# Patient Record
Sex: FEMALE | Race: WHITE | HISPANIC OR LATINO | Employment: OTHER | URBAN - METROPOLITAN AREA
[De-identification: names, ages, dates, MRNs, and addresses within clinical notes are randomized per-mention and may not be internally consistent; named-entity substitution may affect disease eponyms.]

---

## 2017-01-01 ENCOUNTER — OFFICE VISIT (OUTPATIENT)
Dept: PRIMARY CARE CLINIC | Facility: CLINIC | Age: 62
End: 2017-01-01

## 2017-01-01 ENCOUNTER — HOSPITAL ENCOUNTER (INPATIENT)
Facility: HOSPITAL | Age: 62
LOS: 11 days | Discharge: HOME | DRG: 281 | End: 2017-12-25
Attending: EMERGENCY MEDICINE | Admitting: HOSPITALIST
Payer: COMMERCIAL

## 2017-01-01 ENCOUNTER — TELEPHONE (OUTPATIENT)
Dept: CARDIAC REHAB | Facility: CLINIC | Age: 62
End: 2017-01-01

## 2017-01-01 VITALS
BODY MASS INDEX: 28.34 KG/M2 | HEART RATE: 74 BPM | DIASTOLIC BLOOD PRESSURE: 56 MMHG | WEIGHT: 162.5 LBS | SYSTOLIC BLOOD PRESSURE: 98 MMHG | OXYGEN SATURATION: 94 % | TEMPERATURE: 98 F | OXYGEN SATURATION: 94 % | HEIGHT: 63 IN | SYSTOLIC BLOOD PRESSURE: 110 MMHG | RESPIRATION RATE: 18 BRPM | BODY MASS INDEX: 28.79 KG/M2 | HEART RATE: 76 BPM | WEIGHT: 159.94 LBS | DIASTOLIC BLOOD PRESSURE: 63 MMHG | HEIGHT: 63 IN

## 2017-01-01 DIAGNOSIS — R06.02 SOB (SHORTNESS OF BREATH): ICD-10-CM

## 2017-01-01 DIAGNOSIS — I35.0 NONRHEUMATIC AORTIC VALVE STENOSIS: ICD-10-CM

## 2017-01-01 DIAGNOSIS — E78.5 HYPERLIPIDEMIA ASSOCIATED WITH TYPE 2 DIABETES MELLITUS: Chronic | ICD-10-CM

## 2017-01-01 DIAGNOSIS — I48.0 PAROXYSMAL ATRIAL FIBRILLATION: Primary | ICD-10-CM

## 2017-01-01 DIAGNOSIS — I21.4 NSTEMI (NON-ST ELEVATION MYOCARDIAL INFARCTION): Primary | ICD-10-CM

## 2017-01-01 DIAGNOSIS — I50.23 ACUTE ON CHRONIC SYSTOLIC HEART FAILURE: Primary | ICD-10-CM

## 2017-01-01 DIAGNOSIS — I35.0 SEVERE AORTIC STENOSIS: ICD-10-CM

## 2017-01-01 DIAGNOSIS — I10 ESSENTIAL HYPERTENSION: ICD-10-CM

## 2017-01-01 DIAGNOSIS — I50.22 CHRONIC SYSTOLIC HEART FAILURE: ICD-10-CM

## 2017-01-01 DIAGNOSIS — I50.9 CHF (CONGESTIVE HEART FAILURE): ICD-10-CM

## 2017-01-01 DIAGNOSIS — R06.02 SHORTNESS OF BREATH: ICD-10-CM

## 2017-01-01 DIAGNOSIS — Z79.01 CHRONIC ANTICOAGULATION: Chronic | ICD-10-CM

## 2017-01-01 DIAGNOSIS — E11.69 HYPERLIPIDEMIA ASSOCIATED WITH TYPE 2 DIABETES MELLITUS: Chronic | ICD-10-CM

## 2017-01-01 DIAGNOSIS — R07.9 CHEST PAIN: ICD-10-CM

## 2017-01-01 DIAGNOSIS — I21.4 NON-ST ELEVATION MYOCARDIAL INFARCTION (NSTEMI): ICD-10-CM

## 2017-01-01 DIAGNOSIS — I25.10 CORONARY ARTERY DISEASE INVOLVING NATIVE CORONARY ARTERY OF NATIVE HEART WITHOUT ANGINA PECTORIS: Chronic | ICD-10-CM

## 2017-01-01 DIAGNOSIS — N18.30 CKD (CHRONIC KIDNEY DISEASE) STAGE 3, GFR 30-59 ML/MIN: Chronic | ICD-10-CM

## 2017-01-01 LAB
ABO + RH BLD: NORMAL
ALBUMIN SERPL BCP-MCNC: 3.5 G/DL
ALLENS TEST: ABNORMAL
ALP SERPL-CCNC: 85 U/L
ALT SERPL W/O P-5'-P-CCNC: 17 U/L
ANION GAP SERPL CALC-SCNC: 10 MMOL/L
ANION GAP SERPL CALC-SCNC: 11 MMOL/L
ANION GAP SERPL CALC-SCNC: 11 MMOL/L
ANION GAP SERPL CALC-SCNC: 12 MMOL/L
ANION GAP SERPL CALC-SCNC: 13 MMOL/L
ANION GAP SERPL CALC-SCNC: 14 MMOL/L
ANION GAP SERPL CALC-SCNC: 14 MMOL/L
ANION GAP SERPL CALC-SCNC: 15 MMOL/L
AORTIC VALVE STENOSIS: ABNORMAL
APTT BLDCRRT: 25.6 SEC
AST SERPL-CCNC: 18 U/L
BASOPHILS # BLD AUTO: 0.05 K/UL
BASOPHILS # BLD AUTO: 0.06 K/UL
BASOPHILS # BLD AUTO: 0.06 K/UL
BASOPHILS # BLD AUTO: 0.07 K/UL
BASOPHILS # BLD AUTO: 0.07 K/UL
BASOPHILS # BLD AUTO: 0.08 K/UL
BASOPHILS # BLD AUTO: 0.09 K/UL
BASOPHILS NFR BLD: 0.8 %
BASOPHILS NFR BLD: 0.9 %
BASOPHILS NFR BLD: 1 %
BASOPHILS NFR BLD: 1 %
BASOPHILS NFR BLD: 1.2 %
BASOPHILS NFR BLD: 1.2 %
BASOPHILS NFR BLD: 1.3 %
BASOPHILS NFR BLD: 1.3 %
BASOPHILS NFR BLD: 1.4 %
BASOPHILS NFR BLD: 1.4 %
BASOPHILS NFR BLD: 1.5 %
BASOPHILS NFR BLD: 1.6 %
BILIRUB SERPL-MCNC: 0.3 MG/DL
BILIRUB UR QL STRIP: NEGATIVE
BILIRUB UR QL STRIP: NEGATIVE
BLD GP AB SCN CELLS X3 SERPL QL: NORMAL
BNP SERPL-MCNC: 1429 PG/ML
BNP SERPL-MCNC: 2414 PG/ML
BUN SERPL-MCNC: 22 MG/DL
BUN SERPL-MCNC: 25 MG/DL
BUN SERPL-MCNC: 25 MG/DL
BUN SERPL-MCNC: 26 MG/DL
BUN SERPL-MCNC: 28 MG/DL
BUN SERPL-MCNC: 28 MG/DL
BUN SERPL-MCNC: 33 MG/DL
BUN SERPL-MCNC: 36 MG/DL
BUN SERPL-MCNC: 37 MG/DL
BUN SERPL-MCNC: 38 MG/DL
CALCIUM SERPL-MCNC: 10 MG/DL
CALCIUM SERPL-MCNC: 9 MG/DL
CALCIUM SERPL-MCNC: 9.5 MG/DL
CALCIUM SERPL-MCNC: 9.6 MG/DL
CALCIUM SERPL-MCNC: 9.7 MG/DL
CALCIUM SERPL-MCNC: 9.7 MG/DL
CALCIUM SERPL-MCNC: 9.8 MG/DL
CALCIUM SERPL-MCNC: 9.9 MG/DL
CALCIUM SERPL-MCNC: 9.9 MG/DL
CHLORIDE SERPL-SCNC: 100 MMOL/L
CHLORIDE SERPL-SCNC: 103 MMOL/L
CHLORIDE SERPL-SCNC: 104 MMOL/L
CHLORIDE SERPL-SCNC: 105 MMOL/L
CHLORIDE SERPL-SCNC: 106 MMOL/L
CHLORIDE SERPL-SCNC: 107 MMOL/L
CHLORIDE SERPL-SCNC: 107 MMOL/L
CHLORIDE SERPL-SCNC: 109 MMOL/L
CHOLEST SERPL-MCNC: 173 MG/DL
CHOLEST/HDLC SERPL: 5.2 {RATIO}
CLARITY UR REFRACT.AUTO: CLEAR
CLARITY UR REFRACT.AUTO: CLEAR
CO2 SERPL-SCNC: 19 MMOL/L
CO2 SERPL-SCNC: 21 MMOL/L
CO2 SERPL-SCNC: 22 MMOL/L
CO2 SERPL-SCNC: 23 MMOL/L
CO2 SERPL-SCNC: 24 MMOL/L
CO2 SERPL-SCNC: 25 MMOL/L
CO2 SERPL-SCNC: 26 MMOL/L
CO2 SERPL-SCNC: 27 MMOL/L
CO2 SERPL-SCNC: 27 MMOL/L
COLOR UR AUTO: NORMAL
COLOR UR AUTO: YELLOW
CREAT SERPL-MCNC: 1.2 MG/DL
CREAT SERPL-MCNC: 1.3 MG/DL
CREAT SERPL-MCNC: 1.3 MG/DL
CREAT SERPL-MCNC: 1.4 MG/DL
CREAT SERPL-MCNC: 1.5 MG/DL
CREAT SERPL-MCNC: 1.6 MG/DL
CREAT SERPL-MCNC: 1.6 MG/DL
CREAT SERPL-MCNC: 1.8 MG/DL
CREAT UR-MCNC: 75 MG/DL
DELSYS: ABNORMAL
DIFFERENTIAL METHOD: ABNORMAL
EOSINOPHIL # BLD AUTO: 0.1 K/UL
EOSINOPHIL # BLD AUTO: 0.2 K/UL
EOSINOPHIL # BLD AUTO: 0.3 K/UL
EOSINOPHIL # BLD AUTO: 0.3 K/UL
EOSINOPHIL # BLD AUTO: 0.4 K/UL
EOSINOPHIL NFR BLD: 1.7 %
EOSINOPHIL NFR BLD: 2.4 %
EOSINOPHIL NFR BLD: 2.5 %
EOSINOPHIL NFR BLD: 2.8 %
EOSINOPHIL NFR BLD: 3 %
EOSINOPHIL NFR BLD: 3.2 %
EOSINOPHIL NFR BLD: 3.3 %
EOSINOPHIL NFR BLD: 3.7 %
EOSINOPHIL NFR BLD: 3.8 %
EOSINOPHIL NFR BLD: 5.1 %
EOSINOPHIL NFR BLD: 5.2 %
EOSINOPHIL NFR BLD: 6.7 %
ERYTHROCYTE [DISTWIDTH] IN BLOOD BY AUTOMATED COUNT: 15.1 %
ERYTHROCYTE [DISTWIDTH] IN BLOOD BY AUTOMATED COUNT: 15.2 %
ERYTHROCYTE [DISTWIDTH] IN BLOOD BY AUTOMATED COUNT: 15.3 %
ERYTHROCYTE [DISTWIDTH] IN BLOOD BY AUTOMATED COUNT: 15.3 %
ERYTHROCYTE [DISTWIDTH] IN BLOOD BY AUTOMATED COUNT: 15.4 %
ERYTHROCYTE [DISTWIDTH] IN BLOOD BY AUTOMATED COUNT: 15.5 %
ERYTHROCYTE [DISTWIDTH] IN BLOOD BY AUTOMATED COUNT: 15.6 %
ERYTHROCYTE [DISTWIDTH] IN BLOOD BY AUTOMATED COUNT: 15.6 %
ERYTHROCYTE [DISTWIDTH] IN BLOOD BY AUTOMATED COUNT: 15.7 %
EST. GFR  (AFRICAN AMERICAN): 34 ML/MIN/1.73 M^2
EST. GFR  (AFRICAN AMERICAN): 39.3 ML/MIN/1.73 M^2
EST. GFR  (AFRICAN AMERICAN): 39.3 ML/MIN/1.73 M^2
EST. GFR  (AFRICAN AMERICAN): 42.4 ML/MIN/1.73 M^2
EST. GFR  (AFRICAN AMERICAN): 46.1 ML/MIN/1.73 M^2
EST. GFR  (AFRICAN AMERICAN): 50.5 ML/MIN/1.73 M^2
EST. GFR  (AFRICAN AMERICAN): 50.5 ML/MIN/1.73 M^2
EST. GFR  (AFRICAN AMERICAN): 55.6 ML/MIN/1.73 M^2
EST. GFR  (NON AFRICAN AMERICAN): 29.5 ML/MIN/1.73 M^2
EST. GFR  (NON AFRICAN AMERICAN): 34 ML/MIN/1.73 M^2
EST. GFR  (NON AFRICAN AMERICAN): 34 ML/MIN/1.73 M^2
EST. GFR  (NON AFRICAN AMERICAN): 36.8 ML/MIN/1.73 M^2
EST. GFR  (NON AFRICAN AMERICAN): 40 ML/MIN/1.73 M^2
EST. GFR  (NON AFRICAN AMERICAN): 43.8 ML/MIN/1.73 M^2
EST. GFR  (NON AFRICAN AMERICAN): 43.8 ML/MIN/1.73 M^2
EST. GFR  (NON AFRICAN AMERICAN): 48.2 ML/MIN/1.73 M^2
ESTIMATED AVG GLUCOSE: 108 MG/DL
ESTIMATED PA SYSTOLIC PRESSURE: 62.91
FACT X PPP CHRO-ACNC: 0.72 IU/ML
GLUCOSE SERPL-MCNC: 101 MG/DL
GLUCOSE SERPL-MCNC: 103 MG/DL
GLUCOSE SERPL-MCNC: 108 MG/DL
GLUCOSE SERPL-MCNC: 108 MG/DL
GLUCOSE SERPL-MCNC: 113 MG/DL
GLUCOSE SERPL-MCNC: 113 MG/DL
GLUCOSE SERPL-MCNC: 114 MG/DL
GLUCOSE SERPL-MCNC: 117 MG/DL
GLUCOSE SERPL-MCNC: 119 MG/DL
GLUCOSE SERPL-MCNC: 121 MG/DL
GLUCOSE SERPL-MCNC: 126 MG/DL
GLUCOSE SERPL-MCNC: 90 MG/DL
GLUCOSE UR QL STRIP: NEGATIVE
GLUCOSE UR QL STRIP: NEGATIVE
HBA1C MFR BLD HPLC: 5.4 %
HCO3 UR-SCNC: 22.1 MMOL/L (ref 24–28)
HCT VFR BLD AUTO: 33.8 %
HCT VFR BLD AUTO: 34.1 %
HCT VFR BLD AUTO: 34.3 %
HCT VFR BLD AUTO: 34.3 %
HCT VFR BLD AUTO: 34.5 %
HCT VFR BLD AUTO: 34.6 %
HCT VFR BLD AUTO: 34.6 %
HCT VFR BLD AUTO: 34.7 %
HCT VFR BLD AUTO: 34.8 %
HCT VFR BLD AUTO: 34.9 %
HCT VFR BLD AUTO: 36.1 %
HCT VFR BLD AUTO: 36.2 %
HDLC SERPL-MCNC: 33 MG/DL
HDLC SERPL: 19.1 %
HGB BLD-MCNC: 10.7 G/DL
HGB BLD-MCNC: 10.8 G/DL
HGB BLD-MCNC: 10.8 G/DL
HGB BLD-MCNC: 10.9 G/DL
HGB BLD-MCNC: 11 G/DL
HGB BLD-MCNC: 11.1 G/DL
HGB BLD-MCNC: 11.2 G/DL
HGB BLD-MCNC: 11.2 G/DL
HGB BLD-MCNC: 11.3 G/DL
HGB BLD-MCNC: 11.4 G/DL
HGB UR QL STRIP: NEGATIVE
HGB UR QL STRIP: NEGATIVE
IMM GRANULOCYTES # BLD AUTO: 0.01 K/UL
IMM GRANULOCYTES # BLD AUTO: 0.02 K/UL
IMM GRANULOCYTES # BLD AUTO: 0.03 K/UL
IMM GRANULOCYTES # BLD AUTO: 0.03 K/UL
IMM GRANULOCYTES # BLD AUTO: 0.04 K/UL
IMM GRANULOCYTES NFR BLD AUTO: 0.2 %
IMM GRANULOCYTES NFR BLD AUTO: 0.3 %
IMM GRANULOCYTES NFR BLD AUTO: 0.5 %
IMM GRANULOCYTES NFR BLD AUTO: 0.6 %
IMM GRANULOCYTES NFR BLD AUTO: 0.8 %
INR PPP: 1.6
KETONES UR QL STRIP: NEGATIVE
KETONES UR QL STRIP: NEGATIVE
LDLC SERPL CALC-MCNC: 100.2 MG/DL
LEUKOCYTE ESTERASE UR QL STRIP: NEGATIVE
LEUKOCYTE ESTERASE UR QL STRIP: NEGATIVE
LYMPHOCYTES # BLD AUTO: 1.9 K/UL
LYMPHOCYTES # BLD AUTO: 2.1 K/UL
LYMPHOCYTES # BLD AUTO: 2.1 K/UL
LYMPHOCYTES # BLD AUTO: 2.2 K/UL
LYMPHOCYTES # BLD AUTO: 2.3 K/UL
LYMPHOCYTES # BLD AUTO: 2.3 K/UL
LYMPHOCYTES # BLD AUTO: 2.4 K/UL
LYMPHOCYTES # BLD AUTO: 2.7 K/UL
LYMPHOCYTES # BLD AUTO: 2.7 K/UL
LYMPHOCYTES # BLD AUTO: 2.8 K/UL
LYMPHOCYTES # BLD AUTO: 2.9 K/UL
LYMPHOCYTES # BLD AUTO: 3.8 K/UL
LYMPHOCYTES NFR BLD: 31.7 %
LYMPHOCYTES NFR BLD: 33.3 %
LYMPHOCYTES NFR BLD: 33.8 %
LYMPHOCYTES NFR BLD: 34.2 %
LYMPHOCYTES NFR BLD: 34.8 %
LYMPHOCYTES NFR BLD: 35.7 %
LYMPHOCYTES NFR BLD: 38.6 %
LYMPHOCYTES NFR BLD: 47.8 %
LYMPHOCYTES NFR BLD: 48.3 %
LYMPHOCYTES NFR BLD: 50.9 %
LYMPHOCYTES NFR BLD: 51.4 %
LYMPHOCYTES NFR BLD: 51.6 %
MAGNESIUM SERPL-MCNC: 2.1 MG/DL
MCH RBC QN AUTO: 29.9 PG
MCH RBC QN AUTO: 30.2 PG
MCH RBC QN AUTO: 30.3 PG
MCH RBC QN AUTO: 30.4 PG
MCH RBC QN AUTO: 30.6 PG
MCH RBC QN AUTO: 30.6 PG
MCH RBC QN AUTO: 30.7 PG
MCH RBC QN AUTO: 30.9 PG
MCH RBC QN AUTO: 30.9 PG
MCH RBC QN AUTO: 31 PG
MCH RBC QN AUTO: 31.1 PG
MCH RBC QN AUTO: 31.2 PG
MCHC RBC AUTO-ENTMCNC: 31 G/DL
MCHC RBC AUTO-ENTMCNC: 31.2 G/DL
MCHC RBC AUTO-ENTMCNC: 31.2 G/DL
MCHC RBC AUTO-ENTMCNC: 31.5 G/DL
MCHC RBC AUTO-ENTMCNC: 31.5 G/DL
MCHC RBC AUTO-ENTMCNC: 31.7 G/DL
MCHC RBC AUTO-ENTMCNC: 31.8 G/DL
MCHC RBC AUTO-ENTMCNC: 32 G/DL
MCHC RBC AUTO-ENTMCNC: 32.1 G/DL
MCHC RBC AUTO-ENTMCNC: 32.2 G/DL
MCHC RBC AUTO-ENTMCNC: 32.3 G/DL
MCHC RBC AUTO-ENTMCNC: 32.8 G/DL
MCV RBC AUTO: 100 FL
MCV RBC AUTO: 95 FL
MCV RBC AUTO: 96 FL
MCV RBC AUTO: 97 FL
MITRAL VALVE REGURGITATION: ABNORMAL
MODE: ABNORMAL
MONOCYTES # BLD AUTO: 0.3 K/UL
MONOCYTES # BLD AUTO: 0.4 K/UL
MONOCYTES # BLD AUTO: 0.5 K/UL
MONOCYTES NFR BLD: 5.1 %
MONOCYTES NFR BLD: 5.2 %
MONOCYTES NFR BLD: 5.5 %
MONOCYTES NFR BLD: 5.6 %
MONOCYTES NFR BLD: 6.3 %
MONOCYTES NFR BLD: 7.6 %
MONOCYTES NFR BLD: 7.6 %
MONOCYTES NFR BLD: 7.9 %
MONOCYTES NFR BLD: 7.9 %
MONOCYTES NFR BLD: 8.2 %
MONOCYTES NFR BLD: 8.9 %
MONOCYTES NFR BLD: 9.6 %
NEUTROPHILS # BLD AUTO: 1.6 K/UL
NEUTROPHILS # BLD AUTO: 1.8 K/UL
NEUTROPHILS # BLD AUTO: 1.8 K/UL
NEUTROPHILS # BLD AUTO: 2.2 K/UL
NEUTROPHILS # BLD AUTO: 2.3 K/UL
NEUTROPHILS # BLD AUTO: 3.2 K/UL
NEUTROPHILS # BLD AUTO: 3.3 K/UL
NEUTROPHILS # BLD AUTO: 3.4 K/UL
NEUTROPHILS # BLD AUTO: 3.6 K/UL
NEUTROPHILS # BLD AUTO: 3.6 K/UL
NEUTROPHILS # BLD AUTO: 3.8 K/UL
NEUTROPHILS # BLD AUTO: 4.7 K/UL
NEUTROPHILS NFR BLD: 32.3 %
NEUTROPHILS NFR BLD: 32.3 %
NEUTROPHILS NFR BLD: 33.7 %
NEUTROPHILS NFR BLD: 39.2 %
NEUTROPHILS NFR BLD: 42.7 %
NEUTROPHILS NFR BLD: 47.4 %
NEUTROPHILS NFR BLD: 51.6 %
NEUTROPHILS NFR BLD: 52.4 %
NEUTROPHILS NFR BLD: 55.8 %
NEUTROPHILS NFR BLD: 57 %
NEUTROPHILS NFR BLD: 57 %
NEUTROPHILS NFR BLD: 58.5 %
NITRITE UR QL STRIP: NEGATIVE
NITRITE UR QL STRIP: NEGATIVE
NONHDLC SERPL-MCNC: 140 MG/DL
NRBC BLD-RTO: 0 /100 WBC
PCO2 BLDA: 31.2 MMHG (ref 35–45)
PH SMN: 7.46 [PH] (ref 7.35–7.45)
PH UR STRIP: 5 [PH] (ref 5–8)
PH UR STRIP: 6 [PH] (ref 5–8)
PHOSPHATE SERPL-MCNC: 4.3 MG/DL
PLATELET # BLD AUTO: 389 K/UL
PLATELET # BLD AUTO: 473 K/UL
PLATELET # BLD AUTO: 492 K/UL
PLATELET # BLD AUTO: 494 K/UL
PLATELET # BLD AUTO: 496 K/UL
PLATELET # BLD AUTO: 505 K/UL
PLATELET # BLD AUTO: 508 K/UL
PLATELET # BLD AUTO: 514 K/UL
PLATELET # BLD AUTO: 521 K/UL
PLATELET # BLD AUTO: 525 K/UL
PLATELET # BLD AUTO: 526 K/UL
PLATELET # BLD AUTO: 528 K/UL
PMV BLD AUTO: 10 FL
PMV BLD AUTO: 10 FL
PMV BLD AUTO: 10.1 FL
PMV BLD AUTO: 10.4 FL
PMV BLD AUTO: 9.7 FL
PMV BLD AUTO: 9.8 FL
PMV BLD AUTO: 9.9 FL
PMV BLD AUTO: 9.9 FL
PO2 BLDA: 56 MMHG (ref 80–100)
POC BE: -2 MMOL/L
POC SATURATED O2: 91 % (ref 95–100)
POC TCO2: 23 MMOL/L (ref 23–27)
POCT GLUCOSE: 100 MG/DL (ref 70–110)
POCT GLUCOSE: 102 MG/DL (ref 70–110)
POCT GLUCOSE: 104 MG/DL (ref 70–110)
POCT GLUCOSE: 105 MG/DL (ref 70–110)
POCT GLUCOSE: 107 MG/DL (ref 70–110)
POCT GLUCOSE: 109 MG/DL (ref 70–110)
POCT GLUCOSE: 112 MG/DL (ref 70–110)
POCT GLUCOSE: 112 MG/DL (ref 70–110)
POCT GLUCOSE: 115 MG/DL (ref 70–110)
POCT GLUCOSE: 118 MG/DL (ref 70–110)
POCT GLUCOSE: 118 MG/DL (ref 70–110)
POCT GLUCOSE: 120 MG/DL (ref 70–110)
POCT GLUCOSE: 121 MG/DL (ref 70–110)
POCT GLUCOSE: 122 MG/DL (ref 70–110)
POCT GLUCOSE: 123 MG/DL (ref 70–110)
POCT GLUCOSE: 125 MG/DL (ref 70–110)
POCT GLUCOSE: 126 MG/DL (ref 70–110)
POCT GLUCOSE: 127 MG/DL (ref 70–110)
POCT GLUCOSE: 131 MG/DL (ref 70–110)
POCT GLUCOSE: 132 MG/DL (ref 70–110)
POCT GLUCOSE: 141 MG/DL (ref 70–110)
POCT GLUCOSE: 141 MG/DL (ref 70–110)
POCT GLUCOSE: 144 MG/DL (ref 70–110)
POCT GLUCOSE: 159 MG/DL (ref 70–110)
POCT GLUCOSE: 64 MG/DL (ref 70–110)
POCT GLUCOSE: 83 MG/DL (ref 70–110)
POCT GLUCOSE: 85 MG/DL (ref 70–110)
POCT GLUCOSE: 89 MG/DL (ref 70–110)
POCT GLUCOSE: 90 MG/DL (ref 70–110)
POCT GLUCOSE: 92 MG/DL (ref 70–110)
POCT GLUCOSE: 97 MG/DL (ref 70–110)
POCT GLUCOSE: 99 MG/DL (ref 70–110)
POTASSIUM SERPL-SCNC: 3.2 MMOL/L
POTASSIUM SERPL-SCNC: 3.3 MMOL/L
POTASSIUM SERPL-SCNC: 3.6 MMOL/L
POTASSIUM SERPL-SCNC: 3.9 MMOL/L
POTASSIUM SERPL-SCNC: 4 MMOL/L
POTASSIUM SERPL-SCNC: 4.1 MMOL/L
POTASSIUM SERPL-SCNC: 4.2 MMOL/L
POTASSIUM SERPL-SCNC: 4.3 MMOL/L
POTASSIUM SERPL-SCNC: 4.3 MMOL/L
PROT SERPL-MCNC: 8.1 G/DL
PROT UR QL STRIP: NEGATIVE
PROT UR QL STRIP: NEGATIVE
PROTHROMBIN TIME: 16.6 SEC
RBC # BLD AUTO: 3.5 M/UL
RBC # BLD AUTO: 3.5 M/UL
RBC # BLD AUTO: 3.55 M/UL
RBC # BLD AUTO: 3.56 M/UL
RBC # BLD AUTO: 3.57 M/UL
RBC # BLD AUTO: 3.58 M/UL
RBC # BLD AUTO: 3.59 M/UL
RBC # BLD AUTO: 3.65 M/UL
RBC # BLD AUTO: 3.74 M/UL
RBC # BLD AUTO: 3.78 M/UL
RETIRED EF AND QEF - SEE NOTES: 30 (ref 55–65)
SAMPLE: ABNORMAL
SITE: ABNORMAL
SODIUM SERPL-SCNC: 138 MMOL/L
SODIUM SERPL-SCNC: 139 MMOL/L
SODIUM SERPL-SCNC: 140 MMOL/L
SODIUM SERPL-SCNC: 140 MMOL/L
SODIUM SERPL-SCNC: 141 MMOL/L
SODIUM SERPL-SCNC: 142 MMOL/L
SODIUM SERPL-SCNC: 143 MMOL/L
SODIUM SERPL-SCNC: 145 MMOL/L
SP GR UR STRIP: 1.01 (ref 1–1.03)
SP GR UR STRIP: 1.01 (ref 1–1.03)
TRICUSPID VALVE REGURGITATION: ABNORMAL
TRIGL SERPL-MCNC: 199 MG/DL
TROPONIN I SERPL DL<=0.01 NG/ML-MCNC: 0.51 NG/ML
TROPONIN I SERPL DL<=0.01 NG/ML-MCNC: 2.74 NG/ML
TROPONIN I SERPL DL<=0.01 NG/ML-MCNC: 2.94 NG/ML
TROPONIN I SERPL DL<=0.01 NG/ML-MCNC: 2.96 NG/ML
TSH SERPL DL<=0.005 MIU/L-ACNC: 3.68 UIU/ML
URN SPEC COLLECT METH UR: NORMAL
URN SPEC COLLECT METH UR: NORMAL
UROBILINOGEN UR STRIP-ACNC: NEGATIVE EU/DL
UROBILINOGEN UR STRIP-ACNC: NEGATIVE EU/DL
UUN UR-MCNC: 597 MG/DL
WBC # BLD AUTO: 4.79 K/UL
WBC # BLD AUTO: 4.82 K/UL
WBC # BLD AUTO: 5.52 K/UL
WBC # BLD AUTO: 5.53 K/UL
WBC # BLD AUTO: 5.64 K/UL
WBC # BLD AUTO: 6.09 K/UL
WBC # BLD AUTO: 6.29 K/UL
WBC # BLD AUTO: 6.33 K/UL
WBC # BLD AUTO: 6.47 K/UL
WBC # BLD AUTO: 6.52 K/UL
WBC # BLD AUTO: 7.88 K/UL
WBC # BLD AUTO: 8.15 K/UL

## 2017-01-01 PROCEDURE — 63600175 PHARM REV CODE 636 W HCPCS: Performed by: INTERNAL MEDICINE

## 2017-01-01 PROCEDURE — 12000002 HC ACUTE/MED SURGE SEMI-PRIVATE ROOM

## 2017-01-01 PROCEDURE — 36415 COLL VENOUS BLD VENIPUNCTURE: CPT

## 2017-01-01 PROCEDURE — 85025 COMPLETE CBC W/AUTO DIFF WBC: CPT

## 2017-01-01 PROCEDURE — 83735 ASSAY OF MAGNESIUM: CPT

## 2017-01-01 PROCEDURE — 83880 ASSAY OF NATRIURETIC PEPTIDE: CPT

## 2017-01-01 PROCEDURE — 99232 SBSQ HOSP IP/OBS MODERATE 35: CPT | Mod: ,,, | Performed by: HOSPITALIST

## 2017-01-01 PROCEDURE — 20600001 HC STEP DOWN PRIVATE ROOM

## 2017-01-01 PROCEDURE — 80048 BASIC METABOLIC PNL TOTAL CA: CPT

## 2017-01-01 PROCEDURE — 99285 EMERGENCY DEPT VISIT HI MDM: CPT | Mod: 25

## 2017-01-01 PROCEDURE — 93306 TTE W/DOPPLER COMPLETE: CPT | Mod: 26,,, | Performed by: INTERNAL MEDICINE

## 2017-01-01 PROCEDURE — 82570 ASSAY OF URINE CREATININE: CPT

## 2017-01-01 PROCEDURE — 82962 GLUCOSE BLOOD TEST: CPT

## 2017-01-01 PROCEDURE — 97803 MED NUTRITION INDIV SUBSEQ: CPT | Performed by: DIETITIAN, REGISTERED

## 2017-01-01 PROCEDURE — 25000003 PHARM REV CODE 250: Performed by: INTERNAL MEDICINE

## 2017-01-01 PROCEDURE — 93010 ELECTROCARDIOGRAM REPORT: CPT | Mod: 76,,, | Performed by: INTERNAL MEDICINE

## 2017-01-01 PROCEDURE — 99233 SBSQ HOSP IP/OBS HIGH 50: CPT | Mod: ,,, | Performed by: HOSPITALIST

## 2017-01-01 PROCEDURE — 63600175 PHARM REV CODE 636 W HCPCS: Performed by: HOSPITALIST

## 2017-01-01 PROCEDURE — 99255 IP/OBS CONSLTJ NEW/EST HI 80: CPT | Mod: ,,, | Performed by: NURSE PRACTITIONER

## 2017-01-01 PROCEDURE — 80053 COMPREHEN METABOLIC PANEL: CPT

## 2017-01-01 PROCEDURE — C8929 TTE W OR WO FOL WCON,DOPPLER: HCPCS

## 2017-01-01 PROCEDURE — 25000003 PHARM REV CODE 250: Performed by: HOSPITALIST

## 2017-01-01 PROCEDURE — 81003 URINALYSIS AUTO W/O SCOPE: CPT

## 2017-01-01 PROCEDURE — 84484 ASSAY OF TROPONIN QUANT: CPT

## 2017-01-01 PROCEDURE — 11000001 HC ACUTE MED/SURG PRIVATE ROOM

## 2017-01-01 PROCEDURE — 99496 TRANSJ CARE MGMT HIGH F2F 7D: CPT | Mod: PBBFAC | Performed by: INTERNAL MEDICINE

## 2017-01-01 PROCEDURE — 25500020 PHARM REV CODE 255: Performed by: HOSPITALIST

## 2017-01-01 PROCEDURE — 99285 EMERGENCY DEPT VISIT HI MDM: CPT | Mod: ,,, | Performed by: INTERNAL MEDICINE

## 2017-01-01 PROCEDURE — 86901 BLOOD TYPING SEROLOGIC RH(D): CPT

## 2017-01-01 PROCEDURE — 99239 HOSP IP/OBS DSCHRG MGMT >30: CPT | Mod: ,,, | Performed by: HOSPITALIST

## 2017-01-01 PROCEDURE — 93005 ELECTROCARDIOGRAM TRACING: CPT

## 2017-01-01 PROCEDURE — 82803 BLOOD GASES ANY COMBINATION: CPT

## 2017-01-01 PROCEDURE — 85730 THROMBOPLASTIN TIME PARTIAL: CPT

## 2017-01-01 PROCEDURE — 93010 ELECTROCARDIOGRAM REPORT: CPT | Mod: ,,, | Performed by: INTERNAL MEDICINE

## 2017-01-01 PROCEDURE — 96365 THER/PROPH/DIAG IV INF INIT: CPT

## 2017-01-01 PROCEDURE — 97802 MEDICAL NUTRITION INDIV IN: CPT | Performed by: DIETITIAN, REGISTERED

## 2017-01-01 PROCEDURE — 96366 THER/PROPH/DIAG IV INF ADDON: CPT

## 2017-01-01 PROCEDURE — 99223 1ST HOSP IP/OBS HIGH 75: CPT | Mod: ,,, | Performed by: INTERNAL MEDICINE

## 2017-01-01 PROCEDURE — 63600175 PHARM REV CODE 636 W HCPCS: Performed by: EMERGENCY MEDICINE

## 2017-01-01 PROCEDURE — 36600 WITHDRAWAL OF ARTERIAL BLOOD: CPT

## 2017-01-01 PROCEDURE — 99231 SBSQ HOSP IP/OBS SF/LOW 25: CPT | Mod: ,,, | Performed by: HOSPITALIST

## 2017-01-01 PROCEDURE — 84100 ASSAY OF PHOSPHORUS: CPT

## 2017-01-01 PROCEDURE — 99496 TRANSJ CARE MGMT HIGH F2F 7D: CPT | Mod: S$PBB,,, | Performed by: INTERNAL MEDICINE

## 2017-01-01 PROCEDURE — 25000003 PHARM REV CODE 250

## 2017-01-01 PROCEDURE — 63600175 PHARM REV CODE 636 W HCPCS: Performed by: STUDENT IN AN ORGANIZED HEALTH CARE EDUCATION/TRAINING PROGRAM

## 2017-01-01 PROCEDURE — 96375 TX/PRO/DX INJ NEW DRUG ADDON: CPT

## 2017-01-01 PROCEDURE — 99999 PR PBB SHADOW E&M-EST. PATIENT-LVL III: CPT | Mod: PBBFAC,,, | Performed by: INTERNAL MEDICINE

## 2017-01-01 PROCEDURE — 84443 ASSAY THYROID STIM HORMONE: CPT

## 2017-01-01 PROCEDURE — 85520 HEPARIN ASSAY: CPT

## 2017-01-01 PROCEDURE — 83036 HEMOGLOBIN GLYCOSYLATED A1C: CPT

## 2017-01-01 PROCEDURE — 84540 ASSAY OF URINE/UREA-N: CPT

## 2017-01-01 PROCEDURE — 85610 PROTHROMBIN TIME: CPT

## 2017-01-01 PROCEDURE — 99233 SBSQ HOSP IP/OBS HIGH 50: CPT | Mod: ,,, | Performed by: INTERNAL MEDICINE

## 2017-01-01 PROCEDURE — 80061 LIPID PANEL: CPT

## 2017-01-01 PROCEDURE — 99291 CRITICAL CARE FIRST HOUR: CPT | Mod: ,,, | Performed by: EMERGENCY MEDICINE

## 2017-01-01 RX ORDER — BUTALBITAL, ACETAMINOPHEN AND CAFFEINE 50; 325; 40 MG/1; MG/1; MG/1
1 TABLET ORAL EVERY 4 HOURS PRN
Status: DISCONTINUED | OUTPATIENT
Start: 2017-01-01 | End: 2017-01-01 | Stop reason: HOSPADM

## 2017-01-01 RX ORDER — FUROSEMIDE 10 MG/ML
40 INJECTION INTRAMUSCULAR; INTRAVENOUS 3 TIMES DAILY
Status: DISCONTINUED | OUTPATIENT
Start: 2017-01-01 | End: 2017-01-01

## 2017-01-01 RX ORDER — FUROSEMIDE 40 MG/1
40 TABLET ORAL 2 TIMES DAILY
Qty: 60 TABLET | Refills: 0 | Status: SHIPPED | OUTPATIENT
Start: 2017-01-01 | End: 2017-01-01

## 2017-01-01 RX ORDER — SENNOSIDES 8.6 MG/1
1 TABLET ORAL 2 TIMES DAILY PRN
COMMUNITY
Start: 2017-01-01

## 2017-01-01 RX ORDER — ATORVASTATIN CALCIUM 80 MG/1
80 TABLET, FILM COATED ORAL DAILY
Qty: 30 TABLET | Refills: 11 | Status: SHIPPED | OUTPATIENT
Start: 2017-01-01

## 2017-01-01 RX ORDER — METFORMIN HYDROCHLORIDE 500 MG/1
500 TABLET, FILM COATED, EXTENDED RELEASE ORAL
COMMUNITY

## 2017-01-01 RX ORDER — FUROSEMIDE 10 MG/ML
40 INJECTION INTRAMUSCULAR; INTRAVENOUS
Status: COMPLETED | OUTPATIENT
Start: 2017-01-01 | End: 2017-01-01

## 2017-01-01 RX ORDER — CLOPIDOGREL BISULFATE 75 MG/1
75 TABLET ORAL DAILY
Status: DISCONTINUED | OUTPATIENT
Start: 2017-01-01 | End: 2017-01-01 | Stop reason: HOSPADM

## 2017-01-01 RX ORDER — ASPIRIN 325 MG
325 TABLET, DELAYED RELEASE (ENTERIC COATED) ORAL
Status: DISCONTINUED | OUTPATIENT
Start: 2017-01-01 | End: 2017-01-01

## 2017-01-01 RX ORDER — ALBUTEROL SULFATE 0.83 MG/ML
2.5 SOLUTION RESPIRATORY (INHALATION) EVERY 4 HOURS PRN
Status: DISCONTINUED | OUTPATIENT
Start: 2017-01-01 | End: 2017-01-01 | Stop reason: HOSPADM

## 2017-01-01 RX ORDER — FUROSEMIDE 40 MG/1
40 TABLET ORAL 2 TIMES DAILY
Status: DISCONTINUED | OUTPATIENT
Start: 2017-01-01 | End: 2017-01-01 | Stop reason: HOSPADM

## 2017-01-01 RX ORDER — FUROSEMIDE 40 MG/1
40 TABLET ORAL 2 TIMES DAILY
Qty: 60 TABLET | Refills: 0 | Status: SHIPPED | OUTPATIENT
Start: 2017-01-01 | End: 2017-01-01 | Stop reason: SDUPTHER

## 2017-01-01 RX ORDER — FUROSEMIDE 40 MG/1
40 TABLET ORAL 2 TIMES DAILY
Status: DISCONTINUED | OUTPATIENT
Start: 2017-01-01 | End: 2017-01-01

## 2017-01-01 RX ORDER — DIPHENHYDRAMINE HCL 50 MG
50 CAPSULE ORAL EVERY 6 HOURS
Status: CANCELLED | OUTPATIENT
Start: 2017-01-01 | End: 2017-01-01

## 2017-01-01 RX ORDER — POTASSIUM CHLORIDE 20 MEQ/1
60 TABLET, EXTENDED RELEASE ORAL ONCE
Status: COMPLETED | OUTPATIENT
Start: 2017-01-01 | End: 2017-01-01

## 2017-01-01 RX ORDER — CLOPIDOGREL 300 MG/1
300 TABLET, FILM COATED ORAL ONCE
Status: COMPLETED | OUTPATIENT
Start: 2017-01-01 | End: 2017-01-01

## 2017-01-01 RX ORDER — HEPARIN SODIUM 10000 [USP'U]/100ML
12 INJECTION, SOLUTION INTRAVENOUS CONTINUOUS
Status: DISCONTINUED | OUTPATIENT
Start: 2017-01-01 | End: 2017-01-01

## 2017-01-01 RX ORDER — FUROSEMIDE 10 MG/ML
60 INJECTION INTRAMUSCULAR; INTRAVENOUS ONCE
Status: COMPLETED | OUTPATIENT
Start: 2017-01-01 | End: 2017-01-01

## 2017-01-01 RX ORDER — GLUCAGON 1 MG
1 KIT INJECTION
Status: DISCONTINUED | OUTPATIENT
Start: 2017-01-01 | End: 2017-01-01 | Stop reason: HOSPADM

## 2017-01-01 RX ORDER — CLOPIDOGREL BISULFATE 75 MG/1
75 TABLET ORAL DAILY
Qty: 30 TABLET | Refills: 11 | Status: SHIPPED | OUTPATIENT
Start: 2017-01-01 | End: 2017-01-01

## 2017-01-01 RX ORDER — LORAZEPAM 2 MG/ML
0.25 INJECTION INTRAMUSCULAR ONCE
Status: COMPLETED | OUTPATIENT
Start: 2017-01-01 | End: 2017-01-01

## 2017-01-01 RX ORDER — NITROGLYCERIN 0.4 MG/1
0.4 TABLET SUBLINGUAL EVERY 5 MIN PRN
Status: DISCONTINUED | OUTPATIENT
Start: 2017-01-01 | End: 2017-01-01 | Stop reason: HOSPADM

## 2017-01-01 RX ORDER — NAPROXEN SODIUM 220 MG/1
81 TABLET, FILM COATED ORAL DAILY
Refills: 0 | Status: ON HOLD | COMMUNITY
Start: 2017-01-01 | End: 2018-01-01 | Stop reason: HOSPADM

## 2017-01-01 RX ORDER — AMIODARONE HYDROCHLORIDE 200 MG/1
200 TABLET ORAL DAILY
Status: DISCONTINUED | OUTPATIENT
Start: 2017-01-01 | End: 2017-01-01 | Stop reason: HOSPADM

## 2017-01-01 RX ORDER — SENNOSIDES 8.6 MG/1
8.6 TABLET ORAL 2 TIMES DAILY PRN
Status: DISCONTINUED | OUTPATIENT
Start: 2017-01-01 | End: 2017-01-01 | Stop reason: HOSPADM

## 2017-01-01 RX ORDER — METOPROLOL TARTRATE 50 MG/1
50 TABLET ORAL 2 TIMES DAILY
Status: DISCONTINUED | OUTPATIENT
Start: 2017-01-01 | End: 2017-01-01 | Stop reason: HOSPADM

## 2017-01-01 RX ORDER — GUAIFENESIN 600 MG/1
600 TABLET, EXTENDED RELEASE ORAL 2 TIMES DAILY PRN
Status: DISCONTINUED | OUTPATIENT
Start: 2017-01-01 | End: 2017-01-01 | Stop reason: HOSPADM

## 2017-01-01 RX ORDER — AMIODARONE HYDROCHLORIDE 200 MG/1
TABLET ORAL DAILY
Status: ON HOLD | COMMUNITY
End: 2018-01-01 | Stop reason: HOSPADM

## 2017-01-01 RX ORDER — LISINOPRIL 20 MG/1
20 TABLET ORAL DAILY
Status: DISCONTINUED | OUTPATIENT
Start: 2017-01-01 | End: 2017-01-01

## 2017-01-01 RX ORDER — METOPROLOL TARTRATE 50 MG/1
50 TABLET ORAL 2 TIMES DAILY
COMMUNITY
End: 2017-01-01 | Stop reason: SDUPTHER

## 2017-01-01 RX ORDER — DIPHENHYDRAMINE HCL 50 MG
CAPSULE ORAL
Status: DISCONTINUED
Start: 2017-01-01 | End: 2017-01-01 | Stop reason: WASHOUT

## 2017-01-01 RX ORDER — POTASSIUM CHLORIDE 20 MEQ/1
40 TABLET, EXTENDED RELEASE ORAL ONCE
Status: COMPLETED | OUTPATIENT
Start: 2017-01-01 | End: 2017-01-01

## 2017-01-01 RX ORDER — ASPIRIN 325 MG
325 TABLET ORAL DAILY
Status: DISCONTINUED | OUTPATIENT
Start: 2017-01-01 | End: 2017-01-01

## 2017-01-01 RX ORDER — FENOFIBRATE 145 MG/1
145 TABLET, FILM COATED ORAL DAILY
COMMUNITY
End: 2017-01-01

## 2017-01-01 RX ORDER — VENLAFAXINE HYDROCHLORIDE 150 MG/1
CAPSULE, EXTENDED RELEASE ORAL
Refills: 1 | COMMUNITY
Start: 2017-01-01

## 2017-01-01 RX ORDER — ONDANSETRON 8 MG/1
8 TABLET, ORALLY DISINTEGRATING ORAL EVERY 8 HOURS PRN
Status: DISCONTINUED | OUTPATIENT
Start: 2017-01-01 | End: 2017-01-01 | Stop reason: HOSPADM

## 2017-01-01 RX ORDER — NAPROXEN SODIUM 220 MG/1
81 TABLET, FILM COATED ORAL DAILY
Status: DISCONTINUED | OUTPATIENT
Start: 2017-01-01 | End: 2017-01-01 | Stop reason: HOSPADM

## 2017-01-01 RX ORDER — ATORVASTATIN CALCIUM 20 MG/1
80 TABLET, FILM COATED ORAL DAILY
Status: DISCONTINUED | OUTPATIENT
Start: 2017-01-01 | End: 2017-01-01 | Stop reason: HOSPADM

## 2017-01-01 RX ORDER — FUROSEMIDE 10 MG/ML
40 INJECTION INTRAMUSCULAR; INTRAVENOUS 2 TIMES DAILY
Status: DISCONTINUED | OUTPATIENT
Start: 2017-01-01 | End: 2017-01-01

## 2017-01-01 RX ORDER — CLOPIDOGREL BISULFATE 75 MG/1
75 TABLET ORAL DAILY
Qty: 30 TABLET | Refills: 11 | Status: ON HOLD | OUTPATIENT
Start: 2017-01-01 | End: 2018-01-01 | Stop reason: HOSPADM

## 2017-01-01 RX ORDER — BUTALBITAL, ACETAMINOPHEN AND CAFFEINE 50; 325; 40 MG/1; MG/1; MG/1
1 TABLET ORAL EVERY 4 HOURS PRN
COMMUNITY

## 2017-01-01 RX ORDER — NITROGLYCERIN 0.4 MG/1
0.4 TABLET SUBLINGUAL EVERY 5 MIN PRN
Qty: 20 TABLET | Refills: 0 | Status: SHIPPED | OUTPATIENT
Start: 2017-01-01 | End: 2018-12-25

## 2017-01-01 RX ORDER — METOPROLOL TARTRATE 50 MG/1
50 TABLET ORAL 2 TIMES DAILY
Qty: 180 TABLET | Refills: 4 | Status: SHIPPED | OUTPATIENT
Start: 2017-01-01 | End: 2018-01-01 | Stop reason: SDUPTHER

## 2017-01-01 RX ORDER — CLOPIDOGREL 300 MG/1
TABLET, FILM COATED ORAL
Status: DISCONTINUED
Start: 2017-01-01 | End: 2017-01-01 | Stop reason: WASHOUT

## 2017-01-01 RX ORDER — METOPROLOL TARTRATE 1 MG/ML
INJECTION, SOLUTION INTRAVENOUS
Status: DISCONTINUED
Start: 2017-01-01 | End: 2017-01-01 | Stop reason: WASHOUT

## 2017-01-01 RX ORDER — FUROSEMIDE 40 MG/1
40 TABLET ORAL 2 TIMES DAILY
Qty: 60 TABLET | Refills: 0 | Status: SHIPPED | OUTPATIENT
Start: 2017-01-01 | End: 2018-01-01 | Stop reason: SDUPTHER

## 2017-01-01 RX ORDER — POTASSIUM CHLORIDE 20 MEQ/1
40 TABLET, EXTENDED RELEASE ORAL
Status: COMPLETED | OUTPATIENT
Start: 2017-01-01 | End: 2017-01-01

## 2017-01-01 RX ORDER — ASPIRIN 325 MG
TABLET ORAL
Status: COMPLETED
Start: 2017-01-01 | End: 2017-01-01

## 2017-01-01 RX ORDER — GABAPENTIN 100 MG/1
100 CAPSULE ORAL 3 TIMES DAILY
Status: DISCONTINUED | OUTPATIENT
Start: 2017-01-01 | End: 2017-01-01 | Stop reason: HOSPADM

## 2017-01-01 RX ORDER — FUROSEMIDE 20 MG/1
20 TABLET ORAL 2 TIMES DAILY
Status: ON HOLD | COMMUNITY
End: 2017-01-01

## 2017-01-01 RX ORDER — LISINOPRIL 20 MG/1
20 TABLET ORAL DAILY
COMMUNITY
End: 2018-01-01

## 2017-01-01 RX ORDER — ATORVASTATIN CALCIUM 20 MG/1
20 TABLET, FILM COATED ORAL DAILY
Status: ON HOLD | COMMUNITY
End: 2017-01-01

## 2017-01-01 RX ORDER — NITROGLYCERIN 0.4 MG/1
TABLET SUBLINGUAL
Status: DISPENSED
Start: 2017-01-01 | End: 2017-01-01

## 2017-01-01 RX ORDER — FENOFIBRATE 145 MG/1
145 TABLET, FILM COATED ORAL DAILY
Status: DISCONTINUED | OUTPATIENT
Start: 2017-01-01 | End: 2017-01-01 | Stop reason: HOSPADM

## 2017-01-01 RX ORDER — GABAPENTIN 100 MG/1
100 CAPSULE ORAL 3 TIMES DAILY
Status: ON HOLD | COMMUNITY
End: 2018-01-01 | Stop reason: HOSPADM

## 2017-01-01 RX ORDER — TEMAZEPAM 15 MG/1
CAPSULE ORAL
Refills: 2 | Status: ON HOLD | COMMUNITY
Start: 2017-01-01 | End: 2018-01-01 | Stop reason: HOSPADM

## 2017-01-01 RX ORDER — NITROGLYCERIN 0.4 MG/1
0.4 TABLET SUBLINGUAL EVERY 5 MIN PRN
Status: DISCONTINUED | OUTPATIENT
Start: 2017-01-01 | End: 2017-01-01 | Stop reason: SDUPTHER

## 2017-01-01 RX ORDER — INSULIN ASPART 100 [IU]/ML
0-5 INJECTION, SOLUTION INTRAVENOUS; SUBCUTANEOUS EVERY 6 HOURS PRN
Status: DISCONTINUED | OUTPATIENT
Start: 2017-01-01 | End: 2017-01-01 | Stop reason: HOSPADM

## 2017-01-01 RX ADMIN — GABAPENTIN 100 MG: 100 CAPSULE ORAL at 03:12

## 2017-01-01 RX ADMIN — RIVAROXABAN 15 MG: 15 TABLET, FILM COATED ORAL at 04:12

## 2017-01-01 RX ADMIN — FUROSEMIDE 40 MG: 10 INJECTION, SOLUTION INTRAMUSCULAR; INTRAVENOUS at 05:12

## 2017-01-01 RX ADMIN — RIVAROXABAN 15 MG: 15 TABLET, FILM COATED ORAL at 05:12

## 2017-01-01 RX ADMIN — TICAGRELOR 90 MG: 90 TABLET ORAL at 08:12

## 2017-01-01 RX ADMIN — FENOFIBRATE 145 MG: 145 TABLET ORAL at 10:12

## 2017-01-01 RX ADMIN — METOPROLOL TARTRATE 50 MG: 50 TABLET ORAL at 08:12

## 2017-01-01 RX ADMIN — FUROSEMIDE 40 MG: 10 INJECTION, SOLUTION INTRAMUSCULAR; INTRAVENOUS at 08:12

## 2017-01-01 RX ADMIN — FENOFIBRATE 145 MG: 145 TABLET ORAL at 08:12

## 2017-01-01 RX ADMIN — GABAPENTIN 100 MG: 100 CAPSULE ORAL at 09:12

## 2017-01-01 RX ADMIN — METOPROLOL TARTRATE 50 MG: 50 TABLET ORAL at 07:12

## 2017-01-01 RX ADMIN — ASPIRIN 325 MG ORAL TABLET 325 MG: 325 PILL ORAL at 05:12

## 2017-01-01 RX ADMIN — FUROSEMIDE 40 MG: 40 TABLET ORAL at 09:12

## 2017-01-01 RX ADMIN — SENNOSIDES 8.6 MG: 8.6 TABLET, FILM COATED ORAL at 10:12

## 2017-01-01 RX ADMIN — GABAPENTIN 100 MG: 100 CAPSULE ORAL at 01:12

## 2017-01-01 RX ADMIN — FUROSEMIDE 40 MG: 10 INJECTION, SOLUTION INTRAMUSCULAR; INTRAVENOUS at 10:12

## 2017-01-01 RX ADMIN — FENOFIBRATE 145 MG: 145 TABLET ORAL at 09:12

## 2017-01-01 RX ADMIN — METOPROLOL TARTRATE 50 MG: 50 TABLET ORAL at 10:12

## 2017-01-01 RX ADMIN — Medication 325 MG: at 05:12

## 2017-01-01 RX ADMIN — GABAPENTIN 100 MG: 100 CAPSULE ORAL at 02:12

## 2017-01-01 RX ADMIN — FUROSEMIDE 40 MG: 40 TABLET ORAL at 05:12

## 2017-01-01 RX ADMIN — CLOPIDOGREL BISULFATE 300 MG: 300 TABLET, FILM COATED ORAL at 10:12

## 2017-01-01 RX ADMIN — GABAPENTIN 100 MG: 100 CAPSULE ORAL at 08:12

## 2017-01-01 RX ADMIN — GUAIFENESIN 600 MG: 600 TABLET, EXTENDED RELEASE ORAL at 12:12

## 2017-01-01 RX ADMIN — GABAPENTIN 100 MG: 100 CAPSULE ORAL at 05:12

## 2017-01-01 RX ADMIN — AMIODARONE HYDROCHLORIDE 200 MG: 200 TABLET ORAL at 09:12

## 2017-01-01 RX ADMIN — METOPROLOL TARTRATE 50 MG: 50 TABLET ORAL at 09:12

## 2017-01-01 RX ADMIN — TICAGRELOR 180 MG: 90 TABLET ORAL at 09:12

## 2017-01-01 RX ADMIN — HEPARIN SODIUM AND DEXTROSE 12 UNITS/KG/HR: 10000; 5 INJECTION INTRAVENOUS at 10:12

## 2017-01-01 RX ADMIN — HEPARIN SODIUM AND DEXTROSE 11 UNITS/KG/HR: 10000; 5 INJECTION INTRAVENOUS at 01:12

## 2017-01-01 RX ADMIN — TICAGRELOR 90 MG: 90 TABLET ORAL at 09:12

## 2017-01-01 RX ADMIN — BUTALBITAL, ACETAMINOPHEN AND CAFFEINE 1 TABLET: 50; 325; 40 TABLET ORAL at 08:12

## 2017-01-01 RX ADMIN — GABAPENTIN 100 MG: 100 CAPSULE ORAL at 10:12

## 2017-01-01 RX ADMIN — FUROSEMIDE 40 MG: 10 INJECTION, SOLUTION INTRAMUSCULAR; INTRAVENOUS at 09:12

## 2017-01-01 RX ADMIN — TICAGRELOR 90 MG: 90 TABLET ORAL at 10:12

## 2017-01-01 RX ADMIN — BUTALBITAL, ACETAMINOPHEN AND CAFFEINE 1 TABLET: 50; 325; 40 TABLET ORAL at 01:12

## 2017-01-01 RX ADMIN — ASPIRIN 81 MG CHEWABLE TABLET 81 MG: 81 TABLET CHEWABLE at 08:12

## 2017-01-01 RX ADMIN — RIVAROXABAN 20 MG: 20 TABLET, FILM COATED ORAL at 05:12

## 2017-01-01 RX ADMIN — AMIODARONE HYDROCHLORIDE 200 MG: 200 TABLET ORAL at 08:12

## 2017-01-01 RX ADMIN — LISINOPRIL 20 MG: 20 TABLET ORAL at 10:12

## 2017-01-01 RX ADMIN — LORAZEPAM 0.25 MG: 2 INJECTION, SOLUTION INTRAMUSCULAR; INTRAVENOUS at 02:12

## 2017-01-01 RX ADMIN — BUTALBITAL, ACETAMINOPHEN AND CAFFEINE 1 TABLET: 50; 325; 40 TABLET ORAL at 09:12

## 2017-01-01 RX ADMIN — IOHEXOL 100 ML: 350 INJECTION, SOLUTION INTRAVENOUS at 10:12

## 2017-01-01 RX ADMIN — BUTALBITAL, ACETAMINOPHEN AND CAFFEINE 1 TABLET: 50; 325; 40 TABLET ORAL at 03:12

## 2017-01-01 RX ADMIN — ATORVASTATIN CALCIUM 80 MG: 20 TABLET, FILM COATED ORAL at 08:12

## 2017-01-01 RX ADMIN — ONDANSETRON 8 MG: 8 TABLET, ORALLY DISINTEGRATING ORAL at 04:12

## 2017-01-01 RX ADMIN — ASPIRIN 81 MG CHEWABLE TABLET 81 MG: 81 TABLET CHEWABLE at 09:12

## 2017-01-01 RX ADMIN — BUTALBITAL, ACETAMINOPHEN AND CAFFEINE 1 TABLET: 50; 325; 40 TABLET ORAL at 11:12

## 2017-01-01 RX ADMIN — GABAPENTIN 100 MG: 100 CAPSULE ORAL at 07:12

## 2017-01-01 RX ADMIN — BUTALBITAL, ACETAMINOPHEN AND CAFFEINE 1 TABLET: 50; 325; 40 TABLET ORAL at 07:12

## 2017-01-01 RX ADMIN — RIVAROXABAN 15 MG: 15 TABLET, FILM COATED ORAL at 06:12

## 2017-01-01 RX ADMIN — POTASSIUM CHLORIDE 40 MEQ: 1500 TABLET, EXTENDED RELEASE ORAL at 12:12

## 2017-01-01 RX ADMIN — LISINOPRIL 20 MG: 20 TABLET ORAL at 09:12

## 2017-01-01 RX ADMIN — FUROSEMIDE 40 MG: 10 INJECTION, SOLUTION INTRAMUSCULAR; INTRAVENOUS at 02:12

## 2017-01-01 RX ADMIN — ASPIRIN 81 MG CHEWABLE TABLET 81 MG: 81 TABLET CHEWABLE at 10:12

## 2017-01-01 RX ADMIN — AMIODARONE HYDROCHLORIDE 200 MG: 200 TABLET ORAL at 10:12

## 2017-01-01 RX ADMIN — BUTALBITAL, ACETAMINOPHEN AND CAFFEINE 1 TABLET: 50; 325; 40 TABLET ORAL at 10:12

## 2017-01-01 RX ADMIN — FUROSEMIDE 60 MG: 10 INJECTION, SOLUTION INTRAVENOUS at 02:12

## 2017-01-01 RX ADMIN — GABAPENTIN 100 MG: 100 CAPSULE ORAL at 06:12

## 2017-01-01 RX ADMIN — FUROSEMIDE 40 MG: 10 INJECTION, SOLUTION INTRAMUSCULAR; INTRAVENOUS at 01:12

## 2017-01-01 RX ADMIN — BUTALBITAL, ACETAMINOPHEN AND CAFFEINE 1 TABLET: 50; 325; 40 TABLET ORAL at 04:12

## 2017-01-01 RX ADMIN — RIVAROXABAN 15 MG: 15 TABLET, FILM COATED ORAL at 07:12

## 2017-01-01 RX ADMIN — ATORVASTATIN CALCIUM 80 MG: 20 TABLET, FILM COATED ORAL at 10:12

## 2017-01-01 RX ADMIN — POTASSIUM CHLORIDE 60 MEQ: 1500 TABLET, EXTENDED RELEASE ORAL at 09:12

## 2017-01-01 RX ADMIN — POTASSIUM CHLORIDE 40 MEQ: 1500 TABLET, EXTENDED RELEASE ORAL at 01:12

## 2017-01-01 RX ADMIN — POTASSIUM CHLORIDE 40 MEQ: 1500 TABLET, EXTENDED RELEASE ORAL at 10:12

## 2017-01-01 RX ADMIN — ATORVASTATIN CALCIUM 80 MG: 20 TABLET, FILM COATED ORAL at 09:12

## 2017-01-01 RX ADMIN — TICAGRELOR 90 MG: 90 TABLET ORAL at 07:12

## 2017-01-01 RX ADMIN — SALINE NASAL SPRAY 1 SPRAY: 1.5 SOLUTION NASAL at 10:12

## 2017-01-01 RX ADMIN — BUTALBITAL, ACETAMINOPHEN AND CAFFEINE 1 TABLET: 50; 325; 40 TABLET ORAL at 05:12

## 2017-01-01 RX ADMIN — ATORVASTATIN CALCIUM 80 MG: 20 TABLET, FILM COATED ORAL at 07:12

## 2017-01-01 RX ADMIN — NITROGLYCERIN 0.4 MG: 0.4 TABLET SUBLINGUAL at 02:12

## 2017-12-14 PROBLEM — E78.5 HYPERLIPIDEMIA: Status: ACTIVE | Noted: 2017-01-01

## 2017-12-14 PROBLEM — E11.49 TYPE 2 DIABETES MELLITUS WITH NEUROLOGIC COMPLICATION: Status: ACTIVE | Noted: 2017-01-01

## 2017-12-14 PROBLEM — I10 ESSENTIAL HYPERTENSION: Status: ACTIVE | Noted: 2017-01-01

## 2017-12-14 PROBLEM — E11.49 TYPE 2 DIABETES MELLITUS WITH NEUROLOGIC COMPLICATION: Chronic | Status: ACTIVE | Noted: 2017-01-01

## 2017-12-14 PROBLEM — R06.02 SOB (SHORTNESS OF BREATH): Status: ACTIVE | Noted: 2017-01-01

## 2017-12-14 PROBLEM — I21.4 NSTEMI (NON-ST ELEVATED MYOCARDIAL INFARCTION): Status: ACTIVE | Noted: 2017-01-01

## 2017-12-14 PROBLEM — R06.02 SHORTNESS OF BREATH: Status: ACTIVE | Noted: 2017-01-01

## 2017-12-14 PROBLEM — I35.0 AORTIC VALVE STENOSIS: Status: ACTIVE | Noted: 2017-01-01

## 2017-12-14 PROBLEM — I50.23 ACUTE ON CHRONIC SYSTOLIC HEART FAILURE: Status: ACTIVE | Noted: 2017-01-01

## 2017-12-14 PROBLEM — R06.02 SOB (SHORTNESS OF BREATH): Status: RESOLVED | Noted: 2017-01-01 | Resolved: 2017-01-01

## 2017-12-14 NOTE — SUBJECTIVE & OBJECTIVE
Past Medical History:   Diagnosis Date    Aortic stenosis     CHF (congestive heart failure)     Diabetes mellitus     Hyperlipidemia     Hypertension     Hypokalemia     Pulmonary edema        Past Surgical History:   Procedure Laterality Date    CARDIAC SURGERY      HYSTERECTOMY      KNEE SURGERY         Review of patient's allergies indicates:  No Known Allergies    No current facility-administered medications on file prior to encounter.      No current outpatient prescriptions on file prior to encounter.     Family History     None        Social History Main Topics    Smoking status: Never Smoker    Smokeless tobacco: Not on file    Alcohol use No    Drug use: No    Sexual activity: Not on file     Review of Systems   Constitution: Negative for chills and fever.   HENT: Negative for congestion.    Eyes: Negative for blurred vision.   Cardiovascular: Positive for dyspnea on exertion and paroxysmal nocturnal dyspnea. Negative for chest pain, leg swelling, near-syncope, orthopnea, palpitations and syncope.   Respiratory: Positive for shortness of breath.    Hematologic/Lymphatic: Negative for bleeding problem.   Skin: Negative for rash.   Musculoskeletal: Negative for falls.   Gastrointestinal: Negative for abdominal pain.   Genitourinary: Negative for dysuria.   Neurological: Negative for focal weakness.   Psychiatric/Behavioral: Negative for altered mental status.     Objective:     Vital Signs (Most Recent):  Temp: 97.7 °F (36.5 °C) (12/14/17 1636)  Pulse: 84 (12/14/17 1710)  Resp: 18 (12/14/17 1636)  BP: (!) 159/86 (12/14/17 1710)  SpO2: 97 % (12/14/17 1716) Vital Signs (24h Range):  Temp:  [97.7 °F (36.5 °C)] 97.7 °F (36.5 °C)  Pulse:  [84-86] 84  Resp:  [18] 18  SpO2:  [97 %] 97 %  BP: (148-159)/(86-87) 159/86     Weight: 72.6 kg (160 lb)  Body mass index is 28.34 kg/m².    SpO2: 97 %       No intake or output data in the 24 hours ending 12/14/17 1721    Lines/Drains/Airways     Peripheral  Intravenous Line                 Peripheral IV - Single Lumen 12/14/17 1702 Right Forearm less than 1 day         Peripheral IV - Single Lumen 12/14/17 1707 Left Forearm less than 1 day                Physical Exam   Constitutional: She is oriented to person, place, and time. She appears well-developed and well-nourished. She appears distressed (Mild, respiratory).   HENT:   Head: Normocephalic and atraumatic.   Eyes: EOM are normal.   Neck: Neck supple. No JVD present.   Cardiovascular: Normal rate, regular rhythm and intact distal pulses.    Murmur (Systolic ejection murmur at RUSB) heard.  Pulmonary/Chest: She has no wheezes.   Bibasilar rales   Abdominal: Soft. Bowel sounds are normal. She exhibits no distension. There is no tenderness.   Musculoskeletal: Normal range of motion. She exhibits no edema.   Neurological: She is alert and oriented to person, place, and time.   Skin: Skin is warm and dry.   Psychiatric: She has a normal mood and affect. Her behavior is normal.       Significant Labs: BMP: No results for input(s): GLU, NA, K, CL, CO2, BUN, CREATININE, CALCIUM, MG in the last 48 hours., CMP No results for input(s): NA, K, CL, CO2, GLU, BUN, CREATININE, CALCIUM, PROT, ALBUMIN, BILITOT, ALKPHOS, AST, ALT, ANIONGAP, ESTGFRAFRICA, EGFRNONAA in the last 48 hours., CBC No results for input(s): WBC, HGB, HCT, PLT in the last 48 hours., INR No results for input(s): INR, PROTIME in the last 48 hours., Lipid Panel No results for input(s): CHOL, HDL, LDLCALC, TRIG, CHOLHDL in the last 48 hours. and Troponin No results for input(s): TROPONINI in the last 48 hours.    Significant Imaging: EKG: normal sinus rhythm, anterior q waves, 1.5mm ST elevation in anterior leads

## 2017-12-14 NOTE — HPI
Ms Sparks is a 63yo F with HTN, HLD, DM, aortic stenosis, who presents with increasing shortness of breath. She reports that she was recently admitted to [facility] due to heart failure. She underwent an angiogram during that hospitalization. She was told that she needs valve surgery but was told that it could not be performed there. She was instructed to follow up in [place]. Since discharge, she reports increasing dyspnea, orthopnea, and PND. She denies chest pain. Cardiology is consulted due to ST elevation on EKG.

## 2017-12-14 NOTE — ASSESSMENT & PLAN NOTE
Ms Sparks is a 63yo F with HTN, HLD, DM, aortic stenosis, who presents with symptoms of acute on chronic systolic heart failure. EKG shows anterior q waves as well as 1.5mm ST elevation in anterior leads. Bedside echo reveals LVEF ~20%, dyskinetic LV apex, hypokinetic apical septum. 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 No obvious LV thrombus on bedside echo. Symptoms are most likely due to acute on chronic heart failure.    - admit to medicine (team C/J preferably) for treatment of acute on chronic systolic heart failure  - diuresis with lasix 40mg IV BID  - obtain formal echocardiogram  - obtain records of recent hospitalization at [facility], including report of ARELI, TTE, angiogram, and discharge summary

## 2017-12-14 NOTE — ED TRIAGE NOTES
Patient states that she has had SOB for 3 weeks. Patient states that [facility] told her she needed a valve replacement and was told she had to follow up in [place]. Patient reports NO chest pain.

## 2017-12-14 NOTE — ED NOTES
Patient identifiers verified and correct for Anum Quick.    LOC: The patient is awake, alert and aware of environment with an appropriate affect, the patient is oriented x 3 and speaking appropriately.  APPEARANCE: Patient resting comfortably and in no acute distress, patient is clean and well groomed, patient's clothing is properly fastened.  SKIN: The skin is warm and dry, color consistent with ethnicity, patient has normal skin turgor and moist mucus membranes, abrasions noted to RLE.  MUSCULOSKELETAL: Patient moving all extremities spontaneously, no obvious swelling or deformities noted.  RESPIRATORY: Airway is open and patent, respirations are spontaneous, patient has a normal effort and rate, no accessory muscle use noted. Pt c/o SOB. Pt was placed on 2L O2 NC for comfort.  CARDIAC: Patient has a normal rate and regular rhythm, no peripheral edema noted, capillary refill < 3 seconds.  ABDOMEN: Soft and non tender to palpation, no distention noted.  NEUROLOGIC: Eyes open spontaneously, behavior appropriate to situation, follows commands, facial expression symmetrical, bilateral hand grasp equal and even, purposeful motor response noted, normal sensation in all extremities when touched with a finger.

## 2017-12-14 NOTE — ED PROVIDER NOTES
Encounter Date: 12/14/2017    SCRIBE #1 NOTE: I, Zenaida Whitlock, am scribing for, and in the presence of,  Dr. Crespo. I have scribed the following portions of the note - the Resident attestation. Other sections scribed: Attending ED notes, Critical Care.       History     Chief Complaint   Patient presents with    Shortness of Breath     dc'd from [facility] on dec 8, aortic stenosis told needing to go to tx for surgery,chest pressure     Ms Sparks is a 63 yo woman with PMH significant for aortic stenosis, CHF, HTN, HLD and DMT2 who presents to [facility] ED for emergent evaluation of worsening shortness of breath. Pt states this episode of shortness of breath has been going on for the past four weeks with progressive worsening requiring medical evaluation today. Pt states that at baseline she is able to walk about a block and perhaps a flight of stairs before requiring rest, but today she has been unable to walk a few steps without having to break for a rest.     Pt states she was recently evaluated at [facility] by cardiologist there, Dr Rodriguez, who recommended aortic valve replacement in [place], [place].           Review of patient's allergies indicates:  No Known Allergies  Past Medical History:   Diagnosis Date    Aortic stenosis     CHF (congestive heart failure)     Diabetes mellitus     Hyperlipidemia     Hypertension     Hypokalemia     Pulmonary edema      Past Surgical History:   Procedure Laterality Date    CARDIAC SURGERY      HYSTERECTOMY      KNEE SURGERY       History reviewed. No pertinent family history.  Social History   Substance Use Topics    Smoking status: Never Smoker    Smokeless tobacco: Not on file    Alcohol use No     Review of Systems   Constitutional: Negative for fever and unexpected weight change.   HENT: Negative for ear pain, sinus pressure, sneezing and sore throat.    Eyes: Negative for pain and visual disturbance.   Respiratory: Positive for cough and shortness of  breath. Negative for chest tightness and wheezing.    Cardiovascular: Negative for chest pain, palpitations and leg swelling.   Gastrointestinal: Negative for abdominal pain, constipation, diarrhea, nausea and vomiting.   Endocrine: Negative for polydipsia and polyuria.   Genitourinary: Negative for decreased urine volume, difficulty urinating, dysuria and urgency.   Musculoskeletal: Negative for arthralgias and myalgias.   Skin: Negative for color change and rash.   Allergic/Immunologic: Negative for environmental allergies and food allergies.   Neurological: Negative for dizziness, syncope, weakness, light-headedness and headaches.   Psychiatric/Behavioral: Negative for confusion and dysphoric mood. The patient is not nervous/anxious.        Physical Exam     Initial Vitals [12/14/17 1636]   BP Pulse Resp Temp SpO2   (!) 148/87 86 18 97.7 °F (36.5 °C) 97 %      MAP       107.33         Physical Exam    Nursing note and vitals reviewed.  Constitutional: She appears well-developed and well-nourished.   HENT:   Head: Normocephalic and atraumatic.   Nose: Nose normal.   Eyes: EOM are normal. Pupils are equal, round, and reactive to light.   Neck: No tracheal deviation present.   Cardiovascular: Normal rate, regular rhythm and intact distal pulses.   Murmur heard.   Crescendo decrescendo systolic murmur is present with a grade of 3/6   Pulmonary/Chest: Breath sounds normal. No stridor. No respiratory distress. She has no decreased breath sounds. She has no rhonchi. She has no rales. She exhibits no tenderness.   Abdominal: Soft. Bowel sounds are normal. There is no tenderness.   Musculoskeletal: Normal range of motion. She exhibits no tenderness.   Lymphadenopathy:     She has no cervical adenopathy.   Neurological: She is alert and oriented to person, place, and time.   Skin: Skin is warm and dry.   Psychiatric: She has a normal mood and affect.         ED Course   Critical Care  Date/Time: 12/14/2017 5:32  PM  Performed by: SHANNA CAMACHO  Authorized by: SHANNA CAMACHO   Direct patient critical care time: 10 minutes  Additional history critical care time: 1 minutes  Ordering / reviewing critical care time: 5 minutes  Documentation critical care time: 5 minutes  Consulting other physicians critical care time: 10 minutes  Total critical care time (exclusive of procedural time) : 31 minutes        Labs Reviewed   CBC W/ AUTO DIFFERENTIAL - Abnormal; Notable for the following:        Result Value    RBC 3.59 (*)     Hemoglobin 11.2 (*)     Hematocrit 34.8 (*)     MCH 31.2 (*)     RDW 15.5 (*)     Platelets 525 (*)     All other components within normal limits   COMPREHENSIVE METABOLIC PANEL - Abnormal; Notable for the following:     CO2 22 (*)     Glucose 117 (*)     BUN, Bld 26 (*)     Creatinine 1.5 (*)     eGFR if  42.4 (*)     eGFR if non  36.8 (*)     All other components within normal limits    Narrative:     ADD ON PT PER DR. SHANNA CAMACHO AT  12/14/2017  18:03    TROPONIN I - Abnormal; Notable for the following:     Troponin I 2.965 (*)     All other components within normal limits    Narrative:     ADD ON PT PER DR. SHANNA CAMACHO AT  12/14/2017  18:03    B-TYPE NATRIURETIC PEPTIDE - Abnormal; Notable for the following:     BNP 2,414 (*)     All other components within normal limits    Narrative:     ADD ON PT PER DR. SHANNA CAMACHO AT  12/14/2017  18:03    PROTIME-INR - Abnormal; Notable for the following:     Prothrombin Time 16.6 (*)     INR 1.6 (*)     All other components within normal limits    Narrative:     ADD ON PT PER DR. SHANNA CAMACHO AT  12/14/2017  18:03    MAGNESIUM    Narrative:     ADD ON PT PER DR. SHANNA CAMACHO AT  12/14/2017  18:03    PHOSPHORUS    Narrative:     ADD ON PT PER DR. SHANNA CAMACHO AT  12/14/2017  18:03    PROTIME-INR   URINALYSIS    Narrative:     YELLOW & GRAY TUBES   APTT   APTT   TYPE & SCREEN   POCT GLUCOSE     EKG Readings: (Independently  Interpreted)   Initial Reading: STEMI. Heart Rate: 83. ST Segment Elevation: V3, V4 and V5. T Waves: Normal. Axis: Normal. Clinical Impression: Anterior STEMI   1.5 to 2mm ST elevation noted in leads V3-V5          Medical Decision Making:   History:   Old Medical Records: I decided to obtain old medical records.  Differential Diagnosis:   STEMI  Aortic stenosis   Previous STEMI  CHF exacerbation  Independently Interpreted Test(s):   I have ordered and independently interpreted X-rays - see prior notes.  I have ordered and independently interpreted EKG Reading(s) - see prior notes  Clinical Tests:   Lab Tests: Ordered and Reviewed  Radiological Study: Ordered and Reviewed  Medical Tests: Ordered and Reviewed  ED Management:  62 F who presents with acutely worsening shortness of breath that has never been this intense before. Upon arrival, ECG was performed on pt and ST elevation was noted to be present in leads V3-V5 concerning for new onset anterior wall MI. Immediately, code STEMI was called and cardiology arrived to evaluate the patient for possible trip to the Cath lab. Cardiology performed bedside echo and noted no hypokinesis or akinesis concerning for possible acute myocardial infarction but did note ventricular wall aneurysm possibly d/t MI sustained in the past. Assessment, instead was made that this was most likely CHF exacerbation especially in the symptomatic context of no chest pain and shortness of breath as the primary complaint. CHF workup revealed elevated BNP and elevated troponin. Spoke with cardiology about management of elevated troponin and they maintained continuing management as acute CHF exacerbation. Case management was called and pt was deemed appropriate for inpatient management of this possibly new onset congestive heart failure.   Other:   I have discussed this case with another health care provider.            Scribe Attestation:   Scribe #1: I performed the above scribed service and  the documentation accurately describes the services I performed. I attest to the accuracy of the note.    Attending Attestation:   Physician Attestation Statement for Resident:  As the supervising MD   Physician Attestation Statement: I have personally seen and examined this patient.   I agree with the above history. -: I was called emergently to triage to evaluate the pt and the EKG. There are Q waves in the anterior leads along with ST elevations. No reciprocal changes are identified. The pt's hx is not consistent with STEMI. However the EKG is concerning enough that a code STEMI will be called. I am significantly concerned for pt's clinical status.   As the supervising MD I agree with the above PE.    As the supervising MD I agree with the above treatment, course, plan, and disposition.            Attending ED Notes:   17:01   Code STEMI called.    17:06   Cardiology at bedside and evaluating pt and performing stat bedside echo.        17:16   Cardiology completed initial evaluation and bedside echo. They do not believe pt is having an acute STEMI.           ED Course      Clinical Impression:   The primary encounter diagnosis was Acute on chronic systolic heart failure. Diagnoses of SOB (shortness of breath), CHF (congestive heart failure), Non-ST elevation myocardial infarction (NSTEMI), Nonrheumatic aortic valve stenosis, and Chest pain were also pertinent to this visit.    Disposition:   Disposition: Admitted                        Scot Crespo MD  12/26/17 0960

## 2017-12-14 NOTE — CONSULTS
[facility]  Cardiology  Consult Note    Patient Name: Anum Quick  MRN: [id number]  Admission Date: 12/14/2017  Hospital Length of Stay: 0 days  Code Status: No Order   Attending Provider: Daniel Melton MD  Consulting Provider: Daniel Melton MD  Primary Care Physician: No primary care provider on file.  Principal Problem:<principal problem not specified>    Patient information was obtained from patient and ER records.     Consults  Subjective:     Chief Complaint:  dyspnea     HPI:     Ms Sparks is a 61yo F with HTN, HLD, DM, aortic stenosis, who presents with increasing shortness of breath. She reports that she was recently admitted to [facility] due to heart failure. She underwent an angiogram during that hospitalization. She was told that she needs valve surgery but was told that it could not be performed there. She was instructed to follow up in [place]. Since discharge, she reports increasing dyspnea, orthopnea, and PND. She denies chest pain. Cardiology is consulted due to ST elevation on EKG.    Past Medical History:   Diagnosis Date    Aortic stenosis     CHF (congestive heart failure)     Diabetes mellitus     Hyperlipidemia     Hypertension     Hypokalemia     Pulmonary edema        Past Surgical History:   Procedure Laterality Date    CARDIAC SURGERY      HYSTERECTOMY      KNEE SURGERY         Review of patient's allergies indicates:  No Known Allergies    No current facility-administered medications on file prior to encounter.      No current outpatient prescriptions on file prior to encounter.     Family History     None        Social History Main Topics    Smoking status: Never Smoker    Smokeless tobacco: Not on file    Alcohol use No    Drug use: No    Sexual activity: Not on file     Review of Systems   Constitution: Negative for chills and fever.   HENT: Negative for congestion.    Eyes: Negative for blurred vision.   Cardiovascular:  Positive for dyspnea on exertion and paroxysmal nocturnal dyspnea. Negative for chest pain, leg swelling, near-syncope, orthopnea, palpitations and syncope.   Respiratory: Positive for shortness of breath.    Hematologic/Lymphatic: Negative for bleeding problem.   Skin: Negative for rash.   Musculoskeletal: Negative for falls.   Gastrointestinal: Negative for abdominal pain.   Genitourinary: Negative for dysuria.   Neurological: Negative for focal weakness.   Psychiatric/Behavioral: Negative for altered mental status.     Objective:     Vital Signs (Most Recent):  Temp: 97.7 °F (36.5 °C) (12/14/17 1636)  Pulse: 84 (12/14/17 1710)  Resp: 18 (12/14/17 1636)  BP: (!) 159/86 (12/14/17 1710)  SpO2: 97 % (12/14/17 1716) Vital Signs (24h Range):  Temp:  [97.7 °F (36.5 °C)] 97.7 °F (36.5 °C)  Pulse:  [84-86] 84  Resp:  [18] 18  SpO2:  [97 %] 97 %  BP: (148-159)/(86-87) 159/86     Weight: 72.6 kg (160 lb)  Body mass index is 28.34 kg/m².    SpO2: 97 %       No intake or output data in the 24 hours ending 12/14/17 1721    Lines/Drains/Airways     Peripheral Intravenous Line                 Peripheral IV - Single Lumen 12/14/17 1702 Right Forearm less than 1 day         Peripheral IV - Single Lumen 12/14/17 1707 Left Forearm less than 1 day                Physical Exam   Constitutional: She is oriented to person, place, and time. She appears well-developed and well-nourished. She appears distressed (Mild, respiratory).   HENT:   Head: Normocephalic and atraumatic.   Eyes: EOM are normal.   Neck: Neck supple. No JVD present.   Cardiovascular: Normal rate, regular rhythm and intact distal pulses.    Murmur (Systolic ejection murmur at RUSB) heard.  Pulmonary/Chest: She has no wheezes.   Bibasilar rales   Abdominal: Soft. Bowel sounds are normal. She exhibits no distension. There is no tenderness.   Musculoskeletal: Normal range of motion. She exhibits no edema.   Neurological: She is alert and oriented to person, place, and  time.   Skin: Skin is warm and dry.   Psychiatric: She has a normal mood and affect. Her behavior is normal.       Significant Labs: BMP: No results for input(s): GLU, NA, K, CL, CO2, BUN, CREATININE, CALCIUM, MG in the last 48 hours., CMP No results for input(s): NA, K, CL, CO2, GLU, BUN, CREATININE, CALCIUM, PROT, ALBUMIN, BILITOT, ALKPHOS, AST, ALT, ANIONGAP, ESTGFRAFRICA, EGFRNONAA in the last 48 hours., CBC No results for input(s): WBC, HGB, HCT, PLT in the last 48 hours., INR No results for input(s): INR, PROTIME in the last 48 hours., Lipid Panel No results for input(s): CHOL, HDL, LDLCALC, TRIG, CHOLHDL in the last 48 hours. and Troponin No results for input(s): TROPONINI in the last 48 hours.    Significant Imaging: EKG: normal sinus rhythm, anterior q waves, 1.5mm ST elevation in anterior leads    Assessment and Plan:     Shortness of breath      Ms Sparks is a 63yo F with HTN, HLD, DM, aortic stenosis, who presents with symptoms of acute on chronic systolic heart failure. EKG shows anterior q waves as well as 1.5mm ST elevation in anterior leads. Bedside echo reveals LVEF ~20%, dyskinetic LV apex, hypokinetic apical septum. 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 No obvious LV thrombus on bedside echo. Symptoms are most likely due to acute on chronic heart failure.    - admit to medicine (team C/J preferably) for treatment of acute on chronic systolic heart failure  - diuresis with lasix 40mg IV BID  - obtain formal echocardiogram  - obtain records of recent hospitalization at [facility], including report of ARELI, TTE, angiogram, and discharge summary            VTE Risk Mitigation     None          Thank you for your consult. I will sign off. Please contact us if you have any additional questions.    Daniel Melton MD  Cardiology   [facility]

## 2017-12-15 PROBLEM — I48.0 PAROXYSMAL ATRIAL FIBRILLATION: Status: ACTIVE | Noted: 2017-01-01

## 2017-12-15 NOTE — ASSESSMENT & PLAN NOTE
Patient with reported A stenosis per prior eval at [facility], with systolic murmur c/w a. Stenosis  -TTE tomorrow  -Request records per above

## 2017-12-15 NOTE — HPI
Ms. Quick is a 62 F h/o recently diagnosed CHF, AS, DM, HTN, A Fib (on amio), presenting with shortness of breath,  patient reports has been going on x3 weeks, and is progressive, worse with exertion, now exercise limitation to < 1 block without exercise limitations previously. Pt denies chest pain, palpitations, syncope, light headed. She reports 3 pillow orthopnea, PND, and ankle swelling. She reports has been increasing fluid intake recently. Had recent admission to [facility], pt reports undergoing cardiac cath (no reported significant CAD per reports, we have requested disc) was told she had aortic valve stenosis that would require surgery but also was told about possible TAVR. Patient presented here for evaluation because she had previously been told that she would need to go to [place], [place] for AVR.      She states for the last 3-4 weeks she has experienced HA (Class III sx) and is unable to walk up her 17 stairs in her home without stopping to catch her breath. Her SOB resolves with rest and is not associated with Cp, light headedness, dizziness. At  she believes she had ARELI, Coronary Angiogram, CTA but we are still awaiting records. She was also told she would need surgical AVR but was told she must be referred to [place], [place] for surgery. She was unsure as to the reasoning behind this. Since discharge she has adhered to a low sodium diet and been complaint with newly prescribed Lasix, however, she began experiencing SOB and noticed bilateral ankle edema. She presented to [facility] this hospitalization as she wanted another opinion regarding management of her CHF and AS.

## 2017-12-15 NOTE — ASSESSMENT & PLAN NOTE
Stable, checking A1C, on metformin as outpatient holding  -Accuchecks Q6H while NPO  -Low dose SSI

## 2017-12-15 NOTE — ASSESSMENT & PLAN NOTE
Nutrition Problem  Food- and nutrition-related knowledge deficit    Related to (etiology):   No previous diet education    Signs and Symptoms (as evidenced by):   Pt reported diet     Interventions/Recommendations (treatment strategy):  See recs.    Nutrition Diagnosis Status:   New

## 2017-12-15 NOTE — ASSESSMENT & PLAN NOTE
Patient presenting with worsening dyspnea on exertion with LE edema on exam, orthopnea, PND, and elevated BNP, likely etiology of pt SOB, Patient with depressed EF on ECHO, patient without reported h/o CAD / MI however bedside echo and EKG concerning for prior infarct.  -Lasix 40 BID  -request records from [facility] (ECHO, cath report, d/c summary, cards notes)  -ACE/ASA/Statin/B Blocker per above, patient on amiodarone (will continue however will need to clarify from [facility] why on this)

## 2017-12-15 NOTE — SUBJECTIVE & OBJECTIVE
Past Medical History:   Diagnosis Date    Aortic stenosis     CHF (congestive heart failure)     Diabetes mellitus     Hyperlipidemia     Hypertension     Hypokalemia     Pulmonary edema        Past Surgical History:   Procedure Laterality Date    CARDIAC SURGERY      HYSTERECTOMY      KNEE SURGERY         Review of patient's allergies indicates:  No Known Allergies    PTA Medications   Medication Sig    amiodarone (PACERONE) 200 MG Tab Take by mouth once daily.    atorvastatin (LIPITOR) 20 MG tablet Take 20 mg by mouth once daily.    butalbital-acetaminophen-caffeine -40 mg (FIORICET, ESGIC) -40 mg per tablet Take 1 tablet by mouth every 4 (four) hours as needed for Pain.    estropipate (OGEN) 1.5 MG tablet Take 1.5 mg by mouth once daily.    fenofibrate (TRICOR) 145 MG tablet Take 145 mg by mouth once daily.    furosemide (LASIX) 20 MG tablet Take 20 mg by mouth 2 (two) times daily.    gabapentin (NEURONTIN) 100 MG capsule Take 100 mg by mouth 3 (three) times daily.    lisinopril (PRINIVIL,ZESTRIL) 20 MG tablet Take 20 mg by mouth once daily.    metFORMIN (GLUMETZA) 500 MG (MOD) 24 hr tablet Take 500 mg by mouth daily with breakfast.    metoprolol tartrate (LOPRESSOR) 50 MG tablet Take 50 mg by mouth 2 (two) times daily.    rivaroxaban (XARELTO) 20 mg Tab Take 20 mg by mouth daily with dinner or evening meal.     Family History     None        Social History Main Topics    Smoking status: Never Smoker    Smokeless tobacco: Not on file    Alcohol use No    Drug use: No    Sexual activity: Not on file     Review of Systems   Constitution: Positive for weight gain. Negative for chills, diaphoresis, fever, weakness and weight loss.   HENT: Negative for sore throat.    Eyes: Negative for blurred vision, vision loss in left eye, vision loss in right eye and visual disturbance.   Cardiovascular: Positive for dyspnea on exertion and orthopnea. Negative for chest pain, claudication,  leg swelling, near-syncope, palpitations, paroxysmal nocturnal dyspnea and syncope.   Respiratory: Positive for shortness of breath. Negative for cough, hemoptysis, sputum production and wheezing.    Endocrine: Negative for cold intolerance and heat intolerance.   Hematologic/Lymphatic: Negative for adenopathy. Does not bruise/bleed easily.   Skin: Negative for rash.   Musculoskeletal: Negative for falls, muscle weakness and myalgias.   Gastrointestinal: Negative for abdominal pain, change in bowel habit, constipation, diarrhea, melena and nausea.   Genitourinary: Negative for bladder incontinence.   Neurological: Negative for dizziness, focal weakness, headaches, light-headedness and numbness.   Psychiatric/Behavioral: Negative for altered mental status.     Objective:     Vital Signs (Most Recent):  Temp: 97.4 °F (36.3 °C) (12/15/17 1123)  Pulse: 87 (12/15/17 1452)  Resp: 18 (12/15/17 1123)  BP: 118/81 (12/15/17 1123)  SpO2: 98 % (12/15/17 1123) Vital Signs (24h Range):  Temp:  [97.4 °F (36.3 °C)-97.8 °F (36.6 °C)] 97.4 °F (36.3 °C)  Pulse:  [] 87  Resp:  [18-20] 18  SpO2:  [93 %-98 %] 98 %  BP: (118-159)/() 118/81     Weight: 77.3 kg (170 lb 6.7 oz)  Body mass index is 30.19 kg/m².    SpO2: 98 %  O2 Device (Oxygen Therapy): nasal cannula    No intake or output data in the 24 hours ending 12/15/17 1536    Lines/Drains/Airways     Peripheral Intravenous Line                 Peripheral IV - Single Lumen 12/14/17 1702 Right Forearm less than 1 day                Physical Exam   Constitutional: She is oriented to person, place, and time. She appears well-developed and well-nourished. No distress.   HENT:   Head: Normocephalic and atraumatic.   Mouth/Throat: Oropharynx is clear and moist.   Eyes: Conjunctivae and EOM are normal. Pupils are equal, round, and reactive to light. No scleral icterus.   Neck: Neck supple. JVD present. No tracheal deviation present.   Cardiovascular: Exam reveals no gallop and no  friction rub.    Murmur heard.  Pulmonary/Chest: Effort normal and breath sounds normal. No respiratory distress. She has no wheezes. She has no rales. She exhibits no tenderness.   Abdominal: Soft. Bowel sounds are normal. She exhibits no distension. There is no hepatosplenomegaly. There is no tenderness.   Musculoskeletal: She exhibits edema. She exhibits no tenderness.   Neurological: She is alert and oriented to person, place, and time.   Skin: Skin is warm and dry. No rash noted. No erythema.   Psychiatric: She has a normal mood and affect. Her behavior is normal.       Significant Labs:   ABG: No results for input(s): PH, PCO2, HCO3, POCSATURATED, BE in the last 48 hours., Blood Culture: No results for input(s): LABBLOO in the last 48 hours., BMP:   Recent Labs  Lab 12/14/17 1720 12/15/17  0344   * 90    143   K 4.0 4.0    104   CO2 22* 24   BUN 26* 25*   CREATININE 1.5* 1.4   CALCIUM 9.9 9.8   MG 2.1  --    , CMP   Recent Labs  Lab 12/14/17 1720 12/15/17  0344    143   K 4.0 4.0    104   CO2 22* 24   * 90   BUN 26* 25*   CREATININE 1.5* 1.4   CALCIUM 9.9 9.8   PROT 8.1  --    ALBUMIN 3.5  --    BILITOT 0.3  --    ALKPHOS 85  --    AST 18  --    ALT 17  --    ANIONGAP 13 15   ESTGFRAFRICA 42.4* 46.1*   EGFRNONAA 36.8* 40.0*   , CBC   Recent Labs  Lab 12/14/17  1720 12/15/17  0344   WBC 7.88 6.52   HGB 11.2* 11.0*   HCT 34.8* 34.1*   * 494*    and INR   Recent Labs  Lab 12/14/17 1720   INR 1.6*       Significant Imaging: X-Ray: CXR: X-Ray Chest 1 View (CXR): No results found for this visit on 12/14/17.

## 2017-12-15 NOTE — PROGRESS NOTES
Pt follows with Cards at [facility] - Dr. Chun Rodriguez.  Per Cards recs, may need TAVR w/u in future.  Not a candidate for DMHF program.    Removed from HF list.

## 2017-12-15 NOTE — HPI
Ms. Quick is a 62 F h/o recently diagnosed CHF, AS, DM, HTN, A Fib (on amio), presenting with shortness of breath,  patient reports has been going on x3 weeks, and is progressive, worse with exertion, now exercise limitation to < 1 block without exercise limitations previously. Pt denies chest pain, palpitations, syncope, light headed. She reports 3 pillow orthopnea, PND, and ankle swelling. She reports has been increasing fluid intake recently. Had recent admission to [facility], pt reports undergoing cardiac cath (no reported significant CAD per reports, we have requested disc) was told she had aortic valve stenosis that would require surgery but also was told about possible TAVR. Patient presented here for evaluation because she had previously been told that she would need to go to [place], [place] for AVR. Pt denies recent illness, no F/C/N/V.     She states for the last 3-4 weeks she has experienced HA (Class III sx) and is unable to walk up her 17 stairs in her home without stopping to catch her breath. Her SOB resolves with rest and is not associated with Cp, light headedness, dizziness. At  she believes she had ARELI, Coronary Angiogram, CTA but we are still awaiting records. She was also told she would need surgical AVR but was told she must be referred to [place], [place] for surgery. She was unsure as to the reasoning behind this. Since discharge she has adhered to a low sodium diet and been complaint with newly prescribed Lasix, however, she began experiencing SOB and noticed bilateral ankle edema. She [place] [facility] [place] as she wanted another opinion regarding management of her CHF and AS.

## 2017-12-15 NOTE — ED NOTES
Called war [facility] for telemetry box. War room states they do not have telemetry boxes at this time but will bring one as soon as they have one

## 2017-12-15 NOTE — ASSESSMENT & PLAN NOTE
Interventional Cardiology       Valve Center      Anum Quick is a 62 y.o. female referred by Dr Vasquez for evaluation of severe AS (NYHA Class III sx).    she has undergone the following TAVR work-up:   ECHO (Date 12/15/2017): JAMES= 0.49 cm2, MG= 48mmHg, Peak Carlos A= 4.0 m/s, EF= 35%. - Per EJ concern for LV apical thrombus, however, no thrombus visualized on [facility] TTE   LHC : Awaiting disc from [facility]. Per report (12/4/2017): proximal LAD has 20% stenosis, RCA with LI otherwise normal coronaries    STS: 2.19%   Frailty: 1/4 (hand )   Iliacs are Pending   LVOT area by CTA is Pending   CTS risk assessment: Pending- CTS c/s placed   PFTs: Needs     CTS consulted, appreciate their assistance   CTA ordered as part of TAVR work-up and to evaluate for possible Porcelain Aorta  Still awaiting records from  regarding why patient was told she would need to be referred to [place] for AVR.

## 2017-12-15 NOTE — ASSESSMENT & PLAN NOTE
See CHF exacerbation, ddx also includes ACS (less likely per cards eval), COPD (less likely no smoking history or reported hx), PNA (less likely no fever or cough). CXR with lower lung interstitial marking increased c/w volume overload 2/2 CHF  -Continue to monitor, oxygen nasal cannula PRN goal sats >92%

## 2017-12-15 NOTE — PROGRESS NOTES
Progress Note  Hospital Medicine    Primary Team: [facility]  Admit Date: 12/14/2017   Length of Stay:  LOS: 1 day   SUBJECTIVE:   Reason for Admission:  Acute on chronic systolic heart failure      HPI:  Ms. Quick is a 62F h/o recently diagnosed CHF, Aortic stenosis, Dm, HTN presenting with shortness of breath, patient reports has been going on x3 weeks, and progressive, worse with exertion, now exercise limitation to < 1 block without exercise limitations previously. Pt endorsing no chest pain, reports orthopnea, PND, denies LE swelling. Reports has been increasing fluid intake recently. Had recent admission to [facility], pt reports undergoing cardiac cath (no reported blockages) was told had aortic valve stenosis that would require surgery. Patient presented here for evaluation because she had previously been told that she would need to go to [place] for further care. Pt denies recent illness, no F/C/N/V. Reports dyspnea is mild currently.     Hospital Course:  Pt was admitted to [facility] for treatment of acute on chronic systolic heart failure.  Records were obtained from , and Interventional Cardiology consulted for further recs.    Interval history:    Pt reports feeling better today.  Reports she was compliant with all medications from time of  discharge, but despite this, felt SOB within 2-3 days and thus [place] [facility]  Extremely compliant with low salt diet and fluid restriction.    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  Dilated aortic root/ascending aorta at 4.3cm  -Discharge meds: Lasix 20mg QD, Lisinopril 20mg QD, Amio 200mg BID, Metoprolol 50mg BID, Xarelto 20mg qHS; home meds include Metformin, statin    Review of Systems:  Constitutional: no fever or chills  Respiratory: positive for dyspnea on exertion  Cardiovascular: no chest pain or palpitations  Gastrointestinal: no nausea or vomiting,  no abdominal pain or change in bowel habits  Musculoskeletal: no arthralgias or myalgias     OBJECTIVE:     Temp:  [97.4 °F (36.3 °C)-97.8 °F (36.6 °C)]   Pulse:  []   Resp:  [18-20]   BP: (118-159)/()   SpO2:  [93 %-98 %]  Body mass index is 30.19 kg/m².  Intake/Outake:  This Shift:  No intake/output data recorded.    Net I/O past 24h:   No intake or output data in the 24 hours ending 12/15/17 1139          Physical Exam:  Gen- well-developed, well-nourished, NAD  CVS- S1 and S2 present, 3/6 systolic murmur, irregular rhythm  Resp- CTA b/l, few bibasilar crackles  Abd- BS+, soft, NT, ND  Ext- no clubbing, cyanosis.  Trace LE edema    Laboratory:  CBC/Anemia Labs: Coags:      Recent Labs  Lab 12/14/17  1720 12/15/17  0344   WBC 7.88 6.52   HGB 11.2* 11.0*   HCT 34.8* 34.1*   * 494*   MCV 97 96   RDW 15.5* 15.3*      Recent Labs  Lab 12/14/17  1720 12/14/17  2102   INR 1.6*  --    APTT  --  25.6        Chemistries:     Recent Labs  Lab 12/14/17  1720 12/15/17  0344    143   K 4.0 4.0    104   CO2 22* 24   BUN 26* 25*   CREATININE 1.5* 1.4   CALCIUM 9.9 9.8   PROT 8.1  --    BILITOT 0.3  --    ALKPHOS 85  --    ALT 17  --    AST 18  --    MG 2.1  --    PHOS 4.3  --           Cardiac Enzymes: Ejection Fractions:    Recent Labs      12/14/17   1720  12/15/17   0125  12/15/17   0344   TROPONINI  2.965*  2.736*  2.935*    EF   Date Value Ref Range Status   12/15/2017 30 (A) 55 - 65         POCT Glucose: HbA1c:      Recent Labs  Lab 12/14/17 2106   POCTGLUCOSE 83    Hemoglobin A1C   Date Value Ref Range Status   12/15/2017 5.4 4.0 - 5.6 % Final     Comment:     According to ADA guidelines, hemoglobin A1c <7.0% represents  optimal control in non-pregnant diabetic patients. Different  metrics may apply to specific patient populations.   Standards of Medical Care in Diabetes-2016.  For the purpose of screening for the presence of diabetes:  <5.7%     Consistent with the absence of  diabetes  5.7-6.4%  Consistent with increasing risk for diabetes   (prediabetes)  >or=6.5%  Consistent with diabetes  Currently, no consensus exists for use of hemoglobin A1c  for diagnosis of diabetes for children.  This Hemoglobin A1c assay has significant interference with fetal   hemoglobin   (HbF). The results are invalid for patients with abnormal amounts of   HbF,   including those with known Hereditary Persistence   of Fetal Hemoglobin. Heterozygous hemoglobin variants (HbAS, HbAC,   HbAD, HbAE, HbA2) do not significantly interfere with this assay;   however, presence of multiple variants in a sample may impact the %   interference.           Medications:  Scheduled Meds:   amiodarone  200 mg Oral Daily    aspirin  81 mg Oral Daily    atorvastatin  80 mg Oral Daily    estropipate  1.5 mg Oral Daily    fenofibrate  145 mg Oral Daily    furosemide  40 mg Intravenous BID    gabapentin  100 mg Oral TID    lisinopril  20 mg Oral Daily    metoprolol tartrate  50 mg Oral BID    ticagrelor  90 mg Oral BID                             Continuous Infusions:   heparin (porcine) in 5 % dex 12 Units/kg/hr (12/14/17 4340)     PRN Meds:.butalbital-acetaminophen-caffeine -40 mg, dextrose 50%, glucagon (human recombinant), insulin aspart, nitroGLYCERIN     ASSESSMENT/PLAN:     Acute on Chronic Systolic Heart Failure  -ECHO 12/15:   1 - Moderately depressed left ventricular systolic function (EF 30-35%). Akinetic LV apex. There is no thrombus in LV apex.    2 - Indeterminate LV diastolic function.     3 - Mildly to moderately depressed right ventricular systolic function     4 - Pulmonary hypertension. The estimated PA systolic pressure is 63 mmHg.     5 - Severe low flow aortic valve stenosis (JAMES 0.49 cm2, AVAi 0.27 cm2/m2, peak aortic jet velocity 4.0 m/s,MG 48 mmHg).     6 - Mild to moderate mitral regurgitation.     7 - Mild tricuspid regurgitation.     8 - Severe left atrial enlargement.   -volume status  "complicated by low-flow severe AS  -diuresing well, continue Lasix 40mg IV BID; may be able to change to PO tomorrow  -continue medical management with Metoprolol 50mg BID, Lisinopril 20mg daily    Severe Aortic Valve Stenosis  -JAMES 0.49 cm2, AVAi 0.27 cm2/m2, peak aortic jet velocity 4.0 m/s,MG 48 mmHg  -Pt will need TAVR evaluation, will consult team    NSTEMI  -Trop elevated, unsure if acute Type I or II 2/2 CHF and AS  -on ACS protocol with Heparin gtt, ASA, Ticagrelor, Metoprolol, statin  -For cath lab this afternoon  -ECHO results as above  -per LHC at , "noncritical CAD"    Atrial Fibrillation  -CHADS 3  -At , was started on Amiodarone and Xarelto; Xarelto held here  -continue Amio, Metoprolol, and transition back to Xarelto post-procedure  -note severe atrial enlargement, unclear if DCCV would be beneficial    Essential Hypertension  -controlled  -continue Lisinopril and Metoprolol    DM-II, controlled  -A1C 5.4  -continue low-dose SSI    DVT ppx- Heparin gtt  CODE Status- FULL    Dispo- home pending above norma Rosales MD  Hospital Medicine Staff    "

## 2017-12-15 NOTE — H&P
[facility] Hospital Medicine  History & Physical    Patient Name: Anum Quick  MRN: [id number]  Admission Date: 12/14/2017  Attending Physician: Fanny Rosales MD   Primary Care Provider: No primary care provider on file.    Hospital Medicine Team: [facility] MED C Brodie Vasquez MD     Patient information was obtained from patient, spouse/SO and ER records.     Subjective:     Principal Problem:<principal problem not specified>    Chief Complaint:   Chief Complaint   Patient presents with    Shortness of Breath     dc'd from [facility] on dec 8, aortic stenosis told needing to go to tx for surgery,chest pressure        HPI: Ms. Quick is a 62F h/o recently diagnosed CHF, Aortic stenosis, Dm, HTN presenting with shortness of breath, patient reports has been going on x3 weeks, and progressive, worse with exertion, now exercise limitation to < 1 block without exercise limitations previously. Pt endorsing no chest pain, reports orthopnea, PND, denies LE swelling. Reports has been increasing fluid intake recently. Had recent admission to [facility], pt reports undergoing cardiac cath (no reported blockages) was told had aortic valve stenosis that would require surgery. Patient presented here for evaluation because she had previously been told that she would need to go to [place] for further care. Pt denies recent illness, no F/C/N/V. Reports dyspnea is mild currently.     Past Medical History:   Diagnosis Date    Aortic stenosis     CHF (congestive heart failure)     Diabetes mellitus     Hyperlipidemia     Hypertension     Hypokalemia     Pulmonary edema        Past Surgical History:   Procedure Laterality Date    CARDIAC SURGERY      HYSTERECTOMY      KNEE SURGERY         Review of patient's allergies indicates:  No Known Allergies    No current facility-administered medications on file prior to encounter.      No current outpatient prescriptions on file prior to encounter.     Family  History     None        Social History Main Topics    Smoking status: Never Smoker    Smokeless tobacco: Not on file    Alcohol use No    Drug use: No    Sexual activity: Not on file     Review of Systems   Constitutional: Positive for appetite change. Negative for chills and fever.   HENT: Negative for congestion, hearing loss and trouble swallowing.    Respiratory: Positive for cough and shortness of breath. Negative for chest tightness and wheezing.    Cardiovascular: Negative for chest pain, palpitations and leg swelling.   Gastrointestinal: Negative for abdominal distention, abdominal pain, constipation and diarrhea.   Genitourinary: Negative for difficulty urinating and dysuria.   Musculoskeletal: Negative for arthralgias and myalgias.   Skin: Negative for rash.   Neurological: Negative for dizziness and headaches.   Psychiatric/Behavioral: Negative for agitation and behavioral problems.     Objective:     Vital Signs (Most Recent):  Temp: 97.7 °F (36.5 °C) (12/14/17 1636)  Pulse: 94 (12/14/17 1931)  Resp: 18 (12/14/17 1636)  BP: (!) 126/95 (12/14/17 1921)  SpO2: 98 % (12/14/17 1928) Vital Signs (24h Range):  Temp:  [97.7 °F (36.5 °C)] 97.7 °F (36.5 °C)  Pulse:  [] 94  Resp:  [18] 18  SpO2:  [95 %-98 %] 98 %  BP: (124-159)/() 126/95     Weight: 72.6 kg (160 lb)  Body mass index is 28.34 kg/m².    Physical Exam   Constitutional: She is oriented to person, place, and time. She appears well-developed and well-nourished. No distress.   HENT:   Head: Normocephalic and atraumatic.   Nose: Nose normal.   Mouth/Throat: No oropharyngeal exudate.   Eyes: Conjunctivae and EOM are normal. Pupils are equal, round, and reactive to light. Right eye exhibits no discharge. Left eye exhibits no discharge. No scleral icterus.   Neck: Normal range of motion. Neck supple. No JVD present.   Cardiovascular: Normal rate, regular rhythm and intact distal pulses.  Exam reveals no friction rub.    Murmur heard.    Crescendo decrescendo systolic murmur is present with a grade of 3/6   Systolic murmur noted over R upper sternal border radiation to carotid    Pulmonary/Chest: Effort normal. No respiratory distress. She has no wheezes. She has rales in the right lower field and the left lower field. She exhibits no tenderness.   Abdominal: Soft. Bowel sounds are normal. She exhibits no distension and no mass. There is no tenderness. There is no rebound and no guarding.   Musculoskeletal: Normal range of motion. She exhibits edema (bilateral to shins). She exhibits no tenderness or deformity.   Lymphadenopathy:     She has no cervical adenopathy.   Neurological: She is alert and oriented to person, place, and time. She has normal reflexes. No cranial nerve deficit.   Skin: Skin is warm and dry. No rash noted. She is not diaphoretic. No erythema.   Psychiatric: She has a normal mood and affect. Her behavior is normal.         CRANIAL NERVES     CN III, IV, VI   Pupils are equal, round, and reactive to light.  Extraocular motions are normal.        Significant Labs:   CBC:   Recent Labs  Lab 12/14/17  1720   WBC 7.88   HGB 11.2*   HCT 34.8*   *     CMP:   Recent Labs  Lab 12/14/17  1720      K 4.0      CO2 22*   *   BUN 26*   CREATININE 1.5*   CALCIUM 9.9   PROT 8.1   ALBUMIN 3.5   BILITOT 0.3   ALKPHOS 85   AST 18   ALT 17   ANIONGAP 13   EGFRNONAA 36.8*     Cardiac Markers:   Recent Labs  Lab 12/14/17  1720   BNP 2,414*     Troponin:   Recent Labs  Lab 12/14/17  1720   TROPONINI 2.965*       Significant Imaging: I have reviewed all pertinent imaging results/findings within the past 24 hours.    Assessment/Plan:     NSTEMI (non-ST elevated myocardial infarction)    Patient without any h/o chest pain or pressure, with elevated troponin, EKG with ant q waves with 1.5mm ST elevation in ant leads, code STEMI was called prior to my evaluation and cards has evaluated patient, based on bedside echo likely old  changes (no prior EKG to compare in our system), and related to heart failure. Given troponin elevation will proceed with NSTEMI pathway, trend EKG/Trop  -ASA, statin, B blocker, ACE, ticagelor, oxygen PRN, heparin ggt  -ECHO in AM            Acute on chronic systolic heart failure    Patient presenting with worsening dyspnea on exertion with LE edema on exam, orthopnea, PND, and elevated BNP, likely etiology of pt SOB, Patient with depressed EF on ECHO, patient without reported h/o CAD / MI however bedside echo and EKG concerning for prior infarct.  -Lasix 40 BID  -request records from [facility] (ECHO, cath report, d/c summary, cards notes)  -ACE/ASA/Statin/B Blocker per above, patient on amiodarone (will continue however will need to clarify from [facility] why on this)          Aortic valve stenosis    Patient with reported A stenosis per prior eval at [facility], with systolic murmur c/w a. Stenosis  -TTE tomorrow  -Request records per above          Hyperlipidemia    Continue atorvastatin           Essential hypertension    Continue lisinopril, metoprolol            Type 2 diabetes mellitus with neurologic complication    Stable, checking A1C, on metformin as outpatient holding  -Accuchecks Q6H while NPO  -Low dose SSI          Shortness of breath    See CHF exacerbation, ddx also includes ACS (less likely per cards eval), COPD (less likely no smoking history or reported hx), PNA (less likely no fever or cough). CXR with lower lung interstitial marking increased c/w volume overload 2/2 CHF  -Continue to monitor, oxygen nasal cannula PRN goal sats >92%          HARISH   Patient with unknown baseline with creatinine og 1.5, likely prerenal, checking Ua, will continue to monitor.    VTE Risk Mitigation         Ordered     heparin (PORCINE) bolus from bag 4,000 Units  Once     Route:  Intravenous        12/14/17 1944     heparin 25,000 units in dextrose 5% 250 mL (100 units/mL) infusion (heparin infusion)  Continuous     Route:   Intravenous        12/14/17 1944             Brodie Vasquez MD  Department of Hospital Medicine   [facility]

## 2017-12-15 NOTE — HPI
Ms. Quick is a 62F h/o recently diagnosed CHF, Aortic stenosis, Dm, HTN presenting with shortness of breath, patient reports has been going on x3 weeks, and progressive, worse with exertion, now exercise limitation to < 1 block without exercise limitations previously. Pt endorsing no chest pain, reports orthopnea, PND, denies LE swelling. Reports has been increasing fluid intake recently. Had recent admission to [facility], pt reports undergoing cardiac cath (no reported blockages) was told had aortic valve stenosis that would require surgery. Patient presented here for evaluation because she had previously been told that she would need to go to [place] for further care. Pt denies recent illness, no F/C/N/V. Reports dyspnea is mild currently.

## 2017-12-15 NOTE — CONSULTS
[facility]  Cardiothoracic Surgery  Consult Note    Patient Name: Anum Quick  MRN: [id number]  Admission Date: 12/14/2017  Attending Physician: Fanny Rosales MD  Referring Provider: Self, Aaareferral    Patient information was obtained from patient.     Inpatient consult to Cardiothoracic Surgery  Consult performed by: SUSANA ACOSTA  Consult ordered by: SHERRI SAAVEDRA  Reason for consult: TAVR evaluation         Subjective:     Principal Problem: Acute on chronic systolic heart failure    History of Present Illness: Ms. Quick is a 62 F h/o recently diagnosed CHF, AS, DM, HTN, A Fib (on amio), presenting with shortness of breath,  patient reports has been going on x3 weeks, and is progressive, worse with exertion, now exercise limitation to < 1 block without exercise limitations previously. Pt denies chest pain, palpitations, syncope, light headed. She reports 3 pillow orthopnea, PND, and ankle swelling. She reports has been increasing fluid intake recently. Had recent admission to [facility], pt reports undergoing cardiac cath (no reported significant CAD per reports, we have requested disc) was told she had aortic valve stenosis that would require surgery but also was told about possible TAVR. Patient presented here for evaluation because she had previously been told that she would need to go to [place], [place] for AVR.      She states for the last 3-4 weeks she has experienced HA (Class III sx) and is unable to walk up her 17 stairs in her home without stopping to catch her breath. Her SOB resolves with rest and is not associated with Cp, light headedness, dizziness. At  she believes she had ARELI, Coronary Angiogram, CTA but we are still awaiting records. She was also told she would need surgical AVR but was told she must be referred to [place], [place] for surgery. She was unsure as to the reasoning behind this. Since discharge she has adhered to a low sodium diet and been complaint with  newly prescribed Lasix, however, she began experiencing SOB and noticed bilateral ankle edema. She presented to [facility] [place] as she wanted another opinion regarding management of her CHF and AS.     No current facility-administered medications on file prior to encounter.      No current outpatient prescriptions on file prior to encounter.       Review of patient's allergies indicates:  No Known Allergies    Past Medical History:   Diagnosis Date    Aortic stenosis     CHF (congestive heart failure)     Diabetes mellitus     Hyperlipidemia     Hypertension     Hypokalemia     Pulmonary edema      Past Surgical History:   Procedure Laterality Date    CARDIAC SURGERY      HYSTERECTOMY      KNEE SURGERY       Family History     None        Social History Main Topics    Smoking status: Never Smoker    Smokeless tobacco: Not on file    Alcohol use No    Drug use: No    Sexual activity: Not on file     Review of Systems   Constitutional: Positive for activity change.   Respiratory: Positive for shortness of breath. Negative for cough.    Cardiovascular: Negative for chest pain, palpitations and leg swelling.   Gastrointestinal: Negative for abdominal pain, nausea and vomiting.   Endocrine: Negative for polydipsia, polyphagia and polyuria.   Musculoskeletal: Negative for gait problem.   Skin: Negative for rash.   Allergic/Immunologic: Negative for immunocompromised state.   Neurological: Negative for dizziness, syncope and weakness.   Hematological: Does not bruise/bleed easily.   Psychiatric/Behavioral: Negative for behavioral problems.     Objective:     Vital Signs (Most Recent):  Temp: 96.6 °F (35.9 °C) (12/15/17 1544)  Pulse: 85 (12/15/17 1544)  Resp: 18 (12/15/17 1544)  BP: 113/75 (12/15/17 1544)  SpO2: (!) 94 % (12/15/17 1544) Vital Signs (24h Range):  Temp:  [96.6 °F (35.9 °C)-97.8 °F (36.6 °C)] 96.6 °F (35.9 °C)  Pulse:  [] 85  Resp:  [18-20] 18  SpO2:  [93 %-98 %] 94 %  BP:  (113-159)/() 113/75     Weight: 77.3 kg (170 lb 6.7 oz)  Body mass index is 30.19 kg/m².    SpO2: (!) 94 %  O2 Device (Oxygen Therapy): room air     Intake/Output - Last 3 Shifts     None           Lines/Drains/Airways     Peripheral Intravenous Line                 Peripheral IV - Single Lumen 12/14/17 1702 Right Forearm less than 1 day                 STS Risk Score: 2.8%    Physical Exam   Constitutional: She is oriented to person, place, and time. She appears well-developed and well-nourished.   HENT:   Head: Normocephalic.   Eyes: Pupils are equal, round, and reactive to light.   Cardiovascular: Normal rate and regular rhythm.    Murmur heard.  Pulmonary/Chest: Effort normal and breath sounds normal.   Abdominal: Soft.   Musculoskeletal: Normal range of motion.   Neurological: She is alert and oriented to person, place, and time.   Skin: Skin is warm and dry.   Psychiatric: She has a normal mood and affect.       Significant Labs:  BMP:   Recent Labs  Lab 12/14/17  1720 12/15/17  0344   * 90    143   K 4.0 4.0    104   CO2 22* 24   BUN 26* 25*   CREATININE 1.5* 1.4   CALCIUM 9.9 9.8   MG 2.1  --      CBC:   Recent Labs  Lab 12/15/17  0344   WBC 6.52   RBC 3.56*   HGB 11.0*   HCT 34.1*   *   MCV 96   MCH 30.9   MCHC 32.3       Significant Diagnostics:  ECHO CONCLUSIONS     1 - Moderately depressed left ventricular systolic function (EF 30-35%). Akinetic LV apex. There is no thrombus in LV apex.    2 - Indeterminate LV diastolic function.     3 - Mildly to moderately depressed right ventricular systolic function .     4 - Pulmonary hypertension. The estimated PA systolic pressure is 63 mmHg.     5 - Severe low flow aortic valve stenosis (JAMES 0.49 cm2, AVAi 0.27 cm2/m2, peak aortic jet velocity 4.0 m/s,MG 48 mmHg).     6 - Mild to moderate mitral regurgitation.     7 - Mild tricuspid regurgitation.     8 - Severe left atrial enlargement.     Assessment/Plan:     NYHA Score: NYHA  III: marked limitation of physical activity, comfortable at rest    Aortic valve stenosis    Pt is low risk for SAVR from information available per patient. Unclear reason why patient was told she would need to have surgery in [place] or [place]. Can continue with TAVR work up if patient would like and Dr. Lundy to staff.           Thank you for your consult. I will follow-up with patient. Please contact us if you have any additional questions.    Neena Carranza NP  Cardiothoracic Surgery  [facility] Attending Note:    I have personally taken the history and agree with the NP's note as stated above. She is low risk for SAVR at this time, we will complete her evaluation with the valve team and her heart failure exacerbation needs to be optimized medically.

## 2017-12-15 NOTE — PLAN OF CARE
12/15/17 0846   Discharge Assessment   Assessment Type Discharge Planning Assessment   Confirmed/corrected address and phone number on facesheet? Yes   Assessment information obtained from? Patient;Medical Record   Expected Length of Stay (days) 3   Communicated expected length of stay with patient/caregiver yes   Prior to hospitilization cognitive status: Alert/Oriented   Prior to hospitalization functional status: Independent   Current cognitive status: Alert/Oriented   Current Functional Status: Independent   Lives With spouse   Able to Return to Prior Arrangements yes   Is patient able to care for self after discharge? Yes   Patient's perception of discharge disposition home or selfcare   Readmission Within The Last 30 Days previous discharge plan unsuccessful   If yes, most recent facility name: [facility]   Patient currently being followed by outpatient case management? No   Patient currently receives any other outside agency services? No   Equipment Currently Used at Home none   Do you have any problems affording any of your prescribed medications? No   Is the patient taking medications as prescribed? yes   Does the patient have transportation home? Yes   Transportation Available family or friend will provide  ()   Does the patient receive services at the [facility]? No   Discharge Plan A Home with family   Readmission Questionnaire   At the time of your discharge, did someone talk to you about what your health problems were? Yes   At the time of discharge, did someone talk to you about what to watch out for regarding worsening of your health problem? Yes   At the time of discharge, did someone talk to you about what to do if you experienced worsening of your health problem? Yes   At the time of discharge, did someone talk to you about which medication to take when you left the hospital and which ones to stop taking? Yes   At the time of discharge, did someone talk to you about when and  where to follow up with a doctor after you left the hospital? Yes   How often do you need to have someone help you when you read instructions, pamphlets, or other written material from your doctor or pharmacy? Sometimes   Do you have problems taking your medications as prescribed? No   Do you have any problems affording any of  your prescribed medications? To be determined   Do you have problems obtaining/receiving your medications? No   Does the patient have transportation to healthcare appointments? Yes   Living Arrangements house   Does the patient have family/friends to help with healtcare needs after discharge? yes   Does your caregiver provide all the help you need? Yes   Are you currently feeling confused? No   Are you currently having problems thinking? No   Are you currently having memory problems? No   Have you felt down, depressed, or hopeless? 0   Have you felt little interest or pleasure in doing things? 0   In the last 7 days, my sleep quality was: poor   Admitted with NSTEMI, CHF and AS. Lives with  and is independent in her ADLs. Plan is to DC home. No DC needs identified.  Records requested from .    Cardiologist:  Dr. Chun Rodriguez   Address: [id number]  [place] [address]  Phone: [phone]

## 2017-12-15 NOTE — ASSESSMENT & PLAN NOTE
Per records at , currently rate controlled in 80s  On Metoprolol and amiodarone  Pt on Heparin gtt due to NSTEMI pathway (ok to d/c from that standpoint from Dr. Chung) but will need AC for A Fib

## 2017-12-15 NOTE — ASSESSMENT & PLAN NOTE
EKG on admit showed anterior q waves as well as 1.5mm ST elevation in anterior leads. Bedside echo reveals LVEF ~20%, dyskinetic LV apex, hypokinetic apical septum. 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 No obvious LV thrombus on bedside echo. Symptoms are most likely due to acute on chronic heart failure. Continue diuresis see below for TAVR work-up.    -[facility]: Per report (12/4/2017) proximal LAD has 20% stenosis, RCA with LI otherwise normal coronaries    -Heparin gtt due to NSTEMI pathway ok to d/c per Dr. Chung  -Most recent TTE:   1 - Moderately depressed left ventricular systolic function (EF 30-35%). Akinetic LV apex. There is no thrombus in LV apex.    2 - Indeterminate LV diastolic function.     3 - Mildly to moderately depressed right ventricular systolic function .     4 - Pulmonary hypertension. The estimated PA systolic pressure is 63 mmHg.     5 - Severe low flow aortic valve stenosis (JAMES 0.49 cm2, AVAi 0.27 cm2/m2, peak aortic jet velocity 4.0 m/s,MG 48 mmHg).     6 - Mild to moderate mitral regurgitation.     7 - Mild tricuspid regurgitation.     8 - Severe left atrial enlargement.

## 2017-12-15 NOTE — ED NOTES
Pt returned from restroom. Hooked back up to cardiac monitor, BP cuff, and pulse ox. Bed low and locked, side rails up x2. Call light within reach of pt. Will continue to monitor.

## 2017-12-15 NOTE — CONSULTS
"Cardiac Rehab     Anum Quick   [id number]   12/15/2017       Activity taught: Yes    Cardiac Rehab Phase Taught: Phase I & II    Risk Factors-Modifiable: diabetes, nutrition, hypertension, obesity, sedentary lifestyle, stress, exercise    Risk Factors-Non modifiable: age    Teaching Method: Verbal, Written and Living with Heart Disease book.    Understanding/Response: Pt verbalized understanding, all questions answered. Pt understands their cardiac rehab order is good for 12 months.       Comments: S/P NSTEMI. Discussed cardiac rehab and risk factor modification. Team to refer patient to [facility] Phase II. Educational materials were used in the process and given to the patient. They included "Your Guide to Living with Heart Disease", Phase Two Cardiac Rehabilitation information along with a sample Mediterranean diet.The patient expressed understanding of the teaching and expressed desire to take a role in modifying the risk factors when they return home.    NIKOLAY Clement RN  Cardiac Rehab Nurse      "

## 2017-12-15 NOTE — PROGRESS NOTES
Patient identified via spelling of name and date of birth. Patient denies blood transfusion reaction. Consent obtained for use of contrast. Optison 3ml IVP sammy Mcbride. Saline lock flushed pre and post use under aseptic technique. Definity 1.5ml IVP used as contrast for 2 d imaging. Tolerated procedure well.

## 2017-12-15 NOTE — CARE UPDATE
RN Proactive Rounding Note  Time of Visit: 45    Admit Date: 2017  LOS: 1  Code Status: Full Code   Date of Visit: 12/15/2017  : 1955  Age: 62 y.o.  Sex: female  Bed: [facility] A:   MRN: [id number]  Was the patient discharged from an ICU this admission? no  Was the patient discharged from a PACU within last 24 hours? no  Did the patient receive conscious sedation/general anesthesia in last 24 hours? no  Was the patient in the ED within the past 24 hours? yes  Was the patient started on NIPPV within the past 24 hours? no  Attending Physician: Brodie Vasquez MD  Primary Service: [facility] MED       ASSESSMENT:     Modified Early Warning Score (MEWS): none  Abnormal Vital Signs: abnormal ekg  Clinical Issues:Dysrythmia     INTERVENTIONS/ RECOMMENDATIONS:   New admit, elevated troponin, Severe AS. Move to cardiac floor when bed become available     Discussed plan of care with RN    PHYSICIAN ESCALATION:     No         Disposition: Call if needed.    FOLLOW-UP/CONTINGENCY:       Call back the Rapid Response Nurse at x [phone]  for additional questions or concerns

## 2017-12-15 NOTE — CARE UPDATE
Rapid Response Follow-up Note    Followed up with patient for proactive rounding.   No acute issues at this time. Vital signs within normal limits  Reviewed plan of care with primary RN.   Please call Rapid Response RN with any questions or concerns at  [phone]

## 2017-12-15 NOTE — NURSING
Patient had EKG that was changed from the previous one done in the ED. Dr Stone informed of change in EKG at 0130, awaiting further orders. On continuous telemetry monitoring. VSS, pt in NAD. Will continue to monitor closely. Waiting on second troponin.

## 2017-12-15 NOTE — ASSESSMENT & PLAN NOTE
Pt is low risk for SAVR from information available per patient. Can continue with TAVR work up if patient would like and Dr. Lundy to staff.

## 2017-12-15 NOTE — ASSESSMENT & PLAN NOTE
Patient without any h/o chest pain or pressure, with elevated troponin, EKG with ant q waves with 1.5mm ST elevation in ant leads, code STEMI was called prior to my evaluation and cards has evaluated patient, based on bedside echo likely old changes (no prior EKG to compare in our system), and related to heart failure. Given troponin elevation will proceed with NSTEMI pathway, trend EKG/Trop  -ASA, statin, B blocker, ACE, ticagelor, oxygen PRN, heparin ggt  -ECHO in AM

## 2017-12-15 NOTE — SUBJECTIVE & OBJECTIVE
No current facility-administered medications on file prior to encounter.      No current outpatient prescriptions on file prior to encounter.       Review of patient's allergies indicates:  No Known Allergies    Past Medical History:   Diagnosis Date    Aortic stenosis     CHF (congestive heart failure)     Diabetes mellitus     Hyperlipidemia     Hypertension     Hypokalemia     Pulmonary edema      Past Surgical History:   Procedure Laterality Date    CARDIAC SURGERY      HYSTERECTOMY      KNEE SURGERY       Family History     None        Social History Main Topics    Smoking status: Never Smoker    Smokeless tobacco: Not on file    Alcohol use No    Drug use: No    Sexual activity: Not on file     Review of Systems   Constitutional: Positive for activity change.   Respiratory: Positive for shortness of breath. Negative for cough.    Cardiovascular: Negative for chest pain, palpitations and leg swelling.   Gastrointestinal: Negative for abdominal pain, nausea and vomiting.   Endocrine: Negative for polydipsia, polyphagia and polyuria.   Musculoskeletal: Negative for gait problem.   Skin: Negative for rash.   Allergic/Immunologic: Negative for immunocompromised state.   Neurological: Negative for dizziness, syncope and weakness.   Hematological: Does not bruise/bleed easily.   Psychiatric/Behavioral: Negative for behavioral problems.     Objective:     Vital Signs (Most Recent):  Temp: 96.6 °F (35.9 °C) (12/15/17 1544)  Pulse: 85 (12/15/17 1544)  Resp: 18 (12/15/17 1544)  BP: 113/75 (12/15/17 1544)  SpO2: (!) 94 % (12/15/17 1544) Vital Signs (24h Range):  Temp:  [96.6 °F (35.9 °C)-97.8 °F (36.6 °C)] 96.6 °F (35.9 °C)  Pulse:  [] 85  Resp:  [18-20] 18  SpO2:  [93 %-98 %] 94 %  BP: (113-159)/() 113/75     Weight: 77.3 kg (170 lb 6.7 oz)  Body mass index is 30.19 kg/m².    SpO2: (!) 94 %  O2 Device (Oxygen Therapy): room air     Intake/Output - Last 3 Shifts     None            Lines/Drains/Airways     Peripheral Intravenous Line                 Peripheral IV - Single Lumen 12/14/17 1702 Right Forearm less than 1 day                 STS Risk Score: 2.8%    Physical Exam   Constitutional: She is oriented to person, place, and time. She appears well-developed and well-nourished.   HENT:   Head: Normocephalic.   Eyes: Pupils are equal, round, and reactive to light.   Cardiovascular: Normal rate and regular rhythm.    Murmur heard.  Pulmonary/Chest: Effort normal and breath sounds normal.   Abdominal: Soft.   Musculoskeletal: Normal range of motion.   Neurological: She is alert and oriented to person, place, and time.   Skin: Skin is warm and dry.   Psychiatric: She has a normal mood and affect.       Significant Labs:  BMP:   Recent Labs  Lab 12/14/17  1720 12/15/17  0344   * 90    143   K 4.0 4.0    104   CO2 22* 24   BUN 26* 25*   CREATININE 1.5* 1.4   CALCIUM 9.9 9.8   MG 2.1  --      CBC:   Recent Labs  Lab 12/15/17  0344   WBC 6.52   RBC 3.56*   HGB 11.0*   HCT 34.1*   *   MCV 96   MCH 30.9   MCHC 32.3       Significant Diagnostics:  ECHO CONCLUSIONS     1 - Moderately depressed left ventricular systolic function (EF 30-35%). Akinetic LV apex. There is no thrombus in LV apex.    2 - Indeterminate LV diastolic function.     3 - Mildly to moderately depressed right ventricular systolic function .     4 - Pulmonary hypertension. The estimated PA systolic pressure is 63 mmHg.     5 - Severe low flow aortic valve stenosis (JAMES 0.49 cm2, AVAi 0.27 cm2/m2, peak aortic jet velocity 4.0 m/s,MG 48 mmHg).     6 - Mild to moderate mitral regurgitation.     7 - Mild tricuspid regurgitation.     8 - Severe left atrial enlargement.

## 2017-12-15 NOTE — PLAN OF CARE
Problem: Patient Care Overview  Goal: Plan of Care Review    Recommendations     Recommendation/Intervention:   1. When medically appropriate, advance diet to cardiac.   2. Cardiac diet education completed.   3. RD following.     Goals: Diet advancement by RD follow-up  Nutrition Goal Status: new

## 2017-12-15 NOTE — CONSULTS
"  [facility]  Adult Nutrition  Consult Note    SUMMARY     Recommendations    Recommendation/Intervention:   1. When medically appropriate, advance diet to cardiac.   2. Cardiac diet education completed.   3. RD following.    Goals: Diet advancement by RD follow-up  Nutrition Goal Status: new  Communication of RD Recs: reviewed with RN    Reason for Assessment    Reason for Assessment: physician consult  Diagnosis: cardiac disease (AS, CHF)  Relevant Medical History: CHF, HTN, T2DM, HLD, AS         General Information Comments: Pt denies N/V/D/C. Denies wt change PTA. Heart healthy/low Na diet education completed.    Nutrition Discharge Planning: Adequate PO intake on cardiac, carb controlled diet    Nutrition Prescription Ordered    Current Diet Order: NPO    Evaluation of Received Nutrients/Fluid Intake     Comments: LBM 12/14     % Intake of Estimated Energy Needs: 0 - 25 %  % Meal Intake: NPO     Nutrition Risk Screen     Nutrition Risk Screen: no indicators present    Nutrition/Diet History    Patient Reported Diet/Restrictions/Preferences: general  Typical Food/Fluid Intake:  cooks, no dietary restrictions  Food Preferences: No cultural/Anglican preferences noted.  Factors Affecting Nutritional Intake: NPO    Labs/Tests/Procedures/Meds    Pertinent Labs Reviewed: reviewed, pertinent  Pertinent Labs Comments: BUN 25, GFR 40.0, , HDL 33, Trop 2.935  Pertinent Medications Reviewed: reviewed, pertinent  Pertinent Medications Comments: fenofibrate, lasix, heparin    Physical Findings    Overall Physical Appearance: obese     Oral/Mouth Cavity: WDL  Skin: intact    Anthropometrics    Temp: 97.4 °F (36.3 °C)     Height: 5' 3" (160 cm)  Weight Method: Bed Scale  Weight: 77.3 kg (170 lb 6.7 oz)     Ideal Body Weight (IBW), Female: 115 lb     % Ideal Body Weight, Female (lb): 148.19 lb  BMI (Calculated): 30.3  BMI Grade: 30 - 34.9- obesity - grade I    Estimated/Assessed Needs    Weight " Used For Calorie Calculations: 77.3 kg (170 lb 6.7 oz)   Energy Calorie Requirements (kcal): 1556  Energy Need Method: Donna-St Jeor (x 1.2 (PAL))  RMR (Donna-St. Jeor Equation): 1297.12     Weight Used For Protein Calculations: 77.3 kg (170 lb 6.7 oz)  Protein Requirements: 77-93 gm (1.0-1.2 gm/kg)    Fluid Requirements (mL): per MD  RDA Method (mL): 1556    Assessment and Plan    Acute on chronic systolic heart failure    Nutrition Problem  Food- and nutrition-related knowledge deficit    Related to (etiology):   No previous diet education    Signs and Symptoms (as evidenced by):   Pt reported diet     Interventions/Recommendations (treatment strategy):  See recs.    Nutrition Diagnosis Status:   New              Monitor and Evaluation    Food and Nutrient Intake: energy intake, food and beverage intake  Food and Nutrient Adminstration: diet order  Knowledge/Beliefs/Attitudes: food and nutrition knowledge/skill  Physical Activity and Function: nutrition-related ADLs and IADLs  Anthropometric Measurements: weight, weight change  Biochemical Data, Medical Tests and Procedures: electrolyte and renal panel, gastrointestinal profile, glucose/endocrine profile, inflammatory profile, lipid profile  Nutrition-Focused Physical Findings: overall appearance    Nutrition Risk    Level of Risk: F/u 1x weekly    Nutrition Follow-Up    RD Follow-up?: Yes

## 2017-12-15 NOTE — CONSULTS
[facility]  Interventional Cardiology  Consult Note    Patient Name: Anum Quick  MRN: [id number]  Admission Date: 12/14/2017  Hospital Length of Stay: 1 days  Code Status: Full Code   Attending Provider: Brodie Fine MD  Consulting Provider: Sherri Raza NP  Primary Care Physician: Raghav Valdez MD  Principal Problem:Acute on chronic systolic heart failure    Patient information was obtained from patient, spouse/SO and past medical records.     Inpatient consult to Interventional Cardiology  Consult performed by: SHERRI RAZA.  Consult ordered by: BRODIE FINE        Subjective:     Chief Complaint: SOB    HPI:  Ms. Quick is a 62 F h/o recently diagnosed CHF, AS, DM, HTN, A Fib (on amio), presenting with shortness of breath,  patient reports has been going on x3 weeks, and is progressive, worse with exertion, now exercise limitation to < 1 block without exercise limitations previously. Pt denies chest pain, palpitations, syncope, light headed. She reports 3 pillow orthopnea, PND, and ankle swelling. She reports has been increasing fluid intake recently. Had recent admission to [facility], pt reports undergoing cardiac cath (no reported significant CAD per reports, we have requested disc) was told she had aortic valve stenosis that would require surgery but also was told about possible TAVR. Patient presented here for evaluation because she had previously been told that she would need to go to [place], [place] for AVR. Pt denies recent illness, no F/C/N/V.     She states for the last 3-4 weeks she has experienced HA (Class III sx) and is unable to walk up her 17 stairs in her home without stopping to catch her breath. Her SOB resolves with rest and is not associated with Cp, light headedness, dizziness. At  she believes she had ARELI, Coronary Angiogram, CTA but we are still awaiting records. She was also told she would need surgical AVR but was told she must be referred to  [place], [place] for surgery. She was unsure as to the reasoning behind this. Since discharge she has adhered to a low sodium diet and been complaint with newly prescribed Lasix, however, she began experiencing SOB and noticed bilateral ankle edema. [place] [facility] [place] as she wanted another opinion regarding management of her CHF and AS.       Past Medical History:   Diagnosis Date    Aortic stenosis     CHF (congestive heart failure)     Diabetes mellitus     Hyperlipidemia     Hypertension     Hypokalemia     Pulmonary edema        Past Surgical History:   Procedure Laterality Date    CARDIAC SURGERY      HYSTERECTOMY      KNEE SURGERY         Review of patient's allergies indicates:  No Known Allergies    PTA Medications   Medication Sig    amiodarone (PACERONE) 200 MG Tab Take by mouth once daily.    atorvastatin (LIPITOR) 20 MG tablet Take 20 mg by mouth once daily.    butalbital-acetaminophen-caffeine -40 mg (FIORICET, ESGIC) -40 mg per tablet Take 1 tablet by mouth every 4 (four) hours as needed for Pain.    estropipate (OGEN) 1.5 MG tablet Take 1.5 mg by mouth once daily.    fenofibrate (TRICOR) 145 MG tablet Take 145 mg by mouth once daily.    furosemide (LASIX) 20 MG tablet Take 20 mg by mouth 2 (two) times daily.    gabapentin (NEURONTIN) 100 MG capsule Take 100 mg by mouth 3 (three) times daily.    lisinopril (PRINIVIL,ZESTRIL) 20 MG tablet Take 20 mg by mouth once daily.    metFORMIN (GLUMETZA) 500 MG (MOD) 24 hr tablet Take 500 mg by mouth daily with breakfast.    metoprolol tartrate (LOPRESSOR) 50 MG tablet Take 50 mg by mouth 2 (two) times daily.    rivaroxaban (XARELTO) 20 mg Tab Take 20 mg by mouth daily with dinner or evening meal.     Family History     None        Social History Main Topics    Smoking status: Never Smoker    Smokeless tobacco: Not on file    Alcohol use No    Drug use: No    Sexual activity: Not on file     Review of Systems    Constitution: Positive for weight gain. Negative for chills, diaphoresis, fever, weakness and weight loss.   HENT: Negative for sore throat.    Eyes: Negative for blurred vision, vision loss in left eye, vision loss in right eye and visual disturbance.   Cardiovascular: Positive for dyspnea on exertion and orthopnea. Negative for chest pain, claudication, leg swelling, near-syncope, palpitations, paroxysmal nocturnal dyspnea and syncope.   Respiratory: Positive for shortness of breath. Negative for cough, hemoptysis, sputum production and wheezing.    Endocrine: Negative for cold intolerance and heat intolerance.   Hematologic/Lymphatic: Negative for adenopathy. Does not bruise/bleed easily.   Skin: Negative for rash.   Musculoskeletal: Negative for falls, muscle weakness and myalgias.   Gastrointestinal: Negative for abdominal pain, change in bowel habit, constipation, diarrhea, melena and nausea.   Genitourinary: Negative for bladder incontinence.   Neurological: Negative for dizziness, focal weakness, headaches, light-headedness and numbness.   Psychiatric/Behavioral: Negative for altered mental status.     Objective:     Vital Signs (Most Recent):  Temp: 97.4 °F (36.3 °C) (12/15/17 1123)  Pulse: 87 (12/15/17 1452)  Resp: 18 (12/15/17 1123)  BP: 118/81 (12/15/17 1123)  SpO2: 98 % (12/15/17 1123) Vital Signs (24h Range):  Temp:  [97.4 °F (36.3 °C)-97.8 °F (36.6 °C)] 97.4 °F (36.3 °C)  Pulse:  [] 87  Resp:  [18-20] 18  SpO2:  [93 %-98 %] 98 %  BP: (118-159)/() 118/81     Weight: 77.3 kg (170 lb 6.7 oz)  Body mass index is 30.19 kg/m².    SpO2: 98 %  O2 Device (Oxygen Therapy): nasal cannula    No intake or output data in the 24 hours ending 12/15/17 1536    Lines/Drains/Airways     Peripheral Intravenous Line                 Peripheral IV - Single Lumen 12/14/17 1702 Right Forearm less than 1 day                Physical Exam   Constitutional: She is oriented to person, place, and time. She appears  well-developed and well-nourished. No distress.   HENT:   Head: Normocephalic and atraumatic.   Mouth/Throat: Oropharynx is clear and moist.   Eyes: Conjunctivae and EOM are normal. Pupils are equal, round, and reactive to light. No scleral icterus.   Neck: Neck supple. JVD present. No tracheal deviation present.   Cardiovascular: Exam reveals no gallop and no friction rub.    Murmur heard.  Pulmonary/Chest: Effort normal and breath sounds normal. No respiratory distress. She has no wheezes. She has no rales. She exhibits no tenderness.   Abdominal: Soft. Bowel sounds are normal. She exhibits no distension. There is no hepatosplenomegaly. There is no tenderness.   Musculoskeletal: She exhibits edema. She exhibits no tenderness.   Neurological: She is alert and oriented to person, place, and time.   Skin: Skin is warm and dry. No rash noted. No erythema.   Psychiatric: She has a normal mood and affect. Her behavior is normal.       Significant Labs:   ABG: No results for input(s): PH, PCO2, HCO3, POCSATURATED, BE in the last 48 hours., Blood Culture: No results for input(s): LABBLOO in the last 48 hours., BMP:   Recent Labs  Lab 12/14/17  1720 12/15/17  0344   * 90    143   K 4.0 4.0    104   CO2 22* 24   BUN 26* 25*   CREATININE 1.5* 1.4   CALCIUM 9.9 9.8   MG 2.1  --    , CMP   Recent Labs  Lab 12/14/17  1720 12/15/17  0344    143   K 4.0 4.0    104   CO2 22* 24   * 90   BUN 26* 25*   CREATININE 1.5* 1.4   CALCIUM 9.9 9.8   PROT 8.1  --    ALBUMIN 3.5  --    BILITOT 0.3  --    ALKPHOS 85  --    AST 18  --    ALT 17  --    ANIONGAP 13 15   ESTGFRAFRICA 42.4* 46.1*   EGFRNONAA 36.8* 40.0*   , CBC   Recent Labs  Lab 12/14/17  1720 12/15/17  0344   WBC 7.88 6.52   HGB 11.2* 11.0*   HCT 34.8* 34.1*   * 494*    and INR   Recent Labs  Lab 12/14/17  1720   INR 1.6*       Significant Imaging: X-Ray: CXR: X-Ray Chest 1 View (CXR): No results found for this visit on  12/14/17.    Assessment and Plan:     * Acute on chronic systolic heart failure    Recommend continued diuresis Lasix 40mg BID        Paroxysmal atrial fibrillation    Per records at , currently rate controlled in 80s  On Metoprolol and amiodarone  Pt on Heparin gtt due to NSTEMI pathway (ok to d/c from that standpoint from Dr. Chung) but will need AC for A Fib         Hyperlipidemia    Stable on atorvastatin         Aortic valve stenosis        Interventional Cardiology       [facility]      Anum Quick is a 62 y.o. female referred by Dr Vasquez for evaluation of severe AS (NYHA Class III sx).    she has undergone the following TAVR work-up:   ECHO (Date 12/15/2017): JAMES= 0.49 cm2, MG= 48mmHg, Peak Carlos A= 4.0 m/s, EF= 35%. - Per  concern for LV apical thrombus, however, no thrombus visualized on [facility] TTE   [facility] : Awaiting disc from . Per report (12/4/2017): proximal LAD has 20% stenosis, RCA with LI otherwise normal coronaries. Awaiting disc from   STS: 2.19%   Frailty: 1/4 (hand )   Iliacs are Pending   LVOT area by CTA is Pending   CTS risk assessment: Pending  PFTs: Needs     CTS consulted, appreciate their assistance   CTA ordered as part of TAVR work-up and to evaluate for possible Porcelain Aorta  Still awaiting records from  regarding why patient was told she would need to be referred to [place] for AVR.        Essential hypertension    On Metoprolol 50mg, lisinopril 20mg        Type 2 diabetes mellitus with neurologic complication    On Metformin at home, now on SSI        NSTEMI (non-ST elevated myocardial infarction)    EKG on admit showed anterior q waves as well as 1.5mm ST elevation in anterior leads. Bedside echo reveals LVEF ~20%, dyskinetic LV apex, hypokinetic apical septum. 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 No obvious LV thrombus on bedside echo. Symptoms are most likely due to acute on chronic heart failure. Continue diuresis see  below for TAVR work-up.    -[facility] @ : Per report (12/4/2017) proximal LAD has 20% stenosis, RCA with LI otherwise normal coronaries    -Heparin gtt due to NSTEMI pathway ok to d/c per Dr. Chung  -Most recent TTE:   1 - Moderately depressed left ventricular systolic function (EF 30-35%). Akinetic LV apex. There is no thrombus in LV apex.    2 - Indeterminate LV diastolic function.     3 - Mildly to moderately depressed right ventricular systolic function .     4 - Pulmonary hypertension. The estimated PA systolic pressure is 63 mmHg.     5 - Severe low flow aortic valve stenosis (JAMES 0.49 cm2, AVAi 0.27 cm2/m2, peak aortic jet velocity 4.0 m/s,MG 48 mmHg).     6 - Mild to moderate mitral regurgitation.     7 - Mild tricuspid regurgitation.     8 - Severe left atrial enlargement.             VTE Risk Mitigation         Ordered     heparin 25,000 units in dextrose 5% 250 mL (100 units/mL) infusion (heparin infusion)  Continuous     Route:  Intravenous        12/14/17 1944        Severe AS:  she has undergone the following TAVR work-up:   ECHO (Date 12/15/2017): JAMES= 0.49 cm2, MG= 48mmHg, Peak Carlos A= 4.0 m/s, EF= 35%. - Per  concern for LV apical thrombus, however, no thrombus visualized on [facility] TTE   [facility] : Awaiting disc from . Per report (12/4/2017): proximal LAD has 20% stenosis, RCA with LI otherwise normal coronaries. Awaiting angiogram disc from   STS: 2.19%   Frailty: 1/4 (hand )   Iliacs are Pending   LVOT area by CTA is Pending   CTS risk assessment: Pending  PFTs: Needs     CTS consulted, appreciate their assistance   CTA ordered as part of TAVR work-up and to evaluate for possible Porcelain Aorta  Still awaiting records from  regarding why patient was told she would need to be referred to [place] for AVR.    Thank you for your consult. I will follow-up with patient. Please contact us if you have any additional questions.    Jackeline Raza, TERE  Interventional Cardiology   [facility]

## 2017-12-16 NOTE — PROGRESS NOTES
Progress Note  Hospital Medicine    Primary Team: [facility]  Admit Date: 12/14/2017   Length of Stay:  LOS: 2 days   SUBJECTIVE:   Reason for Admission:  Acute on chronic systolic heart failure      HPI:  Ms. Quick is a 62F h/o recently diagnosed CHF, Aortic stenosis, Dm, HTN presenting with shortness of breath, patient reports has been going on x3 weeks, and progressive, worse with exertion, now exercise limitation to < 1 block without exercise limitations previously. Pt endorsing no chest pain, reports orthopnea, PND, denies LE swelling. Reports has been increasing fluid intake recently. Had recent admission to [facility], pt reports undergoing cardiac cath (no reported blockages) was told had aortic valve stenosis that would require surgery. Patient presented here for evaluation because she had previously been told that she would need to go to [place] for further care. Pt denies recent illness, no F/C/N/V. Reports dyspnea is mild currently.     Hospital Course:  Pt was admitted to [facility] for treatment of acute on chronic systolic heart failure.  Records were obtained from , and Interventional Cardiology consulted for further recs.    Interval history:    Pt reports feeling better today.  She is satting well on RA but still cannot tolerate supine position.    Review of Systems:  Constitutional: no fever or chills  Respiratory: positive for dyspnea on exertion  Cardiovascular: no chest pain or palpitations  Gastrointestinal: no nausea or vomiting, no abdominal pain or change in bowel habits  Musculoskeletal: no arthralgias or myalgias     OBJECTIVE:     Temp:  [96.2 °F (35.7 °C)-97.7 °F (36.5 °C)]   Pulse:  [74-87]   Resp:  [18]   BP: (113-127)/(75-87)   SpO2:  [94 %-98 %]  Body mass index is 30.19 kg/m².  Intake/Outake:  This Shift:  No intake/output data recorded.    Net I/O past 24h:   No intake or output data in the 24 hours ending 12/16/17 0937          Physical Exam:  Gen- well-developed, well-nourished,  NAD  CVS- S1 and S2 present, 3/6 systolic murmur, irregular rhythm  Resp- CTA b/l, no work of breathing  Abd- BS+, soft, NT, ND  Ext- no clubbing, cyanosis.  Trace LE edema    Laboratory:  CBC/Anemia Labs: Coags:      Recent Labs  Lab 12/14/17  1720 12/15/17  0344 12/16/17  0352   WBC 7.88 6.52 4.82   HGB 11.2* 11.0* 11.3*   HCT 34.8* 34.1* 34.5*   * 494* 521*   MCV 97 96 95   RDW 15.5* 15.3* 15.3*      Recent Labs  Lab 12/14/17  1720 12/14/17  2102   INR 1.6*  --    APTT  --  25.6        Chemistries:     Recent Labs  Lab 12/14/17  1720 12/15/17  0344 12/16/17  0352    143 141   K 4.0 4.0 3.2*    104 104   CO2 22* 24 25   BUN 26* 25* 22   CREATININE 1.5* 1.4 1.3   CALCIUM 9.9 9.8 9.7   PROT 8.1  --   --    BILITOT 0.3  --   --    ALKPHOS 85  --   --    ALT 17  --   --    AST 18  --   --    MG 2.1  --   --    PHOS 4.3  --   --           Cardiac Enzymes: Ejection Fractions:    Recent Labs      12/14/17   1720  12/15/17   0125  12/15/17   0344   TROPONINI  2.965*  2.736*  2.935*    EF   Date Value Ref Range Status   12/15/2017 30 (A) 55 - 65         POCT Glucose: HbA1c:      Recent Labs  Lab 12/14/17  2106 12/15/17  2227   POCTGLUCOSE 83 92    Hemoglobin A1C   Date Value Ref Range Status   12/15/2017 5.4 4.0 - 5.6 % Final     Comment:     According to ADA guidelines, hemoglobin A1c <7.0% represents  optimal control in non-pregnant diabetic patients. Different  metrics may apply to specific patient populations.   Standards of Medical Care in Diabetes-2016.  For the purpose of screening for the presence of diabetes:  <5.7%     Consistent with the absence of diabetes  5.7-6.4%  Consistent with increasing risk for diabetes   (prediabetes)  >or=6.5%  Consistent with diabetes  Currently, no consensus exists for use of hemoglobin A1c  for diagnosis of diabetes for children.  This Hemoglobin A1c assay has significant interference with fetal   hemoglobin   (HbF). The results are invalid for patients with  abnormal amounts of   HbF,   including those with known Hereditary Persistence   of Fetal Hemoglobin. Heterozygous hemoglobin variants (HbAS, HbAC,   HbAD, HbAE, HbA2) do not significantly interfere with this assay;   however, presence of multiple variants in a sample may impact the %   interference.           Medications:  Scheduled Meds:   amiodarone  200 mg Oral Daily    aspirin  81 mg Oral Daily    atorvastatin  80 mg Oral Daily    estropipate  1.5 mg Oral Daily    fenofibrate  145 mg Oral Daily    furosemide  40 mg Intravenous BID    gabapentin  100 mg Oral TID    lisinopril  20 mg Oral Daily    metoprolol tartrate  50 mg Oral BID    rivaroxaban  20 mg Oral Daily with dinner    ticagrelor  90 mg Oral BID                             Continuous Infusions:    PRN Meds:.butalbital-acetaminophen-caffeine -40 mg, dextrose 50%, glucagon (human recombinant), insulin aspart, nitroGLYCERIN     ASSESSMENT/PLAN:     Acute on Chronic Systolic Heart Failure  -ECHO 12/15:   1 - Moderately depressed left ventricular systolic function (EF 30-35%). Akinetic LV apex. There is no thrombus in LV apex.    2 - Indeterminate LV diastolic function.     3 - Mildly to moderately depressed right ventricular systolic function     4 - Pulmonary hypertension. The estimated PA systolic pressure is 63 mmHg.     5 - Severe low flow aortic valve stenosis (JAMES 0.49 cm2, AVAi 0.27 cm2/m2, peak aortic jet velocity 4.0 m/s,MG 48 mmHg).     6 - Mild to moderate mitral regurgitation.     7 - Mild tricuspid regurgitation.     8 - Severe left atrial enlargement.   -volume status complicated by low-flow severe AS  -diuresing well, continue Lasix 40mg IV BID; may be able to change to PO tomorrow  -continue medical management with Metoprolol 50mg BID, Lisinopril 20mg daily    Severe Aortic Valve Stenosis  -JAMES 0.49 cm2, AVAi 0.27 cm2/m2, peak aortic jet velocity 4.0 m/s,MG 48 mmHg  -TAVR eval in process, CT chest pending    NSTEMI  -Trop  "elevated, unsure if acute Type I or II 2/2 CHF and AS  -continue medical management with ASA, Ticagrelor, Metoprolol, statin  -ECHO results as above  -per [facility] at , "noncritical CAD"    Atrial Fibrillation  -CHADS 3  -At , was started on Amiodarone and Xarelto; Xarelto resumed here  -continue Amio, Metoprolol  -note severe atrial enlargement, unclear if DCCV would be beneficial    Essential Hypertension  -controlled  -continue Lisinopril and Metoprolol    DM-II, controlled  -A1C 5.4  -continue low-dose SSI    DVT ppx- Heparin gtt  CODE Status- FULL    Dispo- home pending above norma Rosales MD  Hospital Medicine Staff    "

## 2017-12-17 NOTE — PROGRESS NOTES
Progress Note  Hospital Medicine    Primary Team: [facility]  Admit Date: 12/14/2017   Length of Stay:  LOS: 3 days   SUBJECTIVE:   Reason for Admission:  Acute on chronic systolic heart failure      HPI:  Ms. Quick is a 62F h/o recently diagnosed CHF, Aortic stenosis, Dm, HTN presenting with shortness of breath, patient reports has been going on x3 weeks, and progressive, worse with exertion, now exercise limitation to < 1 block without exercise limitations previously. Pt endorsing no chest pain, reports orthopnea, PND, denies LE swelling. Reports has been increasing fluid intake recently. Had recent admission to [facility], pt reports undergoing cardiac cath (no reported blockages) was told had aortic valve stenosis that would require surgery. Patient presented here for evaluation because she had previously been told that she would need to go to [place] for further care. Pt denies recent illness, no F/C/N/V. Reports dyspnea is mild currently.     Hospital Course:  Pt was admitted to [facility] for treatment of acute on chronic systolic heart failure.  Records were obtained from , and Interventional Cardiology consulted for further recs.    Interval history:    No acute events overnight.  Pt feels short of breath this morning and is again requiring oxygen.  Denies change in urine output, denies chest pain.    Review of Systems:  Constitutional: no fever or chills  Respiratory: positive for dyspnea on exertion  Cardiovascular: no chest pain or palpitations  Gastrointestinal: no nausea or vomiting, no abdominal pain or change in bowel habits  Musculoskeletal: no arthralgias or myalgias     OBJECTIVE:     Temp:  [95.7 °F (35.4 °C)-98.2 °F (36.8 °C)]   Pulse:  [65-83]   Resp:  [16-20]   BP: ()/(57-91)   SpO2:  [94 %-100 %]  Body mass index is 28.63 kg/m².  Intake/Outake:  This Shift:  I/O this shift:  In: -   Out: 200 [Urine:200]    Net I/O past 24h:     Intake/Output Summary (Last 24 hours) at 12/17/17 8327  Last  data filed at 12/17/17 0898   Gross per 24 hour   Intake                0 ml   Output              600 ml   Net             -600 ml             Physical Exam:  Gen- well-developed, well-nourished, NAD  CVS- S1 and S2 present, 3/6 systolic murmur, irregular rhythm  Resp- coarse breath sounds at b/l bases, no work of breathing  Abd- BS+, soft, NT, ND  Ext- no clubbing, cyanosis.  Trace LE edema    Laboratory:  CBC/Anemia Labs: Coags:      Recent Labs  Lab 12/15/17  0344 12/16/17  0352 12/17/17  0422   WBC 6.52 4.82 6.29   HGB 11.0* 11.3* 11.0*   HCT 34.1* 34.5* 34.3*   * 521* 528*   MCV 96 95 96   RDW 15.3* 15.3* 15.6*      Recent Labs  Lab 12/14/17  1720 12/14/17  2102   INR 1.6*  --    APTT  --  25.6        Chemistries:     Recent Labs  Lab 12/14/17  1720 12/15/17  0344 12/16/17  0352 12/17/17  0422    143 141 141   K 4.0 4.0 3.2* 3.9    104 104 104   CO2 22* 24 25 25   BUN 26* 25* 22 37*   CREATININE 1.5* 1.4 1.3 1.6*   CALCIUM 9.9 9.8 9.7 9.8   PROT 8.1  --   --   --    BILITOT 0.3  --   --   --    ALKPHOS 85  --   --   --    ALT 17  --   --   --    AST 18  --   --   --    MG 2.1  --   --   --    PHOS 4.3  --   --   --           Cardiac Enzymes: Ejection Fractions:    Recent Labs      12/14/17   1720  12/15/17   0125  12/15/17   0344   TROPONINI  2.965*  2.736*  2.935*    EF   Date Value Ref Range Status   12/15/2017 30 (A) 55 - 65         POCT Glucose: HbA1c:      Recent Labs  Lab 12/15/17  2227 12/16/17  0939 12/16/17  1310 12/16/17  1743 12/17/17  0024 12/17/17  0534   POCTGLUCOSE 92 105 109 127* 141* 89    Hemoglobin A1C   Date Value Ref Range Status   12/15/2017 5.4 4.0 - 5.6 % Final     Comment:     According to ADA guidelines, hemoglobin A1c <7.0% represents  optimal control in non-pregnant diabetic patients. Different  metrics may apply to specific patient populations.   Standards of Medical Care in Diabetes-2016.  For the purpose of screening for the presence of diabetes:  <5.7%      Consistent with the absence of diabetes  5.7-6.4%  Consistent with increasing risk for diabetes   (prediabetes)  >or=6.5%  Consistent with diabetes  Currently, no consensus exists for use of hemoglobin A1c  for diagnosis of diabetes for children.  This Hemoglobin A1c assay has significant interference with fetal   hemoglobin   (HbF). The results are invalid for patients with abnormal amounts of   HbF,   including those with known Hereditary Persistence   of Fetal Hemoglobin. Heterozygous hemoglobin variants (HbAS, HbAC,   HbAD, HbAE, HbA2) do not significantly interfere with this assay;   however, presence of multiple variants in a sample may impact the %   interference.           Medications:  Scheduled Meds:   amiodarone  200 mg Oral Daily    aspirin  81 mg Oral Daily    atorvastatin  80 mg Oral Daily    fenofibrate  145 mg Oral Daily    gabapentin  100 mg Oral TID    metoprolol tartrate  50 mg Oral BID    rivaroxaban  20 mg Oral Daily with dinner    ticagrelor  90 mg Oral BID                             Continuous Infusions:    PRN Meds:.butalbital-acetaminophen-caffeine -40 mg, dextrose 50%, glucagon (human recombinant), insulin aspart, nitroGLYCERIN     ASSESSMENT/PLAN:     Acute on Chronic Systolic Heart Failure  -ECHO 12/15:   1 - Moderately depressed left ventricular systolic function (EF 30-35%). Akinetic LV apex. There is no thrombus in LV apex.    2 - Indeterminate LV diastolic function.     3 - Mildly to moderately depressed right ventricular systolic function     4 - Pulmonary hypertension. The estimated PA systolic pressure is 63 mmHg.     5 - Severe low flow aortic valve stenosis (JAMES 0.49 cm2, AVAi 0.27 cm2/m2, peak aortic jet velocity 4.0 m/s,MG 48 mmHg).     6 - Mild to moderate mitral regurgitation.     7 - Mild tricuspid regurgitation.     8 - Severe left atrial enlargement.   -volume status complicated by low-flow severe AS  -Lasix held yesterday due to borderline hypotension; now  "volume overloaded and SOB, will resume 40mg IV BID  -continue medical management with Metoprolol 50mg BID, hold Lisinopril today    Severe Aortic Valve Stenosis  -JAMES 0.49 cm2, AVAi 0.27 cm2/m2, peak aortic jet velocity 4.0 m/s,MG 48 mmHg  -TAVR eval in process, CT chest pending    NSTEMI  -Trop elevated, unsure if acute Type I or II 2/2 CHF and AS  -continue medical management with ASA, Ticagrelor, Metoprolol, statin  -ECHO results as above  -per [facility] at , "noncritical CAD"    Atrial Fibrillation  -CHADS 3  -At , was started on Amiodarone and Xarelto; Xarelto resumed here  -continue Amio, Metoprolol  -note severe atrial enlargement, unclear if DCCV would be beneficial    Essential Hypertension  -controlled  -continue Metoprolol  -holding Lisinopril while evaluating rise in creatinine (suspect cardiorenal however)    DM-II, controlled  -A1C 5.4  -continue low-dose SSI    DVT ppx- Xarelto  CODE Status- FULL    Dispo- home pending above eval    Fanny Rosales MD  Hospital Medicine Staff    "

## 2017-12-17 NOTE — PLAN OF CARE
Problem: Patient Care Overview  Goal: Plan of Care Review  Outcome: Ongoing (interventions implemented as appropriate)  Pt is free from injury and falls during shift. Pt shows no signs of acute distress. Pt c/o migraine once during shift with relief from PRN meds. AAO. Vitals stable. I&Os charted in flowsheet. Blood sugar checked per orders- WNL. By end of shift, pt is on room air and denying SOB. Pt able to reposition self frequently- skin intact with bruising. Bed is in low position with breaks locked. Side rails are up x 2. Environment is clutter free. Call light is within reach of the patient. Will continue to monitor.

## 2017-12-17 NOTE — PLAN OF CARE
Problem: Patient Care Overview  Goal: Plan of Care Review  Outcome: Ongoing (interventions implemented as appropriate)  VS and assessment performed per orders. Pt complained of SOB, worse with exertion/ ambulation. Pt denies chest pain. Pt placed on 2L NC with sats 95-98%. Blood glucose monitored as ordered. Pt free of falls or injury.

## 2017-12-18 PROBLEM — N17.9 AKI (ACUTE KIDNEY INJURY): Status: ACTIVE | Noted: 2017-01-01

## 2017-12-18 NOTE — PLAN OF CARE
Problem: Patient Care Overview  Goal: Plan of Care Review  Outcome: Ongoing (interventions implemented as appropriate)  Pt is free from injury and falls during shift. Pt shows no signs of acute distress. Pt c/o migraine once during shift with relief from PRN meds. AAO. Vitals stable. I&Os charted in flowsheet. Blood sugar checked per orders- WNL. Pt felt need for O2 towards end of shift, satting well on 2 L NC. Pt able to reposition self frequently- skin intact with bruising. Bed is in low position with breaks locked. Side rails are up x 2. Environment is clutter free. Call light is within reach of the patient. Will continue to monitor

## 2017-12-18 NOTE — PROGRESS NOTES
Called CT concerning ordered TAVR- states it will be performed today, but pt must be NPO for at least 4 hours. Educated pt to not eat or drink anything starting at 10 AM. Pt verbalized understanding.

## 2017-12-18 NOTE — PROGRESS NOTES
"Progress Note  Hospital Medicine    Primary Team: [facility]  Admit Date: 12/14/2017   Length of Stay:  LOS: 4 days   SUBJECTIVE:   Reason for Admission:  Acute on chronic systolic heart failure      HPI:  Ms. Quick is a 62F h/o recently diagnosed CHF, Aortic stenosis, Dm, HTN presenting with shortness of breath, patient reports has been going on x3 weeks, and progressive, worse with exertion, now exercise limitation to < 1 block without exercise limitations previously. Pt endorsing no chest pain, reports orthopnea, PND, denies LE swelling. Reports has been increasing fluid intake recently. Had recent admission to [facility], pt reports undergoing cardiac cath (no reported blockages) was told had aortic valve stenosis that would require surgery. Patient presented here for evaluation because she had previously been told that she would need to go to [place] for further care. Pt denies recent illness, no F/C/N/V. Reports dyspnea is mild currently.     Hospital Course:  Pt was admitted to [facility] for treatment of acute on chronic systolic heart failure.  Records were obtained from , and Interventional Cardiology consulted for further recs.    Interval history:    No acute events overnight.  Pt feels better, but feels shortness of breath "comes and goes".  Satting well on RA.    Review of Systems:  Constitutional: no fever or chills  Respiratory: positive for dyspnea on exertion  Cardiovascular: no chest pain or palpitations  Gastrointestinal: no nausea or vomiting, no abdominal pain or change in bowel habits  Musculoskeletal: no arthralgias or myalgias     OBJECTIVE:     Temp:  [96.3 °F (35.7 °C)-98 °F (36.7 °C)]   Pulse:  [59-75]   Resp:  [16-19]   BP: (103-138)/(63-78)   SpO2:  [94 %-96 %]  Body mass index is 28.52 kg/m².  Intake/Outake:  This Shift:  No intake/output data recorded.    Net I/O past 24h:     Intake/Output Summary (Last 24 hours) at 12/18/17 1587  Last data filed at 12/18/17 0500   Gross per 24 hour "   Intake              710 ml   Output             1350 ml   Net             -640 ml             Physical Exam:  Gen- well-developed, well-nourished, NAD  CVS- S1 and S2 present, 3/6 systolic murmur, irregular rhythm  Resp- CTA b/l, no work of breathing  Abd- BS+, soft, NT, ND  Ext- no clubbing, cyanosis, or edema    Laboratory:  CBC/Anemia Labs: Coags:      Recent Labs  Lab 12/16/17  0352 12/17/17  0422 12/18/17  0324   WBC 4.82 6.29 8.15   HGB 11.3* 11.0* 11.1*   HCT 34.5* 34.3* 34.7*   * 528* 526*   MCV 95 96 97   RDW 15.3* 15.6* 15.5*      Recent Labs  Lab 12/14/17  1720 12/14/17  2102   INR 1.6*  --    APTT  --  25.6        Chemistries:     Recent Labs  Lab 12/14/17  1720 12/16/17  0352 12/17/17  0422 12/18/17  0324     < > 141 141 138   K 4.0  < > 3.2* 3.9 4.2     < > 104 104 100   CO2 22*  < > 25 25 26   BUN 26*  < > 22 37* 37*   CREATININE 1.5*  < > 1.3 1.6* 1.8*   CALCIUM 9.9  < > 9.7 9.8 9.0   PROT 8.1  --   --   --   --    BILITOT 0.3  --   --   --   --    ALKPHOS 85  --   --   --   --    ALT 17  --   --   --   --    AST 18  --   --   --   --    MG 2.1  --   --   --   --    PHOS 4.3  --   --   --   --    < > = values in this interval not displayed.       Cardiac Enzymes: Ejection Fractions:    No results for input(s): CPK, CPKMB, MB, TROPONINI in the last 72 hours. EF   Date Value Ref Range Status   12/15/2017 30 (A) 55 - 65         POCT Glucose: HbA1c:      Recent Labs  Lab 12/17/17  0024 12/17/17  0534 12/17/17  1235 12/17/17  1734 12/18/17  0012 12/18/17  0601   POCTGLUCOSE 141* 89 102 144* 85 100    Hemoglobin A1C   Date Value Ref Range Status   12/15/2017 5.4 4.0 - 5.6 % Final     Comment:     According to ADA guidelines, hemoglobin A1c <7.0% represents  optimal control in non-pregnant diabetic patients. Different  metrics may apply to specific patient populations.   Standards of Medical Care in Diabetes-2016.  For the purpose of screening for the presence of diabetes:  <5.7%      Consistent with the absence of diabetes  5.7-6.4%  Consistent with increasing risk for diabetes   (prediabetes)  >or=6.5%  Consistent with diabetes  Currently, no consensus exists for use of hemoglobin A1c  for diagnosis of diabetes for children.  This Hemoglobin A1c assay has significant interference with fetal   hemoglobin   (HbF). The results are invalid for patients with abnormal amounts of   HbF,   including those with known Hereditary Persistence   of Fetal Hemoglobin. Heterozygous hemoglobin variants (HbAS, HbAC,   HbAD, HbAE, HbA2) do not significantly interfere with this assay;   however, presence of multiple variants in a sample may impact the %   interference.           Medications:  Scheduled Meds:   amiodarone  200 mg Oral Daily    aspirin  81 mg Oral Daily    atorvastatin  80 mg Oral Daily    fenofibrate  145 mg Oral Daily    [START ON 12/19/2017] furosemide  40 mg Oral BID    gabapentin  100 mg Oral TID    metoprolol tartrate  50 mg Oral BID    rivaroxaban  20 mg Oral Daily with dinner    ticagrelor  90 mg Oral BID                             Continuous Infusions:    PRN Meds:.butalbital-acetaminophen-caffeine -40 mg, dextrose 50%, glucagon (human recombinant), insulin aspart, nitroGLYCERIN     ASSESSMENT/PLAN:     Acute on Chronic Systolic Heart Failure  -ECHO 12/15:   1 - Moderately depressed left ventricular systolic function (EF 30-35%). Akinetic LV apex. There is no thrombus in LV apex.    2 - Indeterminate LV diastolic function.     3 - Mildly to moderately depressed right ventricular systolic function     4 - Pulmonary hypertension. The estimated PA systolic pressure is 63 mmHg.     5 - Severe low flow aortic valve stenosis (JAMES 0.49 cm2, AVAi 0.27 cm2/m2, peak aortic jet velocity 4.0 m/s,MG 48 mmHg).     6 - Mild to moderate mitral regurgitation.     7 - Mild tricuspid regurgitation.     8 - Severe left atrial enlargement.   -volume status complicated by low-flow severe  "AS  -hold Lasix and Lisinopril due to HARISH    Severe Aortic Valve Stenosis  -JAMES 0.49 cm2, AVAi 0.27 cm2/m2, peak aortic jet velocity 4.0 m/s,MG 48 mmHg  -TAVR eval in process, f/u CT chest today  -Appreciate Interventional Cardiology and CTS recs; pt largely low-risk for SAVR unless porcelain aortic valve seen    NSTEMI  -Trop elevated, unsure if acute Type I or II 2/2 CHF and AS  -continue medical management with ASA, Ticagrelor, Metoprolol, statin  -ECHO results as above  -per [facility] at , "noncritical CAD"    Atrial Fibrillation  -CHADS 3  -At , was started on Amiodarone and Xarelto; Xarelto resumed here (dose decreased for renal function)  -continue Amio, Metoprolol  -note severe atrial enlargement, unclear if DCCV would be beneficial    HARISH  -suspect prerenal 2/2 overdiuresis  -hold Lasix and Lisinopril  -avoid giving IVF due to tenuous volume status and severe AS    Essential Hypertension  -controlled  -continue Metoprolol  -holding Lisinopril while evaluating rise in creatinine     DM-II, controlled  -A1C 5.4  -continue low-dose SSI    DVT ppx- Xarelto  CODE Status- FULL    Dispo- home pending above eval; will need close f/u with CTS and Interventional    Fanny Rosales MD  Hospital Medicine Staff    "

## 2017-12-19 PROBLEM — R06.02 SOB (SHORTNESS OF BREATH): Status: ACTIVE | Noted: 2017-01-01

## 2017-12-19 NOTE — PROGRESS NOTES
"Progress Note  [facility]      Admit Date: 12/14/2017    SUBJECTIVE:     Follow-up For:  Acute on chronic systolic heart failure    HPI:   Ms. Quick is a 61yo lady with HTN, DM-2 (controlled), h/o recently diagnosed CHF, Aortic stenosis, presenting with shortness of breath x 3 weeks, progressive, worse with exertion, now exercise limitation to < 1 block without exercise limitations previously. She has orthopnea, PND.  She denies chest pain and LE swelling. Reports has been increasing fluid intake recently. Had recent admission to [facility], at which time she underwent cardiac cath (no reported blockages) was told had aortic valve stenosis that would require surgery. She presented here for evaluation because she had previously been told that she would need to go to [place] for further care. She denies recent illness, no F/C/N/V. Reports dyspnea is mild currently.      Hospital Course:   Pt was admitted to [facility] for treatment of acute on chronic systolic heart failure.  Records were obtained from , and Interventional Cardiology (Juliet) and CTS (Nikkie) both consulted for further recs.  She feels better, but feels shortness of breath "comes and goes".  O2 sat wnl on RA.     Interval History: Doing well in the morning, went to TAVR CTA, and upon arrival back to her room she complained of abrupt onset of severe SOB and atypical chest pain (sharp, L substernal), charge nurse called a rapid response.  VS were stable throughout, and O2 sat was wnl (>92% on RA) albeit difficult to achieve appropriate reading so checked ABG.  EKG/CXR/trop/BNP done, trop decreased from prior (0.511 <-- 2.935), BNP decreased from prior (30707 <--2414).  discussed with Interventional Cardiology and reviewed CT results, revealing for large B pleural effusions and pulmonary edema, so re-started Lasix IV. Her subjective SOB was out of proportion to hypoxemia, consistent with panic attack, so gave low dose ativan 0.25 IV.  After both " Lasix and Ativan, she was much improved clinically. Move to CSU 3rd floor (stepdown).     Review of Systems:  Pain Scale: 5 /10   Constitutional: no fever or chills  Respiratory: no cough or shortness of breath  Cardiovascular: no chest pain or palpitations  Gastrointestinal: no nausea or vomiting, no abdominal pain or change in bowel habits  Genitourinary: no hematuria or dysuria  Integument/Breast: no rash or pruritis  Hematologic/Lymphatic: no easy bruising or lymphadenopathy  Musculoskeletal: no arthralgias or myalgias  Neurological: no seizures or tremors  Behavioral/Psych: no depression or anxiety    OBJECTIVE:     Vital Signs Range (Last 24H):  Temp:  [96.7 °F (35.9 °C)-98.2 °F (36.8 °C)]   Pulse:  []   Resp:  [16-20]   BP: ()/(64-86)   SpO2:  [93 %-97 %]     I & O (Last 24H):  Intake/Output Summary (Last 24 hours) at 12/19/17 1051  Last data filed at 12/19/17 0743   Gross per 24 hour   Intake              540 ml   Output             1125 ml   Net             -585 ml       Physical Exam:  General appearance: severe distress 2/2 SOB and anxiety  Mental status: Alert and oriented x 3  HEENT:  conjunctivae/corneas clear, PERRL  Neck: supple, thyroid not enlarged  Pulm:   normal respiratory effort, bibasilar crackles  Card: RRR, S1, S2 normal, systolic murmur  Abd: soft, NT, ND, BS present; no masses, no organomegaly  Ext: no c/c/e  Pulses: 2+, symmetric  Skin: color, texture, turgor normal. No rashes or lesions  Neuro: CN II-XII grossly intact, no focal numbness or weakness, normal strength and tone     Diagnostic Results:  Labs reviewed    Recent Results (from the past 24 hour(s))   POCT glucose    Collection Time: 12/18/17 12:07 PM   Result Value Ref Range    POCT Glucose 109 70 - 110 mg/dL   POCT glucose    Collection Time: 12/18/17  7:13 PM   Result Value Ref Range    POCT Glucose 102 70 - 110 mg/dL   CBC auto differential    Collection Time: 12/19/17  3:26 AM   Result Value Ref Range    WBC 6.47  3.90 - 12.70 K/uL    RBC 3.59 (L) 4.00 - 5.40 M/uL    Hemoglobin 11.0 (L) 12.0 - 16.0 g/dL    Hematocrit 34.6 (L) 37.0 - 48.5 %    MCV 96 82 - 98 fL    MCH 30.6 27.0 - 31.0 pg    MCHC 31.8 (L) 32.0 - 36.0 g/dL    RDW 15.5 (H) 11.5 - 14.5 %    Platelets 508 (H) 150 - 350 K/uL    MPV 10.1 9.2 - 12.9 fL    Immature Granulocytes 0.5 0.0 - 0.5 %    Gran # 3.6 1.8 - 7.7 K/uL    Immature Grans (Abs) 0.03 0.00 - 0.04 K/uL    Lymph # 2.3 1.0 - 4.8 K/uL    Mono # 0.4 0.3 - 1.0 K/uL    Eos # 0.2 0.0 - 0.5 K/uL    Baso # 0.05 0.00 - 0.20 K/uL    nRBC 0 0 /100 WBC    Gran% 55.8 38.0 - 73.0 %    Lymph% 34.8 18.0 - 48.0 %    Mono% 5.6 4.0 - 15.0 %    Eosinophil% 2.5 0.0 - 8.0 %    Basophil% 0.8 0.0 - 1.9 %    Differential Method Automated    Basic metabolic panel    Collection Time: 12/19/17  3:26 AM   Result Value Ref Range    Sodium 140 136 - 145 mmol/L    Potassium 4.1 3.5 - 5.1 mmol/L    Chloride 103 95 - 110 mmol/L    CO2 23 23 - 29 mmol/L    Glucose 113 (H) 70 - 110 mg/dL    BUN, Bld 28 (H) 8 - 23 mg/dL    Creatinine 1.2 0.5 - 1.4 mg/dL    Calcium 9.8 8.7 - 10.5 mg/dL    Anion Gap 14 8 - 16 mmol/L    eGFR if African American 55.6 (A) >60 mL/min/1.73 m^2    eGFR if non  48.2 (A) >60 mL/min/1.73 m^2   POCT glucose    Collection Time: 12/19/17  5:38 AM   Result Value Ref Range    POCT Glucose 99 70 - 110 mg/dL           ASSESSMENT/PLAN:     Acute on Chronic Systolic Heart Failure and severe AS  -ECHO 12/15:   1 - Moderately depressed left ventricular systolic function (EF 30-35%). Akinetic LV apex. There is no thrombus in LV apex.    2 - Indeterminate LV diastolic function.     3 - Mildly to moderately depressed right ventricular systolic function     4 - Pulmonary hypertension. The estimated PA systolic pressure is 63 mmHg.     5 - Severe low flow aortic valve stenosis (JAMES 0.49 cm2, AVAi 0.27 cm2/m2, peak aortic jet velocity 4.0 m/s,MG 48 mmHg).     6 - Mild to moderate mitral regurgitation.     7 - Mild  "tricuspid regurgitation.     8 - Severe left atrial enlargement.   -volume status complicated by low-flow severe AS  - diuresed with Lasix 40 IV BID 12/14 - 12/16  - held home Lasix and Lisinopril on 12/17-12/18 for HARISH  - resume Lasix IV 60 x 1 then 40 IV BID      Severe aortic valve stenosis  - JAMES 0.49 cm2, AVAi 0.27 cm2/m2, peak aortic jet velocity 4.0 m/s,MG 48 mmHg  - TAVR eval in process, CT chest done today  - Appreciate Interventional Cardiology and CTS recs  -  low-risk for SAVR unless porcelain aortic valve seen  - unclear why the outside hospital told her that she needed to go to [place]     NSTEMI- unsure if acute Type I or II 2/2 CHF and AS. Per LHC at , "noncritical CAD"  - medical management  - ASA  - Ticagrelor  - Metoprolol  - statin  - ECHO results as above    Atrial fibrillation. CHADS 3. At , was started on Amiodarone and Xarelto  - Xarelto resumed here (dose decreased for renal function)  - home Amio  - home Metoprolol  - note severe atrial enlargement, unclear if DCCV would be beneficial     HARISH resolved - likely prerenal 2/2 overdiuresis  - continue to hold ACE-I Lisinopril while renal fxn dynamic  - ok to resume home Lasix Po today  - avoid IVF due to tenuous volume status and severe AS     Essential hypertension  - controlled  - continue Metoprolol  - hold Lisinopril while renal fxn dynamic     DM-2, controlled. A1C 5.4. Home med: metformin 500 daily   - low-dose SSI  - hold home metformin while inpt     - Home amio 200   - ?home ASA 81  - ??brilinta 90 BID  - ??nitro PRN   - home metoprolol tartrate (LOPRESSOR) tablet 50 mg, 50 mg, Oral, BID    Home atorva 80     Home fioricet PRN    HLD   - home fenofibrate tablet 145 mg, 145 mg, Oral, Daily  - Home atorva 80     Neuropathy, chronic  - home gabapentin capsule 100 mg, 100 mg, Oral, TID     Prophylaxis- already on home xarelto    Advance directives - full code    Post-acute care- pending clinical condition    Time spent in care of " patient: 60 mins    Poonam Ybarra MD  [facility] Medicine Staff

## 2017-12-19 NOTE — SIGNIFICANT EVENT
Rapid Response Nurse Note     Rapid Response Metrics:     Admit Date: 2017  LOS: 5  Code Status: Full Code   Date of Consult: 2017  : 1955  Age: 62 y.o.  Weight:   Wt Readings from Last 1 Encounters:   17 74.1 kg (163 lb 5.8 oz)     Race: n/a  Sex: female  Bed: 1160/1160 A:   MRN: [id number]  Time Rapid Response Team page Received: 1359  Time Rapid Response Team at Bedside: 1403  Time Rapid Response Team left Bedside: 1450  Was the patient discharged from an ICU this admission? no   Was the patient discharged from a PACU within last 24 hours? no  Did the patient receive conscious sedation/general anesthesia within last 24 hours? no  Was the patient in the ED within the past 24 hours? no  Was the patient started on NIPPV within the past 24 hours? no  Did this progress into an ARC or CPA: no  Attending Physician: Poonam Ybarra MD  Primary Service: [facility]  Consult Requested By: Poonam bYarra MD   Rapid Response Indication(s): chest pain, SOB  Disposition: transfer to CSU    SITUATION:     Reason for Call:   Called to evaluate the patient for Respiratory    BACKGROUND:     Why is the patient in the hospital?: TAVR work-up    ASSESSMENT:     What did you find:  Upon entering room patient sitting in bed in tripod position. Respiratory rate approximately 28 breaths/min. Sats 97% on 3L. /81. HR 78. Dr. Poonam Ybarra at bedside. Bedside RN administered sublingual nitro at this time. Patient oriented x4 with some complaints of chest pain but main complaint at this time is shortness of breath/difficulty breathing. Orders received for BNP, trop, EKG, CXR and SL nitro. Vitals stable throughout rapid response. Patient denied further chest pain episodes and endorsed dyspnea. Orders received for IV lasix x1 and scheduled BID.     RECOMMENDATIONS:     We recommend: Strict I&Os to monitor diuresis, frequent vitals    FOLLOW-UP/CONTINGENCY PLAN:     Patient needs a second visit at :  0268    Call back the rapid Response Nurse at x [phone]  For additional concerns/questions      PHYSICIAN ESCALATION:     Orders received and case discussed with Dr Ybarra    Disposition: Transfer to CSU

## 2017-12-19 NOTE — NURSING TRANSFER
Nursing Transfer Note      12/19/2017     Transfer to CSU bed 350 from 1160 A    Transfer via wheelchair    Transfer with  and all personal belongings, 2 L NC    Transported by RN, PCT    Medicines sent: none    Chart send with patient: yes    Notified: Claudette  Patient reassessed at:1624 on 12/19/2017    Upon arrival to floor: placed pt on 2 L O2, changed tele box, notified Claudette, RN

## 2017-12-19 NOTE — SUBJECTIVE & OBJECTIVE
Interval History: TAVR CTA done today -- patient does not have a porcelain aorta.          She had some CP post-CT -- likely anxiety.          CT shows large bilateral pleural effusions -- needs diuresis.       Objective:     Vital Signs (Most Recent):  Temp: 97.8 °F (36.6 °C) (12/19/17 1359)  Pulse: 75 (12/19/17 1408)  Resp: 20 (12/19/17 1408)  BP: (!) 142/81 (12/19/17 1408)  SpO2: (!) 94 % (12/19/17 1416) Vital Signs (24h Range):  Temp:  [96 °F (35.6 °C)-98.2 °F (36.8 °C)] 97.8 °F (36.6 °C)  Pulse:  [] 75  Resp:  [16-20] 20  SpO2:  [93 %-100 %] 94 %  BP: ()/(64-86) 142/81     Weight: 74.1 kg (163 lb 5.8 oz)  Body mass index is 28.94 kg/m².    SpO2: (!) 94 %  O2 Device (Oxygen Therapy): room air      Intake/Output Summary (Last 24 hours) at 12/19/17 1425  Last data filed at 12/19/17 1126   Gross per 24 hour   Intake              540 ml   Output             1225 ml   Net             -685 ml       Lines/Drains/Airways     Peripheral Intravenous Line                 Peripheral IV - Single Lumen 12/14/17 1702 Right Forearm 4 days                Physical Exam   Constitutional: She is oriented to person, place, and time. She appears well-developed and well-nourished. No distress.   HENT:   Head: Normocephalic and atraumatic.   Mouth/Throat: Oropharynx is clear and moist.   Eyes: Conjunctivae and EOM are normal. Pupils are equal, round, and reactive to light. No scleral icterus.   Neck: Neck supple. No JVD present. No tracheal deviation present.   Cardiovascular: Normal rate and regular rhythm.  Exam reveals no gallop and no friction rub.    Murmur heard.  Pulmonary/Chest: Effort normal. No respiratory distress. She has decreased breath sounds (bibasilar). She has no wheezes. She has no rales. She exhibits no tenderness.   Abdominal: Soft. Bowel sounds are normal. She exhibits no distension. There is no hepatosplenomegaly. There is no tenderness.   Musculoskeletal: She exhibits no edema or tenderness.    Neurological: She is alert and oriented to person, place, and time.   Skin: Skin is warm and dry. No rash noted. No erythema.   Psychiatric: She has a normal mood and affect. Her behavior is normal.       Significant Labs:   BMP:   Recent Labs  Lab 12/18/17 0324 12/19/17 0326   * 113*    140   K 4.2 4.1    103   CO2 26 23   BUN 37* 28*   CREATININE 1.8* 1.2   CALCIUM 9.0 9.8    and CBC   Recent Labs  Lab 12/18/17 0324 12/19/17 0326   WBC 8.15 6.47   HGB 11.1* 11.0*   HCT 34.7* 34.6*   * 508*

## 2017-12-19 NOTE — NURSING TRANSFER
Nursing Transfer Note      12/19/2017     Transfer From:  1160    Transfer via wheelchair    Transfer with cardiac monitoring    Transported byrn and pct    Medicines sent: none    Chart send with patient: Yes    Notified: spouse at bedsude        Upon arrival to floor: cardiac monitor applied, patient oriented to room, call bell in reach and bed in lowest position- spouse is at bedside

## 2017-12-19 NOTE — ASSESSMENT & PLAN NOTE
Interventional Cardiology       Valve Center      Anum Quick is a 62 y.o. female referred by Dr Vasquez for evaluation of severe AS (NYHA Class III sx).    she has undergone the following TAVR work-up:   ECHO (Date 12/15/2017): JAMES= 0.49 cm2, MG= 48mmHg, Peak Carlos A= 4.0 m/s, EF= 35%. - Per EJ concern for LV apical thrombus, however, no thrombus visualized on [facility] TTE   LHC : Awaiting disc from . Per report (12/4/2017): proximal LAD has 20% stenosis, RCA with LI otherwise normal coronaries    STS: 2.19%   Frailty: 1/4 (hand )   CTS risk assessment: Low risk per Lundy  PFTs: Needs     Still awaiting records from  regarding why patient was told she would need to be referred to [place] for AVR.

## 2017-12-19 NOTE — PROGRESS NOTES
[facility]  Interventional Cardiology  Progress Note    Patient Name: Anum Quick  MRN: [id number]  Admission Date: 12/14/2017  Hospital Length of Stay: 5 days  Code Status: Full Code   Attending Physician: Poonam Ybarra MD   Primary Care Physician: Raghav Valdez MD  Principal Problem:Acute on chronic systolic heart failure    Subjective:     Interval History: TAVR CTA done today -- patient does not have a porcelain aorta.          She had some CP post-CT -- likely anxiety.          CT shows large bilateral pleural effusions -- needs diuresis.       Objective:     Vital Signs (Most Recent):  Temp: 97.8 °F (36.6 °C) (12/19/17 1359)  Pulse: 75 (12/19/17 1408)  Resp: 20 (12/19/17 1408)  BP: (!) 142/81 (12/19/17 1408)  SpO2: (!) 94 % (12/19/17 1416) Vital Signs (24h Range):  Temp:  [96 °F (35.6 °C)-98.2 °F (36.8 °C)] 97.8 °F (36.6 °C)  Pulse:  [] 75  Resp:  [16-20] 20  SpO2:  [93 %-100 %] 94 %  BP: ()/(64-86) 142/81     Weight: 74.1 kg (163 lb 5.8 oz)  Body mass index is 28.94 kg/m².    SpO2: (!) 94 %  O2 Device (Oxygen Therapy): room air      Intake/Output Summary (Last 24 hours) at 12/19/17 1425  Last data filed at 12/19/17 1126   Gross per 24 hour   Intake              540 ml   Output             1225 ml   Net             -685 ml       Lines/Drains/Airways     Peripheral Intravenous Line                 Peripheral IV - Single Lumen 12/14/17 1702 Right Forearm 4 days                Physical Exam   Constitutional: She is oriented to person, place, and time. She appears well-developed and well-nourished. No distress.   HENT:   Head: Normocephalic and atraumatic.   Mouth/Throat: Oropharynx is clear and moist.   Eyes: Conjunctivae and EOM are normal. Pupils are equal, round, and reactive to light. No scleral icterus.   Neck: Neck supple. No JVD present. No tracheal deviation present.   Cardiovascular: Normal rate and regular rhythm.  Exam reveals no gallop and no friction rub.     Murmur heard.  Pulmonary/Chest: Effort normal. No respiratory distress. She has decreased breath sounds (bibasilar). She has no wheezes. She has no rales. She exhibits no tenderness.   Abdominal: Soft. Bowel sounds are normal. She exhibits no distension. There is no hepatosplenomegaly. There is no tenderness.   Musculoskeletal: She exhibits no edema or tenderness.   Neurological: She is alert and oriented to person, place, and time.   Skin: Skin is warm and dry. No rash noted. No erythema.   Psychiatric: She has a normal mood and affect. Her behavior is normal.       Significant Labs:   BMP:   Recent Labs  Lab 12/18/17  0324 12/19/17  0326   * 113*    140   K 4.2 4.1    103   CO2 26 23   BUN 37* 28*   CREATININE 1.8* 1.2   CALCIUM 9.0 9.8    and CBC   Recent Labs  Lab 12/18/17 0324 12/19/17  0326   WBC 8.15 6.47   HGB 11.1* 11.0*   HCT 34.7* 34.6*   * 508*     Assessment and Plan:     Patient is a 62 y.o. female presenting with:    * Acute on chronic systolic heart failure    CT shows large bilateral pleural effusions.   Needs diuresis.           Paroxysmal atrial fibrillation    Per records at , currently rate controlled in 80s  On Metoprolol and amiodarone  Pt on Heparin gtt due to NSTEMI pathway (ok to d/c from that standpoint from Dr. Chung) but will need AC for A Fib         Hyperlipidemia    Stable on atorvastatin         Aortic valve stenosis        Interventional Cardiology       Valve Center      Anum Quick is a 62 y.o. female referred by Dr Vasquez for evaluation of severe AS (NYHA Class III sx).    she has undergone the following TAVR work-up:   ECHO (Date 12/15/2017): JAMES= 0.49 cm2, MG= 48mmHg, Peak Carlos A= 4.0 m/s, EF= 35%. - Per  concern for LV apical thrombus, however, no thrombus visualized on [facility] TTE   LHC : Awaiting disc from . Per report (12/4/2017): proximal LAD has 20% stenosis, RCA with LI otherwise normal coronaries    STS: 2.19%   Frailty: 1/4 (hand  )   CTS risk assessment: Low risk per Lundy  PFTs: Needs     Still awaiting records from  regarding why patient was told she would need to be referred to [place] for AVR.        Essential hypertension    On Metoprolol 50mg, lisinopril 20mg        Type 2 diabetes mellitus with neurologic complication    On Metformin at home, now on SSI            Patient can proceed with SAVR with CTS.     VTE Risk Mitigation         Ordered     rivaroxaban tablet 15 mg  With dinner     Route:  Oral        12/18/17 1146          Jennifer Sarabia PA-C  Interventional Cardiology  [facility]

## 2017-12-19 NOTE — PROGRESS NOTES
"Pt states feeling SOB while sitting in bed. Pt states "I feel like I'm working hard to breathe." O2 Sat 100% 3 L NC. Lung sounds clear, equal. Nurse notes pt using shoulders when breathing. Pt also using pursed lip breathing and sitting in tripod position. Medicine team C paged at this time.     1:35 PM - Dr Ybarra notified.    1:45 PM - While in room with pt, pt c/o sharp 8/10 pain in left chest along with SOB and mild anxiety. Continuing to sat well on 3 L NC. Dr Ybarra notified once again. Rapid called. PRN nitro administered for chest pain. Vitals in flow sheet. Pt stated nitro relieved chest pain. Amada and rapid response team at bedside. EKG, ABG, labs, and chest xray ordered. Ativan and lasix administered per orders. Pt now on 2 L NC per orders from Amada. Pt stating feeling better and satting well on oxygen. Orders placed to transfer pt to CSU. Will continue to monitor.   "

## 2017-12-19 NOTE — PLAN OF CARE
12/19/17 0802   Discharge Reassessment   Assessment Type Discharge Planning Reassessment   Do you have any problems affording any of your prescribed medications? No   Discharge Plan A Home with family   Can the patient answer the patient profile reliably? Yes, cognitively intact   How does the patient rate their overall health at the present time? Good   Describe the patient's ability to walk at the present time. No restrictions   How often would a person be available to care for the patient? Whenever needed   Number of comorbid conditions (as recorded on the chart) Three   During the past month, has the patient often been bothered by feeling down, depressed or hopeless? No   During the past month, has the patient often been bothered by little interest or pleasure in doing things? No

## 2017-12-20 NOTE — PLAN OF CARE
Problem: Patient Care Overview  Goal: Plan of Care Review  Outcome: Ongoing (interventions implemented as appropriate)  Patient remained free from falls/injury throughout shift. No complaints of chest pain or SOB. VSS. Patient oriented room, call bell placed within reach, and bed placed in lowest position. Fall risk precautions reviewed and maintained. Tavr work-up in place. Plan of care reviewed with patient. Patient verbalized understanding. Will continue to monitor.

## 2017-12-20 NOTE — PROGRESS NOTES
Notified Dr. Ybarra that patient is complaining of SOB in room, patient's lung sound coarse but clear, patient's VSS-/83, HR 89, pulse ox 98%, RR 18. Patient denies anxiety at this time. No new orders, MD stated would be to bedside shortly.     Remained in room with patient for 10 minutes, SOB subsiding. Patient's vitals remain stable. Patient sitting up in bed, will continue to monitor.

## 2017-12-20 NOTE — PLAN OF CARE
Problem: Patient Care Overview  Goal: Individualization & Mutuality  Patient progressing well. Patient ambulating in room independently. Patient denies pain throughout day. Fall precautions in place, call bell within reach. Reviewed POC with patient and family. No questions or concerns at this time, will continue to monitor.

## 2017-12-20 NOTE — PROGRESS NOTES
"Progress Note  [facility] [place] [facility] [place] Date: 12/14/2017    SUBJECTIVE:     Follow-up For:  Acute on chronic systolic heart failure    HPI:   Ms. Quick is a 61yo lady with HTN, DM-2 (controlled), h/o recently diagnosed CHF, Aortic stenosis, presenting with shortness of breath x 3 weeks, progressive, worse with exertion, now exercise limitation to < 1 block without exercise limitations previously. She has orthopnea, PND.  She denies chest pain and LE swelling. Reports has been increasing fluid intake recently. Had recent admission to [facility], at which time she underwent cardiac cath (no reported blockages) was told had aortic valve stenosis that would require surgery. She presented here for evaluation because she had previously been told that she would need to go to [place] for further care. She denies recent illness, no F/C/N/V. Reports dyspnea is mild currently.      Hospital Course:   Pt was admitted to [facility] for treatment of acute on chronic systolic heart failure.  Records were obtained from , and Interventional Cardiology (Juliet) and CTS (Nikkie) both consulted for further recs.  She feels better, but feels shortness of breath "comes and goes".  O2 sat wnl on RA. On 12/19, doing well in the morning, went to TAVR CTA, and upon arrival back to her room she complained of abrupt onset of severe SOB and atypical chest pain (sharp, L substernal), charge nurse called a rapid response.  VS were stable throughout, and O2 sat was wnl (>92% on RA) albeit difficult to achieve appropriate reading so checked ABG.  EKG/CXR/trop/BNP done, trop decreased from prior (0.511 <-- 2.935), BNP decreased from prior (23255 <--2414).  discussed with Interventional Cardiology and reviewed CT results, revealing for large B pleural effusions and pulmonary edema, so re-started Lasix IV. Her subjective SOB was out of proportion to hypoxemia, consistent with panic attack, so gave low dose ativan 0.25 IV.  After both Lasix and " Ativan, she was much improved clinically. Move to CSU 3rd floor (stepdown).      Interval History: Much improved today, net -2L, anxiety resolved,  at bedside reports that she had some gurgling noises while sleeping and he felt that he had to monitor closely    Review of Systems:  Pain Scale: 5 /10   Constitutional: no fever or chills  Respiratory: no cough or shortness of breath  Cardiovascular: no chest pain or palpitations  Gastrointestinal: no nausea or vomiting, no abdominal pain or change in bowel habits  Genitourinary: no hematuria or dysuria  Integument/Breast: no rash or pruritis  Hematologic/Lymphatic: no easy bruising or lymphadenopathy  Musculoskeletal: no arthralgias or myalgias  Neurological: no seizures or tremors  Behavioral/Psych: no depression or anxiety    OBJECTIVE:     Vital Signs Range (Last 24H):  Temp:  [96 °F (35.6 °C)-98.2 °F (36.8 °C)]   Pulse:  [57-94]   Resp:  [16-22]   BP: (105-144)/(62-85)   SpO2:  [94 %-100 %]     I & O (Last 24H):    Intake/Output Summary (Last 24 hours) at 12/20/17 0802  Last data filed at 12/20/17 0400   Gross per 24 hour   Intake              360 ml   Output             2550 ml   Net            -2190 ml       Physical Exam:  General appearance: no distress  Mental status: Alert and oriented x 3  HEENT:  conjunctivae/corneas clear, PERRL  Neck: supple, thyroid not enlarged  Pulm:   normal respiratory effort, bibasilar crackles  Card: RRR, S1, S2 normal, systolic murmur  Abd: soft, NT, ND, BS present; no masses, no organomegaly  Ext: no c/c/e  Pulses: 2+, symmetric  Skin: color, texture, turgor normal. No rashes or lesions  Neuro: CN II-XII grossly intact, no focal numbness or weakness, normal strength and tone     Diagnostic Results:  Labs reviewed    Recent Results (from the past 24 hour(s))   POCT glucose    Collection Time: 12/19/17 12:18 PM   Result Value Ref Range    POCT Glucose 131 (H) 70 - 110 mg/dL   POCT glucose    Collection Time: 12/19/17  2:04  PM   Result Value Ref Range    POCT Glucose 107 70 - 110 mg/dL   Brain natriuretic peptide    Collection Time: 12/19/17  2:13 PM   Result Value Ref Range    BNP 1,429 (H) 0 - 99 pg/mL   Troponin I    Collection Time: 12/19/17  2:13 PM   Result Value Ref Range    Troponin I 0.511 (H) 0.000 - 0.026 ng/mL   ISTAT PROCEDURE    Collection Time: 12/19/17  2:28 PM   Result Value Ref Range    POC PH 7.457 (H) 7.35 - 7.45    POC PCO2 31.2 (L) 35 - 45 mmHg    POC PO2 56 (LL) 80 - 100 mmHg    POC HCO3 22.1 (L) 24 - 28 mmol/L    POC BE -2 -2 to 2 mmol/L    POC SATURATED O2 91 (L) 95 - 100 %    POC TCO2 23 23 - 27 mmol/L    Sample ARTERIAL     Site LR     Allens Test Pass     DelSys Room Air     Mode SPONT    POCT glucose    Collection Time: 12/19/17  5:30 PM   Result Value Ref Range    POCT Glucose 115 (H) 70 - 110 mg/dL   POCT glucose    Collection Time: 12/19/17  9:51 PM   Result Value Ref Range    POCT Glucose 132 (H) 70 - 110 mg/dL   CBC auto differential    Collection Time: 12/20/17  4:11 AM   Result Value Ref Range    WBC 6.33 3.90 - 12.70 K/uL    RBC 3.74 (L) 4.00 - 5.40 M/uL    Hemoglobin 11.2 (L) 12.0 - 16.0 g/dL    Hematocrit 36.1 (L) 37.0 - 48.5 %    MCV 97 82 - 98 fL    MCH 29.9 27.0 - 31.0 pg    MCHC 31.0 (L) 32.0 - 36.0 g/dL    RDW 15.5 (H) 11.5 - 14.5 %    Platelets 514 (H) 150 - 350 K/uL    MPV 9.9 9.2 - 12.9 fL    Immature Granulocytes 0.3 0.0 - 0.5 %    Gran # 3.3 1.8 - 7.7 K/uL    Immature Grans (Abs) 0.02 0.00 - 0.04 K/uL    Lymph # 2.3 1.0 - 4.8 K/uL    Mono # 0.5 0.3 - 1.0 K/uL    Eos # 0.2 0.0 - 0.5 K/uL    Baso # 0.08 0.00 - 0.20 K/uL    nRBC 0 0 /100 WBC    Gran% 51.6 38.0 - 73.0 %    Lymph% 35.7 18.0 - 48.0 %    Mono% 7.9 4.0 - 15.0 %    Eosinophil% 3.2 0.0 - 8.0 %    Basophil% 1.3 0.0 - 1.9 %    Differential Method Automated    Basic metabolic panel    Collection Time: 12/20/17  4:11 AM   Result Value Ref Range    Sodium 141 136 - 145 mmol/L    Potassium 4.3 3.5 - 5.1 mmol/L    Chloride 104 95 - 110  "mmol/L    CO2 25 23 - 29 mmol/L    Glucose 114 (H) 70 - 110 mg/dL    BUN, Bld 25 (H) 8 - 23 mg/dL    Creatinine 1.5 (H) 0.5 - 1.4 mg/dL    Calcium 10.0 8.7 - 10.5 mg/dL    Anion Gap 12 8 - 16 mmol/L    eGFR if African American 42.4 (A) >60 mL/min/1.73 m^2    eGFR if non  36.8 (A) >60 mL/min/1.73 m^2           ASSESSMENT/PLAN:     Acute on Chronic Systolic Heart Failure and severe AS  -ECHO 12/15:   1 - Moderately depressed left ventricular systolic function (EF 30-35%). Akinetic LV apex. There is no thrombus in LV apex.    2 - Indeterminate LV diastolic function.     3 - Mildly to moderately depressed right ventricular systolic function     4 - Pulmonary hypertension. The estimated PA systolic pressure is 63 mmHg.     5 - Severe low flow aortic valve stenosis (JAMES 0.49 cm2, AVAi 0.27 cm2/m2, peak aortic jet velocity 4.0 m/s,MG 48 mmHg).     6 - Mild to moderate mitral regurgitation.     7 - Mild tricuspid regurgitation.     8 - Severe left atrial enlargement.   -volume status complicated by low-flow severe AS  - diuresed with Lasix 40 IV BID 12/14 - 12/16  - held home Lasix and Lisinopril on 12/17-12/18 for HARISH  - continue Lasix 40 IV BID      Severe aortic valve stenosis  - JAMES 0.49 cm2, AVAi 0.27 cm2/m2, peak aortic jet velocity 4.0 m/s,MG 48 mmHg  - TAVR eval in process, CT chest done 12/19  - Appreciate Interventional Cardiology and CTS recs  -  low-risk for SAVR unless porcelain aortic valve seen  - unclear why the outside hospital told her that she needed to go to [place]    HARISH - likely cardiorenal 2/2 volume overload  - diuresis as above  - hold ACE-I Lisinopril while renal fxn dynamic    NSTEMI- unsure if acute Type I or II 2/2 CHF and AS. Per C at , "noncritical CAD". ECHO results as above  - medical management  - ASA  - Ticagrelor 90 BID  - Metoprolol  - statin  - nitro PRN   - home metop 50 BID  - hold Lisinopril while renal fxn dynamic    Atrial fibrillation. CHADS 3. At [facility], was " started on Amiodarone and Xarelto  - Xarelto resumed here (dose decreased for renal function)  - home Amio  - home Metoprolol  - note severe atrial enlargement, unclear if DCCV would be beneficial     Essential hypertension  - controlled  - continue Metoprolol  - hold Lisinopril while renal fxn dynamic     DM-2, controlled. A1C 5.4. Home med: metformin 500 daily   - low-dose SSI  - hold home metformin while inpt     HLD   - home fenofibrate 145  - Home atorva 80     Neuropathy, chronic  - home gabapentin 100 TID     Prophylaxis- already on home xarelto    Advance directives - full code    Post-acute care- pending clinical condition, diuresis, and AoV plan     Time spent in care of patient: 30 mins    Poonam Ybarra MD  Hospital Medicine Staff

## 2017-12-21 NOTE — PLAN OF CARE
Problem: Patient Care Overview  Goal: Individualization & Mutuality  Outcome: Ongoing (interventions implemented as appropriate)  POC reviewed with pt. VS stable. Pt started on lasix 40mg IVP bid. Accuchecks q6h. Pt remains free from falls, trauma, injury; pt tolerating POC well. Will continue to monitor.

## 2017-12-21 NOTE — PROGRESS NOTES
"Progress Note  [facility] Medicine - [facility] [place]      Admit Date: 12/14/2017    SUBJECTIVE:     Follow-up For:  Acute on chronic systolic heart failure    HPI:   Ms. Quick is a 63yo lady with HTN, DM-2 (controlled), h/o recently diagnosed CHF, Aortic stenosis, presenting with shortness of breath x 3 weeks, progressive, worse with exertion, now exercise limitation to < 1 block without exercise limitations previously. She has orthopnea, PND.  She denies chest pain and LE swelling. Reports has been increasing fluid intake recently. Had recent admission to [facility], at which time she underwent cardiac cath (no reported blockages) was told had aortic valve stenosis that would require surgery. She presented here for evaluation because she had previously been told that she would need to go to [place] for further care. She denies recent illness, no F/C/N/V. Reports dyspnea is mild currently.      Hospital Course:   Pt was admitted to [facility] for treatment of acute on chronic systolic heart failure.  Records were obtained from , and Interventional Cardiology (Juliet) and CTS (Nikkie) both consulted for further recs.  She feels better, but feels shortness of breath "comes and goes".  O2 sat wnl on RA. On 12/19, doing well in the morning, went to TAVR CTA, and upon arrival back to her room she complained of abrupt onset of severe SOB and atypical chest pain (sharp, L substernal), charge nurse called a rapid response.  VS were stable throughout, and O2 sat was wnl (>92% on RA) albeit difficult to achieve appropriate reading so checked ABG.  EKG/CXR/trop/BNP done, trop decreased from prior (0.511 <-- 2.935), BNP decreased from prior (54235 <--2414).  discussed with Interventional Cardiology and reviewed CT results, revealing for large B pleural effusions and pulmonary edema, so re-started Lasix IV. Her subjective SOB was out of proportion to hypoxemia, consistent with panic attack, so gave low dose ativan 0.25 IV.  After both Lasix and " Ativan, she was much improved clinically. Move to CSU 3rd floor (stepdown). Much improved the next day, net -2L, anxiety resolved,  at bedside reports that she had some gurgling noises while sleeping and he felt that he had to monitor closely     Interval History: Much improved, SOB resolved, no anxiety, continue current Lasix IV and possible switch to PO tomorrow    Review of Systems:  Pain Scale: 5 /10   Constitutional: no fever or chills  Respiratory: no cough or shortness of breath  Cardiovascular: no chest pain or palpitations  Gastrointestinal: no nausea or vomiting, no abdominal pain or change in bowel habits  Genitourinary: no hematuria or dysuria  Integument/Breast: no rash or pruritis  Hematologic/Lymphatic: no easy bruising or lymphadenopathy  Musculoskeletal: no arthralgias or myalgias  Neurological: no seizures or tremors  Behavioral/Psych: no depression or anxiety    OBJECTIVE:     Vital Signs Range (Last 24H):  Temp:  [97.4 °F (36.3 °C)-99.2 °F (37.3 °C)]   Pulse:  []   Resp:  [16-18]   BP: (103-135)/(60-83)   SpO2:  [94 %-99 %]     I & O (Last 24H):    Intake/Output Summary (Last 24 hours) at 12/21/17 1140  Last data filed at 12/21/17 1000   Gross per 24 hour   Intake              600 ml   Output             2350 ml   Net            -1750 ml       Physical Exam:  General appearance: no distress  Mental status: Alert and oriented x 3  HEENT:  conjunctivae/corneas clear, PERRL  Neck: supple, thyroid not enlarged  Pulm:   normal respiratory effort, bibasilar crackles  Card: RRR, S1, S2 normal, systolic murmur  Abd: soft, NT, ND, BS present; no masses, no organomegaly  Ext: no c/c/e  Pulses: 2+, symmetric  Skin: color, texture, turgor normal. No rashes or lesions  Neuro: CN II-XII grossly intact, no focal numbness or weakness, normal strength and tone     Diagnostic Results:  Labs reviewed    Recent Results (from the past 24 hour(s))   POCT glucose    Collection Time: 12/20/17 12:07 PM    Result Value Ref Range    POCT Glucose 90 70 - 110 mg/dL   POCT glucose    Collection Time: 12/20/17  6:20 PM   Result Value Ref Range    POCT Glucose 99 70 - 110 mg/dL   POCT glucose    Collection Time: 12/20/17 11:50 PM   Result Value Ref Range    POCT Glucose 118 (H) 70 - 110 mg/dL   CBC auto differential    Collection Time: 12/21/17  4:16 AM   Result Value Ref Range    WBC 6.09 3.90 - 12.70 K/uL    RBC 3.50 (L) 4.00 - 5.40 M/uL    Hemoglobin 10.7 (L) 12.0 - 16.0 g/dL    Hematocrit 33.8 (L) 37.0 - 48.5 %    MCV 97 82 - 98 fL    MCH 30.6 27.0 - 31.0 pg    MCHC 31.7 (L) 32.0 - 36.0 g/dL    RDW 15.6 (H) 11.5 - 14.5 %    Platelets 496 (H) 150 - 350 K/uL    MPV 10.1 9.2 - 12.9 fL    Immature Granulocytes 0.5 0.0 - 0.5 %    Gran # 3.2 1.8 - 7.7 K/uL    Immature Grans (Abs) 0.03 0.00 - 0.04 K/uL    Lymph # 2.1 1.0 - 4.8 K/uL    Mono # 0.5 0.3 - 1.0 K/uL    Eos # 0.2 0.0 - 0.5 K/uL    Baso # 0.08 0.00 - 0.20 K/uL    nRBC 0 0 /100 WBC    Gran% 52.4 38.0 - 73.0 %    Lymph% 33.8 18.0 - 48.0 %    Mono% 8.2 4.0 - 15.0 %    Eosinophil% 3.8 0.0 - 8.0 %    Basophil% 1.3 0.0 - 1.9 %    Differential Method Automated    Basic metabolic panel    Collection Time: 12/21/17  4:16 AM   Result Value Ref Range    Sodium 142 136 - 145 mmol/L    Potassium 3.6 3.5 - 5.1 mmol/L    Chloride 109 95 - 110 mmol/L    CO2 21 (L) 23 - 29 mmol/L    Glucose 119 (H) 70 - 110 mg/dL    BUN, Bld 28 (H) 8 - 23 mg/dL    Creatinine 1.3 0.5 - 1.4 mg/dL    Calcium 9.8 8.7 - 10.5 mg/dL    Anion Gap 12 8 - 16 mmol/L    eGFR if African American 50.5 (A) >60 mL/min/1.73 m^2    eGFR if non  43.8 (A) >60 mL/min/1.73 m^2   POCT glucose    Collection Time: 12/21/17  6:00 AM   Result Value Ref Range    POCT Glucose 123 (H) 70 - 110 mg/dL   POCT glucose    Collection Time: 12/21/17  8:27 AM   Result Value Ref Range    POCT Glucose 126 (H) 70 - 110 mg/dL           ASSESSMENT/PLAN:     Acute on Chronic Systolic Heart Failure and severe AS  -ECHO  "12/15:   1 - Moderately depressed left ventricular systolic function (EF 30-35%). Akinetic LV apex. There is no thrombus in LV apex.    2 - Indeterminate LV diastolic function.     3 - Mildly to moderately depressed right ventricular systolic function     4 - Pulmonary hypertension. The estimated PA systolic pressure is 63 mmHg.     5 - Severe low flow aortic valve stenosis (JAMES 0.49 cm2, AVAi 0.27 cm2/m2, peak aortic jet velocity 4.0 m/s,MG 48 mmHg).     6 - Mild to moderate mitral regurgitation.     7 - Mild tricuspid regurgitation.     8 - Severe left atrial enlargement.   -volume status complicated by low-flow severe AS  - diuresed with Lasix 40 IV BID 12/14 - 12/16  - held home Lasix and Lisinopril on 12/17-12/18 for HARISH  - continue Lasix 40 IV BID      Severe aortic valve stenosis  - JAMES 0.49 cm2, AVAi 0.27 cm2/m2, peak aortic jet velocity 4.0 m/s,MG 48 mmHg  - TAVR eval in process, CT chest done 12/19  - Appreciate Interventional Cardiology and CTS recs  - plan for outpatient SAVR by Dr Lundy, to be scheduled  - unclear why the outside hospital told her that she needed to go to [place]    HARISH - likely cardiorenal 2/2 volume overload  - diuresis as above  - hold ACE-I Lisinopril while renal fxn dynamic    NSTEMI- unsure if acute Type I or II 2/2 CHF and AS. Per [facility] at , "noncritical CAD". ECHO results as above  - medical management  - ASA  - Ticagrelor 90 BID  - Metoprolol  - statin  - nitro PRN   - home metop 50 BID  - hold Lisinopril while renal fxn dynamic    Atrial fibrillation. CHADS 3. At , was started on Amiodarone and Xarelto  - Xarelto resumed here (dose decreased for renal function)  - home Amio  - home Metoprolol  - note severe atrial enlargement, unclear if DCCV would be beneficial     Essential hypertension  - controlled  - continue Metoprolol  - hold Lisinopril while renal fxn dynamic     DM-2, controlled. A1C 5.4. Home med: metformin 500 daily   - low-dose SSI  - hold home metformin while " inpt     HLD   - home fenofibrate 145  - Home atorva 80     Neuropathy, chronic  - home gabapentin 100 TID     Prophylaxis- already on home xarelto    Advance directives - full code    Post-acute care- pending clinical condition, to home with     Time spent in care of patient: 30 mins    Poonam Ybarra MD  Hospital Medicine Staff

## 2017-12-21 NOTE — PHYSICIAN QUERY
PT Name: Anum Quick  MR #: [id number]     Physician Query Form - Documentation Clarification      CDS/: Lakisha Arriola                 Contact information:[email or web address]    This form is a permanent document in the medical record.     Query Date: December 21, 2017    By submitting this query, we are merely seeking further clarification of documentation. Please utilize your independent clinical judgment when addressing the question(s) below.    The Medical record reflects the following:    Supporting Clinical Findings Location in Medical Record   Acute on Chronic Systolic Heart Failure   -ECHO 12/15:    1 - Moderately depressed left ventricular systolic function (EF 30-35%). Akinetic LV apex. There is no thrombus in LV apex.    2 - Indeterminate  LV diastolic function.     3 - Mildly to moderately depressed right ventricular systolic function     4 - Pulmonary hypertension. The estimated PA systolic pressure is 63 mmHg.     5 - Severe  low flow aortic valve stenosis (JAMES 0.49 cm2, AVAi 0.27 cm2/m2, peak aortic jet velocity 4.0 m/s,MG 48 mmHg).     6 - Mild to moderate mitral regurgitation.     7 - Mild tricuspid regurgitation.      8 - Severe left atrial enlargement.   -volume status complicated by low-flow severe AS   -diuresing well, continue Lasix 40mg IV BID; may be able to change to PO tomorrow   -continue medical management with Metoprolol 50mg BID, Lisinopril 20mg daily      HM progress note 12-15                                                                                Doctor, Please specify diagnosis or diagnoses associated with above clinical findings.    Provider Use Only        Please specify the type of Pulmonary Hypertension:    [     ] Primary pulmonary hypertension (Group 1)  [     ] Other Secondary pulmonary hypertension  (Group 5)  [     ] Pulmonary hypertension, unspecified   [     ] Other: ___________________                                                                                                              [X  ] Clinically undetermined

## 2017-12-21 NOTE — PLAN OF CARE
Problem: Patient Care Overview  Goal: Plan of Care Review  Outcome: Ongoing (interventions implemented as appropriate)  Discussed Plan of Care with patient. VS stable. Ambulates well independently. Fall precautions in place. Bleeding precautions reviewed. Pain controlled by PRN migraine medication. Lasix IVP. Potassium replaced PO. Xarelto continued. Blood glucose monitored. Patient remained free of falls/ injury. All questions and concerns were addressed. Will continue to monitor patient.

## 2017-12-22 NOTE — PROGRESS NOTES
"[facility]  Adult Nutrition  Progress Note    SUMMARY     Recommendations    Recommendation/Intervention:   1. Continue current cardiac diet. Pt with good intake at this time.   2. RD following.    Goals: Diet advancement by RD follow-up  Nutrition Goal Status: goal met  Communication of RD Recs: reviewed with RN    Reason for Assessment    Reason for Assessment: RD follow-up  Diagnosis: cardiac disease (AS, CHF)  Relevant Medical History: CHF, HTN, T2DM, HLD, AS         General Information Comments: Pt continues with good intake at this time, 100% of meals.    Nutrition Discharge Planning: Adequate PO intake on cardiac, carb controlled diet    Nutrition Prescription Ordered    Current Diet Order: Cardiac  Nutrition Order Comments: 1500ml FR    Evaluation of Received Nutrients/Fluid Intake    I/O: -1.95L      Comments: LBM 12/20     % Intake of Estimated Energy Needs: 75 - 100 %  % Meal Intake: 100%     Nutrition Risk Screen     Nutrition Risk Screen: no indicators present    Nutrition/Diet History    Patient Reported Diet/Restrictions/Preferences: general  Typical Food/Fluid Intake:  cooks, no dietary restrictions  Food Preferences: No cultural/[place]ian preferences noted.  Factors Affecting Nutritional Intake:  (none)    Labs/Tests/Procedures/Meds    Pertinent Labs Reviewed: reviewed, pertinent  Pertinent Labs Comments: BUN 33, Crt 1.5, GFR 36.8, Glu 113  Pertinent Medications Reviewed: reviewed, pertinent  Pertinent Medications Comments: statin, fenofibrate, lasix, KCl    Physical Findings    Overall Physical Appearance: obese     Oral/Mouth Cavity: WDL  Skin: intact    Anthropometrics    Temp: 97 °F (36.1 °C)     Height: 5' 3" (160 cm)  Weight Method: Standard Scale  Weight: 73.4 kg (161 lb 11.3 oz)     Ideal Body Weight (IBW), Female: 115 lb     % Ideal Body Weight, Female (lb): 140.62 lb  BMI (Calculated): 28.7  BMI Grade: 25 - 29.9 - overweight    Estimated/Assessed Needs    Weight " Used For Calorie Calculations: 77.3 kg (170 lb 6.7 oz)   Energy Calorie Requirements (kcal): 1556  Energy Need Method: Bruce-St Jeor (x 1.2 (PAL))  RMR (Bruce-St. Jeor Equation): 1297.12     Weight Used For Protein Calculations: 77.3 kg (170 lb 6.7 oz)  Protein Requirements: 77-93 gm (1.0-1.2 gm/kg)    Fluid Requirements (mL): per MD  RDA Method (mL): 1556    Assessment and Plan    * Acute on chronic systolic heart failure    Nutrition Problem  Food- and nutrition-related knowledge deficit    Related to (etiology):   No previous diet education    Signs and Symptoms (as evidenced by):   Pt reported diet     Interventions/Recommendations (treatment strategy):  See recs.    Nutrition Diagnosis Status:   Continues            Monitor and Evaluation    Food and Nutrient Intake: energy intake, food and beverage intake  Food and Nutrient Adminstration: diet order  Knowledge/Beliefs/Attitudes: food and nutrition knowledge/skill  Physical Activity and Function: nutrition-related ADLs and IADLs  Anthropometric Measurements: weight, weight change  Biochemical Data, Medical Tests and Procedures: electrolyte and renal panel, gastrointestinal profile, glucose/endocrine profile, inflammatory profile, lipid profile  Nutrition-Focused Physical Findings: overall appearance    Nutrition Risk    Level of Risk:  (F/u 1x weekly)    Nutrition Follow-Up    RD Follow-up?: Yes

## 2017-12-22 NOTE — PROGRESS NOTES
"Progress Note  [facility] Medicine - [facility] [place]      Admit Date: 12/14/2017    SUBJECTIVE:     Follow-up For:  Acute on chronic systolic heart failure    HPI:   Ms. Quick is a 61yo lady with HTN, DM-2 (controlled), h/o recently diagnosed CHF, Aortic stenosis, presenting with shortness of breath x 3 weeks, progressive, worse with exertion, now exercise limitation to < 1 block without exercise limitations previously. She has orthopnea, PND.  She denies chest pain and LE swelling. Reports has been increasing fluid intake recently. Had recent admission to [facility], at which time she underwent cardiac cath (no reported blockages) was told had aortic valve stenosis that would require surgery. She presented here for evaluation because she had previously been told that she would need to go to [place] for further care. She denies recent illness, no F/C/N/V. Reports dyspnea is mild currently.      Hospital Course:   Pt was admitted to [facility] for treatment of acute on chronic systolic heart failure.  Records were obtained from , and Interventional Cardiology (Juliet) and CTS (Nikkie) both consulted for further recs.  She feels better, but feels shortness of breath "comes and goes".  O2 sat wnl on RA. On 12/19, doing well in the morning, went to TAVR CTA, and upon arrival back to her room she complained of abrupt onset of severe SOB and atypical chest pain (sharp, L substernal), charge nurse called a rapid response.  VS were stable throughout, and O2 sat was wnl (>92% on RA) albeit difficult to achieve appropriate reading so checked ABG.  EKG/CXR/trop/BNP done, trop decreased from prior (0.511 <-- 2.935), BNP decreased from prior (62751 <--2414).  discussed with Interventional Cardiology and reviewed CT results, revealing for large B pleural effusions and pulmonary edema, so re-started Lasix IV. Her subjective SOB was out of proportion to hypoxemia, consistent with panic attack, so gave low dose ativan 0.25 IV.  After both Lasix and " Ativan, she was much improved clinically. Move to CSU 3rd floor (stepdown). Much improved the next day, net -2L, anxiety resolved,  at bedside reports that she had some gurgling noises while sleeping and he felt that he had to monitor closely. Much improved on 12/21, SOB resolved, no anxiety, continue current Lasix IV and possible switch to PO tomorrow     Interval History: Doing well, continue current IV diuretic, possible transition to PO tomorrow then d/c home Sunday. Seen by JOVITA Perez and scheduled for clinic with Dr Lundy for further discussion of SAVR. Mild nausea around 4pm - no abd pain, constipation, chest pain, other - trial of zofran PRN and check EKG    Review of Systems:  Pain Scale: 5 /10   Constitutional: no fever or chills  Respiratory: no cough or shortness of breath  Cardiovascular: no chest pain or palpitations  Gastrointestinal: no nausea or vomiting, no abdominal pain or change in bowel habits  Genitourinary: no hematuria or dysuria  Integument/Breast: no rash or pruritis  Hematologic/Lymphatic: no easy bruising or lymphadenopathy  Musculoskeletal: no arthralgias or myalgias  Neurological: no seizures or tremors  Behavioral/Psych: no depression or anxiety    OBJECTIVE:     Vital Signs Range (Last 24H):  Temp:  [96.7 °F (35.9 °C)-98.6 °F (37 °C)]   Pulse:  []   Resp:  [14-20]   BP: (103-126)/(72-90)   SpO2:  [91 %-98 %]     I & O (Last 24H):    Intake/Output Summary (Last 24 hours) at 12/22/17 1038  Last data filed at 12/22/17 0500   Gross per 24 hour   Intake              900 ml   Output             2050 ml   Net            -1150 ml       Physical Exam:  General appearance: no distress  Mental status: Alert and oriented x 3  HEENT:  conjunctivae/corneas clear, PERRL  Neck: supple, thyroid not enlarged  Pulm:   normal respiratory effort, bibasilar crackles  Card: RRR, S1, S2 normal, systolic murmur  Abd: soft, NT, ND, BS present; no masses, no organomegaly  Ext: no  c/c/e  Pulses: 2+, symmetric  Skin: color, texture, turgor normal. No rashes or lesions  Neuro: CN II-XII grossly intact, no focal numbness or weakness, normal strength and tone     Diagnostic Results:  Labs reviewed    Recent Results (from the past 24 hour(s))   POCT glucose    Collection Time: 12/21/17 12:26 PM   Result Value Ref Range    POCT Glucose 102 70 - 110 mg/dL   POCT glucose    Collection Time: 12/21/17  6:14 PM   Result Value Ref Range    POCT Glucose 120 (H) 70 - 110 mg/dL   POCT glucose    Collection Time: 12/21/17  9:03 PM   Result Value Ref Range    POCT Glucose 118 (H) 70 - 110 mg/dL   CBC auto differential    Collection Time: 12/22/17  5:39 AM   Result Value Ref Range    WBC 5.64 3.90 - 12.70 K/uL    RBC 3.55 (L) 4.00 - 5.40 M/uL    Hemoglobin 10.8 (L) 12.0 - 16.0 g/dL    Hematocrit 34.3 (L) 37.0 - 48.5 %    MCV 97 82 - 98 fL    MCH 30.4 27.0 - 31.0 pg    MCHC 31.5 (L) 32.0 - 36.0 g/dL    RDW 15.4 (H) 11.5 - 14.5 %    Platelets 505 (H) 150 - 350 K/uL    MPV 9.8 9.2 - 12.9 fL    Immature Granulocytes 0.2 0.0 - 0.5 %    Gran # 1.8 1.8 - 7.7 K/uL    Immature Grans (Abs) 0.01 0.00 - 0.04 K/uL    Lymph # 2.9 1.0 - 4.8 K/uL    Mono # 0.4 0.3 - 1.0 K/uL    Eos # 0.4 0.0 - 0.5 K/uL    Baso # 0.09 0.00 - 0.20 K/uL    nRBC 0 0 /100 WBC    Gran% 32.3 (L) 38.0 - 73.0 %    Lymph% 51.6 (H) 18.0 - 48.0 %    Mono% 7.6 4.0 - 15.0 %    Eosinophil% 6.7 0.0 - 8.0 %    Basophil% 1.6 0.0 - 1.9 %    Differential Method Automated    Basic metabolic panel    Collection Time: 12/22/17  5:39 AM   Result Value Ref Range    Sodium 142 136 - 145 mmol/L    Potassium 4.3 3.5 - 5.1 mmol/L    Chloride 107 95 - 110 mmol/L    CO2 25 23 - 29 mmol/L    Glucose 113 (H) 70 - 110 mg/dL    BUN, Bld 33 (H) 8 - 23 mg/dL    Creatinine 1.5 (H) 0.5 - 1.4 mg/dL    Calcium 9.9 8.7 - 10.5 mg/dL    Anion Gap 10 8 - 16 mmol/L    eGFR if African American 42.4 (A) >60 mL/min/1.73 m^2    eGFR if non  36.8 (A) >60 mL/min/1.73 m^2   POCT  "glucose    Collection Time: 12/22/17  8:18 AM   Result Value Ref Range    POCT Glucose 121 (H) 70 - 110 mg/dL           ASSESSMENT/PLAN:     Acute on Chronic Systolic Heart Failure and severe AS  -ECHO 12/15:   1 - Moderately depressed left ventricular systolic function (EF 30-35%). Akinetic LV apex. There is no thrombus in LV apex.    2 - Indeterminate LV diastolic function.     3 - Mildly to moderately depressed right ventricular systolic function     4 - Pulmonary hypertension. The estimated PA systolic pressure is 63 mmHg.     5 - Severe low flow aortic valve stenosis (JAMES 0.49 cm2, AVAi 0.27 cm2/m2, peak aortic jet velocity 4.0 m/s,MG 48 mmHg).     6 - Mild to moderate mitral regurgitation.     7 - Mild tricuspid regurgitation.     8 - Severe left atrial enlargement.   -volume status complicated by low-flow severe AS  - diuresed with Lasix 40 IV BID 12/14 - 12/16  - held home Lasix and Lisinopril on 12/17-12/18 for HARISH  - continue Lasix 40 IV BID      Severe aortic valve stenosis  - JAMES 0.49 cm2, AVAi 0.27 cm2/m2, peak aortic jet velocity 4.0 m/s,MG 48 mmHg  - TAVR eval in process, CT chest done 12/19  - Appreciate Interventional Cardiology and CTS recs  - plan for outpatient SAVR by Dr Lundy, to be scheduled  - unclear why the outside hospital told her that she needed to go to [place]    HARISH - likely cardiorenal 2/2 volume overload  - diuresis as above  - hold ACE-I Lisinopril while renal fxn dynamic    Nausea  - see HPI    NSTEMI- unsure if acute Type I or II 2/2 CHF and AS. Per [facility] at , "noncritical CAD". ECHO results as above  - medical management  - ASA  - Ticagrelor 90 BID - will d/w cardiology re: switch to plavix  - Metoprolol  - statin  - nitro PRN   - home metop 50 BID  - hold Lisinopril while renal fxn dynamic    Atrial fibrillation. CHADS 3. At , was started on Amiodarone and Xarelto  - Xarelto resumed here (dose decreased for renal function)  - home Amio  - home Metoprolol  - note severe atrial " enlargement, unclear if DCCV would be beneficial     Essential hypertension  - controlled  - continue Metoprolol  - hold Lisinopril while renal fxn dynamic     DM-2, controlled. A1C 5.4. Home med: metformin 500 daily   - low-dose SSI  - hold home metformin while inpt     HLD   - home fenofibrate 145  - Home atorva 80     Neuropathy, chronic  - home gabapentin 100 TID     Prophylaxis- already on home xarelto    Advance directives - full code    Post-acute care- pending clinical condition, to home with     Time spent in care of patient: 30 mins    Poonam Ybarra MD  Hospital Medicine Staff

## 2017-12-22 NOTE — PLAN OF CARE
Problem: Patient Care Overview  Goal: Plan of Care Review  Outcome: Ongoing (interventions implemented as appropriate)  Pt free of falls/traumas/injuries. Skin remains clean, dry, and intact. Pt continued on IVP lasix BID.   Pt encouraged to ambulate with staff assistance. Pt re-educated on importance of measuring accurate intake and out put; pt verbalized and demonstrates understanding. Reviewed plan of care with pt; and pt verbalized understanding.  Pt VSS in no distress will continue to monitor.

## 2017-12-22 NOTE — PLAN OF CARE
Problem: Patient Care Overview  Goal: Plan of Care Review  Outcome: Ongoing (interventions implemented as appropriate)  POC reviewed with patient and she verbalized understanding. VSS and patient denies presence of pain. BS checked before bed with no need for PRN insulin. Patient continues to diurese on 40 mg Lasix BID. RN educating patient on importance of strict intake and output measurements. Patient A. Fib on monitor, rate controlled and anticoagulated. CTS consulted for possible TAVR. Fall bundle implemented and patient has remained free of falls and injuries. Patient in no apparent sign of distress, will continue to monitor.

## 2017-12-22 NOTE — ASSESSMENT & PLAN NOTE
Nutrition Problem  Food- and nutrition-related knowledge deficit    Related to (etiology):   No previous diet education    Signs and Symptoms (as evidenced by):   Pt reported diet     Interventions/Recommendations (treatment strategy):  See recs.    Nutrition Diagnosis Status:   Continues

## 2017-12-23 NOTE — PROGRESS NOTES
"Progress Note  [facility] [place] [facility] [place]      [place] Date: 12/14/2017    SUBJECTIVE:     Follow-up For:  Acute on chronic systolic heart failure, severe AS    HPI:   Ms. Quick is a 63yo lady with HTN, DM-2 (controlled), h/o recently diagnosed CHF, Aortic stenosis, presenting with shortness of breath x 3 weeks, progressive, worse with exertion, now exercise limitation to < 1 block without exercise limitations previously. She has orthopnea, PND.  She denies chest pain and LE swelling. Reports has been increasing fluid intake recently. Had recent admission to [facility], at which time she underwent cardiac cath (no reported blockages) was told had aortic valve stenosis that would require surgery. She presented here for evaluation because she had previously been told that she would need to go to [place] for further care. She denies recent illness, no F/C/N/V. Reports dyspnea is mild currently.      Hospital Course:   Pt was admitted to [facility] for treatment of acute on chronic systolic heart failure.  Records were obtained from , and Interventional Cardiology (Juliet) and CTS (Nikkie) both consulted for further recs.  She feels better, but feels shortness of breath "comes and goes".  O2 sat wnl on RA. On 12/19, doing well in the morning, went to TAVR CTA, and upon arrival back to her room she complained of abrupt onset of severe SOB and atypical chest pain (sharp, L substernal), charge nurse called a rapid response.  VS were stable throughout, and O2 sat was wnl (>92% on RA) albeit difficult to achieve appropriate reading so checked ABG.  EKG/CXR/trop/BNP done, trop decreased from prior (0.511 <-- 2.935), BNP decreased from prior (85001 <--2414).  discussed with Interventional Cardiology and reviewed CT results, revealing for large B pleural effusions and pulmonary edema, so re-started Lasix IV. Her subjective SOB was out of proportion to hypoxemia, consistent with panic attack, so gave low dose ativan 0.25 IV.  After both Lasix " and Ativan, she was much improved clinically. Move to CSU 3rd floor (stepdown). Much improved the next day, net -2L, anxiety resolved,  at bedside reports that she had some gurgling noises while sleeping and he felt that he had to monitor closely. Much improved on 12/21, SOB resolved, no anxiety, continue current Lasix IV and possible switch to PO tomorrow.  Doing well, continue current IV diuretic, possible transition to PO tomorrow then d/c home Sunday. Seen by JOVITA Perez and scheduled for clinic with Dr Lundy for further discussion of SAVR. Mild nausea around 4pm - no abd pain, constipation, chest pain, other - resolved after zofran and did not recur. She attributes the nausea to fish that she ate for lunch     Interval History: Only net -720 so increase Lasix to 40 IV TID (from BID), possible transition to oral lasix tomorrow then d/c home early Monday morning    Review of Systems:  Pain Scale: 5 /10   Constitutional: no fever or chills  Respiratory: no cough or shortness of breath  Cardiovascular: no chest pain or palpitations  Gastrointestinal: no nausea or vomiting, no abdominal pain or change in bowel habits  Genitourinary: no hematuria or dysuria  Integument/Breast: no rash or pruritis  Hematologic/Lymphatic: no easy bruising or lymphadenopathy  Musculoskeletal: no arthralgias or myalgias  Neurological: no seizures or tremors  Behavioral/Psych: no depression or anxiety    OBJECTIVE:     Vital Signs Range (Last 24H):  Temp:  [96.8 °F (36 °C)-98.5 °F (36.9 °C)]   Pulse:  [68-90]   Resp:  [18-20]   BP: ()/(59-83)   SpO2:  [93 %-98 %]     I & O (Last 24H):    Intake/Output Summary (Last 24 hours) at 12/23/17 0977  Last data filed at 12/23/17 0400   Gross per 24 hour   Intake             1380 ml   Output             2100 ml   Net             -720 ml       Physical Exam:  General appearance: no distress  Mental status: Alert and oriented x 3  HEENT:  conjunctivae/corneas clear, PERRL  Neck:  supple, thyroid not enlarged  Pulm:   normal respiratory effort, bibasilar crackles  Card: RRR, S1, S2 normal, systolic murmur  Abd: soft, NT, ND, BS present; no masses, no organomegaly  Ext: no c/c/e  Pulses: 2+, symmetric  Skin: color, texture, turgor normal. No rashes or lesions  Neuro: CN II-XII grossly intact, no focal numbness or weakness, normal strength and tone     Diagnostic Results:  Labs reviewed    Recent Results (from the past 24 hour(s))   POCT glucose    Collection Time: 12/22/17 12:26 PM   Result Value Ref Range    POCT Glucose 109 70 - 110 mg/dL   POCT glucose    Collection Time: 12/22/17  5:34 PM   Result Value Ref Range    POCT Glucose 122 (H) 70 - 110 mg/dL   POCT glucose    Collection Time: 12/22/17  7:44 PM   Result Value Ref Range    POCT Glucose 159 (H) 70 - 110 mg/dL   CBC auto differential    Collection Time: 12/23/17  6:03 AM   Result Value Ref Range    WBC 4.79 3.90 - 12.70 K/uL    RBC 3.50 (L) 4.00 - 5.40 M/uL    Hemoglobin 10.9 (L) 12.0 - 16.0 g/dL    Hematocrit 34.9 (L) 37.0 - 48.5 %     (H) 82 - 98 fL    MCH 31.1 (H) 27.0 - 31.0 pg    MCHC 31.2 (L) 32.0 - 36.0 g/dL    RDW 15.7 (H) 11.5 - 14.5 %    Platelets 389 (H) 150 - 350 K/uL    MPV 10.4 9.2 - 12.9 fL    Immature Granulocytes 0.8 (H) 0.0 - 0.5 %    Gran # 1.6 (L) 1.8 - 7.7 K/uL    Immature Grans (Abs) 0.04 0.00 - 0.04 K/uL    Lymph # 2.4 1.0 - 4.8 K/uL    Mono # 0.4 0.3 - 1.0 K/uL    Eos # 0.3 0.0 - 0.5 K/uL    Baso # 0.07 0.00 - 0.20 K/uL    nRBC 0 0 /100 WBC    Gran% 33.7 (L) 38.0 - 73.0 %    Lymph% 50.9 (H) 18.0 - 48.0 %    Mono% 7.9 4.0 - 15.0 %    Eosinophil% 5.2 0.0 - 8.0 %    Basophil% 1.5 0.0 - 1.9 %    Differential Method Automated    Basic metabolic panel    Collection Time: 12/23/17  6:03 AM   Result Value Ref Range    Sodium 139 136 - 145 mmol/L    Potassium 4.0 3.5 - 5.1 mmol/L    Chloride 106 95 - 110 mmol/L    CO2 19 (L) 23 - 29 mmol/L    Glucose 101 70 - 110 mg/dL    BUN, Bld 36 (H) 8 - 23 mg/dL     "Creatinine 1.8 (H) 0.5 - 1.4 mg/dL    Calcium 9.6 8.7 - 10.5 mg/dL    Anion Gap 14 8 - 16 mmol/L    eGFR if African American 34.0 (A) >60 mL/min/1.73 m^2    eGFR if non African American 29.5 (A) >60 mL/min/1.73 m^2   POCT glucose    Collection Time: 12/23/17  8:43 AM   Result Value Ref Range    POCT Glucose 109 70 - 110 mg/dL           ASSESSMENT/PLAN:     Acute on Chronic Systolic Heart Failure and severe AS  -ECHO 12/15:   1 - Moderately depressed left ventricular systolic function (EF 30-35%). Akinetic LV apex. There is no thrombus in LV apex.    2 - Indeterminate LV diastolic function.     3 - Mildly to moderately depressed right ventricular systolic function     4 - Pulmonary hypertension. The estimated PA systolic pressure is 63 mmHg.     5 - Severe low flow aortic valve stenosis (JAMES 0.49 cm2, AVAi 0.27 cm2/m2, peak aortic jet velocity 4.0 m/s,MG 48 mmHg).     6 - Mild to moderate mitral regurgitation.     7 - Mild tricuspid regurgitation.     8 - Severe left atrial enlargement.   -volume status complicated by low-flow severe AS  - diuresed with Lasix 40 IV BID 12/14 - 12/16  - held home Lasix and Lisinopril on 12/17-12/18 for HARISH  - increase Lasix to 40 IV TID (from BID)      Severe aortic valve stenosis  - JAMES 0.49 cm2, AVAi 0.27 cm2/m2, peak aortic jet velocity 4.0 m/s,MG 48 mmHg  - TAVR eval in process, CT chest done 12/19  - Appreciate Interventional Cardiology and CTS recs  - plan for outpatient SAVR by Dr Lundy, to be scheduled  - unclear why the outside hospital told her that she needed to go to [place]    HARISH - likely cardiorenal 2/2 volume overload  - diuresis as above  - hold ACE-I Lisinopril while renal fxn dynamic    Nausea  - see HPI    NSTEMI- unsure if acute Type I or II 2/2 CHF and AS. Per [facility] at , "noncritical CAD". ECHO results as above  - medical management  - ASA  - Ticagrelor 90 BID - will d/w cardiology re: switch to plavix  - Metoprolol  - statin  - nitro PRN   - home metop 50 BID  - " hold Lisinopril while renal fxn dynamic    Atrial fibrillation. CHADS 3. At [facility], was started on Amiodarone and Xarelto  - Xarelto resumed here (dose decreased for renal function)  - home Amio  - home Metoprolol  - note severe atrial enlargement, unclear if DCCV would be beneficial     Essential hypertension  - controlled  - continue Metoprolol  - hold Lisinopril while renal fxn dynamic     DM-2, controlled. A1C 5.4. Home med: metformin 500 daily   - low-dose SSI  - hold home metformin while inpt     HLD   - home fenofibrate 145  - Home atorva 80     Neuropathy, chronic  - home gabapentin 100 TID     Prophylaxis- already on home xarelto    Advance directives - full code    Post-acute care- pending clinical condition, to home with  in next 2-3 days    Time spent in care of patient: 30 mins    Poonam Ybarra MD  Hospital Medicine Staff

## 2017-12-23 NOTE — PLAN OF CARE
Problem: Patient Care Overview  Goal: Plan of Care Review  Outcome: Ongoing (interventions implemented as appropriate)  Pt free of falls/traumas/injuries. Skin remains clean, dry, and intact. Pt's lasix IVP increased to TID.   Pt re-educated on importance of measuring accurate intake and out put; pt verbalized and demonstrates understanding. Reviewed plan of care with pt; and pt verbalized understanding.  Pt VSS in no distress will continue to monitor.

## 2017-12-24 NOTE — PLAN OF CARE
Problem: Patient Care Overview  Goal: Plan of Care Review  Outcome: Ongoing (interventions implemented as appropriate)  Pt free of falls/traumas/injuries. Skin remains clean, dry, and intact. Pt continued on IVP lasix TID. Pt states her breathing feels better; pt's lungs sounds are clearer than yesterday; and pt has no audible wheezing. Pt re-educated on importance of measuring accurate intake and out put; pt verbalized and demonstrates understanding. Reviewed plan of care with pt; and pt verbalized understanding.  Pt VSS in no distress will continue to monitor.

## 2017-12-24 NOTE — PROGRESS NOTES
"Progress Note  [facility] Medicine - [facility]      Admit Date: 12/14/2017    SUBJECTIVE:     Follow-up For:  Acute on chronic systolic heart failure, severe AS    HPI:   Ms. Quick is a 61yo lady with HTN, DM-2 (controlled), h/o recently diagnosed CHF, Aortic stenosis, presenting with shortness of breath x 3 weeks, progressive, worse with exertion, now exercise limitation to < 1 block without exercise limitations previously. She has orthopnea, PND.  She denies chest pain and LE swelling. Reports has been increasing fluid intake recently. Had recent admission to [facility], at which time she underwent cardiac cath (no reported blockages) was told had aortic valve stenosis that would require surgery. She presented here for evaluation because she had previously been told that she would need to go to [place] for further care. She denies recent illness, no F/C/N/V. Reports dyspnea is mild currently.      Hospital Course:   Pt was admitted to [facility] for treatment of acute on chronic systolic heart failure.  Records were obtained from , and Interventional Cardiology (Juliet) and CTS (Nikkie) both consulted for further recs.  She feels better, but feels shortness of breath "comes and goes".  O2 sat wnl on RA. On 12/19, doing well in the morning, went to TAVR CTA, and upon arrival back to her room she complained of abrupt onset of severe SOB and atypical chest pain (sharp, L substernal), charge nurse called a rapid response.  VS were stable throughout, and O2 sat was wnl (>92% on RA) albeit difficult to achieve appropriate reading so checked ABG.  EKG/CXR/trop/BNP done, trop decreased from prior (0.511 <-- 2.935), BNP decreased from prior (29139 <--2414).  discussed with Interventional Cardiology and reviewed CT results, revealing for large B pleural effusions and pulmonary edema, so re-started Lasix IV. Her subjective SOB was out of proportion to hypoxemia, consistent with panic attack, so gave low dose ativan 0.25 IV.  After both Lasix " and Ativan, she was much improved clinically. Move to CSU 3rd floor (stepdown). Much improved the next day, net -2L, anxiety resolved,  at bedside reports that she had some gurgling noises while sleeping and he felt that he had to monitor closely. Much improved on 12/21, SOB resolved, no anxiety, continue current Lasix IV and possible switch to PO tomorrow.  Doing well, continue current IV diuretic, possible transition to PO tomorrow then d/c home Sunday. Seen by JOVITA Perez and scheduled for clinic with Dr Lundy for further discussion of SAVR. Mild nausea around 4pm - no abd pain, constipation, chest pain, other - resolved after zofran and did not recur. She attributes the nausea to fish that she ate for lunch. The next day, 12/23, only net -720 so increase Lasix to 40 IV TID (from BID), possible transition to oral lasix tomorrow then d/c home early Monday morning     Interval History: net -1760, weight 160 (admit weight), change to oral lasix and monitor closely    Review of Systems:  Pain Scale: 0 /10   Constitutional: no fever or chills  Respiratory: no cough or shortness of breath  Cardiovascular: no chest pain or palpitations  Gastrointestinal: no nausea or vomiting, no abdominal pain or change in bowel habits  Genitourinary: no hematuria or dysuria  Integument/Breast: no rash or pruritis  Hematologic/Lymphatic: no easy bruising or lymphadenopathy  Musculoskeletal: no arthralgias or myalgias  Neurological: no seizures or tremors  Behavioral/Psych: no depression or anxiety    OBJECTIVE:     Vital Signs Range (Last 24H):  Temp:  [96.2 °F (35.7 °C)-98.8 °F (37.1 °C)]   Pulse:  [66-91]   Resp:  [18-20]   BP: ()/(54-77)   SpO2:  [92 %-97 %]     I & O (Last 24H):    Intake/Output Summary (Last 24 hours) at 12/24/17 1233  Last data filed at 12/24/17 0400   Gross per 24 hour   Intake             1240 ml   Output             3000 ml   Net            -1760 ml       Physical Exam:  General appearance:  no distress  Mental status: Alert and oriented x 3  HEENT:  conjunctivae/corneas clear, PERRL  Neck: supple, thyroid not enlarged  Pulm:   normal respiratory effort, no crackles  Card: RRR, S1, S2 normal, systolic murmur  Abd: soft, NT, ND, BS present; no masses, no organomegaly  Ext: no c/c/e  Pulses: 2+, symmetric  Skin: color, texture, turgor normal. No rashes or lesions  Neuro: CN II-XII grossly intact, no focal numbness or weakness, normal strength and tone     Diagnostic Results:  Labs reviewed    Recent Results (from the past 24 hour(s))   POCT glucose    Collection Time: 12/23/17 12:51 PM   Result Value Ref Range    POCT Glucose 115 (H) 70 - 110 mg/dL   POCT glucose    Collection Time: 12/23/17  6:02 PM   Result Value Ref Range    POCT Glucose 104 70 - 110 mg/dL   POCT glucose    Collection Time: 12/23/17  8:58 PM   Result Value Ref Range    POCT Glucose 64 (L) 70 - 110 mg/dL   POCT glucose    Collection Time: 12/23/17  8:59 PM   Result Value Ref Range    POCT Glucose 112 (H) 70 - 110 mg/dL   CBC auto differential    Collection Time: 12/24/17  5:04 AM   Result Value Ref Range    WBC 5.53 3.90 - 12.70 K/uL    RBC 3.57 (L) 4.00 - 5.40 M/uL    Hemoglobin 10.8 (L) 12.0 - 16.0 g/dL    Hematocrit 34.6 (L) 37.0 - 48.5 %    MCV 97 82 - 98 fL    MCH 30.3 27.0 - 31.0 pg    MCHC 31.2 (L) 32.0 - 36.0 g/dL    RDW 15.2 (H) 11.5 - 14.5 %    Platelets 473 (H) 150 - 350 K/uL    MPV 10.1 9.2 - 12.9 fL    Immature Granulocytes 0.2 0.0 - 0.5 %    Gran # 2.2 1.8 - 7.7 K/uL    Immature Grans (Abs) 0.01 0.00 - 0.04 K/uL    Lymph # 2.7 1.0 - 4.8 K/uL    Mono # 0.4 0.3 - 1.0 K/uL    Eos # 0.2 0.0 - 0.5 K/uL    Baso # 0.08 0.00 - 0.20 K/uL    nRBC 0 0 /100 WBC    Gran% 39.2 38.0 - 73.0 %    Lymph% 48.3 (H) 18.0 - 48.0 %    Mono% 7.6 4.0 - 15.0 %    Eosinophil% 3.3 0.0 - 8.0 %    Basophil% 1.4 0.0 - 1.9 %    Differential Method Automated    Basic metabolic panel    Collection Time: 12/24/17  5:04 AM   Result Value Ref Range    Sodium  145 136 - 145 mmol/L    Potassium 4.0 3.5 - 5.1 mmol/L    Chloride 107 95 - 110 mmol/L    CO2 27 23 - 29 mmol/L    Glucose 108 70 - 110 mg/dL    BUN, Bld 38 (H) 8 - 23 mg/dL    Creatinine 1.8 (H) 0.5 - 1.4 mg/dL    Calcium 9.5 8.7 - 10.5 mg/dL    Anion Gap 11 8 - 16 mmol/L    eGFR if African American 34.0 (A) >60 mL/min/1.73 m^2    eGFR if non African American 29.5 (A) >60 mL/min/1.73 m^2   POCT glucose    Collection Time: 12/24/17  8:18 AM   Result Value Ref Range    POCT Glucose 112 (H) 70 - 110 mg/dL           ASSESSMENT/PLAN:     Acute on Chronic Systolic Heart Failure and severe AS  -ECHO 12/15:   1 - Moderately depressed left ventricular systolic function (EF 30-35%). Akinetic LV apex. There is no thrombus in LV apex.    2 - Indeterminate LV diastolic function.     3 - Mildly to moderately depressed right ventricular systolic function     4 - Pulmonary hypertension. The estimated PA systolic pressure is 63 mmHg.     5 - Severe low flow aortic valve stenosis (JAMES 0.49 cm2, AVAi 0.27 cm2/m2, peak aortic jet velocity 4.0 m/s,MG 48 mmHg).     6 - Mild to moderate mitral regurgitation.     7 - Mild tricuspid regurgitation.     8 - Severe left atrial enlargement.   -volume status complicated by low-flow severe AS  - diuresed with Lasix 40 IV BID 12/14 - 12/16  - held home Lasix and Lisinopril on 12/17-12/18 for HARISH  - increased Lasix to 40 IV TID (from BID)   - transition to oral lasix today and monitor     Severe aortic valve stenosis  - JAMES 0.49 cm2, AVAi 0.27 cm2/m2, peak aortic jet velocity 4.0 m/s,MG 48 mmHg  - TAVR eval in process, CT chest done 12/19  - Appreciate Interventional Cardiology and CTS recs  - plan for outpatient SAVR by Dr Lundy, to be scheduled  - unclear why the outside hospital told her that she needed to go to [place]    HARISH - likely cardiorenal 2/2 volume overload  - diuresis as above  - hold ACE-I Lisinopril while renal fxn dynamic    Nausea  - see HPI    NSTEMI- unsure if acute Type I or  "II 2/2 CHF and AS. Per [facility] at , "noncritical CAD". ECHO results as above  - medical management  - ASA  - Ticagrelor 90 BID - will d/w cardiology re: switch to plavix  - Metoprolol  - statin  - nitro PRN   - home metop 50 BID  - hold Lisinopril while renal fxn dynamic    Atrial fibrillation. CHADS 3. At , was started on Amiodarone and Xarelto  - Xarelto resumed here (dose decreased for renal function)  - home Amio  - home Metoprolol  - note severe atrial enlargement, unclear if DCCV would be beneficial     Essential hypertension  - controlled  - continue Metoprolol  - hold Lisinopril while renal fxn dynamic     DM-2, controlled. A1C 5.4. Home med: metformin 500 daily   - low-dose SSI  - hold home metformin while inpt     HLD   - home fenofibrate 145  - Home atorva 80     Neuropathy, chronic  - home gabapentin 100 TID     Prophylaxis- already on home xarelto    Advance directives - full code    Post-acute care- pending clinical condition, to home with  in next 2-3 days    Time spent in care of patient: 30 mins    Poonam Ybarra MD  Hospital Medicine Staff      "

## 2017-12-24 NOTE — PLAN OF CARE
Problem: Patient Care Overview  Goal: Plan of Care Review  Outcome: Ongoing (interventions implemented as appropriate)  POC reviewed with patient and she verbalized understanding. VSS and patient denies presence of pain. BS checked before bed with no need for PRN insulin. Lasix increased to 40 mg TID. RN educating patient on importance of strict intake and output measurements. Patient A. Fib on monitor, rate controlled and anticoagulated. CTS consulted for possible TAVR. PRN nebulizer treatments ordered for wheezing. Fall bundle implemented and patient has remained free of falls and injuries. Patient in no apparent sign of distress, will continue to monitor.

## 2017-12-25 NOTE — DISCHARGE SUMMARY
"[facility] Hospital Medicine  Discharge Summary      Patient Name: Anum Quick  MRN: [id number]  Admission Date: 12/14/2017  Hospital Length of Stay: 11 days  Discharge Date and Time: 12/25/2017  3:50 PM  Attending Physician: Poonam Ybarra MD   Discharging Provider: Poonam Ybarra MD  Primary Care Provider: Raghav Valdez MD    Hospital Medicine Team: [facility] HOSP MED C Poonam Ybarra MD    HPI:   Ms. Quick is a 61yo lady with HTN, DM-2 (controlled), h/o recently diagnosed CHF, Aortic stenosis, presenting with shortness of breath x 3 weeks, progressive, worse with exertion, now exercise limitation to < 1 block without exercise limitations previously. She has orthopnea, PND.  She denies chest pain and LE swelling. Reports has been increasing fluid intake recently. Had recent admission to [facility], at which time she underwent cardiac cath (no reported blockages) was told had aortic valve stenosis that would require surgery. She presented here for evaluation because she had previously been told that she would need to go to [place] for further care. She denies recent illness, no F/C/N/V. Reports dyspnea is mild currently.      Hospital Course:   Pt was admitted to [facility] for treatment of acute on chronic systolic heart failure.  Records were obtained from , and Interventional Cardiology (Juliet) and CTS (Nikkie) both consulted for further recs.  She feels better, but feels shortness of breath "comes and goes".  O2 sat wnl on RA. On 12/19, doing well in the morning, went to TAVR CTA, and upon arrival back to her room she complained of abrupt onset of severe SOB and atypical chest pain (sharp, L substernal), charge nurse called a rapid response.  VS were stable throughout, and O2 sat was wnl (>92% on RA) albeit difficult to achieve appropriate reading so checked ABG.  EKG/CXR/trop/BNP done, trop decreased from prior (0.511 <-- 2.935), BNP decreased from prior (38718 <--2414).  discussed with " Interventional Cardiology and reviewed CT results, revealing for large B pleural effusions and pulmonary edema, so re-started Lasix IV. Her subjective SOB was out of proportion to hypoxemia, consistent with panic attack, so gave low dose ativan 0.25 IV.  After both Lasix and Ativan, she was much improved clinically. Move to CSU 3rd floor (stepdown). Much improved the next day, net -2L, anxiety resolved,  at bedside reports that she had some gurgling noises while sleeping and he felt that he had to monitor closely. Much improved on 12/21, SOB resolved, no anxiety, continue current Lasix IV and possible switch to PO tomorrow.  Doing well, continue current IV diuretic, possible transition to PO tomorrow then d/c home Sunday. Seen by JOVITA Perez and scheduled for clinic with Dr Lundy for further discussion of SAVR. Mild nausea around 4pm - no abd pain, constipation, chest pain, other - resolved after zofran and did not recur. She attributes the nausea to fish that she ate for lunch. The next day, 12/23, only net -720 so increase Lasix to 40 IV TID (from BID), possible transition to oral lasix tomorrow then d/c home early Monday morning. On 12/24, net -1760, weight 160 (admit weight), changed to lasix PO and monitor closely. She was Net -2130 on Lasix PO, thus great response and ok for d.c home with close follow up.    A/p:  Acute on Chronic Systolic Heart Failure and severe AS  -ECHO 12/15:   1 - Moderately depressed left ventricular systolic function (EF 30-35%). Akinetic LV apex. There is no thrombus in LV apex.    2 - Indeterminate LV diastolic function.     3 - Mildly to moderately depressed right ventricular systolic function     4 - Pulmonary hypertension. The estimated PA systolic pressure is 63 mmHg.     5 - Severe low flow aortic valve stenosis (JAMES 0.49 cm2, AVAi 0.27 cm2/m2, peak aortic jet velocity 4.0 m/s,MG 48 mmHg).     6 - Mild to moderate mitral regurgitation.     7 - Mild tricuspid  "regurgitation.     8 - Severe left atrial enlargement.   -volume status complicated by low-flow severe AS  - diuresed with Lasix 40 IV BID 12/14 - 12/16  - held home Lasix and Lisinopril on 12/17-12/18 for HARISH  - increased Lasix to 40 IV TID (from BID)   - transitioned to oral lasix 40 PO BID on 12/24, and she did great  - pt instructions (printed):  Lasix sliding scale:  Weigh yourself daily  If weight gain 2-3 lbs over 2-5 days, increase Lasix to 80mg by mouth 2x/day temporarily. If no improvement, call MD     Severe aortic valve stenosis  - JAMES 0.49 cm2, AVAi 0.27 cm2/m2, peak aortic jet velocity 4.0 m/s,MG 48 mmHg  - TAVR eval in process, CT chest done 12/19  - Appreciate Interventional Cardiology and CTS recs  - plan for outpatient SAVR by Dr Lundy, to be scheduled  - unclear why the outside hospital told her that she needed to go to [place]     HARISH - likely cardiorenal 2/2 volume overload  - diuresis as above  - hold ACE-I Lisinopril while renal fxn dynamic     Nausea  - see HPI     NSTEMI- unsure if acute Type I or II 2/2 CHF and AS. Per [facility] at , "noncritical CAD". ECHO results as above  - medical management  - ASA  - Ticagrelor 90 BID - will d/w cardiology re: switch to plavix  - Metoprolol  - statin  - nitro PRN   - home metop 50 BID  - hold Lisinopril while renal fxn dynamic     Atrial fibrillation. CHADS 3. At , was started on Amiodarone and Xarelto  - Xarelto resumed here (dose decreased for renal function)  - home Amio  - home Metoprolol  - note severe atrial enlargement, unclear if DCCV would be beneficial     Essential hypertension  - controlled  - continue Metoprolol  - hold Lisinopril while renal fxn dynamic     DM-2, controlled. A1C 5.4. Home med: metformin 500 daily   - low-dose SSI  - hold home metformin while inpt      HLD   - home fenofibrate 145  - Home atorva 80      Neuropathy, chronic  - home gabapentin 100 TID     Hypok - replaced PO     Prophylaxis- already on home xarelto     Advance " directives - full code     Post-acute care- to home with  and lasix sliding scale today, close f/u in [facility] clinic and CTS clinic with Dr Lundy (scheduled)       Procedure(s) (LRB):  Coronary angiography (N/A)        Consults:   Consults         Status Ordering Provider     Inpatient consult to Cardiac Rehab  Once     Provider:  (Not yet assigned)    Completed RUPA FINE     Inpatient consult to Cardiothoracic Surgery  Once     Provider:  (Not yet assigned)    Completed SHERRI SAAVEDRA     Inpatient consult to Interventional Cardiology  Once     Provider:  (Not yet assigned)    Completed RUPA FINE     Inpatient consult to Social Work/Case Management  Once     Provider:  (Not yet assigned)    Acknowledged RUPA FINE     IP consult to dietary  Once     Provider:  (Not yet assigned)    Completed RUPA FINE          Final Active Diagnoses:    Diagnosis Date Noted POA    PRINCIPAL PROBLEM:  Acute on chronic systolic heart failure [I50.23] 12/14/2017 Yes    SOB (shortness of breath) [R06.02] 12/19/2017 Yes    HARISH (acute kidney injury) [N17.9] 12/18/2017 Yes    Paroxysmal atrial fibrillation [I48.0] 12/15/2017 Unknown    Shortness of breath [R06.02] 12/14/2017 Unknown    NSTEMI (non-ST elevated myocardial infarction) [I21.4] 12/14/2017 Yes    Type 2 diabetes mellitus with neurologic complication [E11.49] 12/14/2017 Yes    Essential hypertension [I10] 12/14/2017 Unknown    Aortic valve stenosis [I35.0] 12/14/2017 Yes    Hyperlipidemia [E78.5] 12/14/2017 Yes      Problems Resolved During this Admission:    Diagnosis Date Noted Date Resolved POA      Discharged Condition: stable    Disposition: Home or Self Care    Follow Up:  Follow-up Information     Arun Blackmon MD On 12/26/2017.    Specialty:  Hospitalist  Why:  @ 8:00  Contact information:  [address] [place] [id number]  [phone]             Rodo Lundy MD.    Specialty:  Cardiothoracic  Surgery  Why:  for SAVR  Contact information:  [address]  [facility] [address]  [phone]                 Patient Instructions:     Diet Adult Regular   Order Specific Question Answer Comments   Additional restrictions: Cardiac (Low Na/Chol)      Activity as tolerated       Medications:  Reconciled Home Medications:   Current Discharge Medication List      START taking these medications    Details   aspirin 81 MG Chew Take 1 tablet (81 mg total) by mouth once daily.  Refills: 0      !! clopidogrel (PLAVIX) 75 mg tablet Take 1 tablet (75 mg total) by mouth once daily.  Qty: 30 tablet, Refills: 11      !! clopidogrel (PLAVIX) 75 mg tablet Take 1 tablet (75 mg total) by mouth once daily.  Qty: 30 tablet, Refills: 11      nitroGLYCERIN (NITROSTAT) 0.4 MG SL tablet Place 1 tablet (0.4 mg total) under the tongue every 5 (five) minutes as needed for Chest pain (angina).  Qty: 20 tablet, Refills: 0      senna (SENOKOT) 8.6 mg tablet Take 1 tablet by mouth 2 (two) times daily as needed for Constipation.       !! - Potential duplicate medications found. Please discuss with provider.      CONTINUE these medications which have CHANGED    Details   atorvastatin (LIPITOR) 80 MG tablet Take 1 tablet (80 mg total) by mouth once daily.  Qty: 30 tablet, Refills: 11      furosemide (LASIX) 40 MG tablet Take 1 tablet (40 mg total) by mouth 2 (two) times daily. If wt gain 2-3 lbs x 2-5 d, increase Lasix to 80mg by mouth 2x/d  Qty: 60 tablet, Refills: 0         CONTINUE these medications which have NOT CHANGED    Details   amiodarone (PACERONE) 200 MG Tab Take by mouth once daily.      butalbital-acetaminophen-caffeine -40 mg (FIORICET, ESGIC) -40 mg per tablet Take 1 tablet by mouth every 4 (four) hours as needed for Pain.      estropipate (OGEN) 1.5 MG tablet Take 1.5 mg by mouth once daily.      fenofibrate (TRICOR) 145 MG tablet Take 145 mg by mouth once daily.      gabapentin (NEURONTIN) 100 MG capsule Take  100 mg by mouth 3 (three) times daily.      lisinopril (PRINIVIL,ZESTRIL) 20 MG tablet Take 20 mg by mouth once daily.      metFORMIN (GLUMETZA) 500 MG (MOD) 24 hr tablet Take 500 mg by mouth daily with breakfast.      metoprolol tartrate (LOPRESSOR) 50 MG tablet Take 50 mg by mouth 2 (two) times daily.      rivaroxaban (XARELTO) 20 mg Tab Take 20 mg by mouth daily with dinner or evening meal.             Significant Diagnostic Studies: see above    Pending Diagnostic Studies:     None        Indwelling Lines/Drains at time of discharge:   Lines/Drains/Airways          No matching active lines, drains, or airways          Time spent on the discharge of patient: 40 minutes  Patient was seen and examined on the date of discharge and determined to be suitable for discharge.         Poonam Ybarra MD  Department of Hospital Medicine  [facility]
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
## 2017-12-25 NOTE — DISCHARGE INSTRUCTIONS
Lasix sliding scale:  Weigh yourself daily  If weight gain 2-3 lbs over 2-5 days, increase Lasix to 80mg by mouth 2x/day temporarily. If no improvement, call MD

## 2017-12-25 NOTE — PLAN OF CARE
Problem: Patient Care Overview  Goal: Plan of Care Review  Outcome: Ongoing (interventions implemented as appropriate)  POC reviewed with patient and she verbalized understanding. VSS and patient denies presence of pain. BS checked before bed with no need for PRN insulin. Patient transitioned from Lasix IVP TID to PO today. RN educating patient on importance of strict intake and output measurements. Patient A. Fib on monitor, rate controlled and anticoagulated. CTS consulted for possible TAVR. PRN nebulizer treatments ordered for wheezing. Fall bundle implemented and patient has remained free of falls and injuries. Patient in no apparent sign of distress, will continue to monitor.

## 2017-12-25 NOTE — NURSING
Pt D/C home per MD orders. Tele removed, IV access removed and intact X 1.  VSS, NAD, and no complaints at this time. Pt given and explained med list and prescriptions. Pt verbalizes complete understanding of all  D/C instructions and follow up care. Pt given printed AVS, signed and copy placed in Pt chart. Pt awaiting escort. Will continue to monitor.

## 2017-12-25 NOTE — PLAN OF CARE
Problem: Patient Care Overview  Goal: Plan of Care Review  Outcome: Ongoing (interventions implemented as appropriate)  Pt free of falls/traumas/injuries. Skin remains clean, dry, and intact. Pt's potassium being replaced and once that is complete pt is able to be discharged.  Pt re-educated on importance of measuring accurate intake and out put; pt verbalized and demonstrates understanding. Reviewed plan of care with pt; and pt verbalized understanding.  Pt VSS in no distress will continue to monitor.

## 2017-12-26 NOTE — PLAN OF CARE
12/26/17 0644   Final Note   Assessment Type Final Discharge Note   Discharge Disposition Home   Hospital Follow Up  Appt(s) scheduled? Yes

## 2017-12-29 PROBLEM — R06.02 SHORTNESS OF BREATH: Status: RESOLVED | Noted: 2017-01-01 | Resolved: 2017-01-01

## 2017-12-29 PROBLEM — N17.9 AKI (ACUTE KIDNEY INJURY): Status: RESOLVED | Noted: 2017-01-01 | Resolved: 2017-01-01

## 2017-12-29 PROBLEM — E78.5 HYPERLIPIDEMIA ASSOCIATED WITH TYPE 2 DIABETES MELLITUS: Chronic | Status: ACTIVE | Noted: 2017-01-01

## 2017-12-29 PROBLEM — I21.4 NSTEMI (NON-ST ELEVATED MYOCARDIAL INFARCTION): Status: RESOLVED | Noted: 2017-01-01 | Resolved: 2017-01-01

## 2017-12-29 PROBLEM — I25.10 CORONARY ARTERY DISEASE INVOLVING NATIVE CORONARY ARTERY OF NATIVE HEART WITHOUT ANGINA PECTORIS: Chronic | Status: ACTIVE | Noted: 2017-01-01

## 2017-12-29 PROBLEM — N18.30 CKD (CHRONIC KIDNEY DISEASE) STAGE 3, GFR 30-59 ML/MIN: Chronic | Status: ACTIVE | Noted: 2017-01-01

## 2017-12-29 PROBLEM — R06.02 SOB (SHORTNESS OF BREATH): Status: RESOLVED | Noted: 2017-01-01 | Resolved: 2017-01-01

## 2017-12-29 PROBLEM — Z79.01 CHRONIC ANTICOAGULATION: Chronic | Status: ACTIVE | Noted: 2017-01-01

## 2017-12-29 PROBLEM — E11.69 HYPERLIPIDEMIA ASSOCIATED WITH TYPE 2 DIABETES MELLITUS: Chronic | Status: ACTIVE | Noted: 2017-01-01

## 2017-12-29 PROBLEM — I50.22 CHRONIC SYSTOLIC HEART FAILURE: Status: ACTIVE | Noted: 2017-01-01

## 2017-12-29 NOTE — PATIENT INSTRUCTIONS
Fluid Management Instructions    Monitor daily weight.  Regular activity within patient's limitations.  Low salt, low fat and low choleterol diet.  Chew gum to avoid thirst sensation.  Call MD if SOB, chest pain, weight gain > 2-3 lbs per day and/or 5-6 lbs per week.    - May increase Lasix to 80 mg twice daily until weight is back to normal.

## 2017-12-29 NOTE — PROGRESS NOTES
[facility]  New Visit Progress Note   Recent Hospital Discharge     PRESENTING HISTORY     Chief Complaint/Reason for Visit:  Follow up Hospital Discharge   Chief Complaint   Patient presents with    Hospital Follow Up     PCP: Raghav Valdez MD    History of Present Illness: Ms. Anum Quick is a 62 y.o. female who was recently admitted to the hospital.    DOA 12-14-17  DOD 12-25-17  NSTEMI (non-ST elevated myocardial infarction) ICD-10-CM: I21.4  ICD-9-CM: 410.70 12/14/2017 Yes   Current Assessment & Plan 12/14/2017 Hospital Encounter Edited 12/15/2017  3:59 PM by Jackeline Raza, NP   EKG on admit showed anterior q waves as well as 1.5mm ST elevation in anterior leads. Bedside echo reveals LVEF ~20%, dyskinetic LV apex, hypokinetic apical septum. 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 No obvious LV thrombus on bedside echo. Symptoms are most likely due to acute on chronic heart failure. Continue diuresis see below for TAVR work-up.     -C @ [facility]: Per report (12/4/2017) proximal LAD has 20% stenosis, RCA with LI otherwise normal coronaries    -Heparin gtt due to NSTEMI pathway ok to d/c per Dr. Chung  -Most recent TTE:   1 - Moderately depressed left ventricular systolic function (EF 30-35%). Akinetic LV apex. There is no thrombus in LV apex.    2 - Indeterminate LV diastolic function.     3 - Mildly to moderately depressed right ventricular systolic function .     4 - Pulmonary hypertension. The estimated PA systolic pressure is 63 mmHg.     5 - Severe low flow aortic valve stenosis (JAMES 0.49 cm2, AVAi 0.27 cm2/m2, peak aortic jet velocity 4.0 m/s,MG 48 mmHg).     6 - Mild to moderate mitral regurgitation.     7 - Mild tricuspid regurgitation.     8 - Severe left atrial enlargement.    All Assessment & Plan Notes   Type 2 diabetes mellitus with neurologic complication ICD-10-CM: E11.49  ICD-9-CM: 250.60 12/14/2017 Yes   Current Assessment & Plan 12/14/2017  Hospital Encounter Written 12/19/2017  2:36 PM by Jennifer Sarabia PA-C   On Metformin at home, now on SSI   All Assessment & Plan Notes   Essential hypertension ICD-10-CM: I10  ICD-9-CM: 401.9 12/14/2017 Unknown   Current Assessment & Plan 12/14/2017 Hospital Encounter Written 12/19/2017  2:36 PM by Jennifer Sarabia PA-C   On Metoprolol 50mg, lisinopril 20mg   All Assessment & Plan Notes   Aortic valve stenosis ICD-10-CM: I35.0  ICD-9-CM: 424.1 12/14/2017 Yes   Current Assessment & Plan 12/14/2017 Hospital Encounter Written 12/19/2017  2:41 PM by Jennifer Sarabia PA-C                                                   Interventional Cardiology                                                              [facility]        Anum Quick is a 62 y.o. female referred by Dr Vasquez for evaluation of severe AS (NYHA Class III sx).     she has undergone the following TAVR work-up:   · ECHO (Date 12/15/2017): JAMES= 0.49 cm2, MG= 48mmHg, Peak Carlos A= 4.0 m/s, EF= 35%. - Per [facility] concern for LV apical thrombus, however, no thrombus visualized on [facility] TTE   · LHC : Awaiting disc from . Per report (12/4/2017): proximal LAD has 20% stenosis, RCA with LI otherwise normal coronaries    · STS: 2.19%   · Frailty: 1/4 (hand )   · CTS risk assessment: Low risk per Lundy  · PFTs: Needs      Still awaiting records from  regarding why patient was told she would need to be referred to [place] for AVR.   All Assessment & Plan Notes   Hyperlipidemia ICD-10-CM: E78.5  ICD-9-CM: 272.4 12/14/2017 Yes   Current Assessment & Plan 12/14/2017 Hospital Encounter Written 12/19/2017  2:41 PM by Jennifer Sarabia PA-C   Stable on atorvastatin    All Assessment & Plan Notes   Paroxysmal atrial fibrillation ICD-10-CM: I48.0  ICD-9-CM: 427.31 12/15/2017 Unknown   Current Assessment & Plan 12/14/2017 Hospital Encounter Written 12/19/2017  2:41 PM by Jennifer Sarabia PA-C   Per records at , currently rate controlled in  80s  On Metoprolol and amiodarone  Pt on Heparin gtt due to NSTEMI pathway (ok to d/c from that standpoint from Dr. Chung) but will need AC for A Fib    All Assessment & Plan Notes       ___________________________________________________________________    Today:    She is doing well.  No chest pain or SOB.  No leg swelling.  She is  at [employer].    No weight gain.    PAST HISTORY:     Past Medical History:   Diagnosis Date    Chronic anticoagulation - on rivaroxaban 12/29/2017    PAF    Chronic systolic heart failure 12/14/2017     12-15-17   1 - Moderately depressed left ventricular systolic function (EF 30-35%). Akinetic LV apex. There is no thrombus in LV apex.   2 - Indeterminate LV diastolic function.    3 - Mildly to moderately depressed right ventricular systolic function .    4 - Pulmonary hypertension. The estimated PA systolic pressure is 63 mmHg.    5 - Severe low flow aortic valve stenosis (JAMES 0.49 cm2, AVAi 0.27 cm2/m2, peak aortic jet velocity 4.0 m/s,MG 48 mmHg).    6 - Mild to moderate mitral regurgitation.    7 - Mild tricuspid regurgitation.    8 - Severe left atrial enlargement.     CKD (chronic kidney disease) stage 3, GFR 30-59 ml/min 12/29/2017    Coronary artery disease involving native coronary artery of native heart without angina pectoris 12/29/2017    [facility] @ : Per report (12/4/2017) proximal LAD has 20% stenosis, RCA with LI otherwise normal coronaries      Essential hypertension 12/14/2017    Hyperlipidemia associated with type 2 diabetes mellitus 12/14/2017    NSTEMI (non-ST elevated myocardial infarction) 12/14/2017    Paroxysmal atrial fibrillation 12/15/2017    Severe aortic stenosis 12/14/2017    12-15-17  Severe low flow aortic valve stenosis (JAMES 0.49 cm2, AVAi 0.27 cm2/m2, peak aortic jet velocity 4.0 m/s,MG 48 mmHg).      Type 2 diabetes mellitus with neurologic complication, without long-term current use of insulin 12/14/2017       Past Surgical History:    Procedure Laterality Date    CARDIAC SURGERY      HYSTERECTOMY      KNEE SURGERY         No family history on file.    Social History     Social History    Marital status: Single     Spouse name: N/A    Number of children: N/A    Years of education: N/A     Social History Main Topics    Smoking status: Never Smoker    Smokeless tobacco: None    Alcohol use No    Drug use: No    Sexual activity: Not Asked     Other Topics Concern    None     Social History Narrative    None       MEDICATIONS & ALLERGIES:     Current Outpatient Prescriptions on File Prior to Visit   Medication Sig Dispense Refill    amiodarone (PACERONE) 200 MG Tab Take by mouth once daily.      aspirin 81 MG Chew Take 1 tablet (81 mg total) by mouth once daily.  0    atorvastatin (LIPITOR) 80 MG tablet Take 1 tablet (80 mg total) by mouth once daily. 30 tablet 11    butalbital-acetaminophen-caffeine -40 mg (FIORICET, ESGIC) -40 mg per tablet Take 1 tablet by mouth every 4 (four) hours as needed for Pain.      clopidogrel (PLAVIX) 75 mg tablet Take 1 tablet (75 mg total) by mouth once daily. 30 tablet 11    estropipate (OGEN) 1.5 MG tablet Take 1.5 mg by mouth once daily.      gabapentin (NEURONTIN) 100 MG capsule Take 100 mg by mouth 3 (three) times daily.      lisinopril (PRINIVIL,ZESTRIL) 20 MG tablet Take 20 mg by mouth once daily.      metFORMIN (GLUMETZA) 500 MG (MOD) 24 hr tablet Take 500 mg by mouth daily with breakfast.      nitroGLYCERIN (NITROSTAT) 0.4 MG SL tablet Place 1 tablet (0.4 mg total) under the tongue every 5 (five) minutes as needed for Chest pain (angina). 20 tablet 0    senna (SENOKOT) 8.6 mg tablet Take 1 tablet by mouth 2 (two) times daily as needed for Constipation.             fenofibrate (TRICOR) 145 MG tablet Take 145 mg by mouth once daily.       furosemide (LASIX) 40 MG tablet Take 1 tablet (40 mg total) by mouth 2 (two) times daily. If wt gain 2-3 lbs x 2-5 d, increase Lasix to  80mg by mouth 2x/d 60 tablet 0    metoprolol tartrate (LOPRESSOR) 50 MG tablet Take 50 mg by mouth 2 (two) times daily.       rivaroxaban (XARELTO) 20 mg Tab Take 20 mg by mouth daily with dinner or evening meal.       No current facility-administered medications on file prior to visit.         Review of patient's allergies indicates:  No Known Allergies    OBJECTIVE:     Vital Signs:  Vitals:    12/29/17 1505   BP: (!) 110/56   Pulse: 76     Wt Readings from Last 1 Encounters:   12/29/17 1505 73.7 kg (162 lb 7.7 oz)     Body mass index is 28.78 kg/m².     Physical Exam:  General: Well developed, well nourished. No distress.  HEENT: Head is normocephalic, atraumatic; ears are normal.    Eyes: Clear conjunctiva.  Neck: Supple, symmetrical neck; trachea midline.  Lungs: Clear to auscultation bilaterally and normal respiratory effort.  Cardiovascular: Irreg Irreg rhythm. Faint systolic murmur.  Extremities: No LE edema.    Abdomen: Abdomen is soft, non-tender non-distended with normal bowel sounds.  Skin: Skin color, texture, turgor normal. No rashes.  Musculoskeletal: Normal gait.   Psychiatric: Normal affect. Alert.    Laboratory  Lab Results   Component Value Date    WBC 5.52 12/25/2017    HGB 11.4 (L) 12/25/2017    HCT 36.2 (L) 12/25/2017    MCV 96 12/25/2017     (H) 12/25/2017     BMP  Lab Results   Component Value Date     12/25/2017    K 3.3 (L) 12/25/2017     12/25/2017    CO2 27 12/25/2017    BUN 37 (H) 12/25/2017    CREATININE 1.6 (H) 12/25/2017    CALCIUM 9.7 12/25/2017    ANIONGAP 11 12/25/2017    ESTGFRAFRICA 39.3 (A) 12/25/2017    EGFRNONAA 34.0 (A) 12/25/2017     Lab Results   Component Value Date    ALT 17 12/14/2017    AST 18 12/14/2017    ALKPHOS 85 12/14/2017    BILITOT 0.3 12/14/2017     Lab Results   Component Value Date    INR 1.6 (H) 12/14/2017     Lab Results   Component Value Date    HGBA1C 5.4 12/15/2017       TRANSITION OF CARE:     [facility] On Call Contact Note:  12-27-17    Family and/or Caretaker present at visit?  No.  Diagnostic tests reviewed/disposition: No diagnosic tests pending after this hospitalization.  Disease/illness education: CHF, aortic stenosis.  Home health/community services discussion/referrals: Patient does not have home health established from hospital visit.  They do not need home health.  If needed, we will set up home health for the patient.   Establishment or re-establishment of referral orders for community resources: No other necessary community resources.   Discussion with other health care providers: No discussion with other health care providers necessary.     Transition of Care Visit:     I have reviewed and updated the history and problem list.  I have reconciled the medication list.  I have discussed the hospitalization and current medical issues, prognosis and plans with the patient/family.  I  spent more than 50% of time discussing the care with the patient/family.  Total Encounter in [facility]: 60 minutes.    Medications Reconciliation:   I have reconciled the patient's home medications and discharge medications with the patient/family. I have updated all changes.  Refer to After-Visit Medication List.    ASSESSMENT & PLAN:     Coronary artery disease involving native coronary artery of native heart without angina pectoris  Chronic systolic heart failure  Essential hypertension  - Compensated today. No edema. BP controlled.    On Lisinopril 20 mg daily.  Metoprolol 50 mg BID.    Lasix 40 mg BID.  - Follow-up Cardiology.    Refilled:  -     furosemide (LASIX) 40 MG tablet; Take 1 tablet (40 mg total) by mouth 2 (two) times daily. If wt gain 2-3 lbs x 2-5 d, increase Lasix to 80mg by mouth 2x/d  Dispense: 60 tablet; Refill: 0    Paroxysmal atrial fibrillation  Chronic anticoagulation - on rivaroxaban  - Rate stable.    Refilled:  -     rivaroxaban (XARELTO) 20 mg Tab; Take 1 tablet (20 mg total) by mouth daily with dinner or  evening meal.  Dispense: 90 tablet; Refill: 4  -     metoprolol tartrate (LOPRESSOR) 50 MG tablet; Take 1 tablet (50 mg total) by mouth 2 (two) times daily.  Dispense: 180 tablet; Refill: 4    Severe aortic stenosis  - Follow up with Cardiovascular Surgery.    CKD (chronic kidney disease) stage 3, GFR 30-59 ml/min  - Stable CKD 3.    Hyperlipidemia associated with type 2 diabetes mellitus  - On atorvastatin 80 mg daily.    Instructions for the patient:    Fluid Management Instructions    Monitor daily weight.  Regular activity within patient's limitations.  Low salt, low fat and low choleterol diet.  Chew gum to avoid thirst sensation.  Call MD if SOB, chest pain, weight gain > 2-3 lbs per day and/or 5-6 lbs per week.    - May increase Lasix to 80 mg twice daily until weight is back to normal.    Scheduled Follow-up :  Future Appointments  Date Time Provider Department Center   1/3/2018 9:30 AM Rodo Lundy MD [facility] CARDVAS [facility]   1/11/2018 3:30 PM Jason Plaza MD [facility] CARDIO [facility]   2/6/2018 1:00 PM Sherley Soliz MD [facility] IM [facility] PC[facility]       After Visit Medication List :     Medication List          Accurate as of 12/29/17  3:37 PM. If you have any questions, ask your nurse or doctor.               CHANGE how you take these medications    clopidogrel 75 mg tablet  Commonly known as:  PLAVIX  Take 1 tablet (75 mg total) by mouth once daily.  What changed:  Another medication with the same name was removed. Continue taking this medication, and follow the directions you see here.  Changed by:  Arun Blackmon MD        CONTINUE taking these medications    amiodarone 200 MG Tab  Commonly known as:  PACERONE     aspirin 81 MG Chew  Take 1 tablet (81 mg total) by mouth once daily.     atorvastatin 80 MG tablet  Commonly known as:  LIPITOR  Take 1 tablet (80 mg total) by mouth once daily.     butalbital-acetaminophen-caffeine -40 mg -40 mg per tablet  Commonly known as:   FIORICET, ESGIC     estropipate 1.5 MG tablet  Commonly known as:  OGEN     furosemide 40 MG tablet  Commonly known as:  LASIX  Take 1 tablet (40 mg total) by mouth 2 (two) times daily. If wt gain 2-3 lbs x 2-5 d, increase Lasix to 80mg by mouth 2x/d     gabapentin 100 MG capsule  Commonly known as:  NEURONTIN     lisinopril 20 MG tablet  Commonly known as:  PRINIVIL,ZESTRIL     metFORMIN 500 MG (MOD) 24 hr tablet  Commonly known as:  GLUMETZA     metoprolol tartrate 50 MG tablet  Commonly known as:  LOPRESSOR  Take 1 tablet (50 mg total) by mouth 2 (two) times daily.     nitroGLYCERIN 0.4 MG SL tablet  Commonly known as:  NITROSTAT  Place 1 tablet (0.4 mg total) under the tongue every 5 (five) minutes as needed for Chest pain (angina).     rivaroxaban 20 mg Tab  Commonly known as:  XARELTO  Take 1 tablet (20 mg total) by mouth daily with dinner or evening meal.     senna 8.6 mg tablet  Commonly known as:  SENOKOT  Take 1 tablet by mouth 2 (two) times daily as needed for Constipation.     temazepam 15 mg Cap  Commonly known as:  RESTORIL     venlafaxine 150 MG Cp24  Commonly known as:  EFFEXOR-XR        STOP taking these medications    fenofibrate 145 MG tablet  Commonly known as:  TRICOR  Stopped by:  Arun Blackmon MD           Where to Get Your Medications      [id number]  [address]  [address] AT [facility]  [address] [address]    Phone:  [phone]   · furosemide 40 MG tablet  · metoprolol tartrate 50 MG tablet  · rivaroxaban 20 mg Tab           Signing Physician:  Arun Blackmon MD

## 2018-01-01 ENCOUNTER — TELEPHONE (OUTPATIENT)
Dept: INTERNAL MEDICINE | Facility: CLINIC | Age: 63
End: 2018-01-01

## 2018-01-01 ENCOUNTER — TELEPHONE (OUTPATIENT)
Dept: CARDIOLOGY | Facility: CLINIC | Age: 63
End: 2018-01-01

## 2018-01-01 ENCOUNTER — OFFICE VISIT (OUTPATIENT)
Dept: CARDIOTHORACIC SURGERY | Facility: CLINIC | Age: 63
End: 2018-01-01
Payer: COMMERCIAL

## 2018-01-01 ENCOUNTER — OFFICE VISIT (OUTPATIENT)
Dept: PRIMARY CARE CLINIC | Facility: CLINIC | Age: 63
End: 2018-01-01
Payer: COMMERCIAL

## 2018-01-01 ENCOUNTER — LAB VISIT (OUTPATIENT)
Dept: LAB | Facility: HOSPITAL | Age: 63
End: 2018-01-01
Attending: INTERNAL MEDICINE
Payer: COMMERCIAL

## 2018-01-01 ENCOUNTER — PES CALL (OUTPATIENT)
Dept: ADMINISTRATIVE | Facility: CLINIC | Age: 63
End: 2018-01-01

## 2018-01-01 ENCOUNTER — OUTPATIENT CASE MANAGEMENT (OUTPATIENT)
Dept: ADMINISTRATIVE | Facility: OTHER | Age: 63
End: 2018-01-01

## 2018-01-01 ENCOUNTER — TELEPHONE (OUTPATIENT)
Dept: FAMILY MEDICINE | Facility: CLINIC | Age: 63
End: 2018-01-01

## 2018-01-01 ENCOUNTER — TELEPHONE (OUTPATIENT)
Dept: GASTROENTEROLOGY | Facility: CLINIC | Age: 63
End: 2018-01-01

## 2018-01-01 ENCOUNTER — ANESTHESIA (OUTPATIENT)
Dept: SURGERY | Facility: HOSPITAL | Age: 63
End: 2018-01-01

## 2018-01-01 ENCOUNTER — TELEPHONE (OUTPATIENT)
Dept: ADMINISTRATIVE | Facility: CLINIC | Age: 63
End: 2018-01-01

## 2018-01-01 ENCOUNTER — TELEPHONE (OUTPATIENT)
Dept: CARDIOTHORACIC SURGERY | Facility: CLINIC | Age: 63
End: 2018-01-01

## 2018-01-01 ENCOUNTER — HOSPITAL ENCOUNTER (OUTPATIENT)
Dept: PREADMISSION TESTING | Facility: HOSPITAL | Age: 63
Discharge: HOME | End: 2018-01-10
Attending: ANESTHESIOLOGY
Payer: COMMERCIAL

## 2018-01-01 ENCOUNTER — ANESTHESIA EVENT (OUTPATIENT)
Dept: SURGERY | Facility: HOSPITAL | Age: 63
End: 2018-01-01

## 2018-01-01 ENCOUNTER — DOCUMENTATION ONLY (OUTPATIENT)
Dept: CARDIOTHORACIC SURGERY | Facility: CLINIC | Age: 63
End: 2018-01-01

## 2018-01-01 ENCOUNTER — NURSE TRIAGE (OUTPATIENT)
Dept: ADMINISTRATIVE | Facility: CLINIC | Age: 63
End: 2018-01-01

## 2018-01-01 ENCOUNTER — TELEPHONE (OUTPATIENT)
Dept: HEPATOLOGY | Facility: CLINIC | Age: 63
End: 2018-01-01

## 2018-01-01 ENCOUNTER — HOSPITAL ENCOUNTER (OUTPATIENT)
Dept: PULMONOLOGY | Facility: CLINIC | Age: 63
Discharge: HOME | End: 2018-01-10
Payer: COMMERCIAL

## 2018-01-01 ENCOUNTER — PATIENT OUTREACH (OUTPATIENT)
Dept: ADMINISTRATIVE | Facility: CLINIC | Age: 63
End: 2018-01-01

## 2018-01-01 ENCOUNTER — HOSPITAL ENCOUNTER (INPATIENT)
Facility: HOSPITAL | Age: 63
LOS: 13 days | DRG: 870 | End: 2018-08-04
Attending: PSYCHIATRY & NEUROLOGY | Admitting: PSYCHIATRY & NEUROLOGY

## 2018-01-01 ENCOUNTER — LAB VISIT (OUTPATIENT)
Dept: LAB | Facility: HOSPITAL | Age: 63
End: 2018-01-01
Payer: COMMERCIAL

## 2018-01-01 ENCOUNTER — HOSPITAL ENCOUNTER (OUTPATIENT)
Dept: RADIOLOGY | Facility: HOSPITAL | Age: 63
Discharge: HOME | End: 2018-01-10
Attending: THORACIC SURGERY (CARDIOTHORACIC VASCULAR SURGERY)
Payer: COMMERCIAL

## 2018-01-01 ENCOUNTER — HOSPITAL ENCOUNTER (EMERGENCY)
Facility: HOSPITAL | Age: 63
DRG: 296 | End: 2018-07-22
Attending: EMERGENCY MEDICINE | Admitting: INTERNAL MEDICINE

## 2018-01-01 ENCOUNTER — HOSPITAL ENCOUNTER (OUTPATIENT)
Facility: HOSPITAL | Age: 63
Discharge: HOME | DRG: 291 | End: 2018-05-03
Admitting: HOSPITALIST

## 2018-01-01 ENCOUNTER — ANESTHESIA (OUTPATIENT)
Dept: EMERGENCY MEDICINE | Facility: HOSPITAL | Age: 63
DRG: 291 | End: 2018-01-01
Payer: COMMERCIAL

## 2018-01-01 ENCOUNTER — OFFICE VISIT (OUTPATIENT)
Dept: CARDIOLOGY | Facility: CLINIC | Age: 63
End: 2018-01-01
Payer: COMMERCIAL

## 2018-01-01 ENCOUNTER — HOSPITAL ENCOUNTER (INPATIENT)
Facility: HOSPITAL | Age: 63
LOS: 1 days | Discharge: HOME HEALTH | DRG: 643 | End: 2018-07-09
Attending: EMERGENCY MEDICINE | Admitting: INTERNAL MEDICINE

## 2018-01-01 ENCOUNTER — HOSPITAL ENCOUNTER (INPATIENT)
Facility: HOSPITAL | Age: 63
LOS: 7 days | Discharge: HOME HEALTH | DRG: 291 | End: 2018-02-24
Attending: EMERGENCY MEDICINE | Admitting: INTERNAL MEDICINE
Payer: COMMERCIAL

## 2018-01-01 ENCOUNTER — HOSPITAL ENCOUNTER (OUTPATIENT)
Dept: CARDIOLOGY | Facility: CLINIC | Age: 63
Discharge: HOME | End: 2018-01-10
Payer: COMMERCIAL

## 2018-01-01 ENCOUNTER — HOSPITAL ENCOUNTER (OUTPATIENT)
Dept: VASCULAR SURGERY | Facility: CLINIC | Age: 63
Discharge: HOME | End: 2018-01-10
Attending: THORACIC SURGERY (CARDIOTHORACIC VASCULAR SURGERY)
Payer: COMMERCIAL

## 2018-01-01 ENCOUNTER — HOSPITAL ENCOUNTER (EMERGENCY)
Facility: HOSPITAL | Age: 63
Discharge: HOME | End: 2018-06-10
Attending: EMERGENCY MEDICINE

## 2018-01-01 ENCOUNTER — DOCUMENTATION ONLY (OUTPATIENT)
Dept: CARDIOLOGY | Facility: CLINIC | Age: 63
End: 2018-01-01

## 2018-01-01 ENCOUNTER — HOSPITAL ENCOUNTER (INPATIENT)
Facility: HOSPITAL | Age: 63
LOS: 6 days | Discharge: HOME HEALTH | DRG: 291 | End: 2018-03-27
Attending: EMERGENCY MEDICINE | Admitting: HOSPITALIST
Payer: COMMERCIAL

## 2018-01-01 VITALS
DIASTOLIC BLOOD PRESSURE: 62 MMHG | HEIGHT: 63 IN | HEART RATE: 104 BPM | WEIGHT: 172.38 LBS | SYSTOLIC BLOOD PRESSURE: 100 MMHG | OXYGEN SATURATION: 98 % | BODY MASS INDEX: 30.54 KG/M2

## 2018-01-01 VITALS
DIASTOLIC BLOOD PRESSURE: 67 MMHG | HEART RATE: 98 BPM | BODY MASS INDEX: 28.56 KG/M2 | TEMPERATURE: 98 F | SYSTOLIC BLOOD PRESSURE: 108 MMHG | WEIGHT: 161.19 LBS | OXYGEN SATURATION: 95 % | HEIGHT: 63 IN

## 2018-01-01 VITALS
HEART RATE: 97 BPM | DIASTOLIC BLOOD PRESSURE: 102 MMHG | SYSTOLIC BLOOD PRESSURE: 144 MMHG | TEMPERATURE: 98 F | HEIGHT: 63 IN | OXYGEN SATURATION: 94 % | WEIGHT: 160.06 LBS | BODY MASS INDEX: 28.36 KG/M2

## 2018-01-01 VITALS
HEIGHT: 67 IN | TEMPERATURE: 99 F | RESPIRATION RATE: 31 BRPM | BODY MASS INDEX: 24.01 KG/M2 | OXYGEN SATURATION: 100 % | SYSTOLIC BLOOD PRESSURE: 98 MMHG | WEIGHT: 153 LBS | DIASTOLIC BLOOD PRESSURE: 48 MMHG | HEART RATE: 98 BPM

## 2018-01-01 VITALS
HEART RATE: 69 BPM | SYSTOLIC BLOOD PRESSURE: 101 MMHG | TEMPERATURE: 98 F | HEIGHT: 66 IN | OXYGEN SATURATION: 100 % | DIASTOLIC BLOOD PRESSURE: 67 MMHG | BODY MASS INDEX: 27.99 KG/M2 | RESPIRATION RATE: 12 BRPM | WEIGHT: 174.19 LBS

## 2018-01-01 VITALS
TEMPERATURE: 98 F | HEART RATE: 78 BPM | HEIGHT: 63 IN | WEIGHT: 171.38 LBS | OXYGEN SATURATION: 100 % | BODY MASS INDEX: 30.37 KG/M2 | SYSTOLIC BLOOD PRESSURE: 123 MMHG | DIASTOLIC BLOOD PRESSURE: 79 MMHG | RESPIRATION RATE: 18 BRPM

## 2018-01-01 VITALS
HEIGHT: 63 IN | WEIGHT: 167.69 LBS | HEIGHT: 63 IN | RESPIRATION RATE: 20 BRPM | HEART RATE: 88 BPM | WEIGHT: 166.63 LBS | OXYGEN SATURATION: 92 % | BODY MASS INDEX: 29.71 KG/M2 | HEART RATE: 98 BPM | BODY MASS INDEX: 29.52 KG/M2 | SYSTOLIC BLOOD PRESSURE: 130 MMHG | TEMPERATURE: 98 F | DIASTOLIC BLOOD PRESSURE: 88 MMHG | OXYGEN SATURATION: 97 %

## 2018-01-01 VITALS
HEIGHT: 63 IN | WEIGHT: 170.44 LBS | TEMPERATURE: 98 F | SYSTOLIC BLOOD PRESSURE: 107 MMHG | BODY MASS INDEX: 30.2 KG/M2 | OXYGEN SATURATION: 91 % | HEART RATE: 75 BPM | DIASTOLIC BLOOD PRESSURE: 79 MMHG

## 2018-01-01 VITALS
DIASTOLIC BLOOD PRESSURE: 60 MMHG | HEIGHT: 63 IN | WEIGHT: 174.81 LBS | OXYGEN SATURATION: 88 % | SYSTOLIC BLOOD PRESSURE: 106 MMHG | BODY MASS INDEX: 30.97 KG/M2 | HEART RATE: 85 BPM

## 2018-01-01 VITALS
HEIGHT: 67 IN | DIASTOLIC BLOOD PRESSURE: 89 MMHG | SYSTOLIC BLOOD PRESSURE: 137 MMHG | RESPIRATION RATE: 17 BRPM | TEMPERATURE: 97 F | BODY MASS INDEX: 27.02 KG/M2 | OXYGEN SATURATION: 98 % | HEART RATE: 87 BPM | WEIGHT: 172.19 LBS

## 2018-01-01 VITALS
HEIGHT: 63 IN | BODY MASS INDEX: 28.67 KG/M2 | WEIGHT: 161.81 LBS | RESPIRATION RATE: 18 BRPM | SYSTOLIC BLOOD PRESSURE: 95 MMHG | OXYGEN SATURATION: 95 % | TEMPERATURE: 97 F | HEART RATE: 77 BPM | DIASTOLIC BLOOD PRESSURE: 65 MMHG

## 2018-01-01 VITALS
BODY MASS INDEX: 27.64 KG/M2 | HEIGHT: 63 IN | DIASTOLIC BLOOD PRESSURE: 81 MMHG | TEMPERATURE: 98 F | OXYGEN SATURATION: 98 % | WEIGHT: 156 LBS | SYSTOLIC BLOOD PRESSURE: 107 MMHG | RESPIRATION RATE: 18 BRPM | HEART RATE: 65 BPM

## 2018-01-01 VITALS
BODY MASS INDEX: 27.11 KG/M2 | WEIGHT: 153 LBS | HEART RATE: 87 BPM | DIASTOLIC BLOOD PRESSURE: 89 MMHG | TEMPERATURE: 96 F | OXYGEN SATURATION: 94 % | HEIGHT: 63 IN | RESPIRATION RATE: 24 BRPM | SYSTOLIC BLOOD PRESSURE: 129 MMHG

## 2018-01-01 VITALS
WEIGHT: 166 LBS | DIASTOLIC BLOOD PRESSURE: 95 MMHG | SYSTOLIC BLOOD PRESSURE: 125 MMHG | BODY MASS INDEX: 29.41 KG/M2 | HEIGHT: 63 IN | HEART RATE: 119 BPM

## 2018-01-01 DIAGNOSIS — I25.10 CORONARY ARTERY DISEASE INVOLVING NATIVE CORONARY ARTERY OF NATIVE HEART WITHOUT ANGINA PECTORIS: Chronic | ICD-10-CM

## 2018-01-01 DIAGNOSIS — I50.23 ACUTE ON CHRONIC SYSTOLIC CONGESTIVE HEART FAILURE: ICD-10-CM

## 2018-01-01 DIAGNOSIS — I10 ESSENTIAL HYPERTENSION: ICD-10-CM

## 2018-01-01 DIAGNOSIS — R79.89 ELEVATED TROPONIN: ICD-10-CM

## 2018-01-01 DIAGNOSIS — I50.22 CHRONIC SYSTOLIC HEART FAILURE: ICD-10-CM

## 2018-01-01 DIAGNOSIS — I35.0 AORTIC VALVE STENOSIS, ETIOLOGY OF CARDIAC VALVE DISEASE UNSPECIFIED: ICD-10-CM

## 2018-01-01 DIAGNOSIS — E83.42 HYPOMAGNESEMIA: ICD-10-CM

## 2018-01-01 DIAGNOSIS — E03.9 HYPOTHYROIDISM, UNSPECIFIED TYPE: ICD-10-CM

## 2018-01-01 DIAGNOSIS — E11.69 HYPERLIPIDEMIA ASSOCIATED WITH TYPE 2 DIABETES MELLITUS: Chronic | ICD-10-CM

## 2018-01-01 DIAGNOSIS — I48.0 PAROXYSMAL ATRIAL FIBRILLATION: ICD-10-CM

## 2018-01-01 DIAGNOSIS — E87.6 HYPOKALEMIA: ICD-10-CM

## 2018-01-01 DIAGNOSIS — I35.0 SEVERE AORTIC STENOSIS: ICD-10-CM

## 2018-01-01 DIAGNOSIS — I50.9 ACUTE ON CHRONIC CONGESTIVE HEART FAILURE, UNSPECIFIED HEART FAILURE TYPE: Primary | ICD-10-CM

## 2018-01-01 DIAGNOSIS — R06.02 SHORTNESS OF BREATH: ICD-10-CM

## 2018-01-01 DIAGNOSIS — I50.43 ACUTE ON CHRONIC COMBINED SYSTOLIC AND DIASTOLIC HEART FAILURE: ICD-10-CM

## 2018-01-01 DIAGNOSIS — R53.1 WEAKNESS: ICD-10-CM

## 2018-01-01 DIAGNOSIS — I25.10 CAD (CORONARY ARTERY DISEASE): ICD-10-CM

## 2018-01-01 DIAGNOSIS — J98.9 CARDIAC ARREST DUE TO RESPIRATORY DISORDER: ICD-10-CM

## 2018-01-01 DIAGNOSIS — I35.0 AORTIC VALVE STENOSIS, ETIOLOGY OF CARDIAC VALVE DISEASE UNSPECIFIED: Primary | ICD-10-CM

## 2018-01-01 DIAGNOSIS — E11.40 TYPE 2 DIABETES MELLITUS WITH DIABETIC NEUROPATHY, WITHOUT LONG-TERM CURRENT USE OF INSULIN: ICD-10-CM

## 2018-01-01 DIAGNOSIS — I35.0 SEVERE AORTIC STENOSIS: Chronic | ICD-10-CM

## 2018-01-01 DIAGNOSIS — I35.0 NONRHEUMATIC AORTIC VALVE STENOSIS: ICD-10-CM

## 2018-01-01 DIAGNOSIS — D50.9 IRON DEFICIENCY ANEMIA, UNSPECIFIED IRON DEFICIENCY ANEMIA TYPE: ICD-10-CM

## 2018-01-01 DIAGNOSIS — Z79.01 CHRONIC ANTICOAGULATION: ICD-10-CM

## 2018-01-01 DIAGNOSIS — I15.0 RENOVASCULAR HYPERTENSION: ICD-10-CM

## 2018-01-01 DIAGNOSIS — I50.22 CHRONIC SYSTOLIC HEART FAILURE: Primary | ICD-10-CM

## 2018-01-01 DIAGNOSIS — I46.9 CARDIAC ARREST: Primary | ICD-10-CM

## 2018-01-01 DIAGNOSIS — I87.8 VENOUS STASIS OF BOTH LOWER EXTREMITIES: ICD-10-CM

## 2018-01-01 DIAGNOSIS — E78.5 HYPERLIPIDEMIA ASSOCIATED WITH TYPE 2 DIABETES MELLITUS: ICD-10-CM

## 2018-01-01 DIAGNOSIS — L97.519: ICD-10-CM

## 2018-01-01 DIAGNOSIS — Z45.2 ENCOUNTER FOR CENTRAL LINE PLACEMENT: ICD-10-CM

## 2018-01-01 DIAGNOSIS — D63.1 ANEMIA OF CHRONIC KIDNEY FAILURE, STAGE 3 (MODERATE): ICD-10-CM

## 2018-01-01 DIAGNOSIS — E03.9 ACQUIRED HYPOTHYROIDISM: ICD-10-CM

## 2018-01-01 DIAGNOSIS — Z53.9 PROCEDURE NOT CARRIED OUT: ICD-10-CM

## 2018-01-01 DIAGNOSIS — N18.30 CKD (CHRONIC KIDNEY DISEASE) STAGE 3, GFR 30-59 ML/MIN: Chronic | ICD-10-CM

## 2018-01-01 DIAGNOSIS — Z79.01 CHRONIC ANTICOAGULATION: Chronic | ICD-10-CM

## 2018-01-01 DIAGNOSIS — S01.81XA FOREHEAD LACERATION, INITIAL ENCOUNTER: ICD-10-CM

## 2018-01-01 DIAGNOSIS — I83.015: ICD-10-CM

## 2018-01-01 DIAGNOSIS — E86.0 DEHYDRATION: ICD-10-CM

## 2018-01-01 DIAGNOSIS — I48.20 CHRONIC ATRIAL FIBRILLATION: ICD-10-CM

## 2018-01-01 DIAGNOSIS — N17.9 AKI (ACUTE KIDNEY INJURY): ICD-10-CM

## 2018-01-01 DIAGNOSIS — I50.9 ACUTE ON CHRONIC CONGESTIVE HEART FAILURE: ICD-10-CM

## 2018-01-01 DIAGNOSIS — I13.10 CARDIORENAL SYNDROME WITH RENAL FAILURE: ICD-10-CM

## 2018-01-01 DIAGNOSIS — I35.0 SEVERE AORTIC STENOSIS: Primary | ICD-10-CM

## 2018-01-01 DIAGNOSIS — R07.9 CHEST PAIN AT REST: ICD-10-CM

## 2018-01-01 DIAGNOSIS — S09.90XA CHI (CLOSED HEAD INJURY), INITIAL ENCOUNTER: Primary | ICD-10-CM

## 2018-01-01 DIAGNOSIS — J96.91 RESPIRATORY FAILURE WITH HYPOXIA, UNSPECIFIED CHRONICITY: ICD-10-CM

## 2018-01-01 DIAGNOSIS — I50.23 ACUTE ON CHRONIC SYSTOLIC CONGESTIVE HEART FAILURE: Primary | ICD-10-CM

## 2018-01-01 DIAGNOSIS — R57.0 CARDIOGENIC SHOCK: Primary | ICD-10-CM

## 2018-01-01 DIAGNOSIS — B37.2 CANDIDAL DERMATITIS: ICD-10-CM

## 2018-01-01 DIAGNOSIS — I50.9 CONGESTIVE HEART FAILURE, UNSPECIFIED CONGESTIVE HEART FAILURE CHRONICITY, UNSPECIFIED CONGESTIVE HEART FAILURE TYPE: Primary | ICD-10-CM

## 2018-01-01 DIAGNOSIS — Z97.8 ENDOTRACHEAL TUBE PRESENT: ICD-10-CM

## 2018-01-01 DIAGNOSIS — I25.10 CORONARY ARTERY DISEASE INVOLVING NATIVE CORONARY ARTERY OF NATIVE HEART WITHOUT ANGINA PECTORIS: ICD-10-CM

## 2018-01-01 DIAGNOSIS — E87.20 LACTIC ACIDOSIS: ICD-10-CM

## 2018-01-01 DIAGNOSIS — I35.0 NONRHEUMATIC AORTIC VALVE STENOSIS: Primary | ICD-10-CM

## 2018-01-01 DIAGNOSIS — R53.81 DEBILITY: ICD-10-CM

## 2018-01-01 DIAGNOSIS — I49.9 IRREGULAR HEART RHYTHM: ICD-10-CM

## 2018-01-01 DIAGNOSIS — N18.30 ANEMIA OF CHRONIC KIDNEY FAILURE, STAGE 3 (MODERATE): ICD-10-CM

## 2018-01-01 DIAGNOSIS — E11.69 HYPERLIPIDEMIA ASSOCIATED WITH TYPE 2 DIABETES MELLITUS: ICD-10-CM

## 2018-01-01 DIAGNOSIS — E03.9 SEVERE HYPOTHYROIDISM: Primary | ICD-10-CM

## 2018-01-01 DIAGNOSIS — I48.91 A-FIB: ICD-10-CM

## 2018-01-01 DIAGNOSIS — R94.30 CARDIAC LV EJECTION FRACTION <20%: ICD-10-CM

## 2018-01-01 DIAGNOSIS — R41.82 ALTERED MENTAL STATUS: ICD-10-CM

## 2018-01-01 DIAGNOSIS — G40.901 STATUS EPILEPTICUS: ICD-10-CM

## 2018-01-01 DIAGNOSIS — E78.5 HYPERLIPIDEMIA ASSOCIATED WITH TYPE 2 DIABETES MELLITUS: Chronic | ICD-10-CM

## 2018-01-01 DIAGNOSIS — R60.0 PERIPHERAL EDEMA: ICD-10-CM

## 2018-01-01 DIAGNOSIS — Z01.818 PRE-OP EXAM: ICD-10-CM

## 2018-01-01 DIAGNOSIS — I46.8 CARDIAC ARREST DUE TO RESPIRATORY DISORDER: ICD-10-CM

## 2018-01-01 DIAGNOSIS — I50.22 CHRONIC SYSTOLIC HEART FAILURE: Chronic | ICD-10-CM

## 2018-01-01 DIAGNOSIS — I50.9 ACUTE ON CHRONIC CONGESTIVE HEART FAILURE, UNSPECIFIED CONGESTIVE HEART FAILURE TYPE: Primary | ICD-10-CM

## 2018-01-01 LAB
ABO + RH BLD: NORMAL
ALBUMIN SERPL BCP-MCNC: 2.3 G/DL
ALBUMIN SERPL BCP-MCNC: 2.3 G/DL
ALBUMIN SERPL BCP-MCNC: 2.4 G/DL
ALBUMIN SERPL BCP-MCNC: 2.4 G/DL
ALBUMIN SERPL BCP-MCNC: 2.5 G/DL
ALBUMIN SERPL BCP-MCNC: 2.6 G/DL
ALBUMIN SERPL BCP-MCNC: 2.7 G/DL
ALBUMIN SERPL BCP-MCNC: 2.8 G/DL
ALBUMIN SERPL BCP-MCNC: 2.9 G/DL
ALBUMIN SERPL BCP-MCNC: 3 G/DL
ALBUMIN SERPL BCP-MCNC: 3.1 G/DL
ALBUMIN SERPL BCP-MCNC: 3.2 G/DL
ALBUMIN SERPL BCP-MCNC: 3.2 G/DL
ALBUMIN SERPL BCP-MCNC: 3.3 G/DL
ALBUMIN SERPL BCP-MCNC: 3.3 G/DL
ALBUMIN SERPL BCP-MCNC: 3.4 G/DL
ALBUMIN SERPL BCP-MCNC: 3.5 G/DL
ALBUMIN SERPL BCP-MCNC: 4.1 G/DL
ALLENS TEST: ABNORMAL
ALP SERPL-CCNC: 120 U/L
ALP SERPL-CCNC: 124 U/L
ALP SERPL-CCNC: 126 U/L
ALP SERPL-CCNC: 132 U/L
ALP SERPL-CCNC: 136 U/L
ALP SERPL-CCNC: 139 U/L
ALP SERPL-CCNC: 141 U/L
ALP SERPL-CCNC: 144 U/L
ALP SERPL-CCNC: 147 U/L
ALP SERPL-CCNC: 151 U/L
ALP SERPL-CCNC: 153 U/L
ALP SERPL-CCNC: 158 U/L
ALP SERPL-CCNC: 160 U/L
ALP SERPL-CCNC: 163 U/L
ALP SERPL-CCNC: 165 U/L
ALP SERPL-CCNC: 165 U/L
ALP SERPL-CCNC: 167 U/L
ALP SERPL-CCNC: 173 U/L
ALP SERPL-CCNC: 181 U/L
ALP SERPL-CCNC: 183 U/L
ALP SERPL-CCNC: 184 U/L
ALP SERPL-CCNC: 204 U/L
ALP SERPL-CCNC: 234 U/L
ALP SERPL-CCNC: 235 U/L
ALP SERPL-CCNC: 240 U/L
ALP SERPL-CCNC: 249 U/L
ALP SERPL-CCNC: 255 U/L
ALP SERPL-CCNC: 310 U/L
ALP SERPL-CCNC: 348 U/L
ALP SERPL-CCNC: 362 U/L
ALP SERPL-CCNC: 378 U/L
ALP SERPL-CCNC: 379 U/L
ALP SERPL-CCNC: 389 U/L
ALP SERPL-CCNC: 408 U/L
ALP SERPL-CCNC: 414 U/L
ALP SERPL-CCNC: 416 U/L
ALP SERPL-CCNC: 417 U/L
ALP SERPL-CCNC: 419 U/L
ALP SERPL-CCNC: 445 U/L
ALP SERPL-CCNC: 445 U/L
ALP SERPL-CCNC: 456 U/L
ALP SERPL-CCNC: 459 U/L
ALP SERPL-CCNC: 462 U/L
ALP SERPL-CCNC: 482 U/L
ALP SERPL-CCNC: 488 U/L
ALP SERPL-CCNC: 513 U/L
ALT SERPL W/O P-5'-P-CCNC: 104 U/L
ALT SERPL W/O P-5'-P-CCNC: 116 U/L
ALT SERPL W/O P-5'-P-CCNC: 117 U/L
ALT SERPL W/O P-5'-P-CCNC: 121 U/L
ALT SERPL W/O P-5'-P-CCNC: 122 U/L
ALT SERPL W/O P-5'-P-CCNC: 122 U/L
ALT SERPL W/O P-5'-P-CCNC: 129 U/L
ALT SERPL W/O P-5'-P-CCNC: 135 U/L
ALT SERPL W/O P-5'-P-CCNC: 143 U/L
ALT SERPL W/O P-5'-P-CCNC: 149 U/L
ALT SERPL W/O P-5'-P-CCNC: 168 U/L
ALT SERPL W/O P-5'-P-CCNC: 20 U/L
ALT SERPL W/O P-5'-P-CCNC: 20 U/L
ALT SERPL W/O P-5'-P-CCNC: 21 U/L
ALT SERPL W/O P-5'-P-CCNC: 21 U/L
ALT SERPL W/O P-5'-P-CCNC: 22 U/L
ALT SERPL W/O P-5'-P-CCNC: 24 U/L
ALT SERPL W/O P-5'-P-CCNC: 25 U/L
ALT SERPL W/O P-5'-P-CCNC: 36 U/L
ALT SERPL W/O P-5'-P-CCNC: 40 U/L
ALT SERPL W/O P-5'-P-CCNC: 42 U/L
ALT SERPL W/O P-5'-P-CCNC: 43 U/L
ALT SERPL W/O P-5'-P-CCNC: 45 U/L
ALT SERPL W/O P-5'-P-CCNC: 45 U/L
ALT SERPL W/O P-5'-P-CCNC: 47 U/L
ALT SERPL W/O P-5'-P-CCNC: 48 U/L
ALT SERPL W/O P-5'-P-CCNC: 53 U/L
ALT SERPL W/O P-5'-P-CCNC: 55 U/L
ALT SERPL W/O P-5'-P-CCNC: 58 U/L
ALT SERPL W/O P-5'-P-CCNC: 59 U/L
ALT SERPL W/O P-5'-P-CCNC: 63 U/L
ALT SERPL W/O P-5'-P-CCNC: 66 U/L
ALT SERPL W/O P-5'-P-CCNC: 71 U/L
ALT SERPL W/O P-5'-P-CCNC: 73 U/L
ALT SERPL W/O P-5'-P-CCNC: 74 U/L
ALT SERPL W/O P-5'-P-CCNC: 74 U/L
ALT SERPL W/O P-5'-P-CCNC: 79 U/L
ALT SERPL W/O P-5'-P-CCNC: 82 U/L
ALT SERPL W/O P-5'-P-CCNC: 83 U/L
ALT SERPL W/O P-5'-P-CCNC: 84 U/L
ALT SERPL W/O P-5'-P-CCNC: 85 U/L
ALT SERPL W/O P-5'-P-CCNC: 96 U/L
ALT SERPL W/O P-5'-P-CCNC: 96 U/L
ALT SERPL W/O P-5'-P-CCNC: 98 U/L
AMMONIA PLAS-SCNC: 49 UMOL/L
AMORPH CRY URNS QL MICRO: NORMAL
AMPHET+METHAMPHET UR QL: NEGATIVE
AMYLASE SERPL-CCNC: 102 U/L
AMYLASE SERPL-CCNC: 110 U/L
AMYLASE SERPL-CCNC: 180 U/L
AMYLASE SERPL-CCNC: 373 U/L
AMYLASE SERPL-CCNC: 524 U/L
AMYLASE SERPL-CCNC: 59 U/L
AMYLASE SERPL-CCNC: 83 U/L
ANION GAP SERPL CALC-SCNC: 10 MMOL/L
ANION GAP SERPL CALC-SCNC: 12 MMOL/L
ANION GAP SERPL CALC-SCNC: 13 MMOL/L
ANION GAP SERPL CALC-SCNC: 14 MMOL/L
ANION GAP SERPL CALC-SCNC: 15 MMOL/L
ANION GAP SERPL CALC-SCNC: 16 MMOL/L
ANION GAP SERPL CALC-SCNC: 17 MMOL/L
ANION GAP SERPL CALC-SCNC: 18 MMOL/L
ANION GAP SERPL CALC-SCNC: 18 MMOL/L
ANION GAP SERPL CALC-SCNC: 19 MMOL/L
ANION GAP SERPL CALC-SCNC: 20 MMOL/L
ANION GAP SERPL CALC-SCNC: 22 MMOL/L
ANION GAP SERPL CALC-SCNC: 22 MMOL/L
ANION GAP SERPL CALC-SCNC: 23 MMOL/L
ANION GAP SERPL CALC-SCNC: 23 MMOL/L
ANION GAP SERPL CALC-SCNC: 25 MMOL/L
ANION GAP SERPL CALC-SCNC: 25 MMOL/L
ANION GAP SERPL CALC-SCNC: 26 MMOL/L
ANION GAP SERPL CALC-SCNC: 29 MMOL/L
ANION GAP SERPL CALC-SCNC: 30 MMOL/L
ANION GAP SERPL CALC-SCNC: 35 MMOL/L
ANION GAP SERPL CALC-SCNC: 7 MMOL/L
ANION GAP SERPL CALC-SCNC: 9 MMOL/L
ANION GAP SERPL CALC-SCNC: ABNORMAL MMOL/L
ANION GAP SERPL CALC-SCNC: ABNORMAL MMOL/L
ANISOCYTOSIS BLD QL SMEAR: ABNORMAL
ANISOCYTOSIS BLD QL SMEAR: SLIGHT
AORTIC VALVE REGURGITATION: ABNORMAL
AORTIC VALVE STENOSIS: ABNORMAL
AORTIC VALVE STENOSIS: ABNORMAL
APTT BLDCRRT: 23.3 SEC
APTT BLDCRRT: 24.4 SEC
APTT BLDCRRT: 24.7 SEC
APTT BLDCRRT: 25.5 SEC
APTT BLDCRRT: 26.3 SEC
APTT BLDCRRT: 26.7 SEC
APTT BLDCRRT: 29.6 SEC
APTT BLDCRRT: 30.9 SEC
APTT BLDCRRT: 45.6 SEC
AST SERPL-CCNC: 108 U/L
AST SERPL-CCNC: 112 U/L
AST SERPL-CCNC: 121 U/L
AST SERPL-CCNC: 128 U/L
AST SERPL-CCNC: 133 U/L
AST SERPL-CCNC: 145 U/L
AST SERPL-CCNC: 153 U/L
AST SERPL-CCNC: 153 U/L
AST SERPL-CCNC: 163 U/L
AST SERPL-CCNC: 170 U/L
AST SERPL-CCNC: 176 U/L
AST SERPL-CCNC: 201 U/L
AST SERPL-CCNC: 209 U/L
AST SERPL-CCNC: 230 U/L
AST SERPL-CCNC: 231 U/L
AST SERPL-CCNC: 243 U/L
AST SERPL-CCNC: 258 U/L
AST SERPL-CCNC: 278 U/L
AST SERPL-CCNC: 282 U/L
AST SERPL-CCNC: 293 U/L
AST SERPL-CCNC: 32 U/L
AST SERPL-CCNC: 327 U/L
AST SERPL-CCNC: 344 U/L
AST SERPL-CCNC: 37 U/L
AST SERPL-CCNC: 41 U/L
AST SERPL-CCNC: 43 U/L
AST SERPL-CCNC: 43 U/L
AST SERPL-CCNC: 44 U/L
AST SERPL-CCNC: 47 U/L
AST SERPL-CCNC: 48 U/L
AST SERPL-CCNC: 49 U/L
AST SERPL-CCNC: 52 U/L
AST SERPL-CCNC: 53 U/L
AST SERPL-CCNC: 54 U/L
AST SERPL-CCNC: 54 U/L
AST SERPL-CCNC: 61 U/L
AST SERPL-CCNC: 63 U/L
AST SERPL-CCNC: 65 U/L
AST SERPL-CCNC: 68 U/L
AST SERPL-CCNC: 73 U/L
AST SERPL-CCNC: 73 U/L
AST SERPL-CCNC: 74 U/L
AST SERPL-CCNC: 76 U/L
AST SERPL-CCNC: 86 U/L
AST SERPL-CCNC: 93 U/L
AST SERPL-CCNC: 97 U/L
BACTERIA #/AREA URNS AUTO: ABNORMAL /HPF
BACTERIA #/AREA URNS AUTO: ABNORMAL /HPF
BACTERIA #/AREA URNS AUTO: NORMAL /HPF
BACTERIA #/AREA URNS HPF: NORMAL /HPF
BACTERIA #/AREA URNS HPF: NORMAL /HPF
BACTERIA BLD CULT: NORMAL
BACTERIA SPEC AEROBE CULT: NORMAL
BACTERIA SPEC ANAEROBE CULT: NORMAL
BACTERIA SPEC ANAEROBE CULT: NORMAL
BACTERIA UR CULT: NO GROWTH
BACTERIA UR CULT: NORMAL
BARBITURATES UR QL SCN>200 NG/ML: NEGATIVE
BASOPHILS # BLD AUTO: 0 K/UL
BASOPHILS # BLD AUTO: 0.01 K/UL
BASOPHILS # BLD AUTO: 0.02 K/UL
BASOPHILS # BLD AUTO: 0.03 K/UL
BASOPHILS # BLD AUTO: 0.03 K/UL
BASOPHILS # BLD AUTO: 0.05 K/UL
BASOPHILS # BLD AUTO: 0.06 K/UL
BASOPHILS # BLD AUTO: 0.07 K/UL
BASOPHILS # BLD AUTO: 0.08 K/UL
BASOPHILS # BLD AUTO: 0.09 K/UL
BASOPHILS # BLD AUTO: 0.1 K/UL
BASOPHILS # BLD AUTO: 0.12 K/UL
BASOPHILS NFR BLD: 0 %
BASOPHILS NFR BLD: 0.1 %
BASOPHILS NFR BLD: 0.2 %
BASOPHILS NFR BLD: 0.3 %
BASOPHILS NFR BLD: 0.3 %
BASOPHILS NFR BLD: 0.4 %
BASOPHILS NFR BLD: 1 %
BASOPHILS NFR BLD: 1.1 %
BASOPHILS NFR BLD: 1.2 %
BASOPHILS NFR BLD: 1.3 %
BASOPHILS NFR BLD: 1.4 %
BASOPHILS NFR BLD: 1.5 %
BASOPHILS NFR BLD: 1.5 %
BASOPHILS NFR BLD: 1.7 %
BENZODIAZ UR QL SCN>200 NG/ML: NEGATIVE
BILIRUB DIRECT SERPL-MCNC: 0.7 MG/DL
BILIRUB DIRECT SERPL-MCNC: 3 MG/DL
BILIRUB SERPL-MCNC: 0.5 MG/DL
BILIRUB SERPL-MCNC: 0.5 MG/DL
BILIRUB SERPL-MCNC: 0.6 MG/DL
BILIRUB SERPL-MCNC: 0.7 MG/DL
BILIRUB SERPL-MCNC: 0.8 MG/DL
BILIRUB SERPL-MCNC: 0.8 MG/DL
BILIRUB SERPL-MCNC: 0.9 MG/DL
BILIRUB SERPL-MCNC: 1 MG/DL
BILIRUB SERPL-MCNC: 1.1 MG/DL
BILIRUB SERPL-MCNC: 1.3 MG/DL
BILIRUB SERPL-MCNC: 1.4 MG/DL
BILIRUB SERPL-MCNC: 1.5 MG/DL
BILIRUB SERPL-MCNC: 1.5 MG/DL
BILIRUB SERPL-MCNC: 1.6 MG/DL
BILIRUB SERPL-MCNC: 1.7 MG/DL
BILIRUB SERPL-MCNC: 1.7 MG/DL
BILIRUB SERPL-MCNC: 1.8 MG/DL
BILIRUB SERPL-MCNC: 1.9 MG/DL
BILIRUB SERPL-MCNC: 1.9 MG/DL
BILIRUB SERPL-MCNC: 2.5 MG/DL
BILIRUB SERPL-MCNC: 2.8 MG/DL
BILIRUB SERPL-MCNC: 2.8 MG/DL
BILIRUB SERPL-MCNC: 2.9 MG/DL
BILIRUB SERPL-MCNC: 2.9 MG/DL
BILIRUB SERPL-MCNC: 3 MG/DL
BILIRUB SERPL-MCNC: 3.1 MG/DL
BILIRUB SERPL-MCNC: 3.2 MG/DL
BILIRUB SERPL-MCNC: 3.3 MG/DL
BILIRUB SERPL-MCNC: 3.6 MG/DL
BILIRUB SERPL-MCNC: 3.7 MG/DL
BILIRUB SERPL-MCNC: 3.8 MG/DL
BILIRUB SERPL-MCNC: 3.8 MG/DL
BILIRUB UR QL STRIP: NEGATIVE
BLD GP AB SCN CELLS X3 SERPL QL: NORMAL
BLD PROD TYP BPU: NORMAL
BLOOD UNIT EXPIRATION DATE: NORMAL
BLOOD UNIT TYPE CODE: 600
BLOOD UNIT TYPE CODE: 6200
BLOOD UNIT TYPE: NORMAL
BNP SERPL-MCNC: 4834 PG/ML
BNP SERPL-MCNC: 4875 PG/ML
BNP SERPL-MCNC: >4900 PG/ML
BUN SERPL-MCNC: 23 MG/DL
BUN SERPL-MCNC: 24 MG/DL
BUN SERPL-MCNC: 25 MG/DL
BUN SERPL-MCNC: 26 MG/DL
BUN SERPL-MCNC: 27 MG/DL
BUN SERPL-MCNC: 28 MG/DL
BUN SERPL-MCNC: 28 MG/DL
BUN SERPL-MCNC: 30 MG/DL
BUN SERPL-MCNC: 31 MG/DL
BUN SERPL-MCNC: 32 MG/DL
BUN SERPL-MCNC: 33 MG/DL
BUN SERPL-MCNC: 34 MG/DL
BUN SERPL-MCNC: 34 MG/DL
BUN SERPL-MCNC: 35 MG/DL
BUN SERPL-MCNC: 35 MG/DL
BUN SERPL-MCNC: 36 MG/DL
BUN SERPL-MCNC: 36 MG/DL
BUN SERPL-MCNC: 37 MG/DL
BUN SERPL-MCNC: 38 MG/DL
BUN SERPL-MCNC: 38 MG/DL
BUN SERPL-MCNC: 39 MG/DL
BUN SERPL-MCNC: 41 MG/DL
BUN SERPL-MCNC: 44 MG/DL
BUN SERPL-MCNC: 44 MG/DL
BUN SERPL-MCNC: 45 MG/DL
BUN SERPL-MCNC: 46 MG/DL
BUN SERPL-MCNC: 48 MG/DL
BUN SERPL-MCNC: 50 MG/DL
BUN SERPL-MCNC: 57 MG/DL
BUN SERPL-MCNC: 58 MG/DL
BUN SERPL-MCNC: 59 MG/DL
BUN SERPL-MCNC: 59 MG/DL
BUN SERPL-MCNC: 60 MG/DL
BUN SERPL-MCNC: 60 MG/DL
BUN SERPL-MCNC: 61 MG/DL
BUN SERPL-MCNC: 63 MG/DL
BUN SERPL-MCNC: 63 MG/DL
BUN SERPL-MCNC: 68 MG/DL
BUN SERPL-MCNC: 68 MG/DL
BUN SERPL-MCNC: 69 MG/DL
BUN SERPL-MCNC: 71 MG/DL
BUN SERPL-MCNC: 71 MG/DL
BUN SERPL-MCNC: 73 MG/DL
BUN SERPL-MCNC: 80 MG/DL
BUN SERPL-MCNC: 80 MG/DL
BUN SERPL-MCNC: 82 MG/DL
BUN SERPL-MCNC: 83 MG/DL
BUN SERPL-MCNC: 84 MG/DL
BUN SERPL-MCNC: 86 MG/DL
BURR CELLS BLD QL SMEAR: ABNORMAL
BZE UR QL SCN: NEGATIVE
CALCIUM SERPL-MCNC: 10.3 MG/DL
CALCIUM SERPL-MCNC: 7.9 MG/DL
CALCIUM SERPL-MCNC: 8 MG/DL
CALCIUM SERPL-MCNC: 8.2 MG/DL
CALCIUM SERPL-MCNC: 8.3 MG/DL
CALCIUM SERPL-MCNC: 8.4 MG/DL
CALCIUM SERPL-MCNC: 8.4 MG/DL
CALCIUM SERPL-MCNC: 8.5 MG/DL
CALCIUM SERPL-MCNC: 8.5 MG/DL
CALCIUM SERPL-MCNC: 8.6 MG/DL
CALCIUM SERPL-MCNC: 8.7 MG/DL
CALCIUM SERPL-MCNC: 8.7 MG/DL
CALCIUM SERPL-MCNC: 8.8 MG/DL
CALCIUM SERPL-MCNC: 8.8 MG/DL
CALCIUM SERPL-MCNC: 8.9 MG/DL
CALCIUM SERPL-MCNC: 9 MG/DL
CALCIUM SERPL-MCNC: 9.1 MG/DL
CALCIUM SERPL-MCNC: 9.2 MG/DL
CALCIUM SERPL-MCNC: 9.3 MG/DL
CALCIUM SERPL-MCNC: 9.4 MG/DL
CALCIUM SERPL-MCNC: 9.5 MG/DL
CALCIUM SERPL-MCNC: 9.6 MG/DL
CALCIUM SERPL-MCNC: 9.8 MG/DL
CALCIUM SERPL-MCNC: 9.8 MG/DL
CALCIUM SERPL-MCNC: 9.9 MG/DL
CANNABINOIDS UR QL SCN: NEGATIVE
CHLORIDE SERPL-SCNC: 100 MMOL/L
CHLORIDE SERPL-SCNC: 101 MMOL/L
CHLORIDE SERPL-SCNC: 102 MMOL/L
CHLORIDE SERPL-SCNC: 103 MMOL/L
CHLORIDE SERPL-SCNC: 105 MMOL/L
CHLORIDE SERPL-SCNC: 106 MMOL/L
CHLORIDE SERPL-SCNC: 113 MMOL/L
CHLORIDE SERPL-SCNC: 117 MMOL/L
CHLORIDE SERPL-SCNC: 119 MMOL/L
CHLORIDE SERPL-SCNC: 120 MMOL/L
CHLORIDE SERPL-SCNC: 122 MMOL/L
CHLORIDE SERPL-SCNC: 124 MMOL/L
CHLORIDE SERPL-SCNC: 125 MMOL/L
CHLORIDE SERPL-SCNC: 128 MMOL/L
CHLORIDE SERPL-SCNC: 91 MMOL/L
CHLORIDE SERPL-SCNC: 92 MMOL/L
CHLORIDE SERPL-SCNC: 93 MMOL/L
CHLORIDE SERPL-SCNC: 95 MMOL/L
CHLORIDE SERPL-SCNC: 96 MMOL/L
CHLORIDE SERPL-SCNC: 98 MMOL/L
CHLORIDE SERPL-SCNC: 99 MMOL/L
CHLORIDE SERPL-SCNC: >130 MMOL/L
CHLORIDE SERPL-SCNC: >130 MMOL/L
CHOLEST SERPL-MCNC: 78 MG/DL
CHOLEST SERPL-MCNC: 95 MG/DL
CHOLEST/HDLC SERPL: 2.2 {RATIO}
CHOLEST/HDLC SERPL: 3.7 {RATIO}
CK SERPL-CCNC: 100 U/L
CK SERPL-CCNC: 401 U/L
CK SERPL-CCNC: 494 U/L
CLARITY UR REFRACT.AUTO: ABNORMAL
CLARITY UR REFRACT.AUTO: CLEAR
CLARITY UR: ABNORMAL
CLARITY UR: ABNORMAL
CLARITY UR: CLEAR
CLARITY UR: CLEAR
CO2 SERPL-SCNC: 11 MMOL/L
CO2 SERPL-SCNC: 11 MMOL/L
CO2 SERPL-SCNC: 13 MMOL/L
CO2 SERPL-SCNC: 14 MMOL/L
CO2 SERPL-SCNC: 14 MMOL/L
CO2 SERPL-SCNC: 16 MMOL/L
CO2 SERPL-SCNC: 16 MMOL/L
CO2 SERPL-SCNC: 17 MMOL/L
CO2 SERPL-SCNC: 18 MMOL/L
CO2 SERPL-SCNC: 19 MMOL/L
CO2 SERPL-SCNC: 20 MMOL/L
CO2 SERPL-SCNC: 20 MMOL/L
CO2 SERPL-SCNC: 21 MMOL/L
CO2 SERPL-SCNC: 22 MMOL/L
CO2 SERPL-SCNC: 23 MMOL/L
CO2 SERPL-SCNC: 24 MMOL/L
CO2 SERPL-SCNC: 25 MMOL/L
CO2 SERPL-SCNC: 26 MMOL/L
CO2 SERPL-SCNC: 27 MMOL/L
CO2 SERPL-SCNC: 27 MMOL/L
CO2 SERPL-SCNC: 28 MMOL/L
CO2 SERPL-SCNC: 29 MMOL/L
CO2 SERPL-SCNC: 29 MMOL/L
CO2 SERPL-SCNC: 30 MMOL/L
CO2 SERPL-SCNC: 31 MMOL/L
CO2 SERPL-SCNC: 31 MMOL/L
CO2 SERPL-SCNC: 33 MMOL/L
CO2 SERPL-SCNC: 35 MMOL/L
CO2 SERPL-SCNC: 37 MMOL/L
CO2 SERPL-SCNC: 5 MMOL/L
CODING SYSTEM: NORMAL
COLOR UR AUTO: NORMAL
COLOR UR AUTO: YELLOW
COLOR UR: ABNORMAL
COLOR UR: YELLOW
CREAT SERPL-MCNC: 0.8 MG/DL
CREAT SERPL-MCNC: 0.9 MG/DL
CREAT SERPL-MCNC: 1 MG/DL
CREAT SERPL-MCNC: 1.1 MG/DL
CREAT SERPL-MCNC: 1.2 MG/DL
CREAT SERPL-MCNC: 1.3 MG/DL
CREAT SERPL-MCNC: 1.4 MG/DL
CREAT SERPL-MCNC: 1.5 MG/DL
CREAT SERPL-MCNC: 1.6 MG/DL
CREAT SERPL-MCNC: 1.7 MG/DL
CREAT SERPL-MCNC: 1.7 MG/DL
CREAT SERPL-MCNC: 1.8 MG/DL
CREAT SERPL-MCNC: 2.1 MG/DL
CREAT SERPL-MCNC: 2.1 MG/DL
CREAT SERPL-MCNC: 2.3 MG/DL
CREAT SERPL-MCNC: 2.7 MG/DL
CREAT SERPL-MCNC: 2.9 MG/DL
CREAT UR-MCNC: 28 MG/DL
CREAT UR-MCNC: 56.1 MG/DL
CRP SERPL-MCNC: 35.1 MG/L
DELSYS: ABNORMAL
DIASTOLIC DYSFUNCTION: YES
DIFFERENTIAL METHOD: ABNORMAL
DISPENSE STATUS: NORMAL
EOSINOPHIL # BLD AUTO: 0 K/UL
EOSINOPHIL # BLD AUTO: 0.1 K/UL
EOSINOPHIL # BLD AUTO: 0.2 K/UL
EOSINOPHIL # BLD AUTO: 0.4 K/UL
EOSINOPHIL # BLD AUTO: 0.5 K/UL
EOSINOPHIL # BLD AUTO: 0.6 K/UL
EOSINOPHIL NFR BLD: 0 %
EOSINOPHIL NFR BLD: 0.1 %
EOSINOPHIL NFR BLD: 0.2 %
EOSINOPHIL NFR BLD: 0.3 %
EOSINOPHIL NFR BLD: 0.4 %
EOSINOPHIL NFR BLD: 0.7 %
EOSINOPHIL NFR BLD: 0.8 %
EOSINOPHIL NFR BLD: 1.1 %
EOSINOPHIL NFR BLD: 1.4 %
EOSINOPHIL NFR BLD: 1.6 %
EOSINOPHIL NFR BLD: 1.6 %
EOSINOPHIL NFR BLD: 2.1 %
EOSINOPHIL NFR BLD: 2.5 %
EOSINOPHIL NFR BLD: 2.8 %
EOSINOPHIL NFR BLD: 4.2 %
EOSINOPHIL NFR BLD: 5.7 %
EOSINOPHIL NFR BLD: 7.4 %
EOSINOPHIL NFR BLD: 8.7 %
EP: 5
ERYTHROCYTE [DISTWIDTH] IN BLOOD BY AUTOMATED COUNT: 17.1 %
ERYTHROCYTE [DISTWIDTH] IN BLOOD BY AUTOMATED COUNT: 17.2 %
ERYTHROCYTE [DISTWIDTH] IN BLOOD BY AUTOMATED COUNT: 17.2 %
ERYTHROCYTE [DISTWIDTH] IN BLOOD BY AUTOMATED COUNT: 17.4 %
ERYTHROCYTE [DISTWIDTH] IN BLOOD BY AUTOMATED COUNT: 17.5 %
ERYTHROCYTE [DISTWIDTH] IN BLOOD BY AUTOMATED COUNT: 17.7 %
ERYTHROCYTE [DISTWIDTH] IN BLOOD BY AUTOMATED COUNT: 18.8 %
ERYTHROCYTE [DISTWIDTH] IN BLOOD BY AUTOMATED COUNT: 18.9 %
ERYTHROCYTE [DISTWIDTH] IN BLOOD BY AUTOMATED COUNT: 18.9 %
ERYTHROCYTE [DISTWIDTH] IN BLOOD BY AUTOMATED COUNT: 19 %
ERYTHROCYTE [DISTWIDTH] IN BLOOD BY AUTOMATED COUNT: 19.2 %
ERYTHROCYTE [DISTWIDTH] IN BLOOD BY AUTOMATED COUNT: 20.1 %
ERYTHROCYTE [DISTWIDTH] IN BLOOD BY AUTOMATED COUNT: 20.1 %
ERYTHROCYTE [DISTWIDTH] IN BLOOD BY AUTOMATED COUNT: 20.2 %
ERYTHROCYTE [DISTWIDTH] IN BLOOD BY AUTOMATED COUNT: 20.2 %
ERYTHROCYTE [DISTWIDTH] IN BLOOD BY AUTOMATED COUNT: 20.9 %
ERYTHROCYTE [DISTWIDTH] IN BLOOD BY AUTOMATED COUNT: 20.9 %
ERYTHROCYTE [DISTWIDTH] IN BLOOD BY AUTOMATED COUNT: 21 %
ERYTHROCYTE [DISTWIDTH] IN BLOOD BY AUTOMATED COUNT: 21.2 %
ERYTHROCYTE [DISTWIDTH] IN BLOOD BY AUTOMATED COUNT: 21.3 %
ERYTHROCYTE [DISTWIDTH] IN BLOOD BY AUTOMATED COUNT: 21.4 %
ERYTHROCYTE [DISTWIDTH] IN BLOOD BY AUTOMATED COUNT: 21.4 %
ERYTHROCYTE [DISTWIDTH] IN BLOOD BY AUTOMATED COUNT: 21.5 %
ERYTHROCYTE [DISTWIDTH] IN BLOOD BY AUTOMATED COUNT: 21.6 %
ERYTHROCYTE [DISTWIDTH] IN BLOOD BY AUTOMATED COUNT: 22.2 %
ERYTHROCYTE [DISTWIDTH] IN BLOOD BY AUTOMATED COUNT: 22.2 %
ERYTHROCYTE [DISTWIDTH] IN BLOOD BY AUTOMATED COUNT: 22.6 %
ERYTHROCYTE [DISTWIDTH] IN BLOOD BY AUTOMATED COUNT: 22.7 %
ERYTHROCYTE [DISTWIDTH] IN BLOOD BY AUTOMATED COUNT: 22.9 %
ERYTHROCYTE [DISTWIDTH] IN BLOOD BY AUTOMATED COUNT: 23.2 %
ERYTHROCYTE [DISTWIDTH] IN BLOOD BY AUTOMATED COUNT: 23.2 %
ERYTHROCYTE [DISTWIDTH] IN BLOOD BY AUTOMATED COUNT: 23.7 %
ERYTHROCYTE [SEDIMENTATION RATE] IN BLOOD BY WESTERGREN METHOD: 12 MM/H
ERYTHROCYTE [SEDIMENTATION RATE] IN BLOOD BY WESTERGREN METHOD: 14 MM/H
ERYTHROCYTE [SEDIMENTATION RATE] IN BLOOD BY WESTERGREN METHOD: 16 MM/H
ERYTHROCYTE [SEDIMENTATION RATE] IN BLOOD BY WESTERGREN METHOD: 20 MM/H
ERYTHROCYTE [SEDIMENTATION RATE] IN BLOOD BY WESTERGREN METHOD: 25 MM/H
ERYTHROCYTE [SEDIMENTATION RATE] IN BLOOD BY WESTERGREN METHOD: 25 MM/H
EST. GFR  (AFRICAN AMERICAN): 19 ML/MIN/1.73 M^2
EST. GFR  (AFRICAN AMERICAN): 21 ML/MIN/1.73 M^2
EST. GFR  (AFRICAN AMERICAN): 25.3 ML/MIN/1.73 M^2
EST. GFR  (AFRICAN AMERICAN): 28 ML/MIN/1.73 M^2
EST. GFR  (AFRICAN AMERICAN): 28 ML/MIN/1.73 M^2
EST. GFR  (AFRICAN AMERICAN): 34 ML/MIN/1.73 M^2
EST. GFR  (AFRICAN AMERICAN): 36 ML/MIN/1.73 M^2
EST. GFR  (AFRICAN AMERICAN): 36.5 ML/MIN/1.73 M^2
EST. GFR  (AFRICAN AMERICAN): 39.3 ML/MIN/1.73 M^2
EST. GFR  (AFRICAN AMERICAN): 42.4 ML/MIN/1.73 M^2
EST. GFR  (AFRICAN AMERICAN): 46 ML/MIN/1.73 M^2
EST. GFR  (AFRICAN AMERICAN): 46.1 ML/MIN/1.73 M^2
EST. GFR  (AFRICAN AMERICAN): 50 ML/MIN/1.73 M^2
EST. GFR  (AFRICAN AMERICAN): 50.5 ML/MIN/1.73 M^2
EST. GFR  (AFRICAN AMERICAN): 55.6 ML/MIN/1.73 M^2
EST. GFR  (AFRICAN AMERICAN): 56 ML/MIN/1.73 M^2
EST. GFR  (AFRICAN AMERICAN): >60 ML/MIN/1.73 M^2
EST. GFR  (NON AFRICAN AMERICAN): 17 ML/MIN/1.73 M^2
EST. GFR  (NON AFRICAN AMERICAN): 18 ML/MIN/1.73 M^2
EST. GFR  (NON AFRICAN AMERICAN): 22 ML/MIN/1.73 M^2
EST. GFR  (NON AFRICAN AMERICAN): 25 ML/MIN/1.73 M^2
EST. GFR  (NON AFRICAN AMERICAN): 25 ML/MIN/1.73 M^2
EST. GFR  (NON AFRICAN AMERICAN): 29.5 ML/MIN/1.73 M^2
EST. GFR  (NON AFRICAN AMERICAN): 31.6 ML/MIN/1.73 M^2
EST. GFR  (NON AFRICAN AMERICAN): 32 ML/MIN/1.73 M^2
EST. GFR  (NON AFRICAN AMERICAN): 34 ML/MIN/1.73 M^2
EST. GFR  (NON AFRICAN AMERICAN): 36.8 ML/MIN/1.73 M^2
EST. GFR  (NON AFRICAN AMERICAN): 40 ML/MIN/1.73 M^2
EST. GFR  (NON AFRICAN AMERICAN): 43.8 ML/MIN/1.73 M^2
EST. GFR  (NON AFRICAN AMERICAN): 44 ML/MIN/1.73 M^2
EST. GFR  (NON AFRICAN AMERICAN): 48 ML/MIN/1.73 M^2
EST. GFR  (NON AFRICAN AMERICAN): 48.2 ML/MIN/1.73 M^2
EST. GFR  (NON AFRICAN AMERICAN): 53.6 ML/MIN/1.73 M^2
EST. GFR  (NON AFRICAN AMERICAN): 54 ML/MIN/1.73 M^2
EST. GFR  (NON AFRICAN AMERICAN): >60 ML/MIN/1.73 M^2
ESTIMATED AVG GLUCOSE: 108 MG/DL
ESTIMATED AVG GLUCOSE: 126 MG/DL
ESTIMATED AVG GLUCOSE: 134 MG/DL
ESTIMATED PA SYSTOLIC PRESSURE: 53.7
ESTIMATED PA SYSTOLIC PRESSURE: 54.51
ETHANOL UR-MCNC: <10 MG/DL
FERRITIN SERPL-MCNC: 25 NG/ML
FERRITIN SERPL-MCNC: 37 NG/ML
FERRITIN SERPL-MCNC: 47 NG/ML
FIO2: 100
FIO2: 35
FIO2: 35
FIO2: 40
FIO2: 50
FIO2: 60
FLOW: 2
FLOW: 50
FLOW: 60
FLOW: 60
FOLATE SERPL-MCNC: 5.1 NG/ML
FOLATE SERPL-MCNC: 9.1 NG/ML
GGT SERPL-CCNC: 247 U/L
GIANT PLATELETS BLD QL SMEAR: PRESENT
GLOBAL PERICARDIAL EFFUSION: ABNORMAL
GLUCOSE SERPL-MCNC: 100 MG/DL
GLUCOSE SERPL-MCNC: 100 MG/DL
GLUCOSE SERPL-MCNC: 105 MG/DL
GLUCOSE SERPL-MCNC: 106 MG/DL
GLUCOSE SERPL-MCNC: 107 MG/DL
GLUCOSE SERPL-MCNC: 107 MG/DL
GLUCOSE SERPL-MCNC: 108 MG/DL
GLUCOSE SERPL-MCNC: 109 MG/DL
GLUCOSE SERPL-MCNC: 109 MG/DL
GLUCOSE SERPL-MCNC: 110 MG/DL
GLUCOSE SERPL-MCNC: 111 MG/DL
GLUCOSE SERPL-MCNC: 111 MG/DL
GLUCOSE SERPL-MCNC: 116 MG/DL
GLUCOSE SERPL-MCNC: 117 MG/DL
GLUCOSE SERPL-MCNC: 118 MG/DL
GLUCOSE SERPL-MCNC: 121 MG/DL
GLUCOSE SERPL-MCNC: 121 MG/DL
GLUCOSE SERPL-MCNC: 122 MG/DL
GLUCOSE SERPL-MCNC: 123 MG/DL
GLUCOSE SERPL-MCNC: 123 MG/DL
GLUCOSE SERPL-MCNC: 128 MG/DL
GLUCOSE SERPL-MCNC: 128 MG/DL
GLUCOSE SERPL-MCNC: 129 MG/DL
GLUCOSE SERPL-MCNC: 129 MG/DL
GLUCOSE SERPL-MCNC: 130 MG/DL
GLUCOSE SERPL-MCNC: 131 MG/DL
GLUCOSE SERPL-MCNC: 134 MG/DL
GLUCOSE SERPL-MCNC: 135 MG/DL
GLUCOSE SERPL-MCNC: 135 MG/DL
GLUCOSE SERPL-MCNC: 137 MG/DL
GLUCOSE SERPL-MCNC: 137 MG/DL
GLUCOSE SERPL-MCNC: 139 MG/DL
GLUCOSE SERPL-MCNC: 141 MG/DL
GLUCOSE SERPL-MCNC: 142 MG/DL
GLUCOSE SERPL-MCNC: 142 MG/DL
GLUCOSE SERPL-MCNC: 148 MG/DL
GLUCOSE SERPL-MCNC: 149 MG/DL
GLUCOSE SERPL-MCNC: 150 MG/DL
GLUCOSE SERPL-MCNC: 152 MG/DL
GLUCOSE SERPL-MCNC: 152 MG/DL
GLUCOSE SERPL-MCNC: 157 MG/DL
GLUCOSE SERPL-MCNC: 157 MG/DL
GLUCOSE SERPL-MCNC: 158 MG/DL
GLUCOSE SERPL-MCNC: 160 MG/DL
GLUCOSE SERPL-MCNC: 163 MG/DL
GLUCOSE SERPL-MCNC: 164 MG/DL
GLUCOSE SERPL-MCNC: 165 MG/DL
GLUCOSE SERPL-MCNC: 165 MG/DL
GLUCOSE SERPL-MCNC: 170 MG/DL
GLUCOSE SERPL-MCNC: 171 MG/DL
GLUCOSE SERPL-MCNC: 172 MG/DL
GLUCOSE SERPL-MCNC: 182 MG/DL
GLUCOSE SERPL-MCNC: 187 MG/DL
GLUCOSE SERPL-MCNC: 188 MG/DL
GLUCOSE SERPL-MCNC: 197 MG/DL
GLUCOSE SERPL-MCNC: 216 MG/DL
GLUCOSE SERPL-MCNC: 216 MG/DL
GLUCOSE SERPL-MCNC: 306 MG/DL
GLUCOSE SERPL-MCNC: 59 MG/DL
GLUCOSE SERPL-MCNC: 84 MG/DL
GLUCOSE SERPL-MCNC: 95 MG/DL
GLUCOSE SERPL-MCNC: 97 MG/DL
GLUCOSE SERPL-MCNC: 98 MG/DL
GLUCOSE SERPL-MCNC: 98 MG/DL
GLUCOSE SERPL-MCNC: 99 MG/DL
GLUCOSE UR QL STRIP: NEGATIVE
GRAM STN SPEC: NORMAL
HAV IGM SERPL QL IA: NEGATIVE
HBA1C MFR BLD HPLC: 5.4 %
HBA1C MFR BLD HPLC: 6 %
HBA1C MFR BLD HPLC: 6.3 %
HBV CORE IGM SERPL QL IA: NEGATIVE
HBV SURFACE AG SERPL QL IA: NEGATIVE
HCO3 UR-SCNC: 14 MMOL/L (ref 24–28)
HCO3 UR-SCNC: 14.8 MMOL/L (ref 24–28)
HCO3 UR-SCNC: 15 MMOL/L (ref 24–28)
HCO3 UR-SCNC: 17.2 MMOL/L (ref 24–28)
HCO3 UR-SCNC: 18.1 MMOL/L (ref 24–28)
HCO3 UR-SCNC: 20 MMOL/L (ref 24–28)
HCO3 UR-SCNC: 21.5 MMOL/L (ref 24–28)
HCO3 UR-SCNC: 22 MMOL/L (ref 24–28)
HCO3 UR-SCNC: 22.2 MMOL/L (ref 24–28)
HCO3 UR-SCNC: 23.3 MMOL/L (ref 24–28)
HCO3 UR-SCNC: 23.7 MMOL/L (ref 24–28)
HCO3 UR-SCNC: 23.8 MMOL/L (ref 24–28)
HCO3 UR-SCNC: 25.2 MMOL/L (ref 24–28)
HCO3 UR-SCNC: 25.8 MMOL/L (ref 24–28)
HCO3 UR-SCNC: 26.8 MMOL/L (ref 24–28)
HCO3 UR-SCNC: 28.8 MMOL/L (ref 24–28)
HCO3 UR-SCNC: 29.4 MMOL/L (ref 24–28)
HCO3 UR-SCNC: 29.8 MMOL/L (ref 24–28)
HCO3 UR-SCNC: 29.8 MMOL/L (ref 24–28)
HCO3 UR-SCNC: 30 MMOL/L (ref 24–28)
HCO3 UR-SCNC: 30.3 MMOL/L (ref 24–28)
HCO3 UR-SCNC: 31.2 MMOL/L (ref 24–28)
HCO3 UR-SCNC: 34 MMOL/L (ref 24–28)
HCO3 UR-SCNC: 7.9 MMOL/L (ref 24–28)
HCT VFR BLD AUTO: 24.1 %
HCT VFR BLD AUTO: 25.7 %
HCT VFR BLD AUTO: 25.7 %
HCT VFR BLD AUTO: 27.1 %
HCT VFR BLD AUTO: 27.4 %
HCT VFR BLD AUTO: 27.9 %
HCT VFR BLD AUTO: 28.2 %
HCT VFR BLD AUTO: 28.7 %
HCT VFR BLD AUTO: 28.8 %
HCT VFR BLD AUTO: 28.8 %
HCT VFR BLD AUTO: 29.7 %
HCT VFR BLD AUTO: 29.8 %
HCT VFR BLD AUTO: 29.9 %
HCT VFR BLD AUTO: 30 %
HCT VFR BLD AUTO: 30.1 %
HCT VFR BLD AUTO: 30.2 %
HCT VFR BLD AUTO: 30.5 %
HCT VFR BLD AUTO: 30.6 %
HCT VFR BLD AUTO: 30.6 %
HCT VFR BLD AUTO: 30.8 %
HCT VFR BLD AUTO: 30.9 %
HCT VFR BLD AUTO: 31.1 %
HCT VFR BLD AUTO: 31.4 %
HCT VFR BLD AUTO: 31.7 %
HCT VFR BLD AUTO: 31.8 %
HCT VFR BLD AUTO: 31.8 %
HCT VFR BLD AUTO: 31.9 %
HCT VFR BLD AUTO: 32.1 %
HCT VFR BLD AUTO: 32.2 %
HCT VFR BLD AUTO: 32.9 %
HCT VFR BLD AUTO: 32.9 %
HCT VFR BLD AUTO: 33 %
HCT VFR BLD AUTO: 33.8 %
HCT VFR BLD AUTO: 33.8 %
HCT VFR BLD AUTO: 34 %
HCT VFR BLD AUTO: 34.5 %
HCT VFR BLD CALC: 29 %PCV (ref 36–54)
HCV AB SERPL QL IA: NEGATIVE
HDLC SERPL-MCNC: 26 MG/DL
HDLC SERPL-MCNC: 35 MG/DL
HDLC SERPL: 27.4 %
HDLC SERPL: 44.9 %
HGB BLD-MCNC: 10 G/DL
HGB BLD-MCNC: 10.1 G/DL
HGB BLD-MCNC: 10.4 G/DL
HGB BLD-MCNC: 10.4 G/DL
HGB BLD-MCNC: 10.6 G/DL
HGB BLD-MCNC: 10.8 G/DL
HGB BLD-MCNC: 7.5 G/DL
HGB BLD-MCNC: 7.9 G/DL
HGB BLD-MCNC: 7.9 G/DL
HGB BLD-MCNC: 8.2 G/DL
HGB BLD-MCNC: 8.5 G/DL
HGB BLD-MCNC: 8.5 G/DL
HGB BLD-MCNC: 8.6 G/DL
HGB BLD-MCNC: 8.6 G/DL
HGB BLD-MCNC: 8.8 G/DL
HGB BLD-MCNC: 8.9 G/DL
HGB BLD-MCNC: 8.9 G/DL
HGB BLD-MCNC: 9 G/DL
HGB BLD-MCNC: 9 G/DL
HGB BLD-MCNC: 9.1 G/DL
HGB BLD-MCNC: 9.2 G/DL
HGB BLD-MCNC: 9.2 G/DL
HGB BLD-MCNC: 9.3 G/DL
HGB BLD-MCNC: 9.3 G/DL
HGB BLD-MCNC: 9.5 G/DL
HGB BLD-MCNC: 9.6 G/DL
HGB BLD-MCNC: 9.7 G/DL
HGB BLD-MCNC: 9.7 G/DL
HGB BLD-MCNC: 9.8 G/DL
HGB BLD-MCNC: 9.8 G/DL
HGB UR QL STRIP: ABNORMAL
HGB UR QL STRIP: NEGATIVE
HYALINE CASTS #/AREA URNS LPF: 0 /LPF
HYALINE CASTS #/AREA URNS LPF: 0 /LPF
HYALINE CASTS UR QL AUTO: 0 /LPF
HYALINE CASTS UR QL AUTO: 0 /LPF
HYALINE CASTS UR QL AUTO: 3 /LPF
HYALINE CASTS UR QL AUTO: 5 /LPF
HYPOCHROMIA BLD QL SMEAR: ABNORMAL
IMM GRANULOCYTES # BLD AUTO: 0.01 K/UL
IMM GRANULOCYTES # BLD AUTO: 0.02 K/UL
IMM GRANULOCYTES # BLD AUTO: 0.03 K/UL
IMM GRANULOCYTES # BLD AUTO: 0.04 K/UL
IMM GRANULOCYTES # BLD AUTO: 0.06 K/UL
IMM GRANULOCYTES # BLD AUTO: 0.06 K/UL
IMM GRANULOCYTES # BLD AUTO: 0.08 K/UL
IMM GRANULOCYTES # BLD AUTO: 0.1 K/UL
IMM GRANULOCYTES # BLD AUTO: 0.1 K/UL
IMM GRANULOCYTES # BLD AUTO: 0.11 K/UL
IMM GRANULOCYTES # BLD AUTO: 0.12 K/UL
IMM GRANULOCYTES # BLD AUTO: 0.13 K/UL
IMM GRANULOCYTES # BLD AUTO: 0.17 K/UL
IMM GRANULOCYTES # BLD AUTO: 0.25 K/UL
IMM GRANULOCYTES # BLD AUTO: 0.33 K/UL
IMM GRANULOCYTES NFR BLD AUTO: 0.2 %
IMM GRANULOCYTES NFR BLD AUTO: 0.3 %
IMM GRANULOCYTES NFR BLD AUTO: 0.4 %
IMM GRANULOCYTES NFR BLD AUTO: 0.5 %
IMM GRANULOCYTES NFR BLD AUTO: 0.5 %
IMM GRANULOCYTES NFR BLD AUTO: 0.6 %
IMM GRANULOCYTES NFR BLD AUTO: 0.9 %
IMM GRANULOCYTES NFR BLD AUTO: 1 %
IMM GRANULOCYTES NFR BLD AUTO: 1.1 %
IMM GRANULOCYTES NFR BLD AUTO: 1.2 %
IMM GRANULOCYTES NFR BLD AUTO: 1.2 %
IMM GRANULOCYTES NFR BLD AUTO: 1.3 %
IMM GRANULOCYTES NFR BLD AUTO: 1.7 %
IMM GRANULOCYTES NFR BLD AUTO: 2 %
IMM GRANULOCYTES NFR BLD AUTO: 2.1 %
INR PPP: 1.2
INR PPP: 1.3
INR PPP: 1.3
INR PPP: 1.4
INR PPP: 1.4
INR PPP: 1.5
INR PPP: 1.6
INR PPP: 1.6
INR PPP: 1.7
INR PPP: 1.8
INR PPP: 1.8
INR PPP: 1.9
INR PPP: 2
INR PPP: 2.1
INR PPP: 2.2
INR PPP: 2.3
INR PPP: 2.4
INR PPP: 2.5
INR PPP: 2.7
INR PPP: 2.9
INR PPP: 3
INR PPP: 3.2
INR PPP: 3.3
INR PPP: 3.3
INR PPP: 3.4
INR PPP: 3.5
IP: 10
IRON SERPL-MCNC: 14 UG/DL
IRON SERPL-MCNC: 16 UG/DL
IRON SERPL-MCNC: 24 UG/DL
KETONES UR QL STRIP: NEGATIVE
LACTATE SERPL-SCNC: 1.4 MMOL/L
LACTATE SERPL-SCNC: 1.5 MMOL/L
LACTATE SERPL-SCNC: 2.4 MMOL/L
LACTATE SERPL-SCNC: 2.6 MMOL/L
LACTATE SERPL-SCNC: 2.6 MMOL/L
LACTATE SERPL-SCNC: 4.1 MMOL/L
LACTATE SERPL-SCNC: 4.1 MMOL/L
LACTATE SERPL-SCNC: 4.2 MMOL/L
LACTATE SERPL-SCNC: 4.4 MMOL/L
LACTATE SERPL-SCNC: 4.6 MMOL/L
LACTATE SERPL-SCNC: 4.7 MMOL/L
LACTATE SERPL-SCNC: 5.6 MMOL/L
LACTATE SERPL-SCNC: 6.1 MMOL/L
LACTATE SERPL-SCNC: 6.4 MMOL/L
LACTATE SERPL-SCNC: 7 MMOL/L
LACTATE SERPL-SCNC: 7.3 MMOL/L
LACTATE SERPL-SCNC: 7.7 MMOL/L
LACTATE SERPL-SCNC: 9.2 MMOL/L
LACTATE SERPL-SCNC: >12 MMOL/L
LDLC SERPL CALC-MCNC: 30.4 MG/DL
LDLC SERPL CALC-MCNC: 43.8 MG/DL
LEUKOCYTE ESTERASE UR QL STRIP: NEGATIVE
LIPASE SERPL-CCNC: 147 U/L
LIPASE SERPL-CCNC: 167 U/L
LIPASE SERPL-CCNC: 169 U/L
LIPASE SERPL-CCNC: 4 U/L
LIPASE SERPL-CCNC: 410 U/L
LIPASE SERPL-CCNC: 93 U/L
LIPASE SERPL-CCNC: 948 U/L
LIPASE SERPL-CCNC: >1000 U/L
LYMPHOCYTES # BLD AUTO: 0.5 K/UL
LYMPHOCYTES # BLD AUTO: 0.7 K/UL
LYMPHOCYTES # BLD AUTO: 0.8 K/UL
LYMPHOCYTES # BLD AUTO: 0.9 K/UL
LYMPHOCYTES # BLD AUTO: 1 K/UL
LYMPHOCYTES # BLD AUTO: 1.1 K/UL
LYMPHOCYTES # BLD AUTO: 1.3 K/UL
LYMPHOCYTES # BLD AUTO: 1.4 K/UL
LYMPHOCYTES # BLD AUTO: 1.5 K/UL
LYMPHOCYTES # BLD AUTO: 1.6 K/UL
LYMPHOCYTES # BLD AUTO: 1.7 K/UL
LYMPHOCYTES # BLD AUTO: 1.8 K/UL
LYMPHOCYTES # BLD AUTO: 2.1 K/UL
LYMPHOCYTES # BLD AUTO: 2.1 K/UL
LYMPHOCYTES NFR BLD: 10 %
LYMPHOCYTES NFR BLD: 10.1 %
LYMPHOCYTES NFR BLD: 10.3 %
LYMPHOCYTES NFR BLD: 10.4 %
LYMPHOCYTES NFR BLD: 10.8 %
LYMPHOCYTES NFR BLD: 11.2 %
LYMPHOCYTES NFR BLD: 11.4 %
LYMPHOCYTES NFR BLD: 11.5 %
LYMPHOCYTES NFR BLD: 11.6 %
LYMPHOCYTES NFR BLD: 11.7 %
LYMPHOCYTES NFR BLD: 12.3 %
LYMPHOCYTES NFR BLD: 13.3 %
LYMPHOCYTES NFR BLD: 14.6 %
LYMPHOCYTES NFR BLD: 15.1 %
LYMPHOCYTES NFR BLD: 16.5 %
LYMPHOCYTES NFR BLD: 20.9 %
LYMPHOCYTES NFR BLD: 20.9 %
LYMPHOCYTES NFR BLD: 21.3 %
LYMPHOCYTES NFR BLD: 21.5 %
LYMPHOCYTES NFR BLD: 22.8 %
LYMPHOCYTES NFR BLD: 23.6 %
LYMPHOCYTES NFR BLD: 24.7 %
LYMPHOCYTES NFR BLD: 26.2 %
LYMPHOCYTES NFR BLD: 27 %
LYMPHOCYTES NFR BLD: 27 %
LYMPHOCYTES NFR BLD: 28.8 %
LYMPHOCYTES NFR BLD: 29.6 %
LYMPHOCYTES NFR BLD: 31.4 %
LYMPHOCYTES NFR BLD: 33.4 %
LYMPHOCYTES NFR BLD: 36 %
LYMPHOCYTES NFR BLD: 5.8 %
LYMPHOCYTES NFR BLD: 7.4 %
LYMPHOCYTES NFR BLD: 9 %
LYMPHOCYTES NFR BLD: 9.9 %
MAGNESIUM SERPL-MCNC: 1 MG/DL
MAGNESIUM SERPL-MCNC: 1.2 MG/DL
MAGNESIUM SERPL-MCNC: 1.3 MG/DL
MAGNESIUM SERPL-MCNC: 1.4 MG/DL
MAGNESIUM SERPL-MCNC: 1.4 MG/DL
MAGNESIUM SERPL-MCNC: 1.5 MG/DL
MAGNESIUM SERPL-MCNC: 1.6 MG/DL
MAGNESIUM SERPL-MCNC: 1.7 MG/DL
MAGNESIUM SERPL-MCNC: 1.8 MG/DL
MAGNESIUM SERPL-MCNC: 1.9 MG/DL
MAGNESIUM SERPL-MCNC: 2 MG/DL
MAGNESIUM SERPL-MCNC: 2.1 MG/DL
MAGNESIUM SERPL-MCNC: 2.2 MG/DL
MAGNESIUM SERPL-MCNC: 2.3 MG/DL
MAGNESIUM SERPL-MCNC: 2.4 MG/DL
MAGNESIUM SERPL-MCNC: 2.5 MG/DL
MAGNESIUM SERPL-MCNC: 2.6 MG/DL
MAGNESIUM SERPL-MCNC: 3.1 MG/DL
MCH RBC QN AUTO: 25.8 PG
MCH RBC QN AUTO: 26 PG
MCH RBC QN AUTO: 26.2 PG
MCH RBC QN AUTO: 26.2 PG
MCH RBC QN AUTO: 26.4 PG
MCH RBC QN AUTO: 26.4 PG
MCH RBC QN AUTO: 26.5 PG
MCH RBC QN AUTO: 27 PG
MCH RBC QN AUTO: 27 PG
MCH RBC QN AUTO: 27.1 PG
MCH RBC QN AUTO: 27.1 PG
MCH RBC QN AUTO: 27.3 PG
MCH RBC QN AUTO: 27.4 PG
MCH RBC QN AUTO: 27.4 PG
MCH RBC QN AUTO: 27.5 PG
MCH RBC QN AUTO: 27.6 PG
MCH RBC QN AUTO: 27.7 PG
MCH RBC QN AUTO: 27.8 PG
MCH RBC QN AUTO: 28 PG
MCH RBC QN AUTO: 28 PG
MCH RBC QN AUTO: 28.1 PG
MCH RBC QN AUTO: 28.2 PG
MCH RBC QN AUTO: 28.2 PG
MCH RBC QN AUTO: 28.3 PG
MCH RBC QN AUTO: 28.3 PG
MCH RBC QN AUTO: 28.5 PG
MCH RBC QN AUTO: 28.5 PG
MCH RBC QN AUTO: 28.7 PG
MCH RBC QN AUTO: 28.7 PG
MCH RBC QN AUTO: 29.4 PG
MCHC RBC AUTO-ENTMCNC: 28.5 G/DL
MCHC RBC AUTO-ENTMCNC: 28.7 G/DL
MCHC RBC AUTO-ENTMCNC: 28.7 G/DL
MCHC RBC AUTO-ENTMCNC: 29.1 G/DL
MCHC RBC AUTO-ENTMCNC: 29.5 G/DL
MCHC RBC AUTO-ENTMCNC: 29.9 G/DL
MCHC RBC AUTO-ENTMCNC: 30 G/DL
MCHC RBC AUTO-ENTMCNC: 30.1 G/DL
MCHC RBC AUTO-ENTMCNC: 30.1 G/DL
MCHC RBC AUTO-ENTMCNC: 30.2 G/DL
MCHC RBC AUTO-ENTMCNC: 30.4 G/DL
MCHC RBC AUTO-ENTMCNC: 30.4 G/DL
MCHC RBC AUTO-ENTMCNC: 30.5 G/DL
MCHC RBC AUTO-ENTMCNC: 30.6 G/DL
MCHC RBC AUTO-ENTMCNC: 30.7 G/DL
MCHC RBC AUTO-ENTMCNC: 30.8 G/DL
MCHC RBC AUTO-ENTMCNC: 31 G/DL
MCHC RBC AUTO-ENTMCNC: 31 G/DL
MCHC RBC AUTO-ENTMCNC: 31.1 G/DL
MCHC RBC AUTO-ENTMCNC: 31.2 G/DL
MCHC RBC AUTO-ENTMCNC: 31.4 G/DL
MCHC RBC AUTO-ENTMCNC: 31.5 G/DL
MCHC RBC AUTO-ENTMCNC: 31.8 G/DL
MCHC RBC AUTO-ENTMCNC: 31.9 G/DL
MCHC RBC AUTO-ENTMCNC: 32 G/DL
MCHC RBC AUTO-ENTMCNC: 32.1 G/DL
MCHC RBC AUTO-ENTMCNC: 32.1 G/DL
MCV RBC AUTO: 84 FL
MCV RBC AUTO: 84 FL
MCV RBC AUTO: 85 FL
MCV RBC AUTO: 86 FL
MCV RBC AUTO: 87 FL
MCV RBC AUTO: 88 FL
MCV RBC AUTO: 89 FL
MCV RBC AUTO: 90 FL
MCV RBC AUTO: 91 FL
MCV RBC AUTO: 92 FL
MCV RBC AUTO: 92 FL
MCV RBC AUTO: 93 FL
MCV RBC AUTO: 94 FL
MCV RBC AUTO: 95 FL
MCV RBC AUTO: 95 FL
MCV RBC AUTO: 96 FL
MCV RBC AUTO: 96 FL
MCV RBC AUTO: 97 FL
MCV RBC AUTO: 97 FL
METHADONE UR QL SCN>300 NG/ML: NEGATIVE
MICROSCOPIC COMMENT: ABNORMAL
MICROSCOPIC COMMENT: NORMAL
MIN VOL: 10.1
MIN VOL: 10.2
MIN VOL: 9
MIN VOL: 9.4
MITRAL VALVE MOBILITY: NORMAL
MITRAL VALVE MOBILITY: NORMAL
MITRAL VALVE REGURGITATION: ABNORMAL
MODE: ABNORMAL
MONOCYTES # BLD AUTO: 0.2 K/UL
MONOCYTES # BLD AUTO: 0.3 K/UL
MONOCYTES # BLD AUTO: 0.4 K/UL
MONOCYTES # BLD AUTO: 0.5 K/UL
MONOCYTES # BLD AUTO: 0.6 K/UL
MONOCYTES # BLD AUTO: 0.7 K/UL
MONOCYTES # BLD AUTO: 0.8 K/UL
MONOCYTES # BLD AUTO: 0.8 K/UL
MONOCYTES # BLD AUTO: 1.1 K/UL
MONOCYTES NFR BLD: 10.8 %
MONOCYTES NFR BLD: 11.9 %
MONOCYTES NFR BLD: 12.5 %
MONOCYTES NFR BLD: 15.5 %
MONOCYTES NFR BLD: 3 %
MONOCYTES NFR BLD: 3.1 %
MONOCYTES NFR BLD: 3.3 %
MONOCYTES NFR BLD: 3.4 %
MONOCYTES NFR BLD: 3.5 %
MONOCYTES NFR BLD: 3.8 %
MONOCYTES NFR BLD: 4.3 %
MONOCYTES NFR BLD: 4.5 %
MONOCYTES NFR BLD: 4.6 %
MONOCYTES NFR BLD: 4.7 %
MONOCYTES NFR BLD: 4.9 %
MONOCYTES NFR BLD: 4.9 %
MONOCYTES NFR BLD: 5.1 %
MONOCYTES NFR BLD: 5.2 %
MONOCYTES NFR BLD: 5.4 %
MONOCYTES NFR BLD: 5.4 %
MONOCYTES NFR BLD: 5.7 %
MONOCYTES NFR BLD: 5.9 %
MONOCYTES NFR BLD: 5.9 %
MONOCYTES NFR BLD: 6 %
MONOCYTES NFR BLD: 6.2 %
MONOCYTES NFR BLD: 6.4 %
MONOCYTES NFR BLD: 6.4 %
MONOCYTES NFR BLD: 6.7 %
MONOCYTES NFR BLD: 6.8 %
MONOCYTES NFR BLD: 6.9 %
MONOCYTES NFR BLD: 7.2 %
MONOCYTES NFR BLD: 7.5 %
MONOCYTES NFR BLD: 8.1 %
MONOCYTES NFR BLD: 9.5 %
MONOCYTES NFR BLD: 9.8 %
MONOCYTES NFR BLD: 9.9 %
NEUTROPHILS # BLD AUTO: 10.1 K/UL
NEUTROPHILS # BLD AUTO: 10.3 K/UL
NEUTROPHILS # BLD AUTO: 12.8 K/UL
NEUTROPHILS # BLD AUTO: 14.6 K/UL
NEUTROPHILS # BLD AUTO: 2.5 K/UL
NEUTROPHILS # BLD AUTO: 2.7 K/UL
NEUTROPHILS # BLD AUTO: 2.8 K/UL
NEUTROPHILS # BLD AUTO: 3 K/UL
NEUTROPHILS # BLD AUTO: 3.2 K/UL
NEUTROPHILS # BLD AUTO: 3.5 K/UL
NEUTROPHILS # BLD AUTO: 4.2 K/UL
NEUTROPHILS # BLD AUTO: 4.3 K/UL
NEUTROPHILS # BLD AUTO: 4.3 K/UL
NEUTROPHILS # BLD AUTO: 4.5 K/UL
NEUTROPHILS # BLD AUTO: 5 K/UL
NEUTROPHILS # BLD AUTO: 5.2 K/UL
NEUTROPHILS # BLD AUTO: 5.8 K/UL
NEUTROPHILS # BLD AUTO: 5.9 K/UL
NEUTROPHILS # BLD AUTO: 6.1 K/UL
NEUTROPHILS # BLD AUTO: 6.4 K/UL
NEUTROPHILS # BLD AUTO: 6.6 K/UL
NEUTROPHILS # BLD AUTO: 6.6 K/UL
NEUTROPHILS # BLD AUTO: 6.8 K/UL
NEUTROPHILS # BLD AUTO: 6.9 K/UL
NEUTROPHILS # BLD AUTO: 7.6 K/UL
NEUTROPHILS # BLD AUTO: 7.6 K/UL
NEUTROPHILS # BLD AUTO: 8 K/UL
NEUTROPHILS # BLD AUTO: 8.4 K/UL
NEUTROPHILS # BLD AUTO: 8.5 K/UL
NEUTROPHILS # BLD AUTO: 8.5 K/UL
NEUTROPHILS # BLD AUTO: 8.6 K/UL
NEUTROPHILS # BLD AUTO: 8.8 K/UL
NEUTROPHILS # BLD AUTO: 9.7 K/UL
NEUTROPHILS NFR BLD: 45.5 %
NEUTROPHILS NFR BLD: 52.6 %
NEUTROPHILS NFR BLD: 54.5 %
NEUTROPHILS NFR BLD: 56.1 %
NEUTROPHILS NFR BLD: 57 %
NEUTROPHILS NFR BLD: 57.3 %
NEUTROPHILS NFR BLD: 60.6 %
NEUTROPHILS NFR BLD: 60.7 %
NEUTROPHILS NFR BLD: 62.8 %
NEUTROPHILS NFR BLD: 63.1 %
NEUTROPHILS NFR BLD: 66.5 %
NEUTROPHILS NFR BLD: 67.4 %
NEUTROPHILS NFR BLD: 70.5 %
NEUTROPHILS NFR BLD: 71.2 %
NEUTROPHILS NFR BLD: 71.7 %
NEUTROPHILS NFR BLD: 73.3 %
NEUTROPHILS NFR BLD: 78.7 %
NEUTROPHILS NFR BLD: 80.8 %
NEUTROPHILS NFR BLD: 81.1 %
NEUTROPHILS NFR BLD: 81.6 %
NEUTROPHILS NFR BLD: 82.3 %
NEUTROPHILS NFR BLD: 82.5 %
NEUTROPHILS NFR BLD: 82.6 %
NEUTROPHILS NFR BLD: 82.7 %
NEUTROPHILS NFR BLD: 82.9 %
NEUTROPHILS NFR BLD: 83 %
NEUTROPHILS NFR BLD: 83.1 %
NEUTROPHILS NFR BLD: 83.5 %
NEUTROPHILS NFR BLD: 83.7 %
NEUTROPHILS NFR BLD: 84.1 %
NEUTROPHILS NFR BLD: 84.5 %
NEUTROPHILS NFR BLD: 85.5 %
NEUTROPHILS NFR BLD: 86 %
NEUTROPHILS NFR BLD: 86.1 %
NEUTROPHILS NFR BLD: 86.2 %
NEUTROPHILS NFR BLD: 87.1 %
NITRITE UR QL STRIP: NEGATIVE
NONHDLC SERPL-MCNC: 43 MG/DL
NONHDLC SERPL-MCNC: 69 MG/DL
NRBC BLD-RTO: 0 /100 WBC
NRBC BLD-RTO: 1 /100 WBC
NRBC BLD-RTO: 2 /100 WBC
NRBC BLD-RTO: 4 /100 WBC
NRBC BLD-RTO: 4 /100 WBC
NRBC BLD-RTO: 5 /100 WBC
NRBC BLD-RTO: 6 /100 WBC
NRBC BLD-RTO: 7 /100 WBC
NRBC BLD-RTO: 7 /100 WBC
NSE SERPL-MCNC: 124 NG/ML
NSE SERPL-MCNC: 38 NG/ML
NSE SERPL-MCNC: NORMAL UG/L
NUM UNITS TRANS FFP: NORMAL
NUM UNITS TRANS FFP: NORMAL
NUM UNITS TRANS PACKED RBC: NORMAL
NUM UNITS TRANS PACKED RBC: NORMAL
OB PNL STL: POSITIVE
OPIATES UR QL SCN: NORMAL
OSMOLALITY UR: 381 MOSM/KG
OVALOCYTES BLD QL SMEAR: ABNORMAL
PCO2 BLDA: 22.1 MMHG (ref 35–45)
PCO2 BLDA: 23.2 MMHG (ref 35–45)
PCO2 BLDA: 23.9 MMHG (ref 35–45)
PCO2 BLDA: 25.2 MMHG (ref 35–45)
PCO2 BLDA: 26.3 MMHG (ref 35–45)
PCO2 BLDA: 28.6 MMHG (ref 35–45)
PCO2 BLDA: 29.7 MMHG (ref 35–45)
PCO2 BLDA: 29.7 MMHG (ref 35–45)
PCO2 BLDA: 30.6 MMHG (ref 35–45)
PCO2 BLDA: 30.8 MMHG (ref 35–45)
PCO2 BLDA: 31.1 MMHG (ref 35–45)
PCO2 BLDA: 31.5 MMHG (ref 35–45)
PCO2 BLDA: 31.9 MMHG (ref 35–45)
PCO2 BLDA: 33.1 MMHG (ref 35–45)
PCO2 BLDA: 33.2 MMHG (ref 35–45)
PCO2 BLDA: 33.5 MMHG (ref 35–45)
PCO2 BLDA: 33.6 MMHG (ref 35–45)
PCO2 BLDA: 34.7 MMHG (ref 35–45)
PCO2 BLDA: 36 MMHG (ref 35–45)
PCO2 BLDA: 38.4 MMHG (ref 35–45)
PCO2 BLDA: 39.1 MMHG (ref 35–45)
PCO2 BLDA: 39.3 MMHG (ref 35–45)
PCO2 BLDA: 41.1 MMHG (ref 35–45)
PCO2 BLDA: 49.2 MMHG (ref 35–45)
PCP UR QL SCN>25 NG/ML: NEGATIVE
PEEP: 101
PEEP: 5
PEEP: 8
PH SMN: 7.09 [PH] (ref 7.35–7.45)
PH SMN: 7.13 [PH] (ref 7.35–7.45)
PH SMN: 7.37 [PH] (ref 7.35–7.45)
PH SMN: 7.41 [PH] (ref 7.35–7.45)
PH SMN: 7.43 [PH] (ref 7.35–7.45)
PH SMN: 7.43 [PH] (ref 7.35–7.45)
PH SMN: 7.45 [PH] (ref 7.35–7.45)
PH SMN: 7.46 [PH] (ref 7.35–7.45)
PH SMN: 7.47 [PH] (ref 7.35–7.45)
PH SMN: 7.49 [PH] (ref 7.35–7.45)
PH SMN: 7.49 [PH] (ref 7.35–7.45)
PH SMN: 7.51 [PH] (ref 7.35–7.45)
PH SMN: 7.52 [PH] (ref 7.35–7.45)
PH SMN: 7.53 [PH] (ref 7.35–7.45)
PH SMN: 7.53 [PH] (ref 7.35–7.45)
PH SMN: 7.54 [PH] (ref 7.35–7.45)
PH SMN: 7.55 [PH] (ref 7.35–7.45)
PH SMN: 7.56 [PH] (ref 7.35–7.45)
PH SMN: 7.58 [PH] (ref 7.35–7.45)
PH SMN: 7.62 [PH] (ref 7.35–7.45)
PH UR STRIP: 5 [PH] (ref 5–8)
PH UR STRIP: 6 [PH] (ref 5–8)
PH UR STRIP: 7 [PH] (ref 5–8)
PH UR STRIP: 8 [PH] (ref 5–8)
PHOSPHATE SERPL-MCNC: 1.6 MG/DL
PHOSPHATE SERPL-MCNC: 1.7 MG/DL
PHOSPHATE SERPL-MCNC: 1.7 MG/DL
PHOSPHATE SERPL-MCNC: 1.8 MG/DL
PHOSPHATE SERPL-MCNC: 1.9 MG/DL
PHOSPHATE SERPL-MCNC: 2.3 MG/DL
PHOSPHATE SERPL-MCNC: 2.4 MG/DL
PHOSPHATE SERPL-MCNC: 2.7 MG/DL
PHOSPHATE SERPL-MCNC: 2.8 MG/DL
PHOSPHATE SERPL-MCNC: 2.9 MG/DL
PHOSPHATE SERPL-MCNC: 2.9 MG/DL
PHOSPHATE SERPL-MCNC: 3.1 MG/DL
PHOSPHATE SERPL-MCNC: 3.3 MG/DL
PHOSPHATE SERPL-MCNC: 3.3 MG/DL
PHOSPHATE SERPL-MCNC: 3.4 MG/DL
PHOSPHATE SERPL-MCNC: 3.5 MG/DL
PHOSPHATE SERPL-MCNC: 3.7 MG/DL
PHOSPHATE SERPL-MCNC: 3.7 MG/DL
PHOSPHATE SERPL-MCNC: 3.8 MG/DL
PHOSPHATE SERPL-MCNC: 3.9 MG/DL
PHOSPHATE SERPL-MCNC: 4 MG/DL
PHOSPHATE SERPL-MCNC: 4.3 MG/DL
PHOSPHATE SERPL-MCNC: 4.4 MG/DL
PHOSPHATE SERPL-MCNC: 4.7 MG/DL
PHOSPHATE SERPL-MCNC: 4.7 MG/DL
PHOSPHATE SERPL-MCNC: 4.9 MG/DL
PHOSPHATE SERPL-MCNC: 5.1 MG/DL
PHOSPHATE SERPL-MCNC: 5.1 MG/DL
PHOSPHATE SERPL-MCNC: 5.7 MG/DL
PHOSPHATE SERPL-MCNC: 6.4 MG/DL
PHOSPHATE SERPL-MCNC: 7.7 MG/DL
PIP: 13
PIP: 19
PIP: 22
PIP: 22
PIP: 27
PIP: 27
PIP: 29
PIP: 32
PIP: 32
PLATELET # BLD AUTO: 100 K/UL
PLATELET # BLD AUTO: 182 K/UL
PLATELET # BLD AUTO: 182 K/UL
PLATELET # BLD AUTO: 184 K/UL
PLATELET # BLD AUTO: 195 K/UL
PLATELET # BLD AUTO: 198 K/UL
PLATELET # BLD AUTO: 205 K/UL
PLATELET # BLD AUTO: 212 K/UL
PLATELET # BLD AUTO: 227 K/UL
PLATELET # BLD AUTO: 228 K/UL
PLATELET # BLD AUTO: 231 K/UL
PLATELET # BLD AUTO: 231 K/UL
PLATELET # BLD AUTO: 235 K/UL
PLATELET # BLD AUTO: 237 K/UL
PLATELET # BLD AUTO: 242 K/UL
PLATELET # BLD AUTO: 242 K/UL
PLATELET # BLD AUTO: 248 K/UL
PLATELET # BLD AUTO: 248 K/UL
PLATELET # BLD AUTO: 249 K/UL
PLATELET # BLD AUTO: 249 K/UL
PLATELET # BLD AUTO: 250 K/UL
PLATELET # BLD AUTO: 255 K/UL
PLATELET # BLD AUTO: 257 K/UL
PLATELET # BLD AUTO: 264 K/UL
PLATELET # BLD AUTO: 270 K/UL
PLATELET # BLD AUTO: 276 K/UL
PLATELET # BLD AUTO: 295 K/UL
PLATELET # BLD AUTO: 306 K/UL
PLATELET # BLD AUTO: 309 K/UL
PLATELET # BLD AUTO: 318 K/UL
PLATELET # BLD AUTO: 356 K/UL
PLATELET # BLD AUTO: 361 K/UL
PLATELET # BLD AUTO: 83 K/UL
PLATELET # BLD AUTO: 91 K/UL
PLATELET BLD QL SMEAR: ABNORMAL
PMV BLD AUTO: 10 FL
PMV BLD AUTO: 10.2 FL
PMV BLD AUTO: 10.3 FL
PMV BLD AUTO: 10.4 FL
PMV BLD AUTO: 10.4 FL
PMV BLD AUTO: 10.5 FL
PMV BLD AUTO: 10.6 FL
PMV BLD AUTO: 10.7 FL
PMV BLD AUTO: 10.7 FL
PMV BLD AUTO: 10.8 FL
PMV BLD AUTO: 10.9 FL
PMV BLD AUTO: 10.9 FL
PMV BLD AUTO: 11 FL
PMV BLD AUTO: 11.1 FL
PMV BLD AUTO: 11.1 FL
PMV BLD AUTO: 11.2 FL
PMV BLD AUTO: 11.3 FL
PMV BLD AUTO: 11.6 FL
PMV BLD AUTO: 12 FL
PMV BLD AUTO: 12.2 FL
PMV BLD AUTO: 12.5 FL
PMV BLD AUTO: 14 FL
PMV BLD AUTO: 9.8 FL
PMV BLD AUTO: 9.9 FL
PMV BLD AUTO: 9.9 FL
PO2 BLDA: 108 MMHG (ref 80–100)
PO2 BLDA: 114 MMHG (ref 80–100)
PO2 BLDA: 126 MMHG (ref 80–100)
PO2 BLDA: 129 MMHG (ref 80–100)
PO2 BLDA: 130 MMHG (ref 80–100)
PO2 BLDA: 133 MMHG (ref 80–100)
PO2 BLDA: 139 MMHG (ref 80–100)
PO2 BLDA: 141 MMHG (ref 80–100)
PO2 BLDA: 142 MMHG (ref 80–100)
PO2 BLDA: 149 MMHG (ref 80–100)
PO2 BLDA: 151 MMHG (ref 80–100)
PO2 BLDA: 155 MMHG (ref 80–100)
PO2 BLDA: 163 MMHG (ref 80–100)
PO2 BLDA: 172 MMHG (ref 80–100)
PO2 BLDA: 172 MMHG (ref 80–100)
PO2 BLDA: 177 MMHG (ref 80–100)
PO2 BLDA: 179 MMHG (ref 80–100)
PO2 BLDA: 198 MMHG (ref 80–100)
PO2 BLDA: 214 MMHG (ref 80–100)
PO2 BLDA: 219 MMHG (ref 80–100)
PO2 BLDA: 27 MMHG (ref 40–60)
PO2 BLDA: 382 MMHG (ref 80–100)
PO2 BLDA: 84 MMHG (ref 80–100)
PO2 BLDA: 87 MMHG (ref 80–100)
POC BE: -10 MMOL/L
POC BE: -11 MMOL/L
POC BE: -15 MMOL/L
POC BE: -2 MMOL/L
POC BE: -2 MMOL/L
POC BE: -21 MMOL/L
POC BE: -5 MMOL/L
POC BE: -6 MMOL/L
POC BE: -8 MMOL/L
POC BE: 0 MMOL/L
POC BE: 1 MMOL/L
POC BE: 1 MMOL/L
POC BE: 10 MMOL/L
POC BE: 11 MMOL/L
POC BE: 2 MMOL/L
POC BE: 4 MMOL/L
POC BE: 6 MMOL/L
POC BE: 7 MMOL/L
POC BE: 8 MMOL/L
POC BE: 8 MMOL/L
POC IONIZED CALCIUM: 1.02 MMOL/L (ref 1.06–1.42)
POC SATURATED O2: 100 % (ref 95–100)
POC SATURATED O2: 36 % (ref 95–100)
POC SATURATED O2: 96 % (ref 95–100)
POC SATURATED O2: 97 % (ref 95–100)
POC SATURATED O2: 97 % (ref 95–100)
POC SATURATED O2: 99 % (ref 95–100)
POC TCO2: 15 MMOL/L (ref 23–27)
POC TCO2: 15 MMOL/L (ref 23–27)
POC TCO2: 17 MMOL/L (ref 23–27)
POC TCO2: 18 MMOL/L (ref 23–27)
POC TCO2: 19 MMOL/L (ref 23–27)
POC TCO2: 21 MMOL/L (ref 23–27)
POC TCO2: 22 MMOL/L (ref 23–27)
POC TCO2: 23 MMOL/L (ref 23–27)
POC TCO2: 23 MMOL/L (ref 23–27)
POC TCO2: 24 MMOL/L (ref 23–27)
POC TCO2: 25 MMOL/L (ref 23–27)
POC TCO2: 25 MMOL/L (ref 23–27)
POC TCO2: 26 MMOL/L (ref 23–27)
POC TCO2: 27 MMOL/L (ref 23–27)
POC TCO2: 28 MMOL/L (ref 23–27)
POC TCO2: 30 MMOL/L (ref 23–27)
POC TCO2: 30 MMOL/L (ref 23–27)
POC TCO2: 31 MMOL/L (ref 23–27)
POC TCO2: 32 MMOL/L (ref 23–27)
POC TCO2: 35 MMOL/L (ref 23–27)
POC TCO2: 9 MMOL/L (ref 24–29)
POCT GLUCOSE: 102 MG/DL (ref 70–110)
POCT GLUCOSE: 103 MG/DL (ref 70–110)
POCT GLUCOSE: 103 MG/DL (ref 70–110)
POCT GLUCOSE: 104 MG/DL (ref 70–110)
POCT GLUCOSE: 105 MG/DL (ref 70–110)
POCT GLUCOSE: 106 MG/DL (ref 70–110)
POCT GLUCOSE: 106 MG/DL (ref 70–110)
POCT GLUCOSE: 107 MG/DL (ref 70–110)
POCT GLUCOSE: 109 MG/DL (ref 70–110)
POCT GLUCOSE: 109 MG/DL (ref 70–110)
POCT GLUCOSE: 111 MG/DL (ref 70–110)
POCT GLUCOSE: 112 MG/DL (ref 70–110)
POCT GLUCOSE: 113 MG/DL (ref 70–110)
POCT GLUCOSE: 114 MG/DL (ref 70–110)
POCT GLUCOSE: 117 MG/DL (ref 70–110)
POCT GLUCOSE: 118 MG/DL (ref 70–110)
POCT GLUCOSE: 120 MG/DL (ref 70–110)
POCT GLUCOSE: 120 MG/DL (ref 70–110)
POCT GLUCOSE: 121 MG/DL (ref 70–110)
POCT GLUCOSE: 122 MG/DL (ref 70–110)
POCT GLUCOSE: 124 MG/DL (ref 70–110)
POCT GLUCOSE: 125 MG/DL (ref 70–110)
POCT GLUCOSE: 126 MG/DL (ref 70–110)
POCT GLUCOSE: 126 MG/DL (ref 70–110)
POCT GLUCOSE: 127 MG/DL (ref 70–110)
POCT GLUCOSE: 129 MG/DL (ref 70–110)
POCT GLUCOSE: 129 MG/DL (ref 70–110)
POCT GLUCOSE: 130 MG/DL (ref 70–110)
POCT GLUCOSE: 131 MG/DL (ref 70–110)
POCT GLUCOSE: 131 MG/DL (ref 70–110)
POCT GLUCOSE: 132 MG/DL (ref 70–110)
POCT GLUCOSE: 133 MG/DL (ref 70–110)
POCT GLUCOSE: 134 MG/DL (ref 70–110)
POCT GLUCOSE: 135 MG/DL (ref 70–110)
POCT GLUCOSE: 136 MG/DL (ref 70–110)
POCT GLUCOSE: 136 MG/DL (ref 70–110)
POCT GLUCOSE: 137 MG/DL (ref 70–110)
POCT GLUCOSE: 137 MG/DL (ref 70–110)
POCT GLUCOSE: 138 MG/DL (ref 70–110)
POCT GLUCOSE: 141 MG/DL (ref 70–110)
POCT GLUCOSE: 143 MG/DL (ref 70–110)
POCT GLUCOSE: 143 MG/DL (ref 70–110)
POCT GLUCOSE: 144 MG/DL (ref 70–110)
POCT GLUCOSE: 144 MG/DL (ref 70–110)
POCT GLUCOSE: 145 MG/DL (ref 70–110)
POCT GLUCOSE: 147 MG/DL (ref 70–110)
POCT GLUCOSE: 147 MG/DL (ref 70–110)
POCT GLUCOSE: 148 MG/DL (ref 70–110)
POCT GLUCOSE: 148 MG/DL (ref 70–110)
POCT GLUCOSE: 149 MG/DL (ref 70–110)
POCT GLUCOSE: 150 MG/DL (ref 70–110)
POCT GLUCOSE: 151 MG/DL (ref 70–110)
POCT GLUCOSE: 152 MG/DL (ref 70–110)
POCT GLUCOSE: 153 MG/DL (ref 70–110)
POCT GLUCOSE: 153 MG/DL (ref 70–110)
POCT GLUCOSE: 155 MG/DL (ref 70–110)
POCT GLUCOSE: 157 MG/DL (ref 70–110)
POCT GLUCOSE: 157 MG/DL (ref 70–110)
POCT GLUCOSE: 159 MG/DL (ref 70–110)
POCT GLUCOSE: 161 MG/DL (ref 70–110)
POCT GLUCOSE: 161 MG/DL (ref 70–110)
POCT GLUCOSE: 162 MG/DL (ref 70–110)
POCT GLUCOSE: 163 MG/DL (ref 70–110)
POCT GLUCOSE: 163 MG/DL (ref 70–110)
POCT GLUCOSE: 164 MG/DL (ref 70–110)
POCT GLUCOSE: 164 MG/DL (ref 70–110)
POCT GLUCOSE: 166 MG/DL (ref 70–110)
POCT GLUCOSE: 167 MG/DL (ref 70–110)
POCT GLUCOSE: 168 MG/DL (ref 70–110)
POCT GLUCOSE: 171 MG/DL (ref 70–110)
POCT GLUCOSE: 171 MG/DL (ref 70–110)
POCT GLUCOSE: 175 MG/DL (ref 70–110)
POCT GLUCOSE: 177 MG/DL (ref 70–110)
POCT GLUCOSE: 179 MG/DL (ref 70–110)
POCT GLUCOSE: 186 MG/DL (ref 70–110)
POCT GLUCOSE: 192 MG/DL (ref 70–110)
POCT GLUCOSE: 193 MG/DL (ref 70–110)
POCT GLUCOSE: 196 MG/DL (ref 70–110)
POCT GLUCOSE: 197 MG/DL (ref 70–110)
POCT GLUCOSE: 199 MG/DL (ref 70–110)
POCT GLUCOSE: 208 MG/DL (ref 70–110)
POCT GLUCOSE: 219 MG/DL (ref 70–110)
POCT GLUCOSE: 222 MG/DL (ref 70–110)
POCT GLUCOSE: 255 MG/DL (ref 70–110)
POCT GLUCOSE: 306 MG/DL (ref 70–110)
POCT GLUCOSE: 70 MG/DL (ref 70–110)
POCT GLUCOSE: 77 MG/DL (ref 70–110)
POCT GLUCOSE: 78 MG/DL (ref 70–110)
POCT GLUCOSE: 80 MG/DL (ref 70–110)
POCT GLUCOSE: 84 MG/DL (ref 70–110)
POCT GLUCOSE: 85 MG/DL (ref 70–110)
POCT GLUCOSE: 87 MG/DL (ref 70–110)
POCT GLUCOSE: 87 MG/DL (ref 70–110)
POCT GLUCOSE: 91 MG/DL (ref 70–110)
POCT GLUCOSE: 94 MG/DL (ref 70–110)
POCT GLUCOSE: 95 MG/DL (ref 70–110)
POCT GLUCOSE: 95 MG/DL (ref 70–110)
POCT GLUCOSE: 96 MG/DL (ref 70–110)
POCT GLUCOSE: 96 MG/DL (ref 70–110)
POCT GLUCOSE: 98 MG/DL (ref 70–110)
POCT GLUCOSE: 98 MG/DL (ref 70–110)
POCT GLUCOSE: 99 MG/DL (ref 70–110)
POCT GLUCOSE: 99 MG/DL (ref 70–110)
POCT GLUCOSE: <20 MG/DL (ref 70–110)
POIKILOCYTOSIS BLD QL SMEAR: ABNORMAL
POIKILOCYTOSIS BLD QL SMEAR: ABNORMAL
POIKILOCYTOSIS BLD QL SMEAR: SLIGHT
POLYCHROMASIA BLD QL SMEAR: ABNORMAL
POTASSIUM BLD-SCNC: 4.6 MMOL/L (ref 3.5–5.1)
POTASSIUM SERPL-SCNC: 2.7 MMOL/L
POTASSIUM SERPL-SCNC: 2.8 MMOL/L
POTASSIUM SERPL-SCNC: 2.9 MMOL/L
POTASSIUM SERPL-SCNC: 3 MMOL/L
POTASSIUM SERPL-SCNC: 3.1 MMOL/L
POTASSIUM SERPL-SCNC: 3.2 MMOL/L
POTASSIUM SERPL-SCNC: 3.3 MMOL/L
POTASSIUM SERPL-SCNC: 3.4 MMOL/L
POTASSIUM SERPL-SCNC: 3.5 MMOL/L
POTASSIUM SERPL-SCNC: 3.6 MMOL/L
POTASSIUM SERPL-SCNC: 3.7 MMOL/L
POTASSIUM SERPL-SCNC: 3.7 MMOL/L
POTASSIUM SERPL-SCNC: 3.8 MMOL/L
POTASSIUM SERPL-SCNC: 3.9 MMOL/L
POTASSIUM SERPL-SCNC: 4 MMOL/L
POTASSIUM SERPL-SCNC: 4.1 MMOL/L
POTASSIUM SERPL-SCNC: 4.2 MMOL/L
POTASSIUM SERPL-SCNC: 4.3 MMOL/L
POTASSIUM SERPL-SCNC: 4.4 MMOL/L
POTASSIUM SERPL-SCNC: 4.4 MMOL/L
POTASSIUM SERPL-SCNC: 4.5 MMOL/L
POTASSIUM SERPL-SCNC: 4.5 MMOL/L
POTASSIUM SERPL-SCNC: 4.7 MMOL/L
POTASSIUM SERPL-SCNC: 4.7 MMOL/L
POTASSIUM SERPL-SCNC: 4.8 MMOL/L
POTASSIUM SERPL-SCNC: 5 MMOL/L
POTASSIUM UR-SCNC: 14 MMOL/L
PRE FEV1 FVC: 74
PRE FEV1: 1.83
PRE FVC: 2.46
PREDICTED FEV1 FVC: 80
PREDICTED FEV1: 2.28
PREDICTED FVC: 2.86
PROCALCITONIN SERPL IA-MCNC: 0.15 NG/ML
PROCALCITONIN SERPL IA-MCNC: 0.37 NG/ML
PROCALCITONIN SERPL IA-MCNC: 0.76 NG/ML
PROCALCITONIN SERPL IA-MCNC: 1.18 NG/ML
PROT SERPL-MCNC: 4.9 G/DL
PROT SERPL-MCNC: 5 G/DL
PROT SERPL-MCNC: 5.2 G/DL
PROT SERPL-MCNC: 5.4 G/DL
PROT SERPL-MCNC: 5.5 G/DL
PROT SERPL-MCNC: 5.6 G/DL
PROT SERPL-MCNC: 5.6 G/DL
PROT SERPL-MCNC: 5.7 G/DL
PROT SERPL-MCNC: 5.8 G/DL
PROT SERPL-MCNC: 5.9 G/DL
PROT SERPL-MCNC: 6 G/DL
PROT SERPL-MCNC: 6.1 G/DL
PROT SERPL-MCNC: 6.1 G/DL
PROT SERPL-MCNC: 6.2 G/DL
PROT SERPL-MCNC: 6.2 G/DL
PROT SERPL-MCNC: 6.3 G/DL
PROT SERPL-MCNC: 6.4 G/DL
PROT SERPL-MCNC: 6.5 G/DL
PROT SERPL-MCNC: 6.6 G/DL
PROT SERPL-MCNC: 6.7 G/DL
PROT SERPL-MCNC: 6.8 G/DL
PROT SERPL-MCNC: 6.9 G/DL
PROT SERPL-MCNC: 7 G/DL
PROT SERPL-MCNC: 7.1 G/DL
PROT SERPL-MCNC: 7.4 G/DL
PROT SERPL-MCNC: 7.4 G/DL
PROT SERPL-MCNC: 7.5 G/DL
PROT SERPL-MCNC: 9.2 G/DL
PROT UR QL STRIP: ABNORMAL
PROT UR QL STRIP: NEGATIVE
PROTHROMBIN TIME: 12 SEC
PROTHROMBIN TIME: 13.6 SEC
PROTHROMBIN TIME: 14 SEC
PROTHROMBIN TIME: 14.3 SEC
PROTHROMBIN TIME: 14.4 SEC
PROTHROMBIN TIME: 15.1 SEC
PROTHROMBIN TIME: 16 SEC
PROTHROMBIN TIME: 16.2 SEC
PROTHROMBIN TIME: 17 SEC
PROTHROMBIN TIME: 17.5 SEC
PROTHROMBIN TIME: 17.9 SEC
PROTHROMBIN TIME: 18.7 SEC
PROTHROMBIN TIME: 19.7 SEC
PROTHROMBIN TIME: 20.9 SEC
PROTHROMBIN TIME: 21.5 SEC
PROTHROMBIN TIME: 22 SEC
PROTHROMBIN TIME: 22.5 SEC
PROTHROMBIN TIME: 22.5 SEC
PROTHROMBIN TIME: 23 SEC
PROTHROMBIN TIME: 23.3 SEC
PROTHROMBIN TIME: 23.7 SEC
PROTHROMBIN TIME: 24.3 SEC
PROTHROMBIN TIME: 26 SEC
PROTHROMBIN TIME: 29.1 SEC
PROTHROMBIN TIME: 30.8 SEC
PROTHROMBIN TIME: 31.9 SEC
PROTHROMBIN TIME: 32.2 SEC
PROTHROMBIN TIME: 32.6 SEC
PROTHROMBIN TIME: 33.4 SEC
PROTHROMBIN TIME: 34.3 SEC
PS: 10
PS: 14
RBC # BLD AUTO: 2.68 M/UL
RBC # BLD AUTO: 2.99 M/UL
RBC # BLD AUTO: 3.02 M/UL
RBC # BLD AUTO: 3.06 M/UL
RBC # BLD AUTO: 3.06 M/UL
RBC # BLD AUTO: 3.09 M/UL
RBC # BLD AUTO: 3.1 M/UL
RBC # BLD AUTO: 3.12 M/UL
RBC # BLD AUTO: 3.16 M/UL
RBC # BLD AUTO: 3.23 M/UL
RBC # BLD AUTO: 3.24 M/UL
RBC # BLD AUTO: 3.28 M/UL
RBC # BLD AUTO: 3.31 M/UL
RBC # BLD AUTO: 3.32 M/UL
RBC # BLD AUTO: 3.34 M/UL
RBC # BLD AUTO: 3.35 M/UL
RBC # BLD AUTO: 3.38 M/UL
RBC # BLD AUTO: 3.38 M/UL
RBC # BLD AUTO: 3.41 M/UL
RBC # BLD AUTO: 3.41 M/UL
RBC # BLD AUTO: 3.43 M/UL
RBC # BLD AUTO: 3.44 M/UL
RBC # BLD AUTO: 3.46 M/UL
RBC # BLD AUTO: 3.47 M/UL
RBC # BLD AUTO: 3.48 M/UL
RBC # BLD AUTO: 3.52 M/UL
RBC # BLD AUTO: 3.53 M/UL
RBC # BLD AUTO: 3.53 M/UL
RBC # BLD AUTO: 3.54 M/UL
RBC # BLD AUTO: 3.58 M/UL
RBC # BLD AUTO: 3.58 M/UL
RBC # BLD AUTO: 3.7 M/UL
RBC # BLD AUTO: 3.81 M/UL
RBC # BLD AUTO: 3.84 M/UL
RBC # BLD AUTO: 3.85 M/UL
RBC # BLD AUTO: 3.86 M/UL
RBC #/AREA URNS AUTO: 0 /HPF (ref 0–4)
RBC #/AREA URNS AUTO: 2 /HPF (ref 0–4)
RBC #/AREA URNS AUTO: 3 /HPF (ref 0–4)
RBC #/AREA URNS AUTO: 5 /HPF (ref 0–4)
RBC #/AREA URNS HPF: 1 /HPF (ref 0–4)
RBC #/AREA URNS HPF: 2 /HPF (ref 0–4)
RETIRED EF AND QEF - SEE NOTES: 15 (ref 55–65)
RETIRED EF AND QEF - SEE NOTES: 20 (ref 55–65)
SAMPLE: ABNORMAL
SATURATED IRON: 3 %
SATURATED IRON: 4 %
SATURATED IRON: 5 %
SCHISTOCYTES BLD QL SMEAR: ABNORMAL
SCHISTOCYTES BLD QL SMEAR: ABNORMAL
SITE: ABNORMAL
SODIUM BLD-SCNC: 136 MMOL/L (ref 136–145)
SODIUM SERPL-SCNC: 133 MMOL/L
SODIUM SERPL-SCNC: 135 MMOL/L
SODIUM SERPL-SCNC: 135 MMOL/L
SODIUM SERPL-SCNC: 136 MMOL/L
SODIUM SERPL-SCNC: 137 MMOL/L
SODIUM SERPL-SCNC: 138 MMOL/L
SODIUM SERPL-SCNC: 139 MMOL/L
SODIUM SERPL-SCNC: 140 MMOL/L
SODIUM SERPL-SCNC: 141 MMOL/L
SODIUM SERPL-SCNC: 142 MMOL/L
SODIUM SERPL-SCNC: 143 MMOL/L
SODIUM SERPL-SCNC: 144 MMOL/L
SODIUM SERPL-SCNC: 145 MMOL/L
SODIUM SERPL-SCNC: 151 MMOL/L
SODIUM SERPL-SCNC: 154 MMOL/L
SODIUM SERPL-SCNC: 154 MMOL/L
SODIUM SERPL-SCNC: 155 MMOL/L
SODIUM SERPL-SCNC: 156 MMOL/L
SODIUM SERPL-SCNC: 157 MMOL/L
SODIUM SERPL-SCNC: 157 MMOL/L
SODIUM SERPL-SCNC: 158 MMOL/L
SODIUM SERPL-SCNC: 159 MMOL/L
SODIUM SERPL-SCNC: 161 MMOL/L
SODIUM SERPL-SCNC: 163 MMOL/L
SODIUM SERPL-SCNC: 163 MMOL/L
SODIUM SERPL-SCNC: 166 MMOL/L
SODIUM SERPL-SCNC: 166 MMOL/L
SODIUM SERPL-SCNC: 169 MMOL/L
SODIUM UR-SCNC: 142 MMOL/L
SODIUM UR-SCNC: <20 MMOL/L
SP GR UR STRIP: 1.01 (ref 1–1.03)
SP GR UR STRIP: 1.02 (ref 1–1.03)
SP GR UR STRIP: 1.02 (ref 1–1.03)
SP GR UR STRIP: >=1.03 (ref 1–1.03)
SP02: 100
SP02: 35
SP02: 60
SP02: 98
SP02: 98
SP02: 99
SQUAMOUS #/AREA URNS AUTO: 1 /HPF
SQUAMOUS #/AREA URNS AUTO: 8 /HPF
SQUAMOUS #/AREA URNS HPF: 3 /HPF
T4 FREE SERPL-MCNC: 0.48 NG/DL
T4 FREE SERPL-MCNC: 0.5 NG/DL
T4 FREE SERPL-MCNC: 0.57 NG/DL
T4 FREE SERPL-MCNC: 0.67 NG/DL
T4 FREE SERPL-MCNC: 0.7 NG/DL
T4 FREE SERPL-MCNC: 0.71 NG/DL
T4 FREE SERPL-MCNC: 0.73 NG/DL
T4 FREE SERPL-MCNC: 0.74 NG/DL
T4 FREE SERPL-MCNC: 0.75 NG/DL
T4 FREE SERPL-MCNC: 0.77 NG/DL
T4 FREE SERPL-MCNC: 0.78 NG/DL
T4 FREE SERPL-MCNC: 0.79 NG/DL
T4 FREE SERPL-MCNC: 0.81 NG/DL
T4 FREE SERPL-MCNC: 0.84 NG/DL
T4 FREE SERPL-MCNC: 0.84 NG/DL
T4 FREE SERPL-MCNC: <0.4 NG/DL
TARGETS BLD QL SMEAR: ABNORMAL
THYROGLOB AB SERPL IA-ACNC: <4 IU/ML
THYROPEROXIDASE IGG SERPL-ACNC: 6.7 IU/ML
TOTAL IRON BINDING CAPACITY: 408 UG/DL
TOTAL IRON BINDING CAPACITY: 417 UG/DL
TOTAL IRON BINDING CAPACITY: 514 UG/DL
TOXIC GRANULES BLD QL SMEAR: PRESENT
TOXICOLOGY INFORMATION: NORMAL
TRANSFERRIN SERPL-MCNC: 276 MG/DL
TRANSFERRIN SERPL-MCNC: 282 MG/DL
TRANSFERRIN SERPL-MCNC: 347 MG/DL
TRICUSPID VALVE REGURGITATION: ABNORMAL
TRICUSPID VALVE REGURGITATION: ABNORMAL
TRIGL SERPL-MCNC: 126 MG/DL
TRIGL SERPL-MCNC: 63 MG/DL
TROPONIN I SERPL DL<=0.01 NG/ML-MCNC: 0.02 NG/ML
TROPONIN I SERPL DL<=0.01 NG/ML-MCNC: 0.05 NG/ML
TROPONIN I SERPL DL<=0.01 NG/ML-MCNC: 0.05 NG/ML
TROPONIN I SERPL DL<=0.01 NG/ML-MCNC: 0.06 NG/ML
TROPONIN I SERPL DL<=0.01 NG/ML-MCNC: 0.12 NG/ML
TROPONIN I SERPL DL<=0.01 NG/ML-MCNC: 0.12 NG/ML
TROPONIN I SERPL DL<=0.01 NG/ML-MCNC: 0.22 NG/ML
TROPONIN I SERPL DL<=0.01 NG/ML-MCNC: 0.33 NG/ML
TROPONIN I SERPL DL<=0.01 NG/ML-MCNC: 0.4 NG/ML
TSH SERPL DL<=0.005 MIU/L-ACNC: 18.35 UIU/ML
TSH SERPL DL<=0.005 MIU/L-ACNC: 59.1 UIU/ML
TSH SERPL DL<=0.005 MIU/L-ACNC: 68.71 UIU/ML
TSH SERPL DL<=0.005 MIU/L-ACNC: 75 UIU/ML
URN SPEC COLLECT METH UR: ABNORMAL
URN SPEC COLLECT METH UR: NORMAL
URN SPEC COLLECT METH UR: NORMAL
UROBILINOGEN UR STRIP-ACNC: 1 EU/DL
UROBILINOGEN UR STRIP-ACNC: 2 EU/DL
UROBILINOGEN UR STRIP-ACNC: 4 EU/DL
UROBILINOGEN UR STRIP-ACNC: NEGATIVE EU/DL
UUN UR-MCNC: 566 MG/DL
VANCOMYCIN SERPL-MCNC: 11.8 UG/ML
VANCOMYCIN SERPL-MCNC: 21.1 UG/ML
VANCOMYCIN SERPL-MCNC: 36.8 UG/ML
VANCOMYCIN TROUGH SERPL-MCNC: 27.1 UG/ML
VIT B12 SERPL-MCNC: 841 PG/ML
VIT B12 SERPL-MCNC: 861 PG/ML
VT: 450
WBC # BLD AUTO: 10.02 K/UL
WBC # BLD AUTO: 10.19 K/UL
WBC # BLD AUTO: 10.2 K/UL
WBC # BLD AUTO: 10.42 K/UL
WBC # BLD AUTO: 10.76 K/UL
WBC # BLD AUTO: 11.56 K/UL
WBC # BLD AUTO: 12.22 K/UL
WBC # BLD AUTO: 12.38 K/UL
WBC # BLD AUTO: 14.89 K/UL
WBC # BLD AUTO: 16.72 K/UL
WBC # BLD AUTO: 4.33 K/UL
WBC # BLD AUTO: 4.56 K/UL
WBC # BLD AUTO: 4.75 K/UL
WBC # BLD AUTO: 4.97 K/UL
WBC # BLD AUTO: 5.72 K/UL
WBC # BLD AUTO: 5.86 K/UL
WBC # BLD AUTO: 6.11 K/UL
WBC # BLD AUTO: 6.28 K/UL
WBC # BLD AUTO: 6.31 K/UL
WBC # BLD AUTO: 6.32 K/UL
WBC # BLD AUTO: 6.44 K/UL
WBC # BLD AUTO: 6.56 K/UL
WBC # BLD AUTO: 6.77 K/UL
WBC # BLD AUTO: 7.03 K/UL
WBC # BLD AUTO: 7.03 K/UL
WBC # BLD AUTO: 7.54 K/UL
WBC # BLD AUTO: 8 K/UL
WBC # BLD AUTO: 8.03 K/UL
WBC # BLD AUTO: 8.13 K/UL
WBC # BLD AUTO: 8.17 K/UL
WBC # BLD AUTO: 8.21 K/UL
WBC # BLD AUTO: 8.22 K/UL
WBC # BLD AUTO: 8.24 K/UL
WBC # BLD AUTO: 8.77 K/UL
WBC # BLD AUTO: 9 K/UL
WBC # BLD AUTO: 9.75 K/UL
WBC #/AREA URNS AUTO: 1 /HPF (ref 0–5)
WBC #/AREA URNS AUTO: 4 /HPF (ref 0–5)
WBC #/AREA URNS AUTO: 7 /HPF (ref 0–5)
WBC #/AREA URNS HPF: 0 /HPF (ref 0–5)
WBC #/AREA URNS HPF: 3 /HPF (ref 0–5)

## 2018-01-01 PROCEDURE — 87070 CULTURE OTHR SPECIMN AEROBIC: CPT

## 2018-01-01 PROCEDURE — 99291 CRITICAL CARE FIRST HOUR: CPT

## 2018-01-01 PROCEDURE — 25000003 PHARM REV CODE 250: Performed by: HOSPITALIST

## 2018-01-01 PROCEDURE — 99900035 HC TECH TIME PER 15 MIN (STAT)

## 2018-01-01 PROCEDURE — 99285 EMERGENCY DEPT VISIT HI MDM: CPT | Mod: 25

## 2018-01-01 PROCEDURE — 84540 ASSAY OF URINE/UREA-N: CPT

## 2018-01-01 PROCEDURE — 25000003 PHARM REV CODE 250: Performed by: NURSE PRACTITIONER

## 2018-01-01 PROCEDURE — 81001 URINALYSIS AUTO W/SCOPE: CPT

## 2018-01-01 PROCEDURE — 84145 PROCALCITONIN (PCT): CPT

## 2018-01-01 PROCEDURE — 63600175 PHARM REV CODE 636 W HCPCS: Performed by: NURSE PRACTITIONER

## 2018-01-01 PROCEDURE — 25000003 PHARM REV CODE 250: Performed by: STUDENT IN AN ORGANIZED HEALTH CARE EDUCATION/TRAINING PROGRAM

## 2018-01-01 PROCEDURE — 85610 PROTHROMBIN TIME: CPT

## 2018-01-01 PROCEDURE — 97802 MEDICAL NUTRITION INDIV IN: CPT

## 2018-01-01 PROCEDURE — 99900026 HC AIRWAY MAINTENANCE (STAT)

## 2018-01-01 PROCEDURE — 99999 PR PBB SHADOW E&M-EST. PATIENT-LVL IV: CPT | Mod: PBBFAC,,, | Performed by: THORACIC SURGERY (CARDIOTHORACIC VASCULAR SURGERY)

## 2018-01-01 PROCEDURE — 25000003 PHARM REV CODE 250: Performed by: ANESTHESIOLOGY

## 2018-01-01 PROCEDURE — 20000000 HC ICU ROOM

## 2018-01-01 PROCEDURE — 83605 ASSAY OF LACTIC ACID: CPT

## 2018-01-01 PROCEDURE — 85025 COMPLETE CBC W/AUTO DIFF WBC: CPT

## 2018-01-01 PROCEDURE — 94761 N-INVAS EAR/PLS OXIMETRY MLT: CPT

## 2018-01-01 PROCEDURE — 95951 PR EEG MONITORING/VIDEORECORD: CPT | Mod: 26,,, | Performed by: PSYCHIATRY & NEUROLOGY

## 2018-01-01 PROCEDURE — 63600175 PHARM REV CODE 636 W HCPCS: Performed by: STUDENT IN AN ORGANIZED HEALTH CARE EDUCATION/TRAINING PROGRAM

## 2018-01-01 PROCEDURE — 99222 1ST HOSP IP/OBS MODERATE 55: CPT | Mod: ,,, | Performed by: INTERNAL MEDICINE

## 2018-01-01 PROCEDURE — 25000003 PHARM REV CODE 250: Performed by: EMERGENCY MEDICINE

## 2018-01-01 PROCEDURE — 97530 THERAPEUTIC ACTIVITIES: CPT

## 2018-01-01 PROCEDURE — 97116 GAIT TRAINING THERAPY: CPT

## 2018-01-01 PROCEDURE — 94003 VENT MGMT INPAT SUBQ DAY: CPT

## 2018-01-01 PROCEDURE — 99223 1ST HOSP IP/OBS HIGH 75: CPT | Mod: ,,, | Performed by: HOSPITALIST

## 2018-01-01 PROCEDURE — 90471 IMMUNIZATION ADMIN: CPT | Mod: S$GLB,,, | Performed by: INTERNAL MEDICINE

## 2018-01-01 PROCEDURE — 27000221 HC OXYGEN, UP TO 24 HOURS

## 2018-01-01 PROCEDURE — 36415 COLL VENOUS BLD VENIPUNCTURE: CPT

## 2018-01-01 PROCEDURE — 85730 THROMBOPLASTIN TIME PARTIAL: CPT

## 2018-01-01 PROCEDURE — 99199 UNLISTED SPECIAL SVC PX/RPRT: CPT

## 2018-01-01 PROCEDURE — 83735 ASSAY OF MAGNESIUM: CPT

## 2018-01-01 PROCEDURE — 97161 PT EVAL LOW COMPLEX 20 MIN: CPT

## 2018-01-01 PROCEDURE — 97166 OT EVAL MOD COMPLEX 45 MIN: CPT

## 2018-01-01 PROCEDURE — 83690 ASSAY OF LIPASE: CPT

## 2018-01-01 PROCEDURE — G8996 SWALLOW CURRENT STATUS: HCPCS | Mod: CH

## 2018-01-01 PROCEDURE — 97116 GAIT TRAINING THERAPY: CPT | Mod: 59

## 2018-01-01 PROCEDURE — 87205 SMEAR GRAM STAIN: CPT

## 2018-01-01 PROCEDURE — 37799 UNLISTED PX VASCULAR SURGERY: CPT

## 2018-01-01 PROCEDURE — 83520 IMMUNOASSAY QUANT NOS NONAB: CPT

## 2018-01-01 PROCEDURE — 84439 ASSAY OF FREE THYROXINE: CPT

## 2018-01-01 PROCEDURE — 12000002 HC ACUTE/MED SURGE SEMI-PRIVATE ROOM

## 2018-01-01 PROCEDURE — 80053 COMPREHEN METABOLIC PANEL: CPT | Mod: 91

## 2018-01-01 PROCEDURE — 63600175 PHARM REV CODE 636 W HCPCS: Performed by: PHYSICIAN ASSISTANT

## 2018-01-01 PROCEDURE — 93005 ELECTROCARDIOGRAM TRACING: CPT

## 2018-01-01 PROCEDURE — 83880 ASSAY OF NATRIURETIC PEPTIDE: CPT

## 2018-01-01 PROCEDURE — 99495 TRANSJ CARE MGMT MOD F2F 14D: CPT | Mod: SA,25,S$GLB, | Performed by: NURSE PRACTITIONER

## 2018-01-01 PROCEDURE — 25000003 PHARM REV CODE 250

## 2018-01-01 PROCEDURE — 80053 COMPREHEN METABOLIC PANEL: CPT

## 2018-01-01 PROCEDURE — 99214 OFFICE O/P EST MOD 30 MIN: CPT | Mod: S$PBB,,, | Performed by: INTERNAL MEDICINE

## 2018-01-01 PROCEDURE — 99291 CRITICAL CARE FIRST HOUR: CPT | Mod: ,,, | Performed by: ANESTHESIOLOGY

## 2018-01-01 PROCEDURE — 83605 ASSAY OF LACTIC ACID: CPT | Mod: 91

## 2018-01-01 PROCEDURE — 63600175 PHARM REV CODE 636 W HCPCS: Mod: JG

## 2018-01-01 PROCEDURE — P9016 RBC LEUKOCYTES REDUCED: HCPCS

## 2018-01-01 PROCEDURE — 96365 THER/PROPH/DIAG IV INF INIT: CPT | Mod: 59

## 2018-01-01 PROCEDURE — 99291 CRITICAL CARE FIRST HOUR: CPT | Mod: ,,, | Performed by: NURSE PRACTITIONER

## 2018-01-01 PROCEDURE — 84484 ASSAY OF TROPONIN QUANT: CPT

## 2018-01-01 PROCEDURE — 97803 MED NUTRITION INDIV SUBSEQ: CPT

## 2018-01-01 PROCEDURE — 83735 ASSAY OF MAGNESIUM: CPT | Mod: 91

## 2018-01-01 PROCEDURE — 93880 EXTRACRANIAL BILAT STUDY: CPT | Mod: S$GLB,,, | Performed by: SURGERY

## 2018-01-01 PROCEDURE — 93306 TTE W/DOPPLER COMPLETE: CPT | Mod: 26,,, | Performed by: INTERNAL MEDICINE

## 2018-01-01 PROCEDURE — 85025 COMPLETE CBC W/AUTO DIFF WBC: CPT | Mod: 91

## 2018-01-01 PROCEDURE — P9017 PLASMA 1 DONOR FRZ W/IN 8 HR: HCPCS

## 2018-01-01 PROCEDURE — 84295 ASSAY OF SERUM SODIUM: CPT

## 2018-01-01 PROCEDURE — 76937 US GUIDE VASCULAR ACCESS: CPT | Performed by: STUDENT IN AN ORGANIZED HEALTH CARE EDUCATION/TRAINING PROGRAM

## 2018-01-01 PROCEDURE — 93306 TTE W/DOPPLER COMPLETE: CPT

## 2018-01-01 PROCEDURE — 25000003 PHARM REV CODE 250: Performed by: PHYSICIAN ASSISTANT

## 2018-01-01 PROCEDURE — 99291 CRITICAL CARE FIRST HOUR: CPT | Mod: ,,, | Performed by: INTERNAL MEDICINE

## 2018-01-01 PROCEDURE — 97165 OT EVAL LOW COMPLEX 30 MIN: CPT

## 2018-01-01 PROCEDURE — 63600175 PHARM REV CODE 636 W HCPCS: Performed by: HOSPITALIST

## 2018-01-01 PROCEDURE — C1751 CATH, INF, PER/CENT/MIDLINE: HCPCS | Performed by: STUDENT IN AN ORGANIZED HEALTH CARE EDUCATION/TRAINING PROGRAM

## 2018-01-01 PROCEDURE — 31500 INSERT EMERGENCY AIRWAY: CPT

## 2018-01-01 PROCEDURE — G0378 HOSPITAL OBSERVATION PER HR: HCPCS

## 2018-01-01 PROCEDURE — 82803 BLOOD GASES ANY COMBINATION: CPT

## 2018-01-01 PROCEDURE — 95951 HC EEG MONITORING/VIDEO RECORD: CPT

## 2018-01-01 PROCEDURE — 27200966 HC CLOSED SUCTION SYSTEM

## 2018-01-01 PROCEDURE — 36600 WITHDRAWAL OF ARTERIAL BLOOD: CPT

## 2018-01-01 PROCEDURE — 87186 SC STD MICRODIL/AGAR DIL: CPT | Mod: 59

## 2018-01-01 PROCEDURE — 96368 THER/DIAG CONCURRENT INF: CPT | Mod: 59

## 2018-01-01 PROCEDURE — 84100 ASSAY OF PHOSPHORUS: CPT

## 2018-01-01 PROCEDURE — 99291 CRITICAL CARE FIRST HOUR: CPT | Mod: ,,, | Performed by: PSYCHIATRY & NEUROLOGY

## 2018-01-01 PROCEDURE — 81003 URINALYSIS AUTO W/O SCOPE: CPT

## 2018-01-01 PROCEDURE — 90670 PCV13 VACCINE IM: CPT | Mod: S$GLB,,, | Performed by: INTERNAL MEDICINE

## 2018-01-01 PROCEDURE — 80061 LIPID PANEL: CPT

## 2018-01-01 PROCEDURE — 25000003 PHARM REV CODE 250: Performed by: ORTHOPAEDIC SURGERY

## 2018-01-01 PROCEDURE — 93000 ELECTROCARDIOGRAM COMPLETE: CPT | Mod: S$GLB,,, | Performed by: INTERNAL MEDICINE

## 2018-01-01 PROCEDURE — 83036 HEMOGLOBIN GLYCOSYLATED A1C: CPT

## 2018-01-01 PROCEDURE — 82803 BLOOD GASES ANY COMBINATION: CPT | Mod: S$GLB,,, | Performed by: INTERNAL MEDICINE

## 2018-01-01 PROCEDURE — 87040 BLOOD CULTURE FOR BACTERIA: CPT | Mod: 59

## 2018-01-01 PROCEDURE — 87086 URINE CULTURE/COLONY COUNT: CPT

## 2018-01-01 PROCEDURE — 93005 ELECTROCARDIOGRAM TRACING: CPT | Mod: 59

## 2018-01-01 PROCEDURE — 82607 VITAMIN B-12: CPT

## 2018-01-01 PROCEDURE — 96374 THER/PROPH/DIAG INJ IV PUSH: CPT

## 2018-01-01 PROCEDURE — 84100 ASSAY OF PHOSPHORUS: CPT | Mod: 91

## 2018-01-01 PROCEDURE — 83540 ASSAY OF IRON: CPT

## 2018-01-01 PROCEDURE — 02HV33Z INSERTION OF INFUSION DEVICE INTO SUPERIOR VENA CAVA, PERCUTANEOUS APPROACH: ICD-10-PCS | Performed by: HOSPITALIST

## 2018-01-01 PROCEDURE — 12011 RPR F/E/E/N/L/M 2.5 CM/<: CPT

## 2018-01-01 PROCEDURE — 5A1955Z RESPIRATORY VENTILATION, GREATER THAN 96 CONSECUTIVE HOURS: ICD-10-PCS | Performed by: ANESTHESIOLOGY

## 2018-01-01 PROCEDURE — 25000003 PHARM REV CODE 250: Performed by: PSYCHIATRY & NEUROLOGY

## 2018-01-01 PROCEDURE — 82550 ASSAY OF CK (CPK): CPT

## 2018-01-01 PROCEDURE — 84300 ASSAY OF URINE SODIUM: CPT

## 2018-01-01 PROCEDURE — 99233 SBSQ HOSP IP/OBS HIGH 50: CPT | Mod: ,,, | Performed by: HOSPITALIST

## 2018-01-01 PROCEDURE — 82962 GLUCOSE BLOOD TEST: CPT

## 2018-01-01 PROCEDURE — 87077 CULTURE AEROBIC IDENTIFY: CPT

## 2018-01-01 PROCEDURE — 85610 PROTHROMBIN TIME: CPT | Mod: 91

## 2018-01-01 PROCEDURE — 80202 ASSAY OF VANCOMYCIN: CPT

## 2018-01-01 PROCEDURE — 83935 ASSAY OF URINE OSMOLALITY: CPT

## 2018-01-01 PROCEDURE — 84443 ASSAY THYROID STIM HORMONE: CPT

## 2018-01-01 PROCEDURE — 96361 HYDRATE IV INFUSION ADD-ON: CPT

## 2018-01-01 PROCEDURE — 63600175 PHARM REV CODE 636 W HCPCS: Performed by: EMERGENCY MEDICINE

## 2018-01-01 PROCEDURE — 63600175 PHARM REV CODE 636 W HCPCS

## 2018-01-01 PROCEDURE — 99284 EMERGENCY DEPT VISIT MOD MDM: CPT | Mod: 25

## 2018-01-01 PROCEDURE — 99232 SBSQ HOSP IP/OBS MODERATE 35: CPT | Mod: ,,, | Performed by: INTERNAL MEDICINE

## 2018-01-01 PROCEDURE — 96375 TX/PRO/DX INJ NEW DRUG ADDON: CPT | Mod: 59

## 2018-01-01 PROCEDURE — 27100171 HC OXYGEN HIGH FLOW UP TO 24 HOURS

## 2018-01-01 PROCEDURE — 93010 ELECTROCARDIOGRAM REPORT: CPT | Mod: ,,, | Performed by: INTERNAL MEDICINE

## 2018-01-01 PROCEDURE — 90715 TDAP VACCINE 7 YRS/> IM: CPT | Mod: S$GLB,,, | Performed by: INTERNAL MEDICINE

## 2018-01-01 PROCEDURE — 86376 MICROSOMAL ANTIBODY EACH: CPT

## 2018-01-01 PROCEDURE — 84132 ASSAY OF SERUM POTASSIUM: CPT

## 2018-01-01 PROCEDURE — 99215 OFFICE O/P EST HI 40 MIN: CPT | Mod: S$PBB,,, | Performed by: THORACIC SURGERY (CARDIOTHORACIC VASCULAR SURGERY)

## 2018-01-01 PROCEDURE — 97110 THERAPEUTIC EXERCISES: CPT

## 2018-01-01 PROCEDURE — 99900038 HC OT GENERIC THERAPY SCREENING (STAT)

## 2018-01-01 PROCEDURE — 84484 ASSAY OF TROPONIN QUANT: CPT | Mod: 91

## 2018-01-01 PROCEDURE — A4216 STERILE WATER/SALINE, 10 ML: HCPCS | Performed by: HOSPITALIST

## 2018-01-01 PROCEDURE — 82550 ASSAY OF CK (CPK): CPT | Mod: 91

## 2018-01-01 PROCEDURE — 96366 THER/PROPH/DIAG IV INF ADDON: CPT | Mod: 59

## 2018-01-01 PROCEDURE — 99285 EMERGENCY DEPT VISIT HI MDM: CPT | Mod: ,,,

## 2018-01-01 PROCEDURE — 63600175 PHARM REV CODE 636 W HCPCS: Performed by: INTERNAL MEDICINE

## 2018-01-01 PROCEDURE — 86141 C-REACTIVE PROTEIN HS: CPT

## 2018-01-01 PROCEDURE — 87075 CULTR BACTERIA EXCEPT BLOOD: CPT

## 2018-01-01 PROCEDURE — 99233 SBSQ HOSP IP/OBS HIGH 50: CPT | Mod: ,,, | Performed by: PHYSICIAN ASSISTANT

## 2018-01-01 PROCEDURE — G8978 MOBILITY CURRENT STATUS: HCPCS | Mod: CK

## 2018-01-01 PROCEDURE — 82728 ASSAY OF FERRITIN: CPT

## 2018-01-01 PROCEDURE — G8979 MOBILITY GOAL STATUS: HCPCS | Mod: CI

## 2018-01-01 PROCEDURE — C1751 CATH, INF, PER/CENT/MIDLINE: HCPCS

## 2018-01-01 PROCEDURE — 99292 CRITICAL CARE ADDL 30 MIN: CPT | Mod: ,,, | Performed by: ANESTHESIOLOGY

## 2018-01-01 PROCEDURE — 27201640 HC PAD, ARTICGEL

## 2018-01-01 PROCEDURE — G8987 SELF CARE CURRENT STATUS: HCPCS | Mod: CK

## 2018-01-01 PROCEDURE — G8979 MOBILITY GOAL STATUS: HCPCS | Mod: CJ

## 2018-01-01 PROCEDURE — 82140 ASSAY OF AMMONIA: CPT

## 2018-01-01 PROCEDURE — 82977 ASSAY OF GGT: CPT

## 2018-01-01 PROCEDURE — 63600175 PHARM REV CODE 636 W HCPCS: Performed by: ANESTHESIOLOGY

## 2018-01-01 PROCEDURE — 51702 INSERT TEMP BLADDER CATH: CPT | Mod: 59

## 2018-01-01 PROCEDURE — 25000003 PHARM REV CODE 250: Performed by: INTERNAL MEDICINE

## 2018-01-01 PROCEDURE — 99223 1ST HOSP IP/OBS HIGH 75: CPT | Mod: ,,, | Performed by: PODIATRIST

## 2018-01-01 PROCEDURE — 80074 ACUTE HEPATITIS PANEL: CPT

## 2018-01-01 PROCEDURE — 36600 WITHDRAWAL OF ARTERIAL BLOOD: CPT | Mod: 59

## 2018-01-01 PROCEDURE — 82150 ASSAY OF AMYLASE: CPT

## 2018-01-01 PROCEDURE — 36556 INSERT NON-TUNNEL CV CATH: CPT

## 2018-01-01 PROCEDURE — 82746 ASSAY OF FOLIC ACID SERUM: CPT

## 2018-01-01 PROCEDURE — 99499 UNLISTED E&M SERVICE: CPT | Mod: S$GLB,,, | Performed by: THORACIC SURGERY (CARDIOTHORACIC VASCULAR SURGERY)

## 2018-01-01 PROCEDURE — 99232 SBSQ HOSP IP/OBS MODERATE 35: CPT | Mod: ,,, | Performed by: HOSPITALIST

## 2018-01-01 PROCEDURE — 96367 TX/PROPH/DG ADDL SEQ IV INF: CPT

## 2018-01-01 PROCEDURE — 96376 TX/PRO/DX INJ SAME DRUG ADON: CPT

## 2018-01-01 PROCEDURE — 90472 IMMUNIZATION ADMIN EACH ADD: CPT | Mod: S$GLB,,, | Performed by: INTERNAL MEDICINE

## 2018-01-01 PROCEDURE — 80048 BASIC METABOLIC PNL TOTAL CA: CPT

## 2018-01-01 PROCEDURE — G8989 SELF CARE D/C STATUS: HCPCS | Mod: CJ

## 2018-01-01 PROCEDURE — 95813 EEG EXTND MNTR 61-119 MIN: CPT | Mod: 26,,, | Performed by: PSYCHIATRY & NEUROLOGY

## 2018-01-01 PROCEDURE — 99999 PR PBB SHADOW E&M-EST. PATIENT-LVL III: CPT | Mod: PBBFAC,,, | Performed by: THORACIC SURGERY (CARDIOTHORACIC VASCULAR SURGERY)

## 2018-01-01 PROCEDURE — G8988 SELF CARE GOAL STATUS: HCPCS | Mod: CJ

## 2018-01-01 PROCEDURE — 87186 SC STD MICRODIL/AGAR DIL: CPT

## 2018-01-01 PROCEDURE — 82272 OCCULT BLD FECES 1-3 TESTS: CPT

## 2018-01-01 PROCEDURE — 94002 VENT MGMT INPAT INIT DAY: CPT

## 2018-01-01 PROCEDURE — 94660 CPAP INITIATION&MGMT: CPT

## 2018-01-01 PROCEDURE — 11000001 HC ACUTE MED/SURG PRIVATE ROOM

## 2018-01-01 PROCEDURE — 87088 URINE BACTERIA CULTURE: CPT

## 2018-01-01 PROCEDURE — 99999 PR PBB SHADOW E&M-EST. PATIENT-LVL IV: CPT | Mod: PBBFAC,,, | Performed by: INTERNAL MEDICINE

## 2018-01-01 PROCEDURE — 82570 ASSAY OF URINE CREATININE: CPT

## 2018-01-01 PROCEDURE — 84439 ASSAY OF FREE THYROXINE: CPT | Mod: 91

## 2018-01-01 PROCEDURE — 80048 BASIC METABOLIC PNL TOTAL CA: CPT | Mod: 91

## 2018-01-01 PROCEDURE — 85730 THROMBOPLASTIN TIME PARTIAL: CPT | Mod: 91

## 2018-01-01 PROCEDURE — 97535 SELF CARE MNGMENT TRAINING: CPT

## 2018-01-01 PROCEDURE — 20600001 HC STEP DOWN PRIVATE ROOM

## 2018-01-01 PROCEDURE — 84133 ASSAY OF URINE POTASSIUM: CPT

## 2018-01-01 PROCEDURE — 94010 BREATHING CAPACITY TEST: CPT | Mod: S$GLB,,, | Performed by: INTERNAL MEDICINE

## 2018-01-01 PROCEDURE — G8998 SWALLOW D/C STATUS: HCPCS | Mod: CH

## 2018-01-01 PROCEDURE — 99213 OFFICE O/P EST LOW 20 MIN: CPT | Mod: S$GLB,,, | Performed by: INTERNAL MEDICINE

## 2018-01-01 PROCEDURE — 71046 X-RAY EXAM CHEST 2 VIEWS: CPT | Mod: TC,FY

## 2018-01-01 PROCEDURE — 87040 BLOOD CULTURE FOR BACTERIA: CPT

## 2018-01-01 PROCEDURE — 80307 DRUG TEST PRSMV CHEM ANLYZR: CPT

## 2018-01-01 PROCEDURE — 99999 PR PBB SHADOW E&M-EST. PATIENT-LVL III: CPT | Mod: PBBFAC,,, | Performed by: INTERNAL MEDICINE

## 2018-01-01 PROCEDURE — P9045 ALBUMIN (HUMAN), 5%, 250 ML: HCPCS | Mod: JG

## 2018-01-01 PROCEDURE — 5A12012 PERFORMANCE OF CARDIAC OUTPUT, SINGLE, MANUAL: ICD-10-PCS | Performed by: ANESTHESIOLOGY

## 2018-01-01 PROCEDURE — 36600 WITHDRAWAL OF ARTERIAL BLOOD: CPT | Mod: S$GLB,,, | Performed by: INTERNAL MEDICINE

## 2018-01-01 PROCEDURE — 51702 INSERT TEMP BLADDER CATH: CPT

## 2018-01-01 PROCEDURE — 27200188 HC TRANSDUCER, ART ADULT/PEDS

## 2018-01-01 PROCEDURE — 99238 HOSP IP/OBS DSCHRG MGMT 30/<: CPT | Mod: ,,, | Performed by: HOSPITALIST

## 2018-01-01 PROCEDURE — 03HY32Z INSERTION OF MONITORING DEVICE INTO UPPER ARTERY, PERCUTANEOUS APPROACH: ICD-10-PCS | Performed by: ANESTHESIOLOGY

## 2018-01-01 PROCEDURE — 02HV33Z INSERTION OF INFUSION DEVICE INTO SUPERIOR VENA CAVA, PERCUTANEOUS APPROACH: ICD-10-PCS | Performed by: ANESTHESIOLOGY

## 2018-01-01 PROCEDURE — P9047 ALBUMIN (HUMAN), 25%, 50ML: HCPCS | Mod: JG | Performed by: PHYSICIAN ASSISTANT

## 2018-01-01 PROCEDURE — 99999 PR PBB SHADOW E&M-EST. PATIENT-LVL V: CPT | Mod: PBBFAC,,, | Performed by: NURSE PRACTITIONER

## 2018-01-01 PROCEDURE — 92610 EVALUATE SWALLOWING FUNCTION: CPT

## 2018-01-01 PROCEDURE — P9045 ALBUMIN (HUMAN), 5%, 250 ML: HCPCS | Mod: JG | Performed by: NURSE PRACTITIONER

## 2018-01-01 PROCEDURE — 94640 AIRWAY INHALATION TREATMENT: CPT

## 2018-01-01 PROCEDURE — 76937 US GUIDE VASCULAR ACCESS: CPT

## 2018-01-01 PROCEDURE — 71046 X-RAY EXAM CHEST 2 VIEWS: CPT | Mod: 26,,, | Performed by: RADIOLOGY

## 2018-01-01 PROCEDURE — 36620 INSERTION CATHETER ARTERY: CPT

## 2018-01-01 PROCEDURE — 63600175 PHARM REV CODE 636 W HCPCS: Performed by: PSYCHIATRY & NEUROLOGY

## 2018-01-01 PROCEDURE — 92950 HEART/LUNG RESUSCITATION CPR: CPT

## 2018-01-01 PROCEDURE — 99499 UNLISTED E&M SERVICE: CPT | Mod: ,,, | Performed by: EMERGENCY MEDICINE

## 2018-01-01 PROCEDURE — 36430 TRANSFUSION BLD/BLD COMPNT: CPT

## 2018-01-01 PROCEDURE — 86920 COMPATIBILITY TEST SPIN: CPT

## 2018-01-01 PROCEDURE — 99214 OFFICE O/P EST MOD 30 MIN: CPT | Mod: S$GLB,,, | Performed by: THORACIC SURGERY (CARDIOTHORACIC VASCULAR SURGERY)

## 2018-01-01 PROCEDURE — 96375 TX/PRO/DX INJ NEW DRUG ADDON: CPT

## 2018-01-01 PROCEDURE — 0JQ13ZZ REPAIR FACE SUBCUTANEOUS TISSUE AND FASCIA, PERCUTANEOUS APPROACH: ICD-10-PCS | Performed by: EMERGENCY MEDICINE

## 2018-01-01 PROCEDURE — 36620 INSERTION CATHETER ARTERY: CPT | Mod: ,,, | Performed by: NURSE PRACTITIONER

## 2018-01-01 PROCEDURE — 99999 PR PBB SHADOW E&M-EST. PATIENT-LVL V: CPT | Mod: PBBFAC,,, | Performed by: INTERNAL MEDICINE

## 2018-01-01 PROCEDURE — 81000 URINALYSIS NONAUTO W/SCOPE: CPT

## 2018-01-01 PROCEDURE — 27000190 HC CPAP FULL FACE MASK W/VALVE

## 2018-01-01 PROCEDURE — G8978 MOBILITY CURRENT STATUS: HCPCS | Mod: CL

## 2018-01-01 PROCEDURE — 99221 1ST HOSP IP/OBS SF/LOW 40: CPT | Mod: ,,, | Performed by: INTERNAL MEDICINE

## 2018-01-01 PROCEDURE — G8997 SWALLOW GOAL STATUS: HCPCS | Mod: CH

## 2018-01-01 PROCEDURE — 99291 CRITICAL CARE FIRST HOUR: CPT | Mod: 25

## 2018-01-01 PROCEDURE — 82248 BILIRUBIN DIRECT: CPT

## 2018-01-01 PROCEDURE — 86850 RBC ANTIBODY SCREEN: CPT

## 2018-01-01 PROCEDURE — 99222 1ST HOSP IP/OBS MODERATE 55: CPT | Mod: ,,, | Performed by: PODIATRIST

## 2018-01-01 PROCEDURE — 96365 THER/PROPH/DIAG IV INF INIT: CPT

## 2018-01-01 PROCEDURE — 99233 SBSQ HOSP IP/OBS HIGH 50: CPT | Mod: ,,, | Performed by: PSYCHIATRY & NEUROLOGY

## 2018-01-01 PROCEDURE — 86800 THYROGLOBULIN ANTIBODY: CPT

## 2018-01-01 PROCEDURE — 36556 INSERT NON-TUNNEL CV CATH: CPT | Mod: ,,, | Performed by: ANESTHESIOLOGY

## 2018-01-01 PROCEDURE — 25000242 PHARM REV CODE 250 ALT 637 W/ HCPCS: Performed by: EMERGENCY MEDICINE

## 2018-01-01 PROCEDURE — 36569 INSJ PICC 5 YR+ W/O IMAGING: CPT

## 2018-01-01 PROCEDURE — 96376 TX/PRO/DX INJ SAME DRUG ADON: CPT | Mod: 59

## 2018-01-01 PROCEDURE — 93010 ELECTROCARDIOGRAM REPORT: CPT | Mod: 76,,, | Performed by: INTERNAL MEDICINE

## 2018-01-01 RX ORDER — MODAFINIL 100 MG/1
100 TABLET ORAL DAILY
Status: DISCONTINUED | OUTPATIENT
Start: 2018-01-01 | End: 2018-01-01

## 2018-01-01 RX ORDER — POTASSIUM CHLORIDE 20 MEQ/1
40 TABLET, EXTENDED RELEASE ORAL ONCE
Status: COMPLETED | OUTPATIENT
Start: 2018-01-01 | End: 2018-01-01

## 2018-01-01 RX ORDER — SODIUM BICARBONATE 1 MEQ/ML
50 SYRINGE (ML) INTRAVENOUS
Status: COMPLETED | OUTPATIENT
Start: 2018-01-01 | End: 2018-01-01

## 2018-01-01 RX ORDER — LEVOTHYROXINE SODIUM ANHYDROUS 100 UG/5ML
25 INJECTION, POWDER, LYOPHILIZED, FOR SOLUTION INTRAVENOUS ONCE
Status: COMPLETED | OUTPATIENT
Start: 2018-01-01 | End: 2018-01-01

## 2018-01-01 RX ORDER — METOPROLOL TARTRATE 25 MG/1
25 TABLET, FILM COATED ORAL 2 TIMES DAILY
Status: DISCONTINUED | OUTPATIENT
Start: 2018-01-01 | End: 2018-01-01 | Stop reason: HOSPADM

## 2018-01-01 RX ORDER — FUROSEMIDE 10 MG/ML
80 INJECTION INTRAMUSCULAR; INTRAVENOUS
Status: COMPLETED | OUTPATIENT
Start: 2018-01-01 | End: 2018-01-01

## 2018-01-01 RX ORDER — OXYCODONE HYDROCHLORIDE 5 MG/1
10 TABLET ORAL EVERY 4 HOURS PRN
Status: CANCELLED | OUTPATIENT
Start: 2018-01-01

## 2018-01-01 RX ORDER — METOPROLOL TARTRATE 25 MG/1
50 TABLET, FILM COATED ORAL 2 TIMES DAILY
Status: DISCONTINUED | OUTPATIENT
Start: 2018-01-01 | End: 2018-01-01 | Stop reason: HOSPADM

## 2018-01-01 RX ORDER — PROPOFOL 10 MG/ML
5 INJECTION, EMULSION INTRAVENOUS CONTINUOUS
Status: DISCONTINUED | OUTPATIENT
Start: 2018-01-01 | End: 2018-01-01 | Stop reason: HOSPADM

## 2018-01-01 RX ORDER — CIPROFLOXACIN 500 MG/1
500 TABLET ORAL EVERY 12 HOURS
Status: DISCONTINUED | OUTPATIENT
Start: 2018-01-01 | End: 2018-01-01 | Stop reason: HOSPADM

## 2018-01-01 RX ORDER — ALBUMIN HUMAN 50 G/1000ML
500 SOLUTION INTRAVENOUS ONCE AS NEEDED
Status: CANCELLED | OUTPATIENT
Start: 2018-01-01 | End: 2018-01-01

## 2018-01-01 RX ORDER — POTASSIUM CHLORIDE 20 MEQ/1
20 TABLET, EXTENDED RELEASE ORAL ONCE
Status: COMPLETED | OUTPATIENT
Start: 2018-01-01 | End: 2018-01-01

## 2018-01-01 RX ORDER — DEXTROSE MONOHYDRATE 50 MG/ML
INJECTION, SOLUTION INTRAVENOUS CONTINUOUS
Status: DISCONTINUED | OUTPATIENT
Start: 2018-01-01 | End: 2018-01-01

## 2018-01-01 RX ORDER — ONDANSETRON 2 MG/ML
4 INJECTION INTRAMUSCULAR; INTRAVENOUS EVERY 12 HOURS PRN
Status: CANCELLED | OUTPATIENT
Start: 2018-01-01

## 2018-01-01 RX ORDER — METOPROLOL TARTRATE 25 MG/1
25 TABLET ORAL
Status: CANCELLED | OUTPATIENT
Start: 2018-01-01

## 2018-01-01 RX ORDER — NICARDIPINE HYDROCHLORIDE 0.2 MG/ML
INJECTION INTRAVENOUS
Status: COMPLETED
Start: 2018-01-01 | End: 2018-01-01

## 2018-01-01 RX ORDER — FUROSEMIDE 10 MG/ML
60 INJECTION INTRAMUSCULAR; INTRAVENOUS 2 TIMES DAILY
Status: DISCONTINUED | OUTPATIENT
Start: 2018-01-01 | End: 2018-01-01 | Stop reason: HOSPADM

## 2018-01-01 RX ORDER — HEPARIN SODIUM 5000 [USP'U]/ML
5000 INJECTION, SOLUTION INTRAVENOUS; SUBCUTANEOUS EVERY 8 HOURS
Status: DISCONTINUED | OUTPATIENT
Start: 2018-01-01 | End: 2018-01-01

## 2018-01-01 RX ORDER — HYDROCODONE BITARTRATE AND ACETAMINOPHEN 500; 5 MG/1; MG/1
TABLET ORAL
Status: DISCONTINUED | OUTPATIENT
Start: 2018-01-01 | End: 2018-01-01

## 2018-01-01 RX ORDER — IBUPROFEN 200 MG
16 TABLET ORAL
Status: DISCONTINUED | OUTPATIENT
Start: 2018-01-01 | End: 2018-01-01 | Stop reason: HOSPADM

## 2018-01-01 RX ORDER — POTASSIUM CHLORIDE 20 MEQ/1
40 TABLET, EXTENDED RELEASE ORAL
Status: COMPLETED | OUTPATIENT
Start: 2018-01-01 | End: 2018-01-01

## 2018-01-01 RX ORDER — POTASSIUM CHLORIDE 20 MEQ/15ML
40 SOLUTION ORAL
Status: DISCONTINUED | OUTPATIENT
Start: 2018-01-01 | End: 2018-01-01

## 2018-01-01 RX ORDER — LANOLIN ALCOHOL/MO/W.PET/CERES
400 CREAM (GRAM) TOPICAL DAILY
Status: DISCONTINUED | OUTPATIENT
Start: 2018-01-01 | End: 2018-01-01 | Stop reason: HOSPADM

## 2018-01-01 RX ORDER — MAGNESIUM SULFATE HEPTAHYDRATE 40 MG/ML
2 INJECTION, SOLUTION INTRAVENOUS ONCE
Status: COMPLETED | OUTPATIENT
Start: 2018-01-01 | End: 2018-01-01

## 2018-01-01 RX ORDER — INSULIN ASPART 100 [IU]/ML
0-5 INJECTION, SOLUTION INTRAVENOUS; SUBCUTANEOUS
Status: DISCONTINUED | OUTPATIENT
Start: 2018-01-01 | End: 2018-01-01 | Stop reason: HOSPADM

## 2018-01-01 RX ORDER — FERROUS SULFATE 325(65) MG
325 TABLET, DELAYED RELEASE (ENTERIC COATED) ORAL 2 TIMES DAILY
Status: DISCONTINUED | OUTPATIENT
Start: 2018-01-01 | End: 2018-01-01 | Stop reason: HOSPADM

## 2018-01-01 RX ORDER — ASPIRIN 300 MG/1
120 SUPPOSITORY RECTAL DAILY
Status: DISCONTINUED | OUTPATIENT
Start: 2018-01-01 | End: 2018-01-01

## 2018-01-01 RX ORDER — ATORVASTATIN CALCIUM 10 MG/1
40 TABLET, FILM COATED ORAL NIGHTLY
Status: CANCELLED | OUTPATIENT
Start: 2018-01-01

## 2018-01-01 RX ORDER — FENTANYL CITRATE 50 UG/ML
100 INJECTION, SOLUTION INTRAMUSCULAR; INTRAVENOUS EVERY 10 MIN PRN
Status: DISCONTINUED | OUTPATIENT
Start: 2018-01-01 | End: 2018-01-01 | Stop reason: HOSPADM

## 2018-01-01 RX ORDER — LEVOTHYROXINE SODIUM ANHYDROUS 100 UG/5ML
25 INJECTION, POWDER, LYOPHILIZED, FOR SOLUTION INTRAVENOUS DAILY
Status: DISCONTINUED | OUTPATIENT
Start: 2018-01-01 | End: 2018-01-01

## 2018-01-01 RX ORDER — SODIUM CHLORIDE 9 MG/ML
INJECTION, SOLUTION INTRAVENOUS CONTINUOUS
Status: CANCELLED | OUTPATIENT
Start: 2018-01-01

## 2018-01-01 RX ORDER — MUPIROCIN 20 MG/G
1 OINTMENT TOPICAL 2 TIMES DAILY
Status: CANCELLED | OUTPATIENT
Start: 2018-01-01 | End: 2018-01-01

## 2018-01-01 RX ORDER — SENNOSIDES 8.6 MG/1
1 TABLET ORAL 2 TIMES DAILY PRN
Status: DISCONTINUED | OUTPATIENT
Start: 2018-01-01 | End: 2018-01-01 | Stop reason: HOSPADM

## 2018-01-01 RX ORDER — FENTANYL CITRATE 50 UG/ML
INJECTION, SOLUTION INTRAMUSCULAR; INTRAVENOUS
Status: DISCONTINUED
Start: 2018-01-01 | End: 2018-01-01 | Stop reason: WASHOUT

## 2018-01-01 RX ORDER — AMOXICILLIN 250 MG
1 CAPSULE ORAL DAILY
Status: DISCONTINUED | OUTPATIENT
Start: 2018-01-01 | End: 2018-01-01

## 2018-01-01 RX ORDER — DEXTROSE 50 % IN WATER (D50W) INTRAVENOUS SYRINGE
25
Status: COMPLETED | OUTPATIENT
Start: 2018-01-01 | End: 2018-01-01

## 2018-01-01 RX ORDER — METOPROLOL TARTRATE 50 MG/1
25 TABLET ORAL 2 TIMES DAILY
Qty: 180 TABLET | Refills: 4
Start: 2018-01-01 | End: 2018-01-01

## 2018-01-01 RX ORDER — MIDAZOLAM HYDROCHLORIDE 1 MG/ML
INJECTION INTRAMUSCULAR; INTRAVENOUS
Status: DISCONTINUED
Start: 2018-01-01 | End: 2018-01-01 | Stop reason: WASHOUT

## 2018-01-01 RX ORDER — LABETALOL HYDROCHLORIDE 5 MG/ML
10 INJECTION, SOLUTION INTRAVENOUS ONCE
Status: COMPLETED | OUTPATIENT
Start: 2018-01-01 | End: 2018-01-01

## 2018-01-01 RX ORDER — FUROSEMIDE 10 MG/ML
20 INJECTION INTRAMUSCULAR; INTRAVENOUS 2 TIMES DAILY
Status: CANCELLED | OUTPATIENT
Start: 2018-01-01

## 2018-01-01 RX ORDER — FENTANYL CITRATE-0.9 % NACL/PF 10 MCG/ML
25 PLASTIC BAG, INJECTION (ML) INTRAVENOUS CONTINUOUS
Status: DISCONTINUED | OUTPATIENT
Start: 2018-01-01 | End: 2018-01-01

## 2018-01-01 RX ORDER — ALBUTEROL SULFATE 0.83 MG/ML
SOLUTION RESPIRATORY (INHALATION)
Status: DISCONTINUED
Start: 2018-01-01 | End: 2018-01-01 | Stop reason: HOSPADM

## 2018-01-01 RX ORDER — ASPIRIN 81 MG/1
81 TABLET ORAL DAILY
Status: DISCONTINUED | OUTPATIENT
Start: 2018-01-01 | End: 2018-01-01 | Stop reason: HOSPADM

## 2018-01-01 RX ORDER — CIPROFLOXACIN 500 MG/1
500 TABLET ORAL EVERY 12 HOURS
Status: DISCONTINUED | OUTPATIENT
Start: 2018-01-01 | End: 2018-01-01

## 2018-01-01 RX ORDER — OXYCODONE HYDROCHLORIDE 5 MG/1
5 TABLET ORAL EVERY 4 HOURS PRN
Status: CANCELLED | OUTPATIENT
Start: 2018-01-01

## 2018-01-01 RX ORDER — POTASSIUM CHLORIDE 20 MEQ/15ML
60 SOLUTION ORAL
Status: DISCONTINUED | OUTPATIENT
Start: 2018-01-01 | End: 2018-01-01

## 2018-01-01 RX ORDER — GLUCAGON 1 MG
1 KIT INJECTION
Status: DISCONTINUED | OUTPATIENT
Start: 2018-01-01 | End: 2018-01-01

## 2018-01-01 RX ORDER — INSULIN ASPART 100 [IU]/ML
1-10 INJECTION, SOLUTION INTRAVENOUS; SUBCUTANEOUS EVERY 6 HOURS PRN
Status: DISCONTINUED | OUTPATIENT
Start: 2018-01-01 | End: 2018-01-01

## 2018-01-01 RX ORDER — METOPROLOL TARTRATE 50 MG/1
25 TABLET ORAL 2 TIMES DAILY
Qty: 180 TABLET | Refills: 4 | Status: ON HOLD
Start: 2018-01-01 | End: 2018-01-01 | Stop reason: HOSPADM

## 2018-01-01 RX ORDER — INSULIN ASPART 100 [IU]/ML
0-5 INJECTION, SOLUTION INTRAVENOUS; SUBCUTANEOUS EVERY 6 HOURS PRN
Status: DISCONTINUED | OUTPATIENT
Start: 2018-01-01 | End: 2018-01-01 | Stop reason: HOSPADM

## 2018-01-01 RX ORDER — NOREPINEPHRINE BITARTRATE/D5W 16MG/250ML
0.05 PLASTIC BAG, INJECTION (ML) INTRAVENOUS CONTINUOUS
Status: DISCONTINUED | OUTPATIENT
Start: 2018-01-01 | End: 2018-01-01

## 2018-01-01 RX ORDER — FUROSEMIDE 10 MG/ML
60 INJECTION INTRAMUSCULAR; INTRAVENOUS
Status: COMPLETED | OUTPATIENT
Start: 2018-01-01 | End: 2018-01-01

## 2018-01-01 RX ORDER — METOPROLOL TARTRATE 50 MG/1
50 TABLET ORAL 2 TIMES DAILY
Qty: 180 TABLET | Refills: 4 | Status: ON HOLD | OUTPATIENT
Start: 2018-01-01 | End: 2018-01-01

## 2018-01-01 RX ORDER — DILTIAZEM HYDROCHLORIDE 60 MG/1
60 CAPSULE, EXTENDED RELEASE ORAL 2 TIMES DAILY
Qty: 60 CAPSULE | Refills: 3 | Status: SHIPPED | OUTPATIENT
Start: 2018-01-01 | End: 2019-07-09

## 2018-01-01 RX ORDER — MUPIROCIN 20 MG/G
1 OINTMENT TOPICAL
Status: CANCELLED | OUTPATIENT
Start: 2018-01-01

## 2018-01-01 RX ORDER — VENLAFAXINE HYDROCHLORIDE 75 MG/1
150 CAPSULE, EXTENDED RELEASE ORAL DAILY
Status: DISCONTINUED | OUTPATIENT
Start: 2018-01-01 | End: 2018-01-01 | Stop reason: HOSPADM

## 2018-01-01 RX ORDER — MODAFINIL 100 MG/1
200 TABLET ORAL 2 TIMES DAILY
Status: DISCONTINUED | OUTPATIENT
Start: 2018-01-01 | End: 2018-01-01

## 2018-01-01 RX ORDER — ATORVASTATIN CALCIUM 20 MG/1
80 TABLET, FILM COATED ORAL DAILY
Status: DISCONTINUED | OUTPATIENT
Start: 2018-01-01 | End: 2018-01-01 | Stop reason: HOSPADM

## 2018-01-01 RX ORDER — HYDROCODONE BITARTRATE AND ACETAMINOPHEN 10; 325 MG/1; MG/1
1 TABLET ORAL EVERY 8 HOURS PRN
Status: DISCONTINUED | OUTPATIENT
Start: 2018-01-01 | End: 2018-01-01 | Stop reason: HOSPADM

## 2018-01-01 RX ORDER — FAMOTIDINE 20 MG/1
20 TABLET, FILM COATED ORAL 2 TIMES DAILY
Status: DISCONTINUED | OUTPATIENT
Start: 2018-01-01 | End: 2018-01-01

## 2018-01-01 RX ORDER — CLOPIDOGREL BISULFATE 75 MG/1
75 TABLET ORAL DAILY
Status: DISCONTINUED | OUTPATIENT
Start: 2018-01-01 | End: 2018-01-01 | Stop reason: HOSPADM

## 2018-01-01 RX ORDER — FUROSEMIDE 10 MG/ML
40 INJECTION INTRAMUSCULAR; INTRAVENOUS 3 TIMES DAILY
Status: DISCONTINUED | OUTPATIENT
Start: 2018-01-01 | End: 2018-01-01 | Stop reason: HOSPADM

## 2018-01-01 RX ORDER — ONDANSETRON 8 MG/1
8 TABLET, ORALLY DISINTEGRATING ORAL EVERY 8 HOURS PRN
Status: DISCONTINUED | OUTPATIENT
Start: 2018-01-01 | End: 2018-01-01 | Stop reason: HOSPADM

## 2018-01-01 RX ORDER — LEVOTHYROXINE SODIUM 50 UG/1
50 TABLET ORAL
Status: CANCELLED | OUTPATIENT
Start: 2018-01-01

## 2018-01-01 RX ORDER — SODIUM CHLORIDE 9 MG/ML
INJECTION, SOLUTION INTRAVENOUS CONTINUOUS
Status: DISCONTINUED | OUTPATIENT
Start: 2018-01-01 | End: 2018-01-01

## 2018-01-01 RX ORDER — FUROSEMIDE 10 MG/ML
40 INJECTION INTRAMUSCULAR; INTRAVENOUS ONCE
Status: COMPLETED | OUTPATIENT
Start: 2018-01-01 | End: 2018-01-01

## 2018-01-01 RX ORDER — POTASSIUM CHLORIDE 7.45 MG/ML
10 INJECTION INTRAVENOUS
Status: COMPLETED | OUTPATIENT
Start: 2018-01-01 | End: 2018-01-01

## 2018-01-01 RX ORDER — NAPROXEN SODIUM 220 MG/1
81 TABLET, FILM COATED ORAL DAILY
Status: DISCONTINUED | OUTPATIENT
Start: 2018-01-01 | End: 2018-01-01

## 2018-01-01 RX ORDER — ASPIRIN 325 MG
325 TABLET ORAL ONCE
Status: CANCELLED | OUTPATIENT
Start: 2018-01-01 | End: 2018-01-01

## 2018-01-01 RX ORDER — ASPIRIN 325 MG
325 TABLET ORAL DAILY
Status: CANCELLED | OUTPATIENT
Start: 2018-01-01

## 2018-01-01 RX ORDER — AMIODARONE HYDROCHLORIDE 200 MG/1
200 TABLET ORAL DAILY
Status: DISCONTINUED | OUTPATIENT
Start: 2018-01-01 | End: 2018-01-01 | Stop reason: HOSPADM

## 2018-01-01 RX ORDER — CHLORHEXIDINE GLUCONATE ORAL RINSE 1.2 MG/ML
15 SOLUTION DENTAL 2 TIMES DAILY
Status: DISCONTINUED | OUTPATIENT
Start: 2018-01-01 | End: 2018-01-01

## 2018-01-01 RX ORDER — ONDANSETRON 4 MG/1
4 TABLET, ORALLY DISINTEGRATING ORAL ONCE
Status: DISCONTINUED | OUTPATIENT
Start: 2018-01-01 | End: 2018-01-01

## 2018-01-01 RX ORDER — FUROSEMIDE 40 MG/1
20 TABLET ORAL DAILY
Qty: 15 TABLET | Refills: 0 | Status: SHIPPED | OUTPATIENT
Start: 2018-01-01 | End: 2018-01-01 | Stop reason: ALTCHOICE

## 2018-01-01 RX ORDER — PROPOFOL 10 MG/ML
5 INJECTION, EMULSION INTRAVENOUS
Status: COMPLETED | OUTPATIENT
Start: 2018-01-01 | End: 2018-01-01

## 2018-01-01 RX ORDER — ALBUMIN HUMAN 50 G/1000ML
25 SOLUTION INTRAVENOUS ONCE
Status: COMPLETED | OUTPATIENT
Start: 2018-01-01 | End: 2018-01-01

## 2018-01-01 RX ORDER — DOXYCYCLINE HYCLATE 100 MG
100 TABLET ORAL EVERY 12 HOURS
Status: DISCONTINUED | OUTPATIENT
Start: 2018-01-01 | End: 2018-01-01

## 2018-01-01 RX ORDER — FUROSEMIDE 10 MG/ML
80 INJECTION INTRAMUSCULAR; INTRAVENOUS 3 TIMES DAILY
Status: DISCONTINUED | OUTPATIENT
Start: 2018-01-01 | End: 2018-01-01

## 2018-01-01 RX ORDER — ESCITALOPRAM OXALATE 5 MG/5ML
10 SOLUTION ORAL DAILY
Status: DISCONTINUED | OUTPATIENT
Start: 2018-01-01 | End: 2018-01-01

## 2018-01-01 RX ORDER — NICARDIPINE HYDROCHLORIDE 0.2 MG/ML
2.5 INJECTION INTRAVENOUS CONTINUOUS
Status: DISCONTINUED | OUTPATIENT
Start: 2018-01-01 | End: 2018-01-01

## 2018-01-01 RX ORDER — MIDAZOLAM HYDROCHLORIDE 1 MG/ML
2 INJECTION INTRAMUSCULAR; INTRAVENOUS ONCE
Status: COMPLETED | OUTPATIENT
Start: 2018-01-01 | End: 2018-01-01

## 2018-01-01 RX ORDER — SODIUM CHLORIDE 0.9 % (FLUSH) 0.9 %
3 SYRINGE (ML) INJECTION EVERY 8 HOURS
Status: CANCELLED | OUTPATIENT
Start: 2018-01-01

## 2018-01-01 RX ORDER — DOCUSATE SODIUM 100 MG/1
100 CAPSULE, LIQUID FILLED ORAL DAILY
Status: DISCONTINUED | OUTPATIENT
Start: 2018-01-01 | End: 2018-01-01

## 2018-01-01 RX ORDER — LABETALOL HYDROCHLORIDE 5 MG/ML
INJECTION, SOLUTION INTRAVENOUS
Status: COMPLETED
Start: 2018-01-01 | End: 2018-01-01

## 2018-01-01 RX ORDER — SILODOSIN 4 MG/1
8 CAPSULE ORAL DAILY
Status: DISCONTINUED | OUTPATIENT
Start: 2018-01-01 | End: 2018-01-01

## 2018-01-01 RX ORDER — GLUCAGON 1 MG
1 KIT INJECTION
Status: DISCONTINUED | OUTPATIENT
Start: 2018-01-01 | End: 2018-01-01 | Stop reason: HOSPADM

## 2018-01-01 RX ORDER — ACETAMINOPHEN 650 MG/20.3ML
650 LIQUID ORAL EVERY 6 HOURS
Status: DISCONTINUED | OUTPATIENT
Start: 2018-01-01 | End: 2018-01-01 | Stop reason: HOSPADM

## 2018-01-01 RX ORDER — FUROSEMIDE 40 MG/1
40 TABLET ORAL 2 TIMES DAILY
Qty: 60 TABLET | Refills: 2 | Status: SHIPPED | OUTPATIENT
Start: 2018-01-01 | End: 2019-05-03

## 2018-01-01 RX ORDER — POTASSIUM CHLORIDE 750 MG/1
20 TABLET, EXTENDED RELEASE ORAL EVERY 12 HOURS
Status: CANCELLED | OUTPATIENT
Start: 2018-01-01

## 2018-01-01 RX ORDER — LEVOTHYROXINE SODIUM 50 UG/1
50 TABLET ORAL
Status: DISCONTINUED | OUTPATIENT
Start: 2018-01-01 | End: 2018-01-01 | Stop reason: HOSPADM

## 2018-01-01 RX ORDER — AMOXICILLIN AND CLAVULANATE POTASSIUM 875; 125 MG/1; MG/1
1 TABLET, FILM COATED ORAL EVERY 12 HOURS
Qty: 13 TABLET | Refills: 0 | Status: SHIPPED | OUTPATIENT
Start: 2018-01-01 | End: 2018-01-01

## 2018-01-01 RX ORDER — FUROSEMIDE 40 MG/1
40 TABLET ORAL 2 TIMES DAILY
Status: DISCONTINUED | OUTPATIENT
Start: 2018-01-01 | End: 2018-01-01 | Stop reason: HOSPADM

## 2018-01-01 RX ORDER — PANTOPRAZOLE SODIUM 40 MG/10ML
40 INJECTION, POWDER, LYOPHILIZED, FOR SOLUTION INTRAVENOUS DAILY
Status: CANCELLED | OUTPATIENT
Start: 2018-01-01

## 2018-01-01 RX ORDER — ALBUMIN HUMAN 50 G/1000ML
SOLUTION INTRAVENOUS
Status: COMPLETED
Start: 2018-01-01 | End: 2018-01-01

## 2018-01-01 RX ORDER — ACETAMINOPHEN 325 MG/1
650 TABLET ORAL EVERY 6 HOURS PRN
Status: DISCONTINUED | OUTPATIENT
Start: 2018-01-01 | End: 2018-01-01 | Stop reason: HOSPADM

## 2018-01-01 RX ORDER — LEVOTHYROXINE SODIUM 50 UG/1
50 TABLET ORAL
Qty: 30 TABLET | Refills: 3 | Status: SHIPPED | OUTPATIENT
Start: 2018-01-01 | End: 2018-01-01

## 2018-01-01 RX ORDER — CIPROFLOXACIN 500 MG/1
500 TABLET ORAL EVERY 12 HOURS
Qty: 12 TABLET | Refills: 0 | Status: SHIPPED | OUTPATIENT
Start: 2018-01-01 | End: 2018-01-01

## 2018-01-01 RX ORDER — AMIODARONE HYDROCHLORIDE 200 MG/1
200 TABLET ORAL DAILY
Status: DISCONTINUED | OUTPATIENT
Start: 2018-01-01 | End: 2018-01-01

## 2018-01-01 RX ORDER — POTASSIUM CHLORIDE 14.9 MG/ML
60 INJECTION INTRAVENOUS
Status: CANCELLED | OUTPATIENT
Start: 2018-01-01

## 2018-01-01 RX ORDER — CHLORHEXIDINE GLUCONATE ORAL RINSE 1.2 MG/ML
15 SOLUTION DENTAL 4 TIMES DAILY
Status: DISCONTINUED | OUTPATIENT
Start: 2018-01-01 | End: 2018-01-01

## 2018-01-01 RX ORDER — FUROSEMIDE 10 MG/ML
40 INJECTION INTRAMUSCULAR; INTRAVENOUS 2 TIMES DAILY
Status: DISCONTINUED | OUTPATIENT
Start: 2018-01-01 | End: 2018-01-01

## 2018-01-01 RX ORDER — LEVOTHYROXINE SODIUM 50 UG/1
50 TABLET ORAL
Qty: 30 TABLET | Refills: 3 | Status: SHIPPED | OUTPATIENT
Start: 2018-01-01 | End: 2019-07-10

## 2018-01-01 RX ORDER — LANOLIN ALCOHOL/MO/W.PET/CERES
400 CREAM (GRAM) TOPICAL DAILY
Refills: 0 | COMMUNITY
Start: 2018-01-01

## 2018-01-01 RX ORDER — THIAMINE HCL 100 MG
100 TABLET ORAL DAILY
Status: DISCONTINUED | OUTPATIENT
Start: 2018-01-01 | End: 2018-01-01

## 2018-01-01 RX ORDER — METOPROLOL TARTRATE 25 MG/1
25 TABLET, FILM COATED ORAL 2 TIMES DAILY
Status: DISCONTINUED | OUTPATIENT
Start: 2018-01-01 | End: 2018-01-01

## 2018-01-01 RX ORDER — POTASSIUM CHLORIDE 20 MEQ/15ML
20 SOLUTION ORAL ONCE
Status: COMPLETED | OUTPATIENT
Start: 2018-01-01 | End: 2018-01-01

## 2018-01-01 RX ORDER — IBUPROFEN 200 MG
24 TABLET ORAL
Status: DISCONTINUED | OUTPATIENT
Start: 2018-01-01 | End: 2018-01-01 | Stop reason: HOSPADM

## 2018-01-01 RX ORDER — CIPROFLOXACIN 250 MG/5ML
500 KIT ORAL DAILY
Status: DISCONTINUED | OUTPATIENT
Start: 2018-01-01 | End: 2018-01-01

## 2018-01-01 RX ORDER — RAMELTEON 8 MG/1
8 TABLET ORAL NIGHTLY PRN
Status: DISCONTINUED | OUTPATIENT
Start: 2018-01-01 | End: 2018-01-01 | Stop reason: HOSPADM

## 2018-01-01 RX ORDER — ONDANSETRON 4 MG/1
8 TABLET, ORALLY DISINTEGRATING ORAL EVERY 8 HOURS PRN
Status: DISCONTINUED | OUTPATIENT
Start: 2018-01-01 | End: 2018-01-01 | Stop reason: HOSPADM

## 2018-01-01 RX ORDER — IPRATROPIUM BROMIDE AND ALBUTEROL SULFATE 2.5; .5 MG/3ML; MG/3ML
3 SOLUTION RESPIRATORY (INHALATION) EVERY 4 HOURS
Status: CANCELLED | OUTPATIENT
Start: 2018-01-01 | End: 2018-01-01

## 2018-01-01 RX ORDER — ACETAMINOPHEN 650 MG/20.3ML
650 LIQUID ORAL EVERY 6 HOURS
Status: DISCONTINUED | OUTPATIENT
Start: 2018-01-01 | End: 2018-01-01

## 2018-01-01 RX ORDER — MELOXICAM 15 MG/1
TABLET ORAL
Refills: 1 | COMMUNITY
Start: 2018-01-01 | End: 2018-01-01

## 2018-01-01 RX ORDER — HYDROCODONE BITARTRATE AND ACETAMINOPHEN 10; 325 MG/1; MG/1
TABLET ORAL
Refills: 0 | Status: ON HOLD | COMMUNITY
Start: 2018-01-01 | End: 2018-01-01 | Stop reason: HOSPADM

## 2018-01-01 RX ORDER — POTASSIUM CHLORIDE 20 MEQ/1
40 TABLET, EXTENDED RELEASE ORAL 3 TIMES DAILY
Status: COMPLETED | OUTPATIENT
Start: 2018-01-01 | End: 2018-01-01

## 2018-01-01 RX ORDER — DOPAMINE HYDROCHLORIDE 160 MG/100ML
2 INJECTION, SOLUTION INTRAVENOUS CONTINUOUS
Status: DISCONTINUED | OUTPATIENT
Start: 2018-01-01 | End: 2018-01-01

## 2018-01-01 RX ORDER — FERROUS SULFATE 325(65) MG
325 TABLET ORAL 2 TIMES DAILY WITH MEALS
Qty: 60 TABLET | Refills: 3 | Status: SHIPPED | OUTPATIENT
Start: 2018-01-01

## 2018-01-01 RX ORDER — LABETALOL HYDROCHLORIDE 5 MG/ML
5 INJECTION, SOLUTION INTRAVENOUS ONCE
Status: COMPLETED | OUTPATIENT
Start: 2018-01-01 | End: 2018-01-01

## 2018-01-01 RX ORDER — NITROGLYCERIN 0.4 MG/1
0.4 TABLET SUBLINGUAL EVERY 5 MIN PRN
Status: DISCONTINUED | OUTPATIENT
Start: 2018-01-01 | End: 2018-01-01 | Stop reason: HOSPADM

## 2018-01-01 RX ORDER — LISINOPRIL 20 MG/1
20 TABLET ORAL DAILY
Status: DISCONTINUED | OUTPATIENT
Start: 2018-01-01 | End: 2018-01-01 | Stop reason: HOSPADM

## 2018-01-01 RX ORDER — NOREPINEPHRINE BITARTRATE/D5W 4MG/250ML
0.02 PLASTIC BAG, INJECTION (ML) INTRAVENOUS CONTINUOUS
Status: DISCONTINUED | OUTPATIENT
Start: 2018-01-01 | End: 2018-01-01

## 2018-01-01 RX ORDER — ONDANSETRON 2 MG/ML
4 INJECTION INTRAMUSCULAR; INTRAVENOUS ONCE
Status: COMPLETED | OUTPATIENT
Start: 2018-01-01 | End: 2018-01-01

## 2018-01-01 RX ORDER — INSULIN ASPART 100 [IU]/ML
1-10 INJECTION, SOLUTION INTRAVENOUS; SUBCUTANEOUS EVERY 4 HOURS PRN
Status: DISCONTINUED | OUTPATIENT
Start: 2018-01-01 | End: 2018-01-01

## 2018-01-01 RX ORDER — DEXTROSE MONOHYDRATE, SODIUM CHLORIDE, AND POTASSIUM CHLORIDE 50; 1.49; 4.5 G/1000ML; G/1000ML; G/1000ML
INJECTION, SOLUTION INTRAVENOUS CONTINUOUS
Status: CANCELLED | OUTPATIENT
Start: 2018-01-01

## 2018-01-01 RX ORDER — CALCIUM CHLORIDE INJECTION 100 MG/ML
INJECTION, SOLUTION INTRAVENOUS CODE/TRAUMA/SEDATION MEDICATION
Status: COMPLETED | OUTPATIENT
Start: 2018-01-01 | End: 2018-01-01

## 2018-01-01 RX ORDER — METOCLOPRAMIDE HYDROCHLORIDE 5 MG/ML
5 INJECTION INTRAMUSCULAR; INTRAVENOUS EVERY 6 HOURS PRN
Status: CANCELLED | OUTPATIENT
Start: 2018-01-01

## 2018-01-01 RX ORDER — ACETAMINOPHEN 650 MG/20.3ML
650 LIQUID ORAL EVERY 6 HOURS PRN
Status: DISCONTINUED | OUTPATIENT
Start: 2018-01-01 | End: 2018-01-01

## 2018-01-01 RX ORDER — METOPROLOL SUCCINATE 50 MG/1
50 TABLET, EXTENDED RELEASE ORAL DAILY
Qty: 30 TABLET | Refills: 5 | Status: ON HOLD | OUTPATIENT
Start: 2018-01-01 | End: 2018-01-01 | Stop reason: HOSPADM

## 2018-01-01 RX ORDER — IPRATROPIUM BROMIDE AND ALBUTEROL SULFATE 2.5; .5 MG/3ML; MG/3ML
3 SOLUTION RESPIRATORY (INHALATION) EVERY 4 HOURS PRN
Status: CANCELLED | OUTPATIENT
Start: 2018-01-01

## 2018-01-01 RX ORDER — ATORVASTATIN CALCIUM 20 MG/1
80 TABLET, FILM COATED ORAL DAILY
Status: DISCONTINUED | OUTPATIENT
Start: 2018-01-01 | End: 2018-01-01

## 2018-01-01 RX ORDER — POTASSIUM CHLORIDE 750 MG/1
50 CAPSULE, EXTENDED RELEASE ORAL ONCE
Status: COMPLETED | OUTPATIENT
Start: 2018-01-01 | End: 2018-01-01

## 2018-01-01 RX ORDER — NOREPINEPHRINE BITARTRATE/D5W 4MG/250ML
PLASTIC BAG, INJECTION (ML) INTRAVENOUS
Status: COMPLETED
Start: 2018-01-01 | End: 2018-01-01

## 2018-01-01 RX ORDER — LEVOTHYROXINE SODIUM 25 UG/1
25 TABLET ORAL
Status: DISCONTINUED | OUTPATIENT
Start: 2018-01-01 | End: 2018-01-01

## 2018-01-01 RX ORDER — ATORVASTATIN CALCIUM 20 MG/1
40 TABLET, FILM COATED ORAL DAILY
Status: DISCONTINUED | OUTPATIENT
Start: 2018-01-01 | End: 2018-01-01

## 2018-01-01 RX ORDER — FENTANYL CITRATE 50 UG/ML
50 INJECTION, SOLUTION INTRAMUSCULAR; INTRAVENOUS ONCE
Status: COMPLETED | OUTPATIENT
Start: 2018-01-01 | End: 2018-01-01

## 2018-01-01 RX ORDER — HEPARIN SODIUM,PORCINE/D5W 25000/250
16 INTRAVENOUS SOLUTION INTRAVENOUS CONTINUOUS
Status: DISCONTINUED | OUTPATIENT
Start: 2018-01-01 | End: 2018-01-01 | Stop reason: HOSPADM

## 2018-01-01 RX ORDER — POTASSIUM CHLORIDE 20 MEQ/15ML
40 SOLUTION ORAL
Status: COMPLETED | OUTPATIENT
Start: 2018-01-01 | End: 2018-01-01

## 2018-01-01 RX ORDER — FUROSEMIDE 10 MG/ML
40 INJECTION INTRAMUSCULAR; INTRAVENOUS 2 TIMES DAILY PRN
Status: DISCONTINUED | OUTPATIENT
Start: 2018-01-01 | End: 2018-01-01

## 2018-01-01 RX ORDER — CEFAZOLIN SODIUM 1 G/3ML
2 INJECTION, POWDER, FOR SOLUTION INTRAMUSCULAR; INTRAVENOUS
Status: DISCONTINUED | OUTPATIENT
Start: 2018-01-01 | End: 2018-01-01

## 2018-01-01 RX ORDER — DOCUSATE SODIUM 100 MG/1
100 CAPSULE, LIQUID FILLED ORAL 2 TIMES DAILY
Status: DISCONTINUED | OUTPATIENT
Start: 2018-01-01 | End: 2018-01-01 | Stop reason: HOSPADM

## 2018-01-01 RX ORDER — SODIUM CHLORIDE 9 MG/ML
1000 INJECTION, SOLUTION INTRAVENOUS
Status: COMPLETED | OUTPATIENT
Start: 2018-01-01 | End: 2018-01-01

## 2018-01-01 RX ORDER — FUROSEMIDE 80 MG/1
80 TABLET ORAL 2 TIMES DAILY
Status: DISCONTINUED | OUTPATIENT
Start: 2018-01-01 | End: 2018-01-01 | Stop reason: HOSPADM

## 2018-01-01 RX ORDER — POTASSIUM CHLORIDE 14.9 MG/ML
20 INJECTION INTRAVENOUS ONCE
Status: COMPLETED | OUTPATIENT
Start: 2018-01-01 | End: 2018-01-01

## 2018-01-01 RX ORDER — ACETAMINOPHEN 325 MG/1
650 TABLET ORAL EVERY 4 HOURS PRN
Status: CANCELLED | OUTPATIENT
Start: 2018-01-01

## 2018-01-01 RX ORDER — FENTANYL CITRATE 50 UG/ML
25 INJECTION, SOLUTION INTRAMUSCULAR; INTRAVENOUS
Status: CANCELLED | OUTPATIENT
Start: 2018-01-01

## 2018-01-01 RX ORDER — MICONAZOLE NITRATE 2 %
POWDER (GRAM) TOPICAL 2 TIMES DAILY
Status: DISCONTINUED | OUTPATIENT
Start: 2018-01-01 | End: 2018-01-01 | Stop reason: HOSPADM

## 2018-01-01 RX ORDER — LANOLIN ALCOHOL/MO/W.PET/CERES
800 CREAM (GRAM) TOPICAL EVERY 4 HOURS
Status: COMPLETED | OUTPATIENT
Start: 2018-01-01 | End: 2018-01-01

## 2018-01-01 RX ORDER — METOLAZONE 5 MG/1
5 TABLET ORAL ONCE
Status: COMPLETED | OUTPATIENT
Start: 2018-01-01 | End: 2018-01-01

## 2018-01-01 RX ORDER — MODAFINIL 100 MG/1
100 TABLET ORAL
Status: DISCONTINUED | OUTPATIENT
Start: 2018-01-01 | End: 2018-01-01

## 2018-01-01 RX ORDER — METOPROLOL SUCCINATE 50 MG/1
50 TABLET, EXTENDED RELEASE ORAL DAILY
Qty: 30 TABLET | Refills: 11 | Status: CANCELLED | OUTPATIENT
Start: 2018-01-01 | End: 2019-05-03

## 2018-01-01 RX ORDER — SODIUM CHLORIDE 0.9 % (FLUSH) 0.9 %
3 SYRINGE (ML) INJECTION EVERY 8 HOURS
Status: DISCONTINUED | OUTPATIENT
Start: 2018-01-01 | End: 2018-01-01 | Stop reason: HOSPADM

## 2018-01-01 RX ORDER — FUROSEMIDE 10 MG/ML
80 INJECTION INTRAMUSCULAR; INTRAVENOUS ONCE
Status: COMPLETED | OUTPATIENT
Start: 2018-01-01 | End: 2018-01-01

## 2018-01-01 RX ORDER — FUROSEMIDE 10 MG/ML
40 INJECTION INTRAMUSCULAR; INTRAVENOUS ONCE
Status: DISCONTINUED | OUTPATIENT
Start: 2018-01-01 | End: 2018-01-01

## 2018-01-01 RX ORDER — HYDROCODONE BITARTRATE AND ACETAMINOPHEN 10; 325 MG/1; MG/1
1 TABLET ORAL EVERY 6 HOURS PRN
Status: DISCONTINUED | OUTPATIENT
Start: 2018-01-01 | End: 2018-01-01 | Stop reason: HOSPADM

## 2018-01-01 RX ORDER — ASPIRIN 325 MG
325 TABLET ORAL
Status: DISCONTINUED | OUTPATIENT
Start: 2018-01-01 | End: 2018-01-01

## 2018-01-01 RX ORDER — LEVOTHYROXINE SODIUM ANHYDROUS 100 UG/5ML
50 INJECTION, POWDER, LYOPHILIZED, FOR SOLUTION INTRAVENOUS DAILY
Status: DISCONTINUED | OUTPATIENT
Start: 2018-01-01 | End: 2018-01-01

## 2018-01-01 RX ORDER — AMOXICILLIN AND CLAVULANATE POTASSIUM 875; 125 MG/1; MG/1
1 TABLET, FILM COATED ORAL EVERY 12 HOURS
Status: DISCONTINUED | OUTPATIENT
Start: 2018-01-01 | End: 2018-01-01 | Stop reason: HOSPADM

## 2018-01-01 RX ORDER — ALBUMIN HUMAN 250 G/1000ML
25 SOLUTION INTRAVENOUS ONCE
Status: COMPLETED | OUTPATIENT
Start: 2018-01-01 | End: 2018-01-01

## 2018-01-01 RX ORDER — DILTIAZEM HYDROCHLORIDE 60 MG/1
60 CAPSULE, EXTENDED RELEASE ORAL 2 TIMES DAILY
Qty: 60 CAPSULE | Refills: 3 | Status: SHIPPED | OUTPATIENT
Start: 2018-01-01 | End: 2018-01-01

## 2018-01-01 RX ORDER — POTASSIUM CHLORIDE 20 MEQ/15ML
40 SOLUTION ORAL ONCE
Status: COMPLETED | OUTPATIENT
Start: 2018-01-01 | End: 2018-01-01

## 2018-01-01 RX ORDER — FUROSEMIDE 40 MG/1
40 TABLET ORAL 2 TIMES DAILY
Qty: 60 TABLET | Refills: 2 | Status: ON HOLD | OUTPATIENT
Start: 2018-01-01 | End: 2018-01-01

## 2018-01-01 RX ORDER — POTASSIUM CHLORIDE 29.8 MG/ML
40 INJECTION INTRAVENOUS
Status: CANCELLED | OUTPATIENT
Start: 2018-01-01

## 2018-01-01 RX ORDER — POTASSIUM CHLORIDE 7.45 MG/ML
10 INJECTION INTRAVENOUS
Status: DISCONTINUED | OUTPATIENT
Start: 2018-01-01 | End: 2018-01-01

## 2018-01-01 RX ORDER — FERROUS SULFATE 325(65) MG
325 TABLET, DELAYED RELEASE (ENTERIC COATED) ORAL 2 TIMES DAILY
Qty: 60 TABLET | Refills: 3 | Status: ON HOLD | COMMUNITY
Start: 2018-01-01 | End: 2018-01-01 | Stop reason: HOSPADM

## 2018-01-01 RX ORDER — NALOXONE HCL 0.4 MG/ML
0.4 VIAL (ML) INJECTION
Status: COMPLETED | OUTPATIENT
Start: 2018-01-01 | End: 2018-01-01

## 2018-01-01 RX ORDER — LANOLIN ALCOHOL/MO/W.PET/CERES
400 CREAM (GRAM) TOPICAL ONCE
Status: COMPLETED | OUTPATIENT
Start: 2018-01-01 | End: 2018-01-01

## 2018-01-01 RX ORDER — ALBUTEROL SULFATE 0.83 MG/ML
15 SOLUTION RESPIRATORY (INHALATION)
Status: COMPLETED | OUTPATIENT
Start: 2018-01-01 | End: 2018-01-01

## 2018-01-01 RX ORDER — FERROUS SULFATE 325(65) MG
325 TABLET, DELAYED RELEASE (ENTERIC COATED) ORAL 2 TIMES DAILY
Qty: 60 TABLET | Refills: 3 | COMMUNITY
Start: 2018-01-01 | End: 2018-01-01

## 2018-01-01 RX ORDER — FENTANYL CITRATE 50 UG/ML
25 INJECTION, SOLUTION INTRAMUSCULAR; INTRAVENOUS ONCE
Status: COMPLETED | OUTPATIENT
Start: 2018-01-01 | End: 2018-01-01

## 2018-01-01 RX ORDER — FUROSEMIDE 40 MG/1
TABLET ORAL
Qty: 675 TABLET | Refills: 0 | Status: ON HOLD | OUTPATIENT
Start: 2018-01-01 | End: 2018-01-01 | Stop reason: HOSPADM

## 2018-01-01 RX ORDER — GLYCOPYRROLATE 1 MG/1
1 TABLET ORAL 3 TIMES DAILY PRN
Status: DISCONTINUED | OUTPATIENT
Start: 2018-01-01 | End: 2018-01-01 | Stop reason: HOSPADM

## 2018-01-01 RX ORDER — HEPARIN SODIUM,PORCINE/D5W 25000/250
16 INTRAVENOUS SOLUTION INTRAVENOUS CONTINUOUS
Status: DISCONTINUED | OUTPATIENT
Start: 2018-01-01 | End: 2018-01-01

## 2018-01-01 RX ORDER — ATORVASTATIN CALCIUM 40 MG/1
80 TABLET, FILM COATED ORAL DAILY
Status: DISCONTINUED | OUTPATIENT
Start: 2018-01-01 | End: 2018-01-01 | Stop reason: HOSPADM

## 2018-01-01 RX ORDER — FENTANYL CITRATE-0.9 % NACL/PF 10 MCG/ML
PLASTIC BAG, INJECTION (ML) INTRAVENOUS CONTINUOUS
Status: DISCONTINUED | OUTPATIENT
Start: 2018-01-01 | End: 2018-01-01 | Stop reason: HOSPADM

## 2018-01-01 RX ORDER — LEVETIRACETAM 10 MG/ML
1000 INJECTION INTRAVASCULAR
Status: COMPLETED | OUTPATIENT
Start: 2018-01-01 | End: 2018-01-01

## 2018-01-01 RX ORDER — FUROSEMIDE 20 MG/1
20 TABLET ORAL DAILY
Qty: 30 TABLET | Refills: 11 | Status: SHIPPED | OUTPATIENT
Start: 2018-01-01 | End: 2018-01-01 | Stop reason: SDUPTHER

## 2018-01-01 RX ORDER — DOCUSATE SODIUM 100 MG/1
100 CAPSULE, LIQUID FILLED ORAL 2 TIMES DAILY
Status: CANCELLED | OUTPATIENT
Start: 2018-01-01

## 2018-01-01 RX ORDER — POTASSIUM CHLORIDE 29.8 MG/ML
40 INJECTION INTRAVENOUS ONCE
Status: COMPLETED | OUTPATIENT
Start: 2018-01-01 | End: 2018-01-01

## 2018-01-01 RX ORDER — GABAPENTIN 100 MG/1
100 CAPSULE ORAL 3 TIMES DAILY
Status: DISCONTINUED | OUTPATIENT
Start: 2018-01-01 | End: 2018-01-01 | Stop reason: HOSPADM

## 2018-01-01 RX ORDER — METOPROLOL TARTRATE 25 MG/1
12.5 TABLET ORAL EVERY 12 HOURS
Status: CANCELLED | OUTPATIENT
Start: 2018-01-01

## 2018-01-01 RX ORDER — LIDOCAINE HYDROCHLORIDE 10 MG/ML
10 INJECTION INFILTRATION; PERINEURAL
Status: COMPLETED | OUTPATIENT
Start: 2018-01-01 | End: 2018-01-01

## 2018-01-01 RX ORDER — ASPIRIN 300 MG/1
300 SUPPOSITORY RECTAL ONCE
Status: DISCONTINUED | OUTPATIENT
Start: 2018-01-01 | End: 2018-01-01

## 2018-01-01 RX ORDER — HEPARIN 100 UNIT/ML
500 SYRINGE INTRAVENOUS ONCE
Status: DISCONTINUED | OUTPATIENT
Start: 2018-01-01 | End: 2018-01-01

## 2018-01-01 RX ORDER — SODIUM CHLORIDE 9 MG/ML
1000 INJECTION, SOLUTION INTRAVENOUS ONCE
Status: DISCONTINUED | OUTPATIENT
Start: 2018-01-01 | End: 2018-01-01

## 2018-01-01 RX ORDER — CLOPIDOGREL BISULFATE 75 MG/1
75 TABLET ORAL DAILY
Status: DISCONTINUED | OUTPATIENT
Start: 2018-01-01 | End: 2018-01-01

## 2018-01-01 RX ORDER — LORAZEPAM 0.5 MG/1
0.5 TABLET ORAL EVERY 8 HOURS PRN
Status: DISCONTINUED | OUTPATIENT
Start: 2018-01-01 | End: 2018-01-01

## 2018-01-01 RX ORDER — POTASSIUM CHLORIDE 14.9 MG/ML
20 INJECTION INTRAVENOUS
Status: CANCELLED | OUTPATIENT
Start: 2018-01-01

## 2018-01-01 RX ORDER — CHLORPROMAZINE HYDROCHLORIDE 25 MG/1
25 TABLET, FILM COATED ORAL ONCE
Status: DISCONTINUED | OUTPATIENT
Start: 2018-01-01 | End: 2018-01-01

## 2018-01-01 RX ORDER — PROPOFOL 10 MG/ML
5 INJECTION, EMULSION INTRAVENOUS CONTINUOUS
Status: DISCONTINUED | OUTPATIENT
Start: 2018-01-01 | End: 2018-01-01

## 2018-01-01 RX ORDER — FENTANYL CITRATE/PF 250MCG/5ML
50 SYRINGE (ML) INTRAVENOUS
Status: DISCONTINUED | OUTPATIENT
Start: 2018-01-01 | End: 2018-01-01

## 2018-01-01 RX ORDER — SPIRONOLACTONE 25 MG/1
25 TABLET ORAL ONCE
Status: COMPLETED | OUTPATIENT
Start: 2018-01-01 | End: 2018-01-01

## 2018-01-01 RX ORDER — ACETAMINOPHEN 325 MG/1
650 TABLET ORAL EVERY 4 HOURS PRN
Status: DISCONTINUED | OUTPATIENT
Start: 2018-01-01 | End: 2018-01-01 | Stop reason: HOSPADM

## 2018-01-01 RX ORDER — METOPROLOL SUCCINATE 25 MG/1
50 TABLET, EXTENDED RELEASE ORAL DAILY
Status: DISCONTINUED | OUTPATIENT
Start: 2018-01-01 | End: 2018-01-01

## 2018-01-01 RX ORDER — FUROSEMIDE 40 MG/1
TABLET ORAL
Qty: 90 TABLET | Refills: 0 | OUTPATIENT
Start: 2018-01-01

## 2018-01-01 RX ORDER — FENTANYL CITRATE 50 UG/ML
25 INJECTION, SOLUTION INTRAMUSCULAR; INTRAVENOUS
Status: CANCELLED | OUTPATIENT
Start: 2018-01-01 | End: 2018-01-01

## 2018-01-01 RX ORDER — LORAZEPAM/0.9% SODIUM CHLORIDE 100MG/0.1L
2 PLASTIC BAG, INJECTION (ML) INTRAVENOUS
Status: COMPLETED | OUTPATIENT
Start: 2018-01-01 | End: 2018-01-01

## 2018-01-01 RX ORDER — FENTANYL CITRATE 50 UG/ML
50 INJECTION, SOLUTION INTRAMUSCULAR; INTRAVENOUS
Status: DISCONTINUED | OUTPATIENT
Start: 2018-01-01 | End: 2018-01-01

## 2018-01-01 RX ORDER — FUROSEMIDE 10 MG/ML
100 INJECTION INTRAMUSCULAR; INTRAVENOUS ONCE
Status: COMPLETED | OUTPATIENT
Start: 2018-01-01 | End: 2018-01-01

## 2018-01-01 RX ORDER — LEVOTHYROXINE SODIUM ANHYDROUS 100 UG/5ML
65 INJECTION, POWDER, LYOPHILIZED, FOR SOLUTION INTRAVENOUS DAILY
Status: DISCONTINUED | OUTPATIENT
Start: 2018-01-01 | End: 2018-01-01

## 2018-01-01 RX ORDER — SODIUM,POTASSIUM PHOSPHATES 280-250MG
2 POWDER IN PACKET (EA) ORAL
Status: DISCONTINUED | OUTPATIENT
Start: 2018-01-01 | End: 2018-01-01 | Stop reason: HOSPADM

## 2018-01-01 RX ORDER — PROPOFOL 10 MG/ML
5 INJECTION, EMULSION INTRAVENOUS CONTINUOUS
Status: CANCELLED | OUTPATIENT
Start: 2018-01-01

## 2018-01-01 RX ORDER — BUTALBITAL, ACETAMINOPHEN AND CAFFEINE 50; 325; 40 MG/1; MG/1; MG/1
1 TABLET ORAL EVERY 4 HOURS PRN
Status: DISCONTINUED | OUTPATIENT
Start: 2018-01-01 | End: 2018-01-01 | Stop reason: HOSPADM

## 2018-01-01 RX ORDER — FENTANYL CITRATE 50 UG/ML
50 INJECTION, SOLUTION INTRAMUSCULAR; INTRAVENOUS
Status: CANCELLED | OUTPATIENT
Start: 2018-01-01

## 2018-01-01 RX ORDER — FAMOTIDINE 20 MG/1
20 TABLET, FILM COATED ORAL DAILY
Status: DISCONTINUED | OUTPATIENT
Start: 2018-01-01 | End: 2018-01-01

## 2018-01-01 RX ORDER — MAGNESIUM SULFATE HEPTAHYDRATE 40 MG/ML
2 INJECTION, SOLUTION INTRAVENOUS
Status: COMPLETED | OUTPATIENT
Start: 2018-01-01 | End: 2018-01-01

## 2018-01-01 RX ORDER — SODIUM CHLORIDE 0.9 % (FLUSH) 0.9 %
5 SYRINGE (ML) INJECTION
Status: DISCONTINUED | OUTPATIENT
Start: 2018-01-01 | End: 2018-01-01 | Stop reason: HOSPADM

## 2018-01-01 RX ORDER — SODIUM BICARBONATE 1 MEQ/ML
SYRINGE (ML) INTRAVENOUS CODE/TRAUMA/SEDATION MEDICATION
Status: COMPLETED | OUTPATIENT
Start: 2018-01-01 | End: 2018-01-01

## 2018-01-01 RX ORDER — FUROSEMIDE 20 MG/1
20 TABLET ORAL DAILY
Qty: 30 TABLET | Refills: 11 | Status: ON HOLD | OUTPATIENT
Start: 2018-01-01 | End: 2018-01-01

## 2018-01-01 RX ORDER — EPINEPHRINE 0.1 MG/ML
INJECTION INTRAVENOUS CODE/TRAUMA/SEDATION MEDICATION
Status: COMPLETED | OUTPATIENT
Start: 2018-01-01 | End: 2018-01-01

## 2018-01-01 RX ORDER — FENTANYL CITRATE 50 UG/ML
INJECTION, SOLUTION INTRAMUSCULAR; INTRAVENOUS
Status: COMPLETED
Start: 2018-01-01 | End: 2018-01-01

## 2018-01-01 RX ORDER — NAPROXEN SODIUM 220 MG/1
81 TABLET, FILM COATED ORAL DAILY
Status: DISCONTINUED | OUTPATIENT
Start: 2018-01-01 | End: 2018-01-01 | Stop reason: HOSPADM

## 2018-01-01 RX ORDER — LABETALOL HYDROCHLORIDE 5 MG/ML
15 INJECTION, SOLUTION INTRAVENOUS ONCE
Status: COMPLETED | OUTPATIENT
Start: 2018-01-01 | End: 2018-01-01

## 2018-01-01 RX ORDER — ASPIRIN 325 MG
325 TABLET, DELAYED RELEASE (ENTERIC COATED) ORAL DAILY
Status: CANCELLED | OUTPATIENT
Start: 2018-01-01

## 2018-01-01 RX ORDER — FERROUS SULFATE 325(65) MG
325 TABLET, DELAYED RELEASE (ENTERIC COATED) ORAL 2 TIMES DAILY WITH MEALS
Qty: 60 TABLET | Refills: 3 | COMMUNITY
Start: 2018-01-01 | End: 2018-01-01

## 2018-01-01 RX ORDER — MIDAZOLAM HYDROCHLORIDE 1 MG/ML
2 INJECTION INTRAMUSCULAR; INTRAVENOUS EVERY 10 MIN PRN
Status: DISCONTINUED | OUTPATIENT
Start: 2018-01-01 | End: 2018-01-01 | Stop reason: HOSPADM

## 2018-01-01 RX ORDER — INSULIN ASPART 100 [IU]/ML
0-5 INJECTION, SOLUTION INTRAVENOUS; SUBCUTANEOUS EVERY 4 HOURS PRN
Status: DISCONTINUED | OUTPATIENT
Start: 2018-01-01 | End: 2018-01-01

## 2018-01-01 RX ORDER — PANTOPRAZOLE SODIUM 40 MG/1
40 TABLET, DELAYED RELEASE ORAL
Status: CANCELLED | OUTPATIENT
Start: 2018-01-01

## 2018-01-01 RX ORDER — BUSPIRONE HYDROCHLORIDE 5 MG/1
30 TABLET ORAL ONCE
Status: DISCONTINUED | OUTPATIENT
Start: 2018-01-01 | End: 2018-01-01

## 2018-01-01 RX ORDER — LIDOCAINE HYDROCHLORIDE 10 MG/ML
1 INJECTION, SOLUTION EPIDURAL; INFILTRATION; INTRACAUDAL; PERINEURAL
Status: CANCELLED | OUTPATIENT
Start: 2018-01-01

## 2018-01-01 RX ORDER — FUROSEMIDE 10 MG/ML
1000 INJECTION INTRAMUSCULAR; INTRAVENOUS ONCE
Status: DISCONTINUED | OUTPATIENT
Start: 2018-01-01 | End: 2018-01-01

## 2018-01-01 RX ORDER — SILODOSIN 4 MG/1
4 CAPSULE ORAL DAILY
Status: DISCONTINUED | OUTPATIENT
Start: 2018-01-01 | End: 2018-01-01

## 2018-01-01 RX ORDER — CHOLECALCIFEROL (VITAMIN D3) 25 MCG
1000 TABLET ORAL DAILY
Status: DISCONTINUED | OUTPATIENT
Start: 2018-01-01 | End: 2018-01-01

## 2018-01-01 RX ORDER — ACETAMINOPHEN 10 MG/ML
1000 INJECTION, SOLUTION INTRAVENOUS ONCE
Status: COMPLETED | OUTPATIENT
Start: 2018-01-01 | End: 2018-01-01

## 2018-01-01 RX ORDER — MODAFINIL 100 MG/1
100 TABLET ORAL 2 TIMES DAILY
Status: DISCONTINUED | OUTPATIENT
Start: 2018-01-01 | End: 2018-01-01

## 2018-01-01 RX ORDER — LEVOTHYROXINE SODIUM ANHYDROUS 100 UG/5ML
80 INJECTION, POWDER, LYOPHILIZED, FOR SOLUTION INTRAVENOUS DAILY
Status: DISCONTINUED | OUTPATIENT
Start: 2018-01-01 | End: 2018-01-01

## 2018-01-01 RX ORDER — FENTANYL CITRATE 50 UG/ML
50 INJECTION, SOLUTION INTRAMUSCULAR; INTRAVENOUS EVERY 4 HOURS PRN
Status: DISCONTINUED | OUTPATIENT
Start: 2018-01-01 | End: 2018-01-01 | Stop reason: HOSPADM

## 2018-01-01 RX ORDER — IPRATROPIUM BROMIDE AND ALBUTEROL SULFATE 2.5; .5 MG/3ML; MG/3ML
3 SOLUTION RESPIRATORY (INHALATION) EVERY 6 HOURS PRN
Status: DISCONTINUED | OUTPATIENT
Start: 2018-01-01 | End: 2018-01-01

## 2018-01-01 RX ORDER — ASPIRIN 300 MG/1
300 SUPPOSITORY RECTAL ONCE AS NEEDED
Status: CANCELLED | OUTPATIENT
Start: 2018-01-01 | End: 2018-01-01

## 2018-01-01 RX ORDER — SODIUM,POTASSIUM PHOSPHATES 280-250MG
2 POWDER IN PACKET (EA) ORAL ONCE
Status: DISCONTINUED | OUTPATIENT
Start: 2018-01-01 | End: 2018-01-01

## 2018-01-01 RX ORDER — BUSPIRONE HYDROCHLORIDE 10 MG/1
30 TABLET ORAL ONCE
Status: COMPLETED | OUTPATIENT
Start: 2018-01-01 | End: 2018-01-01

## 2018-01-01 RX ORDER — POLYETHYLENE GLYCOL 3350 17 G/17G
17 POWDER, FOR SOLUTION ORAL DAILY
Status: CANCELLED | OUTPATIENT
Start: 2018-01-01

## 2018-01-01 RX ORDER — BISACODYL 10 MG
10 SUPPOSITORY, RECTAL RECTAL EVERY 12 HOURS PRN
Status: CANCELLED | OUTPATIENT
Start: 2018-01-01

## 2018-01-01 RX ORDER — FUROSEMIDE 10 MG/ML
120 INJECTION INTRAMUSCULAR; INTRAVENOUS 2 TIMES DAILY
Status: DISCONTINUED | OUTPATIENT
Start: 2018-01-01 | End: 2018-01-01

## 2018-01-01 RX ORDER — FUROSEMIDE 80 MG/1
80 TABLET ORAL EVERY 8 HOURS
Status: DISCONTINUED | OUTPATIENT
Start: 2018-01-01 | End: 2018-01-01

## 2018-01-01 RX ADMIN — GABAPENTIN 100 MG: 100 CAPSULE ORAL at 08:05

## 2018-01-01 RX ADMIN — Medication 0.03 MCG/KG/MIN: at 05:07

## 2018-01-01 RX ADMIN — CHLORHEXIDINE GLUCONATE 0.12% ORAL RINSE 15 ML: 1.2 LIQUID ORAL at 09:08

## 2018-01-01 RX ADMIN — VITAMIN D, TAB 1000IU (100/BT) 1000 UNITS: 25 TAB at 08:07

## 2018-01-01 RX ADMIN — Medication 3 ML: at 02:02

## 2018-01-01 RX ADMIN — GABAPENTIN 100 MG: 100 CAPSULE ORAL at 03:02

## 2018-01-01 RX ADMIN — Medication 1250 MG: at 11:02

## 2018-01-01 RX ADMIN — CHLORHEXIDINE GLUCONATE 0.12% ORAL RINSE 15 ML: 1.2 LIQUID ORAL at 08:07

## 2018-01-01 RX ADMIN — LABETALOL HYDROCHLORIDE 5 MG: 5 INJECTION, SOLUTION INTRAVENOUS at 09:07

## 2018-01-01 RX ADMIN — DOXYCYCLINE HYCLATE 100 MG: 100 TABLET, COATED ORAL at 09:03

## 2018-01-01 RX ADMIN — FUROSEMIDE 40 MG: 10 INJECTION, SOLUTION INTRAMUSCULAR; INTRAVENOUS at 08:08

## 2018-01-01 RX ADMIN — HYDROCODONE BITARTRATE AND ACETAMINOPHEN 1 TABLET: 10; 325 TABLET ORAL at 09:03

## 2018-01-01 RX ADMIN — GABAPENTIN 100 MG: 100 CAPSULE ORAL at 01:02

## 2018-01-01 RX ADMIN — PHYTONADIONE 10 MG: 10 INJECTION, EMULSION INTRAMUSCULAR; INTRAVENOUS; SUBCUTANEOUS at 10:07

## 2018-01-01 RX ADMIN — LISINOPRIL 20 MG: 20 TABLET ORAL at 09:02

## 2018-01-01 RX ADMIN — ATORVASTATIN CALCIUM 80 MG: 20 TABLET, FILM COATED ORAL at 08:02

## 2018-01-01 RX ADMIN — ACETAMINOPHEN 650 MG: 325 TABLET ORAL at 11:07

## 2018-01-01 RX ADMIN — POTASSIUM CHLORIDE 40 MEQ: 20 TABLET, EXTENDED RELEASE ORAL at 09:03

## 2018-01-01 RX ADMIN — ATORVASTATIN CALCIUM 80 MG: 20 TABLET, FILM COATED ORAL at 09:02

## 2018-01-01 RX ADMIN — FERROUS SULFATE TAB EC 325 MG (65 MG FE EQUIVALENT) 325 MG: 325 (65 FE) TABLET DELAYED RESPONSE at 08:05

## 2018-01-01 RX ADMIN — FUROSEMIDE 15 MG/HR: 10 INJECTION, SOLUTION INTRAMUSCULAR; INTRAVENOUS at 10:02

## 2018-01-01 RX ADMIN — GABAPENTIN 100 MG: 100 CAPSULE ORAL at 12:02

## 2018-01-01 RX ADMIN — ASPIRIN 81 MG CHEWABLE TABLET 81 MG: 81 TABLET CHEWABLE at 08:07

## 2018-01-01 RX ADMIN — Medication: at 07:06

## 2018-01-01 RX ADMIN — DOXYCYCLINE HYCLATE 100 MG: 100 TABLET, COATED ORAL at 08:02

## 2018-01-01 RX ADMIN — RIVAROXABAN 20 MG: 20 TABLET, FILM COATED ORAL at 06:07

## 2018-01-01 RX ADMIN — POTASSIUM & SODIUM PHOSPHATES POWDER PACK 280-160-250 MG 2 PACKET: 280-160-250 PACK at 04:03

## 2018-01-01 RX ADMIN — POTASSIUM CHLORIDE: 2 INJECTION, SOLUTION, CONCENTRATE INTRAVENOUS at 05:07

## 2018-01-01 RX ADMIN — ASPIRIN 81 MG 81 MG: 81 TABLET ORAL at 08:02

## 2018-01-01 RX ADMIN — INSULIN ASPART 2 UNITS: 100 INJECTION, SOLUTION INTRAVENOUS; SUBCUTANEOUS at 08:08

## 2018-01-01 RX ADMIN — Medication 0.05 MCG/KG/MIN: at 04:03

## 2018-01-01 RX ADMIN — POTASSIUM CHLORIDE 20 MEQ: 1500 TABLET, EXTENDED RELEASE ORAL at 03:07

## 2018-01-01 RX ADMIN — MIDAZOLAM HYDROCHLORIDE 2 MG: 1 INJECTION, SOLUTION INTRAMUSCULAR; INTRAVENOUS at 02:08

## 2018-01-01 RX ADMIN — FUROSEMIDE 40 MG: 10 INJECTION, SOLUTION INTRAMUSCULAR; INTRAVENOUS at 05:03

## 2018-01-01 RX ADMIN — FUROSEMIDE 80 MG: 10 INJECTION, SOLUTION INTRAMUSCULAR; INTRAVENOUS at 03:03

## 2018-01-01 RX ADMIN — FAMOTIDINE 20 MG: 20 TABLET ORAL at 08:07

## 2018-01-01 RX ADMIN — VANCOMYCIN HYDROCHLORIDE 1250 MG: 10 INJECTION, POWDER, LYOPHILIZED, FOR SOLUTION INTRAVENOUS at 05:07

## 2018-01-01 RX ADMIN — LISINOPRIL 20 MG: 20 TABLET ORAL at 08:02

## 2018-01-01 RX ADMIN — METOPROLOL TARTRATE 25 MG: 25 TABLET, FILM COATED ORAL at 09:03

## 2018-01-01 RX ADMIN — HYDROCORTISONE SODIUM SUCCINATE 100 MG: 100 INJECTION, POWDER, FOR SOLUTION INTRAMUSCULAR; INTRAVENOUS at 05:07

## 2018-01-01 RX ADMIN — PROPOFOL 50 MCG/KG/MIN: 10 INJECTION, EMULSION INTRAVENOUS at 08:07

## 2018-01-01 RX ADMIN — MODAFINIL 200 MG: 100 TABLET ORAL at 05:08

## 2018-01-01 RX ADMIN — GLYCOPYRROLATE 1 MG: 1 TABLET ORAL at 12:08

## 2018-01-01 RX ADMIN — FUROSEMIDE 40 MG: 10 INJECTION, SOLUTION INTRAMUSCULAR; INTRAVENOUS at 09:03

## 2018-01-01 RX ADMIN — BUTALBITAL, ACETAMINOPHEN AND CAFFEINE 1 TABLET: 50; 325; 40 TABLET ORAL at 10:02

## 2018-01-01 RX ADMIN — SENNOSIDES AND DOCUSATE SODIUM 1 TABLET: 8.6; 5 TABLET ORAL at 08:08

## 2018-01-01 RX ADMIN — METOPROLOL TARTRATE 25 MG: 25 TABLET, FILM COATED ORAL at 08:05

## 2018-01-01 RX ADMIN — CLOPIDOGREL 75 MG: 75 TABLET, FILM COATED ORAL at 08:02

## 2018-01-01 RX ADMIN — ACETAMINOPHEN 650 MG: 325 TABLET ORAL at 09:07

## 2018-01-01 RX ADMIN — MODAFINIL 200 MG: 100 TABLET ORAL at 01:08

## 2018-01-01 RX ADMIN — EPINEPHRINE 1 MG: 0.1 INJECTION, SOLUTION ENDOTRACHEAL; INTRACARDIAC; INTRAVENOUS at 11:07

## 2018-01-01 RX ADMIN — HEPARIN SODIUM 5000 UNITS: 5000 INJECTION, SOLUTION INTRAVENOUS; SUBCUTANEOUS at 01:07

## 2018-01-01 RX ADMIN — POTASSIUM CHLORIDE 40 MEQ: 400 INJECTION, SOLUTION INTRAVENOUS at 12:03

## 2018-01-01 RX ADMIN — AMIODARONE HYDROCHLORIDE 200 MG: 200 TABLET ORAL at 08:02

## 2018-01-01 RX ADMIN — AMIODARONE HYDROCHLORIDE 200 MG: 200 TABLET ORAL at 10:03

## 2018-01-01 RX ADMIN — FUROSEMIDE 60 MG: 10 INJECTION, SOLUTION INTRAMUSCULAR; INTRAVENOUS at 01:05

## 2018-01-01 RX ADMIN — ATORVASTATIN CALCIUM 80 MG: 20 TABLET, FILM COATED ORAL at 03:05

## 2018-01-01 RX ADMIN — SODIUM CHLORIDE 2448 ML: 0.9 INJECTION, SOLUTION INTRAVENOUS at 12:03

## 2018-01-01 RX ADMIN — AMIODARONE HYDROCHLORIDE 200 MG: 200 TABLET ORAL at 10:02

## 2018-01-01 RX ADMIN — PIPERACILLIN AND TAZOBACTAM 4.5 G: 4; .5 INJECTION, POWDER, LYOPHILIZED, FOR SOLUTION INTRAVENOUS; PARENTERAL at 08:07

## 2018-01-01 RX ADMIN — ATORVASTATIN CALCIUM 80 MG: 40 TABLET, FILM COATED ORAL at 09:03

## 2018-01-01 RX ADMIN — HYDROCODONE BITARTRATE AND ACETAMINOPHEN 1 TABLET: 10; 325 TABLET ORAL at 08:02

## 2018-01-01 RX ADMIN — Medication 3 ML: at 08:02

## 2018-01-01 RX ADMIN — FUROSEMIDE 80 MG: 10 INJECTION, SOLUTION INTRAMUSCULAR; INTRAVENOUS at 04:03

## 2018-01-01 RX ADMIN — MIDAZOLAM HYDROCHLORIDE 2 MG: 1 INJECTION, SOLUTION INTRAMUSCULAR; INTRAVENOUS at 12:08

## 2018-01-01 RX ADMIN — FENTANYL CITRATE 100 MCG: 50 INJECTION INTRAMUSCULAR; INTRAVENOUS at 02:08

## 2018-01-01 RX ADMIN — FUROSEMIDE 80 MG: 10 INJECTION, SOLUTION INTRAMUSCULAR; INTRAVENOUS at 01:02

## 2018-01-01 RX ADMIN — CLOPIDOGREL 75 MG: 75 TABLET, FILM COATED ORAL at 10:02

## 2018-01-01 RX ADMIN — CHLORHEXIDINE GLUCONATE 0.12% ORAL RINSE 15 ML: 1.2 LIQUID ORAL at 08:08

## 2018-01-01 RX ADMIN — RAMELTEON 8 MG: 8 TABLET, FILM COATED ORAL at 11:07

## 2018-01-01 RX ADMIN — GABAPENTIN 100 MG: 100 CAPSULE ORAL at 05:02

## 2018-01-01 RX ADMIN — HYDROCODONE BITARTRATE AND ACETAMINOPHEN 1 TABLET: 10; 325 TABLET ORAL at 12:02

## 2018-01-01 RX ADMIN — IRON SUCROSE 100 MG: 20 INJECTION, SOLUTION INTRAVENOUS at 09:03

## 2018-01-01 RX ADMIN — FUROSEMIDE 40 MG: 40 TABLET ORAL at 08:07

## 2018-01-01 RX ADMIN — POTASSIUM & SODIUM PHOSPHATES POWDER PACK 280-160-250 MG 2 PACKET: 280-160-250 PACK at 08:03

## 2018-01-01 RX ADMIN — NITROGLYCERIN 0.4 MG: 0.4 TABLET SUBLINGUAL at 01:05

## 2018-01-01 RX ADMIN — LEVOTHYROXINE SODIUM ANHYDROUS 25 MCG: 100 INJECTION, POWDER, LYOPHILIZED, FOR SOLUTION INTRAVENOUS at 08:07

## 2018-01-01 RX ADMIN — FENTANYL CITRATE 50 MCG: 50 INJECTION INTRAMUSCULAR; INTRAVENOUS at 08:07

## 2018-01-01 RX ADMIN — CHLORHEXIDINE GLUCONATE 0.12% ORAL RINSE 15 ML: 1.2 LIQUID ORAL at 09:07

## 2018-01-01 RX ADMIN — ALBUTEROL SULFATE 15 MG: 2.5 SOLUTION RESPIRATORY (INHALATION) at 12:07

## 2018-01-01 RX ADMIN — PIPERACILLIN AND TAZOBACTAM 4.5 G: 4; .5 INJECTION, POWDER, LYOPHILIZED, FOR SOLUTION INTRAVENOUS; PARENTERAL at 09:07

## 2018-01-01 RX ADMIN — INSULIN ASPART 2 UNITS: 100 INJECTION, SOLUTION INTRAVENOUS; SUBCUTANEOUS at 04:07

## 2018-01-01 RX ADMIN — ONDANSETRON 8 MG: 4 TABLET, ORALLY DISINTEGRATING ORAL at 01:05

## 2018-01-01 RX ADMIN — FUROSEMIDE 60 MG: 10 INJECTION, SOLUTION INTRAMUSCULAR; INTRAVENOUS at 08:05

## 2018-01-01 RX ADMIN — POTASSIUM CHLORIDE 60 MEQ: 20 SOLUTION ORAL at 12:07

## 2018-01-01 RX ADMIN — CHLORHEXIDINE GLUCONATE 0.12% ORAL RINSE 15 ML: 1.2 LIQUID ORAL at 10:07

## 2018-01-01 RX ADMIN — MICONAZOLE NITRATE: 20 POWDER TOPICAL at 08:02

## 2018-01-01 RX ADMIN — ATORVASTATIN CALCIUM 80 MG: 40 TABLET, FILM COATED ORAL at 10:03

## 2018-01-01 RX ADMIN — FERROUS SULFATE TAB EC 325 MG (65 MG FE EQUIVALENT) 325 MG: 325 (65 FE) TABLET DELAYED RESPONSE at 09:03

## 2018-01-01 RX ADMIN — Medication 100 MG: at 08:08

## 2018-01-01 RX ADMIN — SILODOSIN 4 MG: 4 CAPSULE ORAL at 09:08

## 2018-01-01 RX ADMIN — MAGNESIUM SULFATE IN WATER 2 G: 40 INJECTION, SOLUTION INTRAVENOUS at 03:07

## 2018-01-01 RX ADMIN — POTASSIUM CHLORIDE 40 MEQ: 1500 TABLET, EXTENDED RELEASE ORAL at 08:02

## 2018-01-01 RX ADMIN — DOXYCYCLINE HYCLATE 100 MG: 100 TABLET, COATED ORAL at 05:03

## 2018-01-01 RX ADMIN — POTASSIUM CHLORIDE 40 MEQ: 1500 TABLET, EXTENDED RELEASE ORAL at 02:02

## 2018-01-01 RX ADMIN — ASPIRIN 81 MG CHEWABLE TABLET 81 MG: 81 TABLET CHEWABLE at 08:08

## 2018-01-01 RX ADMIN — METOPROLOL TARTRATE 50 MG: 25 TABLET ORAL at 09:02

## 2018-01-01 RX ADMIN — FAMOTIDINE 20 MG: 20 TABLET ORAL at 08:08

## 2018-01-01 RX ADMIN — LEVOTHYROXINE SODIUM ANHYDROUS 65 MCG: 100 INJECTION, POWDER, LYOPHILIZED, FOR SOLUTION INTRAVENOUS at 09:08

## 2018-01-01 RX ADMIN — VENLAFAXINE HYDROCHLORIDE 150 MG: 75 CAPSULE, EXTENDED RELEASE ORAL at 09:02

## 2018-01-01 RX ADMIN — HYDROCORTISONE SODIUM SUCCINATE 100 MG: 100 INJECTION, POWDER, FOR SOLUTION INTRAMUSCULAR; INTRAVENOUS at 01:07

## 2018-01-01 RX ADMIN — GABAPENTIN 100 MG: 100 CAPSULE ORAL at 09:02

## 2018-01-01 RX ADMIN — LISINOPRIL 20 MG: 20 TABLET ORAL at 10:02

## 2018-01-01 RX ADMIN — ASPIRIN 81 MG 81 MG: 81 TABLET ORAL at 09:03

## 2018-01-01 RX ADMIN — FUROSEMIDE 80 MG: 10 INJECTION, SOLUTION INTRAMUSCULAR; INTRAVENOUS at 02:03

## 2018-01-01 RX ADMIN — ATORVASTATIN CALCIUM 80 MG: 20 TABLET, FILM COATED ORAL at 08:05

## 2018-01-01 RX ADMIN — CLOPIDOGREL 75 MG: 75 TABLET, FILM COATED ORAL at 09:02

## 2018-01-01 RX ADMIN — METOPROLOL TARTRATE 25 MG: 25 TABLET, FILM COATED ORAL at 09:05

## 2018-01-01 RX ADMIN — FUROSEMIDE 40 MG: 10 INJECTION, SOLUTION INTRAMUSCULAR; INTRAVENOUS at 08:03

## 2018-01-01 RX ADMIN — AMIODARONE HYDROCHLORIDE 200 MG: 200 TABLET ORAL at 08:05

## 2018-01-01 RX ADMIN — Medication 3 ML: at 09:02

## 2018-01-01 RX ADMIN — RIVAROXABAN 20 MG: 20 TABLET, FILM COATED ORAL at 08:07

## 2018-01-01 RX ADMIN — HYDROCODONE BITARTRATE AND ACETAMINOPHEN 1 TABLET: 10; 325 TABLET ORAL at 03:03

## 2018-01-01 RX ADMIN — POTASSIUM CHLORIDE: 2 INJECTION, SOLUTION, CONCENTRATE INTRAVENOUS at 04:07

## 2018-01-01 RX ADMIN — HYDROCODONE BITARTRATE AND ACETAMINOPHEN 1 TABLET: 10; 325 TABLET ORAL at 09:02

## 2018-01-01 RX ADMIN — POTASSIUM CHLORIDE 50 MEQ: 750 CAPSULE, EXTENDED RELEASE ORAL at 10:02

## 2018-01-01 RX ADMIN — INSULIN ASPART 2 UNITS: 100 INJECTION, SOLUTION INTRAVENOUS; SUBCUTANEOUS at 08:07

## 2018-01-01 RX ADMIN — CEFAZOLIN 2 G: 1 INJECTION, POWDER, FOR SOLUTION INTRAMUSCULAR; INTRAVENOUS at 04:07

## 2018-01-01 RX ADMIN — FAMOTIDINE 20 MG: 20 TABLET ORAL at 09:08

## 2018-01-01 RX ADMIN — FUROSEMIDE 60 MG: 10 INJECTION, SOLUTION INTRAMUSCULAR; INTRAVENOUS at 06:05

## 2018-01-01 RX ADMIN — HEPARIN SODIUM 5000 UNITS: 5000 INJECTION, SOLUTION INTRAVENOUS; SUBCUTANEOUS at 09:08

## 2018-01-01 RX ADMIN — FUROSEMIDE 10 MG/HR: 10 INJECTION, SOLUTION INTRAMUSCULAR; INTRAVENOUS at 11:02

## 2018-01-01 RX ADMIN — SODIUM CHLORIDE 1000 ML: 0.9 INJECTION, SOLUTION INTRAVENOUS at 04:07

## 2018-01-01 RX ADMIN — METOPROLOL TARTRATE 50 MG: 25 TABLET ORAL at 08:02

## 2018-01-01 RX ADMIN — BUSPIRONE HYDROCHLORIDE 30 MG: 10 TABLET ORAL at 10:07

## 2018-01-01 RX ADMIN — MICONAZOLE NITRATE: 20 POWDER TOPICAL at 09:02

## 2018-01-01 RX ADMIN — POTASSIUM CHLORIDE 20 MEQ: 1500 TABLET, EXTENDED RELEASE ORAL at 10:02

## 2018-01-01 RX ADMIN — MAGNESIUM SULFATE HEPTAHYDRATE 2 G: 40 INJECTION, SOLUTION INTRAVENOUS at 10:02

## 2018-01-01 RX ADMIN — PIPERACILLIN AND TAZOBACTAM 4.5 G: 4; .5 INJECTION, POWDER, LYOPHILIZED, FOR SOLUTION INTRAVENOUS; PARENTERAL at 12:07

## 2018-01-01 RX ADMIN — LIDOCAINE HYDROCHLORIDE 10 ML: 10 INJECTION, SOLUTION INFILTRATION; PERINEURAL at 08:06

## 2018-01-01 RX ADMIN — RIVAROXABAN 20 MG: 20 TABLET, FILM COATED ORAL at 06:05

## 2018-01-01 RX ADMIN — LEVOTHYROXINE SODIUM ANHYDROUS 65 MCG: 100 INJECTION, POWDER, LYOPHILIZED, FOR SOLUTION INTRAVENOUS at 08:07

## 2018-01-01 RX ADMIN — SILODOSIN 4 MG: 4 CAPSULE ORAL at 08:08

## 2018-01-01 RX ADMIN — ATORVASTATIN CALCIUM 80 MG: 40 TABLET, FILM COATED ORAL at 08:03

## 2018-01-01 RX ADMIN — HYDROCODONE BITARTRATE AND ACETAMINOPHEN 1 TABLET: 10; 325 TABLET ORAL at 10:02

## 2018-01-01 RX ADMIN — Medication 100 MG: at 09:08

## 2018-01-01 RX ADMIN — FENTANYL CITRATE 25 MCG: 50 INJECTION INTRAMUSCULAR; INTRAVENOUS at 07:07

## 2018-01-01 RX ADMIN — INSULIN ASPART 1 UNITS: 100 INJECTION, SOLUTION INTRAVENOUS; SUBCUTANEOUS at 04:08

## 2018-01-01 RX ADMIN — HYDROCODONE BITARTRATE AND ACETAMINOPHEN 1 TABLET: 10; 325 TABLET ORAL at 10:03

## 2018-01-01 RX ADMIN — POTASSIUM & SODIUM PHOSPHATES POWDER PACK 280-160-250 MG 2 PACKET: 280-160-250 PACK at 12:03

## 2018-01-01 RX ADMIN — AMIODARONE HYDROCHLORIDE 200 MG: 200 TABLET ORAL at 09:03

## 2018-01-01 RX ADMIN — DOXYCYCLINE HYCLATE 100 MG: 100 TABLET, COATED ORAL at 05:02

## 2018-01-01 RX ADMIN — HEPARIN SODIUM 5000 UNITS: 5000 INJECTION, SOLUTION INTRAVENOUS; SUBCUTANEOUS at 09:07

## 2018-01-01 RX ADMIN — FENTANYL CITRATE 50 MCG: 50 INJECTION INTRAMUSCULAR; INTRAVENOUS at 03:07

## 2018-01-01 RX ADMIN — POTASSIUM CHLORIDE 40 MEQ: 1500 TABLET, EXTENDED RELEASE ORAL at 12:02

## 2018-01-01 RX ADMIN — METOPROLOL TARTRATE 25 MG: 25 TABLET, FILM COATED ORAL at 10:03

## 2018-01-01 RX ADMIN — FENTANYL CITRATE 50 MCG: 50 INJECTION INTRAMUSCULAR; INTRAVENOUS at 12:07

## 2018-01-01 RX ADMIN — FUROSEMIDE 100 MG: 10 INJECTION, SOLUTION INTRAMUSCULAR; INTRAVENOUS at 12:07

## 2018-01-01 RX ADMIN — SODIUM CHLORIDE: 0.9 INJECTION, SOLUTION INTRAVENOUS at 02:07

## 2018-01-01 RX ADMIN — METOPROLOL TARTRATE 50 MG: 25 TABLET ORAL at 11:02

## 2018-01-01 RX ADMIN — LISINOPRIL 20 MG: 20 TABLET ORAL at 03:05

## 2018-01-01 RX ADMIN — HEPARIN SODIUM 5000 UNITS: 5000 INJECTION, SOLUTION INTRAVENOUS; SUBCUTANEOUS at 08:03

## 2018-01-01 RX ADMIN — RIVAROXABAN 20 MG: 20 TABLET, FILM COATED ORAL at 05:02

## 2018-01-01 RX ADMIN — ACETAMINOPHEN 650 MG: 325 TABLET, FILM COATED ORAL at 11:05

## 2018-01-01 RX ADMIN — FUROSEMIDE 40 MG: 10 INJECTION, SOLUTION INTRAMUSCULAR; INTRAVENOUS at 10:03

## 2018-01-01 RX ADMIN — FERROUS SULFATE TAB EC 325 MG (65 MG FE EQUIVALENT) 325 MG: 325 (65 FE) TABLET DELAYED RESPONSE at 08:03

## 2018-01-01 RX ADMIN — Medication 100 MG: at 12:07

## 2018-01-01 RX ADMIN — HEPARIN SODIUM 5000 UNITS: 5000 INJECTION, SOLUTION INTRAVENOUS; SUBCUTANEOUS at 01:08

## 2018-01-01 RX ADMIN — FUROSEMIDE 80 MG: 80 TABLET ORAL at 06:02

## 2018-01-01 RX ADMIN — FENTANYL CITRATE 50 MCG: 50 INJECTION INTRAMUSCULAR; INTRAVENOUS at 06:07

## 2018-01-01 RX ADMIN — ALBUMIN HUMAN 12.5 G: 0.05 INJECTION, SOLUTION INTRAVENOUS at 04:07

## 2018-01-01 RX ADMIN — THERA TABS 1 TABLET: TAB at 09:08

## 2018-01-01 RX ADMIN — FUROSEMIDE 10 MG/HR: 10 INJECTION, SOLUTION INTRAMUSCULAR; INTRAVENOUS at 10:02

## 2018-01-01 RX ADMIN — HEPARIN SODIUM 5000 UNITS: 5000 INJECTION, SOLUTION INTRAVENOUS; SUBCUTANEOUS at 10:08

## 2018-01-01 RX ADMIN — POTASSIUM & SODIUM PHOSPHATES POWDER PACK 280-160-250 MG 2 PACKET: 280-160-250 PACK at 10:03

## 2018-01-01 RX ADMIN — GABAPENTIN 100 MG: 100 CAPSULE ORAL at 06:02

## 2018-01-01 RX ADMIN — CLOPIDOGREL 75 MG: 75 TABLET, FILM COATED ORAL at 03:05

## 2018-01-01 RX ADMIN — Medication 25 MCG/HR: at 08:07

## 2018-01-01 RX ADMIN — FUROSEMIDE 15 MG/HR: 10 INJECTION, SOLUTION INTRAMUSCULAR; INTRAVENOUS at 04:02

## 2018-01-01 RX ADMIN — LABETALOL HYDROCHLORIDE 15 MG: 5 INJECTION, SOLUTION INTRAVENOUS at 10:07

## 2018-01-01 RX ADMIN — THERA TABS 1 TABLET: TAB at 08:07

## 2018-01-01 RX ADMIN — DEXTROSE 500 ML: 5 SOLUTION INTRAVENOUS at 09:08

## 2018-01-01 RX ADMIN — BUTALBITAL, ACETAMINOPHEN AND CAFFEINE 1 TABLET: 50; 325; 40 TABLET ORAL at 08:02

## 2018-01-01 RX ADMIN — CIPROFLOXACIN HYDROCHLORIDE 500 MG: 500 TABLET, FILM COATED ORAL at 01:03

## 2018-01-01 RX ADMIN — BUTALBITAL, ACETAMINOPHEN AND CAFFEINE 1 TABLET: 50; 325; 40 TABLET ORAL at 09:02

## 2018-01-01 RX ADMIN — MAGNESIUM SULFATE HEPTAHYDRATE 2 G: 40 INJECTION, SOLUTION INTRAVENOUS at 08:03

## 2018-01-01 RX ADMIN — ATORVASTATIN CALCIUM 80 MG: 20 TABLET, FILM COATED ORAL at 08:07

## 2018-01-01 RX ADMIN — SODIUM BICARBONATE 50 MEQ: 84 INJECTION INTRAVENOUS at 12:07

## 2018-01-01 RX ADMIN — MAGNESIUM SULFATE IN WATER 2 G: 40 INJECTION, SOLUTION INTRAVENOUS at 05:02

## 2018-01-01 RX ADMIN — PROPOFOL 30 MCG/KG/MIN: 10 INJECTION, EMULSION INTRAVENOUS at 03:07

## 2018-01-01 RX ADMIN — INSULIN ASPART 2 UNITS: 100 INJECTION, SOLUTION INTRAVENOUS; SUBCUTANEOUS at 11:08

## 2018-01-01 RX ADMIN — AMOXICILLIN AND CLAVULANATE POTASSIUM 1 TABLET: 875; 125 TABLET, FILM COATED ORAL at 03:03

## 2018-01-01 RX ADMIN — FUROSEMIDE 40 MG: 10 INJECTION, SOLUTION INTRAMUSCULAR; INTRAVENOUS at 05:08

## 2018-01-01 RX ADMIN — HEPARIN SODIUM 5000 UNITS: 5000 INJECTION, SOLUTION INTRAVENOUS; SUBCUTANEOUS at 12:07

## 2018-01-01 RX ADMIN — ACETAMINOPHEN 650 MG: 325 TABLET ORAL at 07:03

## 2018-01-01 RX ADMIN — FAMOTIDINE 20 MG: 20 TABLET ORAL at 09:07

## 2018-01-01 RX ADMIN — FUROSEMIDE 40 MG: 10 INJECTION, SOLUTION INTRAMUSCULAR; INTRAVENOUS at 03:03

## 2018-01-01 RX ADMIN — HYDROCORTISONE SODIUM SUCCINATE 100 MG: 100 INJECTION, POWDER, FOR SOLUTION INTRAMUSCULAR; INTRAVENOUS at 09:07

## 2018-01-01 RX ADMIN — LEVOTHYROXINE SODIUM ANHYDROUS 80 MCG: 100 INJECTION, POWDER, LYOPHILIZED, FOR SOLUTION INTRAVENOUS at 08:08

## 2018-01-01 RX ADMIN — PIPERACILLIN AND TAZOBACTAM 4.5 G: 4; .5 INJECTION, POWDER, LYOPHILIZED, FOR SOLUTION INTRAVENOUS; PARENTERAL at 10:07

## 2018-01-01 RX ADMIN — CALCIUM GLUCONATE 1 G: 94 INJECTION, SOLUTION INTRAVENOUS at 01:07

## 2018-01-01 RX ADMIN — VENLAFAXINE HYDROCHLORIDE 150 MG: 75 CAPSULE, EXTENDED RELEASE ORAL at 08:02

## 2018-01-01 RX ADMIN — FERROUS SULFATE TAB EC 325 MG (65 MG FE EQUIVALENT) 325 MG: 325 (65 FE) TABLET DELAYED RESPONSE at 09:07

## 2018-01-01 RX ADMIN — ACETAMINOPHEN 650 MG: 650 SOLUTION ORAL at 01:07

## 2018-01-01 RX ADMIN — MAGNESIUM SULFATE IN WATER 2 G: 40 INJECTION, SOLUTION INTRAVENOUS at 05:07

## 2018-01-01 RX ADMIN — DOXYCYCLINE HYCLATE 100 MG: 100 TABLET, COATED ORAL at 09:02

## 2018-01-01 RX ADMIN — METOPROLOL TARTRATE 25 MG: 25 TABLET ORAL at 09:07

## 2018-01-01 RX ADMIN — BUTALBITAL, ACETAMINOPHEN AND CAFFEINE 1 TABLET: 50; 325; 40 TABLET ORAL at 12:02

## 2018-01-01 RX ADMIN — HYDROCODONE BITARTRATE AND ACETAMINOPHEN 1 TABLET: 10; 325 TABLET ORAL at 05:02

## 2018-01-01 RX ADMIN — FUROSEMIDE 15 MG/HR: 10 INJECTION, SOLUTION INTRAMUSCULAR; INTRAVENOUS at 08:02

## 2018-01-01 RX ADMIN — SILODOSIN 4 MG: 4 CAPSULE ORAL at 12:07

## 2018-01-01 RX ADMIN — SODIUM CHLORIDE 1000 ML: 0.9 INJECTION, SOLUTION INTRAVENOUS at 12:07

## 2018-01-01 RX ADMIN — RIVAROXABAN 20 MG: 20 TABLET, FILM COATED ORAL at 04:02

## 2018-01-01 RX ADMIN — SENNOSIDES AND DOCUSATE SODIUM 1 TABLET: 8.6; 5 TABLET ORAL at 12:07

## 2018-01-01 RX ADMIN — LEVOTHYROXINE SODIUM ANHYDROUS 25 MCG: 100 INJECTION, POWDER, LYOPHILIZED, FOR SOLUTION INTRAVENOUS at 02:07

## 2018-01-01 RX ADMIN — GABAPENTIN 100 MG: 100 CAPSULE ORAL at 08:02

## 2018-01-01 RX ADMIN — POTASSIUM & SODIUM PHOSPHATES POWDER PACK 280-160-250 MG 2 PACKET: 280-160-250 PACK at 09:03

## 2018-01-01 RX ADMIN — LEVOTHYROXINE SODIUM 50 MCG: 50 TABLET ORAL at 05:07

## 2018-01-01 RX ADMIN — POTASSIUM CHLORIDE 40 MEQ: 1500 TABLET, EXTENDED RELEASE ORAL at 09:02

## 2018-01-01 RX ADMIN — LEVOTHYROXINE SODIUM ANHYDROUS 65 MCG: 100 INJECTION, POWDER, LYOPHILIZED, FOR SOLUTION INTRAVENOUS at 08:08

## 2018-01-01 RX ADMIN — HYDROCODONE BITARTRATE AND ACETAMINOPHEN 1 TABLET: 10; 325 TABLET ORAL at 02:02

## 2018-01-01 RX ADMIN — INSULIN ASPART 1 UNITS: 100 INJECTION, SOLUTION INTRAVENOUS; SUBCUTANEOUS at 05:07

## 2018-01-01 RX ADMIN — FUROSEMIDE 40 MG: 10 INJECTION, SOLUTION INTRAMUSCULAR; INTRAVENOUS at 09:08

## 2018-01-01 RX ADMIN — FENTANYL CITRATE 25 MCG: 50 INJECTION INTRAMUSCULAR; INTRAVENOUS at 11:07

## 2018-01-01 RX ADMIN — Medication 3 ML: at 06:02

## 2018-01-01 RX ADMIN — PIPERACILLIN AND TAZOBACTAM 4.5 G: 4; .5 INJECTION, POWDER, LYOPHILIZED, FOR SOLUTION INTRAVENOUS; PARENTERAL at 03:07

## 2018-01-01 RX ADMIN — POTASSIUM & SODIUM PHOSPHATES POWDER PACK 280-160-250 MG 2 PACKET: 280-160-250 PACK at 06:03

## 2018-01-01 RX ADMIN — HEPARIN SODIUM AND DEXTROSE 16 UNITS/KG/HR: 10000; 5 INJECTION INTRAVENOUS at 04:07

## 2018-01-01 RX ADMIN — VENLAFAXINE HYDROCHLORIDE 150 MG: 75 CAPSULE, EXTENDED RELEASE ORAL at 06:05

## 2018-01-01 RX ADMIN — ESCITALOPRAM OXALATE 10 MG: 5 SOLUTION ORAL at 08:07

## 2018-01-01 RX ADMIN — DOCUSATE SODIUM 100 MG: 100 CAPSULE, LIQUID FILLED ORAL at 08:07

## 2018-01-01 RX ADMIN — POTASSIUM CHLORIDE 10 MEQ: 10 INJECTION, SOLUTION INTRAVENOUS at 10:07

## 2018-01-01 RX ADMIN — Medication 100 MCG/HR: at 11:08

## 2018-01-01 RX ADMIN — DEXTROSE MONOHYDRATE 25 G: 25 INJECTION, SOLUTION INTRAVENOUS at 12:07

## 2018-01-01 RX ADMIN — HYDROCODONE BITARTRATE AND ACETAMINOPHEN 1 TABLET: 10; 325 TABLET ORAL at 04:02

## 2018-01-01 RX ADMIN — LABETALOL HYDROCHLORIDE 10 MG: 5 INJECTION, SOLUTION INTRAVENOUS at 02:07

## 2018-01-01 RX ADMIN — HEPARIN SODIUM 5000 UNITS: 5000 INJECTION, SOLUTION INTRAVENOUS; SUBCUTANEOUS at 05:08

## 2018-01-01 RX ADMIN — NICARDIPINE HYDROCHLORIDE 2.5 MG/HR: 0.2 INJECTION, SOLUTION INTRAVENOUS at 04:07

## 2018-01-01 RX ADMIN — ASPIRIN 81 MG 81 MG: 81 TABLET ORAL at 08:03

## 2018-01-01 RX ADMIN — Medication 3 ML: at 10:02

## 2018-01-01 RX ADMIN — FUROSEMIDE 80 MG: 80 TABLET ORAL at 01:02

## 2018-01-01 RX ADMIN — PROPOFOL 20 MCG/KG/MIN: 10 INJECTION, EMULSION INTRAVENOUS at 10:07

## 2018-01-01 RX ADMIN — POTASSIUM CHLORIDE 60 MEQ: 20 SOLUTION ORAL at 05:07

## 2018-01-01 RX ADMIN — PROPOFOL 5 MCG/KG/MIN: 10 INJECTION, EMULSION INTRAVENOUS at 01:07

## 2018-01-01 RX ADMIN — METOPROLOL TARTRATE 25 MG: 25 TABLET, FILM COATED ORAL at 02:03

## 2018-01-01 RX ADMIN — ACETAMINOPHEN 650 MG: 650 SOLUTION ORAL at 10:07

## 2018-01-01 RX ADMIN — ASPIRIN 81 MG 81 MG: 81 TABLET ORAL at 10:03

## 2018-01-01 RX ADMIN — POTASSIUM CHLORIDE 40 MEQ: 1500 TABLET, EXTENDED RELEASE ORAL at 05:02

## 2018-01-01 RX ADMIN — AMIODARONE HYDROCHLORIDE 200 MG: 200 TABLET ORAL at 08:03

## 2018-01-01 RX ADMIN — HYDROCODONE BITARTRATE AND ACETAMINOPHEN 1 TABLET: 10; 325 TABLET ORAL at 06:02

## 2018-01-01 RX ADMIN — PIPERACILLIN AND TAZOBACTAM 4.5 G: 4; .5 INJECTION, POWDER, LYOPHILIZED, FOR SOLUTION INTRAVENOUS; PARENTERAL at 04:07

## 2018-01-01 RX ADMIN — MIDAZOLAM HYDROCHLORIDE 2 MG: 1 INJECTION, SOLUTION INTRAMUSCULAR; INTRAVENOUS at 03:07

## 2018-01-01 RX ADMIN — LEVOTHYROXINE SODIUM 25 MCG: 25 TABLET ORAL at 07:07

## 2018-01-01 RX ADMIN — SODIUM CHLORIDE: 0.9 INJECTION, SOLUTION INTRAVENOUS at 11:07

## 2018-01-01 RX ADMIN — LISINOPRIL 20 MG: 20 TABLET ORAL at 08:05

## 2018-01-01 RX ADMIN — DEXTROSE MONOHYDRATE 25 G: 25 INJECTION, SOLUTION INTRAVENOUS at 11:07

## 2018-01-01 RX ADMIN — MAGNESIUM OXIDE TAB 400 MG (241.3 MG ELEMENTAL MG) 400 MG: 400 (241.3 MG) TAB at 08:05

## 2018-01-01 RX ADMIN — NALOXONE HYDROCHLORIDE 0.4 MG: 0.4 INJECTION, SOLUTION INTRAMUSCULAR; INTRAVENOUS; SUBCUTANEOUS at 06:07

## 2018-01-01 RX ADMIN — FENTANYL CITRATE 100 MCG: 50 INJECTION INTRAMUSCULAR; INTRAVENOUS at 10:08

## 2018-01-01 RX ADMIN — HEPARIN SODIUM 5000 UNITS: 5000 INJECTION, SOLUTION INTRAVENOUS; SUBCUTANEOUS at 05:07

## 2018-01-01 RX ADMIN — Medication 100 MG: at 08:07

## 2018-01-01 RX ADMIN — POTASSIUM & SODIUM PHOSPHATES POWDER PACK 280-160-250 MG 2 PACKET: 280-160-250 PACK at 05:03

## 2018-01-01 RX ADMIN — POTASSIUM CHLORIDE 40 MEQ: 20 TABLET, EXTENDED RELEASE ORAL at 12:03

## 2018-01-01 RX ADMIN — PIPERACILLIN AND TAZOBACTAM 4.5 G: 4; .5 INJECTION, POWDER, LYOPHILIZED, FOR SOLUTION INTRAVENOUS; PARENTERAL at 11:07

## 2018-01-01 RX ADMIN — ASPIRIN 81 MG 81 MG: 81 TABLET ORAL at 09:02

## 2018-01-01 RX ADMIN — METOLAZONE 5 MG: 5 TABLET ORAL at 05:02

## 2018-01-01 RX ADMIN — POTASSIUM CHLORIDE 40 MEQ: 20 TABLET, EXTENDED RELEASE ORAL at 02:03

## 2018-01-01 RX ADMIN — HYDROCODONE BITARTRATE AND ACETAMINOPHEN 1 TABLET: 10; 325 TABLET ORAL at 06:03

## 2018-01-01 RX ADMIN — HYDROCODONE BITARTRATE AND ACETAMINOPHEN 1 TABLET: 10; 325 TABLET ORAL at 03:02

## 2018-01-01 RX ADMIN — MAGNESIUM OXIDE TAB 400 MG (241.3 MG ELEMENTAL MG) 400 MG: 400 (241.3 MG) TAB at 03:03

## 2018-01-01 RX ADMIN — PHYTONADIONE 10 MG: 10 INJECTION, EMULSION INTRAMUSCULAR; INTRAVENOUS; SUBCUTANEOUS at 08:08

## 2018-01-01 RX ADMIN — DEXTROSE 500 ML: 5 SOLUTION INTRAVENOUS at 08:08

## 2018-01-01 RX ADMIN — LEVETIRACETAM 1000 MG: 10 INJECTION INTRAVENOUS at 01:07

## 2018-01-01 RX ADMIN — HYDROCODONE BITARTRATE AND ACETAMINOPHEN 1 TABLET: 10; 325 TABLET ORAL at 02:03

## 2018-01-01 RX ADMIN — FUROSEMIDE 40 MG: 10 INJECTION, SOLUTION INTRAMUSCULAR; INTRAVENOUS at 08:07

## 2018-01-01 RX ADMIN — THERA TABS 1 TABLET: TAB at 08:08

## 2018-01-01 RX ADMIN — POTASSIUM CHLORIDE 10 MEQ: 10 INJECTION, SOLUTION INTRAVENOUS at 09:07

## 2018-01-01 RX ADMIN — NITROGLYCERIN 0.4 MG: 0.4 TABLET SUBLINGUAL at 03:05

## 2018-01-01 RX ADMIN — FUROSEMIDE 40 MG: 10 INJECTION, SOLUTION INTRAMUSCULAR; INTRAVENOUS at 10:08

## 2018-01-01 RX ADMIN — MAGNESIUM SULFATE HEPTAHYDRATE 2 G: 40 INJECTION, SOLUTION INTRAVENOUS at 09:03

## 2018-01-01 RX ADMIN — FENTANYL CITRATE 50 MCG: 50 INJECTION INTRAMUSCULAR; INTRAVENOUS at 04:07

## 2018-01-01 RX ADMIN — ATORVASTATIN CALCIUM 80 MG: 20 TABLET, FILM COATED ORAL at 10:02

## 2018-01-01 RX ADMIN — AMOXICILLIN AND CLAVULANATE POTASSIUM 1 TABLET: 875; 125 TABLET, FILM COATED ORAL at 09:03

## 2018-01-01 RX ADMIN — RIVAROXABAN 20 MG: 20 TABLET, FILM COATED ORAL at 05:07

## 2018-01-01 RX ADMIN — ACETAMINOPHEN 1000 MG: 10 INJECTION, SOLUTION INTRAVENOUS at 02:07

## 2018-01-01 RX ADMIN — FERROUS SULFATE TAB EC 325 MG (65 MG FE EQUIVALENT) 325 MG: 325 (65 FE) TABLET DELAYED RESPONSE at 08:07

## 2018-01-01 RX ADMIN — GABAPENTIN 100 MG: 100 CAPSULE ORAL at 02:02

## 2018-01-01 RX ADMIN — FUROSEMIDE 80 MG: 10 INJECTION, SOLUTION INTRAMUSCULAR; INTRAVENOUS at 08:03

## 2018-01-01 RX ADMIN — DOPAMINE HYDROCHLORIDE IN DEXTROSE 2 MCG/KG/MIN: 1.6 INJECTION, SOLUTION INTRAVENOUS at 01:03

## 2018-01-01 RX ADMIN — FUROSEMIDE 40 MG: 10 INJECTION, SOLUTION INTRAMUSCULAR; INTRAVENOUS at 01:07

## 2018-01-01 RX ADMIN — VENLAFAXINE HYDROCHLORIDE 150 MG: 75 CAPSULE, EXTENDED RELEASE ORAL at 08:05

## 2018-01-01 RX ADMIN — MICONAZOLE NITRATE: 20 POWDER TOPICAL at 03:02

## 2018-01-01 RX ADMIN — SENNOSIDES AND DOCUSATE SODIUM 1 TABLET: 8.6; 5 TABLET ORAL at 09:08

## 2018-01-01 RX ADMIN — DOCUSATE SODIUM 100 MG: 100 CAPSULE, LIQUID FILLED ORAL at 09:07

## 2018-01-01 RX ADMIN — AMIODARONE HYDROCHLORIDE 200 MG: 200 TABLET ORAL at 03:05

## 2018-01-01 RX ADMIN — INSULIN HUMAN 10 UNITS: 100 INJECTION, SOLUTION PARENTERAL at 12:07

## 2018-01-01 RX ADMIN — FUROSEMIDE 80 MG: 10 INJECTION, SOLUTION INTRAMUSCULAR; INTRAVENOUS at 09:03

## 2018-01-01 RX ADMIN — PIPERACILLIN SODIUM AND TAZOBACTAM SODIUM 4.5 G: 4; .5 INJECTION, POWDER, FOR SOLUTION INTRAVENOUS at 12:07

## 2018-01-01 RX ADMIN — HEPARIN SODIUM 5000 UNITS: 5000 INJECTION, SOLUTION INTRAVENOUS; SUBCUTANEOUS at 06:03

## 2018-01-01 RX ADMIN — ALBUMIN HUMAN 25 G: 0.25 SOLUTION INTRAVENOUS at 11:07

## 2018-01-01 RX ADMIN — SENNOSIDES AND DOCUSATE SODIUM 1 TABLET: 8.6; 5 TABLET ORAL at 08:07

## 2018-01-01 RX ADMIN — CIPROFLOXACIN HYDROCHLORIDE 500 MG: 500 TABLET, FILM COATED ORAL at 09:02

## 2018-01-01 RX ADMIN — POTASSIUM PHOSPHATE, MONOBASIC AND POTASSIUM PHOSPHATE, DIBASIC 8 MMOL: 224; 236 INJECTION, SOLUTION, CONCENTRATE INTRAVENOUS at 03:03

## 2018-01-01 RX ADMIN — MODAFINIL 100 MG: 100 TABLET ORAL at 05:07

## 2018-01-01 RX ADMIN — Medication 3 ML: at 03:02

## 2018-01-01 RX ADMIN — FUROSEMIDE 80 MG: 10 INJECTION, SOLUTION INTRAMUSCULAR; INTRAVENOUS at 10:03

## 2018-01-01 RX ADMIN — FUROSEMIDE 40 MG: 10 INJECTION, SOLUTION INTRAMUSCULAR; INTRAVENOUS at 02:03

## 2018-01-01 RX ADMIN — CIPROFLOXACIN HYDROCHLORIDE 500 MG: 500 TABLET, FILM COATED ORAL at 12:03

## 2018-01-01 RX ADMIN — Medication 3 ML: at 01:02

## 2018-01-01 RX ADMIN — CALCIUM CHLORIDE 1 G: 100 INJECTION, SOLUTION INTRAVENOUS; INTRAVENTRICULAR at 11:07

## 2018-01-01 RX ADMIN — SODIUM CHLORIDE 500 ML: 0.9 INJECTION, SOLUTION INTRAVENOUS at 11:07

## 2018-01-01 RX ADMIN — POTASSIUM CHLORIDE 40 MEQ: 20 SOLUTION ORAL at 04:07

## 2018-01-01 RX ADMIN — RAMELTEON 8 MG: 8 TABLET, FILM COATED ORAL at 11:05

## 2018-01-01 RX ADMIN — MODAFINIL 100 MG: 100 TABLET ORAL at 01:07

## 2018-01-01 RX ADMIN — CHLORHEXIDINE GLUCONATE 0.12% ORAL RINSE 15 ML: 1.2 LIQUID ORAL at 01:07

## 2018-01-01 RX ADMIN — CIPROFLOXACIN HYDROCHLORIDE 500 MG: 500 TABLET, FILM COATED ORAL at 08:02

## 2018-01-01 RX ADMIN — LABETALOL HYDROCHLORIDE 5 MG: 5 INJECTION, SOLUTION INTRAVENOUS at 01:07

## 2018-01-01 RX ADMIN — SILODOSIN 4 MG: 4 CAPSULE ORAL at 09:07

## 2018-01-01 RX ADMIN — PHYTONADIONE 10 MG: 10 INJECTION, EMULSION INTRAMUSCULAR; INTRAVENOUS; SUBCUTANEOUS at 01:08

## 2018-01-01 RX ADMIN — BUTALBITAL, ACETAMINOPHEN AND CAFFEINE 1 TABLET: 50; 325; 40 TABLET ORAL at 05:02

## 2018-01-01 RX ADMIN — MAGNESIUM OXIDE TAB 400 MG (241.3 MG ELEMENTAL MG) 800 MG: 400 (241.3 MG) TAB at 08:02

## 2018-01-01 RX ADMIN — PROPOFOL 5 MCG/KG/MIN: 10 INJECTION, EMULSION INTRAVENOUS at 04:07

## 2018-01-01 RX ADMIN — PIPERACILLIN AND TAZOBACTAM 4.5 G: 4; .5 INJECTION, POWDER, LYOPHILIZED, FOR SOLUTION INTRAVENOUS; PARENTERAL at 07:07

## 2018-01-01 RX ADMIN — FENTANYL CITRATE 50 MCG: 50 INJECTION, SOLUTION INTRAMUSCULAR; INTRAVENOUS at 11:07

## 2018-01-01 RX ADMIN — ONDANSETRON 4 MG: 2 INJECTION INTRAMUSCULAR; INTRAVENOUS at 07:07

## 2018-01-01 RX ADMIN — DOXYCYCLINE HYCLATE 100 MG: 100 TABLET, COATED ORAL at 11:03

## 2018-01-01 RX ADMIN — POTASSIUM PHOSPHATE, MONOBASIC AND POTASSIUM PHOSPHATE, DIBASIC 20 MMOL: 224; 236 INJECTION, SOLUTION, CONCENTRATE INTRAVENOUS at 10:07

## 2018-01-01 RX ADMIN — FUROSEMIDE 40 MG: 40 TABLET ORAL at 09:07

## 2018-01-01 RX ADMIN — ASPIRIN 81 MG CHEWABLE TABLET 81 MG: 81 TABLET CHEWABLE at 09:08

## 2018-01-01 RX ADMIN — FUROSEMIDE 40 MG: 40 TABLET ORAL at 05:07

## 2018-01-01 RX ADMIN — COLLAGENASE SANTYL: 250 OINTMENT TOPICAL at 08:03

## 2018-01-01 RX ADMIN — MODAFINIL 100 MG: 100 TABLET ORAL at 12:07

## 2018-01-01 RX ADMIN — DEXTROSE: 5 SOLUTION INTRAVENOUS at 05:08

## 2018-01-01 RX ADMIN — FUROSEMIDE 80 MG: 80 TABLET ORAL at 09:02

## 2018-01-01 RX ADMIN — VANCOMYCIN HYDROCHLORIDE 1500 MG: 5 INJECTION, POWDER, LYOPHILIZED, FOR SOLUTION INTRAVENOUS at 01:07

## 2018-01-01 RX ADMIN — MAGNESIUM OXIDE TAB 400 MG (241.3 MG ELEMENTAL MG) 800 MG: 400 (241.3 MG) TAB at 03:02

## 2018-01-01 RX ADMIN — POTASSIUM CHLORIDE 20 MEQ: 20 SOLUTION ORAL at 10:08

## 2018-01-01 RX ADMIN — GABAPENTIN 100 MG: 100 CAPSULE ORAL at 11:02

## 2018-01-01 RX ADMIN — FERROUS SULFATE TAB EC 325 MG (65 MG FE EQUIVALENT) 325 MG: 325 (65 FE) TABLET DELAYED RESPONSE at 10:03

## 2018-01-01 RX ADMIN — SILODOSIN 4 MG: 4 CAPSULE ORAL at 08:07

## 2018-01-01 RX ADMIN — THERA TABS 1 TABLET: TAB at 12:07

## 2018-01-01 RX ADMIN — DOXYCYCLINE HYCLATE 100 MG: 100 TABLET, COATED ORAL at 12:03

## 2018-01-01 RX ADMIN — VENLAFAXINE HYDROCHLORIDE 150 MG: 75 CAPSULE, EXTENDED RELEASE ORAL at 10:02

## 2018-01-01 RX ADMIN — DEXTROSE MONOHYDRATE 25 G: 25 INJECTION, SOLUTION INTRAVENOUS at 10:03

## 2018-01-01 RX ADMIN — MODAFINIL 100 MG: 100 TABLET ORAL at 08:07

## 2018-01-01 RX ADMIN — CEFAZOLIN 2 G: 1 INJECTION, POWDER, FOR SOLUTION INTRAMUSCULAR; INTRAVENOUS at 03:07

## 2018-01-01 RX ADMIN — AMOXICILLIN AND CLAVULANATE POTASSIUM 1 TABLET: 875; 125 TABLET, FILM COATED ORAL at 10:03

## 2018-01-01 RX ADMIN — DEXTROSE: 5 SOLUTION INTRAVENOUS at 09:08

## 2018-01-01 RX ADMIN — PROPOFOL 20 MCG/KG/MIN: 10 INJECTION, EMULSION INTRAVENOUS at 11:07

## 2018-01-01 RX ADMIN — CLOPIDOGREL 75 MG: 75 TABLET, FILM COATED ORAL at 08:05

## 2018-01-01 RX ADMIN — ACETAMINOPHEN 650 MG: 650 SOLUTION ORAL at 12:07

## 2018-01-01 RX ADMIN — VANCOMYCIN HYDROCHLORIDE 1250 MG: 10 INJECTION, POWDER, LYOPHILIZED, FOR SOLUTION INTRAVENOUS at 04:07

## 2018-01-01 RX ADMIN — GABAPENTIN 100 MG: 100 CAPSULE ORAL at 02:05

## 2018-01-01 RX ADMIN — DOXYCYCLINE HYCLATE 100 MG: 100 TABLET, COATED ORAL at 10:02

## 2018-01-01 RX ADMIN — SODIUM BICARBONATE 50 MEQ: 84 INJECTION, SOLUTION INTRAVENOUS at 01:03

## 2018-01-01 RX ADMIN — FENTANYL CITRATE 50 MCG: 50 INJECTION INTRAMUSCULAR; INTRAVENOUS at 11:07

## 2018-01-01 RX ADMIN — CIPROFLOXACIN HYDROCHLORIDE 500 MG: 500 TABLET, FILM COATED ORAL at 10:02

## 2018-01-01 RX ADMIN — HYDROCORTISONE SODIUM SUCCINATE 100 MG: 100 INJECTION, POWDER, FOR SOLUTION INTRAMUSCULAR; INTRAVENOUS at 04:07

## 2018-01-01 RX ADMIN — COLLAGENASE SANTYL: 250 OINTMENT TOPICAL at 09:03

## 2018-01-01 RX ADMIN — SODIUM BICARBONATE 50 MEQ: 84 INJECTION, SOLUTION INTRAVENOUS at 11:07

## 2018-01-01 RX ADMIN — ACETAMINOPHEN 650 MG: 650 SOLUTION ORAL at 05:07

## 2018-01-01 RX ADMIN — GABAPENTIN 100 MG: 100 CAPSULE ORAL at 03:05

## 2018-01-01 RX ADMIN — ASPIRIN 81 MG 81 MG: 81 TABLET ORAL at 10:02

## 2018-01-01 RX ADMIN — SODIUM CHLORIDE: 0.9 INJECTION, SOLUTION INTRAVENOUS at 10:07

## 2018-01-01 RX ADMIN — POTASSIUM CHLORIDE 40 MEQ: 20 SOLUTION ORAL at 09:08

## 2018-01-01 RX ADMIN — FUROSEMIDE 40 MG: 40 TABLET ORAL at 06:07

## 2018-01-01 RX ADMIN — ALBUMIN HUMAN 25 G: 0.05 INJECTION, SOLUTION INTRAVENOUS at 03:07

## 2018-01-01 RX ADMIN — FUROSEMIDE 80 MG: 10 INJECTION, SOLUTION INTRAMUSCULAR; INTRAVENOUS at 09:02

## 2018-01-01 RX ADMIN — POTASSIUM CHLORIDE 20 MEQ: 200 INJECTION, SOLUTION INTRAVENOUS at 06:07

## 2018-01-01 RX ADMIN — POTASSIUM CHLORIDE 40 MEQ: 400 INJECTION, SOLUTION INTRAVENOUS at 09:03

## 2018-01-01 RX ADMIN — AMIODARONE HYDROCHLORIDE 200 MG: 200 TABLET ORAL at 08:07

## 2018-01-01 RX ADMIN — PROPOFOL 20 MCG/KG/MIN: 10 INJECTION, EMULSION INTRAVENOUS at 04:07

## 2018-01-01 RX ADMIN — MAGNESIUM SULFATE HEPTAHYDRATE 2 G: 40 INJECTION, SOLUTION INTRAVENOUS at 01:03

## 2018-01-01 RX ADMIN — POTASSIUM CHLORIDE 20 MEQ: 200 INJECTION, SOLUTION INTRAVENOUS at 05:07

## 2018-01-01 RX ADMIN — HYDROCODONE BITARTRATE AND ACETAMINOPHEN 1 TABLET: 10; 325 TABLET ORAL at 01:03

## 2018-01-01 RX ADMIN — FUROSEMIDE 80 MG: 10 INJECTION, SOLUTION INTRAMUSCULAR; INTRAVENOUS at 06:02

## 2018-01-01 RX ADMIN — METOPROLOL TARTRATE 50 MG: 25 TABLET ORAL at 10:02

## 2018-01-01 RX ADMIN — MAGNESIUM SULFATE HEPTAHYDRATE 2 G: 40 INJECTION, SOLUTION INTRAVENOUS at 03:03

## 2018-01-01 RX ADMIN — IRON SUCROSE 100 MG: 20 INJECTION, SOLUTION INTRAVENOUS at 10:03

## 2018-01-01 RX ADMIN — AZITHROMYCIN MONOHYDRATE 500 MG: 500 INJECTION, POWDER, LYOPHILIZED, FOR SOLUTION INTRAVENOUS at 04:07

## 2018-01-01 RX ADMIN — ACETAMINOPHEN 650 MG: 325 TABLET, FILM COATED ORAL at 03:05

## 2018-01-01 RX ADMIN — AMIODARONE HYDROCHLORIDE 200 MG: 200 TABLET ORAL at 09:02

## 2018-01-01 RX ADMIN — MAGNESIUM SULFATE HEPTAHYDRATE 2 G: 40 INJECTION, SOLUTION INTRAVENOUS at 09:02

## 2018-01-01 RX ADMIN — SPIRONOLACTONE 25 MG: 25 TABLET, FILM COATED ORAL at 03:03

## 2018-01-01 RX ADMIN — HYDROCODONE BITARTRATE AND ACETAMINOPHEN 1 TABLET: 10; 325 TABLET ORAL at 04:03

## 2018-01-01 RX ADMIN — METOPROLOL TARTRATE 25 MG: 25 TABLET, FILM COATED ORAL at 08:03

## 2018-01-01 RX ADMIN — SODIUM CHLORIDE: 0.9 INJECTION, SOLUTION INTRAVENOUS at 04:07

## 2018-01-01 RX ADMIN — IRON SUCROSE 100 MG: 20 INJECTION, SOLUTION INTRAVENOUS at 08:03

## 2018-01-01 RX ADMIN — MAGNESIUM SULFATE HEPTAHYDRATE 2 G: 40 INJECTION, SOLUTION INTRAVENOUS at 12:03

## 2018-01-01 RX ADMIN — POTASSIUM CHLORIDE 10 MEQ: 10 INJECTION, SOLUTION INTRAVENOUS at 08:07

## 2018-01-01 RX ADMIN — PROPOFOL 50 MCG/KG/MIN: 10 INJECTION, EMULSION INTRAVENOUS at 01:07

## 2018-01-01 RX ADMIN — MAGNESIUM OXIDE TAB 400 MG (241.3 MG ELEMENTAL MG) 400 MG: 400 (241.3 MG) TAB at 03:05

## 2018-01-01 RX ADMIN — HYDROCODONE BITARTRATE AND ACETAMINOPHEN 1 TABLET: 10; 325 TABLET ORAL at 01:02

## 2018-01-01 RX ADMIN — FENTANYL CITRATE 50 MCG: 50 INJECTION INTRAMUSCULAR; INTRAVENOUS at 01:07

## 2018-01-03 NOTE — PROGRESS NOTES
Ms. Quick is a 62 F h/o recently diagnosed CHF, AS, DM, HTN, A Fib (on amio), presenting with shortness of breath,  patient reports has been going on x3 weeks, and is progressive, worse with exertion, now exercise limitation to < 1 block without exercise limitations previously. Pt denies chest pain, palpitations, syncope, light headed. She reports 3 pillow orthopnea, PND, and ankle swelling. She reports has been increasing fluid intake recently. Had recent admission to [facility], pt reports undergoing cardiac cath (no reported significant CAD per reports, we have requested disc) was told she had aortic valve stenosis that would require surgery but also was told about possible TAVR. Patient presented here for evaluation because she had previously been told that she would need to go to [place], [place] for AVR.      She states for the last 3-4 weeks she has experienced HA (Class III sx) and is unable to walk up her 17 stairs in her home without stopping to catch her breath. Her SOB resolves with rest and is not associated with Cp, light headedness, dizziness. At  she believes she had ARELI, Coronary Angiogram, CTA but we are still awaiting records. She was also told she would need surgical AVR but was told she must be referred to [place], [place] for surgery. She was unsure as to the reasoning behind this. Since discharge she has adhered to a low sodium diet and been complaint with newly prescribed Lasix, however, she began experiencing SOB and noticed bilateral ankle edema. She presented to [facility] this hospitalization as she wanted another opinion regarding management of her CHF and AS.     See previous H&P from 12/2017  Allergies: reviewed  Medications: reviewed  ROS as above    PE:  Physical Exam   Constitutional: Patient appears well-developed and well-nourished.   HENT:   Head: Normocephalic and atraumatic.   Eyes: Pupils are equal, round, and reactive to light.   Neck: Normal range of motion. Neck supple.   Cardiovascular:  Normal rate, regular rhythm and normal heart sounds.  + murmur  Pulmonary/Chest: Effort normal and breath sounds normal.   Abdominal: Soft. Bowel sounds are normal.   Musculoskeletal: Normal range of motion.   Neurological: Alert and oriented to person, place, and time.   Skin: Skin is warm and dry.   Psychiatric: Normal mood and affect. Behavior is normal.     Assessment:  Severe AS    Plan:  I spoke with the patient in detail.  She needs to pick a date for AVR/PVI.

## 2018-01-03 NOTE — LETTER
January 3, 2018      Pooanm Ybarra MD  [address]  [facility] [address]           [facility]  [address]  [facility] [address]  Phone: [phone]          Patient: Anum Quick   MR Number: [id number]   YOB: 1955   Date of Visit: 1/3/2018       Dear Dr. Poonam Ybarra:    Thank you for referring Anum Quick to me for evaluation. Attached you will find relevant portions of my assessment and plan of care.    If you have questions, please do not hesitate to call me. I look forward to following Anum Quick along with you.    Sincerely,    Rodo Lundy MD    Enclosure  CC:  No Recipients    If you would like to receive this communication electronically, please contact [email or web address] or [phone] to request more information on [employer] access.    For providers and/or their staff who would like to refer a patient to [facility], please contact us through our one-stop-shop provider referral line, [facility] , at [phone].    If you feel you have received this communication in error or would no longer like to receive these types of communications, please e-mail [email or web address]

## 2018-01-10 NOTE — ANESTHESIA PREPROCEDURE EVALUATION
01/10/2018  Anum Quick is a 62 y.o., female.    Pre-op Assessment         Review of Systems  Anesthesia Hx:  History of prior surgery of interest to airway management or planning: Previous anesthesia: General 10/2017: Right leg tumor removal with general anesthesia.  Denies Family Hx of Anesthesia complications.   Denies Personal Hx of Anesthesia complications.   Social:  Patient's occupation is [employer]. Denies Tobacco Use. Denies Alcohol Use.   Hematology/Oncology:         -- Coag Disorders: Bleeding Disorder: currently taking: rivaroxaban   EENT/Dental:   Denies Eye Symptoms Denies Throat Disease.  Denies Jaw Problems   Cardiovascular:  Functional Capacity SOB/occas. CP with activity  Cardiovascular Symptoms: Dyspnea On Extertion  Coronary Artery Disease: Hx of Myocardial Infarction, NSTEMI, NSTEMI date of 12/2017  Valvular Heart Disease: Aortic Stenosis (AS), severe , JAMES(cm2) = 0.49.    Congestive Heart Failure (CHF)  Deep Venous Thrombosis (DVT), Hx of DVT, right lower extremity 1986: Tx'd with blood thinners Hypertension , Recent typical clinic B/P of 123/79 @ POC visit  Disorder of Cardiac Rhythm, Atrial Fibrillation, Paroxysmal Atrial Fibrillation    Pulmonary:  Denies Asthma.  Denies Chronic Obstructive Pulmonary Disease (COPD).  Possible Obstructive Sleep Apnea , (STOP/BANG) Symptoms S - Snoring (loud), P - Pressure being treated for high BP and A - Age > 50  Pulmonary Hypertension Echocardiographic Estimated Pulmonary Artery Systolic Pressure > 60    Renal/:  Kidney Function/Disease, Chronic Kidney Disease (CKD) , CKD Stage III (GFR 30-59)    Hepatic/GI:  Hepatic/GI Symptoms: heartburn.  Denies Liver Disease    Musculoskeletal:  Denies Joint Disease   Spine Disorders: (chronic back pain: pt. reports Hx of bone spurs)    Neurological:   no Neuro Symptoms Denies Seizure  Disorder  Denies CVA - Cerebrovasular Accident  Denies TIA - Transient Ischemic Attack    Endocrine:  Diabetes, Type 2 Diabetes for 2 years , controlled by oral hypoglycemics. Typical AM glucose range: 108-123  Denies Thyroid Disease  Metabolic Disorders, Hyperlipoproteinemia      Physical Exam  General:  Obesity    Airway/Jaw/Neck:  Airway Findings: Mouth Opening: Normal Jaw/Neck Findings:  Neck ROM: Normal ROM      Dental:  Dental Findings: In tact    Chest/Lungs:  Chest/Lungs Findings: Clear to auscultation, Normal Respiratory Rate     Heart/Vascular:  Heart Findings: Rate: Normal  Rhythm: Regular Rhythm  Heart Murmur  Systolic  Systolic Heart Murmur Description: R Upper Sternal Border  Systolic Heart Murmur Grade: Grade II, Grade III  Vascular Findings:  Edema  Edema Locations: BLE  Edema: +1 or +2        Mental Status:  Mental Status Findings:  Cooperative, Alert and Oriented       Cr is now 3.1 from 1.6 on 12/25; elevated liver enzymes also noted. Pt. will require a PCP clearance before surgery. AGUSTINA Hand notified with Dr. Lundy' office.    AGUSTINA Lanza RN

## 2018-01-10 NOTE — PROGRESS NOTES
"PREPARING FOR SURGERY  Your surgery has been scheduled for:   Day: Tuesday Date: 1/16/2018  Arrival Time: 5A  Start Time: 7A  You should report to the second floor surgery center, located on the [facility] side of the second floor of the [facility]. The phone number is [phone].    PLEASE NOTE  · If you are allergic to any medications, please inform your doctor or the nurse responsible for your care.  · Tell the doctor if you take aspirin, products containing aspirin, herbal medications or blood thinners, such as Coumadin, Pradaxa, or Plavix.  · Notify your doctor if you are diabetic and provide information about the medications you take.  · Arrange for someone to drive you home following surgery. You will not be allowed to leave the surgical facility alone or drive yourself home following sedation and anesthesia.  · If you have not already done so, please bring a list of your medications with you the day of your surgery.    BEFORE SURGERY  Stop taking all herbal medications 14 days prior to surgery  Stop taking aspirin, products containing aspirin 0 days before surgery  Stop taking blood thinners 5 days before surgery  Refrain from drinking alcohol beverages for 24 hours before and after surgery  Stop or limit smoking 0 days before surgery  Other: ________________________________________________________    THE NIGHT BEFORE SURGERY  Eat a light supper on the night before your surgery, no greasy or fatty foods.  DO NOT EAT OR DRINK ANYTHING AFTER MIDNIGHT, INCLUDING GUM, HARD CANDY, MINTS, OR CHEWING TOBACCO  Take a complete shower. Wash your body from the neck down with Hibiclens (chlorhexidine gluconate) soap. Hibiclens soap may be purchased over the counter at the pharmacy. Keep the soap away from your eyes, ears, and mouth. After washing with Hibiclens, rinse thoroughly. You may also use any soap labeled "antibacterial". Shampoo your hair with your regular shampoo.    THE DAY OF SURGERY  Take " another shower with Hibiclens or any antibacterial soap, to reduce the change of infection.  Medications to take the morning of surgery: Metoprolol with a small sip of water. Do not take diuretics or fluid pills.  Diabetic medication instructions will be given by the preop center. They will call you before your surgery.  You may brush your teeth and rinse your mouth, but do not shallow any water.  Do not apply perfume, powder, body lotions or deodorant on the day of surgery.  Do not wear any makeup, including mascara and false eyelashes.  Nail polish should be removed.  Wear comfortable clothes, such as button front shirt and loose-fitting pants.  Leave all jewelry, including body piercings and valuables at home.  Hairpins and clasps must be removed before you enter the operating room.  You may wear glasses, dentures and hearing aids before and after surgery. They may need to be removed before going into the operating room. Contact lenses worn before surgery must be removed before entering the operating room. Please bring a case for your hearing aids, glasses and/or contacts.  Bring any devices you will need after surgery such as crutches or canes.  If you have sleep apnea, please bring your CPAP machine.  If you have an implantable device, such as a pacemaker or AICD, please bring the device information card, if you have one.    If you have any questions or concerns, please don't hesitate to call.    TI MarshallN, RN  RN Clinician  Thoracic and Cardiovascular Surgery  [address] [place] [id number]  [phone] fax

## 2018-01-10 NOTE — PRE-PROCEDURE INSTRUCTIONS
PreOp Instructions given:  - Instructed patient to follow Cardiology directions for meds to take AM of surgery; DM med instructions given  - NPO guidelines  - Arrival place directions given; time to be given the day before procedure by the Surgeon's Office  - Bathing with antibacterial soap   - Don't wear any jewelry or bring any valuables AM of surgery  - No makeup or moisturizer to face  - No perfume/cologne, powder, lotions or aftershave    Pt. verbalized understanding.

## 2018-01-10 NOTE — PROGRESS NOTES
Cardiothoracic Surgery      []Hide copied text  HPI: Ms. Quick is a 62 F h/o recently diagnosed CHF, AS, DM, HTN, A Fib (on amio), presenting with shortness of breath,  patient reports has been going on x3 weeks, and is progressive, worse with exertion, now exercise limitation to < 1 block without exercise limitations previously. Pt denies chest pain, palpitations, syncope, light headed. She reports 3 pillow orthopnea, PND, and ankle swelling. She reports has been increasing fluid intake recently. Had recent admission to [facility], pt reports undergoing cardiac cath (no reported significant CAD per reports, we have requested disc) was told she had aortic valve stenosis that would require surgery but also was told about possible TAVR. Patient presented here for evaluation because she had previously been told that she would need to go to [place], [place] for AVR.      She states for the last 3-4 weeks she has experienced HA (Class III sx) and is unable to walk up her 17 stairs in her home without stopping to catch her breath. Her SOB resolves with rest and is not associated with Cp, light headedness, dizziness. At  she believes she had ARELI, Coronary Angiogram, CTA but we are still awaiting records. She was also told she would need surgical AVR but was told she must be referred to [place], [place] for surgery. She was unsure as to the reasoning behind this. Since discharge she has adhered to a low sodium diet and been complaint with newly prescribed Lasix, however, she began experiencing SOB and noticed bilateral ankle edema. She presented to [facility] this hospitalization as she wanted another opinion regarding management of her CHF and AS.        Allergies: reviewed  Medications: reviewed  ROS as above     PE:  Physical Exam   Constitutional: Patient appears well-developed and well-nourished.   HENT:   Head: Normocephalic and atraumatic.   Eyes: Pupils are equal, round, and reactive to light.   Neck: Normal range of motion. Neck  supple.   Cardiovascular: Normal rate, regular rhythm and normal heart sounds.  + murmur  Pulmonary/Chest: Effort normal and breath sounds normal.   Abdominal: Soft. Bowel sounds are normal.   Musculoskeletal: Normal range of motion.   Neurological: Alert and oriented to person, place, and time.   Skin: Skin is warm and dry.   Psychiatric: Normal mood and affect. Behavior is normal.      Assessment:  Severe AS     Plan: I spoke to the patient and her  about surgery we will plan AVR/MAZE

## 2018-01-10 NOTE — PLAN OF CARE
Problem: Patient Care Overview  Goal: Plan of Care Review  Outcome: Ongoing (interventions implemented as appropriate)  VS and assessment performed per orders.  Pt denies chest pain. Pt on room air, sats  95-98%.  Complain of headache at beginning of this shift, PRN med given as ordered, moderate relief.  Blood glucose monitored as ordered. Pt free of falls or injury.              Valeria pt friend called stating pt is not clear on how she should take her prep. Please advise. Pt do not speak good english.    Please call Valeria @[phone]

## 2018-01-10 NOTE — PROGRESS NOTES
Pt here for preop today. Labs reviewed per Dr. Lundy. Pt noted to have elevated creatinine and abnormal LFT's. Pt's surgery postponed for now. Appts with nephrology and hepatology scheduled. Attempted to call pt several times with no answer. Pt notified via voice mail. Direct number given.

## 2018-01-11 NOTE — PROGRESS NOTES
Patient ID:  Anum Quick is a 62 y.o. female who presents for evaluation of severe aortic stenosis.    Pt has had dyspnea for the past 5 weeks; her symptoms are stable. She has recently been found to have severe AS with AV area 0.49 cm2, peak aortic jet velocity 4.0 m/s, MG 48 mmHg  She refers that a recent cath has shown normal coronary arteries. No episodes of near-syncope / syncope. She was scheduled to undergo AVR/MAZE with Dr Lundy but has creatinine has increased from 1.6 to 3.1 in two weeks, and liver enzymes are also elevated. Therefore surgery has been postponed    PMH includes: severe aortic stenosis; atrial fibrillation; hypertension; type 2 diabetes.      Lab Results   Component Value Date     01/10/2018    K 3.7 01/10/2018    CL 97 01/10/2018    CO2 17 (L) 01/10/2018    BUN 93 (H) 01/10/2018    CREATININE 3.1 (H) 01/10/2018    GLU 84 01/10/2018    HGBA1C 5.5 01/10/2018    MG 2.1 12/14/2017     (H) 01/10/2018     (H) 01/10/2018    ALBUMIN 2.9 (L) 01/10/2018    PROT 7.0 01/10/2018    BILITOT 0.4 01/10/2018    WBC 7.41 01/10/2018    HGB 9.1 (L) 01/10/2018    HCT 28.4 (L) 01/10/2018    MCV 92 01/10/2018     01/10/2018    INR 2.1 (H) 01/10/2018    TSH 3.676 12/15/2017       Past Medical History:   Diagnosis Date    Chronic anticoagulation - on rivaroxaban 12/29/2017    PAF    Chronic systolic heart failure 12/14/2017     12-15-17   1 - Moderately depressed left ventricular systolic function (EF 30-35%). Akinetic LV apex. There is no thrombus in LV apex.   2 - Indeterminate LV diastolic function.    3 - Mildly to moderately depressed right ventricular systolic function .    4 - Pulmonary hypertension. The estimated PA systolic pressure is 63 mmHg.    5 - Severe low flow aortic valve stenosis (JAMES 0.49 cm2, AVAi 0.27 cm2/m2, peak aortic jet velocity 4.0 m/s,MG 48 mmHg).    6 - Mild to moderate mitral regurgitation.    7 - Mild tricuspid regurgitation.    8 - Severe left  atrial enlargement.     CKD (chronic kidney disease) stage 3, GFR 30-59 ml/min 12/29/2017    Coronary artery disease involving native coronary artery of native heart without angina pectoris 12/29/2017    [facility] @ : Per report (12/4/2017) proximal LAD has 20% stenosis, RCA with LI otherwise normal coronaries      Essential hypertension 12/14/2017    Hyperlipidemia associated with type 2 diabetes mellitus 12/14/2017    NSTEMI (non-ST elevated myocardial infarction) 12/14/2017    Paroxysmal atrial fibrillation 12/15/2017    Severe aortic stenosis 12/14/2017    12-15-17  Severe low flow aortic valve stenosis (JAMES 0.49 cm2, AVAi 0.27 cm2/m2, peak aortic jet velocity 4.0 m/s,MG 48 mmHg).      Type 2 diabetes mellitus with neurologic complication, without long-term current use of insulin 12/14/2017     History reviewed. No pertinent family history.  Social History     Social History    Marital status:      Spouse name: N/A    Number of children: N/A    Years of education: N/A     Occupational History    Not on file.     Social History Main Topics    Smoking status: Never Smoker    Smokeless tobacco: Never Used    Alcohol use No    Drug use: No    Sexual activity: Not on file     Other Topics Concern    Not on file     Social History Narrative    No narrative on file       Lab Results   Component Value Date    CHOL 173 12/15/2017    HDL 33 (L) 12/15/2017    TRIG 199 (H) 12/15/2017       Lab Results   Component Value Date    LDLCALC 100.2 12/15/2017       Past Medical History:   Diagnosis Date    Chronic anticoagulation - on rivaroxaban 12/29/2017    PAF    Chronic systolic heart failure 12/14/2017     12-15-17   1 - Moderately depressed left ventricular systolic function (EF 30-35%). Akinetic LV apex. There is no thrombus in LV apex.   2 - Indeterminate LV diastolic function.    3 - Mildly to moderately depressed right ventricular systolic function .    4 - Pulmonary hypertension. The estimated PA  systolic pressure is 63 mmHg.    5 - Severe low flow aortic valve stenosis (JAMES 0.49 cm2, AVAi 0.27 cm2/m2, peak aortic jet velocity 4.0 m/s,MG 48 mmHg).    6 - Mild to moderate mitral regurgitation.    7 - Mild tricuspid regurgitation.    8 - Severe left atrial enlargement.     CKD (chronic kidney disease) stage 3, GFR 30-59 ml/min 12/29/2017    Coronary artery disease involving native coronary artery of native heart without angina pectoris 12/29/2017    [facility]: Per report (12/4/2017) proximal LAD has 20% stenosis, RCA with LI otherwise normal coronaries      Essential hypertension 12/14/2017    Hyperlipidemia associated with type 2 diabetes mellitus 12/14/2017    NSTEMI (non-ST elevated myocardial infarction) 12/14/2017    Paroxysmal atrial fibrillation 12/15/2017    Severe aortic stenosis 12/14/2017    12-15-17  Severe low flow aortic valve stenosis (JAMES 0.49 cm2, AVAi 0.27 cm2/m2, peak aortic jet velocity 4.0 m/s,MG 48 mmHg).      Type 2 diabetes mellitus with neurologic complication, without long-term current use of insulin 12/14/2017     Hypertension Medications             furosemide (LASIX) 40 MG tablet Take 1 tablet (40 mg total) by mouth 2 (two) times daily. If wt gain 2-3 lbs x 2-5 d, increase Lasix to 80mg by mouth 2x/d    lisinopril (PRINIVIL,ZESTRIL) 20 MG tablet Take 20 mg by mouth once daily.    metoprolol tartrate (LOPRESSOR) 50 MG tablet Take 1 tablet (50 mg total) by mouth 2 (two) times daily.    nitroGLYCERIN (NITROSTAT) 0.4 MG SL tablet Place 1 tablet (0.4 mg total) under the tongue every 5 (five) minutes as needed for Chest pain (angina).            Review of Systems   Constitution: Positive for weakness. Negative for decreased appetite, diaphoresis, fever, malaise/fatigue, weight gain and weight loss.   HENT: Negative for congestion, ear discharge, ear pain and nosebleeds.    Eyes: Negative for blurred vision, double vision and visual disturbance.   Cardiovascular: Positive for  "dyspnea on exertion, leg swelling and orthopnea. Negative for chest pain, claudication, cyanosis, irregular heartbeat, near-syncope, palpitations, paroxysmal nocturnal dyspnea and syncope.   Respiratory: Positive for shortness of breath. Negative for cough, hemoptysis, sleep disturbances due to breathing, snoring, sputum production and wheezing.    Endocrine: Negative for polydipsia, polyphagia and polyuria.   Hematologic/Lymphatic: Negative for adenopathy and bleeding problem. Does not bruise/bleed easily.   Skin: Negative for color change, nail changes, poor wound healing and rash.   Musculoskeletal: Negative for muscle cramps and muscle weakness.   Gastrointestinal: Negative for abdominal pain, anorexia, change in bowel habit, hematochezia, nausea and vomiting.   Genitourinary: Negative for dysuria, frequency and hematuria.   Neurological: Negative for brief paralysis, difficulty with concentration, excessive daytime sleepiness, dizziness, focal weakness, headaches, light-headedness, seizures and vertigo.   Psychiatric/Behavioral: Negative for altered mental status and depression.   Allergic/Immunologic: Negative for persistent infections.                Objective:         BP (!) 125/95 (BP Location: Left arm, Patient Position: Sitting, BP Method: Medium (Automatic))   Pulse (!) 119   Ht 5' 3" (1.6 m)   Wt 75.3 kg (166 lb 0.1 oz)   LMP  (LMP Unknown)   BMI 29.41 kg/m²    Physical Exam   Constitutional: She is oriented to person, place, and time. She appears well-developed and well-nourished.   HENT:   Head: Normocephalic.   Right Ear: External ear normal.   Left Ear: External ear normal.   Nose: Nose normal.   Inspection of lips, teeth and gums normal   Eyes: Conjunctivae and EOM are normal. Pupils are equal, round, and reactive to light. No scleral icterus.   Neck: Normal range of motion. JVD (venous pressure is 8 cm H2O ) present. No tracheal deviation present. No thyromegaly present.   Cardiovascular: S1 " normal, S2 normal and intact distal pulses.  An irregularly irregular rhythm present. Tachycardia present.  Exam reveals no gallop and no friction rub.    Murmur heard.   Harsh midsystolic murmur is present with a grade of 3/6  at the upper right sternal border radiating to the neck  High-pitched blowing holosystolic murmur of grade 4/6 is also present at the apex radiating to the axilla.  Pulses:       Carotid pulses are 1+ on the right side, and 1+ on the left side.       Radial pulses are 2+ on the right side, and 2+ on the left side.        Dorsalis pedis pulses are 2+ on the right side, and 2+ on the left side.   Heart rate by auscultation is 110-120/min.    Pulmonary/Chest: Effort normal and breath sounds normal. No respiratory distress. She has no wheezes. She has no rales. She exhibits no tenderness.   Abdominal: Soft. Bowel sounds are normal. She exhibits no distension. There is no hepatosplenomegaly. There is no tenderness. There is no guarding.   Musculoskeletal: Normal range of motion. She exhibits no edema or tenderness.   Lymphadenopathy:   Palpation of lymph nodes of neck and groin normal   Neurological: She is oriented to person, place, and time. No cranial nerve deficit. She exhibits normal muscle tone. Coordination normal.   Skin: Skin is warm and dry. No rash noted. No erythema. No pallor.   Bilateral pretibial edema, 1+   Psychiatric: She has a normal mood and affect. Her behavior is normal. Judgment and thought content normal.             ECG (10-CATALINO-2018)  Sinus rhythm with 1st degree A-V block  Possible Left atrial enlargement  Low voltage, limb leads  Cannot rule out Anteroseptal infarct ,age undetermined  Possible Lateral infarct ,age undetermined  Abnormal ECG  When compared with ECG of 19-DEC-2017 14:07,  No significant change was found        Echocardiogram (12/15/2017)    TEST DESCRIPTION   Technical Quality: This study was performed in conjunction with a 1.5ml intravenous injection of  Definity contrast agent.     Aorta: The aortic root is normal in size, measuring 3.1 cm at sinotubular junction and 3.8 cm at Sinuses of Valsalva. The proximal ascending aorta is normal in size, measuring 3.7 cm across.     Left Atrium: The left atrial volume index is severely enlarged, measuring 52.05 cc/m2.     Left Ventricle: The left ventricle is normal in size, with an end-diastolic diameter of 5.1 cm, and an end-systolic diameter of 3.6 cm. LV wall thickness is normal, with the septum measuring 0.9 cm and the posterior wall measuring 0.8 cm across. Relative   wall thickness was normal at 0.31, and the LV mass index was 97.5 g/m2 consistent with normal left ventricular mass. The apex is akinetic.   There is global hypokinesis. Left ventricular systolic function appears moderately depressed. Visually estimated ejection fraction is 30-35%. The LV Doppler derived stroke volume equals 38.0 ccs.     Diastolic indices: Mitral inflow pattern reveals fusion of E & A waves. E/e' ratio(avg) 13. Diastolic function is indeterminate.     Right Atrium: The right atrium is normal in size, measuring 5.2 cm in length and 4.0 cm in width in the apical view.     Right Ventricle: The right ventricle is normal in size measuring 3.1 cm at the base in the apical right ventricle-focused view. Global right ventricular systolic function appears mildly to moderately depressed. Tricuspid annular plane systolic excursion   (TAPSE) is 1.1 cm. Tissue Doppler-derived tricuspid annular peak systolic velocity (S prime) is 5.6 cm/s. The estimated PA systolic pressure is 63 mmHg.     Aortic Valve:  The aortic valve is markedly sclerotic. The aortic valve is tri-leaflet in structure. The peak velocity obtained across the aortic valve is 4.0 m/s, which translates to a peak gradient of 64 mmHg. The mean gradient is 48 mmHg. Using a left   ventricular outflow tract diameter of 2.1 cm, a left ventricular outflow tract velocity time integral of 11 cm,  and a peak instantaneous transvalvular velocity time integral of 78 cm, the calculated aortic valve area is 0.49 cm2(AVAi is 0.27 cm2/m2),   consistent with severe aortic stenosis.     Mitral Valve:  The mitral valve is mildly sclerotic. There is mild to moderate mitral regurgitation.     Tricuspid Valve:  The tricuspid valve is mildly sclerotic. There is mild tricuspid regurgitation.     Pulmonary Valve:  Pulmonary valve is normal in structure with normal leaflet mobility.     IVC: IVC is normal in size and collapses > 50% with a sniff, suggesting normal right atrial pressure of 3 mmHg.     Intracavitary: There is no evidence of pericardial effusion, intracavity mass, thrombi, or vegetation.     CONCLUSIONS     1 - Moderately depressed left ventricular systolic function (EF 30-35%). Akinetic LV apex. There is no thrombus in LV apex.    2 - Indeterminate LV diastolic function.     3 - Mildly to moderately depressed right ventricular systolic function .     4 - Pulmonary hypertension. The estimated PA systolic pressure is 63 mmHg.     5 - Severe low flow aortic valve stenosis (JAMES 0.49 cm2, AVAi 0.27 cm2/m2, peak aortic jet velocity 4.0 m/s,MG 48 mmHg).     6 - Mild to moderate mitral regurgitation.     7 - Mild tricuspid regurgitation.     8 - Severe left atrial enlargement.     This document has been electronically    SIGNED BY: Marcelina Burt MD       I have reviewed the following:     Details / Date    []   Labs     []   Imaging     []   Cardiology Procedures     []   Other      Assessment and Plan:       1. Chronic systolic heart failure    2. Paroxysmal atrial fibrillation    3. Essential hypertension    4. Chronic anticoagulation - on rivaroxaban    5. Aortic valve stenosis, etiology of cardiac valve disease unspecified         Chronic systolic heart failure  BUN/Cr have increased from 37/1.6 to 93/3.1 in 2 weeks.     Home BW and BP monitoring  Decrease lasix from 40 mg bid to 20 mg qd  Continue lisinopril  20 mg qd and metoprolol tartrate 75 mg bid  RTC in 1 week  CMP prior to next appointment    Paroxysmal atrial fibrillation  Pulse is irregularly irregular: Atrial fibrillation with RVR to 110-120/min    Increase metoprolol to 75 mg bid  RTC in 1 week

## 2018-01-12 NOTE — ASSESSMENT & PLAN NOTE
BUN/Cr have increased from 37/1.6 to 93/3.1 in 2 weeks.     Home BW and BP monitoring  Decrease lasix from 40 mg bid to 20 mg qd  Continue lisinopril 20 mg qd and metoprolol tartrate 75 mg bid  RTC in 1 week  CMP prior to next appointment

## 2018-01-12 NOTE — ASSESSMENT & PLAN NOTE
Pulse is irregularly irregular: Atrial fibrillation with RVR to 110-120/min    Increase metoprolol to 75 mg bid  RTC in 1 week

## 2018-01-17 NOTE — PROGRESS NOTES
" Patient ID:  Anum Quick is a 62 y.o. female who presents for follow-up of severe aortic stenosis.    Clinically stable since last appointment with me on 1/11/2018. Shortness of breat and leg edema have not worsened. She has not increased metoprolol nor has recorded body weigh and BP as instructed.    On 1/16 Bun and creatinine were 44 and 1.2 respectively (previously 93 and 3.1).     PMH includes: severe aortic stenosis; atrial fibrillation; hypertension; type 2 diabetes.        Relevant information from 1/11/2018 note.    "Pt has had dyspnea for the past 5 weeks; her symptoms are stable. She has recently been found to have severe AS with AV area 0.49 cm2, peak aortic jet velocity 4.0 m/s, MG 48 mmHg  She refers that a recent cath has shown normal coronary arteries. No episodes of near-syncope / syncope. She was scheduled to undergo AVR/MAZE with Dr Lundy but creatinine has increased from 1.6 to 3.1 in two weeks, and liver enzymes are also elevated. Therefore surgery has been postponed"        Lab Results   Component Value Date     (L) 01/16/2018    K 4.3 01/16/2018    CL 92 (L) 01/16/2018    CO2 21 (L) 01/16/2018    BUN 44 (H) 01/16/2018    CREATININE 1.2 01/16/2018     (H) 01/16/2018    HGBA1C 5.5 01/10/2018    MG 2.1 12/14/2017     (H) 01/10/2018     (H) 01/10/2018    ALBUMIN 2.9 (L) 01/10/2018    PROT 7.0 01/10/2018    BILITOT 0.4 01/10/2018    WBC 7.41 01/10/2018    HGB 9.1 (L) 01/10/2018    HCT 28.4 (L) 01/10/2018    MCV 92 01/10/2018     01/10/2018    INR 2.1 (H) 01/10/2018    TSH 3.676 12/15/2017       Past Medical History:   Diagnosis Date    Chronic anticoagulation - on rivaroxaban 12/29/2017    PAF    Chronic systolic heart failure 12/14/2017     12-15-17   1 - Moderately depressed left ventricular systolic function (EF 30-35%). Akinetic LV apex. There is no thrombus in LV apex.   2 - Indeterminate LV diastolic function.    3 - Mildly to moderately depressed " right ventricular systolic function .    4 - Pulmonary hypertension. The estimated PA systolic pressure is 63 mmHg.    5 - Severe low flow aortic valve stenosis (JAMES 0.49 cm2, AVAi 0.27 cm2/m2, peak aortic jet velocity 4.0 m/s,MG 48 mmHg).    6 - Mild to moderate mitral regurgitation.    7 - Mild tricuspid regurgitation.    8 - Severe left atrial enlargement.     CKD (chronic kidney disease) stage 3, GFR 30-59 ml/min 12/29/2017    Coronary artery disease involving native coronary artery of native heart without angina pectoris 12/29/2017    [facility] @ : Per report (12/4/2017) proximal LAD has 20% stenosis, RCA with LI otherwise normal coronaries      Essential hypertension 12/14/2017    Hyperlipidemia associated with type 2 diabetes mellitus 12/14/2017    NSTEMI (non-ST elevated myocardial infarction) 12/14/2017    Paroxysmal atrial fibrillation 12/15/2017    Severe aortic stenosis 12/14/2017    12-15-17  Severe low flow aortic valve stenosis (JAMES 0.49 cm2, AVAi 0.27 cm2/m2, peak aortic jet velocity 4.0 m/s,MG 48 mmHg).      Type 2 diabetes mellitus with neurologic complication, without long-term current use of insulin 12/14/2017     History reviewed. No pertinent family history.  Social History     Social History    Marital status:      Spouse name: N/A    Number of children: N/A    Years of education: N/A     Occupational History    Not on file.     Social History Main Topics    Smoking status: Never Smoker    Smokeless tobacco: Never Used    Alcohol use No    Drug use: No    Sexual activity: Not on file     Other Topics Concern    Not on file     Social History Narrative    No narrative on file       Lab Results   Component Value Date    CHOL 173 12/15/2017    HDL 33 (L) 12/15/2017    TRIG 199 (H) 12/15/2017       Lab Results   Component Value Date    LDLCALC 100.2 12/15/2017       Past Medical History:   Diagnosis Date    Chronic anticoagulation - on rivaroxaban 12/29/2017    PAF     Chronic systolic heart failure 12/14/2017     12-15-17   1 - Moderately depressed left ventricular systolic function (EF 30-35%). Akinetic LV apex. There is no thrombus in LV apex.   2 - Indeterminate LV diastolic function.    3 - Mildly to moderately depressed right ventricular systolic function .    4 - Pulmonary hypertension. The estimated PA systolic pressure is 63 mmHg.    5 - Severe low flow aortic valve stenosis (JAMES 0.49 cm2, AVAi 0.27 cm2/m2, peak aortic jet velocity 4.0 m/s,MG 48 mmHg).    6 - Mild to moderate mitral regurgitation.    7 - Mild tricuspid regurgitation.    8 - Severe left atrial enlargement.     CKD (chronic kidney disease) stage 3, GFR 30-59 ml/min 12/29/2017    Coronary artery disease involving native coronary artery of native heart without angina pectoris 12/29/2017    [facility]: Per report (12/4/2017) proximal LAD has 20% stenosis, RCA with LI otherwise normal coronaries      Essential hypertension 12/14/2017    Hyperlipidemia associated with type 2 diabetes mellitus 12/14/2017    NSTEMI (non-ST elevated myocardial infarction) 12/14/2017    Paroxysmal atrial fibrillation 12/15/2017    Severe aortic stenosis 12/14/2017    12-15-17  Severe low flow aortic valve stenosis (JAMES 0.49 cm2, AVAi 0.27 cm2/m2, peak aortic jet velocity 4.0 m/s,MG 48 mmHg).      Type 2 diabetes mellitus with neurologic complication, without long-term current use of insulin 12/14/2017     Hypertension Medications             furosemide (LASIX) 20 MG tablet Take 1 tablet (20 mg total) by mouth once daily.    lisinopril (PRINIVIL,ZESTRIL) 20 MG tablet Take 20 mg by mouth once daily.    metoprolol tartrate (LOPRESSOR) 50 MG tablet Take 1 tablet (50 mg total) by mouth 2 (two) times daily. Take 75 mg twice per day    nitroGLYCERIN (NITROSTAT) 0.4 MG SL tablet Place 1 tablet (0.4 mg total) under the tongue every 5 (five) minutes as needed for Chest pain (angina).            Review of Systems   Constitution: Positive  "for weakness. Negative for decreased appetite, diaphoresis, fever, malaise/fatigue, weight gain and weight loss.   HENT: Negative for congestion, ear discharge, ear pain and nosebleeds.    Eyes: Negative for blurred vision, double vision and visual disturbance.   Cardiovascular: Positive for dyspnea on exertion, leg swelling and orthopnea. Negative for chest pain, claudication, cyanosis, irregular heartbeat, near-syncope, palpitations, paroxysmal nocturnal dyspnea and syncope.   Respiratory: Positive for shortness of breath. Negative for cough, hemoptysis, sleep disturbances due to breathing, snoring, sputum production and wheezing.    Endocrine: Negative for polydipsia, polyphagia and polyuria.   Hematologic/Lymphatic: Negative for adenopathy and bleeding problem. Does not bruise/bleed easily.   Skin: Negative for color change, nail changes, poor wound healing and rash.   Musculoskeletal: Negative for muscle cramps and muscle weakness.   Gastrointestinal: Negative for abdominal pain, anorexia, change in bowel habit, hematochezia, nausea and vomiting.   Genitourinary: Negative for dysuria, frequency and hematuria.   Neurological: Negative for brief paralysis, difficulty with concentration, excessive daytime sleepiness, dizziness, focal weakness, headaches, light-headedness, seizures and vertigo.   Psychiatric/Behavioral: Negative for altered mental status and depression.   Allergic/Immunologic: Negative for persistent infections.                Objective:         /62 (BP Location: Left arm, Patient Position: Sitting, BP Method: Large (Automatic))   Pulse 104   Ht 5' 3" (1.6 m)   Wt 78.2 kg (172 lb 6.4 oz)   LMP  (LMP Unknown)   SpO2 98%   BMI 30.54 kg/m²    Physical Exam   Constitutional: She is oriented to person, place, and time. She appears well-developed and well-nourished.   HENT:   Head: Normocephalic.   Right Ear: External ear normal.   Left Ear: External ear normal.   Nose: Nose normal. "   Inspection of lips, teeth and gums normal   Eyes: Conjunctivae and EOM are normal. Pupils are equal, round, and reactive to light. No scleral icterus.   Neck: Normal range of motion. JVD (venous pressure is 12 cm H2O; it was 8 cm at the time of last visit) present. No tracheal deviation present. No thyromegaly present.   Cardiovascular: S1 normal, S2 normal and intact distal pulses.  An irregularly irregular rhythm present. Tachycardia present.  Exam reveals no gallop and no friction rub.    Murmur heard.   Harsh midsystolic murmur is present with a grade of 3/6  at the upper right sternal border radiating to the neck  High-pitched blowing holosystolic murmur of grade 4/6 is also present at the apex radiating to the axilla.  Pulses:       Carotid pulses are 1+ on the right side, and 1+ on the left side.       Radial pulses are 2+ on the right side, and 2+ on the left side.        Dorsalis pedis pulses are 2+ on the right side, and 2+ on the left side.   Heart rate by auscultation is 110min.    Pulmonary/Chest: Effort normal and breath sounds normal. No respiratory distress. She has no wheezes. She has no rales. She exhibits no tenderness.   Abdominal: Soft. Bowel sounds are normal. She exhibits no distension. There is no hepatosplenomegaly. There is no tenderness. There is no guarding.   Musculoskeletal: Normal range of motion. She exhibits no edema or tenderness.   Lymphadenopathy:   Palpation of lymph nodes of neck and groin normal   Neurological: She is oriented to person, place, and time. No cranial nerve deficit. She exhibits normal muscle tone. Coordination normal.   Skin: Skin is warm and dry. No rash noted. No erythema. No pallor.   Bilateral pretibial edema, 1+ (R>L)   Psychiatric: She has a normal mood and affect. Her behavior is normal. Judgment and thought content normal.           ECG (10-CATALINO-2018)  Sinus rhythm with 1st degree A-V block  Possible Left atrial enlargement  Low voltage, limb  leads  Cannot rule out Anteroseptal infarct ,age undetermined  Possible Lateral infarct ,age undetermined  Abnormal ECG  When compared with ECG of 19-DEC-2017 14:07,  No significant change was found           Echocardiogram (12/15/2017)    TEST DESCRIPTION   Technical Quality: This study was performed in conjunction with a 1.5ml intravenous injection of Definity contrast agent.     Aorta: The aortic root is normal in size, measuring 3.1 cm at sinotubular junction and 3.8 cm at Sinuses of Valsalva. The proximal ascending aorta is normal in size, measuring 3.7 cm across.     Left Atrium: The left atrial volume index is severely enlarged, measuring 52.05 cc/m2.     Left Ventricle: The left ventricle is normal in size, with an end-diastolic diameter of 5.1 cm, and an end-systolic diameter of 3.6 cm. LV wall thickness is normal, with the septum measuring 0.9 cm and the posterior wall measuring 0.8 cm across. Relative   wall thickness was normal at 0.31, and the LV mass index was 97.5 g/m2 consistent with normal left ventricular mass. The apex is akinetic.   There is global hypokinesis. Left ventricular systolic function appears moderately depressed. Visually estimated ejection fraction is 30-35%. The LV Doppler derived stroke volume equals 38.0 ccs.     Diastolic indices: Mitral inflow pattern reveals fusion of E & A waves. E/e' ratio(avg) 13. Diastolic function is indeterminate.     Right Atrium: The right atrium is normal in size, measuring 5.2 cm in length and 4.0 cm in width in the apical view.     Right Ventricle: The right ventricle is normal in size measuring 3.1 cm at the base in the apical right ventricle-focused view. Global right ventricular systolic function appears mildly to moderately depressed. Tricuspid annular plane systolic excursion   (TAPSE) is 1.1 cm. Tissue Doppler-derived tricuspid annular peak systolic velocity (S prime) is 5.6 cm/s. The estimated PA systolic pressure is 63 mmHg.     Aortic Valve:   The aortic valve is markedly sclerotic. The aortic valve is tri-leaflet in structure. The peak velocity obtained across the aortic valve is 4.0 m/s, which translates to a peak gradient of 64 mmHg. The mean gradient is 48 mmHg. Using a left   ventricular outflow tract diameter of 2.1 cm, a left ventricular outflow tract velocity time integral of 11 cm, and a peak instantaneous transvalvular velocity time integral of 78 cm, the calculated aortic valve area is 0.49 cm2(AVAi is 0.27 cm2/m2),   consistent with severe aortic stenosis.     Mitral Valve:  The mitral valve is mildly sclerotic. There is mild to moderate mitral regurgitation.     Tricuspid Valve:  The tricuspid valve is mildly sclerotic. There is mild tricuspid regurgitation.     Pulmonary Valve:  Pulmonary valve is normal in structure with normal leaflet mobility.     IVC: IVC is normal in size and collapses > 50% with a sniff, suggesting normal right atrial pressure of 3 mmHg.     Intracavitary: There is no evidence of pericardial effusion, intracavity mass, thrombi, or vegetation.     CONCLUSIONS     1 - Moderately depressed left ventricular systolic function (EF 30-35%). Akinetic LV apex. There is no thrombus in LV apex.    2 - Indeterminate LV diastolic function.     3 - Mildly to moderately depressed right ventricular systolic function .     4 - Pulmonary hypertension. The estimated PA systolic pressure is 63 mmHg.     5 - Severe low flow aortic valve stenosis (JAMES 0.49 cm2, AVAi 0.27 cm2/m2, peak aortic jet velocity 4.0 m/s,MG 48 mmHg).     6 - Mild to moderate mitral regurgitation.     7 - Mild tricuspid regurgitation.     8 - Severe left atrial enlargement.     This document has been electronically    SIGNED BY: Marcelina Burt MD       I have reviewed the following:     Details / Date    []   Labs     []   Imaging     []   Cardiology Procedures     []   Other      Assessment and Plan:       1. Nonrheumatic aortic valve stenosis    2. Chronic  systolic heart failure    3. Paroxysmal atrial fibrillation    4. Essential hypertension    5. Coronary artery disease involving native coronary artery of native heart without angina pectoris         Aortic valve stenosis  Creatinine is back to normal.     Refer to CTS (Dr Lundy) for AVR    Chronic systolic heart failure  She has evidence of fluid retention on physical exam (neck veins more distended). No rales and leg edema unchanged    I have reemphasized the importance of home BW and BP monitoring and keeping a log  Increase lasix to 20 mg alternating with 40 mg qod  BMP prior to next appointment  RTC in 10 weeks    Paroxysmal atrial fibrillation  In atrial fibrilation with RVR (110 /min by auscultation at the apex). She has not increased metoprolol to 75 mg BID as instructed    Increase metoprolol tartrate to 75 mg bid  continue amiodarone and rivaroxaban    Essential hypertension  BP well controlled; today in clinic 100/62 mmHg.    Coronary artery disease involving native coronary artery of native heart without angina pectoris  Presently on ASA and clopidogrel for NSTEMI.     Review [facility] cath prior to discontinuing clopidogrel

## 2018-01-18 NOTE — PROGRESS NOTES
Pt did not complete scheduled consults with hepatology and nephrology. Dr. Lundy is cancelling pt's surgery until workups are complete. Attempted to call pt several times but no answer. Left voicemail to call back ASAP. Direct number given. Appts rescheduled to next available and mailed to pt.

## 2018-01-19 NOTE — ASSESSMENT & PLAN NOTE
Presently on ASA and clopidogrel for NSTEMI.     Review [facility] cath prior to discontinuing clopidogrel

## 2018-01-19 NOTE — PATIENT INSTRUCTIONS
Monitor body weight and blood pressure at home and keep a record of it    Increase metoprolol tartrate to 75 mg twice per day    Increase lasix to 20 mg alternating with 40 mg every other day

## 2018-01-19 NOTE — ASSESSMENT & PLAN NOTE
She has evidence of fluid retention on physical exam (neck veins more distended). No rales and leg edema unchanged    I have reemphasized the importance of home BW and BP monitoring and keeping a log  Increase lasix to 20 mg alternating with 40 mg qod  BMP prior to next appointment  RTC in 10 weeks

## 2018-01-19 NOTE — ASSESSMENT & PLAN NOTE
In atrial fibrilation with RVR (110 /min by auscultation at the apex). She has not increased metoprolol to 75 mg BID as instructed    Increase metoprolol tartrate to 75 mg bid  continue amiodarone and rivaroxaban

## 2018-01-24 NOTE — TELEPHONE ENCOUNTER
Admit 12/14 OV 1/19    Pt called re meds. Pt only given 15#. Pt has furosemide - told to take 1.5 pills qd. OV notes states 40 mg qod, 20 mg qd. Ankles swollen a little - taking 30 mg qd. Office message sent to cards per rx. Call back with questions.     Reason for Disposition   Caller requesting a NON-URGENT new prescription or refill and triager unable to refill per unit policy    Protocols used: ST MEDICATION QUESTION CALL-A-

## 2018-02-02 NOTE — TELEPHONE ENCOUNTER
· Called patient, who expressed she wanted the earliest available appointment with a provider that could see her after 3 PM.  · Patient accepted and confirmed consult with Dr. Chairez on 02/20.  · Mailed appointment reminder to patient.

## 2018-02-02 NOTE — TELEPHONE ENCOUNTER
----- Message from Iris Sanchez sent at 2/2/2018 12:39 PM CST -----  Contact: patient   Patient calling to move the time on her appt with adolfo.       Please call

## 2018-02-17 PROBLEM — I50.9 ACUTE ON CHRONIC CONGESTIVE HEART FAILURE: Status: RESOLVED | Noted: 2018-01-01 | Resolved: 2018-01-01

## 2018-02-17 PROBLEM — E87.6 HYPOKALEMIA: Status: ACTIVE | Noted: 2018-01-01

## 2018-02-17 PROBLEM — I50.23 ACUTE ON CHRONIC SYSTOLIC HEART FAILURE: Status: ACTIVE | Noted: 2018-01-01

## 2018-02-17 PROBLEM — E87.20 METABOLIC ACIDOSIS: Status: ACTIVE | Noted: 2018-01-01

## 2018-02-17 PROBLEM — I50.9 ACUTE ON CHRONIC CONGESTIVE HEART FAILURE: Status: ACTIVE | Noted: 2018-01-01

## 2018-02-18 PROBLEM — B37.2 CANDIDAL DERMATITIS: Status: ACTIVE | Noted: 2018-01-01

## 2018-02-18 NOTE — SUBJECTIVE & OBJECTIVE
Past Medical History:   Diagnosis Date    Chronic anticoagulation - on rivaroxaban 12/29/2017    PAF    Chronic systolic heart failure 12/14/2017     12-15-17   1 - Moderately depressed left ventricular systolic function (EF 30-35%). Akinetic LV apex. There is no thrombus in LV apex.   2 - Indeterminate LV diastolic function.    3 - Mildly to moderately depressed right ventricular systolic function .    4 - Pulmonary hypertension. The estimated PA systolic pressure is 63 mmHg.    5 - Severe low flow aortic valve stenosis (JAMES 0.49 cm2, AVAi 0.27 cm2/m2, peak aortic jet velocity 4.0 m/s,MG 48 mmHg).    6 - Mild to moderate mitral regurgitation.    7 - Mild tricuspid regurgitation.    8 - Severe left atrial enlargement.     CKD (chronic kidney disease) stage 3, GFR 30-59 ml/min 12/29/2017    Coronary artery disease involving native coronary artery of native heart without angina pectoris 12/29/2017    [facility] @ : Per report (12/4/2017) proximal LAD has 20% stenosis, RCA with LI otherwise normal coronaries      Essential hypertension 12/14/2017    Hyperlipidemia associated with type 2 diabetes mellitus 12/14/2017    NSTEMI (non-ST elevated myocardial infarction) 12/14/2017    Paroxysmal atrial fibrillation 12/15/2017    Severe aortic stenosis 12/14/2017    12-15-17  Severe low flow aortic valve stenosis (JAMES 0.49 cm2, AVAi 0.27 cm2/m2, peak aortic jet velocity 4.0 m/s,MG 48 mmHg).      Type 2 diabetes mellitus with neurologic complication, without long-term current use of insulin 12/14/2017       Past Surgical History:   Procedure Laterality Date    CARDIAC SURGERY      HYSTERECTOMY      KNEE SURGERY         Review of patient's allergies indicates:  No Known Allergies    No current facility-administered medications on file prior to encounter.      Current Outpatient Prescriptions on File Prior to Encounter   Medication Sig    amiodarone (PACERONE) 200 MG Tab Take by mouth once daily.    aspirin 81 MG Chew  Take 1 tablet (81 mg total) by mouth once daily.    atorvastatin (LIPITOR) 80 MG tablet Take 1 tablet (80 mg total) by mouth once daily.    butalbital-acetaminophen-caffeine -40 mg (FIORICET, ESGIC) -40 mg per tablet Take 1 tablet by mouth every 4 (four) hours as needed for Pain.    clopidogrel (PLAVIX) 75 mg tablet Take 1 tablet (75 mg total) by mouth once daily.    estropipate (OGEN) 1.5 MG tablet Take 1.5 mg by mouth once daily.    furosemide (LASIX) 20 MG tablet Take 1 tablet (20 mg total) by mouth once daily.    gabapentin (NEURONTIN) 100 MG capsule Take 100 mg by mouth 3 (three) times daily.    lisinopril (PRINIVIL,ZESTRIL) 20 MG tablet Take 20 mg by mouth once daily.    meloxicam (MOBIC) 15 MG tablet TK 1 T PO QD PRN    metFORMIN (GLUMETZA) 500 MG (MOD) 24 hr tablet Take 500 mg by mouth daily with breakfast.    metoprolol tartrate (LOPRESSOR) 50 MG tablet Take 1 tablet (50 mg total) by mouth 2 (two) times daily. Take 75 mg twice per day    nitroGLYCERIN (NITROSTAT) 0.4 MG SL tablet Place 1 tablet (0.4 mg total) under the tongue every 5 (five) minutes as needed for Chest pain (angina).    rivaroxaban (XARELTO) 20 mg Tab Take 1 tablet (20 mg total) by mouth daily with dinner or evening meal.    senna (SENOKOT) 8.6 mg tablet Take 1 tablet by mouth 2 (two) times daily as needed for Constipation.    temazepam (RESTORIL) 15 mg Cap TK 1 C PO QHS    venlafaxine (EFFEXOR-XR) 150 MG Cp24 TK ONE C PO D WF    furosemide (LASIX) 40 MG tablet TAKE 1/2 TABLET BY MOUTH ONCE DAILY. IF WEIGHT GAIN 2 TO 3 LBS FOR 2 TO 5 DAYS INCREASE TO LASIX 80MG TWICE DAILY    hydrocodone-acetaminophen 10-325mg (NORCO)  mg Tab TK 1 T PO TID PRN FOR 30 DAYS     Family History     None        Social History Main Topics    Smoking status: Never Smoker    Smokeless tobacco: Never Used    Alcohol use No    Drug use: No    Sexual activity: Not on file     Review of Systems   Constitution: Positive for  malaise/fatigue and weight gain. Negative for chills and fever.   Cardiovascular: Positive for dyspnea on exertion, leg swelling and orthopnea. Negative for chest pain and syncope.   Respiratory: Positive for shortness of breath. Negative for cough and wheezing.    Gastrointestinal: Negative for constipation and diarrhea.   Genitourinary: Negative for dysuria and hematuria.     Objective:     Vital Signs (Most Recent):  Temp: 97.7 °F (36.5 °C) (02/17/18 1841)  Pulse: 82 (02/17/18 2101)  Resp: 12 (02/17/18 2101)  BP: 124/82 (02/17/18 2101)  SpO2: 96 % (02/17/18 2101) Vital Signs (24h Range):  Temp:  [97.7 °F (36.5 °C)] 97.7 °F (36.5 °C)  Pulse:  [] 82  Resp:  [12-20] 12  SpO2:  [96 %-97 %] 96 %  BP: (121-135)/(82-94) 124/82     Weight: 79.8 kg (176 lb)  Body mass index is 31.18 kg/m².    SpO2: 96 %       No intake or output data in the 24 hours ending 02/17/18 2123    Lines/Drains/Airways     Peripheral Intravenous Line                 Peripheral IV - Single Lumen 02/17/18 1947 Right Hand less than 1 day                Physical Exam   Gen: no acute distress, alert & oriented x 3  Neck: 16cm JVD, no carotid bruits  CV: regular rate and rhythm, normal S1/2, 3/6 harsh systolic murmu  Resp: bilateral rales, normal effort  GI: soft, nontender nondistended, normal bowel sounds  Ext: warm well perfused, +4 edema with weeping, no clubbing or cyanosis      Significant Labs:   All pertinent lab results from the last 24 hours have been reviewed. and   Recent Lab Results       02/17/18 1913      Immature Granulocytes 0.5     Immature Grans (Abs) 0.03  Comment:  Mild elevation in immature granulocytes is non specific and   can be seen in a variety of conditions including stress response,   acute inflammation, trauma and pregnancy. Correlation with other   laboratory and clinical findings is essential.       Albumin 2.8(L)     Alkaline Phosphatase 126     ALT 53(H)     Anion Gap 22(H)     (H)     Baso # 0.08      Basophil% 1.3     Total Bilirubin 0.6  Comment:  For infants and newborns, interpretation of results should be based  on gestational age, weight and in agreement with clinical  observations.  Premature Infant recommended reference ranges:  Up to 24 hours.............<8.0 mg/dL  Up to 48 hours............<12.0 mg/dL  3-5 days..................<15.0 mg/dL  6-29 days.................<15.0 mg/dL       BNP >4,900  Comment:  Values of less than 100 pg/ml are consistent with non-CHF populations.(H)     BUN, Bld 33(H)     Calcium 8.5(L)     Chloride 103     CO2 17(L)     Creatinine 1.6(H)     Differential Method Automated     eGFR if  39.3(A)     eGFR if non  34.0  Comment:  Calculation used to obtain the estimated glomerular filtration  rate (eGFR) is the CKD-EPI equation.   (A)     Eos # 0.1     Eosinophil% 1.4     Glucose 111(H)     Gran # (ANC) 4.2     Gran% 66.5     Hematocrit 30.6(L)     Hemoglobin 9.5(L)     Coumadin Monitoring INR 1.8  Comment:  Coumadin Therapy:  2.0 - 3.0 for INR for all indicators except mechanical heart valves  and antiphospholipid syndromes which should use 2.5 - 3.5.  (H)     Lymph # 1.4     Lymph% 22.8     MCH 27.5     MCHC 31.0(L)     MCV 88     Mono # 0.5     Mono% 7.5     MPV 10.5     nRBC 0     Platelets 309     Potassium 3.3(L)     Total Protein 6.9     Protime 17.5(H)     RBC 3.46(L)     RDW 17.5(H)     Sodium 142     Troponin I 0.118  Comment:  The reference interval for Troponin I represents the 99th percentile   cutoff   for our facility and is consistent with 3rd generation assay   performance.  (H)     WBC 6.28           Significant Imaging: Echocardiogram:   2D echo with color flow doppler:   Results for orders placed or performed during the hospital encounter of 12/14/17   2D echo with color flow doppler   Result Value Ref Range    EF 30 (A) 55 - 65    Mitral Valve Regurgitation MILD TO MODERATE     Aortic Valve Stenosis SEVERE (A)     Est. PA  Systolic Pressure 62.91 (A)     Tricuspid Valve Regurgitation MILD

## 2018-02-18 NOTE — HPI
Ms. Quick is a 63 y/o female with HTN, DM-2 (controlled), NICM with LVEF 30%, severe AS who presents today with SOB, leg swelling and orthopnea. She was admitted in January for similar symptoms, diuresed and discharged. Had been doing well a few weeks ago but started retaining more fluid the past two weeks. Increased lasix to 80mg at home with no response. Legs now weeping with edema.
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
## 2018-02-18 NOTE — PLAN OF CARE
Problem: Patient Care Overview  Goal: Plan of Care Review  Outcome: Revised  Plan of care discussed with patient. Patient is free of fall/trauma/injury. Denies CP, SOB, or pain/discomfort. Lasix gtt infusing per MD order.  All questions addressed. Will continue to monitor

## 2018-02-18 NOTE — ED NOTES
LOC: Patient name and date of birth verified.  The patient is awake, alert and aware of environment with an appropriate affect, the patient is oriented x 3 and speaking appropriately.  Pt in NAD.    APPEARANCE: Patient resting comfortably and in no acute distress, patient is clean and well groomed, patient's clothing is properly fastened.  SKIN: The skin is warm and dry, color consistent with ethnicity, patient appears pale and moist mucus membranes, multiple weeping ulcers noted on RLL. Yeast infection noted under left breast with apparent redness with tenderness.  MUSCULOSKELETAL: Patient moving all extremities well,   RESPIRATORY: Airway is open and patent, respirations are spontaneous, patient has a normal effort and rate, no accessory muscle use noted.  CARDIAC: Patient has a normal rate and rhythm, peripheral edema +3 noted on both lower extremities , capillary refill < 3 seconds.  ABDOMEN: Soft and non tender to palpation, no distention noted. Bowel sounds present in all four quadrants.  NEUROLOGIC: Eyes open spontaneously, behavior appropriate to situation, follows commands, facial expression symmetrical, bilateral hand grasp equal and even, purposeful motor response noted, normal sensation in all extremities when touched with a finger.

## 2018-02-18 NOTE — ED TRIAGE NOTES
Pt arrives to the ED via personal transport with CC of shortness of breath. Pt reports that the SOB has been getting worse for about a week. Pt reports that she also came into the ED because of a sore on her right lower foot. Pt reports coughing up green mucous. Pt denies fever and NVD. Pt is alert and oriented and in no acute distress at this time.

## 2018-02-18 NOTE — CONSULTS
"  [facility]  Adult Nutrition  Consult Note    SUMMARY     Recommendations    Recommendation/Intervention:   1. Continue Cardiac diet.   2. Handout left at bedside on HF diet education.     RD to follow up on education needs.    Goals: Pt to consume >50% of meals  Nutrition Goal Status: new  Communication of RD Recs: reviewed with RN      Reason for Assessment    Reason for Assessment: physician consult  Diagnosis: cardiac disease  Relevent Medical History: CHF, CAD, CKD, HTN, HLD, a fib, T2DM         General Information Comments: Pt not in room at time of visit. Education handout on HF diet left at bedside.    Nutrition Discharge Planning: adequate po intake for optimal nutrition with cardiac restriction    Nutrition Prescription Ordered    Current Diet Order: Cardiac  Nutrition Order Comments: 1500mL FR     Evaluation of Received Nutrients/Fluid Intake     I/O: -I/O          % Intake of Estimated Energy Needs: Other: AUGUST  % Meal Intake: Other: AUGUST     Nutrition Risk Screen     Nutrition Risk Screen: no indicators present    Nutrition/Diet History       Typical Food/Fluid Intake: AUGUST intake PTA. Pt not in room. No family at beside.  Food Preferences: [facility] Baptism/cultural food preferences at this time        Factors Affecting Nutritional Intake: other (see comments) (None known at this time)                Labs/Tests/Procedures/Meds       Pertinent Labs Reviewed: reviewed  Pertinent Labs Comments: K 5.0, BUN 30, Cr 1.4  Pertinent Medications Reviewed: reviewed  Pertinent Medications Comments: lasix, statin    Physical Findings    Overall Physical Appearance: nourished, obese, edematous        Skin: other (see comments) (blisters)    Anthropometrics    Temp: 97.8 °F (36.6 °C)     Height: 5' 3" (160 cm)  Weight Method: Standard Scale  Weight: 80.7 kg (177 lb 14.6 oz)     Ideal Body Weight (IBW), Female: 115 lb     % Ideal Body Weight, Female (lb): 149.34 lb  BMI (Calculated): 30.5  BMI Grade: 30 " - 34.9- obesity - grade I                            Estimated/Assessed Needs    Weight Used For Calorie Calculations: 80.7 kg (177 lb 14.6 oz)      Energy Calorie Requirements (kcal): 6656-7000  Energy Need Method: Taliaferro-St Jeor (PAL 1.2-1.4)        RMR (Taliaferro-St. Jeor Equation): 1331.12        Weight Used For Protein Calculations: 80.7 kg (177 lb 14.6 oz)  Protein Requirements: 81-97g (1.0-1.2g/kg)    Fluid Requirements (mL): 1mL/kcal or per MD  RDA Method (mL): 1600               Assessment and Plan    No nutrition diagnosis at this time.      Monitor and Evaluation    Food and Nutrient Intake: energy intake, food and beverage intake  Food and Nutrient Adminstration: diet order  Knowledge/Beliefs/Attitudes: food and nutrition knowledge/skill     Anthropometric Measurements: weight change, weight, body mass index  Biochemical Data, Medical Tests and Procedures: gastrointestinal profile, electrolyte and renal panel, glucose/endocrine profile, inflammatory profile, lipid profile  Nutrition-Focused Physical Findings: overall appearance    Nutrition Risk    Level of Risk: other (see comments) (f/u 2x/week)    Nutrition Follow-Up    RD Follow-up?: Yes

## 2018-02-18 NOTE — PLAN OF CARE
Problem: Patient Care Overview  Goal: Plan of Care Review  Outcome: Ongoing (interventions implemented as appropriate)  Nutrition assessment completed. Please see RD note for details.    Recommendation/Intervention:   1. Continue Cardiac diet.   2. Handout left at bedside on HF diet education.     RD to follow up on education needs.    Goals: Pt to consume >50% of meals  Nutrition Goal Status: new  Communication of RD Recs: reviewed with RN

## 2018-02-18 NOTE — CONSULTS
[facility]  Cardiology  Consult Note    Patient Name: Anum Quick  MRN: [id number]  Admission Date: 2/17/2018  Hospital Length of Stay: 0 days  Code Status: Prior   Attending Provider: Poonam Ybarra MD   Consulting Provider: Surendra Sahni MD  Primary Care Physician: Raghav Valdez MD  Principal Problem:Acute on chronic congestive heart failure    Patient information was obtained from patient and past medical records.     Inpatient consult to Cardiology  Consult performed by: SURENDRA SAHNI  Consult ordered by: SURENDRA SAHNI        Subjective:     Chief Complaint:  Shortness of breath     HPI:   Ms. Quick is a 61 y/o female with HTN, DM-2 (controlled), NICM with LVEF 30%, severe AS who presents today with SOB, leg swelling and orthopnea. She was admitted in January for similar symptoms, diuresed and discharged. Had been doing well a few weeks ago but started retaining more fluid the past two weeks. Increased lasix to 80mg at home with no response. Legs now weeping with edema.     Past Medical History:   Diagnosis Date    Chronic anticoagulation - on rivaroxaban 12/29/2017    PAF    Chronic systolic heart failure 12/14/2017     12-15-17   1 - Moderately depressed left ventricular systolic function (EF 30-35%). Akinetic LV apex. There is no thrombus in LV apex.   2 - Indeterminate LV diastolic function.    3 - Mildly to moderately depressed right ventricular systolic function .    4 - Pulmonary hypertension. The estimated PA systolic pressure is 63 mmHg.    5 - Severe low flow aortic valve stenosis (JAMES 0.49 cm2, AVAi 0.27 cm2/m2, peak aortic jet velocity 4.0 m/s,MG 48 mmHg).    6 - Mild to moderate mitral regurgitation.    7 - Mild tricuspid regurgitation.    8 - Severe left atrial enlargement.     CKD (chronic kidney disease) stage 3, GFR 30-59 ml/min 12/29/2017    Coronary artery disease involving native coronary artery of native heart without angina pectoris  12/29/2017    [facility] @ : Per report (12/4/2017) proximal LAD has 20% stenosis, RCA with LI otherwise normal coronaries      Essential hypertension 12/14/2017    Hyperlipidemia associated with type 2 diabetes mellitus 12/14/2017    NSTEMI (non-ST elevated myocardial infarction) 12/14/2017    Paroxysmal atrial fibrillation 12/15/2017    Severe aortic stenosis 12/14/2017    12-15-17  Severe low flow aortic valve stenosis (JAMES 0.49 cm2, AVAi 0.27 cm2/m2, peak aortic jet velocity 4.0 m/s,MG 48 mmHg).      Type 2 diabetes mellitus with neurologic complication, without long-term current use of insulin 12/14/2017       Past Surgical History:   Procedure Laterality Date    CARDIAC SURGERY      HYSTERECTOMY      KNEE SURGERY         Review of patient's allergies indicates:  No Known Allergies    No current facility-administered medications on file prior to encounter.      Current Outpatient Prescriptions on File Prior to Encounter   Medication Sig    amiodarone (PACERONE) 200 MG Tab Take by mouth once daily.    aspirin 81 MG Chew Take 1 tablet (81 mg total) by mouth once daily.    atorvastatin (LIPITOR) 80 MG tablet Take 1 tablet (80 mg total) by mouth once daily.    butalbital-acetaminophen-caffeine -40 mg (FIORICET, ESGIC) -40 mg per tablet Take 1 tablet by mouth every 4 (four) hours as needed for Pain.    clopidogrel (PLAVIX) 75 mg tablet Take 1 tablet (75 mg total) by mouth once daily.    estropipate (OGEN) 1.5 MG tablet Take 1.5 mg by mouth once daily.    furosemide (LASIX) 20 MG tablet Take 1 tablet (20 mg total) by mouth once daily.    gabapentin (NEURONTIN) 100 MG capsule Take 100 mg by mouth 3 (three) times daily.    lisinopril (PRINIVIL,ZESTRIL) 20 MG tablet Take 20 mg by mouth once daily.    meloxicam (MOBIC) 15 MG tablet TK 1 T PO QD PRN    metFORMIN (GLUMETZA) 500 MG (MOD) 24 hr tablet Take 500 mg by mouth daily with breakfast.    metoprolol tartrate (LOPRESSOR) 50 MG tablet Take 1  tablet (50 mg total) by mouth 2 (two) times daily. Take 75 mg twice per day    nitroGLYCERIN (NITROSTAT) 0.4 MG SL tablet Place 1 tablet (0.4 mg total) under the tongue every 5 (five) minutes as needed for Chest pain (angina).    rivaroxaban (XARELTO) 20 mg Tab Take 1 tablet (20 mg total) by mouth daily with dinner or evening meal.    senna (SENOKOT) 8.6 mg tablet Take 1 tablet by mouth 2 (two) times daily as needed for Constipation.    temazepam (RESTORIL) 15 mg Cap TK 1 C PO QHS    venlafaxine (EFFEXOR-XR) 150 MG Cp24 TK ONE C PO D WF    furosemide (LASIX) 40 MG tablet TAKE 1/2 TABLET BY MOUTH ONCE DAILY. IF WEIGHT GAIN 2 TO 3 LBS FOR 2 TO 5 DAYS INCREASE TO LASIX 80MG TWICE DAILY    hydrocodone-acetaminophen 10-325mg (NORCO)  mg Tab TK 1 T PO TID PRN FOR 30 DAYS     Family History     None        Social History Main Topics    Smoking status: Never Smoker    Smokeless tobacco: Never Used    Alcohol use No    Drug use: No    Sexual activity: Not on file     Review of Systems   Constitution: Positive for malaise/fatigue and weight gain. Negative for chills and fever.   Cardiovascular: Positive for dyspnea on exertion, leg swelling and orthopnea. Negative for chest pain and syncope.   Respiratory: Positive for shortness of breath. Negative for cough and wheezing.    Gastrointestinal: Negative for constipation and diarrhea.   Genitourinary: Negative for dysuria and hematuria.     Objective:     Vital Signs (Most Recent):  Temp: 97.7 °F (36.5 °C) (02/17/18 1841)  Pulse: 82 (02/17/18 2101)  Resp: 12 (02/17/18 2101)  BP: 124/82 (02/17/18 2101)  SpO2: 96 % (02/17/18 2101) Vital Signs (24h Range):  Temp:  [97.7 °F (36.5 °C)] 97.7 °F (36.5 °C)  Pulse:  [] 82  Resp:  [12-20] 12  SpO2:  [96 %-97 %] 96 %  BP: (121-135)/(82-94) 124/82     Weight: 79.8 kg (176 lb)  Body mass index is 31.18 kg/m².    SpO2: 96 %       No intake or output data in the 24 hours ending 02/17/18 2123    Lines/Drains/Airways      Peripheral Intravenous Line                 Peripheral IV - Single Lumen 02/17/18 1947 Right Hand less than 1 day                Physical Exam   Gen: no acute distress, alert & oriented x 3  Neck: 16cm JVD, no carotid bruits  CV: regular rate and rhythm, normal S1/2, 3/6 harsh systolic murmu  Resp: bilateral rales, normal effort  GI: soft, nontender nondistended, normal bowel sounds  Ext: warm well perfused, +4 edema with weeping, no clubbing or cyanosis      Significant Labs:   All pertinent lab results from the last 24 hours have been reviewed. and   Recent Lab Results       02/17/18  1913      Immature Granulocytes 0.5     Immature Grans (Abs) 0.03  Comment:  Mild elevation in immature granulocytes is non specific and   can be seen in a variety of conditions including stress response,   acute inflammation, trauma and pregnancy. Correlation with other   laboratory and clinical findings is essential.       Albumin 2.8(L)     Alkaline Phosphatase 126     ALT 53(H)     Anion Gap 22(H)     (H)     Baso # 0.08     Basophil% 1.3     Total Bilirubin 0.6  Comment:  For infants and newborns, interpretation of results should be based  on gestational age, weight and in agreement with clinical  observations.  Premature Infant recommended reference ranges:  Up to 24 hours.............<8.0 mg/dL  Up to 48 hours............<12.0 mg/dL  3-5 days..................<15.0 mg/dL  6-29 days.................<15.0 mg/dL       BNP >4,900  Comment:  Values of less than 100 pg/ml are consistent with non-CHF populations.(H)     BUN, Bld 33(H)     Calcium 8.5(L)     Chloride 103     CO2 17(L)     Creatinine 1.6(H)     Differential Method Automated     eGFR if  39.3(A)     eGFR if non  34.0  Comment:  Calculation used to obtain the estimated glomerular filtration  rate (eGFR) is the CKD-EPI equation.   (A)     Eos # 0.1     Eosinophil% 1.4     Glucose 111(H)     Gran # (ANC) 4.2     Gran% 66.5      Hematocrit 30.6(L)     Hemoglobin 9.5(L)     Coumadin Monitoring INR 1.8  Comment:  Coumadin Therapy:  2.0 - 3.0 for INR for all indicators except mechanical heart valves  and antiphospholipid syndromes which should use 2.5 - 3.5.  (H)     Lymph # 1.4     Lymph% 22.8     MCH 27.5     MCHC 31.0(L)     MCV 88     Mono # 0.5     Mono% 7.5     MPV 10.5     nRBC 0     Platelets 309     Potassium 3.3(L)     Total Protein 6.9     Protime 17.5(H)     RBC 3.46(L)     RDW 17.5(H)     Sodium 142     Troponin I 0.118  Comment:  The reference interval for Troponin I represents the 99th percentile   cutoff   for our facility and is consistent with 3rd generation assay   performance.  (H)     WBC 6.28           Significant Imaging: Echocardiogram:   2D echo with color flow doppler:   Results for orders placed or performed during the hospital encounter of 12/14/17   2D echo with color flow doppler   Result Value Ref Range    EF 30 (A) 55 - 65    Mitral Valve Regurgitation MILD TO MODERATE     Aortic Valve Stenosis SEVERE (A)     Est. PA Systolic Pressure 62.91 (A)     Tricuspid Valve Regurgitation MILD      Assessment and Plan:     * Acute on chronic congestive heart failure    Ms. Quick is a 62 year old woman with severe AS planned for AVR with Dr. Lundy, [facility] with LVEF 30% who presents with acute decompensated heart failure, warm and wet. She is floridly volume overloaded but hemodynamically stable with no significant end organ dysfunction, satting well on room air.     --admit to comanagement IM team  --80mg IV lasix now, lasix gtt at 10mg/hr  --strict I/O, fluid restriction  --wound care for extremities  --cardiology will follow in house    Thank you for this interesting consult. Further recommendations per attending addendum    Dean Johnson MD  [phone])  Cardiology Fellow              VTE Risk Mitigation     None          Thank you for your consult.     Dean Johnson MD  Cardiology   [facility]  [place]

## 2018-02-18 NOTE — ED NOTES
APPEARANCE: awake and alert in NAD.  SKIN: warm, dry and intact. Right lower extremity breakdown noted. Rash noted under left and right breast and groin.   MUSCULOSKELETAL: Patient moving all extremities spontaneously, Bilateral edema on lower extremities noted. Ambulates independently.  RESPIRATORY: RR 20 equal and unlabored.   CARDIAC: Regular rate and rhythm; 2+ distal pulses; no peripheral edema  ABDOMEN: S/ND/NT, normoactive bowel sounds present in all four quadrants. Normal stool pattern.  : voids spontaneously without difficulty.  Neurologic: AAO x 4; follows commands equal strength in all extremities;No numbness and tingling.
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
## 2018-02-19 PROBLEM — I15.0 RENOVASCULAR HYPERTENSION: Status: ACTIVE | Noted: 2017-01-01

## 2018-02-19 PROBLEM — I83.009 VENOUS STASIS ULCERS: Status: ACTIVE | Noted: 2018-01-01

## 2018-02-19 PROBLEM — E44.0 MALNUTRITION OF MODERATE DEGREE: Status: ACTIVE | Noted: 2018-01-01

## 2018-02-19 PROBLEM — L97.909 VENOUS STASIS ULCERS: Status: ACTIVE | Noted: 2018-01-01
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
## 2018-02-19 NOTE — PLAN OF CARE
Problem: Patient Care Overview  Goal: Plan of Care Review  Outcome: Ongoing (interventions implemented as appropriate)  Pt free of falls and injury throughout the shift. Generalized skin is CDI, VSS, NAD. Lasix gtt infused throughout the shift per order. UO good. POC reviewed and questions answered. Pt toelrating plan of care.

## 2018-02-19 NOTE — PLAN OF CARE
Problem: Patient Care Overview  Goal: Plan of Care Review  Outcome: Ongoing (interventions implemented as appropriate)  Plan of care reviewed with patient. Patient has no complaints at this time. Patient remains on lasix drip, increased to 15mg/hr. Patient up in room with assistance. Patient to receive potassium and magnesium replacement on today. Patient remains free of falls and injury.

## 2018-02-19 NOTE — PT/OT/SLP EVAL
"Physical Therapy Evaluation    Patient Name:  Anum Quick   MRN:  [id number]    Recommendations:     Discharge Recommendations:  home with home health   Discharge Equipment Recommendations: bath bench, rolling walker    Barriers to discharge: Inaccessible home and Decreased caregiver support    Assessment:     Anum Quick is a 62 y.o. female admitted with a medical diagnosis of Acute on chronic congestive heart failure.  She presents with the following impairments/functional limitations:  weakness, impaired endurance, impaired self care skills, impaired functional mobilty, gait instability, impaired balance, decreased lower extremity function, edema, impaired cardiopulmonary response to activity. Pt reports near baseline mobility however requiring increased assist and time for mobility this date. Pt demo need for HHA x1-2 for ambulation of household distances with intermittent use of handrail for balance and safety.    Rehab Prognosis:  Good; patient would benefit from acute skilled PT services to address these deficits and reach maximum level of function.      Recent Surgery: * No surgery found *      Plan:     During this hospitalization, patient to be seen 3 x/week to address the above listed problems via gait training, therapeutic activities, therapeutic exercises, neuromuscular re-education  · Plan of Care Expires:  03/26/18   Plan of Care Reviewed with: patient    Subjective     Communicated with RN (Candace) prior to session.  Patient found supine with HOB elevated upon PT entry to room, agreeable to evaluation.      Chief Complaint: "My left hip is bothering me from my fall"  Pain/Comfort:  · Pain Rating 1: 0/10    Patients cultural, spiritual, Islam conflicts given the current situation: none noted     Living Environment:  Per pt report, pt lives with her  in an apartment with 15 stairs to enter; no handrail; tub/shower combo. Prior to hospitalization, pt requiring assist from "  for all ADLs/IADLs. Pt endorses recent fall in which she fell down all 15 of her apartment stairs. When  is at work, pt reports using the furniture to walk from room to room. Patient has the following equipment: none.  DME owned (not currently used): none.  Upon discharge, patient will have limited assistance from .    Objective:     Patient found with: telemetry, peripheral IV     General Precautions: Standard, fall   Orthopedic Precautions:N/A   Braces: N/A     Exams:  · Cognitive Exam:  Patient is oriented to Person, Place, Time and Situation and follows 100% of simple commands   · Postural Exam:  Patient presented with the following abnormalities:    · -       Rounded shoulders  · -       Forward head  · -       Posterior pelvic tilt  · Skin Integrity/Edema:      · -       Edema: moderate BLE  · RLE ROM: WFL  · RLE Strength: WFL  · LLE ROM: WFL  · LLE Strength: WFL    Functional Mobility:  · Bed Mobility performed with HOB elevated:     · Rolling Left:  stand by assistance  · Scooting: stand by assistance  · Supine to Sit: stand by assistance  · Transfers:   · Sit to Stand:  contact guard assist; pt demo extreme fwd flexed posture to achieve standing position  · Pt reports using  to pull to stand at home  · Gait: x70 ft in hallway requiring HHA x1 and intermittent use of handrail for safety and balance.   · Pt demo antalgic gait pattern with decreased martha, decreased arm swing, downward gaze, decreased step length likely 2/2 to edema in BLE  · No LOB noted   · Sitting Balance: x2 min at EOB requiring close SBA-CGA; pt demo posterior lean upon LE MMT testing but no LOB        Therapeutic Activities and Exercises:  PT arrived to pt room to find pt resting supine with HOB elevated; agreeable to treatment. Pt performed mobility as above. Following return to room, pt with SOB upon UIC. PT encouraged deep breathing technique. Pt would benefit from use of RW to increase independence with  functional mobility. PT answered all pt questions/concerns within PT scope of practice. RN notified of pt functional mobility needs and response to treatment.       AM-PAC 6 CLICK MOBILITY  Total Score:17       Patient left up in chair with all lines intact, call button in reach, RN notified and  (Celeste) present.    GOALS:    Physical Therapy Goals        Problem: Physical Therapy Goal    Goal Priority Disciplines Outcome Goal Variances Interventions   Physical Therapy Goal     PT/OT, PT Ongoing (interventions implemented as appropriate)     Description:  Goals to be met by: 3/5/18    Patient will increase functional independence with mobility by performin. Supine to sit with HOB flat requiring Modified [place].  2. Sit to supine with HOB flat requiring Modified [place].  3.. Rolling to Left and Right with Modified [place].  4. Sit to stand transfer with Modified [place].  5. Bed to chair transfer with Modified [place] with or without  AD.  6.. Gait  x 150 feet with Supervision with or without AD.  7. Ascend/descend 15 stair with out handrails Contact Guard Assistance.                      History:     Past Medical History:   Diagnosis Date    Chronic anticoagulation - on rivaroxaban 2017    PAF    Chronic systolic heart failure 2017     12-15-17   1 - Moderately depressed left ventricular systolic function (EF 30-35%). Akinetic LV apex. There is no thrombus in LV apex.   2 - Indeterminate LV diastolic function.    3 - Mildly to moderately depressed right ventricular systolic function .    4 - Pulmonary hypertension. The estimated PA systolic pressure is 63 mmHg.    5 - Severe low flow aortic valve stenosis (JAMES 0.49 cm2, AVAi 0.27 cm2/m2, peak aortic jet velocity 4.0 m/s,MG 48 mmHg).    6 - Mild to moderate mitral regurgitation.    7 - Mild tricuspid regurgitation.    8 - Severe left atrial enlargement.     CKD (chronic kidney disease) stage 3, GFR 30-59  ml/min 12/29/2017    Coronary artery disease involving native coronary artery of native heart without angina pectoris 12/29/2017    [facility] @ : Per report (12/4/2017) proximal LAD has 20% stenosis, RCA with LI otherwise normal coronaries      Essential hypertension 12/14/2017    Hyperlipidemia associated with type 2 diabetes mellitus 12/14/2017    NSTEMI (non-ST elevated myocardial infarction) 12/14/2017    Paroxysmal atrial fibrillation 12/15/2017    Severe aortic stenosis 12/14/2017    12-15-17  Severe low flow aortic valve stenosis (JAMES 0.49 cm2, AVAi 0.27 cm2/m2, peak aortic jet velocity 4.0 m/s,MG 48 mmHg).      Type 2 diabetes mellitus with neurologic complication, without long-term current use of insulin 12/14/2017       Past Surgical History:   Procedure Laterality Date    CARDIAC SURGERY      HYSTERECTOMY      KNEE SURGERY         Clinical Decision Making:     History  Co-morbidities and personal factors that may impact the plan of care Examination  Body Structures and Functions, activity limitations and participation restrictions that may impact the plan of care Clinical Presentation   Decision Making/ Complexity Score   Co-morbidities:   [x] Time since onset of injury / illness / exacerbation  [] Status of current condition  []Patient's cognitive status and safety concerns    [x] Multiple Medical Problems (see med hx)  Personal Factors:   [] Patient's age  [] Prior Level of function   [x] Patient's home situation (environment and family support)  [] Patient's level of motivation  [] Expected progression of patient      HISTORY:(criteria)    [] 63149 - no personal factors/history    [x] 94830 - has 1-2 personal factor/comorbidity     [] 58416 - has >3 personal factor/comorbidity     Body Regions:  [x] Objective examination findings  [] Head     []  Neck  [] Trunk   [] Upper Extremity  [] Lower Extremity    Body Systems:  [] For communication ability, affect, cognition, language, and learning style:  the assessment of the ability to make needs known, consciousness, orientation (person, place, and time), expected emotional /behavioral responses, and learning preferences (eg, learning barriers, education  needs)  [x] For the neuromuscular system: a general assessment of gross coordinated movement (eg, balance, gait, locomotion, transfers, and transitions) and motor function  (motor control and motor learning)  [] For the musculoskeletal system: the assessment of gross symmetry, gross range of motion, gross strength, height, and weight  [x] For the integumentary system: the assessment of pliability(texture), presence of scar formation, skin color, and skin integrity  [] For cardiovascular/pulmonary system: the assessment of heart rate, respiratory rate, blood pressure, and edema     Activity limitations:    [] Patient's cognitive status and saf ety concerns          [] Status of current condition      [] Weight bearing restriction  [x] Cardiopulmunary Restriction    Participation Restrictions:   [] Goals and goal agreement with the patient     [] Rehab potential (prognosis) and probable outcome      Examination of Body System: (criteria)    [] 73779 - addressing 1-2 elements    [x] 89867 - addressing a total of 3 or more elements     [] 26664 -  Addressing a total of 4 or more elements         Clinical Presentation: (criteria)  Stable - 12489     On examination of body system using standardized tests and measures patient presents with 3 or more elements from any of the following: body structures and functions, activity limitations, and/or participation restrictions.  Leading to a clinical presentation that is considered stable and/or uncomplicated                              Clinical Decision Making  (Eval Complexity):  Low- 31434     Time Tracking:     PT Received On: 02/19/18  PT Start Time: 0940     PT Stop Time: 0954  PT Total Time (min): 14 min     Billable Minutes: Evaluation 14      Silvia Plasencia  SPT  02/19/2018
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
## 2018-02-19 NOTE — PLAN OF CARE
02/19/18 1127   Discharge Assessment   Assessment Type Discharge Planning Assessment   Confirmed/corrected address and phone number on facesheet? Yes   Assessment information obtained from? Patient;Medical Record   Expected Length of Stay (days) 5   Communicated expected length of stay with patient/caregiver yes   Prior to hospitilization cognitive status: Alert/Oriented   Prior to hospitalization functional status: Independent;Needs Assistance   Current cognitive status: Alert/Oriented   Current Functional Status: Independent;Needs Assistance   Lives With spouse   Able to Return to Prior Arrangements yes   Is patient able to care for self after discharge? Unable to determine at this time (comments)   Patient's perception of discharge disposition home or selfcare   Readmission Within The Last 30 Days no previous admission in last 30 days   Patient currently being followed by outpatient case management? No   Patient currently receives any other outside agency services? No   Equipment Currently Used at Home none   Do you have any problems affording any of your prescribed medications? No   Is the patient taking medications as prescribed? yes   Does the patient have transportation home? Yes   Transportation Available family or friend will provide   Does the patient receive services at the [facility]? No   Discharge Plan A Home with family;Home Health   Discharge Plan B Home with family   Admitted with CHF and LE Cellulitis. Lives with  and states she is independent in her ADLs. Plan is to DC home. Will need bath bench and rolling walker. Will benefit from C also.

## 2018-02-19 NOTE — PROGRESS NOTES
61 yo female admitted with CHF  Consulted to see for wounds to RLE     02/19/18 1700       Wound 02/17/18 2343 Blister(s);Ulceration lower Leg   Date First Assessed/Time First Assessed: 02/17/18 2343   Pre-existing: Yes  Wound Type: Blister(s);Ulceration  Side: Right  Orientation: lower  Location: Leg   Wound Image     Wound WDL ex   Dressing Appearance Moist drainage;Intact   Drainage Amount Moderate   Drainage Characteristics/Odor Serous   Appearance Pink;Dry;Eschar  (tan eschar)   Periwound Area Moist;Swelling;Edematous   Wound Edges Open   Wound Length (cm) 4  (1.5)   Wound Width (cm) 0.6  (0.5)   Depth (cm) 0.1  (0.1)   Care Cleansed with:;Sterile normal saline   Dressing Applied;Foam;Abd pad;Rolled gauze       Wound 02/19/18 1756 Ulceration dorsal Foot   Date First Assessed/Time First Assessed: 02/19/18 1756   Pre-existing: Yes  Wound Type: Ulceration  Side: Right  Orientation: dorsal  Location: Foot   Wound Image    Wound WDL ex   Dressing Appearance Moist drainage;No dressing   Drainage Amount Moderate   Drainage Characteristics/Odor Creamy;Tan   Appearance Eschar;Tan;Moist   Black (%), Wound Tissue Color 100 %  (tan eschar)   Periwound Area Redness;Swelling   Wound Length (cm) 1.5   Wound Width (cm) 3   Depth (cm) obscured   Care Cleansed with:;Sterile normal saline   Dressing Applied;Foam        02/19/18 1700       Wound 02/17/18 2343 Blister(s);Ulceration lower Leg   Date First Assessed/Time First Assessed: 02/17/18 2343   Pre-existing: Yes  Wound Type: Blister(s);Ulceration  Side: Right  Orientation: lower  Location: Leg   Wound Image     Wound WDL ex   Dressing Appearance Moist drainage;Intact   Drainage Amount Moderate   Drainage Characteristics/Odor Serous   Appearance Pink;Dry;Eschar  (tan eschar)   Periwound Area Moist;Swelling;Edematous   Wound Edges Open   Wound Length (cm) 4  (1.5)   Wound Width (cm) 0.6  (0.5)   Depth (cm) 0.1  (0.1)   Care Cleansed with:;Sterile normal saline   Dressing  Applied;Foam;Abd pad;Rolled gauze       Wound 02/19/18 1756 Ulceration dorsal Foot   Date First Assessed/Time First Assessed: 02/19/18 1756   Pre-existing: Yes  Wound Type: Ulceration  Side: Right  Orientation: dorsal  Location: Foot   Wound Image    Wound WDL ex   Dressing Appearance Moist drainage;No dressing   Drainage Amount Moderate   Drainage Characteristics/Odor Creamy;Tan   Appearance Eschar;Tan;Moist   Black (%), Wound Tissue Color 100 %  (tan eschar)   Periwound Area Redness;Swelling   Wound Length (cm) 1.5   Wound Width (cm) 3   Depth (cm) obscured   Care Cleansed with:;Sterile normal saline   Dressing Applied;Foam     Recommendations:  1. Would consult podiatry as there was purulent discharge draining from the right dorsal foot ulcer . Unsure of the extent of tissue damage as wound base is obscured by tan necrotic tissue.   2. Will begin hydrafera blue foam dressings to RLE leg ulcers, cover with ABD and wrap with rolled gauze MWF  3. As pt is currently in CHF will be unable to utilize compression therapy until compensated.  4. Nutritional consult as albumin is 2.7 and may need supplementation for wound healing    Wai Rahman RN CWON  [id number]

## 2018-02-19 NOTE — PROGRESS NOTES
Patient voided 450cc of clear yellow urine.  Straight cath not needed at this time.  Administered 80mg Lasix IVP per MD order.

## 2018-02-19 NOTE — PROGRESS NOTES
Patient stating she feels like she has to urinate but states she can't. Patient stated she drank 2-3 bottles of 16 oz bottles of water. Patient bladder scanned, 189cc noted to be in bladder. Dr. Ybarra notified. Order received for urinalysis. Will continue to monitor.

## 2018-02-19 NOTE — PROGRESS NOTES
Notified by pt that she has not urinated during the course of the shift.  Pt states that she last urinated last night.  Bladder scan obtained and revealed ~450cc of urine in bladder.  Paged Dr. Ybarra. Received orders to give 80mg  Lasix IVP x1, and straight cath patient.  Will pass to night nurse to recheck bladder in 4 hours and if >450cc to place indwelling catheter. Will execute order and continue to monitor.

## 2018-02-20 NOTE — PLAN OF CARE
Problem: Patient Care Overview  Goal: Plan of Care Review  Outcome: Ongoing (interventions implemented as appropriate)  Pt free of falls and injury throughout the shift. Skin is CDI, VSS, NAD. Lasix infusing at 15mg/ hr as ordered. WOCN saw pt today. POC reviewed. Pt tolerating plan of care.

## 2018-02-20 NOTE — PT/OT/SLP PROGRESS
Physical Therapy Treatment    Patient Name:  Anum Quick   MRN:  [id number]    Recommendations:     Discharge Recommendations:  home with home health   Discharge Equipment Recommendations: bath bench   Barriers to discharge: Inaccessible home and Decreased caregiver support    Assessment:     Anum Quick is a 62 y.o. female admitted with a medical diagnosis of Acute on chronic congestive heart failure.  She presents with the following impairments/functional limitations:  weakness, impaired endurance, impaired self care skills, impaired functional mobilty, gait instability, impaired balance, decreased lower extremity function, impaired cardiopulmonary response to activity, edema. Pt with improved stability during gait this session 2/2 RW use. Pt requiring SBA-CGA for majority of functional mobility. Plan to progress gait as tolerated.      Rehab Prognosis:  Good; patient would benefit from acute skilled PT services to address these deficits and reach maximum level of function.      Recent Surgery: * No surgery found *      Plan:     During this hospitalization, patient to be seen 3 x/week to address the above listed problems via gait training, therapeutic activities, therapeutic exercises, neuromuscular re-education  · Plan of Care Expires:  03/26/18   Plan of Care Reviewed with: patient    Subjective     Communicated with RN (Jurgen) prior to session.  Patient found resting with HOB elevated upon PT entry to room, agreeable to treatment.      Chief Complaint: none noted     Pain/Comfort:  · Pain Rating 1:  (did not rate)  · Location - Side 1: Left  · Location - Orientation 1: generalized  · Location 1: hip  · Pain Addressed 1: Distraction    Patients cultural, spiritual, Confucianist conflicts given the current situation: none noted    Objective:     Patient found with: telemetry, peripheral IV     General Precautions: Standard, fall   Orthopedic Precautions:N/A   Braces: N/A     Functional  Mobility:  · Bed Mobility:     · Rolling Left:  stand by assistance with HOB elevated  · Scooting: stand by assistance with VC to obtain feet flat at EOB with neutral pelvic position   · Supine to Sit: stand by assistance with HOB elevated   · Transfers:     · Sit to Stand:  contact guard assistance with no AD; pt demo proper sequencing and initial conditions   · Gait: x70 ft with RW requiring close SBA  · VC required for proper hand placement on RW  · Pt with noted downward gaze and flexed posture; PT encouraged fwd gaze, upright posture and attention to task   · Pt with step through gait pattern; decreased martha and push off noted however pt with improved gait this date 2/2 RW use    Therapeutic Activities and Exercises:  PT arrived to pt room to find pt resting supine with HOB elevated; agreeable to treatment. Pt performed mobility as above. Prior to amb trial, PT educated pt on proper use of AD. Upon return to room, pt agreeable to transfer UIC. PT reviewed proper mechanics with RW. PT answered all pt questions/concerns within PT scope of practice. RN notified of pt functional mobility needs and response to treatment.       AM-PAC 6 CLICK MOBILITY  Turning over in bed (including adjusting bedclothes, sheets and blankets)?: 3  Sitting down on and standing up from a chair with arms (e.g., wheelchair, bedside commode, etc.): 3  Moving from lying on back to sitting on the side of the bed?: 3  Moving to and from a bed to a chair (including a wheelchair)?: 3  Need to walk in hospital room?: 3  Climbing 3-5 steps with a railing?: 2  Total Score: 17       Patient left up in chair with all lines intact, call button in reach and RN notified..    GOALS:    Physical Therapy Goals        Problem: Physical Therapy Goal    Goal Priority Disciplines Outcome Goal Variances Interventions   Physical Therapy Goal     PT/OT, PT Ongoing (interventions implemented as appropriate)     Description:  Goals to be met by:  3/5/18    Patient will increase functional independence with mobility by performin. Supine to sit with HOB flat requiring Modified Barker.  2. Sit to supine with HOB flat requiring Modified Barker.  3.. Rolling to Left and Right with Modified Barker.  4. Sit to stand transfer with Modified Barker.  5. Bed to chair transfer with Modified Barker with or without AD.  6.. Gait  x 150 feet with Supervision with or without AD.  7. Ascend/descend 15 stair with out handrails Contact Guard Assistance.                       Time Tracking:     PT Received On: 18  PT Start Time: 138     PT Stop Time: 0157  PT Total Time (min): 19 min     Billable Minutes: Therapeutic Activity 19    Treatment Type: Treatment  PT/PTA: SEVEN Aguilera   18

## 2018-02-20 NOTE — PLAN OF CARE
Problem: Physical Therapy Goal  Goal: Physical Therapy Goal  Goals to be met by: 3/5/18    Patient will increase functional independence with mobility by performin. Supine to sit with HOB flat requiring Modified Bandon.  2. Sit to supine with HOB flat requiring Modified Bandon.  3.. Rolling to Left and Right with Modified Bandon.  4. Sit to stand transfer with Modified Bandon.  5. Bed to chair transfer with Modified Bandon with or without AD.  6.. Gait  x 150 feet with Supervision with or without AD.  7. Ascend/descend 15 stair with out handrails Contact Guard Assistance.      Outcome: Ongoing (interventions implemented as appropriate)    Goals remain appropriate as above to meet pt functional mobility needs.     Silvia Plasencia, SPT   18
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
## 2018-02-20 NOTE — PLAN OF CARE
met with pt today at the bedside for discharge screening.  Pt is an alert lady who lives with her spouse and plans to return.  Pt states she does not associate with her two adult children.  Pt will benefit from home care when she is discharged.  sw will follow.

## 2018-02-20 NOTE — CONSULTS
"RN consult received for "RLE ulcers and albumin 2.7". RD already following pt. Will f/u as scheduled.  "

## 2018-02-20 NOTE — PLAN OF CARE
Problem: Patient Care Overview  Goal: Plan of Care Review  Outcome: Ongoing (interventions implemented as appropriate)  Plan of care reviewed with patient. Patient has no complaints at this time. Patient remains on lasix drip at 15mg/hr. Magnesium replaced on today. Patient up in room with assistance. Patient remains free of falls and injury.

## 2018-02-21 NOTE — PROGRESS NOTES
Follow up on leg ulcers   Wounds seem to be responding to treatment as eschar is debriding with use of the foam dressing.   Dorsal foot ulcer with surrounding erythema , but am no longer able to express any purulent exudate from wound bed. Redressed as per wound care orders.  Still await podiatry consult.     02/21/18 1400       Wound 02/17/18 2343 Blister(s);Ulceration lower Leg   Date First Assessed/Time First Assessed: 02/17/18 2343   Pre-existing: Yes  Wound Type: Blister(s);Ulceration  Side: Right  Orientation: lower  Location: Leg   Wound WDL ex   Dressing Appearance Moist drainage;Intact   Drainage Amount Moderate   Drainage Characteristics/Odor Yellow;Green;No odor   Appearance Pink;Yellow;Slough;Moist   Periwound Area Edematous;Swelling;Moist   Wound Edges Open   Care Cleansed with:;Sterile normal saline   Dressing Applied;Methylene blue/gentian violet;Foam;Abd pad;Rolled gauze       Wound 02/19/18 1756 Ulceration dorsal Foot   Date First Assessed/Time First Assessed: 02/19/18 1756   Pre-existing: Yes  Wound Type: Ulceration  Side: Right  Orientation: dorsal  Location: Foot   Wound WDL ex   Dressing Appearance Intact;Moist drainage   Drainage Amount Moderate   Drainage Characteristics/Odor Green;Yellow;No odor   Appearance Yellow;Slough;Not granulating   Periwound Area Redness;Swelling;Edematous   Wound Edges Open   Care Cleansed with:;Sterile normal saline   Dressing Applied;Methylene blue/gentian violet;Foam;Abd pad;Rolled gauze     Will follow.  Wai Rahman RN ON  [id number]
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
## 2018-02-21 NOTE — PLAN OF CARE
Problem: Patient Care Overview  Goal: Plan of Care Review  Outcome: Revised  Plan of care discussed with patient. Patient is free of fall/trauma/injury. Denies CP, SOB. U/A sent to lab. Remains on Lasix and dobutamine gtts. Encouraging pt to eat but c/o abdominal x 3 days.  All questions addressed. Will continue to monitor

## 2018-02-21 NOTE — PLAN OF CARE
Problem: Physical Therapy Goal  Goal: Physical Therapy Goal  Goals to be met by: 3/5/18    Patient will increase functional independence with mobility by performin. Supine to sit with HOB flat requiring Modified [place].  2. Sit to supine with HOB flat requiring Modified [place].  3.. Rolling to Left and Right with Modified [place].  4. Sit to stand transfer with Modified [place].  5. Bed to chair transfer with Modified [place] with or without AD.  6.. Gait  x 150 feet with Supervision with or without AD.  7. Ascend/descend 15 stair with out handrails Contact Guard Assistance.      Outcome: Outcome(s) achieved Date Met: 18    Pt has met the above goals to the satisfaction of this SPT. Recommending d/c home with home health and assist from spouse as needed.     Silvia Plasencia, SPT  18

## 2018-02-21 NOTE — PLAN OF CARE
Plan of care reviewed with pt. VS stable. No acute events at this time. Lasix gtt maintained. PRN pain meds for back pain. No complaints. Pt remains free from falls or injury. Bed low and locked, call light and personal belongings within reach. Will continue to monitor. Cristel Cha RN

## 2018-02-21 NOTE — PT/OT/SLP PROGRESS
"Physical Therapy Treatment/Discharge Summary     Patient Name:  Anum Quick   MRN:  [id number]    Recommendations:     Discharge Recommendations:  home with home health   Discharge Equipment Recommendations: bath bench   Barriers to discharge: Inaccessible home and Decreased caregiver support    Assessment:     Anum Quick is a 62 y.o. female admitted with a medical diagnosis of Acute on chronic congestive heart failure.  She presents with the following impairments/functional limitations:  impaired endurance, impaired functional mobilty, gait instability, impaired balance, impaired cardiopulmonary response to activity. Pt presents this date with improved endurance, tolerating amb trial of 120 ft with supervision and RW. Pt appropriate for d/c home with home health 2/2 to near baseline functional mobility.     Recent Surgery: * No surgery found *      Plan:      D/C home with home health and assist from spouse as needed    Plan of Care Reviewed with: patient    Subjective     Communicated with RN prior to session.  Patient found with bed in chair position upon PT entry to room, agreeable to treatment.      Chief Complaint: none noted   Patient comments/goals: "I could probably walk farther"  Pain/Comfort:  · Pain Rating 1: 0/10    Patients cultural, spiritual, Scientologist conflicts given the current situation: none noted     Objective:     Patient found with: telemetry, peripheral IV     General Precautions: Standard, fall   Orthopedic Precautions:N/A   Braces: N/A     Functional Mobility:  · Bed Mobility:     · Rolling Left:  stand by assistance with HOB elevated  · L sidelying to Sit: stand by assistance with HOB elevated  · EOB Scooting: stand by assistance requiring VC for feet flat and neutral pelvic position   · Transfers:     · Sit to Stand:  contact guard assistance with rolling walker from EOB; pt demo improved anterior weight shift this date with reduced assistance level required  · Gait: x120 " ft with supervision and RW; pt demo baseline martha; no LOB noted   · x2 standing rest breaks of 10 sec each     Therapeutic Activities and Exercises:  PT arrived to pt room to find pt resting with bed in chair position; agreeable to treatment. Pt performed mobility as above. Upon return to room, pt agreeable to sit [facility]. Pt performed LE therapeutic exercise program including ankle dorsi/plantarflexion, knee extension, and hip flexion x15 reps each. PT educated pt on HEP program utilizing teach back method; handout of ther ex given to pt. All pt questions/concerns answered within PT scope of practice. RN notified of pt functional mobility needs and response to treatment. Recommending d/c home with home health and assist from pt's spouse as needed.       AM-PAC 6 CLICK MOBILITY  Turning over in bed (including adjusting bedclothes, sheets and blankets)?: 3  Sitting down on and standing up from a chair with arms (e.g., wheelchair, bedside commode, etc.): 3  Moving from lying on back to sitting on the side of the bed?: 3  Moving to and from a bed to a chair (including a wheelchair)?: 3  Need to walk in hospital room?: 3  Climbing 3-5 steps with a railing?: 2  Total Score: 17       Patient left up in chair with all lines intact, call button in reach and RN notified..    GOALS:    Physical Therapy Goals     Not on file          Multidisciplinary Problems (Resolved)        Problem: Physical Therapy Goal    Goal Priority Disciplines Outcome Goal Variances Interventions   Physical Therapy Goal   (Resolved)     PT/OT, PT Outcome(s) achieved     Description:  Goals to be met by: 3/5/18    Patient will increase functional independence with mobility by performin. Supine to sit with HOB flat requiring Modified [place].  2. Sit to supine with HOB flat requiring Modified [place].  3.. Rolling to Left and Right with Modified [place].  4. Sit to stand transfer with Modified [place].  5. Bed to chair  transfer with Modified [place] with or without AD.  6.. Gait  x 150 feet with Supervision with or without AD.  7. Ascend/descend 15 stair with out handrails Contact Guard Assistance.                       Time Tracking:     PT Received On: 02/21/18  PT Start Time: 1120     PT Stop Time: 1145  PT Total Time (min): 25 min     Billable Minutes: Therapeutic Activity 15 and Therapeutic Exercise 10    Treatment Type: Treatment  PT/PTA: PT         Silvia Plasencia, SPT   2/21/18

## 2018-02-22 PROBLEM — K86.2 PANCREATIC CYST: Status: ACTIVE | Noted: 2018-01-01

## 2018-02-22 NOTE — ASSESSMENT & PLAN NOTE
Nutrition Problem  Food and nutrition knowledge deficit     Related to (etiology):   No previous diet education    Signs and Symptoms (as evidenced by):   Consulted for diet education and unable to complete    Interventions/Recommendations (treatment strategy):  See Recs     Nutrition Diagnosis Status:   New

## 2018-02-22 NOTE — SUBJECTIVE & OBJECTIVE
Subjective:     Interval History: ***        Scheduled Meds:   amiodarone  200 mg Oral Daily    aspirin  81 mg Oral Daily    atorvastatin  80 mg Oral Daily    clopidogrel  75 mg Oral Daily    doxycycline  100 mg Oral Q12H    gabapentin  100 mg Oral TID    lisinopril  20 mg Oral Daily    metoprolol tartrate  50 mg Oral BID    miconazole NITRATE 2 %   Topical (Top) BID    rivaroxaban  20 mg Oral Daily with dinner    sodium chloride 0.9%  3 mL Intravenous Q8H    venlafaxine  150 mg Oral Daily     Continuous Infusions:   furosemide (LASIX) 1 mg/mL infusion (non-titrating) 15 mg/hr (02/21/18 1004)     PRN Meds:butalbital-acetaminophen-caffeine -40 mg, dextrose 50%, dextrose 50%, glucagon (human recombinant), glucose, glucose, hydrocodone-acetaminophen 10-325mg, insulin aspart U-100, senna    Review of Systems   Constitutional: Negative.    Respiratory: Negative.    Cardiovascular: Negative.    Gastrointestinal: Negative.    Genitourinary: Negative.    Musculoskeletal: Positive for arthralgias and myalgias.   Skin: Positive for wound.   Neurological: Negative.    Psychiatric/Behavioral: Negative.      Objective:     Vital Signs (Most Recent):  Temp: 97.6 °F (36.4 °C) (02/22/18 0737)  Pulse: 69 (02/22/18 0737)  Resp: 18 (02/22/18 0737)  BP: 118/76 (02/22/18 0737)  SpO2: 99 % (02/22/18 0737) Vital Signs (24h Range):  Temp:  [96.2 °F (35.7 °C)-98 °F (36.7 °C)] 97.6 °F (36.4 °C)  Pulse:  [62-88] 69  Resp:  [16-18] 18  SpO2:  [94 %-99 %] 99 %  BP: (109-134)/(67-89) 118/76     Weight: 80.4 kg (177 lb 4 oz)  Body mass index is 31.4 kg/m².    Foot Exam    General  Orientation: alert and oriented to person, place, and time       Right Foot/Ankle     Inspection and Palpation  Tenderness: metatarsals   Swelling: (Leg)  Skin Exam: skin changes and ulcer;     Neurovascular  Dorsalis pedis: 1+  Posterior tibial: 1+  Saphenous nerve sensation: diminished  Tibial nerve sensation: diminished  Superficial peroneal  nerve sensation: diminished  Deep peroneal nerve sensation: diminished  Sural nerve sensation: diminished      Left Foot/Ankle      Inspection and Palpation  Tenderness: none   Swelling: none   Skin Exam: skin intact;     Neurovascular  Dorsalis pedis: 1+  Posterior tibial: 1+  Saphenous nerve sensation: diminished  Tibial nerve sensation: diminished  Superficial peroneal nerve sensation: diminished  Deep peroneal nerve sensation: diminished  Sural nerve sensation: diminished            Wound 1: right dorsal foot   Measurement: 3ieg6tcb1.1cm.  Base: fibrous base   Periwound skin: mild erythema,   Drainage: serous   Erythema: mild  Probe: none, no bone exposed                  Wound 1: Right leg ulceration   Measurement: 2xnf1mwf7.1cm  Base: red granular with slough  Periwound skin: mild erythema  Drainage: serous   Erythema: mild  Probe: none, no bone exposed            Laboratory:  CBC:   Recent Labs  Lab 02/22/18  0632   WBC 6.11   RBC 3.58*   HGB 9.8*   HCT 30.8*      MCV 86   MCH 27.4   MCHC 31.8*     CMP:   Recent Labs  Lab 02/22/18  0632   GLU 99   CALCIUM 9.0   ALBUMIN 2.6*   PROT 6.5      K 3.5   CO2 26   CL 95   BUN 48*   CREATININE 1.5*   ALKPHOS 147*   *   *   BILITOT 0.6       Diagnostic Results:  Xray: No acute fracture or bone destruction.    Soft tissue swelling about the foot.    Tib fib   No acute fracture or bone destruction.    Soft tissue swelling/edema in the lower leg.  No gas in the soft tissues.      Clinical Findings:  Cellulitis right foot

## 2018-02-22 NOTE — PLAN OF CARE
Problem: Patient Care Overview  Goal: Plan of Care Review  Outcome: Ongoing (interventions implemented as appropriate)  Plan of care reviewed with patient. Patient remains free from injury. No acute events noted at this time. Lasix gtt continued. Will continue to monitor patient.

## 2018-02-22 NOTE — PROGRESS NOTES
EKG showing A flutter with variable AV block. MD notified. Pt already on PO metoprolol, just administered. Will continue to monitor. Pt asymptomatic.

## 2018-02-22 NOTE — ASSESSMENT & PLAN NOTE
Ulceration right foot and leg stable.   Aerobic, anaerobic cultures obtained of right foot ulceration.   Painted with betadine covered with xeroform wrapped with kerlix and ACE up leg.   Nursing orders placed for daily dressing changes.   Podiatry will follow    Weightbearing Status: WBAT right   Offloading Device: RANCHO Liu DPM PGY-3  Pager: [phone]

## 2018-02-22 NOTE — SUBJECTIVE & OBJECTIVE
Scheduled Meds:   amiodarone  200 mg Oral Daily    aspirin  81 mg Oral Daily    atorvastatin  80 mg Oral Daily    clopidogrel  75 mg Oral Daily    doxycycline  100 mg Oral Q12H    gabapentin  100 mg Oral TID    lisinopril  20 mg Oral Daily    metoprolol tartrate  50 mg Oral BID    miconazole NITRATE 2 %   Topical (Top) BID    rivaroxaban  20 mg Oral Daily with dinner    sodium chloride 0.9%  3 mL Intravenous Q8H    venlafaxine  150 mg Oral Daily     Continuous Infusions:   furosemide (LASIX) 1 mg/mL infusion (non-titrating) 15 mg/hr (02/21/18 1004)     PRN Meds:butalbital-acetaminophen-caffeine -40 mg, dextrose 50%, dextrose 50%, glucagon (human recombinant), glucose, glucose, hydrocodone-acetaminophen 10-325mg, insulin aspart U-100, senna    Review of patient's allergies indicates:  No Known Allergies     Past Medical History:   Diagnosis Date    Chronic anticoagulation - on rivaroxaban 12/29/2017    PAF    Chronic systolic heart failure 12/14/2017     12-15-17   1 - Moderately depressed left ventricular systolic function (EF 30-35%). Akinetic LV apex. There is no thrombus in LV apex.   2 - Indeterminate LV diastolic function.    3 - Mildly to moderately depressed right ventricular systolic function .    4 - Pulmonary hypertension. The estimated PA systolic pressure is 63 mmHg.    5 - Severe low flow aortic valve stenosis (JAMES 0.49 cm2, AVAi 0.27 cm2/m2, peak aortic jet velocity 4.0 m/s,MG 48 mmHg).    6 - Mild to moderate mitral regurgitation.    7 - Mild tricuspid regurgitation.    8 - Severe left atrial enlargement.     CKD (chronic kidney disease) stage 3, GFR 30-59 ml/min 12/29/2017    Coronary artery disease involving native coronary artery of native heart without angina pectoris 12/29/2017    [facility]: Per report (12/4/2017) proximal LAD has 20% stenosis, RCA with LI otherwise normal coronaries      Essential hypertension 12/14/2017    Hyperlipidemia associated with type 2 diabetes  mellitus 12/14/2017    NSTEMI (non-ST elevated myocardial infarction) 12/14/2017    Paroxysmal atrial fibrillation 12/15/2017    Renovascular hypertension 12/14/2017    Severe aortic stenosis 12/14/2017    12-15-17  Severe low flow aortic valve stenosis (JAMES 0.49 cm2, AVAi 0.27 cm2/m2, peak aortic jet velocity 4.0 m/s,MG 48 mmHg).      Type 2 diabetes mellitus with neurologic complication, without long-term current use of insulin 12/14/2017     Past Surgical History:   Procedure Laterality Date    CARDIAC SURGERY      HYSTERECTOMY      KNEE SURGERY         Family History     None        Social History Main Topics    Smoking status: Never Smoker    Smokeless tobacco: Never Used    Alcohol use No    Drug use: No    Sexual activity: Not on file     Review of Systems   Respiratory: Negative.    Cardiovascular: Negative.    Gastrointestinal: Negative.    Genitourinary: Negative.    Musculoskeletal: Positive for arthralgias and myalgias.   Skin: Positive for wound.   Neurological: Negative.    Psychiatric/Behavioral: Negative.      Objective:     Vital Signs (Most Recent):  Temp: 97.6 °F (36.4 °C) (02/22/18 0737)  Pulse: 69 (02/22/18 0800)  Resp: 18 (02/22/18 0737)  BP: 118/76 (02/22/18 0737)  SpO2: 99 % (02/22/18 0737) Vital Signs (24h Range):  Temp:  [96.2 °F (35.7 °C)-98 °F (36.7 °C)] 97.6 °F (36.4 °C)  Pulse:  [62-88] 69  Resp:  [16-18] 18  SpO2:  [94 %-99 %] 99 %  BP: (109-134)/(67-89) 118/76     Weight: 80.4 kg (177 lb 4 oz)  Body mass index is 31.4 kg/m².    Foot Exam    General  Orientation: alert and oriented to person, place, and time       Right Foot/Ankle     Inspection and Palpation  Tenderness: metatarsals   Swelling: metatarsals   Skin Exam: ulcer and erythema;     Neurovascular  Dorsalis pedis: 1+  Posterior tibial: 1+  Saphenous nerve sensation: diminished  Tibial nerve sensation: diminished  Superficial peroneal nerve sensation: diminished  Deep peroneal nerve sensation: diminished  Sural  nerve sensation: diminished      Left Foot/Ankle      Inspection and Palpation  Tenderness: none     Neurovascular  Dorsalis pedis: 1+  Posterior tibial: 1+  Saphenous nerve sensation: diminished  Tibial nerve sensation: diminished  Superficial peroneal nerve sensation: diminished  Deep peroneal nerve sensation: diminished  Sural nerve sensation: diminished          2/22/18  Wound 1: Right dorsal foot ulceration   Measurement: 0pvr0rux4.1cm.  Base: fibrous base  Periwound skin: mild erythema, maceration  Drainage:none  Erythema: mild  Probe: none, no bone exposed              Wound 2: Right  Dorsal leg   Measurement: 6pdn5wbp8.1cm.  Base: granular base with sloughing  Periwound skin: mild erythema, maceration  Drainage: serous   Erythema: mild  Probe: none, no bone exposed            Laboratory:  CBC:   Recent Labs  Lab 02/22/18  0632   WBC 6.11   RBC 3.58*   HGB 9.8*   HCT 30.8*      MCV 86   MCH 27.4   MCHC 31.8*     CMP:   Recent Labs  Lab 02/22/18  0632   GLU 99   CALCIUM 9.0   ALBUMIN 2.6*   PROT 6.5      K 3.5   CO2 26   CL 95   BUN 48*   CREATININE 1.5*   ALKPHOS 147*   *   *   BILITOT 0.6       Diagnostic Results:  Xray:   No acute fracture or bone destruction.    No acute fracture or bone destruction.    Soft tissue swelling about the foot.    Clinical Findings:  Ulceration right foot and ankle stable.

## 2018-02-22 NOTE — PROGRESS NOTES
"  [facility]  Adult Nutrition  Progress Note    SUMMARY     Recommendations    Recommendation/Intervention:   1.Continue Cardiac diet w/1500mL fluid restriction.   2. Will follow up on education. Unable to see Pt for nutrition education at this time.   3. RD following.    Goals: Pt to consume >50% of meals  Nutrition Goal Status: goal met  Communication of RD Recs: reviewed with RN    Reason for Assessment    Reason for Assessment: physician consult  Diagnosis: cardiac disease  Relevant Medical History: CHF, CAD, CKD, HTN, HLD, a fib, T2DM     General Information Comments: Unable to see Pt upon 2 attempts due to procedures. Documented intake of 100%.    Nutrition Discharge Planning: adequate po intake for optimal nutrition with cardiac restriction    Nutrition Prescription Ordered    Current Diet Order: Cardiac  Nutrition Order Comments: 1500mL FR                 Evaluation of Received Nutrients/Fluid Intake                                                                                                  % Intake of Estimated Energy Needs: 75 - 100 %  % Meal Intake: 100%     Nutrition/Diet History       Typical Food/Fluid Intake: AUGUST intake PTA. Pt not in room. No family at beside.  Food Preferences: AUGUST Oriental orthodox/cultural food preferences at this time        Factors Affecting Nutritional Intake: other (see comments) (None known at this time)                Labs/Tests/Procedures/Meds       Pertinent Labs Reviewed: reviewed  Pertinent Labs Comments: BUN 48, Alb 2.6, Cr 1.5, ,   Pertinent Medications Reviewed: reviewed  Pertinent Medications Comments: Lasix, KCl, NaCl, Lisinopril, Statin     Physical Findings    Overall Physical Appearance: nourished, obese, edematous        Skin: other (see comments) (blisters)    Anthropometrics    Temp: 96.5 °F (35.8 °C)     Height: 5' 3" (160 cm)  Weight Method: Standard Scale  Weight: 75.2 kg (165 lb 12.6 oz)     Ideal Body Weight (IBW), Female: " 115 lb     % Ideal Body Weight, Female (lb): 144.17 lb  BMI (Calculated): 29.4  BMI Grade: 25 - 29.9 - overweight                            Estimated/Assessed Needs    Weight Used For Calorie Calculations: 75.2 kg (165 lb 12.6 oz)      Energy Calorie Requirements (kcal): 1595  Energy Need Method: [place]-St Jeor (x 1.25 (PAL))      RMR ([place]-St. Jeor Equation): 1276.12        Weight Used For Protein Calculations: 75.2 kg (165 lb 12.6 oz)  Protein Requirements: 75-90g (1.0-1.2 gm/kg)  Fluid Requirements (mL): per md           RDA Method (mL): 1595               Assessment and Plan    * Acute on chronic congestive heart failure    Nutrition Problem  Food and nutrition knowledge deficit     Related to (etiology):   No previous diet education    Signs and Symptoms (as evidenced by):   Consulted for diet education and unable to complete    Interventions/Recommendations (treatment strategy):  See Recs     Nutrition Diagnosis Status:   New              Monitor and Evaluation    Food and Nutrient Intake: energy intake, food and beverage intake  Food and Nutrient Adminstration: diet order  Knowledge/Beliefs/Attitudes: food and nutrition knowledge/skill     Anthropometric Measurements: weight change, weight, body mass index  Biochemical Data, Medical Tests and Procedures: gastrointestinal profile, electrolyte and renal panel, glucose/endocrine profile, inflammatory profile, lipid profile  Nutrition-Focused Physical Findings: overall appearance    Nutrition Risk    Level of Risk: other (see comments) (f/u 2x/week)    Nutrition Follow-Up    RD Follow-up?: Yes

## 2018-02-22 NOTE — PHYSICIAN QUERY
PT Name: Anum Quikc  MR #: [id number]     Physician Query Form - Documentation Clarification      CDS/: Corina Mcintyre RN, CCDS             Contact information: [email or web address]    This form is a permanent document in the medical record.     Query Date: February 22, 2018    By submitting this query, we are merely seeking further clarification of documentation. Please utilize your independent clinical judgment when addressing the question(s) below.    The Medical record reflects the following:    Supporting Clinical Findings Location in Medical Record     also presents with infection in her RLE and she is currently on abx for this   Several shallow ulcers to the right lower leg.  Peeling skin on the plantar surface of the right foot.     RLE cellulitis with venous stasis changes   2/17 ed note            2/17 h/p   Dorsal foot ulcer with surrounding erythema , right  Ulceration lower leg, right    Wound 1 right dorsal foot ulceration  Wound 2 right dorsal leg  Venous stasis ulcers 2/21 wound care        2/22 podiatry note                                                                            Doctor, Please specify diagnosis or diagnoses associated with above clinical findings.  Please further specify ulceration .     Provider Use Only    Right Dorsal foot  (  X  )  Skin breakdown only  (    )  Exposed fat layer  (    )  Muscle involvement without necrosis  (    )  Muscle necrosis    Right dorsal leg  (  X  )  Skin breakdown only  (    )  Exposed fat layer  (    )  Muscle involvement without necrosis  (    )  Muscle necrosis                                                                                                                             [  ] Clinically undetermined

## 2018-02-22 NOTE — PLAN OF CARE
Problem: Patient Care Overview  Goal: Plan of Care Review    Recommendations     Recommendation/Intervention:   1.Continue Cardiac diet w/1500mL fluid restriction.   2. Will follow up on education. Unable to see Pt for nutrition education at this time.   3. RD following.     Goals: Pt to consume >50% of meals  Nutrition Goal Status: goal met

## 2018-02-22 NOTE — CONSULTS
[facility]  Podiatry  Consult Note    Patient Name: Anum Quick  MRN: [id number]  Admission Date: 2/17/2018  Hospital Length of Stay: 5 days  Attending Physician: Fanny Pelayo MD  Primary Care Provider: Raghav Valdez MD     Inpatient consult to Podiatry  Consult performed by: HARINI MÁRQUEZ  Consult ordered by: FANNY PELAYO  Reason for consult: 61 y/o WF with PMH of DM-II, with R dorsal foot ulcerations        Subjective:     History of Present Illness:  Anum Quick is a 62 y.o.  female who  has a past medical history of Chronic anticoagulation - on rivaroxaban. Consulted for right foot ulceration. Reports has had wounds to right foot and leg x 5 years. Reports she has been taking care of the wounds herself throughout this time. States wound get worse when legs swell.   Scheduled Meds:   amiodarone  200 mg Oral Daily    aspirin  81 mg Oral Daily    atorvastatin  80 mg Oral Daily    clopidogrel  75 mg Oral Daily    doxycycline  100 mg Oral Q12H    gabapentin  100 mg Oral TID    lisinopril  20 mg Oral Daily    metoprolol tartrate  50 mg Oral BID    miconazole NITRATE 2 %   Topical (Top) BID    rivaroxaban  20 mg Oral Daily with dinner    sodium chloride 0.9%  3 mL Intravenous Q8H    venlafaxine  150 mg Oral Daily     Continuous Infusions:   furosemide (LASIX) 1 mg/mL infusion (non-titrating) 15 mg/hr (02/21/18 1004)     PRN Meds:butalbital-acetaminophen-caffeine -40 mg, dextrose 50%, dextrose 50%, glucagon (human recombinant), glucose, glucose, hydrocodone-acetaminophen 10-325mg, insulin aspart U-100, senna    Review of patient's allergies indicates:  No Known Allergies     Past Medical History:   Diagnosis Date    Chronic anticoagulation - on rivaroxaban 12/29/2017    PAF    Chronic systolic heart failure 12/14/2017     12-15-17   1 - Moderately depressed left ventricular systolic function (EF 30-35%). Akinetic LV apex. There is no thrombus in LV  apex.   2 - Indeterminate LV diastolic function.    3 - Mildly to moderately depressed right ventricular systolic function .    4 - Pulmonary hypertension. The estimated PA systolic pressure is 63 mmHg.    5 - Severe low flow aortic valve stenosis (JAMES 0.49 cm2, AVAi 0.27 cm2/m2, peak aortic jet velocity 4.0 m/s,MG 48 mmHg).    6 - Mild to moderate mitral regurgitation.    7 - Mild tricuspid regurgitation.    8 - Severe left atrial enlargement.     CKD (chronic kidney disease) stage 3, GFR 30-59 ml/min 12/29/2017    Coronary artery disease involving native coronary artery of native heart without angina pectoris 12/29/2017    [facility]: Per report (12/4/2017) proximal LAD has 20% stenosis, RCA with LI otherwise normal coronaries      Essential hypertension 12/14/2017    Hyperlipidemia associated with type 2 diabetes mellitus 12/14/2017    NSTEMI (non-ST elevated myocardial infarction) 12/14/2017    Paroxysmal atrial fibrillation 12/15/2017    Renovascular hypertension 12/14/2017    Severe aortic stenosis 12/14/2017    12-15-17  Severe low flow aortic valve stenosis (JAMES 0.49 cm2, AVAi 0.27 cm2/m2, peak aortic jet velocity 4.0 m/s,MG 48 mmHg).      Type 2 diabetes mellitus with neurologic complication, without long-term current use of insulin 12/14/2017     Past Surgical History:   Procedure Laterality Date    CARDIAC SURGERY      HYSTERECTOMY      KNEE SURGERY         Family History     None        Social History Main Topics    Smoking status: Never Smoker    Smokeless tobacco: Never Used    Alcohol use No    Drug use: No    Sexual activity: Not on file     Review of Systems   Respiratory: Negative.    Cardiovascular: Negative.    Gastrointestinal: Negative.    Genitourinary: Negative.    Musculoskeletal: Positive for arthralgias and myalgias.   Skin: Positive for wound.   Neurological: Negative.    Psychiatric/Behavioral: Negative.      Objective:     Vital Signs (Most Recent):  Temp: 97.6 °F (36.4  °C) (02/22/18 0737)  Pulse: 69 (02/22/18 0800)  Resp: 18 (02/22/18 0737)  BP: 118/76 (02/22/18 0737)  SpO2: 99 % (02/22/18 0737) Vital Signs (24h Range):  Temp:  [96.2 °F (35.7 °C)-98 °F (36.7 °C)] 97.6 °F (36.4 °C)  Pulse:  [62-88] 69  Resp:  [16-18] 18  SpO2:  [94 %-99 %] 99 %  BP: (109-134)/(67-89) 118/76     Weight: 80.4 kg (177 lb 4 oz)  Body mass index is 31.4 kg/m².    Foot Exam    General  Orientation: alert and oriented to person, place, and time       Right Foot/Ankle     Inspection and Palpation  Tenderness: metatarsals   Swelling: metatarsals   Skin Exam: ulcer and erythema;     Neurovascular  Dorsalis pedis: 1+  Posterior tibial: 1+  Saphenous nerve sensation: diminished  Tibial nerve sensation: diminished  Superficial peroneal nerve sensation: diminished  Deep peroneal nerve sensation: diminished  Sural nerve sensation: diminished      Left Foot/Ankle      Inspection and Palpation  Tenderness: none     Neurovascular  Dorsalis pedis: 1+  Posterior tibial: 1+  Saphenous nerve sensation: diminished  Tibial nerve sensation: diminished  Superficial peroneal nerve sensation: diminished  Deep peroneal nerve sensation: diminished  Sural nerve sensation: diminished          2/22/18  Wound 1: Right dorsal foot ulceration   Measurement: 8dcs7adn3.1cm.  Base: fibrous base  Periwound skin: mild erythema, maceration  Drainage:none  Erythema: mild  Probe: none, no bone exposed              Wound 2: Right  Dorsal leg   Measurement: 8ndv9xya2.1cm.  Base: granular base with sloughing  Periwound skin: mild erythema, maceration  Drainage: serous   Erythema: mild  Probe: none, no bone exposed            Laboratory:  CBC:   Recent Labs  Lab 02/22/18  0632   WBC 6.11   RBC 3.58*   HGB 9.8*   HCT 30.8*      MCV 86   MCH 27.4   MCHC 31.8*     CMP:   Recent Labs  Lab 02/22/18  0632   GLU 99   CALCIUM 9.0   ALBUMIN 2.6*   PROT 6.5      K 3.5   CO2 26   CL 95   BUN 48*   CREATININE 1.5*   ALKPHOS 147*   *    *   BILITOT 0.6       Diagnostic Results:  Xray:   No acute fracture or bone destruction.    No acute fracture or bone destruction.    Soft tissue swelling about the foot.    Clinical Findings:  Ulceration right foot and ankle stable.     Assessment/Plan:     Venous stasis ulcers    Ulceration right foot and leg stable.   Aerobic, anaerobic cultures obtained of right foot ulceration.   Painted with betadine covered with xeroform wrapped with kerlix and ACE up leg.   Nursing orders placed for daily dressing changes.   Podiatry will follow    Weightbearing Status: WBAT right   Offloading Device: DARCO shoe     Lilibeth Liu DPM PGY-3  Pager: [phone]            CKD (chronic kidney disease) stage 3, GFR 30-59 ml/min    Per Primary           Type 2 diabetes mellitus with neurologic complication, without long-term current use of insulin    Per Primary               Thank you for your consult. I will follow-up with patient. Please contact us if you have any additional questions.    Lilibeth Liu MD  Podiatry  [facility]

## 2018-02-22 NOTE — HPI
Anum Quick is a 62 y.o.  female who  has a past medical history of Chronic anticoagulation - on rivaroxaban. Consulted for right foot ulceration. Reports has had wounds to right foot and leg x 5 years. Reports she has been taking care of the wounds herself throughout this time.

## 2018-02-22 NOTE — PLAN OF CARE
Plan of care reviewed with pt. VS stable. No acute events at this time. Lasix gtt maintained, large amounts of UOP. Pt states she sees a decrease in BLE edema. No complaints. PRN pain meds administered for back pain. Ambulating with assistance to BR. Pt remained NPO since MN for abd US this morning. Pt remains free from falls or injury. Bed low and locked, call light and personal belongings within reach. Will continue to monitor. Cristel Cha RN

## 2018-02-23 NOTE — SUBJECTIVE & OBJECTIVE
Interval Hx: Pt seen today, no pedal complaints at this time.     Scheduled Meds:   amiodarone  200 mg Oral Daily    aspirin  81 mg Oral Daily    atorvastatin  80 mg Oral Daily    ciprofloxacin HCl  500 mg Oral Q12H    clopidogrel  75 mg Oral Daily    collagenase   Topical (Top) Daily    furosemide  80 mg Oral Q8H    gabapentin  100 mg Oral TID    lisinopril  20 mg Oral Daily    metoprolol tartrate  50 mg Oral BID    miconazole NITRATE 2 %   Topical (Top) BID    rivaroxaban  20 mg Oral Daily with dinner    sodium chloride 0.9%  3 mL Intravenous Q8H    venlafaxine  150 mg Oral Daily     Continuous Infusions:    PRN Meds:butalbital-acetaminophen-caffeine -40 mg, dextrose 50%, dextrose 50%, glucagon (human recombinant), glucose, glucose, hydrocodone-acetaminophen 10-325mg, insulin aspart U-100, senna    Review of patient's allergies indicates:  No Known Allergies     Past Medical History:   Diagnosis Date    Chronic anticoagulation - on rivaroxaban 12/29/2017    PAF    Chronic systolic heart failure 12/14/2017     12-15-17   1 - Moderately depressed left ventricular systolic function (EF 30-35%). Akinetic LV apex. There is no thrombus in LV apex.   2 - Indeterminate LV diastolic function.    3 - Mildly to moderately depressed right ventricular systolic function .    4 - Pulmonary hypertension. The estimated PA systolic pressure is 63 mmHg.    5 - Severe low flow aortic valve stenosis (JAMES 0.49 cm2, AVAi 0.27 cm2/m2, peak aortic jet velocity 4.0 m/s,MG 48 mmHg).    6 - Mild to moderate mitral regurgitation.    7 - Mild tricuspid regurgitation.    8 - Severe left atrial enlargement.     CKD (chronic kidney disease) stage 3, GFR 30-59 ml/min 12/29/2017    Coronary artery disease involving native coronary artery of native heart without angina pectoris 12/29/2017    [facility] @ : Per report (12/4/2017) proximal LAD has 20% stenosis, RCA with LI otherwise normal coronaries      Essential hypertension  12/14/2017    Hyperlipidemia associated with type 2 diabetes mellitus 12/14/2017    NSTEMI (non-ST elevated myocardial infarction) 12/14/2017    Paroxysmal atrial fibrillation 12/15/2017    Renovascular hypertension 12/14/2017    Severe aortic stenosis 12/14/2017    12-15-17  Severe low flow aortic valve stenosis (JAMES 0.49 cm2, AVAi 0.27 cm2/m2, peak aortic jet velocity 4.0 m/s,MG 48 mmHg).      Type 2 diabetes mellitus with neurologic complication, without long-term current use of insulin 12/14/2017     Past Surgical History:   Procedure Laterality Date    CARDIAC SURGERY      HYSTERECTOMY      KNEE SURGERY         Family History     None        Social History Main Topics    Smoking status: Never Smoker    Smokeless tobacco: Never Used    Alcohol use No    Drug use: No    Sexual activity: Not on file     Review of Systems   Respiratory: Negative.    Cardiovascular: Negative.    Gastrointestinal: Negative.    Genitourinary: Negative.    Musculoskeletal: Positive for arthralgias and myalgias.   Skin: Positive for wound.   Neurological: Negative.    Psychiatric/Behavioral: Negative.      Objective:     Vital Signs (Most Recent):  Temp: (!) 92.2 °F (33.4 °C) (02/23/18 1107)  Pulse: 94 (02/23/18 1257)  Resp: 18 (02/23/18 1107)  BP: (!) 90/59 (02/23/18 1107)  SpO2: 97 % (02/22/18 1952) Vital Signs (24h Range):  Temp:  [92.2 °F (33.4 °C)-99.1 °F (37.3 °C)] 92.2 °F (33.4 °C)  Pulse:  [] 94  Resp:  [16-18] 18  SpO2:  [96 %-97 %] 97 %  BP: ()/(59-77) 90/59     Weight: 73.3 kg (161 lb 9.6 oz)  Body mass index is 28.63 kg/m².    Foot Exam    General  Orientation: alert and oriented to person, place, and time       Right Foot/Ankle     Inspection and Palpation  Tenderness: metatarsals   Swelling: metatarsals   Skin Exam: ulcer and erythema;     Neurovascular  Dorsalis pedis: 1+  Posterior tibial: 1+  Saphenous nerve sensation: diminished  Tibial nerve sensation: diminished  Superficial peroneal nerve  sensation: diminished  Deep peroneal nerve sensation: diminished  Sural nerve sensation: diminished      Left Foot/Ankle      Inspection and Palpation  Tenderness: none     Neurovascular  Dorsalis pedis: 1+  Posterior tibial: 1+  Saphenous nerve sensation: diminished  Tibial nerve sensation: diminished  Superficial peroneal nerve sensation: diminished  Deep peroneal nerve sensation: diminished  Sural nerve sensation: diminished          2/22/18  Wound 1: Right dorsal foot ulceration   Measurement: [id number].  Base: fibrous base  Periwound skin: mild erythema, maceration  Drainage:none  Erythema: mild  Probe: none, no bone exposed              Wound 2: Right  Dorsal leg   Measurement: 1anx4wtg6.1cm.  Base: granular base with sloughing  Periwound skin: mild erythema, maceration  Drainage: serous   Erythema: mild  Probe: none, no bone exposed            Laboratory:  CBC:     Recent Labs  Lab 02/23/18  0645   WBC 7.03   RBC 3.70*   HGB 10.0*   HCT 31.7*      MCV 86   MCH 27.0   MCHC 31.5*     CMP:     Recent Labs  Lab 02/23/18  0645   GLU 95   CALCIUM 9.0   ALBUMIN 2.6*   PROT 6.8      K 4.7   CO2 29   CL 93*   BUN 50*   CREATININE 1.4   ALKPHOS 151*   *   *   BILITOT 0.6       Diagnostic Results:  Xray:   No acute fracture or bone destruction.    No acute fracture or bone destruction.    Soft tissue swelling about the foot.    Clinical Findings:  Ulceration right foot and ankle stable.

## 2018-02-23 NOTE — PLAN OF CARE
Plan of care reviewed with pt. VS stable. Lasix gtt maintained. PRN pain meds administered for back pain. BLE US completed last night. No acute events at this time. No complaints. Pt remains free from falls or injury. Bed low and locked, call light and personal belongings within reach. Will continue to monitor. Cristel Cha RN

## 2018-02-23 NOTE — PROGRESS NOTES
[facility]  Podiatry  Progress Note    Patient Name: Anum Quick  MRN: [id number]  Admission Date: 2/17/2018  Hospital Length of Stay: 6 days  Attending Physician: Fanny Rosales MD  Primary Care Provider: Raghav Valdez MD   Interval Hx: Pt seen today, no pedal complaints at this time.     Scheduled Meds:   amiodarone  200 mg Oral Daily    aspirin  81 mg Oral Daily    atorvastatin  80 mg Oral Daily    ciprofloxacin HCl  500 mg Oral Q12H    clopidogrel  75 mg Oral Daily    collagenase   Topical (Top) Daily    furosemide  80 mg Oral Q8H    gabapentin  100 mg Oral TID    lisinopril  20 mg Oral Daily    metoprolol tartrate  50 mg Oral BID    miconazole NITRATE 2 %   Topical (Top) BID    rivaroxaban  20 mg Oral Daily with dinner    sodium chloride 0.9%  3 mL Intravenous Q8H    venlafaxine  150 mg Oral Daily     Continuous Infusions:    PRN Meds:butalbital-acetaminophen-caffeine -40 mg, dextrose 50%, dextrose 50%, glucagon (human recombinant), glucose, glucose, hydrocodone-acetaminophen 10-325mg, insulin aspart U-100, senna    Review of patient's allergies indicates:  No Known Allergies     Past Medical History:   Diagnosis Date    Chronic anticoagulation - on rivaroxaban 12/29/2017    PAF    Chronic systolic heart failure 12/14/2017     12-15-17   1 - Moderately depressed left ventricular systolic function (EF 30-35%). Akinetic LV apex. There is no thrombus in LV apex.   2 - Indeterminate LV diastolic function.    3 - Mildly to moderately depressed right ventricular systolic function .    4 - Pulmonary hypertension. The estimated PA systolic pressure is 63 mmHg.    5 - Severe low flow aortic valve stenosis (JAMES 0.49 cm2, AVAi 0.27 cm2/m2, peak aortic jet velocity 4.0 m/s,MG 48 mmHg).    6 - Mild to moderate mitral regurgitation.    7 - Mild tricuspid regurgitation.    8 - Severe left atrial enlargement.     CKD (chronic kidney disease) stage 3, GFR 30-59 ml/min  12/29/2017    Coronary artery disease involving native coronary artery of native heart without angina pectoris 12/29/2017    [facility] @ : Per report (12/4/2017) proximal LAD has 20% stenosis, RCA with LI otherwise normal coronaries      Essential hypertension 12/14/2017    Hyperlipidemia associated with type 2 diabetes mellitus 12/14/2017    NSTEMI (non-ST elevated myocardial infarction) 12/14/2017    Paroxysmal atrial fibrillation 12/15/2017    Renovascular hypertension 12/14/2017    Severe aortic stenosis 12/14/2017    12-15-17  Severe low flow aortic valve stenosis (JAMES 0.49 cm2, AVAi 0.27 cm2/m2, peak aortic jet velocity 4.0 m/s,MG 48 mmHg).      Type 2 diabetes mellitus with neurologic complication, without long-term current use of insulin 12/14/2017     Past Surgical History:   Procedure Laterality Date    CARDIAC SURGERY      HYSTERECTOMY      KNEE SURGERY         Family History     None        Social History Main Topics    Smoking status: Never Smoker    Smokeless tobacco: Never Used    Alcohol use No    Drug use: No    Sexual activity: Not on file     Review of Systems   Respiratory: Negative.    Cardiovascular: Negative.    Gastrointestinal: Negative.    Genitourinary: Negative.    Musculoskeletal: Positive for arthralgias and myalgias.   Skin: Positive for wound.   Neurological: Negative.    Psychiatric/Behavioral: Negative.      Objective:     Vital Signs (Most Recent):  Temp: (!) 92.2 °F (33.4 °C) (02/23/18 1107)  Pulse: 94 (02/23/18 1257)  Resp: 18 (02/23/18 1107)  BP: (!) 90/59 (02/23/18 1107)  SpO2: 97 % (02/22/18 1952) Vital Signs (24h Range):  Temp:  [92.2 °F (33.4 °C)-99.1 °F (37.3 °C)] 92.2 °F (33.4 °C)  Pulse:  [] 94  Resp:  [16-18] 18  SpO2:  [96 %-97 %] 97 %  BP: ()/(59-77) 90/59     Weight: 73.3 kg (161 lb 9.6 oz)  Body mass index is 28.63 kg/m².    Foot Exam    General  Orientation: alert and oriented to person, place, and time       Right Foot/Ankle     Inspection  and Palpation  Tenderness: metatarsals   Swelling: metatarsals   Skin Exam: ulcer and erythema;     Neurovascular  Dorsalis pedis: 1+  Posterior tibial: 1+  Saphenous nerve sensation: diminished  Tibial nerve sensation: diminished  Superficial peroneal nerve sensation: diminished  Deep peroneal nerve sensation: diminished  Sural nerve sensation: diminished      Left Foot/Ankle      Inspection and Palpation  Tenderness: none     Neurovascular  Dorsalis pedis: 1+  Posterior tibial: 1+  Saphenous nerve sensation: diminished  Tibial nerve sensation: diminished  Superficial peroneal nerve sensation: diminished  Deep peroneal nerve sensation: diminished  Sural nerve sensation: diminished          2/22/18  Wound 1: Right dorsal foot ulceration   Measurement: 8ufg2ebf2.1cm.  Base: fibrous base  Periwound skin: mild erythema, maceration  Drainage:none  Erythema: mild  Probe: none, no bone exposed              Wound 2: Right  Dorsal leg   Measurement: 1mky6vyo4.1cm.  Base: granular base with sloughing  Periwound skin: mild erythema, maceration  Drainage: serous   Erythema: mild  Probe: none, no bone exposed            Laboratory:  CBC:     Recent Labs  Lab 02/23/18  0645   WBC 7.03   RBC 3.70*   HGB 10.0*   HCT 31.7*      MCV 86   MCH 27.0   MCHC 31.5*     CMP:     Recent Labs  Lab 02/23/18  0645   GLU 95   CALCIUM 9.0   ALBUMIN 2.6*   PROT 6.8      K 4.7   CO2 29   CL 93*   BUN 50*   CREATININE 1.4   ALKPHOS 151*   *   *   BILITOT 0.6       Diagnostic Results:  Xray:   No acute fracture or bone destruction.    No acute fracture or bone destruction.    Soft tissue swelling about the foot.    Clinical Findings:  Ulceration right foot and ankle stable.     Assessment/Plan:     Venous stasis ulcers     Ulceration right foot and leg stable.   Aerobic, anaerobic cultures obtained of right foot ulceration with presumptive pseudomonas species.   Painted with betadine covered with xeroform wrapped with  kerlix and ACE up leg.   Arterial US with no evidence of stenosis.   Nursing orders placed for daily dressing changes with santyl.   Podiatry will follow     Weightbearing Status: WBAT right   Offloading Device: DARCO shoe                 CKD (chronic kidney disease) stage 3, GFR 30-59 ml/min     Per Primary              Type 2 diabetes mellitus with neurologic complication, without long-term current use of insulin     Per Primary                  Svetlana Huggins MD  Podiatry  [facility]

## 2018-02-23 NOTE — PLAN OF CARE
Problem: Patient Care Overview  Goal: Plan of Care Review  Outcome: Revised  Plan of care discussed with patient. Patient is free of fall/trauma/injury. Denies CP, SOB, PRN pain meds given for pain/discomfort. Lasix gtts d/anupama.  All questions addressed. Will continue to monitor

## 2018-02-23 NOTE — PLAN OF CARE
Met with pt at the bedside for follow up.  Pt states she will return home with her .  Pt will benefit from home health  Care when she is discharged.  sw will follow.

## 2018-02-23 NOTE — PROGRESS NOTES
Patient with severe AS, pending SAVR/MAZE. BLE ulcers, wound care. Not a candidate at this time for DMHFP (equip not available).    Removed from hf list.
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
## 2018-02-23 NOTE — PLAN OF CARE
02/23/18 0811   Discharge Reassessment   Assessment Type Discharge Planning Reassessment   Do you have any problems affording any of your prescribed medications? No   Discharge Plan A Home with family;Home Health   Still on Lasix gtt

## 2018-02-24 NOTE — PLAN OF CARE
[facility]    HOME HEALTH ORDERS  FACE TO FACE ENCOUNTER    Patient Name: Anum Quick  YOB: 1955    PCP: Raghav Valdez MD   PCP Address: [address] / [place] [address]  PCP Phone Number: [phone]  PCP Fax: [phone]    Encounter Date: 02/24/2018    Admit to Home Health    Diagnoses:  Active Hospital Problems    Diagnosis  POA    *Acute on chronic congestive heart failure [I50.9]  Yes    Pancreatic cyst [K86.2]  Yes    Malnutrition of moderate degree [E44.0]  Yes    Venous stasis ulcers [I83.009, L97.909]  Yes    Candidal dermatitis [B37.2]  Yes    Acute on chronic systolic heart failure [I50.23]  Yes    Hypokalemia [E87.6]  Yes    Metabolic acidosis [E87.2]  Yes    Coronary artery disease involving native coronary artery of native heart without angina pectoris [I25.10]  Yes     Chronic     LHC @ [facility]: Per report (12/4/2017) proximal LAD has 20% stenosis, RCA with LI otherwise normal coronaries        Chronic anticoagulation - on rivaroxaban [Z79.01]  Not Applicable     Chronic     PAF      CKD (chronic kidney disease) stage 3, GFR 30-59 ml/min [N18.3]  Yes     Chronic    Paroxysmal atrial fibrillation [I48.0]  Yes    Hyperlipidemia associated with type 2 diabetes mellitus [E11.69, E78.5]  Yes     Chronic    Type 2 diabetes mellitus with neurologic complication, without long-term current use of insulin [E11.49]  Yes    Aortic valve stenosis [I35.0]  Yes     12-15-17  Severe low flow aortic valve stenosis (JAMES 0.49 cm2, AVAi 0.27 cm2/m2, peak aortic jet velocity 4.0 m/s,MG 48 mmHg).         Renovascular hypertension [I15.0]  Yes      Resolved Hospital Problems    Diagnosis Date Resolved POA   No resolved problems to display.       Future Appointments  Date Time Provider Department Center   3/1/2018 11:00 AM TYLER Aragon [facility] IMPRICL [place] JUNIOR   3/2/2018 3:45 PM LAB, MAKENZIE [facility] LAB [place]   3/26/2018 3:40 PM  Cale Mendenhall MD [facility] NEPHRO [place]   4/3/2018 3:30 PM Jaosn Plaza MD [facility] CARDIO [place]   5/12/2018 11:30 AM Sherley Soliz MD [facility] IM [place] PCW     Follow-up Information     Jason Plaza MD On 4/3/2018.    Specialty:  Cardiology  Why:  3:30 appointment  Contact information:  [address]  [facility] [address]  [phone]             TYLER Díaz On 3/1/2018.    Specialty:  Internal Medicine  Why:  11:00 hospital follow up in [facility]  Contact information:  [address] [facility] [address]  [phone]                     I have seen and examined this patient face to face today. My clinical findings that support the need for the home health skilled services and home bound status are the following:  Weakness/numbness causing balance and gait disturbance due to Heart Failure and Weakness/Debility making it taxing to leave home.    Allergies:Review of patient's allergies indicates:  No Known Allergies    Diet: cardiac diet, diabetic diet: 2000 calorie and 2 gram sodium diet 1500cc fluid restriction    Activities: activity as tolerated    Nursing:   SN to complete comprehensive assessment including routine vital signs. Instruct on disease process and s/s of complications to report to MD. Review/verify medication list sent home with the patient at time of discharge  and instruct patient/caregiver as needed. Frequency may be adjusted depending on start of care date.    Notify MD if SBP > 160 or < 90; DBP > 90 or < 50; HR > 120 or < 50; Temp > 101      CONSULTS:    Physical Therapy to evaluate and treat. Evaluate for home safety and equipment needs; Establish/upgrade home exercise program. Perform / instruct on therapeutic exercises, gait training, transfer training, and Range of Motion.  Occupational Therapy to evaluate and treat. Evaluate home environment for safety and equipment needs. Perform/Instruct on transfers, ADL  training, ROM, and therapeutic exercises.   to evaluate for community resources/long-range planning.    MISCELLANEOUS CARE:  Heart Failure:      SN to instruct on the following:    Instruct on the definition of CHF.   Instruct on the signs/sympoms of CHF to be reported.   Instruct on and monitor daily weights.   Instruct on factors that cause exacerbation.   Instruct on action, dose, schedule, and side effects of medications.   Instruct on diet as prescribed.   Instruct on activity allowed.   Instruct on life-style modifications for life long management of CHF   SN to assess compliance with daily weights, diet, medications, fluid retention,    safety precautions, activities permitted and life-style modifications.   Additional 1-2 SN visits per week as needed for signs and symptoms     of CHF exacerbation.      For Weight Gain > 2-3 lbs in 1 day or 4-6 lbs over 1 week notify PCP:  Increase Lasix (oral diuretic) dose to 80mg BID for 5 days temporarily    WOUND CARE ORDERS  Right lower leg- clean with saline , apply  Santyl cover with Hydrafera Blue foam to wound on right leg ulceration. Apply santyl  to right foot ulceration.  Cover with ABD pad and secure with rolled gauze, ACE  -perform every Mon, Wed, Fri      Medications: Review discharge medications with patient and family and provide education.      Current Discharge Medication List      START taking these medications    Details   ciprofloxacin HCl (CIPRO) 500 MG tablet Take 1 tablet (500 mg total) by mouth every 12 (twelve) hours.  Qty: 12 tablet, Refills: 0      collagenase ointment Apply topically once daily.  Qty: 15 g, Refills: 0         CONTINUE these medications which have CHANGED    Details   furosemide (LASIX) 40 MG tablet Take 1 tablet (40 mg total) by mouth 2 (two) times daily.  Qty: 60 tablet, Refills: 2         CONTINUE these medications which have NOT CHANGED    Details   amiodarone (PACERONE) 200 MG Tab Take by mouth once daily.       aspirin 81 MG Chew Take 1 tablet (81 mg total) by mouth once daily.  Refills: 0      atorvastatin (LIPITOR) 80 MG tablet Take 1 tablet (80 mg total) by mouth once daily.  Qty: 30 tablet, Refills: 11      butalbital-acetaminophen-caffeine -40 mg (FIORICET, ESGIC) -40 mg per tablet Take 1 tablet by mouth every 4 (four) hours as needed for Pain.      clopidogrel (PLAVIX) 75 mg tablet Take 1 tablet (75 mg total) by mouth once daily.  Qty: 30 tablet, Refills: 11      estropipate (OGEN) 1.5 MG tablet Take 1.5 mg by mouth once daily.      gabapentin (NEURONTIN) 100 MG capsule Take 100 mg by mouth 3 (three) times daily.      lisinopril (PRINIVIL,ZESTRIL) 20 MG tablet Take 20 mg by mouth once daily.      meloxicam (MOBIC) 15 MG tablet TK 1 T PO QD PRN  Refills: 1      metFORMIN (GLUMETZA) 500 MG (MOD) 24 hr tablet Take 500 mg by mouth daily with breakfast.      metoprolol tartrate (LOPRESSOR) 50 MG tablet Take 1 tablet (50 mg total) by mouth 2 (two) times daily. Take 75 mg twice per day  Qty: 180 tablet, Refills: 4    Associated Diagnoses: Paroxysmal atrial fibrillation; Essential hypertension      nitroGLYCERIN (NITROSTAT) 0.4 MG SL tablet Place 1 tablet (0.4 mg total) under the tongue every 5 (five) minutes as needed for Chest pain (angina).  Qty: 20 tablet, Refills: 0      rivaroxaban (XARELTO) 20 mg Tab Take 1 tablet (20 mg total) by mouth daily with dinner or evening meal.  Qty: 90 tablet, Refills: 4    Associated Diagnoses: Chronic anticoagulation; Paroxysmal atrial fibrillation      senna (SENOKOT) 8.6 mg tablet Take 1 tablet by mouth 2 (two) times daily as needed for Constipation.      temazepam (RESTORIL) 15 mg Cap TK 1 C PO QHS  Refills: 2      venlafaxine (EFFEXOR-XR) 150 MG Cp24 TK ONE C PO D WF  Refills: 1      hydrocodone-acetaminophen 10-325mg (NORCO)  mg Tab TK 1 T PO TID PRN FOR 30 DAYS  Refills: 0             I certify that this patient is confined to her home and needs intermittent  skilled nursing care, physical therapy and occupational therapy.

## 2018-02-24 NOTE — PLAN OF CARE
Problem: Patient Care Overview  Goal: Plan of Care Review  Outcome: Revised  Plan of care discussed with patient. Patient is free of fall/trauma/injury. Denies CP, SOB, PRN pain meds given for pain/discomfort. Patient is fit but discharge but has concerns about being re-admitted since the environment at home is not convenient for her health. On call SW notified, she will give patient a call. MD Rosales also notified. All questions addressed. Will continue with discharge as planned.
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
## 2018-02-24 NOTE — NURSING
Patient is ready for discharge. Patient stable alert and oriented. Tele, Visi, and IVs removed. No complaints of pain. Discussed discharge plan. Reviewed medications and side effects, appointments, and answered questions with patient and spouse.

## 2018-02-24 NOTE — NURSING
"Notified Dr. Vasquez pt BP 91/62, . Pt due for metoprolol 50mg po. MD states "ok to give metoprolol at this time."  "

## 2018-02-24 NOTE — PLAN OF CARE
Problem: Patient Care Overview  Goal: Plan of Care Review  Outcome: Ongoing (interventions implemented as appropriate)  Pt remained free from falls or injuries this shift. Pt independent in repositioning. Pt rested well through the night.  remained at bedside. Pt transitioned to po lasix yesterday. Medicated x2 prn pain
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
## 2018-02-25 NOTE — PLAN OF CARE
02/25/18 1624   Final Note   Assessment Type Final Discharge Note   Discharge Disposition Home-Health   Hospital Follow Up  Appt(s) scheduled? Yes

## 2018-03-01 PROBLEM — I50.23 ACUTE ON CHRONIC SYSTOLIC HEART FAILURE: Status: RESOLVED | Noted: 2018-01-01 | Resolved: 2018-01-01

## 2018-03-01 PROBLEM — A49.8 PSEUDOMONAS INFECTION: Status: ACTIVE | Noted: 2018-01-01

## 2018-03-01 PROBLEM — E87.20 METABOLIC ACIDOSIS: Status: RESOLVED | Noted: 2018-01-01 | Resolved: 2018-01-01

## 2018-03-01 PROBLEM — E87.6 HYPOKALEMIA: Status: RESOLVED | Noted: 2018-01-01 | Resolved: 2018-01-01

## 2018-03-09 NOTE — Clinical Note
[facility] Visit (Post Discharge Follow-up) Today:  - Our clinic physicians and nurses plan to follow the patient up for any medical issues in the [facility] for 30 days post discharge.  Future Appointments: 3/9/2018   3:00 PM    LAB, SAME DAY [facility] INTMED  [facility] LAB IM    [place] JUNIOR  3/16/2018  3:45 PM    MAKENZIE CHRISTY LAB       [place] 3/26/2018  3:00 PM    Alicja Granado DPM          [facility] POD       [place]ervin luca 3/26/2018  3:40 PM    Cale Mendenhall MD       [facility] NEPHRO    Mervin luca 4/3/2018   3:30 PM    Jason Canales [facility] CARDIO    Mervin y 5/12/2018  11:30 AM   Sherley Soliz MD     [facility]        [facility]ervin Brower [facility]

## 2018-03-09 NOTE — PROGRESS NOTES
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 of deep vein thrombosis.  The common femoral, superficial femoral and popliteal veins were examined using spectral analysis and show normal wave forms with normal response to augmentation and respiration.    LEFT LEG:  The common femoral, superficial femoral, popliteal, peroneal and anterior and posterior tibial veins show no signs of deep vein thrombosis.  The common femoral, superficial femoral and popliteal veins were examined using spectral analysis and show normal wave forms with normal response to augmentation and respiration.   Impression          No evidence of deep venous thrombosis in either lower extremity.      ______________________________________     Electronically signed by resident: JESSY REHMAN MD  Date: 02/18/18  Time: 11:33            As the supervising and teaching physician, I personally reviewed the images and resident's interpretation and I agree with the findings.          Electronically signed by: ANNETTA GRACIA MD  Date: 02/18/18  Time: 12:59     Encounter     View Encounter          Signed by Resident     JESSY REHMAN [[id number]]         Time of Procedure: 02/22/18 08:30:00  Accession # [id number]    Technique: Complete abdominal ultrasound with  doppler    Comparison: No priors    Findings:    The visualized portion of the pancreas is demonstrating a 0.8 x 0.6 x 0.8 cm cyst.      The liver is normal in size measuring 15.7 cm.  The liver demonstrates homogeneous echotexture.  No focal hepatic lesions are seen.  Elevated hepatorenal index measuring 1.5 cm.  There is a focal fatty sparing near the gallbladder.    The gallbladder is unremarkable with no evidence of calculi.  The common duct is not dilated, measuring 3 mm.  The gallbladder wall is not thickened.  There is no sonographic Dillard sign.  No dilated intrahepatic radicles are seen.    The spleen is normal in size measuring 7.8 cm with a homogeneous echotexture.    There is no evidence of ascites.  There are bilateral pleural effusion.    The main portal vein, right portal vein, left portal vein, middle hepatic vein, right hepatic vein, left hepatic vein, SMV, and IVC are patent with proper directional flow.  The main hepatic artery is patent with a resistive index of 0.76.  The umbilical vein is not patent.   Impression         Hepatic steatosis and focal fatty sparing.    Bilateral pleural effusion.    Incidental finding of a subcentimeter pancreatic simple cyst.  Recommend 1 year followup with MRI/MRCP.    This report has been flagged in the [facility] medical record.  ______________________________________     Electronically signed by resident: ARBEN RODAS  Date: 02/22/18  Time: 09:56            As the supervising and teaching physician, I personally reviewed the images and resident's interpretation and I agree with the findings.          Electronically signed by: ANNETTA GRACIA MD  Date: 02/22/18  Time: 10:19          ULTRASOUND ARTERIAL LOWER EXTREMITY BILATERAL WITH HANNA    INDICATION: Right foot ulceration    COMPARISON: None.    TECHNIQUE: Prior to the procedure, bilateral ankle/brachial indices were obtained. Subsequently, spectral analysis of the right and left common femoral arteries,  proximal superficial femoral arteries, mid superficial femoral arteries, distal superficial femoral arteries, profunda arteries, proximal popliteal arteries, distal popliteal arteries, posterior tibial arteries, anterior tibial arteries, with PSV were measured.    FINDINGS:     HANNA is 1.1 on the left.  HANNA on the right could not be obtained due to right ankle dressing.    Velocities are within normal limits throughout both lower extremities.  Triphasic waveforms are identified in the right and left lower extremities.  There is no evidence of focal high grade arterial stenosis in the lower extremities.   Impression         No evidence of high-grade arterial stenosis in bilateral lower extremities.  Please see above for HANNA  ______________________________________     Electronically signed by resident: HORTENSIA REYES MD  Date: 02/22/18  Time: 18:46            As the supervising and teaching physician, I personally reviewed the images and resident's interpretation and I agree with the findings.          Electronically signed by: JOEL BA MD  Date: 02/22/18  Time: 18:49     Encounter     View Encounter              TRANSITION OF CARE:     [facility] On Call Contact Note:  2/27/2018    Family and/or Caretaker present at visit?  Yes.  Diagnostic tests reviewed/disposition: No diagnosic tests pending after this hospitalization.  Disease/illness education:  CHF, Cellulitis, LFTs, A Fib, DM II, CAD, CKD III, Meds  Home health/community services discussion/referrals: Patient does not have home health established from hospital visit.  They do not need home health.  If needed, we will set up home health for the patient.   Establishment or re-establishment of referral orders for community resources: No other necessary community resources.   Discussion with other health care providers: No discussion with other health care providers necessary.     Transition of Care Visit:     I have reviewed and updated the history and problem  list.  I have reconciled the medication list.  I have discussed the hospitalization and current medical issues, prognosis and plans with the patient/family.  I  spent more than 50% of time discussing the care with the patient/family.  Total Encounter in the [facility]: 60 minutes.    Medications Reconciliation:   I have reconciled the patient's home medications and discharge medications with the patient/family. I have updated all changes.  Refer to After-Visit Medication List.    ASSESSMENT & PLAN:     HIGH RISK CONDITION(S):  [phone](cell)          Recent admission for CHF exacerbation, Severe AS:   ` Patient's wt from home: 162 # this morning  ` Patient's wt at discharge: (2/24): 161 #  ` Today, on clinic scale: 174 (? Erroneous)  *No clinical evidence of cardiac decompensation on today's exam.   *2/17: BNP > 4900  *12/15: Reserve: 30%, + DD, PAP: 62  - continue Lasix 40/40  - continue Lisinopril 20  - continue Lopressor 75/75  - apt with Cards (4/3)   - check BNP, BMP and serum Mg today         Recent admission for RLE Cellulitis, + Pseudomonas (swab) culture to ulcer / in the setting of chronic venous insufficiency and chronic venous stasis ulcers:   *Cellulitic process resolved / wound Improving   *US of Legs: 2/22: - DVT  *HANNA: 2/22: normal pressures  - wound care per herself   *Completed Cipro          *TDaP today      Recent admission for Candida Infection to Left Breast:  (resolved)      Recent admission for Transaminitis:   *2/24:   T Bili: wnl  AST: 230<<<278<<< 344  ALT: 135<<< 149<<< 168  Alk Phos: 139<<< 151  - repeat LFTs        Incidental notation to Pancreatic Cyst on ABD US from 2/22:   MRI in 1 year per PCP (note has been shared)        Mechanical Fall at home ~ 2 weeks ago, while trying to get out of the shower and tripped over the rug:   Bruising is improving.  Denies hitting head or LOC.   *Refusing Outpatient therapy; unable to provide Inpatient due to her driving and not being  homebound.   *Rx fax'd to DME for a Cane for ambulation support; amendable to this.         CKD III:   *Baseline: 1.3-1.4  *Admit: 1.6  *Discharge: 1.6  - apt with Nephro (3/26)   - check Renal markers today   *Discussed not taking NSAIDS; voiced understanding  *Stop the Mobic       A Fib on Chronic AOC, s/p MAZE:   *HR today:85 (controlled)   - continue Lopressor 75/75  - continue Amiodarone  - continue Xarelto      MI / CAD:   Lab Results   Component Value Date    CHOL 173 12/15/2017     Lab Results   Component Value Date    HDL 33 (L) 12/15/2017     Lab Results   Component Value Date    LDLCALC 100.2 12/15/2017     Lab Results   Component Value Date    TRIG 199 (H) 12/15/2017     Lab Results   Component Value Date    CHOLHDL 19.1 (L) 12/15/2017   - continue ASA, Plavix, Statin, Lisinopril, Lopressor      Diabetes Mellitus (II):  Most recent A1c: 5.5% (1/10), reflective of Optimal OP control   - continue Metformin       Chronic Pain Syndrome:   *Established with Dr. David (Pain Mgt physician)  Smiley  Neurontin       Immunizations;   She is considered high risk; per ACIP guidelines, should start the Pneumonia Vaccine Series:   Prevnar 13: today   Will need the PPSV 23 in 6-12 months.     T dap: today       Instructions for the patient:  1) CHF Instructions    Monitor daily weight.  Regular activity within patient's limitations.  Low salt, low fat and low choleterol diet and restrict fluid < 2L per day.  Call MD if SOB, chest pain, weight gain > 2-3 lbs per day and/or 5-6 lbs per week.   No smoking. Annual influenza vaccine required    2) Will contact you with results of labs drawn in clinic today.     3) Call with questions.       [facility] Visit (Post Discharge Follow-up) Today:   - Our clinic physicians and nurses plan to follow the patient up for any medical issues in the [facility] for 30 days post discharge.    Future Appointments  Date Time Provider Department Center   3/9/2018 3:00 PM LAB, SAME  DAY [facility] INTMED [facility] LAB  [facility]   3/16/2018 3:45 PM LAB, [facility] LAB [place]   3/26/2018 3:00 PM Alicja Granado DPM [facility] POD [place] Pending sale to [facility]   3/26/2018 3:40 PM Cale Mendenhall MD [facility] NEPHRO [place] Pending sale to [facility]   4/3/2018 3:30 PM Jason Plaza MD [facility] CARDIO [facility]   5/12/2018 11:30 AM Sherley Soliz MD [facility]          Medication List          Accurate as of 3/9/18  2:56 PM. If you have any questions, ask your nurse or doctor.               CONTINUE taking these medications    amiodarone 200 MG Tab  Commonly known as:  PACERONE     aspirin 81 MG Chew  Take 1 tablet (81 mg total) by mouth once daily.     atorvastatin 80 MG tablet  Commonly known as:  LIPITOR  Take 1 tablet (80 mg total) by mouth once daily.     butalbital-acetaminophen-caffeine -40 mg -40 mg per tablet  Commonly known as:  FIORICET, ESGIC     clopidogrel 75 mg tablet  Commonly known as:  PLAVIX  Take 1 tablet (75 mg total) by mouth once daily.     collagenase ointment  Apply topically once daily.     estropipate 1.5 MG tablet  Commonly known as:  OGEN     furosemide 40 MG tablet  Commonly known as:  LASIX  Take 1 tablet (40 mg total) by mouth 2 (two) times daily.     gabapentin 100 MG capsule  Commonly known as:  NEURONTIN     hydrocodone-acetaminophen 10-325mg  mg Tab  Commonly known as:  NORCO     lisinopril 20 MG tablet  Commonly known as:  PRINIVIL,ZESTRIL     metFORMIN 500 MG (MOD) 24 hr tablet  Commonly known as:  GLUMETZA     metoprolol tartrate 50 MG tablet  Commonly known as:  LOPRESSOR  Take 1 tablet (50 mg total) by mouth 2 (two) times daily. Take 75 mg twice per day     nitroGLYCERIN 0.4 MG SL tablet  Commonly known as:  NITROSTAT  Place 1 tablet (0.4 mg total) under the tongue every 5 (five) minutes as needed for Chest pain (angina).     rivaroxaban 20 mg Tab  Commonly known as:  XARELTO  Take 1 tablet (20 mg total) by mouth daily with dinner or evening meal.     senna 8.6 mg  tablet  Commonly known as:  SENOKOT  Take 1 tablet by mouth 2 (two) times daily as needed for Constipation.     temazepam 15 mg Cap  Commonly known as:  RESTORIL     venlafaxine 150 MG Cp24  Commonly known as:  EFFEXOR-XR        STOP taking these medications    meloxicam 15 MG tablet  Commonly known as:  MOBIC  Stopped by:  TYLER Díaz            Signing Physician:  TYLER Díaz

## 2018-03-09 NOTE — PATIENT INSTRUCTIONS
1) CHF Instructions    Monitor daily weight.  Regular activity within patient's limitations.  Low salt, low fat and low choleterol diet and restrict fluid < 2L per day.  Call MD if SOB, chest pain, weight gain > 2-3 lbs per day and/or 5-6 lbs per week.   No smoking. Annual influenza vaccine required    2) Will contact you with results of labs drawn in clinic today.     3) Call with questions.     [facility] Clinic Visit (Post Discharge Follow-up) Today:   - Our clinic physicians and nurses plan to follow the patient up for any medical issues in the [facility] for 30 days post discharge.      Future Appointments  Date Time Provider Department Center   3/9/2018 3:00 PM LAB, SAME DAY [facility] CRISTAL [facility] LAB  [place]   3/16/2018 3:45 PM LAB, MA[facility]IE KEN LAB [place]   3/26/2018 3:00 PM Alicja Granado DPM [facility] POD [place] luc[place]   3/26/2018 3:40 PM Cale Mendenhall MD [facility] NEPHRO [place]   4/3/2018 3:30 PM Jason Plaza MD [facility] CARDIO [facility]   5/12/2018 11:30 AM Sherley Soliz MD [facility] luc[facility] W

## 2018-03-12 NOTE — TELEPHONE ENCOUNTER
"BNP: 4875 (<< > 4900)    BMP:  Lab Results   Component Value Date     2018    K 4.8 2018     2018    CO2 22 (L) 2018    BUN 38 (H) 2018    CREATININE 2.3 (H)  (< 1.6)  2018    CALCIUM 9.0 2018    ANIONGAP 16 2018    ESTGFRAFRICA 25.3 (A) 2018    EGFRNONAA 22.0 (A) 2018     M.3    Plan:   Hypomagnesemia:   Replete and repeat on Friday, 3/16    ` Hold the Lisinopril  ` Change Lasix to 40 daily (of note, patient has reportedly been taking Lasix 80 twice daily since discharge due to "fear of the fluid coming back up")  ` Repeat CMP on 3/16 and Serum Mag    Have shared the results and the plans with the patient.     SDJ     "

## 2018-03-20 NOTE — PROGRESS NOTES
Please note the following patients information has been forwarded to [facility] for Case Management or .    Please contact Outpatient Complex Care Mgmt at ext [phone] with questions.    Thank you,    Angelita Fong, [facility]  Outpatient Complex Care [facility]  Ext [phone]

## 2018-03-21 PROBLEM — I27.22 PULMONARY HYPERTENSION DUE TO LEFT VENTRICULAR SYSTOLIC DYSFUNCTION: Chronic | Status: ACTIVE | Noted: 2017-01-01

## 2018-03-21 PROBLEM — I50.9 ACUTE ON CHRONIC CONGESTIVE HEART FAILURE: Status: RESOLVED | Noted: 2018-01-01 | Resolved: 2018-01-01

## 2018-03-21 PROBLEM — I15.0 RENOVASCULAR HYPERTENSION: Chronic | Status: ACTIVE | Noted: 2017-01-01

## 2018-03-21 PROBLEM — I13.10 CARDIORENAL SYNDROME WITH RENAL FAILURE: Status: ACTIVE | Noted: 2018-01-01

## 2018-03-21 PROBLEM — R57.0 CARDIOGENIC SHOCK: Status: ACTIVE | Noted: 2018-01-01

## 2018-03-21 PROBLEM — E16.2 HYPOGLYCEMIC ENCEPHALOPATHY: Status: ACTIVE | Noted: 2018-01-01

## 2018-03-21 PROBLEM — I48.0 PAROXYSMAL ATRIAL FIBRILLATION: Chronic | Status: ACTIVE | Noted: 2017-01-01

## 2018-03-21 PROBLEM — G93.41 METABOLIC ENCEPHALOPATHY: Status: ACTIVE | Noted: 2018-01-01

## 2018-03-21 PROBLEM — I50.22 CHRONIC SYSTOLIC HEART FAILURE: Chronic | Status: ACTIVE | Noted: 2017-01-01

## 2018-03-21 PROBLEM — I50.23 ACUTE ON CHRONIC SYSTOLIC CONGESTIVE HEART FAILURE: Status: ACTIVE | Noted: 2018-01-01

## 2018-03-21 PROBLEM — I87.8 VENOUS STASIS OF BOTH LOWER EXTREMITIES: Chronic | Status: ACTIVE | Noted: 2018-01-01

## 2018-03-21 PROBLEM — J96.11 CHRONIC HYPOXEMIC RESPIRATORY FAILURE: Chronic | Status: ACTIVE | Noted: 2018-01-01

## 2018-03-21 PROBLEM — I35.0 AORTIC VALVE STENOSIS: Chronic | Status: ACTIVE | Noted: 2017-01-01

## 2018-03-21 NOTE — HOSPITAL COURSE
Since diabetes was too controlled and metformin contributed to lactic acidosis, metformin was discontinued.      Based on her weight, she needed 17 liters of fluid removed to get her to the same fluid state she was in when she was discharged from [facility], since she weighed 2 lbs more than when she was admitted to [facility].  She was put on norepinephrine drip and furosemide 80 mg IV three times daily.  Norepinephrine was able to weaned off relatively quickly.  Lactic acidosis improved, so she was taken off BiPAP on 3/22/18.  Her renal function initially worsened, so Nephrology was consulted when BUN and creatinine reached 83 and 2.9 on 3/22/18, but renal function subsequently improved.  Cardiology was concerned about her worsening anemia, due to risk of arteriovenous malformations.  Hemoglobin and hematocrit remained stable.  As expected, everything was improving because she was getting better perfusion and fluid removal.  Even her chronic hypoxia and stage 3 chronic kidney disease resolved.      The wound care nurse evaluated her right leg and foot on 3/23/18, noted infection of the dorsal foot wound, cultured it, and recommended dressings and Podiatry evaluation.  Doxycycline was started.  Podiatry ordered an x-ray, which showed no evidence of osteomyelitis or fracture.  They also recommended wound care.  Wound culture grew Enterococcus faecalis and a Gram negative jean pierre.  Doxycycline was changed to amoxicillin-clavulanate on 3/26/18.      She was advised she would need to go to a skilled nursing facility.  Physical/Occupational Therapy agreed. She ultimately declined SNF placement after not being accepted to preferred location. Will discharge home with HH and DME instead.

## 2018-03-21 NOTE — HPI
Anum Quick is a 62 y.o. white  woman with hypertension, diabetes mellitus type 2 (too well controlled on metformin), neuropathy, hyperlipidemia, hepatic steatosis, coronary artery disease, severe left atrial enlargement, severe aortic stenosis, chronic left and right-sided systolic congestive heart failure, pulmonary hypertension, chronic hypoxic respiratory failure, paroxysmal atrial fibrillation (anticoagulated on rivaroxaban), chronic kidney disease stage 3, anemia, chronic lower extremity venous stasis, chronic pain, and a subcentimeter pancreatic cyst.  She lives in [place], [place].  She is  but has had prior husbands, and had 3 children before 1  of a heart attack.  She worked as a  at [employer] on [employer] until she became ill.  Her primary care physician is Dr. Raghav Valdez.  Her cardiologist is Dr. Jason Plaza.  Her pain management specialist is Dr. Luc David.     She was planned for elective aortic valve replacement and Maze procedure by cardiothoracic surgeon Dr. Rodo Lundy, but it was postponed because her creatinine increased from 1.6 on 17 to 3.1 on 1/10/18, and her AST and ALT increased to 698 and 524.  These were thought to be due to decompensated heart failure and subsequently improved.   She was hospitalized at [facility] from 18-18 for decompensated heart failure requiring furosemide drip and metolazone.  BNP was greater than 4,900 on admission.  She had reportedly been taking furosemide 40 mg alternating with 20 mg daily prior to admission and weighed 178 lbs on admission.  Her fluid status was net negative 16 liters by discharge, and creatinine was 1.6.  She was prescribed furosemide 40 mg twice daily.   She was seen at [facility] internal medicine clinic on 3/9/18 by nurse practitioner Svetlana Vicente for worsening congestive heart failure.  She  weighed 79.3 kg (174 lb) and was hypoxic (oxygen saturation 88%) but reported being able to perform activities around her house.  Labs showed worsening renal failure with creatinine increased to 2.3, but BNP had decreased to 4,875.     She was eating a lot of ice at home, and progressively became weaker, gained weight, became more short of breath, and more nauseated.  Her  tried to convince her for a couple of weeks to go the hospital, but she was stubborn.  She was falling a lot, and was too heavy for her  to manage.  She started becoming confused.   Finally, on 3/21/18, her  called EMS.  She was taken to [facility] and found to have hypoglycemic coma, which responded to dextrose administration.  Blood pressure was as low as 85/66.  She was found to have cardiogenic shock with pH of 7.130, lactic acid greater than 12, creatinine increased to 2.7, BNP back up to greater than 4,900.  Weight had increased to 81.6 kg (180 lb).  She was placed on BiPAP, which helped treat her acidosis.  Prior to workup, she was thought to have sepsis, so IV fluids were begun, but stopped at 350 mL after consulting [facility], who also recommended starting a vasopressor or inotrope.  Anesthesiology placed a central venous catheter and she was started on dopamine.  Hospital Medicine recommended consulting her cardiologist, who was not available, so she was admitted to [facility].  She was started on IV furosemide 80 mg three times daily.  Cardiology was consulted to help manage.

## 2018-03-21 NOTE — ED NOTES
APPEARANCE: Alert, oriented and in no acute distress.  CARDIAC: Normal rate and rhythm, no murmur heard.      GASTRO: soft, bowel sounds normal, no tenderness, no abdominal distention.  MUSC: Full ROM. No bony tenderness or soft tissue tenderness. No obvious deformity.  ENT: EARS: no obvious drainage. NOSE: no active bleeding.

## 2018-03-21 NOTE — H&P
[facility]  History & Physical    Patient Name: Anum Quick  MRN: [id number]  Admission Date: 3/21/2018  Attending Physician: Cal Hollis MD   Primary Care Provider: Raghav Valdez MD         Patient information was obtained from spouse/SO, past medical records and ER records.     Subjective:     Principal Problem:Cardiogenic shock    Chief Complaint:   Chief Complaint   Patient presents with    Hypoglycemia     Decreased LOC over the past few days. CBG per EMS 32. Was given 1 amp D50. Per EMS pt more alert now after receiving D50. Bruising and some swelling noted to L side of face.         HPI: Anum Quick is a 62 y.o. white  woman with hypertension, diabetes mellitus type 2 (too well controlled on metformin), neuropathy, hyperlipidemia, hepatic steatosis, coronary artery disease, severe left atrial enlargement, severe aortic stenosis, chronic left and right-sided systolic congestive heart failure, pulmonary hypertension, chronic hypoxic respiratory failure, paroxysmal atrial fibrillation (anticoagulated on rivaroxaban), chronic kidney disease stage 3, anemia, chronic lower extremity venous stasis, chronic pain, and a subcentimeter pancreatic cyst.  She lives in [place], [place].  She is  but has had prior husbands, and had 3 children before 1  of a heart attack.  She worked as a  at [employer] on [facility] until she became ill.  Her primary care physician is Dr. Raghav Valdez.  Her cardiologist is Dr. Jason Plaza.  Her pain management specialist is Dr. Luc David.     She was planned for elective aortic valve replacement and Maze procedure by cardiothoracic surgeon Dr. Rodo Lundy, but it was postponed because her creatinine increased from 1.6 on 17 to 3.1 on 1/10/18, and her AST and ALT increased to 698 and 524.  These were thought to be due to decompensated heart failure and  subsequently improved.   She was hospitalized at [facility] from 2/17/18-2/24/18 for decompensated heart failure requiring furosemide drip and metolazone.  BNP was greater than 4,900 on admission.  She had reportedly been taking furosemide 40 mg alternating with 20 mg daily prior to admission and weight 178 lbs on admission.  Her fluid status was net negative 16 liters by discharge, and creatinine was 1.6.  She was prescribed furosemide 40 mg twice daily.   She was seen at [facility] internal medicine clinic on 3/9/18 by nurse practitioner Svetlana Vicente for worsening congestive heart failure.  She weighed 79.3 kg (174 lb) and was hypoxic (oxygen saturation 88%) but reported being able to perform activities around her house.  Labs showed worsening renal failure with creatinine increased to 2.3, but BNP had decreased to 4,875.     She was eating a lot of ice at home, and progressively became weaker, gained weight, became more short of breath, and more nauseated.  Her  tried to convince her for a couple of weeks to go the hospital, but she was stubborn.  She was falling a lot, and was too heavy for her  to manage.  She started becoming confused.   Finally, on 3/21/18, her  called EMS.  She was taken to [facility] and found to have hypoglycemic coma, which responded to dextrose administration.  Blood pressure was as low as 85/66.  She was found to have cardiogenic shock with pH of 7.130, lactic acid greater than 12, creatinine increased to 2.7, BNP back up to greater than 4,900.  Weight had increased to 81.6 kg (180 lb).  She was placed on BiPAP, which helped treat her acidosis.  Prior to workup, she was thought to have sepsis, so IV fluids were begun, but stopped at 350 mL after consulting [facility], who also recommended starting a vasopressor or inotrope.  Anesthesiology placed a  central venous catheter and she was started on dopamine.  Hospital Medicine recommended consulting her cardiologist, who was not available, so she was admitted to [facility].  She was started on IV furosemide 80 mg three times daily.  Cardiology was consulted to help manage.      Past Medical History:   Diagnosis Date    Chronic anticoagulation - on rivaroxaban 12/29/2017    PAF    Chronic systolic heart failure 12/14/2017     12-15-17   1 - Moderately depressed left ventricular systolic function (EF 30-35%). Akinetic LV apex. There is no thrombus in LV apex.   2 - Indeterminate LV diastolic function.    3 - Mildly to moderately depressed right ventricular systolic function .    4 - Pulmonary hypertension. The estimated PA systolic pressure is 63 mmHg.    5 - Severe low flow aortic valve stenosis (JAMES 0.49 cm2, AVAi 0.27 cm2/m2, peak aortic jet velocity 4.0 m/s,MG 48 mmHg).    6 - Mild to moderate mitral regurgitation.    7 - Mild tricuspid regurgitation.    8 - Severe left atrial enlargement.     CKD (chronic kidney disease) stage 3, GFR 30-59 ml/min 12/29/2017    Coronary artery disease involving native coronary artery of native heart without angina pectoris 12/29/2017    [facility] @ : Per report (12/4/2017) proximal LAD has 20% stenosis, RCA with LI otherwise normal coronaries      Essential hypertension 12/14/2017    Hyperlipidemia associated with type 2 diabetes mellitus 12/14/2017    Left atrial enlargement     NSTEMI (non-ST elevated myocardial infarction) 12/14/2017    Paroxysmal atrial fibrillation 12/15/2017    Renovascular hypertension 12/14/2017    Severe aortic stenosis 12/14/2017    12-15-17  Severe low flow aortic valve stenosis (JAMES 0.49 cm2, AVAi 0.27 cm2/m2, peak aortic jet velocity 4.0 m/s,MG 48 mmHg).      Type 2 diabetes mellitus with neurologic complication, without long-term current use of insulin 12/14/2017       Past Surgical History:   Procedure Laterality Date     CARDIAC SURGERY      HYSTERECTOMY      KNEE SURGERY      SHOULDER ARTHROSCOPY Right 01/16/2004       Review of patient's allergies indicates:  No Known Allergies    No current facility-administered medications on file prior to encounter.      Current Outpatient Prescriptions on File Prior to Encounter   Medication Sig    amiodarone (PACERONE) 200 MG Tab Take by mouth once daily.    aspirin 81 MG Chew Take 1 tablet (81 mg total) by mouth once daily.    atorvastatin (LIPITOR) 80 MG tablet Take 1 tablet (80 mg total) by mouth once daily.    butalbital-acetaminophen-caffeine -40 mg (FIORICET, ESGIC) -40 mg per tablet Take 1 tablet by mouth every 4 (four) hours as needed for Pain.    clopidogrel (PLAVIX) 75 mg tablet Take 1 tablet (75 mg total) by mouth once daily.    collagenase ointment Apply topically once daily.    estropipate (OGEN) 1.5 MG tablet Take 1.5 mg by mouth once daily.    furosemide (LASIX) 40 MG tablet Take 1 tablet (40 mg total) by mouth 2 (two) times daily.    gabapentin (NEURONTIN) 100 MG capsule Take 100 mg by mouth 3 (three) times daily.    hydrocodone-acetaminophen 10-325mg (NORCO)  mg Tab TK 1 T PO TID PRN FOR 30 DAYS    lisinopril (PRINIVIL,ZESTRIL) 20 MG tablet Take 20 mg by mouth once daily.    magnesium oxide (MAG-OX) 400 mg tablet Take 1 tablet (400 mg total) by mouth once daily.    metFORMIN (GLUMETZA) 500 MG (MOD) 24 hr tablet Take 500 mg by mouth daily with breakfast.    metoprolol tartrate (LOPRESSOR) 50 MG tablet Take 1 tablet (50 mg total) by mouth 2 (two) times daily. Take 75 mg twice per day    nitroGLYCERIN (NITROSTAT) 0.4 MG SL tablet Place 1 tablet (0.4 mg total) under the tongue every 5 (five) minutes as needed for Chest pain (angina).    rivaroxaban (XARELTO) 20 mg Tab Take 1 tablet (20 mg total) by mouth daily with dinner or evening meal.    senna (SENOKOT) 8.6 mg tablet Take 1 tablet by mouth 2 (two) times daily as needed for Constipation.     temazepam (RESTORIL) 15 mg Cap TK 1 C PO QHS    venlafaxine (EFFEXOR-XR) 150 MG Cp24 TK ONE C PO D WF     Family History     None        Social History Main Topics    Smoking status: Never Smoker    Smokeless tobacco: Never Used    Alcohol use No    Drug use: No    Sexual activity: Not on file     Review of Systems   Unable to perform ROS: Mental status change     Objective:     Vital Signs (Most Recent):  Temp: 97.2 °F (36.2 °C) (03/21/18 1032)  Pulse: 83 (03/21/18 1527)  Resp: 10 (03/21/18 1527)  BP: (!) 88/65 (03/21/18 1527)  SpO2: 100 % (03/21/18 1453) Vital Signs (24h Range):  Temp:  [97.2 °F (36.2 °C)] 97.2 °F (36.2 °C)  Pulse:  [71-83] 83  Resp:  [10-27] 10  SpO2:  [92 %-100 %] 100 %  BP: ()/(41-73) 88/65     Weight: 81.6 kg (180 lb)  Body mass index is 31.89 kg/m².    Physical Exam   Constitutional: She appears well-developed. She appears lethargic. No distress. Face mask in place.   HENT:   Head: Normocephalic and atraumatic.   Eyes: Conjunctivae are normal. Pupils are equal, round, and reactive to light. No scleral icterus.   Neck: Neck supple. No tracheal deviation present.   Cardiovascular: Normal rate.  An irregularly irregular rhythm present.   Murmur heard.   Systolic murmur is present   Pulmonary/Chest: Effort normal. She has rales.   Abdominal: Soft. She exhibits no distension. There is no tenderness. There is no guarding.   Musculoskeletal: She exhibits edema. She exhibits no deformity.   Neurological: She appears lethargic. She displays no tremor. She displays no seizure activity.   Skin: Skin is dry. Ecchymosis noted. There is pallor.   Right leg ulcers   Psychiatric:   Cannot assess   Nursing note and vitals reviewed.        CRANIAL NERVES     CN III, IV, VI   Pupils are equal, round, and reactive to light.       Significant Labs: All pertinent labs within the past 24 hours have been reviewed.    Significant Imaging: I have reviewed all pertinent imaging results/findings within  the past 24 hours.   X-Ray Chest AP Portable 3/21/18: No significant change from prior.  There is slightly smaller lung volumes likely related to poor inspiratory effort.  Moderate left basilar opacity concerning for effusion with atelectasis, superimposed airspace process not excluded overall similar to prior.  There is no large pneumothorax.  X-Ray Chest AP Portable 3/21/18: Insertion of right IJV catheter, tip mid SVC, no pneumothorax.  Right lung clear.  Patchy infiltrate atelectasis obscuring the left lung base bases segments lower lobe, diaphragm, portion cardiac apex.  Postop changes left shoulder stable.    Assessment/Plan:     * Cardiogenic shock    Acute on chronic systolic congestive heart failure  Pulmonary hypertension due to left ventricular systolic dysfunction  Left atrial enlargement  Chronic hypoxemic respiratory failure  Takes furosemide 40 mg BID, metoprolol tartrate 50 mg BID, lisinopril 20 mg daily at home.  Was eating a lot of ice, which likely contributed to fluid overload.  Give norepinephrine and furosemide 80 mg IV TID.  Consult Cardiology.  Continue BiPAP and titrate to nasal cannula when lactic acidosis improves.          Cardiorenal syndrome with renal failure    CKD (chronic kidney disease) stage 3, GFR 30-59 ml/min  Monitor.          Venous stasis of both lower extremities    Wound Care.          Coronary artery disease involving native coronary artery of native heart without angina pectoris    Takes aspirin 81 mg daily, clopidrogel 75 mg daily, atorvastatin.  Hold until more alert.          Paroxysmal atrial fibrillation    Chronic anticoagulation - on rivaroxaban   Takes amiodarone 200 mg daily and rivaroxaban at home.  Hold for now.          Hyperlipidemia associated with type 2 diabetes mellitus    Takes atorvastatin 80 mg daily.  Hold for now.          Type 2 diabetes mellitus with neurologic complication, without long-term current use of insulin    Hypoglycemic  encephalopathy  Has not needed metformin for a while.  Discontinue since it contributes to lactic acidosis.  Insulin aspart sliding scale.  Monitor serum glucose.  Hold gabapentin.            VTE Risk Mitigation         Ordered     heparin, porcine (PF) 100 unit/mL injection flush 500 Units  Once     Route:  Intravenous        03/21/18 3987        Critical care time spent on the evaluation and treatment of severe organ dysfunction, review of pertinent labs and imaging studies, discussions with consulting providers and discussions with patient/family: 60 minutes.     Cal Hollis MD  Department of Hospital Medicine   [facility]

## 2018-03-21 NOTE — ED PROVIDER NOTES
Encounter Date: 3/21/2018    SCRIBE #1 NOTE: I, Hadley De La Fuente, am scribing for, and in the presence of, Dr. Rick.       History     Chief Complaint   Patient presents with    Hypoglycemia     Decreased LOC over the past few days. CBG per EMS 32. Was given 1 amp D50. Per EMS pt more alert now after receiving D50. Bruising and some swelling noted to L side of face.      Anum Quick is a 62 y.o. female who  has a past medical history of Chronic anticoagulation - on rivaroxaban (12/29/2017); Chronic systolic heart failure (12/14/2017); CKD (chronic kidney disease) stage 3, GFR 30-59 ml/min (12/29/2017); Coronary artery disease involving native coronary artery of native heart without angina pectoris (12/29/2017); Essential hypertension (12/14/2017); Hyperlipidemia associated with type 2 diabetes mellitus (12/14/2017); NSTEMI (non-ST elevated myocardial infarction) (12/14/2017); Paroxysmal atrial fibrillation (12/15/2017); Renovascular hypertension (12/14/2017); Severe aortic stenosis (12/14/2017); and Type 2 diabetes mellitus with neurologic complication, without long-term current use of insulin (12/14/2017).    The patient presents to the ED due to confusion today. Patient's CBG was 32, per EMS. She was given 1 amp D50 by EMS, which improved her alertness. Spouse notes shortness of breath, difficulty walking, generalized weakness, swelling of BLE, and frequent urination for one month. Patient required help walking to the bathroom at home due to her weakness.  Pt spouse has had to help her with any ambulation at home for the past month. Spouse denies any recent bloody stool. Patient has been eating lots of ice chips lately. No vomiting.  No fever.  + worsening swelling.      Recent hospitalization for CHF exacerbation.  Pt  notes that she has been told she needed an AVR by Dr. Lundy.       The history is provided by the patient and the spouse.     Review of patient's allergies indicates:  No Known  Allergies  Past Medical History:   Diagnosis Date    Chronic anticoagulation - on rivaroxaban 12/29/2017    PAF    Chronic systolic heart failure 12/14/2017     12-15-17   1 - Moderately depressed left ventricular systolic function (EF 30-35%). Akinetic LV apex. There is no thrombus in LV apex.   2 - Indeterminate LV diastolic function.    3 - Mildly to moderately depressed right ventricular systolic function .    4 - Pulmonary hypertension. The estimated PA systolic pressure is 63 mmHg.    5 - Severe low flow aortic valve stenosis (JAMES 0.49 cm2, AVAi 0.27 cm2/m2, peak aortic jet velocity 4.0 m/s,MG 48 mmHg).    6 - Mild to moderate mitral regurgitation.    7 - Mild tricuspid regurgitation.    8 - Severe left atrial enlargement.     CKD (chronic kidney disease) stage 3, GFR 30-59 ml/min 12/29/2017    Coronary artery disease involving native coronary artery of native heart without angina pectoris 12/29/2017    [facility] @ : Per report (12/4/2017) proximal LAD has 20% stenosis, RCA with LI otherwise normal coronaries      Essential hypertension 12/14/2017    Hyperlipidemia associated with type 2 diabetes mellitus 12/14/2017    NSTEMI (non-ST elevated myocardial infarction) 12/14/2017    Paroxysmal atrial fibrillation 12/15/2017    Renovascular hypertension 12/14/2017    Severe aortic stenosis 12/14/2017    12-15-17  Severe low flow aortic valve stenosis (JAMES 0.49 cm2, AVAi 0.27 cm2/m2, peak aortic jet velocity 4.0 m/s,MG 48 mmHg).      Type 2 diabetes mellitus with neurologic complication, without long-term current use of insulin 12/14/2017     Past Surgical History:   Procedure Laterality Date    CARDIAC SURGERY      HYSTERECTOMY      KNEE SURGERY       History reviewed. No pertinent family history.  Social History   Substance Use Topics    Smoking status: Never Smoker    Smokeless tobacco: Never Used    Alcohol use No     Review of Systems   Constitutional: Positive for activity change, appetite change  and fatigue.   HENT: Negative for sore throat and trouble swallowing.    Respiratory: Positive for shortness of breath. Negative for chest tightness.    Cardiovascular: Positive for leg swelling. Negative for chest pain and palpitations.   Gastrointestinal: Negative for abdominal pain, blood in stool, nausea and vomiting.   Endocrine: Negative for polydipsia and polyphagia.   Genitourinary: Positive for frequency. Negative for decreased urine volume and hematuria.   Musculoskeletal: Positive for gait problem.   Skin: Positive for color change (bruising of arms) and pallor.   Neurological: Positive for weakness (generalized). Negative for dizziness, numbness and headaches.   Hematological: Bruises/bleeds easily.   Psychiatric/Behavioral: Positive for confusion.   All other systems reviewed and are negative.      Physical Exam     Initial Vitals   BP Pulse Resp Temp SpO2   -- -- -- -- --      MAP       --         Physical Exam    Nursing note and vitals reviewed.  Constitutional: She appears well-developed and well-nourished. She appears distressed.   Mild distress   HENT:   Head: Normocephalic and atraumatic.   Pale conjunctiva, dry mucous membranes, dry cracked lips   Eyes: Conjunctivae and EOM are normal.   Pale conjunctivae, pupils reactive to light bilaterally   Neck: Normal range of motion. Neck supple.   Cardiovascular: Normal rate, regular rhythm, normal heart sounds and intact distal pulses. Exam reveals no gallop and no friction rub.    No murmur heard.  Pulmonary/Chest: Breath sounds normal. She has no wheezes. She has no rhonchi. She has no rales.   Tachypnic   Abdominal: Soft. Bowel sounds are normal. She exhibits no distension. There is no tenderness.   Musculoskeletal: Normal range of motion. She exhibits edema (BLE edema).   Chronic wound on right foot   Neurological: She is alert and oriented to person, place, and time. She has normal strength. No sensory deficit.   Skin: Skin is warm and dry. There  is pallor.   Psychiatric: She has a normal mood and affect. Her behavior is normal.         ED Course   Critical Care  Date/Time: 3/21/2018 3:20 PM  Performed by: SANDOVAL SHAW  Authorized by: SANDOVAL SHAW   Direct patient critical care time: 20 minutes  Additional history critical care time: 6 minutes  Ordering / reviewing critical care time: 5 minutes  Documentation critical care time: 5 minutes  Consulting other physicians critical care time: 10 minutes  Consult with family critical care time: 3 minutes  Total critical care time (exclusive of procedural time) : 49 minutes  Critical care time was exclusive of separately billable procedures and treating other patients and teaching time.  Critical care was necessary to treat or prevent imminent or life-threatening deterioration of the following conditions: circulatory failure, cardiac failure, metabolic crisis and respiratory failure.  Critical care was time spent personally by me on the following activities: discussions with consultants, evaluation of patient's response to treatment, obtaining history from patient or surrogate, ordering and review of laboratory studies, pulse oximetry, review of old charts, development of treatment plan with patient or surrogate, examination of patient, ordering and performing treatments and interventions, ordering and review of radiographic studies and re-evaluation of patient's condition.        Labs Reviewed   CBC W/ AUTO DIFFERENTIAL - Abnormal; Notable for the following:        Result Value    RBC 3.09 (*)     Hemoglobin 8.2 (*)     Hematocrit 28.8 (*)     MCH 26.5 (*)     MCHC 28.5 (*)     RDW 19.0 (*)     Gran # (ANC) 10.3 (*)     Gran% 83.7 (*)     Lymph% 10.3 (*)     All other components within normal limits   COMPREHENSIVE METABOLIC PANEL - Abnormal; Notable for the following:     CO2 5 (*)     Glucose 182 (*)     BUN, Bld 63 (*)     Creatinine 2.7 (*)     Albumin 3.2 (*)     Total Bilirubin 1.1 (*)      Alkaline Phosphatase 181 (*)     AST 97 (*)     ALT 79 (*)     Anion Gap 35 (*)     eGFR if  21 (*)     eGFR if non  18 (*)     All other components within normal limits    Narrative:       CO2  critical result(s) called and verbal readback obtained from   Brunilda Quick RN. at 12:25 on 03/21/2018, 03/21/2018 12:26   LACTIC ACID, PLASMA - Abnormal; Notable for the following:     Lactate (Lactic Acid) >12.0 (*)     All other components within normal limits    Narrative:        Lactic Acid  critical result(s) called and verbal readback   obtained from  Brunilda Quick RN. at 12:48 on 03/21/2018,   03/21/2018 12:49   MAGNESIUM - Abnormal; Notable for the following:     Magnesium 1.5 (*)     All other components within normal limits   PHOSPHORUS - Abnormal; Notable for the following:     Phosphorus 7.7 (*)     All other components within normal limits   PROTIME-INR - Abnormal; Notable for the following:     Prothrombin Time 33.4 (*)     INR 3.2 (*)     All other components within normal limits   TROPONIN I - Abnormal; Notable for the following:     Troponin I 0.121 (*)     All other components within normal limits   B-TYPE NATRIURETIC PEPTIDE - Abnormal; Notable for the following:     BNP >4,900 (*)     All other components within normal limits   ISTAT PROCEDURE - Abnormal; Notable for the following:     POC PH 7.130 (*)     POC PCO2 23.9 (*)     POC PO2 27 (*)     POC HCO3 7.9 (*)     POC SATURATED O2 36 (*)     POC TCO2 9 (*)     POC Ionized Calcium 1.02 (*)     POC Hematocrit 29 (*)     All other components within normal limits   ISTAT PROCEDURE - Abnormal; Notable for the following:     POC PCO2 29.7 (*)     POC PO2 382 (*)     POC HCO3 17.2 (*)     POC TCO2 18 (*)     All other components within normal limits   CULTURE, BLOOD   CULTURE, BLOOD   CULTURE, URINE   URINALYSIS   PROCALCITONIN   PROCALCITONIN   LACTIC ACID, PLASMA   POCT GLUCOSE   POCT GLUCOSE MONITORING CONTINUOUS      EKG Readings: (Independently Interpreted)   Rhythm: Normal Sinus Rhythm. Heart Rate: 74. Conduction: 1st Degree AV Block. Clinical Impression: AV Block - 1st Degree     Imaging Results          X-Ray Chest AP Portable (In process)                CT Head Without Contrast (Final result)  Result time 03/21/18 12:49:19    Final result by Santos Read DO (03/21/18 12:49:19)                 Impression:      Unremarkable slightly motion limited noncontrast CT head specifically without evidence for acute intracranial hemorrhage.  Clinical correlation and further evaluation as warranted.      Electronically signed by: Santos Read DO  Date:    03/21/2018  Time:    12:49             Narrative:    EXAMINATION:  CT HEAD WITHOUT CONTRAST    CLINICAL HISTORY:  altered mental status;  Altered mental status, unspecified    TECHNIQUE:  Multiple sequential 5 mm axial images of the head without contrast.  Coronal and sagittal reformatted imaging from the axial acquisition.    COMPARISON:  None    FINDINGS:  Study slightly limited by patient motion.  Within the limits of the study there is no evidence for acute intracranial hemorrhage or sulcal effacement.  The ventricles are normal in size without hydrocephalus.  There is no midline shift or mass effect.  Visualized paranasal sinuses and mastoid air cells are clear.                               X-Ray Chest AP Portable (Final result)  Result time 03/21/18 11:40:32    Final result by Santos Read DO (03/21/18 11:40:32)                 Impression:      Please see above      Electronically signed by: Santos Read DO  Date:    03/21/2018  Time:    11:40             Narrative:    EXAMINATION:  XR CHEST AP PORTABLE    CLINICAL HISTORY:  Sepsis;    TECHNIQUE:  Single frontal view of the chest was performed.    COMPARISON:  02/17/2018    FINDINGS:  No significant change from prior.  There is slightly smaller lung volumes likely related to poor inspiratory effort.  Moderate left  basilar opacity concerning for effusion with atelectasis, superimposed airspace process not excluded overall similar to prior.  There is no large pneumothorax.                                X-Rays:   Independently Interpreted Readings:   Chest X-Ray: Cardiomegaly present. Left pleural effusion present. No PTX   Head CT: No hemorrhage.  No skull fracture.  No acute stroke.     Medical Decision Making:   History:   Old Medical Records: I decided to obtain old medical records.  Initial Assessment:   62 y.o. Female presents with confusion and generalized weakness. Husbands states she has not been able to ambulate without assistance for one month, but he has not noticed confusion until today.  Pt has not been complaining of chest pain.    Differential Diagnosis:   Sepsis, anemia, metabolic derangement, CHF exacerbation, acute coronary syndrome, pneumonia, UTI  Independently Interpreted Test(s):   I have ordered and independently interpreted X-rays - see prior notes.  I have ordered and independently interpreted EKG Reading(s) - see prior notes  Clinical Tests:   Lab Tests: Ordered and Reviewed  Radiological Study: Reviewed and Ordered  Medical Tests: Reviewed and Ordered  ED Management:   Pt presents to ED with hypotension, sob, tachypnea and generalized weakness.  Pt was immediately placed on bipap and IVF initiated.  Pt received 350 cc of IVF and b/p improved.  ABG reviewed.  Labs pending.    Pt has been re-evaluated multiple times while in ED.  She reports feeling better after being on bipap.  Labs + for multiple acute abnormalities consistent with cardiogenic shock.  Pt has returned from CT.  CT brain pending    1300: CT brain neg. I have re-evaluated the patient.  I have discussed the case with Dr. Hollis who is willing to admit the patient here but request checking with Cardiology at main [place] to ensure she would not benefit from transport. 1322: I have discussed this with the transfer center and am awaiting  call back from cardiology.      1400:  Anesthesia at bedside.  Central line placed.  CXR ordered.    1450: Spoke to Dr. Hollis. Patient will be admitted to ICU.  Other:   I have discussed this case with another health care provider.                      Clinical Impression:   The primary encounter diagnosis was Cardiogenic shock. Diagnoses of Altered mental status, Encounter for central line placement, and Lactic acidosis were also pertinent to this visit.    Disposition:   Disposition: Admitted  Condition: Fair       I, Dr. Funmi Rick, personally performed the services described in this documentation. All medical record entries made by the scribe were at my direction and in my presence.  I have reviewed the chart and agree that the record reflects my personal performance and is accurate and complete. Funmi Rick MD.  3:20 PM 03/21/2018                     Funmi Rick MD  03/21/18 1524

## 2018-03-21 NOTE — ED NOTES
62 year old female presents to ed cc of hypoglycemia. Patient arrived via ems with report of ams. Upon ed arrival patient responsive to painful stimuli only. Patient unable to answer questions appropriately at this time.

## 2018-03-21 NOTE — ASSESSMENT & PLAN NOTE
Chronic anticoagulation - on rivaroxaban   Takes amiodarone 200 mg daily and rivaroxaban at home.  Hold for now.

## 2018-03-21 NOTE — ANESTHESIA PROCEDURE NOTES
Central Line    Diagnosis: Cardiogenic Shock  Doctor requesting consult: Dr. Fisher   Patient location during procedure: ED  Procedure start time: 3/21/2018 1:50 PM  Timeout: 3/21/2018 1:49 PM  Procedure end time: 3/21/2018 2:25 PM  Staffing  Anesthesiologist: ROSINA JUSTICE  Resident/CRNA: MARQUISE RODRIGUEZ  Performed: resident/CRNA   Anesthesiologist was present at the time of the procedure.  Preanesthetic Checklist  Completed: patient identified, site marked, surgical consent, pre-op evaluation, timeout performed, IV checked, risks and benefits discussed, monitors and equipment checked and anesthesia consent given  Indication  Indication: vascular access, med administration     Anesthesia     Central Line  Skin Prep: skin prepped with ChloraPrep, skin prep agent completely dried prior to procedure  maximum sterile barriers used during central venous catheter insertion  hand hygiene performed prior to central venous catheter insertion  Location: right external jugular,   Catheter type: triple lumen  Catheter Size: 7 Fr  Inserted Catheter Length: 20 cm  Ultrasound: vascular probe with ultrasound  Vessel Caliber: medium, patent, compressibility normal  Needle advanced into vessel with real time Ultrasound guidance.  Guidewire confirmed in vessel.  Sterile sheath used.  Image recorded and saved.  Manometry: none  Insertion Attempts: 1   Securement:line sutured     Post-Procedure  X-Ray: no pneumothorax on x-ray and placement verified by x-ray  Adverse Events:none

## 2018-03-21 NOTE — SUBJECTIVE & OBJECTIVE
Past Medical History:   Diagnosis Date    Chronic anticoagulation - on rivaroxaban 12/29/2017    PAF    Chronic systolic heart failure 12/14/2017     12-15-17   1 - Moderately depressed left ventricular systolic function (EF 30-35%). Akinetic LV apex. There is no thrombus in LV apex.   2 - Indeterminate LV diastolic function.    3 - Mildly to moderately depressed right ventricular systolic function .    4 - Pulmonary hypertension. The estimated PA systolic pressure is 63 mmHg.    5 - Severe low flow aortic valve stenosis (JAMSE 0.49 cm2, AVAi 0.27 cm2/m2, peak aortic jet velocity 4.0 m/s,MG 48 mmHg).    6 - Mild to moderate mitral regurgitation.    7 - Mild tricuspid regurgitation.    8 - Severe left atrial enlargement.     CKD (chronic kidney disease) stage 3, GFR 30-59 ml/min 12/29/2017    Coronary artery disease involving native coronary artery of native heart without angina pectoris 12/29/2017    [facility] @ : Per report (12/4/2017) proximal LAD has 20% stenosis, RCA with LI otherwise normal coronaries      Essential hypertension 12/14/2017    Hyperlipidemia associated with type 2 diabetes mellitus 12/14/2017    Left atrial enlargement     NSTEMI (non-ST elevated myocardial infarction) 12/14/2017    Paroxysmal atrial fibrillation 12/15/2017    Renovascular hypertension 12/14/2017    Severe aortic stenosis 12/14/2017    12-15-17  Severe low flow aortic valve stenosis (JAMES 0.49 cm2, AVAi 0.27 cm2/m2, peak aortic jet velocity 4.0 m/s,MG 48 mmHg).      Type 2 diabetes mellitus with neurologic complication, without long-term current use of insulin 12/14/2017       Past Surgical History:   Procedure Laterality Date    CARDIAC SURGERY      HYSTERECTOMY      KNEE SURGERY      SHOULDER ARTHROSCOPY Right 01/16/2004       Review of patient's allergies indicates:  No Known Allergies    No current facility-administered medications on file prior to encounter.      Current Outpatient Prescriptions on File Prior to  Encounter   Medication Sig    amiodarone (PACERONE) 200 MG Tab Take by mouth once daily.    aspirin 81 MG Chew Take 1 tablet (81 mg total) by mouth once daily.    atorvastatin (LIPITOR) 80 MG tablet Take 1 tablet (80 mg total) by mouth once daily.    butalbital-acetaminophen-caffeine -40 mg (FIORICET, ESGIC) -40 mg per tablet Take 1 tablet by mouth every 4 (four) hours as needed for Pain.    clopidogrel (PLAVIX) 75 mg tablet Take 1 tablet (75 mg total) by mouth once daily.    collagenase ointment Apply topically once daily.    estropipate (OGEN) 1.5 MG tablet Take 1.5 mg by mouth once daily.    furosemide (LASIX) 40 MG tablet Take 1 tablet (40 mg total) by mouth 2 (two) times daily.    gabapentin (NEURONTIN) 100 MG capsule Take 100 mg by mouth 3 (three) times daily.    hydrocodone-acetaminophen 10-325mg (NORCO)  mg Tab TK 1 T PO TID PRN FOR 30 DAYS    lisinopril (PRINIVIL,ZESTRIL) 20 MG tablet Take 20 mg by mouth once daily.    magnesium oxide (MAG-OX) 400 mg tablet Take 1 tablet (400 mg total) by mouth once daily.    metFORMIN (GLUMETZA) 500 MG (MOD) 24 hr tablet Take 500 mg by mouth daily with breakfast.    metoprolol tartrate (LOPRESSOR) 50 MG tablet Take 1 tablet (50 mg total) by mouth 2 (two) times daily. Take 75 mg twice per day    nitroGLYCERIN (NITROSTAT) 0.4 MG SL tablet Place 1 tablet (0.4 mg total) under the tongue every 5 (five) minutes as needed for Chest pain (angina).    rivaroxaban (XARELTO) 20 mg Tab Take 1 tablet (20 mg total) by mouth daily with dinner or evening meal.    senna (SENOKOT) 8.6 mg tablet Take 1 tablet by mouth 2 (two) times daily as needed for Constipation.    temazepam (RESTORIL) 15 mg Cap TK 1 C PO QHS    venlafaxine (EFFEXOR-XR) 150 MG Cp24 TK ONE C PO D WF     Family History     None        Social History Main Topics    Smoking status: Never Smoker    Smokeless tobacco: Never Used    Alcohol use No    Drug use: No    Sexual activity: Not  on file     Review of Systems   Unable to perform ROS: Mental status change     Objective:     Vital Signs (Most Recent):  Temp: 97.2 °F (36.2 °C) (03/21/18 1032)  Pulse: 83 (03/21/18 1527)  Resp: 10 (03/21/18 1527)  BP: (!) 88/65 (03/21/18 1527)  SpO2: 100 % (03/21/18 1453) Vital Signs (24h Range):  Temp:  [97.2 °F (36.2 °C)] 97.2 °F (36.2 °C)  Pulse:  [71-83] 83  Resp:  [10-27] 10  SpO2:  [92 %-100 %] 100 %  BP: ()/(41-73) 88/65     Weight: 81.6 kg (180 lb)  Body mass index is 31.89 kg/m².    Physical Exam   Constitutional: She appears well-developed. She appears lethargic. No distress. Face mask in place.   HENT:   Head: Normocephalic and atraumatic.   Eyes: Conjunctivae are normal. Pupils are equal, round, and reactive to light. No scleral icterus.   Neck: Neck supple. No tracheal deviation present.   Cardiovascular: Normal rate.  An irregularly irregular rhythm present.   Murmur heard.   Systolic murmur is present   Pulmonary/Chest: Effort normal. She has rales.   Abdominal: Soft. She exhibits no distension. There is no tenderness. There is no guarding.   Musculoskeletal: She exhibits edema. She exhibits no deformity.   Neurological: She appears lethargic. She displays no tremor. She displays no seizure activity.   Skin: Skin is dry. Ecchymosis noted. There is pallor.   Right leg ulcers   Psychiatric:   Cannot assess   Nursing note and vitals reviewed.        CRANIAL NERVES     CN III, IV, VI   Pupils are equal, round, and reactive to light.       Significant Labs: All pertinent labs within the past 24 hours have been reviewed.    Significant Imaging: I have reviewed all pertinent imaging results/findings within the past 24 hours.   X-Ray Chest AP Portable 3/21/18: No significant change from prior.  There is slightly smaller lung volumes likely related to poor inspiratory effort.  Moderate left basilar opacity concerning for effusion with atelectasis, superimposed airspace process not excluded overall  similar to prior.  There is no large pneumothorax.  X-Ray Chest AP Portable 3/21/18: Insertion of right IJV catheter, tip mid SVC, no pneumothorax.  Right lung clear.  Patchy infiltrate atelectasis obscuring the left lung base bases segments lower lobe, diaphragm, portion cardiac apex.  Postop changes left shoulder stable.

## 2018-03-21 NOTE — ASSESSMENT & PLAN NOTE
Acute on chronic systolic congestive heart failure  Pulmonary hypertension due to left ventricular systolic dysfunction  Left atrial enlargement  Chronic hypoxemic respiratory failure  Takes furosemide 40 mg BID, metoprolol tartrate 50 mg BID, lisinopril 20 mg daily at home.  Was eating a lot of ice, which likely contributed to fluid overload.  Give norepinephrine and furosemide 80 mg IV TID.  Consult Cardiology.  Continue BiPAP and titrate to nasal cannula when lactic acidosis improves.

## 2018-03-21 NOTE — ASSESSMENT & PLAN NOTE
Hypoglycemic encephalopathy  Has not needed metformin for a while.  Discontinue since it contributes to lactic acidosis.  Insulin aspart sliding scale.  Monitor serum glucose.  Hold gabapentin.

## 2018-03-22 PROBLEM — E16.2 HYPOGLYCEMIC ENCEPHALOPATHY: Status: RESOLVED | Noted: 2018-01-01 | Resolved: 2018-01-01

## 2018-03-22 NOTE — PLAN OF CARE
Problem: Patient Care Overview  Goal: Plan of Care Review  Outcome: Ongoing (interventions implemented as appropriate)  Pt's SpO2 100% on bipap settings 10 IPAP/ 5 EPAP/ 30% FiO2. No respiratory distress noted. Will continue to monitor SpO2.

## 2018-03-22 NOTE — PLAN OF CARE
Per progress notes: Pt admitted  [facility] from 2-17-2/24 with ADHF requiring Lasix and Metolazone with adequate diuresis and was discharged home on Lasix 40mg po BID. She followed up in Primary care on 3/9/2018 with continued CHF. She was seen late last year by CTS for evaluation of AVR/Maze procedure that was ultimately deferred due to ARF with rise in creatinine from 1.6 to 3.1    Spouse called 911 yesterday as pt's CBG was low and pt was confused; EMS brought pt to [facility].    TN met with pt and spouse who informed Tn pt current with [facility] and they prefer to resume services upon discharge. Pt has a glucometer and a scale at home . Pt's spouse provides transportation as needed.    TN to follow for additional needs.        03/22/18 2948   Discharge Assessment   Assessment Type Discharge Planning Assessment   Confirmed/corrected address and phone number on facesheet? Yes   Assessment information obtained from? Patient   Expected Length of Stay (days) 2   Communicated expected length of stay with patient/caregiver yes   Prior to hospitilization cognitive status: Alert/Oriented   Prior to hospitalization functional status: Needs Assistance   Current cognitive status: Alert/Oriented   Current Functional Status: Needs Assistance   Lives With spouse   Able to Return to Prior Arrangements yes   Is patient able to care for self after discharge? Unable to determine at this time (comments)   Who are your caregiver(s) and their phone number(s)? Dnaiel (spouse) [phone]   Patient's perception of discharge disposition home health   Readmission Within The Last 30 Days previous discharge plan unsuccessful   If yes, most recent facility [place] [facility]   Patient currently being followed by outpatient case management? No   Patient currently receives any other outside agency services? No   Equipment Currently Used at Home glucometer;other (see comments)  (scale)   Do you have any problems affording any of your prescribed  medications? No   Is the patient taking medications as prescribed? yes   Does the patient have transportation home? Yes  (spouse)   Transportation Available family or friend will provide   Does the patient receive services at the [facility]? No   Discharge Plan A Home;Home Health   Discharge Plan B Skilled Nursing Facility   Patient/Family In Agreement With Plan yes   Readmission Questionnaire   At the time of your discharge, did someone talk to you about what your health problems were? Yes   At the time of discharge, did someone talk to you about what to watch out for regarding worsening of your health problem? Yes   At the time of discharge, did someone talk to you about what to do if you experienced worsening of your health problem? Yes   At the time of discharge, did someone talk to you about which medication to take when you left the hospital and which ones to stop taking? Yes   At the time of discharge, did someone talk to you about when and where to follow up with a doctor after you left the hospital? Yes   What do you believe caused you to be sick enough to be re-admitted? low blood sugarand confused   How often do you need to have someone help you when you read instructions, pamphlets, or other written material from your doctor or pharmacy? Rarely   Do you have problems taking your medications as prescribed? No   Do you have any problems affording any of  your prescribed medications? No   Do you have problems obtaining/receiving your medications? No   Does the patient have transportation to healthcare appointments? Yes   Living Arrangements house   Does the patient have family/friends to help with healtcare needs after discharge? yes   Does your caregiver provide all the help you need? Yes   Are you currently feeling confused? No   Are you currently having problems thinking? Yes   Are you currently having memory problems? Yes   Have you felt down, depressed, or hopeless? 1   Have you felt little  interest or pleasure in doing things? 1   In the last 7 days, my sleep quality was: fair

## 2018-03-22 NOTE — CONSULTS
[facility]  Cardiology  Consult Note    Patient Name: Anum Quick  MRN: [id number]  Admission Date: 3/21/2018  Hospital Length of Stay: 1 days  Code Status: Full Code   Attending Provider: Miguel Hollis MD   Consulting Provider: MAXWELL Soto, ANP  Primary Care Physician: Raghav Valdez MD  Principal Problem:Cardiogenic shock    Patient information was obtained from patient and past medical records.     Inpatient consult to [facility]  Consult performed by: AIME TABARES  Consult ordered by: MIGUEL HOLLIS  Reason for consult: cardiogenic shock and severe AS         Subjective:     Chief Complaint:  Hypoglycemia, lethargy/confusion and hypotension      HPI:   61yo female with history of nonobstructive CAD (LHC with pLAD 20%, RCA LIs and all other cors normal @[facility] 12/2017), severe AS (JAMES .49cm2, MG 48mmHg and peak velocity 4), PAF on Xarelto, HLP, DMII, PHTN and chronic systolic heart failure with EF 30-35% per echo 12/2017 who presented to the ER with complaints of low blood pressure and blood sugar. She was seen this morning in the ICU shortly after being taken off of BiPap. She was lethargic and difficult to understand but was oriented to place and time, therefore her HPI was obtained from primary team's admit H&P.     She was apparently weak and SOB at home for the past few weeks and refused to return to the ER for evaluation. Her  called EMS yesterday due to her lethargy and low blood sugar. She was admitted at [facility] from 2-17-2/24 with ADHF requiring Lasix and Metolazone with adequate diuresis and was discharged home on Lasix 40mg po BID. She followed up in Primary care on 3/9/2018 with continued CHF. She was seen late last year by CTS for evaluation of AVR/Maze procedure that was ultimately deferred due to ARF with rise in creatinine from 1.6 to 3.1                         Hospital Course:  3/21/2018 Presented to the ER via EMS with lethargy,  weakness, hypoglycemia and low blood pressure. Upon arrival to the ER, was found to be in hypoglycemic coma with response to IV Dextrose.  BP was low at 85/66 with cardiogenic shock with pH of 7.130, lactic acid greater than 12, creatinine 2.7 and  BNP greater than 4,900.  Placed on BiPap with initiation of pressors rather than IVF due to cardiogenic shock. Admitted to [facility] for further management and diuresis. Started on IV Lasix 80mg TID  3/22/2018 Weaned off of Levophed overnight and transitioned to NC with stable sats. Lethargic this AM with speech difficult to discern-repeat ABG with pH 7.410 pCO2 31.5 pO2 129 HCO3 20.0. Creatinine 2.9 baseline 1.4-1.6;  H&H 7.9 & 25.7 baseline 9-10/26-30 earlier this year with H&H 11&34 in 12/2017. Lactic acid down to 7.7 from 12. Blood cultures NGTD and urine culture pending. On IV Lasix 80mg TID with 945cc out overnight       Past Medical History:   Diagnosis Date    Chronic anticoagulation - on rivaroxaban 12/29/2017    PAF    Chronic systolic heart failure 12/14/2017     12-15-17   1 - Moderately depressed left ventricular systolic function (EF 30-35%). Akinetic LV apex. There is no thrombus in LV apex.   2 - Indeterminate LV diastolic function.    3 - Mildly to moderately depressed right ventricular systolic function .    4 - Pulmonary hypertension. The estimated PA systolic pressure is 63 mmHg.    5 - Severe low flow aortic valve stenosis (JAMES 0.49 cm2, AVAi 0.27 cm2/m2, peak aortic jet velocity 4.0 m/s,MG 48 mmHg).    6 - Mild to moderate mitral regurgitation.    7 - Mild tricuspid regurgitation.    8 - Severe left atrial enlargement.     CKD (chronic kidney disease) stage 3, GFR 30-59 ml/min 12/29/2017    Coronary artery disease involving native coronary artery of native heart without angina pectoris 12/29/2017    [facility] @ : Per report (12/4/2017) proximal LAD has 20% stenosis, RCA with LI otherwise normal coronaries      Essential hypertension  12/14/2017    Hyperlipidemia associated with type 2 diabetes mellitus 12/14/2017    Left atrial enlargement     NSTEMI (non-ST elevated myocardial infarction) 12/14/2017    Paroxysmal atrial fibrillation 12/15/2017    Renovascular hypertension 12/14/2017    Severe aortic stenosis 12/14/2017    12-15-17  Severe low flow aortic valve stenosis (JAMES 0.49 cm2, AVAi 0.27 cm2/m2, peak aortic jet velocity 4.0 m/s,MG 48 mmHg).      Type 2 diabetes mellitus with neurologic complication, without long-term current use of insulin 12/14/2017       Past Surgical History:   Procedure Laterality Date    CARDIAC SURGERY      HYSTERECTOMY      KNEE SURGERY      SHOULDER ARTHROSCOPY Right 01/16/2004       Review of patient's allergies indicates:  No Known Allergies    No current facility-administered medications on file prior to encounter.      Current Outpatient Prescriptions on File Prior to Encounter   Medication Sig    amiodarone (PACERONE) 200 MG Tab Take by mouth once daily.    aspirin 81 MG Chew Take 1 tablet (81 mg total) by mouth once daily.    atorvastatin (LIPITOR) 80 MG tablet Take 1 tablet (80 mg total) by mouth once daily.    butalbital-acetaminophen-caffeine -40 mg (FIORICET, ESGIC) -40 mg per tablet Take 1 tablet by mouth every 4 (four) hours as needed for Pain.    clopidogrel (PLAVIX) 75 mg tablet Take 1 tablet (75 mg total) by mouth once daily.    collagenase ointment Apply topically once daily.    estropipate (OGEN) 1.5 MG tablet Take 1.5 mg by mouth once daily.    furosemide (LASIX) 40 MG tablet Take 1 tablet (40 mg total) by mouth 2 (two) times daily.    gabapentin (NEURONTIN) 100 MG capsule Take 100 mg by mouth 3 (three) times daily.    hydrocodone-acetaminophen 10-325mg (NORCO)  mg Tab TK 1 T PO TID PRN FOR 30 DAYS    lisinopril (PRINIVIL,ZESTRIL) 20 MG tablet Take 20 mg by mouth once daily.    magnesium oxide (MAG-OX) 400 mg tablet Take 1 tablet (400 mg total) by mouth  once daily.    metFORMIN (GLUMETZA) 500 MG (MOD) 24 hr tablet Take 500 mg by mouth daily with breakfast.    metoprolol tartrate (LOPRESSOR) 50 MG tablet Take 1 tablet (50 mg total) by mouth 2 (two) times daily. Take 75 mg twice per day    nitroGLYCERIN (NITROSTAT) 0.4 MG SL tablet Place 1 tablet (0.4 mg total) under the tongue every 5 (five) minutes as needed for Chest pain (angina).    rivaroxaban (XARELTO) 20 mg Tab Take 1 tablet (20 mg total) by mouth daily with dinner or evening meal.    senna (SENOKOT) 8.6 mg tablet Take 1 tablet by mouth 2 (two) times daily as needed for Constipation.    temazepam (RESTORIL) 15 mg Cap TK 1 C PO QHS    venlafaxine (EFFEXOR-XR) 150 MG Cp24 TK ONE C PO D WF     Family History     None        Social History Main Topics    Smoking status: Never Smoker    Smokeless tobacco: Never Used    Alcohol use No    Drug use: No    Sexual activity: Not on file     ROS  Objective:     Vital Signs (Most Recent):  Temp: 97.5 °F (36.4 °C) (03/22/18 1130)  Pulse: 86 (03/22/18 1230)  Resp: 15 (03/22/18 1230)  BP: 109/65 (03/22/18 1230)  SpO2: 100 % (03/22/18 1230) Vital Signs (24h Range):  Temp:  [97.5 °F (36.4 °C)-99.8 °F (37.7 °C)] 97.5 °F (36.4 °C)  Pulse:  [] 86  Resp:  [8-16] 15  SpO2:  [75 %-100 %] 100 %  BP: ()/(41-83) 109/65     Weight: 77.7 kg (171 lb 4.8 oz)  Body mass index is 26.83 kg/m².    SpO2: 100 %  O2 Device (Oxygen Therapy): nasal cannula      Intake/Output Summary (Last 24 hours) at 03/22/18 1301  Last data filed at 03/22/18 0836   Gross per 24 hour   Intake           333.18 ml   Output             1040 ml   Net          -706.82 ml       Lines/Drains/Airways     Central Venous Catheter Line                 Percutaneous Central Line Insertion/Assessment - single lumen  03/21/18 1406 right internal jugular less than 1 day         Percutaneous Central Line Insertion/Assessment - triple lumen  03/21/18 1350 other (see comments) less than 1 day          Drain                  Urethral Catheter 03/21/18 1536 Latex 14 Fr. less than 1 day                Physical Exam   Constitutional: She is oriented to person, place, and time. She appears well-developed and well-nourished. She has a sickly appearance. She appears ill. No distress.   Cardiovascular: Normal rate and regular rhythm.  Exam reveals no gallop.    Murmur heard.  Pulmonary/Chest: Effort normal. No respiratory distress. She has decreased breath sounds. She has no wheezes.   Abdominal: Soft. Bowel sounds are normal. She exhibits no distension. There is no tenderness.   Neurological: She is alert and oriented to person, place, and time.   arousable to verbal stimuli and oriented to person and place   Skin: Skin is warm and dry.       Significant Labs:       Recent Labs  Lab 03/22/18  0603   WBC 10.76   RBC 2.99*   HGB 7.9*   HCT 25.7*      MCV 86   MCH 26.4*   MCHC 30.7*       Recent Labs  Lab 03/22/18  0553      K 4.5      CO2 18*   BUN 83*   CREATININE 2.9*   MG 1.5*       Recent Labs  Lab 03/22/18  1011   PH 7.410   PCO2 31.5*   PO2 129*   HCO3 20.0*   POCSATURATED 99   BE -5       Significant Imaging: Echocardiogram:   2D echo with color flow doppler:   Results for orders placed or performed during the hospital encounter of 12/14/17   2D echo with color flow doppler   Result Value Ref Range    EF 30 (A) 55 - 65    Mitral Valve Regurgitation MILD TO MODERATE     Aortic Valve Stenosis SEVERE (A)     Est. PA Systolic Pressure 62.91 (A)     Tricuspid Valve Regurgitation MILD      Assessment and Plan:     * Cardiogenic shock    -presented with hypotension, hypoglycemia and lactic acidosis  -BP improved with pressor use with successful wean off this morning  -IV diuresis in process; may need up titration of IV Lasix but will be cautious given AS and anemia  -will re assess to determine if inotropes needed depending on HR and BP         Cardiorenal syndrome with renal failure    -creatinine 2.9 this AM  and 2.7 yesterday  -baseline creatinine 1.4-1.6  -consistent with cardiorenal etiology  -IV diuresis in process with 945 out overnight and minimal output so far this AM  -continue to avoid hypotension and nephrotoxic agents         Acute on chronic systolic congestive heart failure    -echo in December with EF down to 30-35% with previous echo at [facility] with EF 50%  -presented with ADHF and cardiogenic shock; BNP >4900 and SVO2 36 upon admission  -volume overload and started on aggressive IV diuresis with Lasix 80mg IV TID; only 945cc out overnight; CVP per RN staff 17-19  -will continue to attempt to diuresis; considered use of inotropes but held off due to runs of possible afib overnight and hypotension upon admission  -continue with strict I&Os and daily weights         Coronary artery disease involving native coronary artery of native heart without angina pectoris    -nonobstructive per [facility] in 2017 at [facility]  -medical management at home with ASA, statin, BB and ACEI however on hold due to ARF, anemia and concern for bleeding         Paroxysmal atrial fibrillation    -treated with Amiodarone, Metoprolol and Xarelto at home  -SR with 1st degree AVB on EKGs; tachycardia on telemetry with suspicion of periods of atrial fibrillation  -currently HR 100s-110s related to ADHF, cardiogenic shock and other medical issues  -currently HR goal less than 120 and anticipate improvement in HR as volume status improves         Severe aortic stenosis    -echo 12/2017 with severe AS-JAMES .49cm2; mean gradient 48mmHg and peak velocity of 4  -seen by [facility] with deferrment of AVR secondary to ARF  -currently in ADHF with cardiogenic shock in setting of severe AS  -needs volume removal with IV Lasix due to ADHF  -needs intervention of severe AS but precluded now due to acute decompensation, ARF and anemia  -very critical situation and concerned about candidacy for AS intervention given critical state            VTE Risk Mitigation      None          Thank you for your consult. I will follow-up with patient. Please contact us if you have any additional questions.    MAXWELL Soto, ANP  Cardiology   [facility]

## 2018-03-22 NOTE — PLAN OF CARE
Problem: Patient Care Overview  Goal: Plan of Care Review  Outcome: Ongoing (interventions implemented as appropriate)  Pt resting well in bed, reposition for comfort and range of motion done to all extremities. Pt tolerated activities well and able to perform active movements. Pt denies discomforts. Pt responding slowly to Lasix given earlier. Pt free of injury, able to voice requests. Bed in low position with alarm on. Joellen-care done and linen changed. Pt tolerating meals well but only eat on average 50% served.  at bedside and update given on plan of care.

## 2018-03-22 NOTE — PROGRESS NOTES
[facility] Medicine  Progress Note    Patient Name: Anum Quick  MRN: [id number]  Patient Class: IP- Inpatient   Admission Date: 3/21/2018  Length of Stay: 1 days  Attending Physician: Cal Hollis MD  Primary Care Provider: Raghav Valdez MD        Subjective:     Principal Problem:Cardiogenic shock    HPI:  Anum Quick is a 62 y.o. white  woman with hypertension, diabetes mellitus type 2 (too well controlled on metformin), neuropathy, hyperlipidemia, hepatic steatosis, coronary artery disease, severe left atrial enlargement, severe aortic stenosis, chronic left and right-sided systolic congestive heart failure, pulmonary hypertension, chronic hypoxic respiratory failure, paroxysmal atrial fibrillation (anticoagulated on rivaroxaban), chronic kidney disease stage 3, anemia, chronic lower extremity venous stasis, chronic pain, and a subcentimeter pancreatic cyst.  She lives in [place], [place].  She is  but has had prior husbands, and had 3 children before 1  of a heart attack.  She worked as a  at [employer] on [employer] until she became ill.  Her primary care physician is Dr. Raghav Valdez.  Her cardiologist is Dr. Jason Plaza.  Her pain management specialist is Dr. Luc David.     She was planned for elective aortic valve replacement and Maze procedure by cardiothoracic surgeon Dr. Rodo Lundy, but it was postponed because her creatinine increased from 1.6 on 17 to 3.1 on 1/10/18, and her AST and ALT increased to 698 and 524.  These were thought to be due to decompensated heart failure and subsequently improved.   She was hospitalized at [facility] from 18-18 for decompensated heart failure requiring furosemide drip and metolazone.  BNP was greater than 4,900 on admission.  She had reportedly been taking furosemide 40 mg alternating with 20 mg daily prior to  admission and weight 178 lbs on admission.  Her fluid status was net negative 16 liters by discharge, and creatinine was 1.6.  She was prescribed furosemide 40 mg twice daily.   She was seen at [facility] internal medicine clinic on 3/9/18 by nurse practitioner Svetlana Vicente for worsening congestive heart failure.  She weighed 79.3 kg (174 lb) and was hypoxic (oxygen saturation 88%) but reported being able to perform activities around her house.  Labs showed worsening renal failure with creatinine increased to 2.3, but BNP had decreased to 4,875.     She was eating a lot of ice at home, and progressively became weaker, gained weight, became more short of breath, and more nauseated.  Her  tried to convince her for a couple of weeks to go the hospital, but she was stubborn.  She was falling a lot, and was too heavy for her  to manage.  She started becoming confused.   Finally, on 3/21/18, her  called EMS.  She was taken to [facility] Emergency Department and found to have hypoglycemic coma, which responded to dextrose administration.  Blood pressure was as low as 85/66.  She was found to have cardiogenic shock with pH of 7.130, lactic acid greater than 12, creatinine increased to 2.7, BNP back up to greater than 4,900.  Weight had increased to 81.6 kg (180 lb).  She was placed on BiPAP, which helped treat her acidosis.  Prior to workup, she was thought to have sepsis, so IV fluids were begun, but stopped at 350 mL after consulting [facility], who also recommended starting a vasopressor or inotrope.  Anesthesiology placed a central venous catheter and she was started on dopamine.  Hospital Medicine recommended consulting her cardiologist, who was not available, so she was admitted to [facility].  She was started on IV furosemide 80 mg three times daily.  Cardiology was consulted to help manage.      [facility]  Course:  Since diabetes was too controlled and metformin contributed to lactic acidosis, metformin was discontinued.  She was put on norepinephrine drip and furosemide 80 mg IV three times daily.  Norepinephrine was able to weaned off.  Her renal function worsened.  Lactic acidosis improved, so she was taken off BiPAP on 3/22/18.      Interval History: Feels sleepy.    Review of Systems   Constitutional: Positive for fatigue. Negative for fever.   Respiratory: Negative for cough and shortness of breath.      Objective:     Vital Signs (Most Recent):  Temp: 98.1 °F (36.7 °C) (03/22/18 0730)  Pulse: (!) 121 (03/22/18 0900)  Resp: 13 (03/22/18 0900)  BP: 113/73 (03/22/18 0900)  SpO2: 100 % (03/22/18 0900) Vital Signs (24h Range):  Temp:  [97.2 °F (36.2 °C)-99.8 °F (37.7 °C)] 98.1 °F (36.7 °C)  Pulse:  [] 121  Resp:  [8-27] 13  SpO2:  [75 %-100 %] 100 %  BP: ()/(41-81) 113/73     Weight: 77.7 kg (171 lb 4.8 oz)  Body mass index is 26.83 kg/m².    Intake/Output Summary (Last 24 hours) at 03/22/18 0930  Last data filed at 03/22/18 0618   Gross per 24 hour   Intake            43.18 ml   Output              945 ml   Net          -901.82 ml      Physical Exam   Constitutional: She appears well-developed. No distress.   Cardiovascular: An irregularly irregular rhythm present. Tachycardia present.    Murmur heard.   Systolic murmur is present   Pulmonary/Chest: Effort normal. No respiratory distress. She has rales.   Neurological: She is alert.   Skin: Skin is dry. There is pallor.   Nursing note and vitals reviewed.      Significant Labs:   CMP:   Recent Labs  Lab 03/21/18  1136 03/22/18  0553    140   K 4.7 4.5   CL 98 100   CO2 5* 18*   * 121*   BUN 63* 83*   CREATININE 2.7* 2.9*   CALCIUM 8.9 8.2*   PROT 6.9  --    ALBUMIN 3.2*  --    BILITOT 1.1*  --    ALKPHOS 181*  --    AST 97*  --    ALT 79*  --    ANIONGAP 35* 22*   EGFRNONAA 18* 17*       Significant Imaging: I have reviewed all pertinent  imaging results/findings within the past 24 hours.    Assessment/Plan:      * Cardiogenic shock    Acute on chronic systolic congestive heart failure  Pulmonary hypertension due to left ventricular systolic dysfunction  Left atrial enlargement  Chronic hypoxemic respiratory failure  Takes furosemide 40 mg BID, metoprolol tartrate 50 mg BID, lisinopril 20 mg daily at home.  Was eating a lot of ice, which likely contributed to fluid overload.  Giving norepinephrine and furosemide 80 mg IV TID.  Consulted Cardiology.            Cardiorenal syndrome with renal failure    CKD (chronic kidney disease) stage 3, GFR 30-59 ml/min  Worsening, may be due to damage from cardiogenic shock.  Consult Nephrology.          Venous stasis of both lower extremities    Wound Care.          Coronary artery disease involving native coronary artery of native heart without angina pectoris    Takes aspirin 81 mg daily, clopidrogel 75 mg daily, atorvastatin.  Resume aspirin, atorvastatin.          Paroxysmal atrial fibrillation    Chronic anticoagulation - on rivaroxaban   Takes amiodarone 200 mg daily and rivaroxaban at home.  Resume amiodarone.          Hyperlipidemia associated with type 2 diabetes mellitus    Takes atorvastatin 80 mg daily.  Hold for now.          Type 2 diabetes mellitus with neurologic complication, without long-term current use of insulin    Has not needed metformin for a while.  Discontinue since it contributes to lactic acidosis.  Insulin aspart sliding scale.  Monitor serum glucose.  Hold gabapentin.            VTE Risk Mitigation         Ordered     heparin (porcine) injection 5,000 Units  Every 8 hours     Route:  Subcutaneous        03/21/18 1942          Cal Hollis MD  Department of Hospital Medicine   [facility]

## 2018-03-22 NOTE — HPI
63yo female with history of nonobstructive CAD ([facility] with pLAD 20%, RCA LIs and all other cors normal @[facility] 12/2017), severe AS (JAMES .49cm2, MG 48mmHg and peak velocity 4), PAF on Xarelto, HLP, DMII, PHTN and chronic systolic heart failure with EF 30-35% per echo 12/2017 who presented to the ER with complaints of low blood pressure and blood sugar. She was seen this morning in the ICU shortly after being taken off of BiPap. She was lethargic and difficult to understand but was oriented to place and time, therefore her HPI was obtained from primary team's admit H&P.     She was apparently weak and SOB at home for the past few weeks and refused to return to the ER for evaluation. Her  called EMS yesterday due to her lethargy and low blood sugar. She was admitted at [facility] from 2-17-2/24 with ADHF requiring Lasix and Metolazone with adequate diuresis and was discharged home on Lasix 40mg po BID. She followed up in Primary care on 3/9/2018 with continued CHF. She was seen late last year by CTS for evaluation of AVR/Maze procedure that was ultimately deferred due to ARF with rise in creatinine from 1.6 to 3.1

## 2018-03-22 NOTE — ASSESSMENT & PLAN NOTE
-nonobstructive per LHC in 2017 at [facility]  -medical management at home with ASA, statin, BB and ACEI however on hold due to ARF, anemia and concern for bleeding

## 2018-03-22 NOTE — ASSESSMENT & PLAN NOTE
Has not needed metformin for a while.  Discontinue since it contributes to lactic acidosis.  Insulin aspart sliding scale.  Monitor serum glucose.  Hold gabapentin.

## 2018-03-22 NOTE — ASSESSMENT & PLAN NOTE
Takes aspirin 81 mg daily, clopidrogel 75 mg daily, atorvastatin.  Resume aspirin, atorvastatin.

## 2018-03-22 NOTE — ASSESSMENT & PLAN NOTE
Acute on chronic systolic congestive heart failure  Pulmonary hypertension due to left ventricular systolic dysfunction  Left atrial enlargement  Chronic hypoxemic respiratory failure  Takes furosemide 40 mg BID, metoprolol tartrate 50 mg BID, lisinopril 20 mg daily at home.  Was eating a lot of ice, which likely contributed to fluid overload.  Giving norepinephrine and furosemide 80 mg IV TID.  Consulted Cardiology.

## 2018-03-22 NOTE — ASSESSMENT & PLAN NOTE
Chronic anticoagulation - on rivaroxaban   Takes amiodarone 200 mg daily and rivaroxaban at home.  Resume amiodarone.

## 2018-03-22 NOTE — ASSESSMENT & PLAN NOTE
-echo in December with EF down to 30-35% with previous echo at [facility] with EF 50%  -presented with ADHF and cardiogenic shock; BNP >4900 and SVO2 36 upon admission  -volume overload and started on aggressive IV diuresis with Lasix 80mg IV TID; only 945cc out overnight; CVP per RN staff 17-19  -will continue to attempt to diuresis; considered use of inotropes but held off due to runs of possible afib overnight and hypotension upon admission  -continue with strict I&Os and daily weights

## 2018-03-22 NOTE — SUBJECTIVE & OBJECTIVE
Past Medical History:   Diagnosis Date    Chronic anticoagulation - on rivaroxaban 12/29/2017    PAF    Chronic systolic heart failure 12/14/2017     12-15-17   1 - Moderately depressed left ventricular systolic function (EF 30-35%). Akinetic LV apex. There is no thrombus in LV apex.   2 - Indeterminate LV diastolic function.    3 - Mildly to moderately depressed right ventricular systolic function .    4 - Pulmonary hypertension. The estimated PA systolic pressure is 63 mmHg.    5 - Severe low flow aortic valve stenosis (JAMES 0.49 cm2, AVAi 0.27 cm2/m2, peak aortic jet velocity 4.0 m/s,MG 48 mmHg).    6 - Mild to moderate mitral regurgitation.    7 - Mild tricuspid regurgitation.    8 - Severe left atrial enlargement.     CKD (chronic kidney disease) stage 3, GFR 30-59 ml/min 12/29/2017    Coronary artery disease involving native coronary artery of native heart without angina pectoris 12/29/2017    [facility] @ : Per report (12/4/2017) proximal LAD has 20% stenosis, RCA with LI otherwise normal coronaries      Essential hypertension 12/14/2017    Hyperlipidemia associated with type 2 diabetes mellitus 12/14/2017    Left atrial enlargement     NSTEMI (non-ST elevated myocardial infarction) 12/14/2017    Paroxysmal atrial fibrillation 12/15/2017    Renovascular hypertension 12/14/2017    Severe aortic stenosis 12/14/2017    12-15-17  Severe low flow aortic valve stenosis (JAMES 0.49 cm2, AVAi 0.27 cm2/m2, peak aortic jet velocity 4.0 m/s,MG 48 mmHg).      Type 2 diabetes mellitus with neurologic complication, without long-term current use of insulin 12/14/2017       Past Surgical History:   Procedure Laterality Date    CARDIAC SURGERY      HYSTERECTOMY      KNEE SURGERY      SHOULDER ARTHROSCOPY Right 01/16/2004       Review of patient's allergies indicates:  No Known Allergies    No current facility-administered medications on file prior to encounter.      Current Outpatient Prescriptions on File Prior to  Encounter   Medication Sig    amiodarone (PACERONE) 200 MG Tab Take by mouth once daily.    aspirin 81 MG Chew Take 1 tablet (81 mg total) by mouth once daily.    atorvastatin (LIPITOR) 80 MG tablet Take 1 tablet (80 mg total) by mouth once daily.    butalbital-acetaminophen-caffeine -40 mg (FIORICET, ESGIC) -40 mg per tablet Take 1 tablet by mouth every 4 (four) hours as needed for Pain.    clopidogrel (PLAVIX) 75 mg tablet Take 1 tablet (75 mg total) by mouth once daily.    collagenase ointment Apply topically once daily.    estropipate (OGEN) 1.5 MG tablet Take 1.5 mg by mouth once daily.    furosemide (LASIX) 40 MG tablet Take 1 tablet (40 mg total) by mouth 2 (two) times daily.    gabapentin (NEURONTIN) 100 MG capsule Take 100 mg by mouth 3 (three) times daily.    hydrocodone-acetaminophen 10-325mg (NORCO)  mg Tab TK 1 T PO TID PRN FOR 30 DAYS    lisinopril (PRINIVIL,ZESTRIL) 20 MG tablet Take 20 mg by mouth once daily.    magnesium oxide (MAG-OX) 400 mg tablet Take 1 tablet (400 mg total) by mouth once daily.    metFORMIN (GLUMETZA) 500 MG (MOD) 24 hr tablet Take 500 mg by mouth daily with breakfast.    metoprolol tartrate (LOPRESSOR) 50 MG tablet Take 1 tablet (50 mg total) by mouth 2 (two) times daily. Take 75 mg twice per day    nitroGLYCERIN (NITROSTAT) 0.4 MG SL tablet Place 1 tablet (0.4 mg total) under the tongue every 5 (five) minutes as needed for Chest pain (angina).    rivaroxaban (XARELTO) 20 mg Tab Take 1 tablet (20 mg total) by mouth daily with dinner or evening meal.    senna (SENOKOT) 8.6 mg tablet Take 1 tablet by mouth 2 (two) times daily as needed for Constipation.    temazepam (RESTORIL) 15 mg Cap TK 1 C PO QHS    venlafaxine (EFFEXOR-XR) 150 MG Cp24 TK ONE C PO D WF     Family History     None        Social History Main Topics    Smoking status: Never Smoker    Smokeless tobacco: Never Used    Alcohol use No    Drug use: No    Sexual activity: Not  on file     ROS  Objective:     Vital Signs (Most Recent):  Temp: 97.5 °F (36.4 °C) (03/22/18 1130)  Pulse: 86 (03/22/18 1230)  Resp: 15 (03/22/18 1230)  BP: 109/65 (03/22/18 1230)  SpO2: 100 % (03/22/18 1230) Vital Signs (24h Range):  Temp:  [97.5 °F (36.4 °C)-99.8 °F (37.7 °C)] 97.5 °F (36.4 °C)  Pulse:  [] 86  Resp:  [8-16] 15  SpO2:  [75 %-100 %] 100 %  BP: ()/(41-83) 109/65     Weight: 77.7 kg (171 lb 4.8 oz)  Body mass index is 26.83 kg/m².    SpO2: 100 %  O2 Device (Oxygen Therapy): nasal cannula      Intake/Output Summary (Last 24 hours) at 03/22/18 1301  Last data filed at 03/22/18 0836   Gross per 24 hour   Intake           333.18 ml   Output             1040 ml   Net          -706.82 ml       Lines/Drains/Airways     Central Venous Catheter Line                 Percutaneous Central Line Insertion/Assessment - single lumen  03/21/18 1406 right internal jugular less than 1 day         Percutaneous Central Line Insertion/Assessment - triple lumen  03/21/18 1350 other (see comments) less than 1 day          Drain                 Urethral Catheter 03/21/18 1536 Latex 14 Fr. less than 1 day                Physical Exam   Constitutional: She is oriented to person, place, and time. She appears well-developed and well-nourished. She has a sickly appearance. She appears ill. No distress.   Cardiovascular: Normal rate and regular rhythm.  Exam reveals no gallop.    Murmur heard.  Pulmonary/Chest: Effort normal. No respiratory distress. She has decreased breath sounds. She has no wheezes.   Abdominal: Soft. Bowel sounds are normal. She exhibits no distension. There is no tenderness.   Neurological: She is alert and oriented to person, place, and time.   arousable to verbal stimuli and oriented to person and place   Skin: Skin is warm and dry.       Significant Labs:       Recent Labs  Lab 03/22/18  0603   WBC 10.76   RBC 2.99*   HGB 7.9*   HCT 25.7*      MCV 86   MCH 26.4*   MCHC 30.7*       Recent  Labs  Lab 03/22/18  0553      K 4.5      CO2 18*   BUN 83*   CREATININE 2.9*   MG 1.5*       Recent Labs  Lab 03/22/18  1011   PH 7.410   PCO2 31.5*   PO2 129*   HCO3 20.0*   POCSATURATED 99   BE -5       Significant Imaging: Echocardiogram:   2D echo with color flow doppler:   Results for orders placed or performed during the hospital encounter of 12/14/17   2D echo with color flow doppler   Result Value Ref Range    EF 30 (A) 55 - 65    Mitral Valve Regurgitation MILD TO MODERATE     Aortic Valve Stenosis SEVERE (A)     Est. PA Systolic Pressure 62.91 (A)     Tricuspid Valve Regurgitation MILD

## 2018-03-22 NOTE — PLAN OF CARE
Problem: Nutrition, Imbalanced: Inadequate Oral Intake (Adult)  Goal: Improved Oral Intake  Patient will demonstrate the desired outcomes by discharge/transition of care.  Outcome: Ongoing (interventions implemented as appropriate)  Recommendation/Intervention:   1. Add Renal and ADA restrictions to diet.   2. Add Su[place]ena bid.   3. Encourage good intake at meals.    Goals:   Pt will consume at least50% intake at meals  Nutrition Goal Status: new  Communication of RD Recs: reviewed with RN (Yareli)

## 2018-03-22 NOTE — ASSESSMENT & PLAN NOTE
-treated with Amiodarone, Metoprolol and Xarelto at home  -SR with 1st degree AVB on EKGs; tachycardia on telemetry with suspicion of periods of atrial fibrillation  -currently HR 100s-110s related to ADHF, cardiogenic shock and other medical issues  -currently HR goal less than 120 and anticipate improvement in HR as volume status improves

## 2018-03-22 NOTE — ASSESSMENT & PLAN NOTE
-creatinine 2.9 this AM and 2.7 yesterday  -baseline creatinine 1.4-1.6  -consistent with cardiorenal etiology  -IV diuresis in process with 945 out overnight and minimal output so far this AM  -continue to avoid hypotension and nephrotoxic agents

## 2018-03-22 NOTE — HOSPITAL COURSE
3/21/2018 Presented to the ER via EMS with lethargy, weakness, hypoglycemia and low blood pressure. Upon arrival to the ER, was found to be in hypoglycemic coma with response to IV Dextrose.  BP was low at 85/66 with cardiogenic shock with pH of 7.130, lactic acid greater than 12, creatinine 2.7 and  BNP greater than 4,900.  Placed on BiPap with initiation of pressors rather than IVF due to cardiogenic shock. Admitted to [facility] for further management and diuresis. Started on IV Lasix 80mg TID  3/22/2018 Weaned off of Levophed overnight and transitioned to NC with stable sats. Lethargic this AM with speech difficult to discern-repeat ABG with pH 7.410 pCO2 31.5 pO2 129 HCO3 20.0. Creatinine 2.9 baseline 1.4-1.6;  H&H 7.9 & 25.7 baseline 9-10/26-30 earlier this year with H&H 11&34 in 12/2017. Lactic acid down to 7.7 from 12. Blood cultures NGTD and urine culture pending. On IV Lasix 80mg TID with 945cc out overnight   3/23/2018 Remains off BiPap and Levophed. BP 100s-120s/70-90s overnight. Creatinine down to 2.1 this AM from 2.9 yesterday. K+ 3.2 with IV replacement provided. On IV Lasix TID with 2.8 liters out overnight and negative 2.9 since admission. H&H 7.9&25.7 unchanged from yesterday but remains below baseline. Nephrology on board for HARISH

## 2018-03-22 NOTE — SUBJECTIVE & OBJECTIVE
Interval History: Feels sleepy.    Review of Systems   Constitutional: Positive for fatigue. Negative for fever.   Respiratory: Negative for cough and shortness of breath.      Objective:     Vital Signs (Most Recent):  Temp: 98.1 °F (36.7 °C) (03/22/18 0730)  Pulse: (!) 121 (03/22/18 0900)  Resp: 13 (03/22/18 0900)  BP: 113/73 (03/22/18 0900)  SpO2: 100 % (03/22/18 0900) Vital Signs (24h Range):  Temp:  [97.2 °F (36.2 °C)-99.8 °F (37.7 °C)] 98.1 °F (36.7 °C)  Pulse:  [] 121  Resp:  [8-27] 13  SpO2:  [75 %-100 %] 100 %  BP: ()/(41-81) 113/73     Weight: 77.7 kg (171 lb 4.8 oz)  Body mass index is 26.83 kg/m².    Intake/Output Summary (Last 24 hours) at 03/22/18 0930  Last data filed at 03/22/18 0618   Gross per 24 hour   Intake            43.18 ml   Output              945 ml   Net          -901.82 ml      Physical Exam   Constitutional: She appears well-developed. No distress.   Cardiovascular: An irregularly irregular rhythm present. Tachycardia present.    Murmur heard.   Systolic murmur is present   Pulmonary/Chest: Effort normal. No respiratory distress. She has rales.   Neurological: She is alert.   Skin: Skin is dry. There is pallor.   Nursing note and vitals reviewed.      Significant Labs:   CMP:   Recent Labs  Lab 03/21/18  1136 03/22/18  0553    140   K 4.7 4.5   CL 98 100   CO2 5* 18*   * 121*   BUN 63* 83*   CREATININE 2.7* 2.9*   CALCIUM 8.9 8.2*   PROT 6.9  --    ALBUMIN 3.2*  --    BILITOT 1.1*  --    ALKPHOS 181*  --    AST 97*  --    ALT 79*  --    ANIONGAP 35* 22*   EGFRNONAA 18* 17*       Significant Imaging: I have reviewed all pertinent imaging results/findings within the past 24 hours.

## 2018-03-22 NOTE — ASSESSMENT & PLAN NOTE
-echo 12/2017 with severe AS-JAMES .49cm2; mean gradient 48mmHg and peak velocity of 4  -seen by [facility] CTS with deferrment of AVR secondary to ARF  -currently in ADHF with cardiogenic shock in setting of severe AS  -needs volume removal with IV Lasix due to ADHF  -needs intervention of severe AS but precluded now due to acute decompensation, ARF and anemia  -very critical situation and concerned about candidacy for AS intervention given critical state

## 2018-03-22 NOTE — CONSULTS
"  [facility]  Adult Nutrition  Consult Note    SUMMARY     Recommendations    Recommendation/Intervention:   1. Add Renal and ADA restrictions to diet.   2. Add Suplena bid.   3. Encourage good intake at meals.    Goals:   Pt will consume at least50% intake at meals  Nutrition Goal Status: new  Communication of RD Recs: reviewed with RN (Yareli)    Reason for Assessment  Reason for Assessment: consult (nutrition eval)  Diagnosis:  (cardiogenic shock)  Relevant Medical History: CHF, CAD, DM, HTN, CKD, cardiac surgery  General Information Comments: pt on Cardiac diet with 1200ml fluid restrcition. Pt asleep at visit. RN reports pt tolerated fair intake at breakfast today,     Nutrition Risk Screen  Nutrition Risk Screen: other (see comments) (appetite recently poor)    Nutrition/Diet History  Food Preferences: no Church or cultural food prefs identified  Do you have any cultural, spiritual, Church conflicts, given your current situation?: Sabianism    Anthropometrics  Temp: 97.5 °F (36.4 °C)  Height Method: Stated  Height: 5' 7" (170.2 cm)  Height (inches): 67 in  Weight Method: Bed Scale  Weight: 77.7 kg (171 lb 4.8 oz)  Weight (lb): 171.3 lb  Ideal Body Weight (IBW), Female: 135 lb  % Ideal Body Weight, Female (lb): 126.89 lb  BMI (Calculated): 26.9  BMI Grade: 25 - 29.9 - overweight    Lab/Procedures/Meds  Pertinent Labs Reviewed: reviewed  Pertinent Labs Comments: BUN 63H, Crea 2.7H, GLu 182H, Phos 7.7H, Alb 3.2L  Pertinent Medications Reviewed: reviewed  Pertinent Medications Comments: Lasix    Physical Findings/Assessment  Overall Physical Appearance: overweight  Tubes:  (none)  Oral/Mouth Cavity:  (unable to assess)  Skin:  ([place] 12-foot ulcer)    Estimated/Assessed Needs  Weight Used For Calorie Calculations: 77.7 kg (171 lb 4.8 oz)  Height: 5' 7" (170.2 cm)  Energy Calorie Requirements (kcal): 1943  Energy Need Method:  (25 kcal/kg)  RMR ([place]-St. Jeor Equation): 1364.62  Protein " Requirements: 78g (1.0g/kg)  Weight Used For Protein Calculations: 77.7 kg (171 lb 4.8 oz)  Fluid Need Method: RDA Method  RDA Method (mL): 1943  CHO Requirement: 225g     Nutrition Prescription Ordere  Current Diet Order: Cardiac 1200ml fluid restriction    Evaluation of Received Nutrient/Fluid Intake  Energy Calories Required: not meeting needs  Protein Required: not meeting needs  Fluid Required: meeting needs  % Intake of Estimated Energy Needs: 25 - 50 %  % Meal Intake: 25 - 50 %    Nutrition Risk  Level of Risk/Frequency of Follow-up:  (2xweekly)     Assessment and Plan  Nutrition Problem  Inadequate energy intake    Related to (etiology):   Decreased appetite    Signs and Symptoms (as evidenced by):   Decreased intake at meals    Interventions/Recommendations (treatment strategy):  See recs    Nutrition Diagnosis Status:   New     Monitor and Evaluation  Food and Nutrient Intake: food and beverage intake  Food and Nutrient Adminstration: diet order  Physical Activity and Function: nutrition-related ADLs and IADLs  Anthropometric Measurements: weight  Biochemical Data, Medical Tests and Procedures: electrolyte and renal panel  Nutrition-Focused Physical Findings: overall appearance     Nutrition Follow-Up  RD Follow-up?: Yes

## 2018-03-22 NOTE — PLAN OF CARE
Problem: Patient Care Overview  Goal: Plan of Care Review  Outcome: Ongoing (interventions implemented as appropriate)  Pt lying in bed resting comfortably, arouses to voice, incomprehensible speech but follows commands. Sats 100% on Bipap at 50%. Respirations even and unlabored. Tele NSR with HR 80's and occasional bigeminy. Afebrile. BP stable-levo turned off to maintain MAP greater than 65. Crowe urine output 5-15ml/hr. Denies pain. Will continue to monitor.

## 2018-03-23 PROBLEM — L03.115 CELLULITIS OF RIGHT FOOT: Status: ACTIVE | Noted: 2018-01-01

## 2018-03-23 PROBLEM — R57.0 CARDIOGENIC SHOCK: Status: RESOLVED | Noted: 2018-01-01 | Resolved: 2018-01-01

## 2018-03-23 PROBLEM — J96.11 CHRONIC HYPOXEMIC RESPIRATORY FAILURE: Chronic | Status: RESOLVED | Noted: 2018-01-01 | Resolved: 2018-01-01

## 2018-03-23 PROBLEM — D64.9 ANEMIA: Status: ACTIVE | Noted: 2018-01-01

## 2018-03-23 PROBLEM — D50.9 IRON DEFICIENCY ANEMIA: Chronic | Status: ACTIVE | Noted: 2018-01-01

## 2018-03-23 PROBLEM — L97.519 RIGHT FOOT ULCER: Status: ACTIVE | Noted: 2018-01-01

## 2018-03-23 NOTE — ASSESSMENT & PLAN NOTE
Chronic anticoagulation - on rivaroxaban   Takes amiodarone 200 mg daily and rivaroxaban at home.  Continue amiodarone.  Holding rivaroxaban.  Not on any anticoagulation for now due to question of active blood loss.

## 2018-03-23 NOTE — ASSESSMENT & PLAN NOTE
Has not needed metformin for a while.  Discontinue since it contributes to lactic acidosis.  Insulin aspart sliding scale.  Monitor serum glucose.  Holding gabapentin while renal function improves.

## 2018-03-23 NOTE — HPI
Anum Quick is a 62 y.o. female who  has a past medical history of Chronic anticoagulation - on rivaroxaban; Chronic systolic heart failure; CKD (chronic kidney disease) stage 3, GFR 30-59 ml/min; Coronary artery disease involving native coronary artery of native heart without angina pectoris; Essential hypertension; Hyperlipidemia associated with type 2 diabetes mellitus; Left atrial enlargement; NSTEMI (non-ST elevated myocardial infarction); Paroxysmal atrial fibrillation; Renovascular hypertension; Severe aortic stenosis; and Type 2 diabetes mellitus with neurologic complication, without long-term current use of insulin.    Patient Admited for Aotric stenosis.  Consulted to Podiatry for right LE ulcers.  Patient states ulcers have been there for a couple of months.   has been doing dressing changes at home with Hydrogen peroxide.  Denies F/C/N/V..

## 2018-03-23 NOTE — ASSESSMENT & PLAN NOTE
Pulmonary hypertension due to left ventricular systolic dysfunction  Left atrial enlargement  Takes furosemide 40 mg BID, metoprolol tartrate 50 mg BID, lisinopril 20 mg daily at home.  Was eating a lot of ice, which likely contributed to fluid overload.  Had cardiogenic shock on admission that resolved.  Giving furosemide 80 mg IV TID.  During her last hospitalization, she was diuresed to net negative 16 liters.  Appreciate Cardiology.

## 2018-03-23 NOTE — SUBJECTIVE & OBJECTIVE
ROS  Objective:     Vital Signs (Most Recent):  Temp: 97.5 °F (36.4 °C) (03/23/18 0730)  Pulse: 88 (03/23/18 0800)  Resp: 11 (03/23/18 0800)  BP: (!) 129/91 (03/23/18 0800)  SpO2: 99 % (03/23/18 0800) Vital Signs (24h Range):  Temp:  [97.5 °F (36.4 °C)-98.3 °F (36.8 °C)] 97.5 °F (36.4 °C)  Pulse:  [80-92] 88  Resp:  [10-27] 11  SpO2:  [87 %-100 %] 99 %  BP: ()/(54-91) 129/91     Weight: 78.1 kg (172 lb 2.9 oz)  Body mass index is 26.97 kg/m².     SpO2: 99 %  O2 Device (Oxygen Therapy): room air      Intake/Output Summary (Last 24 hours) at 03/23/18 1055  Last data filed at 03/23/18 1038   Gross per 24 hour   Intake              480 ml   Output             3625 ml   Net            -3145 ml       Lines/Drains/Airways     Central Venous Catheter Line                 Percutaneous Central Line Insertion/Assessment - single lumen  03/21/18 1406 right internal jugular 1 day         Percutaneous Central Line Insertion/Assessment - triple lumen  03/21/18 1350 other (see comments) 1 day          Drain                 Urethral Catheter 03/21/18 1536 Latex 14 Fr. 1 day                Physical Exam   Constitutional: She has a sickly appearance. No distress.   Cardiovascular: Normal rate and regular rhythm.  Exam reveals no gallop.    Murmur heard.  Pulmonary/Chest: Effort normal. No respiratory distress. She has decreased breath sounds. She has no wheezes.   Abdominal: Soft. Bowel sounds are normal. She exhibits no distension. There is no tenderness.   Skin: Skin is warm and dry.       Significant Labs:       Recent Labs  Lab 03/23/18  0505      K 3.2*   CL 99   CO2 28   BUN 80*   CREATININE 2.1*   MG 1.9       Recent Labs  Lab 03/23/18  0505   WBC 8.24   RBC 3.06*   HGB 7.9*   HCT 25.7*      MCV 84   MCH 25.8*   MCHC 30.7*       Significant Imaging: Echocardiogram:   2D echo with color flow doppler:   Results for orders placed or performed during the hospital encounter of 03/21/18   2D echo with color  flow doppler   Result Value Ref Range    EF 20 (A) 55 - 65    Mitral Valve Regurgitation MILD     Diastolic Dysfunction Yes (A)     Aortic Valve Regurgitation MODERATE (A)     Aortic Valve Stenosis SEVERE (A)     Est. PA Systolic Pressure 54.51 (A)     Mitral Valve Mobility NORMAL     Tricuspid Valve Regurgitation MILD TO MODERATE

## 2018-03-23 NOTE — ASSESSMENT & PLAN NOTE
CKD (chronic kidney disease) stage 3, GFR 30-59 ml/min  Appreciate Nephrology.  Was worse due to cardiogenic shock.  Now improving.

## 2018-03-23 NOTE — NURSING
Called Dr Hollis regarding removing central line. Pt had 2 PIV's and no longer good and d/c'd. BUE's are swollen and unable to attain a PIV at the moment. Will attempt again once swelling has subsides. MD will place orderes to keep central line till able to place a PIV

## 2018-03-23 NOTE — PLAN OF CARE
Problem: Physical Therapy Goal  Goal: Physical Therapy Goal  Goals to be met by: 2018     Patient will increase functional independence with mobility by performin. Supine to sit with Modified [place]  2. Sit to supine with Modified [place]  3. Sit to stand transfer with Modified [place]  4. Bed to chair transfer with Modified [place] with or without appropriate AD  5. Gait  x 50+ feet with Modified [place] with or without appropriate AD   6. Lower extremity exercise program x10 reps with independence    Outcome: Ongoing (interventions implemented as appropriate)  Patient seen for eval; pt fatigued but able to participate; generally weak and deconditioned; will recommend SNF to maximize functional independence.

## 2018-03-23 NOTE — ASSESSMENT & PLAN NOTE
Right foot ulcer  Appreciate Wound Care.  Consult Podiatry.  Start doxycycline and await culture results.

## 2018-03-23 NOTE — PT/OT/SLP EVAL
Occupational Therapy   Evaluation    Name: Anum Quick  MRN: [id number]  Admitting Diagnosis:  Cardiogenic shock      Recommendations:     Discharge Recommendations: nursing facility, skilled  Discharge Equipment Recommendations:  other (see comments) (TBD)  Barriers to discharge:  Inaccessible home environment, Decreased caregiver support    History:     Occupational Profile:  Living Environment: Pt lives w/spouse in 2 story house no NAE. Has ~15 steps to 2nd floor, no HR, just wall. Reports 1/2 bath downstairs.    Previous level of function: Pt was indep until ~2 month ago when she started having increased falls, increased weakness; was hospitalized in Feb & d/c'ed home w/HH  Roles and Routines: cooks, cleans, drives  Equipment Owned:  none  Assistance upon Discharge: spouse works; limited home surrport    Past Medical History:   Diagnosis Date    Chronic anticoagulation - on rivaroxaban 12/29/2017    PAF    Chronic systolic heart failure 12/14/2017     12-15-17   1 - Moderately depressed left ventricular systolic function (EF 30-35%). Akinetic LV apex. There is no thrombus in LV apex.   2 - Indeterminate LV diastolic function.    3 - Mildly to moderately depressed right ventricular systolic function .    4 - Pulmonary hypertension. The estimated PA systolic pressure is 63 mmHg.    5 - Severe low flow aortic valve stenosis (JAMES 0.49 cm2, AVAi 0.27 cm2/m2, peak aortic jet velocity 4.0 m/s,MG 48 mmHg).    6 - Mild to moderate mitral regurgitation.    7 - Mild tricuspid regurgitation.    8 - Severe left atrial enlargement.     CKD (chronic kidney disease) stage 3, GFR 30-59 ml/min 12/29/2017    Coronary artery disease involving native coronary artery of native heart without angina pectoris 12/29/2017    [facility] @ : Per report (12/4/2017) proximal LAD has 20% stenosis, RCA with LI otherwise normal coronaries      Essential hypertension 12/14/2017    Hyperlipidemia associated with type 2 diabetes mellitus  12/14/2017    Left atrial enlargement     NSTEMI (non-ST elevated myocardial infarction) 12/14/2017    Paroxysmal atrial fibrillation 12/15/2017    Renovascular hypertension 12/14/2017    Severe aortic stenosis 12/14/2017    12-15-17  Severe low flow aortic valve stenosis (JAMES 0.49 cm2, AVAi 0.27 cm2/m2, peak aortic jet velocity 4.0 m/s,MG 48 mmHg).      Type 2 diabetes mellitus with neurologic complication, without long-term current use of insulin 12/14/2017       Past Surgical History:   Procedure Laterality Date    CARDIAC SURGERY      HYSTERECTOMY      KNEE SURGERY      SHOULDER ARTHROSCOPY Right 01/16/2004       Subjective     Chief Complaint: weakness, falls  Patient/Family stated goals: regain strength  Communicated with: nurse prior to session.  Pain/Comfort:  · Pain Rating 1: 0/10  · Pain Rating Post-Intervention 1: 0/10    Patients cultural, spiritual, Catholic conflicts given the current situation:      Objective:     Patient found with: central line, zimmerman catheter, SCD    General Precautions: Standard, fall   Orthopedic Precautions:    Braces:       Occupational Performance:    Bed Mobility:    · Patient completed Rolling/Turning to Right with stand by assistance  · Patient completed Scooting/Bridging with stand by assistance  · Patient completed Supine to Sit with stand by assistance  · Patient completed Sit to Supine with stand by assistance    Functional Mobility/Transfers:  · Patient completed Sit <> Stand Transfer with minimum assistance  with  rolling walker   · Functional Mobility: side stepped to Rt w/RW and Mod A w/limited WB'ing on RLE-just completed wound care on Rt foot    Activities of Daily Living:  ·     Cognitive/Visual Perceptual:  AO4, no deficits noted    Physical Exam:  BUE AROM grossly WFL, strength grossly 3+/5; multiple bruises throughtout BUE and trunk  Good sit balance, fair stand balance    Patient left HOB elevated with all lines intact, call button in reach and  "nurse notified    [facility] 6 Click:  [facility] Total Score: 16    Treatment & Education:  Pt educated on role of OT/POC, wt shift for transfers and side stepping.  Educated on currently will limit WB'ing on RLE until clearance by MD--due to recent wound care.    Education:    Assessment:     Anum Quick is a 62 y.o. female with a medical diagnosis of Cardiogenic shock.  She presents with performance deficits affecting function are weakness, impaired functional mobilty, impaired endurance, gait instability, impaired self care skills, impaired balance, decreased lower extremity function, decreased upper extremity function, impaired cardiopulmonary response to activity.      Rehab Prognosis:  good; patient would benefit from acute skilled OT services to address these deficits and reach maximum level of function.         Clinical Decision Makin.  OT Low:  "Pt evaluation falls under low complexity for evaluation coding due to performance deficits noted in 1-3 areas as stated above and 0 co-morbities affecting current functional status. Data obtained from problem focused assessments. No modifications or assistance was required for completion of evaluation. Only brief occupational profile and history review completed."     Plan:     Patient to be seen 5 x/week to address the above listed problems via self-care/home management, therapeutic activities, therapeutic exercises  · Plan of Care Expires: 18  · Plan of Care Reviewed with: patient    This Plan of care has been discussed with the patient who was involved in its development and understands and is in agreement with the identified goals and treatment plan    GOALS:    Occupational Therapy Goals        Problem: Occupational Therapy Goal    Goal Priority Disciplines Outcome Interventions   Occupational Therapy Goal     OT, PT/OT Ongoing (interventions implemented as appropriate)    Description:  Goals to be met by:      Patient will increase functional " independence with ADLs by performing:    UE Dressing with Modified [place].  LE Dressing with Modified [place].  Grooming while standing with Modified [place].  Toileting from toilet with Modified [place] for hygiene and clothing management.   Toilet transfer to toilet with Modified [place].  Upper extremity exercise program x10 reps per handout, with independence.                      Time Tracking:     OT Date of Treatment: 03/23/18  OT Start Time: 1109  OT Stop Time: 1141  OT Total Time (min): 32 min    Billable Minutes:Evaluation 10  Therapeutic Activity 10    Rad Parra OT  3/23/2018

## 2018-03-23 NOTE — PLAN OF CARE
Problem: Occupational Therapy Goal  Goal: Occupational Therapy Goal  Goals to be met by: 4/23     Patient will increase functional independence with ADLs by performing:    UE Dressing with Modified [place].  LE Dressing with Modified [place].  Grooming while standing with Modified [place].  Toileting from toilet with Modified [place] for hygiene and clothing management.   Toilet transfer to toilet with Modified [place].  Upper extremity exercise program x10 reps per handout, with independence.    Outcome: Ongoing (interventions implemented as appropriate)  OT eval performed, report to follow    Pt will benefit from SNF at discharge

## 2018-03-23 NOTE — CONSULTS
[facility]  Podiatry  Consult Note    Patient Name: Anum Quick  MRN: [id number]  Admission Date: 3/21/2018  Hospital Length of Stay: 2 days  Attending Physician: Miguel Hollis MD  Primary Care Provider: Raghav Valdez MD     Inpatient consult to Podiatry  Consult performed by: HANS BEAR  Consult ordered by: MIGUEL HOLLIS        Subjective:     History of Present Illness:  Anum Quick is a 62 y.o. female who  has a past medical history of Chronic anticoagulation - on rivaroxaban; Chronic systolic heart failure; CKD (chronic kidney disease) stage 3, GFR 30-59 ml/min; Coronary artery disease involving native coronary artery of native heart without angina pectoris; Essential hypertension; Hyperlipidemia associated with type 2 diabetes mellitus; Left atrial enlargement; NSTEMI (non-ST elevated myocardial infarction); Paroxysmal atrial fibrillation; Renovascular hypertension; Severe aortic stenosis; and Type 2 diabetes mellitus with neurologic complication, without long-term current use of insulin.    Patient Admited for Aotric stenosis.  Consulted to Podiatry for right LE ulcers.  Patient states ulcers have been there for a couple of months.   has been doing dressing changes at home with Hydrogen peroxide.  Denies F/C/N/V..        Scheduled Meds:   amiodarone  200 mg Oral Daily    aspirin  81 mg Oral Daily    atorvastatin  80 mg Oral Daily    doxycycline  100 mg Oral Q12H    ferrous sulfate  325 mg Oral BID    furosemide  80 mg Intravenous TID    metoprolol tartrate  25 mg Oral BID     Continuous Infusions:  PRN Meds:acetaminophen, dextrose 50%, dextrose 50%, glucagon (human recombinant), glucose, glucose, hydrocodone-acetaminophen 10-325mg, insulin aspart U-100, ondansetron, senna, sodium chloride 0.9%    Review of patient's allergies indicates:  No Known Allergies     Past Medical History:   Diagnosis Date    Chronic anticoagulation - on rivaroxaban  12/29/2017    PAF    Chronic systolic heart failure 12/14/2017     12-15-17   1 - Moderately depressed left ventricular systolic function (EF 30-35%). Akinetic LV apex. There is no thrombus in LV apex.   2 - Indeterminate LV diastolic function.    3 - Mildly to moderately depressed right ventricular systolic function .    4 - Pulmonary hypertension. The estimated PA systolic pressure is 63 mmHg.    5 - Severe low flow aortic valve stenosis (JAMES 0.49 cm2, AVAi 0.27 cm2/m2, peak aortic jet velocity 4.0 m/s,MG 48 mmHg).    6 - Mild to moderate mitral regurgitation.    7 - Mild tricuspid regurgitation.    8 - Severe left atrial enlargement.     CKD (chronic kidney disease) stage 3, GFR 30-59 ml/min 12/29/2017    Coronary artery disease involving native coronary artery of native heart without angina pectoris 12/29/2017    [facility] @ : Per report (12/4/2017) proximal LAD has 20% stenosis, RCA with LI otherwise normal coronaries      Essential hypertension 12/14/2017    Hyperlipidemia associated with type 2 diabetes mellitus 12/14/2017    Left atrial enlargement     NSTEMI (non-ST elevated myocardial infarction) 12/14/2017    Paroxysmal atrial fibrillation 12/15/2017    Renovascular hypertension 12/14/2017    Severe aortic stenosis 12/14/2017    12-15-17  Severe low flow aortic valve stenosis (JAMES 0.49 cm2, AVAi 0.27 cm2/m2, peak aortic jet velocity 4.0 m/s,MG 48 mmHg).      Type 2 diabetes mellitus with neurologic complication, without long-term current use of insulin 12/14/2017     Past Surgical History:   Procedure Laterality Date    CARDIAC SURGERY      HYSTERECTOMY      KNEE SURGERY      SHOULDER ARTHROSCOPY Right 01/16/2004       Family History     None        Social History Main Topics    Smoking status: Never Smoker    Smokeless tobacco: Never Used    Alcohol use No    Drug use: No    Sexual activity: Not on file     Review of Systems   Constitutional: Negative for chills and fever.    Gastrointestinal: Negative for nausea and vomiting.   Skin: Positive for wound.     Objective:     Vital Signs (Most Recent):  Temp: 98.4 °F (36.9 °C) (03/23/18 1545)  Pulse: 89 (03/23/18 1700)  Resp: (!) 23 (03/23/18 1700)  BP: 107/70 (03/23/18 1700)  SpO2: 100 % (03/23/18 1700) Vital Signs (24h Range):  Temp:  [97.5 °F (36.4 °C)-98.4 °F (36.9 °C)] 98.4 °F (36.9 °C)  Pulse:  [] 89  Resp:  [8-23] 23  SpO2:  [94 %-100 %] 100 %  BP: ()/(52-98) 107/70     Weight: 78.1 kg (172 lb 2.9 oz)  Body mass index is 26.97 kg/m².    Foot Exam    General  Orientation: alert and oriented to person, place, and time   Affect: appropriate       Right Foot/Ankle     Inspection and Palpation  Ecchymosis: none  Tenderness: none   Swelling: none   Skin Exam: ulcer;     Neurovascular  Dorsalis pedis: 1+  Posterior tibial: 1+  Saphenous nerve sensation: diminished  Tibial nerve sensation: diminished  Superficial peroneal nerve sensation: diminished  Deep peroneal nerve sensation: diminished  Sural nerve sensation: diminished      Left Foot/Ankle      Inspection and Palpation  Ecchymosis: none  Tenderness: none   Swelling: none   Skin Exam: skin intact;     Neurovascular  Dorsalis pedis: 1+  Posterior tibial: 1+  Saphenous nerve sensation: diminished  Tibial nerve sensation: diminished  Superficial peroneal nerve sensation: diminished  Deep peroneal nerve sensation: diminished  Sural nerve sensation: diminished            Laboratory:  A1C:   Recent Labs  Lab 12/15/17  0344 01/10/18  0945   HGBA1C 5.4 5.5     Cardiac Markers: No results for input(s): CKMB, TROPONINT, MYOGLOBIN in the last 168 hours.  CBC:   Recent Labs  Lab 03/23/18  0505   WBC 8.24   RBC 3.06*   HGB 7.9*   HCT 25.7*      MCV 84   MCH 25.8*   MCHC 30.7*     CRP: No results for input(s): CRP in the last 168 hours.  ESR: No results for input(s): SEDRATE in the last 168 hours.  Wound Cultures:   Recent Labs  Lab 02/22/18  1108   [facility]     Microbiology Results (last 7 days)     Procedure Component Value Units Date/Time    Aerobic culture [[id number]] Collected:  03/23/18 1044    Order Status:  Sent Specimen:  Wound from Foot, Right Updated:  03/23/18 1414    Culture, Anaerobe [[id number]] Collected:  03/23/18 1044    Order Status:  Sent Specimen:  Wound from Foot, Right Updated:  03/23/18 1414    Blood culture x two cultures. Draw prior to antibiotics. [[id number]] Collected:  03/21/18 1132    Order Status:  Completed Specimen:  Blood from Peripheral, Antecubital, Right Updated:  03/23/18 1412     Blood Culture, Routine No Growth to date     Blood Culture, Routine No Growth to date     Blood Culture, Routine No Growth to date    Narrative:       Aerobic and anaerobic    Urine culture [[id number]] Collected:  03/21/18 1529    Order Status:  Completed Specimen:  Urine from Urine, Catheterized Updated:  03/22/18 2151     Urine Culture, Routine No growth    Blood culture x two cultures. Draw prior to antibiotics. [[id number]] Collected:  03/21/18 1202    Order Status:  Completed Specimen:  Blood from Peripheral, Antecubital, Right Updated:  03/22/18 2012     Blood Culture, Routine No Growth to date     Blood Culture, Routine No Growth to date    Narrative:       Aerobic and anaerobic          Diagnostic Results:  X-ray ordered.    Clinical Findings:  Wound on right dorsal foot measures 1.0 x 0.5cm  With fibrogranular base and viable margins.  No purulence, erythema, edema, or maloder    Wound on right lateral leg measures 0.5 x 0.5 x superficial  With granular base and viable margins.  No purulence, erythema, edema, or maloder                  Assessment/Plan:     Right foot ulcer    Anum Quick is a 62 y.o. female with right LE ulcers, stable  -dressed with iodisorb and aquacel foam.  Nursing to do santyl and aquacel foam dressings every other day.  -x-ray ordered.  -Podiatry will follow.    DC instructions:  Upon discharge patient  is to follow up with podiatry within one week of discharge.    WBAT        Paroxysmal atrial fibrillation    Per primary        Severe aortic stenosis    Per primary            Thank you for your consult. I will follow-up with patient. Please contact us if you have any additional questions.    Gregory Ocampo MD  Podiatry  [facility]

## 2018-03-23 NOTE — ASSESSMENT & PLAN NOTE
Chronic but just never really addressed.  Start ferrous sulfate BID.  Checking stool for occult blood, since aortic stenosis can cause Heyde's syndrome.

## 2018-03-23 NOTE — ASSESSMENT & PLAN NOTE
-presented with hypotension, hypoglycemia and lactic acidosis  -BP improved with pressor use with successful wean and remains off this AM   -IV diuresis in process with improved response; needs continued volume removal due to severely depressed LVEF and AS  -will re assess to determine if inotropes needed depending on HR and BP

## 2018-03-23 NOTE — PT/OT/SLP EVAL
Physical Therapy Evaluation    Patient Name:  Anum Quick   MRN:  [id number]    Recommendations:     Discharge Recommendations:  nursing facility, skilled   Discharge Equipment Recommendations:  (TBD)   Barriers to discharge: Inaccessible home    Assessment:     Anum Quick is a 62 y.o. female admitted with a medical diagnosis of Cardiogenic shock.  She presents with the following impairments/functional limitations:  weakness, impaired endurance, impaired self care skills, impaired functional mobilty, gait instability, impaired balance, decreased lower extremity function, decreased upper extremity function, decreased ROM, impaired cardiopulmonary response to activity, impaired skin, decreased coordination Patient seen for eval; pt fatigued but able to participate; generally weak and deconditioned; will recommend SNF to maximize functional independence..    Rehab Prognosis:  good; patient would benefit from acute skilled PT services to address these deficits and reach maximum level of function.      Recent Surgery: * No surgery found *      Plan:     During this hospitalization, patient to be seen 6 x/week to address the above listed problems via gait training, therapeutic activities, therapeutic exercises  · Plan of Care Expires:  04/23/18   Plan of Care Reviewed with: patient    Subjective     Communicated with nurse prior to session.  Patient found supine upon PT entry to room, agreeable to evaluation.      Chief Complaint: recent falling, bruising all over   Patient comments/goals: to return to PLOF  Pain/Comfort:  · Pain Rating 1: 0/10  · Pain Rating Post-Intervention 1: 0/10   · Bruised areas sensitive to palpation    Patients cultural, spiritual, Synagogue conflicts given the current situation: Muslim    Living Environment:  Pt lives with  in 2  with 1 NAE and 15 steps to upstairs where bed and full bath are situated; half bath downstairs  Prior to admission, patients level of  function was independent with all ADLs, driving, amb without AD.  Patient has the following equipment: none. Upon discharge, patient will have limited assistance from     Objective:     Patient found with: central line, zimmerman catheter, SCD (ICU monitoring)     General Precautions: Standard, fall   Orthopedic Precautions:N/A   Braces: N/A     Exams:  · Cognitive Exam:  Patient is oriented to Person, Place, Time and Situation and follows 100% of one and two step  commands   · Gross Motor Coordination:  slow 2/2 edema, general weakness  · Sensation:    · -       Intact  · Skin Integrity/Edema:      · -       Skin integrity: Bruising of extremities and back  · -       Edema: Moderate extremities  · RLE ROM: WFL  · RLE Strength: 4- to 4/5 grossly  · LLE ROM: WFL  · LLE Strength: 4- to 4/5 grossly    Functional Mobility:  · Bed Mobility:     · Rolling Right: stand by assistance and with rail  · Scooting: stand by assistance  · Supine to Sit: stand by assistance and with rail  · Sit to Supine: stand by assistance  · Transfers:     · Sit to Stand:  stand by assistance and contact guard assistance with rolling walker  · Gait: Patient performed slide pivoting steps laterally toward HOB trying to keep weight off R foot; required min/modA  · Balance: sit fair+, stand fair, dynamic stand fair-/poor    AM-PAC 6 CLICK MOBILITY  Total Score:13       Therapeutic Activities and Exercises:   educated on role of PT/POC; pt tachycardic throughout session    Patient left HOB elevated with all lines intact, call button in reach and nurse notified.    GOALS:    Physical Therapy Goals        Problem: Physical Therapy Goal    Goal Priority Disciplines Outcome Goal Variances Interventions   Physical Therapy Goal     PT/OT, PT Ongoing (interventions implemented as appropriate)     Description:  Goals to be met by: 2018     Patient will increase functional independence with mobility by performin. Supine to sit with Modified  [place]  2. Sit to supine with Modified [place]  3. Sit to stand transfer with Modified [place]  4. Bed to chair transfer with Modified [place] with or without appropriate AD  5. Gait  x 50+ feet with Modified [place] with or without appropriate AD   6. Lower extremity exercise program x10 reps with independence                      History:     Past Medical History:   Diagnosis Date    Chronic anticoagulation - on rivaroxaban 12/29/2017    PAF    Chronic systolic heart failure 12/14/2017     12-15-17   1 - Moderately depressed left ventricular systolic function (EF 30-35%). Akinetic LV apex. There is no thrombus in LV apex.   2 - Indeterminate LV diastolic function.    3 - Mildly to moderately depressed right ventricular systolic function .    4 - Pulmonary hypertension. The estimated PA systolic pressure is 63 mmHg.    5 - Severe low flow aortic valve stenosis (JAMES 0.49 cm2, AVAi 0.27 cm2/m2, peak aortic jet velocity 4.0 m/s,MG 48 mmHg).    6 - Mild to moderate mitral regurgitation.    7 - Mild tricuspid regurgitation.    8 - Severe left atrial enlargement.     CKD (chronic kidney disease) stage 3, GFR 30-59 ml/min 12/29/2017    Coronary artery disease involving native coronary artery of native heart without angina pectoris 12/29/2017    [facility] @ : Per report (12/4/2017) proximal LAD has 20% stenosis, RCA with LI otherwise normal coronaries      Essential hypertension 12/14/2017    Hyperlipidemia associated with type 2 diabetes mellitus 12/14/2017    Left atrial enlargement     NSTEMI (non-ST elevated myocardial infarction) 12/14/2017    Paroxysmal atrial fibrillation 12/15/2017    Renovascular hypertension 12/14/2017    Severe aortic stenosis 12/14/2017    12-15-17  Severe low flow aortic valve stenosis (JAMES 0.49 cm2, AVAi 0.27 cm2/m2, peak aortic jet velocity 4.0 m/s,MG 48 mmHg).      Type 2 diabetes mellitus with neurologic complication, without long-term current use of  insulin 12/14/2017       Past Surgical History:   Procedure Laterality Date    CARDIAC SURGERY      HYSTERECTOMY      KNEE SURGERY      SHOULDER ARTHROSCOPY Right 01/16/2004       Clinical Decision Making:     History  Co-morbidities and personal factors that may impact the plan of care Examination  Body Structures and Functions, activity limitations and participation restrictions that may impact the plan of care Clinical Presentation   Decision Making/ Complexity Score   Co-morbidities:   [] Time since onset of injury / illness / exacerbation  [x] Status of current condition  []Patient's cognitive status and safety concerns    [x] Multiple Medical Problems (see med hx)  Personal Factors:   [] Patient's age  [] Prior Level of function   [x] Patient's home situation (environment and family support)  [] Patient's level of motivation  [] Expected progression of patient      HISTORY:(criteria)    [] 92176 - no personal factors/history    [] 47723 - has 1-2 personal factor/comorbidity     [x] 46724 - has >3 personal factor/comorbidity     Body Regions:  [] Objective examination findings  [] Head     []  Neck  [] Trunk   [] Upper Extremity  [x] Lower Extremity    Body Systems:  [x] For communication ability, affect, cognition, language, and learning style: the assessment of the ability to make needs known, consciousness, orientation (person, place, and time), expected emotional /behavioral responses, and learning preferences (eg, learning barriers, education  needs)  [x] For the neuromuscular system: a general assessment of gross coordinated movement (eg, balance, gait, locomotion, transfers, and transitions) and motor function  (motor control and motor learning)  [x] For the musculoskeletal system: the assessment of gross symmetry, gross range of motion, gross strength, height, and weight  [x] For the integumentary system: the assessment of pliability(texture), presence of scar formation, skin color, and skin  integrity  [x] For cardiovascular/pulmonary system: the assessment of heart rate, respiratory rate, blood pressure, and edema     Activity limitations:    [] Patient's cognitive status and saf ety concerns          [x] Status of current condition      [] Weight bearing restriction  [] Cardiopulmunary Restriction    Participation Restrictions:   [] Goals and goal agreement with the patient     [] Rehab potential (prognosis) and probable outcome      Examination of Body System: (criteria)    [] 94759 - addressing 1-2 elements    [] 55820 - addressing a total of 3 or more elements     [x] 92181 -  Addressing a total of 4 or more elements         Clinical Presentation: (criteria)  Stable - 00648     On examination of body system using standardized tests and measures patient presents with 4 or more elements from any of the following: body structures and functions, activity limitations, and/or participation restrictions.  Leading to a clinical presentation that is considered stable and/or uncomplicated                              Clinical Decision Making  (Eval Complexity):  Low- 49624     Time Tracking:     PT Received On: 03/23/18  PT Start Time: 1109     PT Stop Time: 1141  PT Total Time (min): 32 min     Billable Minutes: Evaluation 32 minutes      Joyce Valera, PT  03/23/2018

## 2018-03-23 NOTE — ASSESSMENT & PLAN NOTE
Anum Quick is a 62 y.o. female with right LE ulcers, stable  -dressed with iodisorb and aquacel foam.  Nursing to do santyl and aquacel foam dressings every other day.  -x-ray ordered.  -Podiatry will follow.    DC instructions:  Upon discharge patient is to follow up with podiatry within one week of discharge.    WBAT

## 2018-03-23 NOTE — CONSULTS
Patient seen today regarding wound rt lateral dorsal foot and rt lateral lower leg. Rt dorsal distal foot very red, edematous, warm capillary refill cedillo. Rt dorsal foot wound 1.5cm x 2.3cm x 0.2cm with 50% pink graunlation and 50% fibrin, small amount sanqenous  drainage, no odor. RT lateral lower leg wound 0.2cm x 0.3cm x 0.1cm with 100% granulation tissue, small amount sanqenous drainage. Wounds details reported to Dr. Hollis and patient nurse. Swab culture dorsal foot wound collected. Recommend consult to Dr. Luis Manuel Cowan regarding diabetic foot wound. Patient responds to questions with [facility]; with siva foot as culture q-tip applied. Recommend heel lift boots to feet. Hydrofera blue to wounds and antibiotics.

## 2018-03-23 NOTE — CONSULTS
Nephrology Consult  H&P      Consult Requested By: Cal Hollis MD  Reason for Consult: HARISH    SUBJECTIVE:     History of Present Illness:  Patient is a 62 y.o. female presents with worsenign weakness and progressive SOB for >3-4 weeks  Baseline cr 4 weeks ago and before was ~ 1.4, but then become to worsen on 2/24/18 to 1.6 and on 3/9/18 to 2.3    Yesterday she presented with AMS, lethargic, hypoglycemic, hypotensive to ED, requiring levophed and with lactic acid>12, cr 2.7  She is treated for cardiogenic shock and since yesterday improved, weaned off levophed, on Lasix 80 IV TID, making ~  cc/hour urine. Lactic acid slowly trending down to 4.2 latest one  She is now more alert but still too weak to even talk,  is bedside providing most of the history      Review of Systems   Unable to perform ROS: Medical condition       Past Medical History:   Diagnosis Date    Chronic anticoagulation - on rivaroxaban 12/29/2017    PAF    Chronic systolic heart failure 12/14/2017     12-15-17   1 - Moderately depressed left ventricular systolic function (EF 30-35%). Akinetic LV apex. There is no thrombus in LV apex.   2 - Indeterminate LV diastolic function.    3 - Mildly to moderately depressed right ventricular systolic function .    4 - Pulmonary hypertension. The estimated PA systolic pressure is 63 mmHg.    5 - Severe low flow aortic valve stenosis (JAMES 0.49 cm2, AVAi 0.27 cm2/m2, peak aortic jet velocity 4.0 m/s,MG 48 mmHg).    6 - Mild to moderate mitral regurgitation.    7 - Mild tricuspid regurgitation.    8 - Severe left atrial enlargement.     CKD (chronic kidney disease) stage 3, GFR 30-59 ml/min 12/29/2017    Coronary artery disease involving native coronary artery of native heart without angina pectoris 12/29/2017    [facility] @ : Per report (12/4/2017) proximal LAD has 20% stenosis, RCA with LI otherwise normal coronaries      Essential hypertension 12/14/2017    Hyperlipidemia associated with  type 2 diabetes mellitus 12/14/2017    Left atrial enlargement     NSTEMI (non-ST elevated myocardial infarction) 12/14/2017    Paroxysmal atrial fibrillation 12/15/2017    Renovascular hypertension 12/14/2017    Severe aortic stenosis 12/14/2017    12-15-17  Severe low flow aortic valve stenosis (JAMES 0.49 cm2, AVAi 0.27 cm2/m2, peak aortic jet velocity 4.0 m/s,MG 48 mmHg).      Type 2 diabetes mellitus with neurologic complication, without long-term current use of insulin 12/14/2017     Past Surgical History:   Procedure Laterality Date    CARDIAC SURGERY      HYSTERECTOMY      KNEE SURGERY      SHOULDER ARTHROSCOPY Right 01/16/2004     History reviewed. No pertinent family history.  Social History   Substance Use Topics    Smoking status: Never Smoker    Smokeless tobacco: Never Used    Alcohol use No       Review of patient's allergies indicates:  No Known Allergies         OBJECTIVE:     Vital Signs (Most Recent)  Vitals:    03/22/18 1815 03/22/18 1830 03/22/18 1845 03/22/18 1905   BP: (!) 87/59 107/61 (!) 103/59 90/61   Pulse: 84 85 86 87   Resp: 12 18 13 12   Temp:    97.6 °F (36.4 °C)   TempSrc:    Oral   SpO2: 100% 100% 100% 99%   Weight:       Height:               Date 03/22/18 0700 - 03/23/18 0659   Shift 5772-4473 3909-6676 7612-8995 24 Hour Total   I  N  T  A  K  E   P.O. 480   480    I.V.  (mL/kg) 50  (0.6)   50  (0.6)    Shift Total  (mL/kg) 530  (6.8)   530  (6.8)   O  U  T  P  U  T   Urine  (mL/kg/hr) 495  (0.8) 405  900    Shift Total  (mL/kg) 495  (6.4) 405  (5.2)  900  (11.6)   Weight (kg) 77.7 77.7 77.7 77.7           Medications:   amiodarone  200 mg Oral Daily    aspirin  81 mg Oral Daily    atorvastatin  80 mg Oral Daily    furosemide  80 mg Intravenous TID           Physical Exam   Constitutional: She is oriented to person, place, and time and well-developed, well-nourished, and in no distress. No distress.   HENT:   Head: Normocephalic and atraumatic.   Mouth/Throat:  Oropharynx is clear and moist.   Eyes: EOM are normal. No scleral icterus.   Neck: Neck supple. No JVD present.   Cardiovascular: Normal rate and regular rhythm.  Exam reveals no friction rub.    Murmur heard.  Pulmonary/Chest: Effort normal. No respiratory distress. She has decreased breath sounds in the right lower field, the left middle field and the left lower field. She has no wheezes. She has no rales.   Abdominal: Soft. Bowel sounds are normal. She exhibits no distension. There is no tenderness.   Musculoskeletal: She exhibits edema (2+ pitting BLE (per  edema is better then ever in the last 4 weeks)).   Neurological: She is alert and oriented to person, place, and time.   Follows commands   Skin: Skin is warm and dry. No rash noted. She is not diaphoretic. No erythema.   Psychiatric: Affect normal.       Laboratory:    Recent Labs  Lab 03/21/18  1108 03/21/18  1136 03/22/18  0603   WBC  --  12.38 10.76   HGB  --  8.2* 7.9*   HCT 29* 28.8* 25.7*   PLT  --  227 237   MONO  --  5.9  0.7 5.2  0.6       Recent Labs  Lab 03/21/18  1136 03/22/18  0553    140   K 4.7 4.5   CL 98 100   CO2 5* 18*   BUN 63* 83*   CREATININE 2.7* 2.9*   CALCIUM 8.9 8.2*   PHOS 7.7* 6.4*       Diagnostic Results:  X-Ray: Reviewed  US: Reviewed The right kidney measures approximately 11.7 cm with mild cortical thinning and decreased corticomedullary distinction.  There is reduced renal perfusion.  Resistive index is elevated at 0.78.  No hydronephrosis or nephrolithiasis.    The left kidney measures 10.4 cm with decreased corticomedullary distinction.  There is decreased renal perfusion, resistive index measures 0.65.  No hydronephrosis or nephrolithiasis.    The IVC is patent, velocity measures 18 centimeters/seconds, diameter adjacent to the a patent parenchyma, measures 2.7 cm.  The a patent parenchyma is echogenic suggesting steatosis.  Echo:  pending  ASSESSMENT/PLAN:     1. HARISH on CKD3 Baseline creatinine- Baseline  cr 4 weeks ago and before was ~ 1.4, but then become to worsen on 2/24/18 to 1.6 and on 3/9/18 to 2.3       Avoid nephrotoxins, dose all meds ad for GFr < 10  No indication for urgent RRT. Monitor closely. Strict ins/outs, daily weight     2. Acute on Chronic SHF with previous EF 30-35% (in 2017) and severe AS - continue LAsix    3. Metabolic acidosis - lactic acidosis - improving, monitor    4. Hypomagnesemia - replace IV, monitor daily while on IV diuretics    5. Hypotension/cardiogenic shock - much better, off levophed    6. Severe anemia of CKD - check iron    7. Deconditioning PT/OT      Thank you for consult, will follow  With any question please call [phone]  Shraddha Barr    [facility]  SULEIMAN Ferro MD, FACDON VAZ MD,   MD EMILY Kelly, NP  [address]  [place], [address]  [phone]

## 2018-03-23 NOTE — ASSESSMENT & PLAN NOTE
-H&H 7.9&25.7 this AM unchanged from yesterday  -below baseline of 9-10/26-30 earlier this year with H&H 11&34 in 12/2017  -concerned about bleeding etiology since AVMs common in severe AS patients  -recommend GI consult for evaluation; ultimately would likely need EGD but given current acute issue would need to hold off for now but feel GI evaluation warranted  -will check stool for OCB if not done

## 2018-03-23 NOTE — SUBJECTIVE & OBJECTIVE
Scheduled Meds:   amiodarone  200 mg Oral Daily    aspirin  81 mg Oral Daily    atorvastatin  80 mg Oral Daily    doxycycline  100 mg Oral Q12H    ferrous sulfate  325 mg Oral BID    furosemide  80 mg Intravenous TID    metoprolol tartrate  25 mg Oral BID     Continuous Infusions:  PRN Meds:acetaminophen, dextrose 50%, dextrose 50%, glucagon (human recombinant), glucose, glucose, hydrocodone-acetaminophen 10-325mg, insulin aspart U-100, ondansetron, senna, sodium chloride 0.9%    Review of patient's allergies indicates:  No Known Allergies     Past Medical History:   Diagnosis Date    Chronic anticoagulation - on rivaroxaban 12/29/2017    PAF    Chronic systolic heart failure 12/14/2017     12-15-17   1 - Moderately depressed left ventricular systolic function (EF 30-35%). Akinetic LV apex. There is no thrombus in LV apex.   2 - Indeterminate LV diastolic function.    3 - Mildly to moderately depressed right ventricular systolic function .    4 - Pulmonary hypertension. The estimated PA systolic pressure is 63 mmHg.    5 - Severe low flow aortic valve stenosis (JAMES 0.49 cm2, AVAi 0.27 cm2/m2, peak aortic jet velocity 4.0 m/s,MG 48 mmHg).    6 - Mild to moderate mitral regurgitation.    7 - Mild tricuspid regurgitation.    8 - Severe left atrial enlargement.     CKD (chronic kidney disease) stage 3, GFR 30-59 ml/min 12/29/2017    Coronary artery disease involving native coronary artery of native heart without angina pectoris 12/29/2017    [facility] @ : Per report (12/4/2017) proximal LAD has 20% stenosis, RCA with LI otherwise normal coronaries      Essential hypertension 12/14/2017    Hyperlipidemia associated with type 2 diabetes mellitus 12/14/2017    Left atrial enlargement     NSTEMI (non-ST elevated myocardial infarction) 12/14/2017    Paroxysmal atrial fibrillation 12/15/2017    Renovascular hypertension 12/14/2017    Severe aortic stenosis 12/14/2017    12-15-17  Severe low flow aortic valve  stenosis (JAMES 0.49 cm2, AVAi 0.27 cm2/m2, peak aortic jet velocity 4.0 m/s,MG 48 mmHg).      Type 2 diabetes mellitus with neurologic complication, without long-term current use of insulin 12/14/2017     Past Surgical History:   Procedure Laterality Date    CARDIAC SURGERY      HYSTERECTOMY      KNEE SURGERY      SHOULDER ARTHROSCOPY Right 01/16/2004       Family History     None        Social History Main Topics    Smoking status: Never Smoker    Smokeless tobacco: Never Used    Alcohol use No    Drug use: No    Sexual activity: Not on file     Review of Systems   Constitutional: Negative for chills and fever.   Gastrointestinal: Negative for nausea and vomiting.   Skin: Positive for wound.     Objective:     Vital Signs (Most Recent):  Temp: 98.4 °F (36.9 °C) (03/23/18 1545)  Pulse: 89 (03/23/18 1700)  Resp: (!) 23 (03/23/18 1700)  BP: 107/70 (03/23/18 1700)  SpO2: 100 % (03/23/18 1700) Vital Signs (24h Range):  Temp:  [97.5 °F (36.4 °C)-98.4 °F (36.9 °C)] 98.4 °F (36.9 °C)  Pulse:  [] 89  Resp:  [8-23] 23  SpO2:  [94 %-100 %] 100 %  BP: ()/(52-98) 107/70     Weight: 78.1 kg (172 lb 2.9 oz)  Body mass index is 26.97 kg/m².    Foot Exam    General  Orientation: alert and oriented to person, place, and time   Affect: appropriate       Right Foot/Ankle     Inspection and Palpation  Ecchymosis: none  Tenderness: none   Swelling: none   Skin Exam: ulcer;     Neurovascular  Dorsalis pedis: 1+  Posterior tibial: 1+  Saphenous nerve sensation: diminished  Tibial nerve sensation: diminished  Superficial peroneal nerve sensation: diminished  Deep peroneal nerve sensation: diminished  Sural nerve sensation: diminished      Left Foot/Ankle      Inspection and Palpation  Ecchymosis: none  Tenderness: none   Swelling: none   Skin Exam: skin intact;     Neurovascular  Dorsalis pedis: 1+  Posterior tibial: 1+  Saphenous nerve sensation: diminished  Tibial nerve sensation: diminished  Superficial peroneal  nerve sensation: diminished  Deep peroneal nerve sensation: diminished  Sural nerve sensation: diminished            Laboratory:  A1C:   Recent Labs  Lab 12/15/17  0344 01/10/18  0945   HGBA1C 5.4 5.5     Cardiac Markers: No results for input(s): CKMB, TROPONINT, MYOGLOBIN in the last 168 hours.  CBC:   Recent Labs  Lab 03/23/18  0505   WBC 8.24   RBC 3.06*   HGB 7.9*   HCT 25.7*      MCV 84   MCH 25.8*   MCHC 30.7*     CRP: No results for input(s): CRP in the last 168 hours.  ESR: No results for input(s): SEDRATE in the last 168 hours.  Wound Cultures:   Recent Labs  Lab 02/22/18  1106   LABAERO PSEUDOMONAS AERUGINOSAMany     Microbiology Results (last 7 days)     Procedure Component Value Units Date/Time    Aerobic culture [[id number]] Collected:  03/23/18 1044    Order Status:  Sent Specimen:  Wound from Foot, Right Updated:  03/23/18 1414    Culture, Anaerobe [[id number]] Collected:  03/23/18 1044    Order Status:  Sent Specimen:  Wound from Foot, Right Updated:  03/23/18 1414    Blood culture x two cultures. Draw prior to antibiotics. [[id number]] Collected:  03/21/18 1132    Order Status:  Completed Specimen:  Blood from Peripheral, Antecubital, Right Updated:  03/23/18 1412     Blood Culture, Routine No Growth to date     Blood Culture, Routine No Growth to date     Blood Culture, Routine No Growth to date    Narrative:       Aerobic and anaerobic    Urine culture [[id number]] Collected:  03/21/18 1529    Order Status:  Completed Specimen:  Urine from Urine, Catheterized Updated:  03/22/18 2151     Urine Culture, Routine No growth    Blood culture x two cultures. Draw prior to antibiotics. [[id number]] Collected:  03/21/18 1202    Order Status:  Completed Specimen:  Blood from Peripheral, Antecubital, Right Updated:  03/22/18 2012     Blood Culture, Routine No Growth to date     Blood Culture, Routine No Growth to date    Narrative:       Aerobic and anaerobic          Diagnostic Results:  X-ray  ordered.    Clinical Findings:  Wound on right dorsal foot measures 1.0 x 0.5cm  With fibrogranular base and viable margins.  No purulence, erythema, edema, or maloder    Wound on right lateral leg measures 0.5 x 0.5 x superficial  With granular base and viable margins.  No purulence, erythema, edema, or maloder

## 2018-03-23 NOTE — ASSESSMENT & PLAN NOTE
-on Amiodarone, Metoprolol and Xarelto at home  -admit EKG with SR with 1st degree AVB on EKGs;  -runs of afib on telemetry since admission; appears in afib this AM with HR 100s-110s  -intermittent afib all demand and volume overload related; will continue with aggressive diuresis and continue Amiodarone; as volume status improves anticipate HR and rhythm will improve as well  -Xarelto on hold due to auto anticoagulation with INR 3.2 upon admission likely related to hepatic congestion in setting of ADHF  -was on DVT prophylaxis yesterday but discontinued due to anemia; if H&H stablizes and INR trends down then will consider resumption; in the meantime will place TEDS/SCDs

## 2018-03-23 NOTE — SUBJECTIVE & OBJECTIVE
Interval History: No new complaints.  Wound Care nurse recommended antibiotic treatment and Podiatry consult.  Mrs. Quick said she was just worried about her kidney function but is glad to hear it is improving now.    Review of Systems   Constitutional: Negative for chills and fever.   Respiratory: Negative for cough and shortness of breath.      Objective:     Vital Signs (Most Recent):  Temp: 97.5 °F (36.4 °C) (03/23/18 0730)  Pulse: (!) 126 (03/23/18 1200)  Resp: 16 (03/23/18 1200)  BP: (!) 125/92 (03/23/18 1200)  SpO2: 100 % (03/23/18 1200) Vital Signs (24h Range):  Temp:  [97.5 °F (36.4 °C)-98.3 °F (36.8 °C)] 97.5 °F (36.4 °C)  Pulse:  [] 126  Resp:  [10-27] 16  SpO2:  [87 %-100 %] 100 %  BP: ()/(54-98) 125/92     Weight: 78.1 kg (172 lb 2.9 oz)  Body mass index is 26.97 kg/m².    Intake/Output Summary (Last 24 hours) at 03/23/18 1232  Last data filed at 03/23/18 1201   Gross per 24 hour   Intake              240 ml   Output             4865 ml   Net            -4625 ml      Physical Exam   Constitutional: She is oriented to person, place, and time. She appears well-developed. No distress.   Cardiovascular: An irregularly irregular rhythm present. Tachycardia present.    Murmur heard.   Systolic murmur is present   Pulmonary/Chest: Effort normal. No respiratory distress. She has rales.   Musculoskeletal: She exhibits edema.   Neurological: She is alert and oriented to person, place, and time.   Psychiatric: She has a normal mood and affect.   Nursing note and vitals reviewed.      Significant Labs:   CMP:     Recent Labs  Lab 03/22/18  0553 03/23/18  0505    139   K 4.5 3.2*    99   CO2 18* 28   * 148*   BUN 83* 80*   CREATININE 2.9* 2.1*   CALCIUM 8.2* 7.9*   PROT  --  5.9*   ALBUMIN  --  2.6*   BILITOT  --  0.8   ALKPHOS  --  167*   AST  --  68*   ALT  --  59*   ANIONGAP 22* 12   EGFRNONAA 17* 25*       Significant Imaging: I have reviewed all pertinent imaging  results/findings within the past 24 hours.   US Kidney Only 3/22/18: The right kidney measures approximately 11.7 cm with mild cortical thinning and decreased corticomedullary distinction.  There is reduced renal perfusion.  Resistive index is elevated at 0.78.  No hydronephrosis or nephrolithiasis.  The left kidney measures 10.4 cm with decreased corticomedullary distinction.  There is decreased renal perfusion, resistive index measures 0.65.  No hydronephrosis or nephrolithiasis.  The IVC is patent, velocity measures 18 centimeters/seconds, diameter adjacent to the a patent parenchyma, measures 2.7 cm.  The a patent parenchyma is echogenic suggesting steatosis.  Impression: Elevated right renal resistive index suggesting medical renal disease, possibly acute noting there is chronic appearing renal disease of the parenchyma bilaterally.  Please see above for details of the IVC.  Hepatic steatosis.

## 2018-03-23 NOTE — PLAN OF CARE
Problem: Patient Care Overview  Goal: Plan of Care Review  Outcome: Ongoing (interventions implemented as appropriate)  Pt lying in bed resting comfortably, arouses to voice, incomprehensible speech at times, follows commands, oriented to self and location. Sats 100% on room air. Respirations even and unlabored. Tele NSR with HR 80's-90's.Afebrile. BP stable with MAP greater than 65--levo remains off. Crowe urine output 100-250/hr . C/O back pain well controlled with PRN tylenol. Will continue to monitor.

## 2018-03-23 NOTE — PROGRESS NOTES
"Wounds cleaned with normal saline. Culture collected right dorsal foot wound.. Saline moist hydrofera blue classic applied with aquacell foam to each wound, secured with 2 rolls 6" cast padding, wide flexi net applied. Recommend dressing change every other day or as often as Dr. Cowan recommends.  " Pt transferred from [facility] d/t resp distress/fluid overload. Admitted to room 700-11 accompanied by , Varinder around 0130. Pt presented in resp distress, tachypneic, labored pursed breathing. On 2l O2 n/c. O2 sats within parameters. Slightly tachycardiac-100's-110s. BP stable. Denies pain. C/o difficulty breathing and having so much pressure in her abdomen. [place] was inserted for more accurate I&Os and due to patient's inability to tolerate much activity. NG was also inserted for abdominal decompression to LIS. KUB obtained. A total of 100cc out/bilious/blood tinged contents from the stomach. Also received 1 amp of sodium bicarb.  Decision was also made to give 40mg Lasix IV in which the patient diuresed a good amount of dilute urine and pt also verbalized relief. Due to pt being in distress and having a very active gag reflex after NG insertion, a proper skin assessment was deferred at this time due to it causing more distress to the patient due to positioning and pt unable to tolerate at the time.  This information was passed on to the oncoming shift. Oncoming shift will attempt to do full skin assessment as pt starts to improve.

## 2018-03-23 NOTE — PROGRESS NOTES
[facility] Hospital Medicine  Progress Note    Patient Name: Anum Quick  MRN: [id number]  Patient Class: IP- Inpatient   Admission Date: 3/21/2018  Length of Stay: 2 days  Attending Physician: Cal Hollis MD  Primary Care Provider: Raghav Valdez MD        Subjective:     Principal Problem:Cardiogenic shock    HPI:  Anum Quick is a 62 y.o. white  woman with hypertension, diabetes mellitus type 2 (too well controlled on metformin), neuropathy, hyperlipidemia, hepatic steatosis, coronary artery disease, severe left atrial enlargement, severe aortic stenosis, chronic left and right-sided systolic congestive heart failure, pulmonary hypertension, chronic hypoxic respiratory failure, paroxysmal atrial fibrillation (anticoagulated on rivaroxaban), chronic kidney disease stage 3, anemia, chronic lower extremity venous stasis, chronic pain, and a subcentimeter pancreatic cyst.  She lives in [place], [place].  She is  but has had prior husbands, and had 3 children before 1  of a heart attack.  She worked as a  at [employer] on [employer] until she became ill.  Her primary care physician is Dr. Raghav Valdez.  Her cardiologist is Dr. Jason Plaza.  Her pain management specialist is Dr. Luc David.     She was planned for elective aortic valve replacement and Maze procedure by cardiothoracic surgeon Dr. Rodo Lundy, but it was postponed because her creatinine increased from 1.6 on 17 to 3.1 on 1/10/18, and her AST and ALT increased to 698 and 524.  These were thought to be due to decompensated heart failure and subsequently improved.   She was hospitalized at [facility] from 18-18 for decompensated heart failure requiring furosemide drip and metolazone.  BNP was greater than 4,900 on admission.  She had reportedly been taking furosemide 40 mg alternating with 20 mg daily prior to  admission and weight 178 lbs on admission.  Her fluid status was net negative 16 liters by discharge, and creatinine was 1.6.  She was prescribed furosemide 40 mg twice daily.   She was seen at [facility] internal medicine clinic on 3/9/18 by nurse practitioner Svetlana Vicente for worsening congestive heart failure.  She weighed 79.3 kg (174 lb) and was hypoxic (oxygen saturation 88%) but reported being able to perform activities around her house.  Labs showed worsening renal failure with creatinine increased to 2.3, but BNP had decreased to 4,875.     She was eating a lot of ice at home, and progressively became weaker, gained weight, became more short of breath, and more nauseated.  Her  tried to convince her for a couple of weeks to go the hospital, but she was stubborn.  She was falling a lot, and was too heavy for her  to manage.  She started becoming confused.   Finally, on 3/21/18, her  called EMS.  She was taken to [facility] Emergency Department and found to have hypoglycemic coma, which responded to dextrose administration.  Blood pressure was as low as 85/66.  She was found to have cardiogenic shock with pH of 7.130, lactic acid greater than 12, creatinine increased to 2.7, BNP back up to greater than 4,900.  Weight had increased to 81.6 kg (180 lb).  She was placed on BiPAP, which helped treat her acidosis.  Prior to workup, she was thought to have sepsis, so IV fluids were begun, but stopped at 350 mL after consulting [facility], who also recommended starting a vasopressor or inotrope.  Anesthesiology placed a central venous catheter and she was started on dopamine.  Hospital Medicine recommended consulting her cardiologist, who was not available, so she was admitted to [facility].  She was started on IV furosemide 80 mg three times daily.  Cardiology was consulted to help manage.      [facility]  Course:  Since diabetes was too controlled and metformin contributed to lactic acidosis, metformin was discontinued.  She was put on norepinephrine drip and furosemide 80 mg IV three times daily.  Norepinephrine was able to weaned off relatively quickly.  Her renal function worsened.  Lactic acidosis improved, so she was taken off BiPAP on 3/22/18.  Cardiology was concerned about her worsening anemia, due to risk of arteriovenous malformations.  Hemoglobin and hematocrit remained stable between 3/22/18 and 3/23/18.  Furthermore, when BUN and creatinine jemima to 83 and 2.9 on 3/22/18, Nephrology was consulted, but BUN and creatinine started to improve the next day.  As expected, everything was improving because she was getting better perfusion and fluid removal.  Even her chronic hypoxia resolved.  The wound care nurse evaluated her right leg and foot on 3/23/18, noted infection of the dorsal foot wound, cultured it, and recommended dressings and Podiatry evaluation.    Interval History: No new complaints.  Wound Care nurse recommended antibiotic treatment and Podiatry consult.  Mrs. Quick said she was just worried about her kidney function but is glad to hear it is improving now.    Review of Systems   Constitutional: Negative for chills and fever.   Respiratory: Negative for cough and shortness of breath.      Objective:     Vital Signs (Most Recent):  Temp: 97.5 °F (36.4 °C) (03/23/18 0730)  Pulse: (!) 126 (03/23/18 1200)  Resp: 16 (03/23/18 1200)  BP: (!) 125/92 (03/23/18 1200)  SpO2: 100 % (03/23/18 1200) Vital Signs (24h Range):  Temp:  [97.5 °F (36.4 °C)-98.3 °F (36.8 °C)] 97.5 °F (36.4 °C)  Pulse:  [] 126  Resp:  [10-27] 16  SpO2:  [87 %-100 %] 100 %  BP: ()/(54-98) 125/92     Weight: 78.1 kg (172 lb 2.9 oz)  Body mass index is 26.97 kg/m².    Intake/Output Summary (Last 24 hours) at 03/23/18 1232  Last data filed at 03/23/18 1201   Gross per 24 hour   Intake              240 ml   Output              4865 ml   Net            -4625 ml      Physical Exam   Constitutional: She is oriented to person, place, and time. She appears well-developed. No distress.   Cardiovascular: An irregularly irregular rhythm present. Tachycardia present.    Murmur heard.   Systolic murmur is present   Pulmonary/Chest: Effort normal. No respiratory distress. She has rales.   Musculoskeletal: She exhibits edema.   Neurological: She is alert and oriented to person, place, and time.   Psychiatric: She has a normal mood and affect.   Nursing note and vitals reviewed.      Significant Labs:   CMP:     Recent Labs  Lab 03/22/18  0553 03/23/18  0505    139   K 4.5 3.2*    99   CO2 18* 28   * 148*   BUN 83* 80*   CREATININE 2.9* 2.1*   CALCIUM 8.2* 7.9*   PROT  --  5.9*   ALBUMIN  --  2.6*   BILITOT  --  0.8   ALKPHOS  --  167*   AST  --  68*   ALT  --  59*   ANIONGAP 22* 12   EGFRNONAA 17* 25*       Significant Imaging: I have reviewed all pertinent imaging results/findings within the past 24 hours.   US Kidney Only 3/22/18: The right kidney measures approximately 11.7 cm with mild cortical thinning and decreased corticomedullary distinction.  There is reduced renal perfusion.  Resistive index is elevated at 0.78.  No hydronephrosis or nephrolithiasis.  The left kidney measures 10.4 cm with decreased corticomedullary distinction.  There is decreased renal perfusion, resistive index measures 0.65.  No hydronephrosis or nephrolithiasis.  The IVC is patent, velocity measures 18 centimeters/seconds, diameter adjacent to the a patent parenchyma, measures 2.7 cm.  The a patent parenchyma is echogenic suggesting steatosis.  Impression: Elevated right renal resistive index suggesting medical renal disease, possibly acute noting there is chronic appearing renal disease of the parenchyma bilaterally.  Please see above for details of the IVC.  Hepatic steatosis.    Assessment/Plan:      Cellulitis of right foot    Right foot  ulcer  Appreciate Wound Care.  Consult Podiatry.  Start doxycycline and await culture results.          Iron deficiency anemia    Chronic but just never really addressed.  Start ferrous sulfate BID.  Checking stool for occult blood, since aortic stenosis can cause Heyde's syndrome.          Cardiorenal syndrome with renal failure    CKD (chronic kidney disease) stage 3, GFR 30-59 ml/min  Appreciate Nephrology.  Was worse due to cardiogenic shock.  Now improving.          Venous stasis of both lower extremities    Appreciate Wound Care.          Acute on chronic systolic congestive heart failure    Pulmonary hypertension due to left ventricular systolic dysfunction  Left atrial enlargement  Takes furosemide 40 mg BID, metoprolol tartrate 50 mg BID, lisinopril 20 mg daily at home.  Was eating a lot of ice, which likely contributed to fluid overload.  Had cardiogenic shock on admission that resolved.  Giving furosemide 80 mg IV TID.  During her last hospitalization, she was diuresed to net negative 16 liters.  Appreciate Cardiology.            Coronary artery disease involving native coronary artery of native heart without angina pectoris    Takes aspirin 81 mg daily, clopidrogel 75 mg daily, atorvastatin.  Resume aspirin, atorvastatin.          Paroxysmal atrial fibrillation    Chronic anticoagulation - on rivaroxaban   Takes amiodarone 200 mg daily and rivaroxaban at home.  Continue amiodarone.  Holding rivaroxaban.  Not on any anticoagulation for now due to question of active blood loss.          Hyperlipidemia associated with type 2 diabetes mellitus    Takes atorvastatin 80 mg daily.  Hold for now.          Type 2 diabetes mellitus with neurologic complication, without long-term current use of insulin    Has not needed metformin for a while.  Discontinue since it contributes to lactic acidosis.  Insulin aspart sliding scale.  Monitor serum glucose.  Holding gabapentin while renal function improves.            VTE  Risk Mitigation         Ordered     Place RADHA hose  Until discontinued      03/23/18 1240     Place sequential compression device  Until discontinued      03/23/18 1240          Critical care time spent on the evaluation and treatment of severe organ dysfunction, review of pertinent labs and imaging studies, discussions with consulting providers and discussions with patient/family: 45 minutes.    Cal Hollis MD  Department of Hospital Medicine   [facility]

## 2018-03-23 NOTE — ASSESSMENT & PLAN NOTE
-echo 12/2017 with severe AS-JAMES .49cm2; mean gradient 48mmHg and peak velocity of 4; repeat echo yesterday with JAMES .59cm2; MG 51mmHg peak velocity 4.4  -seen by [facility] CTS with deferrment of AVR secondary to ARF  -currently in ADHF with cardiogenic shock in setting of severe AS in need of aggressive volume removal with minimal response to IV Lasix initially  -remains on IV Lasix TID with 2.8 liters out overnight; negative 2.9 liters since admission   -needs intervention of severe AS but precluded now due to acute decompensation, ARF and anemia  -very critical situation and concerned about candidacy for AS intervention given critical state

## 2018-03-23 NOTE — PROGRESS NOTES
[facility]  Cardiology  Progress Note    Patient Name: Anum Quick  MRN: [id number]  Admission Date: 3/21/2018  Hospital Length of Stay: 2 days  Code Status: Full Code   Attending Physician: Cal Hollis MD   Primary Care Physician: Raghav Valdez MD  Expected Discharge Date:   Principal Problem:Cardiogenic shock    Subjective:     Hospital Course:   3/21/2018 Presented to the ER via EMS with lethargy, weakness, hypoglycemia and low blood pressure. Upon arrival to the ER, was found to be in hypoglycemic coma with response to IV Dextrose.  BP was low at 85/66 with cardiogenic shock with pH of 7.130, lactic acid greater than 12, creatinine 2.7 and  BNP greater than 4,900.   Placed on BiPap with initiation of pressors rather than IVF due to cardiogenic shock. Admitted to [facility] for further management and diuresis. Started on IV Lasix 80mg TID  3/22/2018 Weaned off of Levophed overnight and transitioned to NC with stable sats. Lethargic this AM with speech difficult to discern-repeat ABG with pH 7.410 pCO2 31.5 pO2 129 HCO3 20.0. Creatinine 2.9 baseline 1.4-1.6;  H&H 7.9 & 25.7 baseline 9-10/26-30 earlier this year with H&H 11&34 in 12/2017. Lactic acid down to 7.7 from 12. Blood cultures NGTD and urine culture pending. On IV Lasix 80mg TID with 945cc out overnight   3/23/2018 Remains off BiPap and Levophed. BP 100s-120s/70-90s overnight. Creatinine down to 2.1 this AM from 2.9 yesterday. K+ 3.2 with IV replacement provided. On IV Lasix TID with 2.8 liters out overnight and negative 2.9 since admission. H&H 7.9&25.7 unchanged from yesterday but remains below baseline. Nephrology on board for HARISH        ROS  Objective:     Vital Signs (Most Recent):  Temp: 97.5 °F (36.4 °C) (03/23/18 0730)  Pulse: 88 (03/23/18 0800)  Resp: 11 (03/23/18 0800)  BP: (!) 129/91 (03/23/18 0800)  SpO2: 99 % (03/23/18 0800) Vital Signs (24h Range):  Temp:  [97.5 °F (36.4 °C)-98.3 °F (36.8 °C)] 97.5  °F (36.4 °C)  Pulse:  [80-92] 88  Resp:  [10-27] 11  SpO2:  [87 %-100 %] 99 %  BP: ()/(54-91) 129/91     Weight: 78.1 kg (172 lb 2.9 oz)  Body mass index is 26.97 kg/m².     SpO2: 99 %  O2 Device (Oxygen Therapy): room air      Intake/Output Summary (Last 24 hours) at 03/23/18 1055  Last data filed at 03/23/18 1038   Gross per 24 hour   Intake              480 ml   Output             3625 ml   Net            -3145 ml       Lines/Drains/Airways     Central Venous Catheter Line                 Percutaneous Central Line Insertion/Assessment - single lumen  03/21/18 1406 right internal jugular 1 day         Percutaneous Central Line Insertion/Assessment - triple lumen  03/21/18 1350 other (see comments) 1 day          Drain                 Urethral Catheter 03/21/18 1536 Latex 14 Fr. 1 day                Physical Exam   Constitutional: She has a sickly appearance. No distress.   Cardiovascular: Normal rate and regular rhythm.  Exam reveals no gallop.    Murmur heard.  Pulmonary/Chest: Effort normal. No respiratory distress. She has decreased breath sounds. She has no wheezes.   Abdominal: Soft. Bowel sounds are normal. She exhibits no distension. There is no tenderness.   Skin: Skin is warm and dry.       Significant Labs:       Recent Labs  Lab 03/23/18  0505      K 3.2*   CL 99   CO2 28   BUN 80*   CREATININE 2.1*   MG 1.9       Recent Labs  Lab 03/23/18  0505   WBC 8.24   RBC 3.06*   HGB 7.9*   HCT 25.7*      MCV 84   MCH 25.8*   MCHC 30.7*       Significant Imaging: Echocardiogram:   2D echo with color flow doppler:   Results for orders placed or performed during the hospital encounter of 03/21/18   2D echo with color flow doppler   Result Value Ref Range    EF 20 (A) 55 - 65    Mitral Valve Regurgitation MILD     Diastolic Dysfunction Yes (A)     Aortic Valve Regurgitation MODERATE (A)     Aortic Valve Stenosis SEVERE (A)     Est. PA Systolic Pressure 54.51 (A)     Mitral Valve Mobility NORMAL      Tricuspid Valve Regurgitation MILD TO MODERATE      Assessment and Plan:         * Cardiogenic shock    -presented with hypotension, hypoglycemia and lactic acidosis  -BP improved with pressor use with successful wean and remains off this AM   -IV diuresis in process with improved response; needs continued volume removal due to severely depressed LVEF and AS  -will re assess to determine if inotropes needed depending on HR and BP         Anemia    -H&H 7.9&25.7 this AM unchanged from yesterday  -below baseline of 9-10/26-30 earlier this year with H&H 11&34 in 12/2017  -concerned about bleeding etiology since AVMs common in severe AS patients  -recommend GI consult for evaluation; ultimately would likely need EGD but given current acute issue would need to hold off for now but feel GI evaluation warranted  -will check stool for OCB if not done         Cardiorenal syndrome with renal failure    -creatinine 2.1 this AM down from 2.9 yesterday  -baseline creatinine 1.4-1.6  -consistent with cardiorenal etiology  -IV diuresis in process with improved output overnight  -continue to avoid hypotension and nephrotoxic agents   -Nephrology         Acute on chronic systolic congestive heart failure    -echo in December with EF down to 30-35% with previous echo at [facility] with EF 50%; repeat echo yesterday with EF down to 20-25%  -presented with ADHF and cardiogenic shock; BNP >4900 and SVO2 36 upon admission  -volume overload and started on aggressive IV diuresis with Lasix 80mg IV TID; minimal response initially with only 945cc out; 2.8 liters out overnight; negative 2.9 liters since admission  -will continue with aggressive IV diuresis;  considered use of inotropes but held off due to runs of  afib and hypotension upon admission; will discuss initiation on rounds again with staff today   -continue with strict I&Os and daily weights         Coronary artery disease involving native coronary artery of native heart without  angina pectoris    -nonobstructive per LHC in 2017 at [facility]  -medical management at home with ASA, statin, BB and ACEI however on hold due to ARF, anemia and concern for bleeding         Paroxysmal atrial fibrillation    -on Amiodarone, Metoprolol and Xarelto at home  -admit EKG with SR with 1st degree AVB on EKGs;  -runs of afib on telemetry since admission; appears in afib this AM with HR 100s-110s  -intermittent afib all demand and volume overload related; will continue with aggressive diuresis and continue Amiodarone; as volume status improves anticipate HR and rhythm will improve as well  -Xarelto on hold due to auto anticoagulation with INR 3.2 upon admission likely related to hepatic congestion in setting of ADHF  -was on DVT prophylaxis yesterday but discontinued due to anemia; if H&H stablizes and INR trends down then will consider resumption; in the meantime will place TEDS/SCDs        Severe aortic stenosis    -echo 12/2017 with severe AS-JAMES .49cm2; mean gradient 48mmHg and peak velocity of 4; repeat echo yesterday with JAMES .59cm2; MG 51mmHg peak velocity 4.4  -seen by [facility] CTS with deferrment of AVR secondary to ARF  -currently in ADHF with cardiogenic shock in setting of severe AS in need of aggressive volume removal with minimal response to IV Lasix initially  -remains on IV Lasix TID with 2.8 liters out overnight; negative 2.9 liters since admission   -needs intervention of severe AS but precluded now due to acute decompensation, ARF and anemia  -very critical situation and concerned about candidacy for AS intervention given critical state            VTE Risk Mitigation     None          MAXWELL Soto, ANP  Cardiology  [facility]          I have seen patient with Nurse Practitioner Edna Feng. I agree with her assessment and plan as written in this note.          Oral amiodarone to control afib  Beta-blocker for rate control  Decrease diuretics to BID   Monitor  renal function  Once clinically stable she will referred to valve clinic for further intervention

## 2018-03-23 NOTE — ASSESSMENT & PLAN NOTE
-nonobstructive per LHC in 2017 at [facility]  -medical management at home with ASA, statin, BB and ACEI however on hold due to ARF, anemia and concern for bleeding

## 2018-03-23 NOTE — ASSESSMENT & PLAN NOTE
Acute on chronic systolic congestive heart failure  Pulmonary hypertension due to left ventricular systolic dysfunction  Left atrial enlargement  Takes furosemide 40 mg BID, metoprolol tartrate 50 mg BID, lisinopril 20 mg daily at home.  Was eating a lot of ice, which likely contributed to fluid overload.  Had cardiogenic shock on admission that resolved.  Giving furosemide 80 mg IV TID.  Appreciate Cardiology.

## 2018-03-23 NOTE — ASSESSMENT & PLAN NOTE
-echo in December with EF down to 30-35% with previous echo at [facility] with EF 50%; repeat echo yesterday with EF down to 20-25%  -presented with ADHF and cardiogenic shock; BNP >4900 and SVO2 36 upon admission  -volume overload and started on aggressive IV diuresis with Lasix 80mg IV TID; minimal response initially with only 945cc out; 2.8 liters out overnight; negative 2.9 liters since admission  -will continue with aggressive IV diuresis;  considered use of inotropes but held off due to runs of  afib and hypotension upon admission; will discuss initiation on rounds again with staff today   -continue with strict I&Os and daily weights

## 2018-03-24 PROBLEM — E83.39 HYPOPHOSPHATEMIA: Status: ACTIVE | Noted: 2018-01-01

## 2018-03-24 PROBLEM — E87.6 HYPOKALEMIA: Status: ACTIVE | Noted: 2018-01-01

## 2018-03-24 PROBLEM — E83.42 HYPOMAGNESEMIA: Status: ACTIVE | Noted: 2018-01-01

## 2018-03-24 PROBLEM — N18.30 CHRONIC KIDNEY DISEASE, STAGE III (MODERATE): Status: ACTIVE | Noted: 2018-01-01

## 2018-03-24 NOTE — PROGRESS NOTES
Hospital Course:   3/21/2018 Presented to the ER via EMS with lethargy, weakness, hypoglycemia and low blood pressure. Upon arrival to the ER, was found to be in hypoglycemic coma with response to IV Dextrose.  BP was low at 85/66 with cardiogenic shock with pH of 7.130, lactic acid greater than 12, creatinine 2.7 and  BNP greater than 4,900.   Placed on BiPap with initiation of pressors rather than IVF due to cardiogenic shock. Admitted to [facility] for further management and diuresis. Started on IV Lasix 80mg TID  3/22/2018 Weaned off of Levophed overnight and transitioned to NC with stable sats. Lethargic this AM with speech difficult to discern-repeat ABG with pH 7.410 pCO2 31.5 pO2 129 HCO3 20.0. Creatinine 2.9 baseline 1.4-1.6;  H&H 7.9 & 25.7 baseline 9-10/26-30 earlier this year with H&H 11&34 in 12/2017. Lactic acid down to 7.7 from 12. Blood cultures NGTD and urine culture pending. On IV Lasix 80mg TID with 945cc out overnight   3/23/2018 Remains off BiPap and Levophed. BP 100s-120s/70-90s overnight. Creatinine down to 2.1 this AM from 2.9 yesterday. K+ 3.2 with IV replacement provided. On IV Lasix TID with 2.8 liters out overnight and negative 2.9 since admission. H&H 7.9&25.7 unchanged from yesterday but remains below baseline. Nephrology on board for HARISH    3/24/2018      Seen this am  No longer on bipap  HR in NSR  -130/70-80 range  Net output -5775 L       K 3.0  Bun/cr 59/1.2  AST/ALT 53/47  ALK P 163  H/H 8.5/27.4  INR 1.3        Vitals:    03/24/18 0630 03/24/18 0700 03/24/18 0730 03/24/18 0853   BP: 131/86 124/69 (!) 127/91    Pulse: 84 83 85 93   Resp: (!) 26 15 20 (!) 22   Temp:   97.8 °F (36.6 °C)    TempSrc:   Oral    SpO2: 99% 98% 100% 99%   Weight:       Height:               Imp:      Cardiogenic shock  Severe AS with severely depressed EF  Anemia  ARF on CKD  Non obstructive CAD  PAF in NSR        Rec:      Continue with current plan  Gentle diuresis   Replace  K  Monitor I/Os  Monitor renal function  Amiodarone and beta-blocker for PAF    Once stable she will return to valve clinic for further care   If there is no improvement she might require BAV as a bridge to definitive therapy

## 2018-03-24 NOTE — PLAN OF CARE
Problem: Physical Therapy Goal  Goal: Physical Therapy Goal  Goals to be met by: 2018     Patient will increase functional independence with mobility by performin. Supine to sit with Modified [place]  2. Sit to supine with Modified [place]  3. Sit to stand transfer with Modified [place]  4. Bed to chair transfer with Modified [place] with or without appropriate AD  5. Gait  x 50+ feet with Modified Stanton with or without appropriate AD   6. Lower extremity exercise program x10 reps with independence     Outcome: Ongoing (interventions implemented as appropriate)  Progressing well /c therapy. Cont POC.

## 2018-03-24 NOTE — ASSESSMENT & PLAN NOTE
Chronic anticoagulation - on rivaroxaban   Takes amiodarone 200 mg daily and rivaroxaban at home.  Continue amiodarone and metoprolol.  Holding rivaroxaban.  Not on any anticoagulation for now due to question of active blood loss.

## 2018-03-24 NOTE — PT/OT/SLP PROGRESS
"Physical Therapy Treatment    Patient Name:  Anum Quick   MRN:  [id number]    Recommendations:     Discharge Recommendations:  nursing facility, skilled   Discharge Equipment Recommendations:     Barriers to discharge: Inaccessible home    Assessment:     Anum Quick is a 62 y.o. female admitted with a medical diagnosis of Cardiogenic shock.  She presents with the following impairments/functional limitations:  weakness, impaired endurance, impaired functional mobilty, impaired self care skills, gait instability, impaired balance, impaired cardiopulmonary response to activity. Amb'd 30' in room /c RW /c CGA-SBA today. Mild SOB post amb trial but resolved /c rest. Cont POC.    Rehab Prognosis:  good; patient would benefit from acute skilled PT services to address these deficits and reach maximum level of function.      Recent Surgery: * No surgery found *      Plan:     During this hospitalization, patient to be seen 6 x/week to address the above listed problems via gait training, therapeutic activities, therapeutic exercises  · Plan of Care Expires:  04/23/18   Plan of Care Reviewed with: patient    Subjective     Communicated with nurse Simons prior to session.  Patient found in bed /c HOB elevated upon PT entry to room, agreeable to treatment.      Chief Complaint: "I was never like this."   Patient comments/goals: wants to get back to where she was before  Pain/Comfort:  · Pain Rating 1: 0/10    Patients cultural, spiritual, Zoroastrian conflicts given the current situation: none reported today    Objective:     Patient found with: SCD, zimmerman catheter, central line     General Precautions: Standard, fall   Orthopedic Precautions:N/A   Braces: N/A     Functional Mobility:  · Bed Mobility:     · Rolling Left:  stand by assistance  · Rolling Right: stand by assistance  · Scooting: stand by assistance  · Bridging: stand by assistance  · Supine to Sit: stand by assistance  · Sit to Supine: stand by " assistance  · Transfers:     · Sit to Stand:  stand by assistance and contact guard assistance with rolling walker  · Gait: 30' in room /c RW /c CGA-SBA. Minimal decreased safety awareness.      AM-PAC 6 CLICK MOBILITY  Turning over in bed (including adjusting bedclothes, sheets and blankets)?: 3  Sitting down on and standing up from a chair with arms (e.g., wheelchair, bedside commode, etc.): 3  Moving from lying on back to sitting on the side of the bed?: 3  Moving to and from a bed to a chair (including a wheelchair)?: 3  Need to walk in hospital room?: 3  Climbing 3-5 steps with a railing?: 2  Total Score: 17       Therapeutic Activities and Exercises:   Noted pt /c BM smeared marks on blue pad from sup to sit and from standing at EOB prior amb. Pt stood at EOB for couple mins /c CGA-SBA /c RW /c PTA assisting /c hygiene mgmt/cleaning. PTA informed nurse Kristyn post tx session. Unsupported sitting balance at EOB x10 mins /c S /c pt performing BLE's therex /c AROM x10 reps for strengthening and endurance: Ap, LAQ, hip flex, hip add/abd /c pillow, Hs.    Patient left supine /c HOB raised with call button in reach and nurse notified..    GOALS:    Physical Therapy Goals        Problem: Physical Therapy Goal    Goal Priority Disciplines Outcome Goal Variances Interventions   Physical Therapy Goal     PT/OT, PT Ongoing (interventions implemented as appropriate)     Description:  Goals to be met by: 2018     Patient will increase functional independence with mobility by performin. Supine to sit with Modified [place]  2. Sit to supine with Modified [place]  3. Sit to stand transfer with Modified [place]  4. Bed to chair transfer with Modified [place] with or without appropriate AD  5. Gait  x 50+ feet with Modified [place] with or without appropriate AD   6. Lower extremity exercise program x10 reps with independence                      Time Tracking:     PT Received On:  03/24/18  PT Start Time: 1025     PT Stop Time: 1055  PT Total Time (min): 30 min     Billable Minutes: Therapeutic Activity 20 and Therapeutic Exercise 10    Treatment Type: Treatment  PT/PTA: PTA     PTA Visit Number: 1     Rosina Borja PTA  03/24/2018

## 2018-03-24 NOTE — ASSESSMENT & PLAN NOTE
Pulmonary hypertension due to left ventricular systolic dysfunction  Left atrial enlargement  Takes furosemide 40 mg BID, metoprolol tartrate 50 mg BID, lisinopril 20 mg daily at home.  Was eating a lot of ice, which likely contributed to fluid overload.  Had cardiogenic shock on admission which is now resolved. Decrease furosemide to 80 mg IV BID. Negative 9 L thus far.  Appreciate Cardiology.

## 2018-03-24 NOTE — ASSESSMENT & PLAN NOTE
Chronic.  Start ferrous sulfate BID.  Checking stool for occult blood, since aortic stenosis can cause Heyde's syndrome. Hgb is up slightly today to 8.5. Continue to monitor.

## 2018-03-24 NOTE — ASSESSMENT & PLAN NOTE
CKD (chronic kidney disease) stage 3, GFR 30-59 ml/min  Appreciate Nephrology. Creatinine down to 1.2 today. Continue to monitor.

## 2018-03-24 NOTE — ASSESSMENT & PLAN NOTE
Right foot ulcer  Appreciate Wound Care and Podiatry. Continue doxycycline and follow culture results.

## 2018-03-24 NOTE — PROGRESS NOTES
[facility] Hospital Medicine  Progress Note    Patient Name: Anum Quick  MRN: [id number]  Patient Class: IP- Inpatient   Admission Date: 3/21/2018  Length of Stay: 3 days  Attending Physician: Lyndon Melissa MD  Primary Care Provider: Raghav Valdez MD        Subjective:     Principal Problem:Cardiogenic shock    HPI:  Anum Quick is a 62 y.o. white  woman with hypertension, diabetes mellitus type 2 (too well controlled on metformin), neuropathy, hyperlipidemia, hepatic steatosis, coronary artery disease, severe left atrial enlargement, severe aortic stenosis, chronic left and right-sided systolic congestive heart failure, pulmonary hypertension, chronic hypoxic respiratory failure, paroxysmal atrial fibrillation (anticoagulated on rivaroxaban), chronic kidney disease stage 3, anemia, chronic lower extremity venous stasis, chronic pain, and a subcentimeter pancreatic cyst.  She lives in [place], [place].  She is  but has had prior husbands, and had 3 children before 1  of a heart attack.  She worked as a  at [employer] on [facility] until she became ill.  Her primary care physician is Dr. Raghav Valdez.  Her cardiologist is Dr. Jason Plaza.  Her pain management specialist is Dr. Luc David.     She was planned for elective aortic valve replacement and Maze procedure by cardiothoracic surgeon Dr. Rodo Lundy, but it was postponed because her creatinine increased from 1.6 on 17 to 3.1 on 1/10/18, and her AST and ALT increased to 698 and 524.  These were thought to be due to decompensated heart failure and subsequently improved.   She was hospitalized at [facility] from 18-18 for decompensated heart failure requiring furosemide drip and metolazone.  BNP was greater than 4,900 on admission.  She had reportedly been taking furosemide 40 mg alternating with 20 mg daily prior to  admission and weight 178 lbs on admission.  Her fluid status was net negative 16 liters by discharge, and creatinine was 1.6.  She was prescribed furosemide 40 mg twice daily.   She was seen at [facility] internal medicine clinic on 3/9/18 by nurse practitioner Svetlana Vicente for worsening congestive heart failure.  She weighed 79.3 kg (174 lb) and was hypoxic (oxygen saturation 88%) but reported being able to perform activities around her house.  Labs showed worsening renal failure with creatinine increased to 2.3, but BNP had decreased to 4,875.     She was eating a lot of ice at home, and progressively became weaker, gained weight, became more short of breath, and more nauseated.  Her  tried to convince her for a couple of weeks to go the hospital, but she was stubborn.  She was falling a lot, and was too heavy for her  to manage.  She started becoming confused.   Finally, on 3/21/18, her  called EMS.  She was taken to [facility] and found to have hypoglycemic coma, which responded to dextrose administration.  Blood pressure was as low as 85/66.  She was found to have cardiogenic shock with pH of 7.130, lactic acid greater than 12, creatinine increased to 2.7, BNP back up to greater than 4,900.  Weight had increased to 81.6 kg (180 lb).  She was placed on BiPAP, which helped treat her acidosis.  Prior to workup, she was thought to have sepsis, so IV fluids were begun, but stopped at 350 mL after consulting [facility], who also recommended starting a vasopressor or inotrope.  Anesthesiology placed a central venous catheter and she was started on dopamine.  Hospital Medicine recommended consulting her cardiologist, who was not available, so she was admitted to [facility].  She was started on IV furosemide 80 mg three times daily.  Cardiology was consulted to help manage.      [facility]  Course:  Since diabetes was too controlled and metformin contributed to lactic acidosis, metformin was discontinued.  She was put on norepinephrine drip and furosemide 80 mg IV three times daily.  Norepinephrine was able to weaned off relatively quickly.  Her renal function worsened.  Lactic acidosis improved, so she was taken off BiPAP on 3/22/18.  Cardiology was concerned about her worsening anemia, due to risk of arteriovenous malformations.  Hemoglobin and hematocrit remained stable between 3/22/18 and 3/23/18.  Furthermore, when BUN and creatinine jemima to 83 and 2.9 on 3/22/18, Nephrology was consulted, but BUN and creatinine started to improve the next day.  As expected, everything was improving because she was getting better perfusion and fluid removal.  Even her chronic hypoxia resolved.  The wound care nurse evaluated her right leg and foot on 3/23/18, noted infection of the dorsal foot wound, cultured it, and recommended dressings and Podiatry evaluation. Podiatry ordered xray which was negative for osteo or fracture. They also recommended wound care.     Interval History: Feeling better today. Had low BP overnight, so metoprolol was held.     Review of Systems   Constitutional: Negative for chills and fever.   Respiratory: Negative for cough and shortness of breath.    Cardiovascular: Negative for chest pain and palpitations.   Gastrointestinal: Negative for nausea and vomiting.     Objective:     Vital Signs (Most Recent):  Temp: 97.8 °F (36.6 °C) (03/24/18 0730)  Pulse: 93 (03/24/18 0853)  Resp: (!) 22 (03/24/18 0853)  BP: (!) 127/91 (03/24/18 0730)  SpO2: 99 % (03/24/18 0853) Vital Signs (24h Range):  Temp:  [97.5 °F (36.4 °C)-98.4 °F (36.9 °C)] 97.8 °F (36.6 °C)  Pulse:  [] 93  Resp:  [8-67] 22  SpO2:  [91 %-100 %] 99 %  BP: ()/(52-98) 127/91     Weight: 78.1 kg (172 lb 2.9 oz)  Body mass index is 26.97 kg/m².    Intake/Output Summary (Last 24 hours) at 03/24/18 0931  Last data filed at  03/24/18 0849   Gross per 24 hour   Intake                0 ml   Output             5515 ml   Net            -5515 ml      Physical Exam   Constitutional: She is oriented to person, place, and time. She appears well-developed and well-nourished. No distress.   Cardiovascular: Normal rate and regular rhythm.    No murmur heard.  Pulmonary/Chest: Effort normal and breath sounds normal. No respiratory distress. She has no wheezes.   Abdominal: Soft. Bowel sounds are normal. She exhibits no distension. There is no tenderness.   Musculoskeletal: She exhibits edema.   Neurological: She is alert and oriented to person, place, and time.   Skin: Skin is warm and dry. Bruising noted. No cyanosis. Nails show no clubbing.   Psychiatric: She has a normal mood and affect. Her behavior is normal.   Nursing note and vitals reviewed.      Significant Labs:   CBC:   Recent Labs  Lab 03/23/18  0505 03/24/18  0605   WBC 8.24 6.77   HGB 7.9* 8.5*   HCT 25.7* 27.4*    228     CMP:   Recent Labs  Lab 03/23/18  0505 03/24/18  0605    143   K 3.2* 3.0*   CL 99 99   CO2 28 35*   * 122*   BUN 80* 59*   CREATININE 2.1* 1.2   CALCIUM 7.9* 8.4*   PROT 5.9* 5.8*   ALBUMIN 2.6* 2.5*   BILITOT 0.8 0.7   ALKPHOS 167* 163*   AST 68* 53*   ALT 59* 47*   ANIONGAP 12 9   EGFRNONAA 25* 48*     Coagulation:   Recent Labs  Lab 03/24/18  0605   INR 1.3*     Magnesium:   Recent Labs  Lab 03/23/18  0505 03/24/18  0605   MG 1.9 1.5*     POCT Glucose:   Recent Labs  Lab 03/23/18  0826 03/23/18  2149 03/24/18  0719   POCTGLUCOSE 162* 186* 124*       Significant Imaging: I have reviewed all pertinent imaging results/findings within the past 24 hours.    Assessment/Plan:      Acute on chronic systolic congestive heart failure    Pulmonary hypertension due to left ventricular systolic dysfunction  Left atrial enlargement  Takes furosemide 40 mg BID, metoprolol tartrate 50 mg BID, lisinopril 20 mg daily at home.  Was eating a lot of ice, which  likely contributed to fluid overload.  Had cardiogenic shock on admission which is now resolved. Decrease furosemide to 80 mg IV BID. Negative 9 L thus far.  Appreciate Cardiology.            Hypophosphatemia    Start on neutraphos          Hypomagnesemia    Give IV mag sulfate        Hypokalemia    Give IV KCl          Cellulitis of right foot    Right foot ulcer  Appreciate Wound Care and Podiatry. Continue doxycycline and follow culture results.          Iron deficiency anemia    Chronic.  Start ferrous sulfate BID.  Checking stool for occult blood, since aortic stenosis can cause Heyde's syndrome. Hgb is up slightly today to 8.5. Continue to monitor.         Cardiorenal syndrome with renal failure    CKD (chronic kidney disease) stage 3, GFR 30-59 ml/min  Appreciate Nephrology. Creatinine down to 1.2 today. Continue to monitor.         Venous stasis of both lower extremities    Appreciate Wound Care.          Coronary artery disease involving native coronary artery of native heart without angina pectoris    Takes aspirin 81 mg daily, clopidrogel 75 mg daily, atorvastatin.  Resume aspirin, atorvastatin.          Paroxysmal atrial fibrillation    Chronic anticoagulation - on rivaroxaban   Takes amiodarone 200 mg daily and rivaroxaban at home.  Continue amiodarone and metoprolol.  Holding rivaroxaban.  Not on any anticoagulation for now due to question of active blood loss.          Hyperlipidemia associated with type 2 diabetes mellitus    Takes atorvastatin 80 mg daily.  Hold for now.          Type 2 diabetes mellitus with neurologic complication, without long-term current use of insulin    Has not needed metformin for a while.  Discontinue since it contributes to lactic acidosis.  Insulin aspart sliding scale.  Monitor serum glucose.  Holding gabapentin while renal function improves.            VTE Risk Mitigation         Ordered     Place RADHA hose  Until discontinued      03/23/18 1240     Place sequential  compression device  Until discontinued      03/23/18 1240          Critical care time spent on the evaluation and treatment of severe organ dysfunction, review of pertinent labs and imaging studies, discussions with consulting providers and discussions with patient/family: 30 minutes.    Lyndon Melissa MD  Department of Hospital Medicine   [facility]

## 2018-03-24 NOTE — SUBJECTIVE & OBJECTIVE
Interval History: Feeling better today. Had low BP overnight, so metoprolol was held.     Review of Systems   Constitutional: Negative for chills and fever.   Respiratory: Negative for cough and shortness of breath.    Cardiovascular: Negative for chest pain and palpitations.   Gastrointestinal: Negative for nausea and vomiting.     Objective:     Vital Signs (Most Recent):  Temp: 97.8 °F (36.6 °C) (03/24/18 0730)  Pulse: 93 (03/24/18 0853)  Resp: (!) 22 (03/24/18 0853)  BP: (!) 127/91 (03/24/18 0730)  SpO2: 99 % (03/24/18 0853) Vital Signs (24h Range):  Temp:  [97.5 °F (36.4 °C)-98.4 °F (36.9 °C)] 97.8 °F (36.6 °C)  Pulse:  [] 93  Resp:  [8-67] 22  SpO2:  [91 %-100 %] 99 %  BP: ()/(52-98) 127/91     Weight: 78.1 kg (172 lb 2.9 oz)  Body mass index is 26.97 kg/m².    Intake/Output Summary (Last 24 hours) at 03/24/18 0931  Last data filed at 03/24/18 0849   Gross per 24 hour   Intake                0 ml   Output             5515 ml   Net            -5515 ml      Physical Exam   Constitutional: She is oriented to person, place, and time. She appears well-developed and well-nourished. No distress.   Cardiovascular: Normal rate and regular rhythm.    No murmur heard.  Pulmonary/Chest: Effort normal and breath sounds normal. No respiratory distress. She has no wheezes.   Abdominal: Soft. Bowel sounds are normal. She exhibits no distension. There is no tenderness.   Musculoskeletal: She exhibits edema.   Neurological: She is alert and oriented to person, place, and time.   Skin: Skin is warm and dry. Bruising noted. No cyanosis. Nails show no clubbing.   Psychiatric: She has a normal mood and affect. Her behavior is normal.   Nursing note and vitals reviewed.      Significant Labs:   CBC:   Recent Labs  Lab 03/23/18  0505 03/24/18  0605   WBC 8.24 6.77   HGB 7.9* 8.5*   HCT 25.7* 27.4*    228     CMP:   Recent Labs  Lab 03/23/18  0505 03/24/18  0605    143   K 3.2* 3.0*   CL 99 99   CO2 28 35*    * 122*   BUN 80* 59*   CREATININE 2.1* 1.2   CALCIUM 7.9* 8.4*   PROT 5.9* 5.8*   ALBUMIN 2.6* 2.5*   BILITOT 0.8 0.7   ALKPHOS 167* 163*   AST 68* 53*   ALT 59* 47*   ANIONGAP 12 9   EGFRNONAA 25* 48*     Coagulation:   Recent Labs  Lab 03/24/18  0605   INR 1.3*     Magnesium:   Recent Labs  Lab 03/23/18  0505 03/24/18  0605   MG 1.9 1.5*     POCT Glucose:   Recent Labs  Lab 03/23/18  0826 03/23/18  2149 03/24/18  0719   POCTGLUCOSE 162* 186* 124*       Significant Imaging: I have reviewed all pertinent imaging results/findings within the past 24 hours.

## 2018-03-24 NOTE — PROGRESS NOTES
Progress Note  Nephrology      Consult Requested By: Lyndon Melissa MD      SUBJECTIVE:     Overnight events  Patient is a 62 y.o. female     Patient Active Problem List   Diagnosis    Chronic systolic heart failure    Type 2 diabetes mellitus with neurologic complication, without long-term current use of insulin    Renovascular hypertension    Severe aortic stenosis    Hyperlipidemia associated with type 2 diabetes mellitus    Paroxysmal atrial fibrillation    Coronary artery disease involving native coronary artery of native heart without angina pectoris    Chronic anticoagulation - on rivaroxaban    CKD (chronic kidney disease) stage 3, GFR 30-59 ml/min    Acute on chronic systolic congestive heart failure    Candidal dermatitis    Malnutrition of moderate degree    Venous stasis ulcers    Pancreatic cyst    Pseudomonas infection    Venous stasis of both lower extremities    Cardiorenal syndrome with renal failure    Left atrial enlargement    Pulmonary hypertension due to left ventricular systolic dysfunction    Iron deficiency anemia    Cellulitis of right foot    Right foot ulcer     Past Medical History:   Diagnosis Date    Chronic anticoagulation - on rivaroxaban 12/29/2017    PAF    Chronic systolic heart failure 12/14/2017     12-15-17   1 - Moderately depressed left ventricular systolic function (EF 30-35%). Akinetic LV apex. There is no thrombus in LV apex.   2 - Indeterminate LV diastolic function.    3 - Mildly to moderately depressed right ventricular systolic function .    4 - Pulmonary hypertension. The estimated PA systolic pressure is 63 mmHg.    5 - Severe low flow aortic valve stenosis (JAMES 0.49 cm2, AVAi 0.27 cm2/m2, peak aortic jet velocity 4.0 m/s,MG 48 mmHg).    6 - Mild to moderate mitral regurgitation.    7 - Mild tricuspid regurgitation.    8 - Severe left atrial enlargement.     CKD (chronic kidney disease) stage 3, GFR 30-59 ml/min 12/29/2017    Coronary  artery disease involving native coronary artery of native heart without angina pectoris 12/29/2017    [facility] @ : Per report (12/4/2017) proximal LAD has 20% stenosis, RCA with LI otherwise normal coronaries      Essential hypertension 12/14/2017    Hyperlipidemia associated with type 2 diabetes mellitus 12/14/2017    Left atrial enlargement     NSTEMI (non-ST elevated myocardial infarction) 12/14/2017    Paroxysmal atrial fibrillation 12/15/2017    Renovascular hypertension 12/14/2017    Severe aortic stenosis 12/14/2017    12-15-17  Severe low flow aortic valve stenosis (JAMES 0.49 cm2, AVAi 0.27 cm2/m2, peak aortic jet velocity 4.0 m/s,MG 48 mmHg).      Type 2 diabetes mellitus with neurologic complication, without long-term current use of insulin 12/14/2017              OBJECTIVE:     Vitals:    03/24/18 1200 03/24/18 1230 03/24/18 1300 03/24/18 1330   BP: 120/84 115/76 108/73 95/67   BP Location:       Patient Position:       Pulse: 96 92 89 88   Resp: (!) 23 (!) 21 17 18   Temp:       TempSrc:       SpO2: 100% (!) 93% 99% 98%   Weight:       Height:           Temp: 98 °F (36.7 °C) (03/24/18 1115)  Pulse: 88 (03/24/18 1330)  Resp: 18 (03/24/18 1330)  BP: 95/67 (03/24/18 1330)  SpO2: 98 % (03/24/18 1330)      Date 03/24/18 0700 - 03/25/18 0659   Shift 0224-0265 0268-2551 6883-5025 24 Hour Total   I  N  T  A  K  E   Shift Total  (mL/kg)       O  U  T  P  U  T   Urine  (mL/kg/hr) 415   415    Shift Total  (mL/kg) 415  (5.3)   415  (5.3)   Weight (kg) 78.1 78.1 78.1 78.1             Medications:   amiodarone  200 mg Oral Daily    aspirin  81 mg Oral Daily    atorvastatin  80 mg Oral Daily    collagenase   Topical (Top) Every other day    doxycycline  100 mg Oral Q12H    ferrous sulfate  325 mg Oral BID    furosemide  40 mg Intravenous BID    iron sucrose  100 mg Intravenous Daily    metoprolol tartrate  25 mg Oral BID    potassium phosphate IVPB  8 mmol Intravenous Once    potassium, sodium  phosphates  2 packet Oral QID (WM & HS)                 Physical Exam:  General appearance: NAD  No SOB  No CP  No cough  Lungs:  Diminished breath sounds  Heart:  Pulse 88  Abdomen: soft  Extremities:  edema  Skin: dry  Laboratory:  ABG  Labs reviewed  Recent Results (from the past 336 hour(s))   Basic Metabolic Panel (BMP)    Collection Time: 03/22/18  5:53 AM   Result Value Ref Range    Sodium 140 136 - 145 mmol/L    Potassium 4.5 3.5 - 5.1 mmol/L    Chloride 100 95 - 110 mmol/L    CO2 18 (L) 23 - 29 mmol/L    BUN, Bld 83 (H) 8 - 23 mg/dL    Creatinine 2.9 (H) 0.5 - 1.4 mg/dL    Calcium 8.2 (L) 8.7 - 10.5 mg/dL    Anion Gap 22 (H) 8 - 16 mmol/L     Recent Results (from the past 336 hour(s))   CBC auto differential    Collection Time: 03/24/18  6:05 AM   Result Value Ref Range    WBC 6.77 3.90 - 12.70 K/uL    Hemoglobin 8.5 (L) 12.0 - 16.0 g/dL    Hematocrit 27.4 (L) 37.0 - 48.5 %    Platelets 228 150 - 350 K/uL   CBC auto differential    Collection Time: 03/23/18  5:05 AM   Result Value Ref Range    WBC 8.24 3.90 - 12.70 K/uL    Hemoglobin 7.9 (L) 12.0 - 16.0 g/dL    Hematocrit 25.7 (L) 37.0 - 48.5 %    Platelets 242 150 - 350 K/uL   CBC auto differential    Collection Time: 03/22/18  6:03 AM   Result Value Ref Range    WBC 10.76 3.90 - 12.70 K/uL    Hemoglobin 7.9 (L) 12.0 - 16.0 g/dL    Hematocrit 25.7 (L) 37.0 - 48.5 %    Platelets 237 150 - 350 K/uL     Urinalysis  No results for input(s): COLORU, CLARITYU, SPECGRAV, PHUR, PROTEINUA, GLUCOSEU, BILIRUBINCON, BLOODU, WBCU, RBCU, BACTERIA, MUCUS, NITRITE, LEUKOCYTESUR, UROBILINOGEN, HYALINECASTS in the last 24 hours.    Diagnostic Results:  X-Ray: Reviewed  US: Reviewed  Echo: Reviewed  ACCESS    ASSESSMENT/PLAN:   HARISH on CKD 3  UA protein 2+  US  The right kidney measures approximately 11.7 cm with mild cortical thinning and decreased corticomedullary distinction. There is reduced renal perfusion.  Resistive index is elevated at 0.78.  No hydronephrosis or  nephrolithiasis.  The left kidney measures 10.4 cm with decreased corticomedullary distinction.  There is decreased renal perfusion, resistive index measures 0.65.  No hydronephrosis or nephrolithiasis  Azotemia  BUN 59  Creatinine 1.2  Hypokalemia  Hypomagnesemia  Hypophosphatemia  Replace  Poor nutrition  Albumin 2.5  Anemia  Hb 8.5  Echo-   1 - Severely depressed left ventricular systolic function (EF 20-25%).     2 - Severe aortic valve stenosis (JAMES 0.59 cm2, AVAi 0.31 cm2/m2, peak aortic jet velocity 4.4 m/s,MG 51 mmHg, ).     3 - Restrictive LV filling pattern, indicating markedly elevated LAP (grade 3 diastolic dysfunction).     4 - Moderate aortic regurgitation.     5 - Pulmonary hypertension. The estimated PA systolic pressure is 55 mmHg.     6 - Mild mitral regurgitation.     7 - Moderate left atrial enlargement.   BP 95/67  Weight daily  I and O  Gentle diuresis

## 2018-03-25 PROBLEM — N18.30 CHRONIC KIDNEY DISEASE, STAGE III (MODERATE): Status: RESOLVED | Noted: 2018-01-01 | Resolved: 2018-01-01

## 2018-03-25 PROBLEM — N18.30 CKD (CHRONIC KIDNEY DISEASE) STAGE 3, GFR 30-59 ML/MIN: Chronic | Status: RESOLVED | Noted: 2017-01-01 | Resolved: 2018-01-01

## 2018-03-25 PROBLEM — I13.10 CARDIORENAL SYNDROME WITH RENAL FAILURE: Status: RESOLVED | Noted: 2018-01-01 | Resolved: 2018-01-01

## 2018-03-25 PROBLEM — A49.8 PSEUDOMONAS INFECTION: Status: RESOLVED | Noted: 2018-01-01 | Resolved: 2018-01-01

## 2018-03-25 PROBLEM — I15.0 RENOVASCULAR HYPERTENSION: Chronic | Status: RESOLVED | Noted: 2017-01-01 | Resolved: 2018-01-01

## 2018-03-25 PROBLEM — E83.39 HYPOPHOSPHATEMIA: Status: RESOLVED | Noted: 2018-01-01 | Resolved: 2018-01-01

## 2018-03-25 PROBLEM — E83.42 HYPOMAGNESEMIA: Status: RESOLVED | Noted: 2018-01-01 | Resolved: 2018-01-01

## 2018-03-25 NOTE — PROGRESS NOTES
[facility] Medicine  Progress Note    Patient Name: Anum Quick  MRN: [id number]  Patient Class: IP- Inpatient   Admission Date: 3/21/2018  Length of Stay: 4 days  Attending Physician: Cal Hollis MD  Primary Care Provider: Raghav Valdez MD        Subjective:     Principal Problem:Cardiogenic shock    HPI:  Anum Quick is a 62 y.o. white  woman with hypertension, diabetes mellitus type 2 (too well controlled on metformin), neuropathy, hyperlipidemia, hepatic steatosis, coronary artery disease, severe left atrial enlargement, severe aortic stenosis, chronic left and right-sided systolic congestive heart failure, pulmonary hypertension, chronic hypoxic respiratory failure, paroxysmal atrial fibrillation (anticoagulated on rivaroxaban), chronic kidney disease stage 3, anemia, chronic lower extremity venous stasis, chronic pain, and a subcentimeter pancreatic cyst.  She lives in [place], [place].  She is  but has had prior husbands, and had 3 children before 1  of a heart attack.  She worked as a  at [employer] on [employer] until she became ill.  Her primary care physician is Dr. Raghav Valdez.  Her cardiologist is Dr. Jason Plaza.  Her pain management specialist is Dr. Luc David.     She was planned for elective aortic valve replacement and Maze procedure by cardiothoracic surgeon Dr. Rodo Lundy, but it was postponed because her creatinine increased from 1.6 on 17 to 3.1 on 1/10/18, and her AST and ALT increased to 698 and 524.  These were thought to be due to decompensated heart failure and subsequently improved.   She was hospitalized at [facility] from 18-18 for decompensated heart failure requiring furosemide drip and metolazone.  BNP was greater than 4,900 on admission.  She had reportedly been taking furosemide 40 mg alternating with 20 mg daily prior to  admission and weighed 178 lbs on admission.  Her fluid status was net negative 16 liters by discharge, and creatinine was 1.6.  She was prescribed furosemide 40 mg twice daily.   She was seen at [facility] internal medicine clinic on 3/9/18 by nurse practitioner Svetlana Vicente for worsening congestive heart failure.  She weighed 79.3 kg (174 lb) and was hypoxic (oxygen saturation 88%) but reported being able to perform activities around her house.  Labs showed worsening renal failure with creatinine increased to 2.3, but BNP had decreased to 4,875.     She was eating a lot of ice at home, and progressively became weaker, gained weight, became more short of breath, and more nauseated.  Her  tried to convince her for a couple of weeks to go the hospital, but she was stubborn.  She was falling a lot, and was too heavy for her  to manage.  She started becoming confused.   Finally, on 3/21/18, her  called EMS.  She was taken to [facility] Emergency Department and found to have hypoglycemic coma, which responded to dextrose administration.  Blood pressure was as low as 85/66.  She was found to have cardiogenic shock with pH of 7.130, lactic acid greater than 12, creatinine increased to 2.7, BNP back up to greater than 4,900.  Weight had increased to 81.6 kg (180 lb).  She was placed on BiPAP, which helped treat her acidosis.  Prior to workup, she was thought to have sepsis, so IV fluids were begun, but stopped at 350 mL after consulting [facility], who also recommended starting a vasopressor or inotrope.  Anesthesiology placed a central venous catheter and she was started on dopamine.  Hospital Medicine recommended consulting her cardiologist, who was not available, so she was admitted to [facility].  She was started on IV furosemide 80 mg three times daily.  Cardiology was consulted to help manage.      [facility]  Course:  Since diabetes was too controlled and metformin contributed to lactic acidosis, metformin was discontinued.      Based on her weight, she needed 17 liters of fluid removed to get her to the same fluid state she was in when she was discharged from [facility], since she weighed 2 lbs more than when she was admitted to [facility].  She was put on norepinephrine drip and furosemide 80 mg IV three times daily.  Norepinephrine was able to weaned off relatively quickly.  Lactic acidosis improved, so she was taken off BiPAP on 3/22/18.  Her renal function initially worsened, so Nephrology was consulted when BUN and creatinine reached 83 and 2.9 on 3/22/18, but renal function subsequently improved.  Cardiology was concerned about her worsening anemia, due to risk of arteriovenous malformations.  Hemoglobin and hematocrit remained stable.  As expected, everything was improving because she was getting better perfusion and fluid removal.  Even her chronic hypoxia and stage 3 chronic kidney disease resolved.      The wound care nurse evaluated her right leg and foot on 3/23/18, noted infection of the dorsal foot wound, cultured it, and recommended dressings and Podiatry evaluation.  Doxycycline was started.  Podiatry ordered an x-ray, which showed no evidence of osteomyelitis or fracture.  They also recommended wound care.  Wound cultures grew Pseudomonas aeruginosa and Enterococcus.      Interval History: No issues. Sitting in chair.    Review of Systems   Constitutional: Negative for chills and fever.   Respiratory: Negative for cough and shortness of breath.    Cardiovascular: Negative for chest pain and palpitations.   Gastrointestinal: Negative for nausea and vomiting.     Objective:     Vital Signs (Most Recent):  Temp: 98.2 °F (36.8 °C) (03/25/18 1130)  Pulse: 81 (03/25/18 1130)  Resp: 19 (03/25/18 1130)  BP: 125/82 (03/25/18 1130)  SpO2: 98 % (03/25/18 1145) Vital Signs (24h Range):  Temp:  [97.7  °F (36.5 °C)-98.4 °F (36.9 °C)] 98.2 °F (36.8 °C)  Pulse:  [77-98] 81  Resp:  [10-31] 19  SpO2:  [95 %-100 %] 98 %  BP: (102-142)/() 125/82     Weight: 78.1 kg (172 lb 2.9 oz)  Body mass index is 26.97 kg/m².    Intake/Output Summary (Last 24 hours) at 03/25/18 1332  Last data filed at 03/25/18 1200   Gross per 24 hour   Intake              990 ml   Output             3025 ml   Net            -2035 ml      Physical Exam   Constitutional: She is oriented to person, place, and time. She appears well-developed and well-nourished. No distress.   Cardiovascular: Normal rate and regular rhythm.    No murmur heard.  Pulmonary/Chest: Effort normal and breath sounds normal. No respiratory distress. She has no wheezes.   Abdominal: Soft. Bowel sounds are normal. She exhibits no distension. There is no tenderness.   Musculoskeletal: She exhibits edema.   Neurological: She is alert and oriented to person, place, and time.   Skin: Skin is warm and dry. Bruising noted. No cyanosis. Nails show no clubbing.   Psychiatric: She has a normal mood and affect. Her behavior is normal.   Nursing note and vitals reviewed.      Significant Labs:   CBC:     Recent Labs  Lab 03/24/18  0605 03/25/18  0615   WBC 6.77 5.72   HGB 8.5* 8.8*   HCT 27.4* 28.8*    227     CMP:     Recent Labs  Lab 03/24/18  0605 03/25/18  0615    142   K 3.0* 3.2*   CL 99 99   CO2 35* 33*   * 130*   BUN 59* 41*   CREATININE 1.2 0.9   CALCIUM 8.4* 8.7   PROT 5.8* 6.0   ALBUMIN 2.5* 2.5*   BILITOT 0.7 0.7   ALKPHOS 163* 158*   AST 53* 52*   ALT 47* 45*   ANIONGAP 9 10   EGFRNONAA 48* >60     Coagulation:     Recent Labs  Lab 03/24/18  0605   INR 1.3*     Magnesium:     Recent Labs  Lab 03/24/18  0605 03/25/18  0615   MG 1.5* 1.8     POCT Glucose:     Recent Labs  Lab 03/24/18  2127 03/25/18  0630 03/25/18  1128   POCTGLUCOSE 126* 132* 150*       Significant Imaging: I have reviewed all pertinent imaging results/findings within the past 24  hours.    Assessment/Plan:      Hypokalemia    Replete.          Cellulitis of right foot    Right foot ulcer  Appreciate Wound Care and Podiatry.  Continue doxycycline and add ciprofloxacin.  Follow up culture results.          Iron deficiency anemia    Chronic.  Start ferrous sulfate BID.  Checking stool for occult blood, since aortic stenosis can cause Heyde's syndrome. Hgb is up slightly today to 8.5. Continue to monitor.         Venous stasis of both lower extremities    Appreciate Wound Care.          Acute on chronic systolic congestive heart failure    Pulmonary hypertension due to left ventricular systolic dysfunction  Left atrial enlargement  Takes furosemide 40 mg BID, metoprolol tartrate 50 mg BID, lisinopril 20 mg daily at home.  Was eating a lot of ice, which likely contributed to fluid overload.  Had cardiogenic shock on admission which is now resolved.  Continue furosemide to 80 mg IV TID and discharge when fluid status is net negative 17 liters.          Coronary artery disease involving native coronary artery of native heart without angina pectoris    Takes aspirin 81 mg daily, clopidrogel 75 mg daily, atorvastatin.  Resume aspirin, atorvastatin.          Paroxysmal atrial fibrillation    Chronic anticoagulation - on rivaroxaban   Takes amiodarone 200 mg daily and rivaroxaban at home.  Continue amiodarone and metoprolol.  Holding rivaroxaban.  Not on any anticoagulation for now due to question of active blood loss.          Hyperlipidemia associated with type 2 diabetes mellitus    Takes atorvastatin 80 mg daily.  Hold for now.          Type 2 diabetes mellitus with neurologic complication, without long-term current use of insulin    Has not needed metformin for a while.  Discontinue since it contributes to lactic acidosis.  Insulin aspart sliding scale.  Monitor serum glucose.  Holding gabapentin while renal function improves.            VTE Risk Mitigation         Ordered     Place RADHA hose   Until discontinued      03/23/18 1240     Place sequential compression device  Until discontinued      03/23/18 1240        Disposition: Transfer out of ICU.    Addendum: Wound culture only grew Enterococcus.  Pseudomonas grew on a culture last month.    Cal Hollis MD  Department of Hospital Medicine   [facility]

## 2018-03-25 NOTE — PLAN OF CARE
Problem: Patient Care Overview  Goal: Plan of Care Review  Outcome: Ongoing (interventions implemented as appropriate)  Continue to diuresis pt, lasix inc to 3x day,replacing electrolytes, inc activity up in chair, walk some in room, less exertional sob noted, crackles cleared past am lasix

## 2018-03-25 NOTE — ASSESSMENT & PLAN NOTE
Right foot ulcer  Appreciate Wound Care and Podiatry.  Continue doxycycline and add ciprofloxacin.  Follow up culture results.

## 2018-03-25 NOTE — ASSESSMENT & PLAN NOTE
Pulmonary hypertension due to left ventricular systolic dysfunction  Left atrial enlargement  Takes furosemide 40 mg BID, metoprolol tartrate 50 mg BID, lisinopril 20 mg daily at home.  Was eating a lot of ice, which likely contributed to fluid overload.  Had cardiogenic shock on admission which is now resolved.  Continue furosemide to 80 mg IV TID and discharge when fluid status is net negative 17 liters.

## 2018-03-25 NOTE — SUBJECTIVE & OBJECTIVE
Interval History: No issues. Sitting in chair.    Review of Systems   Constitutional: Negative for chills and fever.   Respiratory: Negative for cough and shortness of breath.    Cardiovascular: Negative for chest pain and palpitations.   Gastrointestinal: Negative for nausea and vomiting.     Objective:     Vital Signs (Most Recent):  Temp: 98.2 °F (36.8 °C) (03/25/18 1130)  Pulse: 81 (03/25/18 1130)  Resp: 19 (03/25/18 1130)  BP: 125/82 (03/25/18 1130)  SpO2: 98 % (03/25/18 1145) Vital Signs (24h Range):  Temp:  [97.7 °F (36.5 °C)-98.4 °F (36.9 °C)] 98.2 °F (36.8 °C)  Pulse:  [77-98] 81  Resp:  [10-31] 19  SpO2:  [95 %-100 %] 98 %  BP: (102-142)/() 125/82     Weight: 78.1 kg (172 lb 2.9 oz)  Body mass index is 26.97 kg/m².    Intake/Output Summary (Last 24 hours) at 03/25/18 1332  Last data filed at 03/25/18 1200   Gross per 24 hour   Intake              990 ml   Output             3025 ml   Net            -2035 ml      Physical Exam   Constitutional: She is oriented to person, place, and time. She appears well-developed and well-nourished. No distress.   Cardiovascular: Normal rate and regular rhythm.    No murmur heard.  Pulmonary/Chest: Effort normal and breath sounds normal. No respiratory distress. She has no wheezes.   Abdominal: Soft. Bowel sounds are normal. She exhibits no distension. There is no tenderness.   Musculoskeletal: She exhibits edema.   Neurological: She is alert and oriented to person, place, and time.   Skin: Skin is warm and dry. Bruising noted. No cyanosis. Nails show no clubbing.   Psychiatric: She has a normal mood and affect. Her behavior is normal.   Nursing note and vitals reviewed.      Significant Labs:   CBC:     Recent Labs  Lab 03/24/18  0605 03/25/18  0615   WBC 6.77 5.72   HGB 8.5* 8.8*   HCT 27.4* 28.8*    227     CMP:     Recent Labs  Lab 03/24/18  0605 03/25/18  0615    142   K 3.0* 3.2*   CL 99 99   CO2 35* 33*   * 130*   BUN 59* 41*   CREATININE  1.2 0.9   CALCIUM 8.4* 8.7   PROT 5.8* 6.0   ALBUMIN 2.5* 2.5*   BILITOT 0.7 0.7   ALKPHOS 163* 158*   AST 53* 52*   ALT 47* 45*   ANIONGAP 9 10   EGFRNONAA 48* >60     Coagulation:     Recent Labs  Lab 03/24/18  0605   INR 1.3*     Magnesium:     Recent Labs  Lab 03/24/18  0605 03/25/18  0615   MG 1.5* 1.8     POCT Glucose:     Recent Labs  Lab 03/24/18  2127 03/25/18  0630 03/25/18  1128   POCTGLUCOSE 126* 132* 150*       Significant Imaging: I have reviewed all pertinent imaging results/findings within the past 24 hours.

## 2018-03-25 NOTE — PLAN OF CARE
Pt denies sob , but noted some exertional sob, resp 22 unlabored,fine crackles post bases, no cough, no jvd, ap 88 regular, no co of chest pain

## 2018-03-25 NOTE — PROGRESS NOTES
Progress Note  Nephrology      Consult Requested By: Cal Hollis MD      SUBJECTIVE:     Overnight events  Patient is a 62 y.o. female     Patient Active Problem List   Diagnosis    Chronic systolic heart failure    Type 2 diabetes mellitus with neurologic complication, without long-term current use of insulin    Severe aortic stenosis    Hyperlipidemia associated with type 2 diabetes mellitus    Paroxysmal atrial fibrillation    Coronary artery disease involving native coronary artery of native heart without angina pectoris    Chronic anticoagulation - on rivaroxaban    Acute on chronic systolic congestive heart failure    Candidal dermatitis    Malnutrition of moderate degree    Venous stasis ulcers    Pancreatic cyst    Venous stasis of both lower extremities    Left atrial enlargement    Pulmonary hypertension due to left ventricular systolic dysfunction    Iron deficiency anemia    Cellulitis of right foot    Right foot ulcer    Hypokalemia     Past Medical History:   Diagnosis Date    Chronic anticoagulation - on rivaroxaban 12/29/2017    PAF    Chronic systolic heart failure 12/14/2017     12-15-17   1 - Moderately depressed left ventricular systolic function (EF 30-35%). Akinetic LV apex. There is no thrombus in LV apex.   2 - Indeterminate LV diastolic function.    3 - Mildly to moderately depressed right ventricular systolic function .    4 - Pulmonary hypertension. The estimated PA systolic pressure is 63 mmHg.    5 - Severe low flow aortic valve stenosis (JAMES 0.49 cm2, AVAi 0.27 cm2/m2, peak aortic jet velocity 4.0 m/s,MG 48 mmHg).    6 - Mild to moderate mitral regurgitation.    7 - Mild tricuspid regurgitation.    8 - Severe left atrial enlargement.     CKD (chronic kidney disease) stage 3, GFR 30-59 ml/min 12/29/2017    Coronary artery disease involving native coronary artery of native heart without angina pectoris 12/29/2017    [facility] @ : Per report (12/4/2017) proximal LAD  has 20% stenosis, RCA with LI otherwise normal coronaries      Essential hypertension 12/14/2017    Hyperlipidemia associated with type 2 diabetes mellitus 12/14/2017    Left atrial enlargement     NSTEMI (non-ST elevated myocardial infarction) 12/14/2017    Paroxysmal atrial fibrillation 12/15/2017    Renovascular hypertension 12/14/2017    Severe aortic stenosis 12/14/2017    12-15-17  Severe low flow aortic valve stenosis (JAMES 0.49 cm2, AVAi 0.27 cm2/m2, peak aortic jet velocity 4.0 m/s,MG 48 mmHg).      Type 2 diabetes mellitus with neurologic complication, without long-term current use of insulin 12/14/2017              OBJECTIVE:     Vitals:    03/25/18 1030 03/25/18 1100 03/25/18 1130 03/25/18 1145   BP: 129/80 125/80 125/82    BP Location:       Patient Position:       Pulse: 93 87 81    Resp: 12 17 19    Temp:   98.2 °F (36.8 °C)    TempSrc:   Oral    SpO2: 100% 100% 100% 98%   Weight:       Height:           Temp: 98.2 °F (36.8 °C) (03/25/18 1130)  Pulse: 81 (03/25/18 1130)  Resp: 19 (03/25/18 1130)  BP: 125/82 (03/25/18 1130)  SpO2: 98 % (03/25/18 1145)      Date 03/25/18 0700 - 03/26/18 0659   Shift 8000-2906 1010-8419 1570-8504 24 Hour Total   I  N  T  A  K  E   P.O. 390   390    Shift Total  (mL/kg) 390  (5)   390  (5)   O  U  T  P  U  T   Urine  (mL/kg/hr) 825   825    Shift Total  (mL/kg) 825  (10.6)   825  (10.6)   Weight (kg) 78.1 78.1 78.1 78.1             Medications:   amiodarone  200 mg Oral Daily    aspirin  81 mg Oral Daily    atorvastatin  80 mg Oral Daily    ciprofloxacin HCl  500 mg Oral Q12H    collagenase   Topical (Top) Every other day    doxycycline  100 mg Oral Q12H    ferrous sulfate  325 mg Oral BID    furosemide  40 mg Intravenous TID    iron sucrose  100 mg Intravenous Daily    magnesium sulfate  2 g Intravenous Q2H    metoprolol tartrate  25 mg Oral BID    potassium chloride SA  40 mEq Oral TID    potassium, sodium phosphates  2 packet Oral QID (WM & HS)                  Physical Exam:  General appearance: NAD  No SOB  No cough  No CP  Lungs: diminished breath sounds  Heart: pulse 81  Abdomen: soft  Extremities: edema  Skin: dry  Laboratory:  ABG  Labs reviewed  Recent Results (from the past 336 hour(s))   Basic Metabolic Panel (BMP)    Collection Time: 03/22/18  5:53 AM   Result Value Ref Range    Sodium 140 136 - 145 mmol/L    Potassium 4.5 3.5 - 5.1 mmol/L    Chloride 100 95 - 110 mmol/L    CO2 18 (L) 23 - 29 mmol/L    BUN, Bld 83 (H) 8 - 23 mg/dL    Creatinine 2.9 (H) 0.5 - 1.4 mg/dL    Calcium 8.2 (L) 8.7 - 10.5 mg/dL    Anion Gap 22 (H) 8 - 16 mmol/L     Recent Results (from the past 336 hour(s))   CBC auto differential    Collection Time: 03/25/18  6:15 AM   Result Value Ref Range    WBC 5.72 3.90 - 12.70 K/uL    Hemoglobin 8.8 (L) 12.0 - 16.0 g/dL    Hematocrit 28.8 (L) 37.0 - 48.5 %    Platelets 227 150 - 350 K/uL   CBC auto differential    Collection Time: 03/24/18  6:05 AM   Result Value Ref Range    WBC 6.77 3.90 - 12.70 K/uL    Hemoglobin 8.5 (L) 12.0 - 16.0 g/dL    Hematocrit 27.4 (L) 37.0 - 48.5 %    Platelets 228 150 - 350 K/uL   CBC auto differential    Collection Time: 03/23/18  5:05 AM   Result Value Ref Range    WBC 8.24 3.90 - 12.70 K/uL    Hemoglobin 7.9 (L) 12.0 - 16.0 g/dL    Hematocrit 25.7 (L) 37.0 - 48.5 %    Platelets 242 150 - 350 K/uL     Urinalysis  No results for input(s): COLORU, CLARITYU, SPECGRAV, PHUR, PROTEINUA, GLUCOSEU, BILIRUBINCON, BLOODU, WBCU, RBCU, BACTERIA, MUCUS, NITRITE, LEUKOCYTESUR, UROBILINOGEN, HYALINECASTS in the last 24 hours.    Diagnostic Results:  X-Ray: Reviewed  US: Reviewed  Echo: Reviewed  ACCESS    ASSESSMENT/PLAN:     HARISH on CKD 3  UA protein 2+  US  The right kidney measures approximately 11.7 cm with mild cortical thinning and decreased corticomedullary distinction. There is reduced renal perfusion.  Resistive index is elevated at 0.78.  No hydronephrosis or nephrolithiasis.  The left kidney measures  10.4 cm with decreased corticomedullary distinction.  There is decreased renal perfusion, resistive index measures 0.65.  No hydronephrosis or nephrolithiasis  Azotemia  BUN 41  Creatinine 0.9  Hypokalemia 3.2  Replace  Hypomagnesemia  Hypophosphatemia  Replace prn  Poor nutrition  Albumin 2.5  Anemia  Hb 8.5  Low iron  Ferrous sulfate  Echo-   1 - Severely depressed left ventricular systolic function (EF 20-25%).     2 - Severe aortic valve stenosis (JAMES 0.59 cm2, AVAi 0.31 cm2/m2, peak aortic jet velocity 4.4 m/s,MG 51 mmHg, ).     3 - Restrictive LV filling pattern, indicating markedly elevated LAP (grade 3 diastolic dysfunction).     4 - Moderate aortic regurgitation.     5 - Pulmonary hypertension. The estimated PA systolic pressure is 55 mmHg.     6 - Mild mitral regurgitation.     7 - Moderate left atrial enlargement.   /82  Weight daily  I and O  Gentle diuresis

## 2018-03-25 NOTE — PROGRESS NOTES
Hospital Course:   3/21/2018 Presented to the ER via EMS with lethargy, weakness, hypoglycemia and low blood pressure. Upon arrival to the ER, was found to be in hypoglycemic coma with response to IV Dextrose.  BP was low at 85/66 with cardiogenic shock with pH of 7.130, lactic acid greater than 12, creatinine 2.7 and  BNP greater than 4,900.   Placed on BiPap with initiation of pressors rather than IVF due to cardiogenic shock. Admitted to [facility] for further management and diuresis. Started on IV Lasix 80mg TID  3/22/2018 Weaned off of Levophed overnight and transitioned to NC with stable sats. Lethargic this AM with speech difficult to discern-repeat ABG with pH 7.410 pCO2 31.5 pO2 129 HCO3 20.0. Creatinine 2.9 baseline 1.4-1.6;  H&H 7.9 & 25.7 baseline 9-10/26-30 earlier this year with H&H 11&34 in 12/2017. Lactic acid down to 7.7 from 12. Blood cultures NGTD and urine culture pending. On IV Lasix 80mg TID with 945cc out overnight   3/23/2018 Remains off BiPap and Levophed. BP 100s-120s/70-90s overnight. Creatinine down to 2.1 this AM from 2.9 yesterday. K+ 3.2 with IV replacement provided. On IV Lasix TID with 2.8 liters out overnight and negative 2.9 since admission. H&H 7.9&25.7 unchanged from yesterday but remains below baseline. Nephrology on board for HARISH     3/24/2018        Seen this am  No longer on bipap  HR in NSR  -130/70-80 range  Net output -5775 L         K 3.0  Bun/cr 59/1.2  AST/ALT 53/47  ALK P 163  H/H 8.5/27.4  INR 1.3       3/25/2018    Resting at the bedside  /78 with HR 90 in NSR      K 3.2  Bun/cr 41/0.9  LFTs -improving       Vitals:    03/25/18 0945 03/25/18 1000 03/25/18 1015 03/25/18 1130   BP:  127/81     Pulse: 94 93 95    Resp: (!) 21 (!) 29 12    Temp:    98.2 °F (36.8 °C)   TempSrc:    Oral   SpO2: 97% 100% 100%    Weight:       Height:               LABS  CBC    Recent Labs  Lab 03/23/18  0505 03/24/18  0605 03/25/18  0615   WBC 8.24 6.77 5.72   RBC  3.06* 3.24* 3.38*   HGB 7.9* 8.5* 8.8*   HCT 25.7* 27.4* 28.8*    228 227   MCV 84 85 85   MCH 25.8* 26.2* 26.0*   MCHC 30.7* 31.0* 30.6*     BMP    Recent Labs  Lab 03/23/18  0505 03/24/18  0605 03/25/18  0615    143 142   K 3.2* 3.0* 3.2*   CO2 28 35* 33*   CL 99 99 99   BUN 80* 59* 41*   CREATININE 2.1* 1.2 0.9   * 122* 130*       POCT-Glucose  POCT Glucose   Date Value Ref Range Status   03/25/2018 150 (H) 70 - 110 mg/dL Final   03/25/2018 132 (H) 70 - 110 mg/dL Final   03/24/2018 126 (H) 70 - 110 mg/dL Final   03/24/2018 159 (H) 70 - 110 mg/dL Final   03/24/2018 166 (H) 70 - 110 mg/dL Final   03/24/2018 124 (H) 70 - 110 mg/dL Final   03/23/2018 186 (H) 70 - 110 mg/dL Final   03/23/2018 162 (H) 70 - 110 mg/dL Final   03/22/2018 171 (H) 70 - 110 mg/dL Final   03/22/2018 164 (H) 70 - 110 mg/dL Final   03/22/2018 155 (H) 70 - 110 mg/dL Final         Recent Labs  Lab 03/23/18  0505 03/24/18  0605 03/25/18  0615   CALCIUM 7.9* 8.4* 8.7   MG 1.9 1.5* 1.8   PHOS 3.7 2.3* 3.1     LFT    Recent Labs  Lab 03/23/18  0505 03/24/18  0605 03/25/18  0615   PROT 5.9* 5.8* 6.0   ALBUMIN 2.6* 2.5* 2.5*   BILITOT 0.8 0.7 0.7   AST 68* 53* 52*   ALKPHOS 167* 163* 158*   ALT 59* 47* 45*     GFR     COAGS    Recent Labs  Lab 03/21/18  1202 03/24/18  0605   INR 3.2* 1.3*     CE    Recent Labs  Lab 03/21/18  1136   TROPONINI 0.121*     ABGs  No results for input(s): PH, PCO2, PO2, HCO3, POCSATURATED, BE in the last 24 hours.  BNP    Recent Labs  Lab 03/21/18  1136   BNP >4,900*       LAST HbA1c  Lab Results   Component Value Date    HGBA1C 5.5 01/10/2018       Lipid panel  Lab Results   Component Value Date    CHOL 173 12/15/2017     Lab Results   Component Value Date    HDL 33 (L) 12/15/2017     Lab Results   Component Value Date    LDLCALC 100.2 12/15/2017     Lab Results   Component Value Date    TRIG 199 (H) 12/15/2017     Lab Results   Component Value Date    CHOLHDL 19.1 (L) 12/15/2017                          Imp:        Cardiogenic shock  Severe AS with severely depressed EF  Anemia  ARF on CKD  Non obstructive CAD  PAF in NSR           Rec:        Continue with current plan  Gentle diuresis   Replace K  Monitor I/Os  Monitor renal function  Amiodarone and beta-blocker for PAF     Once stable she will return to valve clinic for further care

## 2018-03-26 PROBLEM — E87.6 HYPOKALEMIA: Status: RESOLVED | Noted: 2018-01-01 | Resolved: 2018-01-01

## 2018-03-26 NOTE — SUBJECTIVE & OBJECTIVE
Interval History: No issues. Sitting in chair.  Motivated to get therapy so that she can undergo valve repair.    Review of Systems   Constitutional: Negative for chills and fever.   Respiratory: Negative for cough and shortness of breath.    Cardiovascular: Negative for chest pain and palpitations.   Gastrointestinal: Negative for nausea and vomiting.     Objective:     Vital Signs (Most Recent):  Temp: 98.4 °F (36.9 °C) (03/26/18 1156)  Pulse: 83 (03/26/18 1156)  Resp: 18 (03/26/18 1156)  BP: 118/79 (03/26/18 1156)  SpO2: 100 % (03/25/18 1700) Vital Signs (24h Range):  Temp:  [97.4 °F (36.3 °C)-98.5 °F (36.9 °C)] 98.4 °F (36.9 °C)  Pulse:  [76-98] 83  Resp:  [12-26] 18  SpO2:  [97 %-100 %] 100 %  BP: (101-130)/(67-92) 118/79     Weight: 78.1 kg (172 lb 2.9 oz)  Body mass index is 26.97 kg/m².    Intake/Output Summary (Last 24 hours) at 03/26/18 1412  Last data filed at 03/26/18 0900   Gross per 24 hour   Intake             1245 ml   Output             3125 ml   Net            -1880 ml      Physical Exam   Constitutional: She is oriented to person, place, and time. She appears well-developed and well-nourished. No distress.   Cardiovascular: Normal rate and regular rhythm.    No murmur heard.  Pulmonary/Chest: Effort normal and breath sounds normal. No respiratory distress. She has no wheezes.   Abdominal: Soft. Bowel sounds are normal. She exhibits no distension. There is no tenderness.   Musculoskeletal: She exhibits edema.   Right foot dressed   Neurological: She is alert and oriented to person, place, and time.   Skin: Skin is warm and dry. Bruising noted. No cyanosis. Nails show no clubbing.   Psychiatric: She has a normal mood and affect. Her behavior is normal.   Nursing note and vitals reviewed.      Significant Labs:   CBC:     Recent Labs  Lab 03/25/18  0615   WBC 5.72   HGB 8.8*   HCT 28.8*        CMP:     Recent Labs  Lab 03/25/18  0615 03/26/18  0534    142  142   K 3.2* 4.4  4.4   CL 99  100  100   CO2 33* 30*  30*   * 122*  122*   BUN 41* 36*  36*   CREATININE 0.9 1.0  1.0   CALCIUM 8.7 9.3  9.3   PROT 6.0 6.6   ALBUMIN 2.5* 2.8*   BILITOT 0.7 0.7   ALKPHOS 158* 165*   AST 52* 54*   ALT 45* 48*   ANIONGAP 10 12  12   EGFRNONAA >60 >60  >60     Magnesium:     Recent Labs  Lab 03/25/18  0615 03/26/18  0534   MG 1.8 2.1     POCT Glucose:     Recent Labs  Lab 03/25/18  2103 03/26/18  0617 03/26/18  1143   POCTGLUCOSE 133* 113* 167*       Significant Imaging: I have reviewed all pertinent imaging results/findings within the past 24 hours.

## 2018-03-26 NOTE — ASSESSMENT & PLAN NOTE
Right foot ulcer  Appreciate Wound Care and Podiatry.  Enterococcus faecalis resistant to tetracycline, so stop doxycycline.  Give amoxicillin-clavulanate.  Follow up Gram negative jean pierre.

## 2018-03-26 NOTE — PROGRESS NOTES
[facility] Hospital Medicine  Progress Note    Patient Name: Anum Quick  MRN: [id number]  Patient Class: IP- Inpatient   Admission Date: 3/21/2018  Length of Stay: 5 days  Attending Physician: Cal Hollis MD  Primary Care Provider: Raghav Valdez MD        Subjective:     Principal Problem:Cardiogenic shock    HPI:  Anum Quick is a 62 y.o. white  woman with hypertension, diabetes mellitus type 2 (too well controlled on metformin), neuropathy, hyperlipidemia, hepatic steatosis, coronary artery disease, severe left atrial enlargement, severe aortic stenosis, chronic left and right-sided systolic congestive heart failure, pulmonary hypertension, chronic hypoxic respiratory failure, paroxysmal atrial fibrillation (anticoagulated on rivaroxaban), chronic kidney disease stage 3, anemia, chronic lower extremity venous stasis, chronic pain, and a subcentimeter pancreatic cyst.  She lives in [place], [place].  She is  but has had prior husbands, and had 3 children before 1  of a heart attack.  She worked as a  at [employer] on [facility] until she became ill.  Her primary care physician is Dr. Raghav Valdez.  Her cardiologist is Dr. Jason Plaza.  Her pain management specialist is Dr. Luc David.     She was planned for elective aortic valve replacement and Maze procedure by cardiothoracic surgeon Dr. Rodo Lundy, but it was postponed because her creatinine increased from 1.6 on 17 to 3.1 on 1/10/18, and her AST and ALT increased to 698 and 524.  These were thought to be due to decompensated heart failure and subsequently improved.   She was hospitalized at [facility] from 18-18 for decompensated heart failure requiring furosemide drip and metolazone.  BNP was greater than 4,900 on admission.  She had reportedly been taking furosemide 40 mg alternating with 20 mg daily prior to  admission and weighed 178 lbs on admission.  Her fluid status was net negative 16 liters by discharge, and creatinine was 1.6.  She was prescribed furosemide 40 mg twice daily.   She was seen at [facility] internal medicine clinic on 3/9/18 by nurse practitioner Svetlana Vicente for worsening congestive heart failure.  She weighed 79.3 kg (174 lb) and was hypoxic (oxygen saturation 88%) but reported being able to perform activities around her house.  Labs showed worsening renal failure with creatinine increased to 2.3, but BNP had decreased to 4,875.     She was eating a lot of ice at home, and progressively became weaker, gained weight, became more short of breath, and more nauseated.  Her  tried to convince her for a couple of weeks to go the hospital, but she was stubborn.  She was falling a lot, and was too heavy for her  to manage.  She started becoming confused.   Finally, on 3/21/18, her  called EMS.  She was taken to [facility] Emergency Department and found to have hypoglycemic coma, which responded to dextrose administration.  Blood pressure was as low as 85/66.  She was found to have cardiogenic shock with pH of 7.130, lactic acid greater than 12, creatinine increased to 2.7, BNP back up to greater than 4,900.  Weight had increased to 81.6 kg (180 lb).  She was placed on BiPAP, which helped treat her acidosis.  Prior to workup, she was thought to have sepsis, so IV fluids were begun, but stopped at 350 mL after consulting [facility], who also recommended starting a vasopressor or inotrope.  Anesthesiology placed a central venous catheter and she was started on dopamine.  Hospital Medicine recommended consulting her cardiologist, who was not available, so she was admitted to [facility].  She was started on IV furosemide 80 mg three times daily.  Cardiology was consulted to help manage.      [facility]  Course:  Since diabetes was too controlled and metformin contributed to lactic acidosis, metformin was discontinued.      Based on her weight, she needed 17 liters of fluid removed to get her to the same fluid state she was in when she was discharged from [facility], since she weighed 2 lbs more than when she was admitted to [facility].  She was put on norepinephrine drip and furosemide 80 mg IV three times daily.  Norepinephrine was able to weaned off relatively quickly.  Lactic acidosis improved, so she was taken off BiPAP on 3/22/18.  Her renal function initially worsened, so Nephrology was consulted when BUN and creatinine reached 83 and 2.9 on 3/22/18, but renal function subsequently improved.  Cardiology was concerned about her worsening anemia, due to risk of arteriovenous malformations.  Hemoglobin and hematocrit remained stable.  As expected, everything was improving because she was getting better perfusion and fluid removal.  Even her chronic hypoxia and stage 3 chronic kidney disease resolved.      The wound care nurse evaluated her right leg and foot on 3/23/18, noted infection of the dorsal foot wound, cultured it, and recommended dressings and Podiatry evaluation.  Doxycycline was started.  Podiatry ordered an x-ray, which showed no evidence of osteomyelitis or fracture.  They also recommended wound care.  Wound culture grew Enterococcus faecalis and a Gram negative jean pierre.  Doxycycline was changed to amoxicillin-clavulanate on 3/26/18.      She was advised she would need to go to a skilled nursing facility.  Physical/Occupational Therapy agreed.    Interval History: No issues. Sitting in chair.  Motivated to get therapy so that she can undergo valve repair.    Review of Systems   Constitutional: Negative for chills and fever.   Respiratory: Negative for cough and shortness of breath.    Cardiovascular: Negative for chest pain and palpitations.   Gastrointestinal: Negative for nausea and  vomiting.     Objective:     Vital Signs (Most Recent):  Temp: 98.4 °F (36.9 °C) (03/26/18 1156)  Pulse: 83 (03/26/18 1156)  Resp: 18 (03/26/18 1156)  BP: 118/79 (03/26/18 1156)  SpO2: 100 % (03/25/18 1700) Vital Signs (24h Range):  Temp:  [97.4 °F (36.3 °C)-98.5 °F (36.9 °C)] 98.4 °F (36.9 °C)  Pulse:  [76-98] 83  Resp:  [12-26] 18  SpO2:  [97 %-100 %] 100 %  BP: (101-130)/(67-92) 118/79     Weight: 78.1 kg (172 lb 2.9 oz)  Body mass index is 26.97 kg/m².    Intake/Output Summary (Last 24 hours) at 03/26/18 1412  Last data filed at 03/26/18 0900   Gross per 24 hour   Intake             1245 ml   Output             3125 ml   Net            -1880 ml      Physical Exam   Constitutional: She is oriented to person, place, and time. She appears well-developed and well-nourished. No distress.   Cardiovascular: Normal rate and regular rhythm.    No murmur heard.  Pulmonary/Chest: Effort normal and breath sounds normal. No respiratory distress. She has no wheezes.   Abdominal: Soft. Bowel sounds are normal. She exhibits no distension. There is no tenderness.   Musculoskeletal: She exhibits edema.   Right foot dressed   Neurological: She is alert and oriented to person, place, and time.   Skin: Skin is warm and dry. Bruising noted. No cyanosis. Nails show no clubbing.   Psychiatric: She has a normal mood and affect. Her behavior is normal.   Nursing note and vitals reviewed.      Significant Labs:   CBC:     Recent Labs  Lab 03/25/18  0615   WBC 5.72   HGB 8.8*   HCT 28.8*        CMP:     Recent Labs  Lab 03/25/18  0615 03/26/18  0534    142  142   K 3.2* 4.4  4.4   CL 99 100  100   CO2 33* 30*  30*   * 122*  122*   BUN 41* 36*  36*   CREATININE 0.9 1.0  1.0   CALCIUM 8.7 9.3  9.3   PROT 6.0 6.6   ALBUMIN 2.5* 2.8*   BILITOT 0.7 0.7   ALKPHOS 158* 165*   AST 52* 54*   ALT 45* 48*   ANIONGAP 10 12  12   EGFRNONAA >60 >60  >60     Magnesium:     Recent Labs  Lab 03/25/18  0615 03/26/18  0534   MG  1.8 2.1     POCT Glucose:     Recent Labs  Lab 03/25/18  2103 03/26/18  0617 03/26/18  1143   POCTGLUCOSE 133* 113* 167*       Significant Imaging: I have reviewed all pertinent imaging results/findings within the past 24 hours.    Assessment/Plan:      Cellulitis of right foot    Right foot ulcer  Appreciate Wound Care and Podiatry.  Enterococcus faecalis resistant to tetracycline, so stop doxycycline.  Give amoxicillin-clavulanate.  Follow up Gram negative jean pierre.          Iron deficiency anemia    Chronic.  Start ferrous sulfate BID.  Checking stool for occult blood, since aortic stenosis can cause Heyde's syndrome. Hgb is up slightly today to 8.5. Continue to monitor.         Venous stasis of both lower extremities    Appreciate Wound Care.          Acute on chronic systolic congestive heart failure    Pulmonary hypertension due to left ventricular systolic dysfunction  Left atrial enlargement  Severe aortic stenosis  Takes furosemide 40 mg BID, metoprolol tartrate 50 mg BID, lisinopril 20 mg daily at home.  Had too much fluid intake at home.  Had cardiogenic shock on admission which is now resolved.  Continue furosemide to 80 mg IV TID with goal fluid status of net negative 17 liters.  Working well with therapy and should be a better candidate for aortic valve replacement now than when it was postponed.          Coronary artery disease involving native coronary artery of native heart without angina pectoris    Takes aspirin 81 mg daily, clopidrogel 75 mg daily, atorvastatin.  Resume aspirin, atorvastatin.          Paroxysmal atrial fibrillation    Chronic anticoagulation - on rivaroxaban   Takes amiodarone 200 mg daily and rivaroxaban at home.  Continue amiodarone and metoprolol.  Holding rivaroxaban.  Not on any anticoagulation for now due to question of active blood loss.          Hyperlipidemia associated with type 2 diabetes mellitus    Takes atorvastatin 80 mg daily.  Hold for now.          Type 2 diabetes  mellitus with neurologic complication, without long-term current use of insulin    Has not needed metformin for a while.  Discontinue since it contributes to lactic acidosis.  Insulin aspart sliding scale.  Monitor serum glucose.  Holding gabapentin while renal function improves.            VTE Risk Mitigation         Ordered     Place RADHA hose  Until discontinued      03/23/18 1240     Place sequential compression device  Until discontinued      03/23/18 1240        Disposition: Continue diuresis.  Follow up culture results.  Discharge to SNF.  Let Dr. Lundy know she should be able to get aortic valve replacement soon.      Cal Hollis MD  Department of Hospital Medicine   [facility]

## 2018-03-26 NOTE — PLAN OF CARE
Problem: Patient Care Overview  Goal: Plan of Care Review  Outcome: Ongoing (interventions implemented as appropriate)  Pt VSS and NAD noted overnight. Pt received IV Lasix as ordered- Urine Output overnight- 1850 mL, clear yellow urine. Pt stable enough to get up to bedside commode with minimal assistance. PRN pain medication given per pt request, pt obtained moderate relief. Strict I&O's maintained. Blood glucose monitoring, WNL. PO antibiotics given. Pt refuses RADHA's/SCD's.     Tele Recap:  NSR, 70-80's  1st degree AV block  No red alarms noted.     Plan of care reviewed with patient. Patient verbalized complete understanding. Fall precautions maintained. Will continue to monitor and report to oncoming Rn.

## 2018-03-26 NOTE — PLAN OF CARE
Problem: Patient Care Overview  Goal: Plan of Care Review  Outcome: Ongoing (interventions implemented as appropriate)  Pt safety maintained. Bed in low and locked position. DM being managed with diet and medications. ABX administered as ordered. Dressing changed as ordered. Pt worked with therapy today. NSR on tele. No acute distress noted. Will continue to monitor.

## 2018-03-26 NOTE — PROGRESS NOTES
Per [place], patient denied by [facility] and [place] because payor source not accepted.

## 2018-03-26 NOTE — PLAN OF CARE
Patient rounded and  called in, he will come visit her today.  TN stated she may need SNF placement and he gave permission to submit to  1. [facility]  2. [facility]  3. [facility]  TN notified Samantha Thayer of choices and plan.  He states he will call the TN at 2pm to discuss the DC plan further, he feels that his wife would not do well in a SNF as she is physically, emotionally and mentally reliant on his presence. TN mentioned that she is already a 30-day return and they should consider other options rather than just returning home.    Patient explains that she drinks a lot of fluid at home, including sodas, water and ice. TN encouraged better compliance with fluids/diet.    Patient has a lot of falls at home.    TN recommends Palliative [facility] and dietetic teaching.  Follow-up With  Details  Why  Contact Info   Jason Plaza MD  Go on 4/3/2018  @ 3:30pm  [address] [facility] [address]  [phone]   Rodo Lundy MD  Call  This clinic will contact you with your follow up date and time.  [address]  [facility] [address]  [phone]   Lita Lauren MD  On 4/5/2018  @ 10:30am  [address]  [address]  [facility] [address]  [phone]             03/26/18 1026   Discharge Reassessment   Assessment Type Discharge Planning Reassessment   Discharge Plan A Skilled Nursing Facility   Discharge Plan B Home Health

## 2018-03-26 NOTE — PROGRESS NOTES
made phone contact with long term access services and completed a level of care eligibility tool (LOCET) for nursing facility admission. Then faxed Level one PASRR to the office of aging and adult services for nursing facility admission.    12:42-  left voicemail for admissions coordinator at [facility] regarding status of referral.

## 2018-03-26 NOTE — PLAN OF CARE
Problem: Physical Therapy Goal  Goal: Physical Therapy Goal  Goals to be met by: 2018     Patient will increase functional independence with mobility by performin. Supine to sit with Modified [place]  2. Sit to supine with Modified [place]  3. Sit to stand transfer with Modified [place]  4. Bed to chair transfer with Modified [place] with or without appropriate AD  5. Gait  x 50+ feet with Modified [place] with or without appropriate AD   6. Lower extremity exercise program x10 reps with independence     Outcome: Ongoing (interventions implemented as appropriate)  Pt tolerated treatment session well, PT will continue to follow.

## 2018-03-26 NOTE — ASSESSMENT & PLAN NOTE
Pulmonary hypertension due to left ventricular systolic dysfunction  Left atrial enlargement  Severe aortic stenosis  Takes furosemide 40 mg BID, metoprolol tartrate 50 mg BID, lisinopril 20 mg daily at home.  Had too much fluid intake at home.  Had cardiogenic shock on admission which is now resolved.  Continue furosemide to 80 mg IV TID with goal fluid status of net negative 17 liters.  Working well with therapy and should be a better candidate for aortic valve replacement now than when it was postponed.

## 2018-03-26 NOTE — PROGRESS NOTES
Progress Note  Nephrology      Consult Requested By: Cal Hollis MD      SUBJECTIVE:     Overnight events  Patient is a 62 y.o. female     Patient Active Problem List   Diagnosis    Chronic systolic heart failure    Type 2 diabetes mellitus with neurologic complication, without long-term current use of insulin    Severe aortic stenosis    Hyperlipidemia associated with type 2 diabetes mellitus    Paroxysmal atrial fibrillation    Coronary artery disease involving native coronary artery of native heart without angina pectoris    Chronic anticoagulation - on rivaroxaban    Acute on chronic systolic congestive heart failure    Candidal dermatitis    Malnutrition of moderate degree    Venous stasis ulcers    Pancreatic cyst    Venous stasis of both lower extremities    Left atrial enlargement    Pulmonary hypertension due to left ventricular systolic dysfunction    Iron deficiency anemia    Cellulitis of right foot    Right foot ulcer    Hypokalemia     Past Medical History:   Diagnosis Date    Chronic anticoagulation - on rivaroxaban 12/29/2017    PAF    Chronic systolic heart failure 12/14/2017     12-15-17   1 - Moderately depressed left ventricular systolic function (EF 30-35%). Akinetic LV apex. There is no thrombus in LV apex.   2 - Indeterminate LV diastolic function.    3 - Mildly to moderately depressed right ventricular systolic function .    4 - Pulmonary hypertension. The estimated PA systolic pressure is 63 mmHg.    5 - Severe low flow aortic valve stenosis (JAMES 0.49 cm2, AVAi 0.27 cm2/m2, peak aortic jet velocity 4.0 m/s,MG 48 mmHg).    6 - Mild to moderate mitral regurgitation.    7 - Mild tricuspid regurgitation.    8 - Severe left atrial enlargement.     CKD (chronic kidney disease) stage 3, GFR 30-59 ml/min 12/29/2017    Coronary artery disease involving native coronary artery of native heart without angina pectoris 12/29/2017    [facility] @ : Per report (12/4/2017) proximal LAD  has 20% stenosis, RCA with LI otherwise normal coronaries      Essential hypertension 12/14/2017    Hyperlipidemia associated with type 2 diabetes mellitus 12/14/2017    Left atrial enlargement     NSTEMI (non-ST elevated myocardial infarction) 12/14/2017    Paroxysmal atrial fibrillation 12/15/2017    Renovascular hypertension 12/14/2017    Severe aortic stenosis 12/14/2017    12-15-17  Severe low flow aortic valve stenosis (JAMES 0.49 cm2, AVAi 0.27 cm2/m2, peak aortic jet velocity 4.0 m/s,MG 48 mmHg).      Type 2 diabetes mellitus with neurologic complication, without long-term current use of insulin 12/14/2017              OBJECTIVE:     Vitals:    03/26/18 0800 03/26/18 0831 03/26/18 1152 03/26/18 1156   BP:  (!) 129/92  118/79   BP Location:    Right arm   Patient Position:    Lying   Pulse: 89 98 83 83   Resp:  18  18   Temp:  97.6 °F (36.4 °C)  98.4 °F (36.9 °C)   TempSrc:    Oral   SpO2:       Weight:       Height:           Temp: 98.4 °F (36.9 °C) (03/26/18 1156)  Pulse: 83 (03/26/18 1156)  Resp: 18 (03/26/18 1156)  BP: 118/79 (03/26/18 1156)  SpO2: 100 % (03/25/18 1700)      Date 03/26/18 0700 - 03/27/18 0659   Shift [id number] [id number] 24 Hour Total   I  N  T  A  K  E   P.O. 420   420    Shift Total  (mL/kg) 420  (5.4)   420  (5.4)   O  U  T  P  U  T   Urine  (mL/kg/hr) 500   500    Shift Total  (mL/kg) 500  (6.4)   500  (6.4)   Weight (kg) 78.1 78.1 78.1 78.1             Medications:   amiodarone  200 mg Oral Daily    aspirin  81 mg Oral Daily    atorvastatin  80 mg Oral Daily    collagenase   Topical (Top) Every other day    doxycycline  100 mg Oral Q12H    ferrous sulfate  325 mg Oral BID    furosemide  40 mg Intravenous TID    iron sucrose  100 mg Intravenous Daily    metoprolol tartrate  25 mg Oral BID    potassium, sodium phosphates  2 packet Oral QID (WM & HS)             No SOB    Physical Exam:  General appearance: NAD  Lungs: diminished breath sounds  Heart: pulse  96  Abdomen: soft  Extremities: edema  Skin: dry        Laboratory:  ABG  Labs reviewed  Recent Results (from the past 336 hour(s))   Basic metabolic panel    Collection Time: 03/26/18  5:34 AM   Result Value Ref Range    Sodium 142 136 - 145 mmol/L    Potassium 4.4 3.5 - 5.1 mmol/L    Chloride 100 95 - 110 mmol/L    CO2 30 (H) 23 - 29 mmol/L    BUN, Bld 36 (H) 8 - 23 mg/dL    Creatinine 1.0 0.5 - 1.4 mg/dL    Calcium 9.3 8.7 - 10.5 mg/dL    Anion Gap 12 8 - 16 mmol/L   Basic Metabolic Panel (BMP)    Collection Time: 03/22/18  5:53 AM   Result Value Ref Range    Sodium 140 136 - 145 mmol/L    Potassium 4.5 3.5 - 5.1 mmol/L    Chloride 100 95 - 110 mmol/L    CO2 18 (L) 23 - 29 mmol/L    BUN, Bld 83 (H) 8 - 23 mg/dL    Creatinine 2.9 (H) 0.5 - 1.4 mg/dL    Calcium 8.2 (L) 8.7 - 10.5 mg/dL    Anion Gap 22 (H) 8 - 16 mmol/L     Recent Results (from the past 336 hour(s))   CBC auto differential    Collection Time: 03/25/18  6:15 AM   Result Value Ref Range    WBC 5.72 3.90 - 12.70 K/uL    Hemoglobin 8.8 (L) 12.0 - 16.0 g/dL    Hematocrit 28.8 (L) 37.0 - 48.5 %    Platelets 227 150 - 350 K/uL   CBC auto differential    Collection Time: 03/24/18  6:05 AM   Result Value Ref Range    WBC 6.77 3.90 - 12.70 K/uL    Hemoglobin 8.5 (L) 12.0 - 16.0 g/dL    Hematocrit 27.4 (L) 37.0 - 48.5 %    Platelets 228 150 - 350 K/uL   CBC auto differential    Collection Time: 03/23/18  5:05 AM   Result Value Ref Range    WBC 8.24 3.90 - 12.70 K/uL    Hemoglobin 7.9 (L) 12.0 - 16.0 g/dL    Hematocrit 25.7 (L) 37.0 - 48.5 %    Platelets 242 150 - 350 K/uL     Urinalysis  No results for input(s): COLORU, CLARITYU, SPECGRAV, PHUR, PROTEINUA, GLUCOSEU, BILIRUBINCON, BLOODU, WBCU, RBCU, BACTERIA, MUCUS, NITRITE, LEUKOCYTESUR, UROBILINOGEN, HYALINECASTS in the last 24 hours.    Diagnostic Results:  X-Ray: Reviewed  US: Reviewed  Echo: Reviewed  ACCESS    ASSESSMENT/PLAN:     HARISH on CKD 3  UA protein 2+  US  The right kidney measures approximately  11.7 cm with mild cortical thinning and decreased corticomedullary distinction. There is reduced renal perfusion.  Resistive index is elevated at 0.78.  No hydronephrosis or nephrolithiasis.  The left kidney measures 10.4 cm with decreased corticomedullary distinction.  There is decreased renal perfusion, resistive index measures 0.65.  No hydronephrosis or nephrolithiasis  Azotemia  BUN 36  Creatinine 1  K 4.4  Replace  Poor nutrition  Albumin 2.8  Anemia  Hb 8.5  Low iron  Ferrous sulfate  /76  Echo-   1 - Severely depressed left ventricular systolic function (EF 20-25%).     2 - Severe aortic valve stenosis (JAMES 0.59 cm2, AVAi 0.31 cm2/m2, peak aortic jet velocity 4.4 m/s,MG 51 mmHg, ).     3 - Restrictive LV filling pattern, indicating markedly elevated LAP (grade 3 diastolic dysfunction).     4 - Moderate aortic regurgitation.     5 - Pulmonary hypertension. The estimated PA systolic pressure is 55 mmHg.     6 - Mild mitral regurgitation.     7 - Moderate left atrial enlargement.   Weight daily  I and O  Gentle diuresis  Avoid nephrotoxic agents, hypotension

## 2018-03-26 NOTE — PLAN OF CARE
Problem: Occupational Therapy Goal  Goal: Occupational Therapy Goal  Goals to be met by: 4/23     Patient will increase functional independence with ADLs by performing:    UE Dressing with Modified [place].  LE Dressing with Modified [place].  Grooming while standing with Modified [place].  Toileting from toilet with Modified [place] for hygiene and clothing management.   Toilet transfer to toilet with Modified [place].  Upper extremity exercise program x10 reps per handout, with independence.     Outcome: Ongoing (interventions implemented as appropriate)  Min A functional mobility in room without AD, min a toileting and tf; SBA GH seated at sink.  Decreased endurance/activity tolerance    Cont to rec SNF

## 2018-03-26 NOTE — PT/OT/SLP PROGRESS
Occupational Therapy   Treatment    Name: Anum Quick  MRN: [id number]  Admitting Diagnosis:  Cardiogenic shock       Recommendations:     Discharge Recommendations: nursing facility, skilled  Discharge Equipment Recommendations:  bedside commode, bath bench, walker, rolling  Barriers to discharge:  Inaccessible home environment, Decreased caregiver support    Subjective     Communicated with: nurse prior to session.  Pain/Comfort:  · Pain Rating 1: 0/10  · Pain Rating Post-Intervention 1: 0/10    Patients cultural, spiritual, Mosque conflicts given the current situation:      Objective:     Patient found with: central line    General Precautions: Standard, fall   Orthopedic Precautions:    Braces:       Occupational Performance:    Bed Mobility:    · Patient completed Rolling/Turning to Left with  modified independence  · Patient completed Scooting/Bridging with modified independence  · Patient completed Supine to Sit with supervision  · Patient completed Sit to Supine with supervision     Functional Mobility/Transfers:  · Patient completed Sit <> Stand Transfer with contact guard assistance  with  no assistive device   · Patient completed Bed <> Chair Transfer using Step Transfer technique with contact guard assistance and minimum assistance with no assistive device  · Patient completed Toilet Transfer Step Transfer technique with contact guard assistance and minimum assistance with  no AD  · Functional Mobility: min A HHA in room    Activities of Daily Living:  · Grooming: supervision seated at sink  · Toileting: contact guard assistance and minimum assistance      Patient left HOB elevated with all lines intact, call button in reach, bed alarm on and nurse notified    AMPAC 6 Click:  AMPAC Total Score: 19    Treatment & Education:  Pt moved to sit EOB and transitioned to stand SBA, gait w/o AD to bathroom min A HHA, post toielting functional mobility to sink min A.  Seated GH then transitioned back to  sit EOB.  Pt required several rest breaks throughout session.  Reviewed post acute therapy recs, need for assist at home  Education:    Assessment:     Anum Quick is a 62 y.o. female with a medical diagnosis of Cardiogenic shock.  She presents with performance deficits affecting function are impaired endurance, weakness, gait instability, impaired balance, impaired self care skills, impaired functional mobilty, decreased lower extremity function, decreased upper extremity function, impaired cardiopulmonary response to activity.      Rehab Prognosis:  good; patient would benefit from acute skilled OT services to address these deficits and reach maximum level of function.       Plan:     Patient to be seen 5 x/week to address the above listed problems via self-care/home management, therapeutic activities, therapeutic exercises  · Plan of Care Expires: 04/23/18  · Plan of Care Reviewed with: patient    This Plan of care has been discussed with the patient who was involved in its development and understands and is in agreement with the identified goals and treatment plan    GOALS:    Occupational Therapy Goals        Problem: Occupational Therapy Goal    Goal Priority Disciplines Outcome Interventions   Occupational Therapy Goal     OT, PT/OT Ongoing (interventions implemented as appropriate)    Description:  Goals to be met by: 4/23     Patient will increase functional independence with ADLs by performing:    UE Dressing with Modified [place].  LE Dressing with Modified [place].  Grooming while standing with Modified [place].  Toileting from toilet with Modified [place] for hygiene and clothing management.   Toilet transfer to toilet with Modified [place].  Upper extremity exercise program x10 reps per handout, with independence.                      Time Tracking:     OT Date of Treatment: 03/26/18  OT Start Time: 1057  OT Stop Time: 1123  OT Total Time (min): 26 min    Billable  Minutes:Self Care/Home Management 26    Rad Parra OT  3/26/2018

## 2018-03-26 NOTE — PT/OT/SLP PROGRESS
Physical Therapy Treatment    Patient Name:  Anum Quick   MRN:  [id number]    Recommendations:     Discharge Recommendations:  nursing facility, skilled   Discharge Equipment Recommendations:  (Defer to SNF)   Barriers to discharge: Inaccessible home    Assessment:     Anum Quick is a 62 y.o. female admitted with a medical diagnosis of Cardiogenic shock.  She presents with the following impairments/functional limitations:  impaired endurance, gait instability, impaired balance, impaired self care skills, impaired functional mobilty, decreased lower extremity function, decreased upper extremity function, impaired cardiopulmonary response to activity. Pt tolerated treatment session well and is progressing towards her goals. Pt with improved gait distance; however, pt continues to require the use of a RW and cueing for safe ambulation. Pt would benefit from continued skilled acute care PT services as well as SNF placement upon hospital discharge to improve current impairments and return to a more independent functional mobility level.     Rehab Prognosis:  good; patient would benefit from acute skilled PT services to address these deficits and reach maximum level of function.      Recent Surgery: * No surgery found *      Plan:     During this hospitalization, patient to be seen 6 x/week to address the above listed problems via gait training, therapeutic activities, therapeutic exercises  · Plan of Care Expires:  04/23/18   Plan of Care Reviewed with: patient    Subjective     Communicated with AGUSTINA Snow prior to session.  Patient found supine with HOB elevated upon PT entry to room, agreeable to treatment.      Pain/Comfort:  · Pain Rating 1: 0/10  · Pain Rating Post-Intervention 1: 0/10    Patients cultural, spiritual, Bahai conflicts given the current situation: None verbalized to PT    Objective:     Patient found with: central line     General Precautions: Standard, fall   Orthopedic  Precautions:N/A   Braces: N/A     Functional Mobility:  · Bed Mobility:     · Rolling Right: supervision  · Scooting: supervision  · Bridging: supervision  · Supine to Sit: supervision  · Transfers:     · Sit to Stand:  contact guard assistance with rolling walker  · Gait: 44'X2 trials and 50'X2 trials  · Balance: Good.      AM-PAC 6 CLICK MOBILITY  Turning over in bed (including adjusting bedclothes, sheets and blankets)?: 4  Sitting down on and standing up from a chair with arms (e.g., wheelchair, bedside commode, etc.): 3  Moving from lying on back to sitting on the side of the bed?: 4  Moving to and from a bed to a chair (including a wheelchair)?: 3  Need to walk in hospital room?: 3  Climbing 3-5 steps with a railing?: 3  Total Score: 20       Therapeutic Activities and Exercises:  -AP x25 Bilaterally while supine.   -Bed mobility as listed above.   -Therex, while seated unsupported EOB. 2x10 BLE. LAQ and seated marching. Verbal cueing to maintain an upright position.   -Pt ambulated x4 trials with SBA with RW. Pt required cueing to stay within the RW for safe ambulation. First and second trials pt ambulated  ~44 feet  with a standing rest break between the two trials. After a seated rest break pt ambulated ~50 feet X2 trials with a standing rest break between each trial. Pt reported SOB while ambulating. PT educated pt on PLB, pt reports SOB was improved with PLB and rest.  -Pt left in bed side chair in an upright position to eat lunch. Encouraged pt to prop legs up when she was finished eating in order to decrease the time her legs are in a dependent position. Pt verbalized understanding.     Patient left up in chair with call button in reach, AGUSTINA Snow notified and RT present..    GOALS:    Physical Therapy Goals        Problem: Physical Therapy Goal    Goal Priority Disciplines Outcome Goal Variances Interventions   Physical Therapy Goal     PT/OT, PT Ongoing (interventions implemented as appropriate)      Description:  Goals to be met by: 2018     Patient will increase functional independence with mobility by performin. Supine to sit with Modified [place]  2. Sit to supine with Modified [place]  3. Sit to stand transfer with Modified [place]  4. Bed to chair transfer with Modified [place] with or without appropriate AD  5. Gait  x 50+ feet with Modified [place] with or without appropriate AD   6. Lower extremity exercise program x10 reps with independence                      Time Tracking:     PT Received On: 18  PT Start Time: 1211     PT Stop Time: 1237  PT Total Time (min): 26 min     Billable Minutes: Gait Training 16 and Therapeutic Exercise 10    Treatment Type: Treatment  PT/PTA: PT     PTA Visit Number: 0     Yaw Tobin PT, DPT  2018

## 2018-03-26 NOTE — PLAN OF CARE
Transferred to [facility] wheelchair, on monitor, meds, sent, no belongings , hospital belongings sent,  notified of transfer and bed

## 2018-03-27 NOTE — PT/OT/SLP PROGRESS
Physical Therapy      Patient Name:  Anum Quick   MRN:  [id number]     Nsg was in room removing a central line.  Pt was discharged.    Mirella Corral, PTA

## 2018-03-27 NOTE — PLAN OF CARE
IV removed from Right IJ with catheter tip intact, pressure applied to site for 20 minutes, secured with tape.

## 2018-03-27 NOTE — PLAN OF CARE
[facility] HOME HEALTH ORDERS  FACE TO FACE ENCOUNTER    Patient Name: Anum Quick  YOB: 1955    PCP: Raghav Valdez MD   PCP Address: [address] / [place] [address]  PCP Phone Number: [phone]  PCP Fax: [phone]    Encounter Date: 03/27/2018    Admit to Home Health    Diagnoses:  Active Hospital Problems    Diagnosis  POA    Acute on chronic systolic congestive heart failure [I50.23]  Yes     Priority: 2     Iron deficiency anemia [D50.9]  Yes     Chronic    Cellulitis of right foot [L03.115]  Yes    Right foot ulcer [L97.519]  Yes    Venous stasis of both lower extremities [I87.8]  Yes     Chronic    Left atrial enlargement [I51.7]  Yes     Chronic    Coronary artery disease involving native coronary artery of native heart without angina pectoris [I25.10]  Yes     Chronic     [facility] @ [facility]: Per report (12/4/2017) proximal LAD has 20% stenosis, RCA with LI otherwise normal coronaries        Chronic anticoagulation - on rivaroxaban [Z79.01]  Not Applicable     Chronic     PAF      Paroxysmal atrial fibrillation [I48.0]  Yes     Chronic    Pulmonary hypertension due to left ventricular systolic dysfunction [I27.22]  Yes     Chronic    Severe aortic stenosis [I35.0]  Yes     Chronic     12-15-17  Severe low flow aortic valve stenosis (JAMES 0.49 cm2, AVAi 0.27 cm2/m2, peak aortic jet velocity 4.0 m/s,MG 48 mmHg).         Type 2 diabetes mellitus with neurologic complication, without long-term current use of insulin [E11.49]  Yes     Chronic    Hyperlipidemia associated with type 2 diabetes mellitus [E11.69, E78.5]  Yes     Chronic      Resolved Hospital Problems    Diagnosis Date Resolved POA    *Cardiogenic shock [R57.0] 03/23/2018 Yes    Hypokalemia [E87.6] 03/26/2018 No    Hypomagnesemia [E83.42] 03/25/2018 No    Hypophosphatemia [E83.39] 03/25/2018 No    Chronic kidney disease, stage III (moderate) [N18.3] 03/25/2018 Yes     Cardiorenal syndrome with renal failure [I13.10, N19] 03/25/2018 Yes    Hypoglycemic encephalopathy [E16.2] 03/22/2018 Yes    Chronic hypoxemic respiratory failure [J96.11] 03/23/2018 Yes     Chronic       Future Appointments  Date Time Provider Department Center   4/3/2018 3:30 PM Jason Plaza MD [facility]   4/5/2018 10:30 AM Lita Lauren MD [facility]   5/12/2018 11:30 AM Sherley Soliz MD [facility]     Follow-up Information     Jason Plaza MD. Go on 4/3/2018.    Specialty:  Cardiology  Why:  @ 3:30pm  Contact information:  [address]  [facility] [address]  [phone]             Call Rodo Lundy MD.    Specialty:  Cardiothoracic Surgery  Why:  This clinic will  contact you with your follow up date and time.  Contact information:  [address]  [facility] [address]  [phone]             Lita Lauren MD On 4/5/2018.    Specialty:  Hospitalist  Why:  @ 10:30am  Contact information:  [address]  [address]  [facility] [address]  [phone]                     I have seen and examined this patient face to face today. My clinical findings that support the need for the home health skilled services and home bound status are the following:  Weakness/numbness causing balance and gait disturbance due to Heart Failure and Weakness/Debility making it taxing to leave home.  Patient with medication mismanagement issues requiring home bound status as evidenced by  Poor understanding of medication regimen/dosage.    Allergies:Review of patient's allergies indicates:  No Known Allergies      Discharge Procedure Orders  Diet Adult Regular   Order Specific Question Answer Comments   Na restriction, if any: 2gNa    Fluid restriction: Fluid - 2000mL    Additional restrictions: Cardiac (Low Na/Chol)    Additional restrictions: Diabetic 2200      Activity as tolerated     Notify your health care provider if you  experience any of the following:  temperature >100.4     Notify your health care provider if you experience any of the following:  persistent nausea and vomiting or diarrhea     Notify your health care provider if you experience any of the following:  severe uncontrolled pain     Notify your health care provider if you experience any of the following:  redness, tenderness, or signs of infection (pain, swelling, redness, odor or green/yellow discharge around incision site)     Notify your health care provider if you experience any of the following:  difficulty breathing or increased cough     Notify your health care provider if you experience any of the following:  persistent dizziness, light-headedness, or visual disturbances         Nursing:   SN to complete comprehensive assessment including routine vital signs. Instruct on disease process and s/s of complications to report to MD. Review/verify medication list sent home with the patient at time of discharge  and instruct patient/caregiver as needed. Frequency may be adjusted depending on start of care date.    Notify MD if SBP > 160 or < 90; DBP > 90 or < 50; HR > 120 or < 50; Temp > 101; Other:         CONSULTS:    Physical Therapy to evaluate and treat. Evaluate for home safety and equipment needs; Establish/upgrade home exercise program. Perform / instruct on therapeutic exercises, gait training, transfer training, and Range of Motion.  Occupational Therapy to evaluate and treat. Evaluate home environment for safety and equipment needs. Perform/Instruct on transfers, ADL training, ROM, and therapeutic exercises.    MISCELLANEOUS CARE:  Diabetic Care:   SN to perform and educate Diabetic management with blood glucose monitoring:, Fingerstick blood sugar a.m. and p.m. and Report CBG < 60 or > 350 to physician.  Heart Failure:      SN to instruct on the following:    Instruct on the definition of CHF.   Instruct on the signs/sympoms of CHF to be  reported.   Instruct on and monitor daily weights.   Instruct on factors that cause exacerbation.   Instruct on action, dose, schedule, and side effects of medications.   Instruct on diet as prescribed.   Instruct on activity allowed.   Instruct on life-style modifications for life long management of CHF   SN to assess compliance with daily weights, diet, medications, fluid retention,    safety precautions, activities permitted and life-style modifications.   Additional 1-2 SN visits per week as needed for signs and symptoms     of CHF exacerbation.      WOUND CARE ORDERS  yes:  Foot Ulcer:  Location: Right foot    Clean wound with saline, apply santyl and cover with aquacel foam then wrap with kerlix every other day and as needed.       Medications: Review discharge medications with patient and family and provide education.      Current Discharge Medication List      START taking these medications    Details   amoxicillin-clavulanate 875-125mg (AUGMENTIN) 875-125 mg per tablet Take 1 tablet by mouth every 12 (twelve) hours.  Qty: 13 tablet, Refills: 0      ferrous sulfate 325 (65 FE) MG EC tablet Take 1 tablet (325 mg total) by mouth 2 (two) times daily.  Qty: 60 tablet, Refills: 3         CONTINUE these medications which have CHANGED    Details   metoprolol tartrate (LOPRESSOR) 50 MG tablet Take 0.5 tablets (25 mg total) by mouth 2 (two) times daily. Take 75 mg twice per day  Qty: 180 tablet, Refills: 4    Associated Diagnoses: Paroxysmal atrial fibrillation; Essential hypertension         CONTINUE these medications which have NOT CHANGED    Details   amiodarone (PACERONE) 200 MG Tab Take by mouth once daily.      aspirin 81 MG Chew Take 1 tablet (81 mg total) by mouth once daily.  Refills: 0      atorvastatin (LIPITOR) 80 MG tablet Take 1 tablet (80 mg total) by mouth once daily.  Qty: 30 tablet, Refills: 11      butalbital-acetaminophen-caffeine -40 mg (FIORICET, ESGIC) -40 mg per tablet Take 1  tablet by mouth every 4 (four) hours as needed for Pain.      clopidogrel (PLAVIX) 75 mg tablet Take 1 tablet (75 mg total) by mouth once daily.  Qty: 30 tablet, Refills: 11      collagenase ointment Apply topically once daily.  Qty: 15 g, Refills: 0      estropipate (OGEN) 1.5 MG tablet Take 1.5 mg by mouth once daily.      furosemide (LASIX) 40 MG tablet Take 1 tablet (40 mg total) by mouth 2 (two) times daily.  Qty: 60 tablet, Refills: 2      gabapentin (NEURONTIN) 100 MG capsule Take 100 mg by mouth 3 (three) times daily.      hydrocodone-acetaminophen 10-325mg (NORCO)  mg Tab TK 1 T PO TID PRN FOR 30 DAYS  Refills: 0      lisinopril (PRINIVIL,ZESTRIL) 20 MG tablet Take 20 mg by mouth once daily.      magnesium oxide (MAG-OX) 400 mg tablet Take 1 tablet (400 mg total) by mouth once daily.  Refills: 0      metFORMIN (GLUMETZA) 500 MG (MOD) 24 hr tablet Take 500 mg by mouth daily with breakfast.      nitroGLYCERIN (NITROSTAT) 0.4 MG SL tablet Place 1 tablet (0.4 mg total) under the tongue every 5 (five) minutes as needed for Chest pain (angina).  Qty: 20 tablet, Refills: 0      rivaroxaban (XARELTO) 20 mg Tab Take 1 tablet (20 mg total) by mouth daily with dinner or evening meal.  Qty: 90 tablet, Refills: 4    Associated Diagnoses: Chronic anticoagulation; Paroxysmal atrial fibrillation      senna (SENOKOT) 8.6 mg tablet Take 1 tablet by mouth 2 (two) times daily as needed for Constipation.      temazepam (RESTORIL) 15 mg Cap TK 1 C PO QHS  Refills: 2      venlafaxine (EFFEXOR-XR) 150 MG Cp24 TK ONE C PO D WF  Refills: 1             I certify that this patient is confined to her home and needs intermittent skilled nursing care, physical therapy and occupational therapy.    Lyndon Melissa MD, [facility]  Staff Hospitalist  Pager: [phone]  Spectralink: [phone]

## 2018-03-27 NOTE — PLAN OF CARE
TN spoke with  and patient.    Plan is now for her to go home, the  has gotten some more help to close the gaps in her care at home with her sister.    TN notified Dr. Melissa and MÓNICA Thayer, will plan for HH once ready for DC. TN spoke with Dr. Lundy clinic yesterday and they are going to call the patient to get her scheduled for clinic appt r/t aortic stenosis CV Surgery.    Future Appointments  Date Time Provider Department Center   4/3/2018 3:30 PM Jason Plaza MD [facility]   4/5/2018 10:30 AM Lita Lauren MD [facility]   5/12/2018 11:30 AM Sherley Soliz MD [facility] IM [facility] PCW        03/27/18 0992   Discharge Reassessment   Assessment Type Discharge Planning Reassessment   Discharge Plan A Home with family;Home Health

## 2018-03-27 NOTE — PROGRESS NOTES
met with patient in room and was told her  was at work and gets off at 2PM.  called patient's spouse cell phone and left voicemail requesting call back.     Referral for home health sent to [facility], [facility], and [facility] via [facility].      Update:12:28-  Patient has been medically accepted by [facility].  spoke with Riddhi at [facility]- services will begin on tomorrow 3/28/18.    Patient signed Patient Choice Form and copy placed in chart.

## 2018-03-27 NOTE — PLAN OF CARE
NAD. AAO x 4. NSR on tele. No true red alarms noted. Up to bed side commode with assistance. C/o lower back pain. PRN pain medication given. Nurse instructed patient to call for assistance. Patient verbalized understanding the plan of care. Fall precautions maintained. Bed alarm set, bed in low and locked position, side rails up x 3 ( patient requested 4 side rails up), call light within reach. Report given to Breanne BERRIOS to continue to monitor.

## 2018-03-27 NOTE — PLAN OF CARE
Patient up in chair for most of day. Mild SOB when out of bed to get into bed. Patient reports feeling short of breath, Oxygen 98% on room. Nurse encouraged patient to sit on side of bed.

## 2018-03-27 NOTE — TELEPHONE ENCOUNTER
Returned call to pt's .  He stated that he can not make an appt right now and will call me back tomorrow to schedule an appt with Dr. Lundy.  I confirmed that pt's  has the phone number to the clinic.    ----- Message from Kash Salvador RN sent at 3/27/2018  1:41 PM CDT -----  Hello, this patient is still being dc'd home today and is not scheduled with Dr. Lundy clinic.  Please contact her  to set the follow up date and time.    AbdelrahmanDaniel Spouse  [phone]

## 2018-03-27 NOTE — PROGRESS NOTES
"  [facility]  Adult Nutrition  Consult Note    SUMMARY     Recommendations    Recommendation/Intervention:   1. Continue current diet  -Add ADA restriction if BG levels problematic.  2. Provided low sodium diet education   3. RD to monitor    Goals: Pt will consume at least50% intake at meals  Nutrition Goal Status: goal met  Communication of RD Recs: reviewed with RN    D/C planning: Cardiac/ADA diet    Reason for Assessment    Reason for Assessment: RD follow-up  Diagnosis:  (cardiogenic shock)  Relevant Medical History: CHF, CAD, DM, HTN, CKD, cardiac surgery    General Information Comments: Patient eating 100% of meals. No issues with n/v/chewing or swallowing. Patient would like diet education material on low sodium diet. Provided with f/u resources. Patient controls DM via diet.    Nutrition Risk Screen    Nutrition Risk Screen: other (see comments) (appetite recently poor)    Nutrition/Diet History    Food Preferences: n/a  Do you have any cultural, spiritual, Yazdanism conflicts, given your current situation?: Denies cultural, Yazdanism food restrictions.    Anthropometrics    Temp: 97 °F (36.1 °C)  Height Method: Stated  Height: 5' 7" (170.2 cm)  Height (inches): 67 in  Weight Method: Bed Scale  Weight: 78.1 kg (172 lb 2.9 oz)  Weight (lb): 172.18 lb  Ideal Body Weight (IBW), Female: 135 lb  % Ideal Body Weight, Female (lb): 126.89 lb  BMI (Calculated): 26.9  BMI Grade: 25 - 29.9 - overweight    Lab/Procedures/Meds    Pertinent Labs Reviewed: reviewed  Pertinent Medications Reviewed: reviewed  Pertinent Medications Comments: Lasix    Physical Findings/Assessment    Overall Physical Appearance: overweight  Tubes:  (none)  Oral/Mouth Cavity: tooth/teeth missing  Skin: intact    Estimated/Assessed Needs    Weight Used For Calorie Calculations: 77.7 kg (171 lb 4.8 oz)  Height: 5' 7" (170.2 cm)  Energy Calorie Requirements (kcal): 1700 kcal  Energy Need Method: Horace GORDON " (Cherry-St. Jeor Equation): 1364.62  Protein Requirements: 78g (1.0g/kg)  Weight Used For Protein Calculations: 77.7 kg (171 lb 4.8 oz)    Fluid Need Method: RDA Method  RDA Method (mL): 1700  CHO Requirement: 225g       Nutrition Prescription Ordered    Current Diet Order: Cardiac 1200ml fluid restriction    Evaluation of Received Nutrient/Fluid Intake    Energy Calories Required: meeting needs  Protein Required: meeting needs  Fluid Required:  (per MD)  Comments: LBM: 3/24  Tolerance: tolerating  % Intake of Estimated Energy Needs: 100%  % Meal Intake: 100%    Nutrition Risk    Level of Risk/Frequency of Follow-up:  (1 x week)     Assessment and Plan    Nutrition Dx: Decreased need for sodium r/t CHF as evidenced by fluid restriction and edema.  Nutrition Dx Status: new       Monitor and Evaluation    Food and Nutrient Intake: food and beverage intake  Food and Nutrient Adminstration: diet order  Physical Activity and Function: nutrition-related ADLs and IADLs  Anthropometric Measurements: weight  Biochemical Data, Medical Tests and Procedures: electrolyte and renal panel  Nutrition-Focused Physical Findings: overall appearance     Nutrition Follow-Up    RD Follow-up?: Yes

## 2018-03-27 NOTE — PLAN OF CARE
Though SNF was recommended for this patient, the family unit ultimately was not accepted to their choice of SNF and were not very enthused about the idea of going to SNF to begin with.  TN received a message from Dr. Nikkie foster that they had contacted the  about scheduling for [facility] clinic appt and he declined but said he would set it after her DC, the clinic stated they would call the patient again tomorrow to set this appointment.    TN spoke with  on phone, he will come pick the patient up around 3pm today.  TN notified him that order for TTB and BSC are not covered, he will seek to purchase outside of [facility].    Samantha Thayer has established with [facility].    Patient's RW is covered item and TN has permission by Susan at [facility] to pull this item for bedside delivery. TN pulled and delivered the RW.    TN sent message to Dr. Nikkie foster letting them know patient still is pending an appointment with them, they said yesterday they would call the patient to schedule this follow up.    Follow-up With  Details  Why  Contact Info   Jason Plaza MD  Go on 4/3/2018  @ 3:30pm  [address] [facility] [address]  [phone]   Rodo Lundy MD  Call  This clinic will contact you with your follow up date and time.  [address] [facility] [address]  [phone]   Lita Lauren MD  Go on 4/5/2018  @ 10:30am  [address]  [address]  [place] [address]  [phone]   [facility]    for rolling walker  [address] [facility]  [address]  [place] [address]  [phone]   [facility] [place]    Home Health  [address]  [address]  [place] [address]  [phone]             03/27/18 1343   Final Note   Assessment Type Final Discharge Note   Discharge Disposition Home-Health   What phone number can be called within the next 1-3 days to see how you are doing after discharge? [phone]   Hospital Follow Up  Appt(s) scheduled? Yes    Discharge plans and expectations educations in teach back method with documentation complete? Yes   Right Care Referral Info   Post Acute Recommendation Home-care

## 2018-03-27 NOTE — PROGRESS NOTES
made phone contact with patient's insurance company and obtained list of in network home health agencies in [facility] [address]. Per Zee,, the following three agencies provided were:   1.[facility]  2.[facility])  3.[facility]

## 2018-03-27 NOTE — PLAN OF CARE
"Discharge instructions reviewed with patient and spouse. All new medications and adjusted medications reviewed with patient and spouse with teach back method. Patient states "I am feeling better" RR even and unlabored. Oxygen saturation 98%. CHF education given to patient and spouse using teachback method.   "

## 2018-03-27 NOTE — PROGRESS NOTES
Progress Note  Nephrology      Consult Requested By: Lyndon Melissa MD      SUBJECTIVE:     Overnight events  Patient is a 62 y.o. female     Patient Active Problem List   Diagnosis    Chronic systolic heart failure    Type 2 diabetes mellitus with neurologic complication, without long-term current use of insulin    Severe aortic stenosis    Hyperlipidemia associated with type 2 diabetes mellitus    Paroxysmal atrial fibrillation    Coronary artery disease involving native coronary artery of native heart without angina pectoris    Chronic anticoagulation - on rivaroxaban    Acute on chronic systolic congestive heart failure    Candidal dermatitis    Malnutrition of moderate degree    Venous stasis ulcers    Pancreatic cyst    Venous stasis of both lower extremities    Left atrial enlargement    Pulmonary hypertension due to left ventricular systolic dysfunction    Iron deficiency anemia    Cellulitis of right foot    Right foot ulcer     Past Medical History:   Diagnosis Date    Chronic anticoagulation - on rivaroxaban 12/29/2017    PAF    Chronic systolic heart failure 12/14/2017     12-15-17   1 - Moderately depressed left ventricular systolic function (EF 30-35%). Akinetic LV apex. There is no thrombus in LV apex.   2 - Indeterminate LV diastolic function.    3 - Mildly to moderately depressed right ventricular systolic function .    4 - Pulmonary hypertension. The estimated PA systolic pressure is 63 mmHg.    5 - Severe low flow aortic valve stenosis (JAMES 0.49 cm2, AVAi 0.27 cm2/m2, peak aortic jet velocity 4.0 m/s,MG 48 mmHg).    6 - Mild to moderate mitral regurgitation.    7 - Mild tricuspid regurgitation.    8 - Severe left atrial enlargement.     CKD (chronic kidney disease) stage 3, GFR 30-59 ml/min 12/29/2017    Coronary artery disease involving native coronary artery of native heart without angina pectoris 12/29/2017    [facility] @ : Per report (12/4/2017) proximal LAD has 20%  stenosis, RCA with LI otherwise normal coronaries      Essential hypertension 12/14/2017    Hyperlipidemia associated with type 2 diabetes mellitus 12/14/2017    Left atrial enlargement     NSTEMI (non-ST elevated myocardial infarction) 12/14/2017    Paroxysmal atrial fibrillation 12/15/2017    Renovascular hypertension 12/14/2017    Severe aortic stenosis 12/14/2017    12-15-17  Severe low flow aortic valve stenosis (JAMES 0.49 cm2, AVAi 0.27 cm2/m2, peak aortic jet velocity 4.0 m/s,MG 48 mmHg).      Type 2 diabetes mellitus with neurologic complication, without long-term current use of insulin 12/14/2017              OBJECTIVE:     Vitals:    03/27/18 1100 03/27/18 1149 03/27/18 1218 03/27/18 1500   BP:  137/89     BP Location:       Patient Position:  Lying     Pulse: 104 92 87    Resp:  17     Temp:  97 °F (36.1 °C)     TempSrc:  Oral     SpO2:    98%   Weight:       Height:           Temp: 97 °F (36.1 °C) (03/27/18 1149)  Pulse: 87 (03/27/18 1218)  Resp: 17 (03/27/18 1149)  BP: 137/89 (03/27/18 1149)  SpO2: 98 % (03/27/18 1500)      Date 03/27/18 0700 - 03/28/18 0659   Shift 4055-7342 5367-6061 8720-5626 24 Hour Total   I  N  T  A  K  E   P.O. 440   440    Shift Total  (mL/kg) 440  (5.6)   440  (5.6)   O  U  T  P  U  T   Urine  (mL/kg/hr) 300  (0.5)   300    Shift Total  (mL/kg) 300  (3.8)   300  (3.8)   Weight (kg) 78.1 78.1 78.1 78.1             Medications:   amiodarone  200 mg Oral Daily    amoxicillin-clavulanate 875-125mg  1 tablet Oral Q12H    aspirin  81 mg Oral Daily    atorvastatin  80 mg Oral Daily    collagenase   Topical (Top) Every other day    ferrous sulfate  325 mg Oral BID    furosemide  40 mg Intravenous TID    iron sucrose  100 mg Intravenous Daily    metoprolol tartrate  25 mg Oral BID    potassium, sodium phosphates  2 packet Oral QID (WM & HS)                 Physical Exam:  General appearance: NAD  SOB with exertion  Lungs: diminished breath sounds  Heart: pulse  87  Abdomen: soft  Extremities: edema  Laboratory:  ABG  Labs reviewed  Recent Results (from the past 336 hour(s))   Basic metabolic panel    Collection Time: 03/27/18  5:49 AM   Result Value Ref Range    Sodium 142 136 - 145 mmol/L    Potassium 3.7 3.5 - 5.1 mmol/L    Chloride 101 95 - 110 mmol/L    CO2 31 (H) 23 - 29 mmol/L    BUN, Bld 34 (H) 8 - 23 mg/dL    Creatinine 0.9 0.5 - 1.4 mg/dL    Calcium 9.2 8.7 - 10.5 mg/dL    Anion Gap 10 8 - 16 mmol/L   Basic metabolic panel    Collection Time: 03/26/18  5:34 AM   Result Value Ref Range    Sodium 142 136 - 145 mmol/L    Potassium 4.4 3.5 - 5.1 mmol/L    Chloride 100 95 - 110 mmol/L    CO2 30 (H) 23 - 29 mmol/L    BUN, Bld 36 (H) 8 - 23 mg/dL    Creatinine 1.0 0.5 - 1.4 mg/dL    Calcium 9.3 8.7 - 10.5 mg/dL    Anion Gap 12 8 - 16 mmol/L   Basic Metabolic Panel (BMP)    Collection Time: 03/22/18  5:53 AM   Result Value Ref Range    Sodium 140 136 - 145 mmol/L    Potassium 4.5 3.5 - 5.1 mmol/L    Chloride 100 95 - 110 mmol/L    CO2 18 (L) 23 - 29 mmol/L    BUN, Bld 83 (H) 8 - 23 mg/dL    Creatinine 2.9 (H) 0.5 - 1.4 mg/dL    Calcium 8.2 (L) 8.7 - 10.5 mg/dL    Anion Gap 22 (H) 8 - 16 mmol/L     Recent Results (from the past 336 hour(s))   CBC auto differential    Collection Time: 03/25/18  6:15 AM   Result Value Ref Range    WBC 5.72 3.90 - 12.70 K/uL    Hemoglobin 8.8 (L) 12.0 - 16.0 g/dL    Hematocrit 28.8 (L) 37.0 - 48.5 %    Platelets 227 150 - 350 K/uL   CBC auto differential    Collection Time: 03/24/18  6:05 AM   Result Value Ref Range    WBC 6.77 3.90 - 12.70 K/uL    Hemoglobin 8.5 (L) 12.0 - 16.0 g/dL    Hematocrit 27.4 (L) 37.0 - 48.5 %    Platelets 228 150 - 350 K/uL   CBC auto differential    Collection Time: 03/23/18  5:05 AM   Result Value Ref Range    WBC 8.24 3.90 - 12.70 K/uL    Hemoglobin 7.9 (L) 12.0 - 16.0 g/dL    Hematocrit 25.7 (L) 37.0 - 48.5 %    Platelets 242 150 - 350 K/uL     Urinalysis    Recent Labs  Lab 03/26/18  2200   COLORU Yellow    SPECGRAV 1.010   PHUR 8.0   PROTEINUA Negative   NITRITE Negative   LEUKOCYTESUR Negative   UROBILINOGEN 1.0       Diagnostic Results:  X-Ray: Reviewed  US: Reviewed  Echo: Reviewed  ACCESS    ASSESSMENT/PLAN:       HARISH on CKD 3  UA protein 2+  US  The right kidney measures approximately 11.7 cm with mild cortical thinning and decreased corticomedullary distinction. There is reduced renal perfusion.  Resistive index is elevated at 0.78.  No hydronephrosis or nephrolithiasis.  The left kidney measures 10.4 cm with decreased corticomedullary distinction.  There is decreased renal perfusion, resistive index measures 0.65.  No hydronephrosis or nephrolithiasis  Azotemia  BUN 34  Creatinine 0.9  K 3.7  Replace  Poor nutrition  Albumin 2.8  Anemia  Hb 8.5  Low iron  Ferrous sulfate  /89  Echo-   1 - Severely depressed left ventricular systolic function (EF 20-25%).     2 - Severe aortic valve stenosis (JAMES 0.59 cm2, AVAi 0.31 cm2/m2, peak aortic jet velocity 4.4 m/s,MG 51 mmHg, ).     3 - Restrictive LV filling pattern, indicating markedly elevated LAP (grade 3 diastolic dysfunction).     4 - Moderate aortic regurgitation.     5 - Pulmonary hypertension. The estimated PA systolic pressure is 55 mmHg.     6 - Mild mitral regurgitation.     7 - Moderate left atrial enlargement.   Weight daily  I and O  Gentle diuresis  PT/OT

## 2018-03-27 NOTE — DISCHARGE INSTRUCTIONS
CHF education    · Take medications as prescribed.   · Keep all follow up appointments.   · Check blood pressure daily.  · Weigh yourself everyday at the same time.   · Log all blood pressure and weight in notebook and take to ALL follow up appointments.

## 2018-03-28 NOTE — PATIENT INSTRUCTIONS
Home health requesting new admit date :    SN SPOKE TO CG LAST NIGHT AND SETUP TIME, PATIENT CALLED THIS MORNING AND STATED THAT HE HAD SOME IMPORTANT THINGS TO TAKE CARE OF AND WOULD LIKE SOC ON 3/29/18. SN ASKING FOR OK FOR UPDATED REFERRAL FOR SOC ON 3/29/18  IF ANY QUESTIONS PLEASE CONTACT [facility]  [phone]        Thanks

## 2018-03-28 NOTE — PT/OT/SLP DISCHARGE
Physical Therapy Discharge Summary    Name: Anum Quick  MRN: [id number]   Principal Problem: Cardiogenic shock     Patient Discharged from acute Physical Therapy on 3/27/2018.  Please refer to prior PT noted date on 3/26/2018 for functional status.     Assessment:     Patient appropriate for care in another setting.    Objective:     GOALS:    Physical Therapy Goals     Not on file          Multidisciplinary Problems (Resolved)        Problem: Physical Therapy Goal    Goal Priority Disciplines Outcome Goal Variances Interventions   Physical Therapy Goal   (Resolved)     PT/OT, PT Outcome(s) achieved     Description:  Goals to be met by: 2018     Patient will increase functional independence with mobility by performin. Supine to sit with Modified [place]  2. Sit to supine with Modified [place]  3. Sit to stand transfer with Modified [place]  4. Bed to chair transfer with Modified [place] with or without appropriate AD  5. Gait  x 50+ feet with Modified Jackson with or without appropriate AD   6. Lower extremity exercise program x10 reps with independence                      Reasons for Discontinuation of Therapy Services  Transfer to alternate level of care.      Plan:     Patient Discharged to: Home with Home Health Service(SNF recommended).    Salazar Molina, PT  3/28/2018

## 2018-03-28 NOTE — DISCHARGE SUMMARY
[facility] Medicine  Discharge Summary      Patient Name: Anum Quick  MRN: [id number]  Admission Date: 3/21/2018  Hospital Length of Stay: 6 days  Discharge Date and Time: 3/27/2018  3:49 PM  Attending Physician: Lyndon Melissa MD   Discharging Provider: Lyndon Melissa MD  Primary Care Provider: Raghav Valdez MD      HPI:   Anum Quick is a 62 y.o. white  woman with hypertension, diabetes mellitus type 2 (too well controlled on metformin), neuropathy, hyperlipidemia, hepatic steatosis, coronary artery disease, severe left atrial enlargement, severe aortic stenosis, chronic left and right-sided systolic congestive heart failure, pulmonary hypertension, chronic hypoxic respiratory failure, paroxysmal atrial fibrillation (anticoagulated on rivaroxaban), chronic kidney disease stage 3, anemia, chronic lower extremity venous stasis, chronic pain, and a subcentimeter pancreatic cyst.  She lives in [place], [place].  She is  but has had prior husbands, and had 3 children before 1  of a heart attack.  She worked as a  at [employer] on [facility] [employer] until she became ill.  Her primary care physician is Dr. Raghav Valdez.  Her cardiologist is Dr. Jason Plaza.  Her pain management specialist is Dr. Luc David.     She was planned for elective aortic valve replacement and Maze procedure by cardiothoracic surgeon Dr. Rodo Lundy, but it was postponed because her creatinine increased from 1.6 on 17 to 3.1 on 1/10/18, and her AST and ALT increased to 698 and 524.  These were thought to be due to decompensated heart failure and subsequently improved.   She was hospitalized at [facility] from 18-18 for decompensated heart failure requiring furosemide drip and metolazone.  BNP was greater than 4,900 on admission.  She had reportedly been taking furosemide 40 mg alternating with 20 mg  daily prior to admission and weighed 178 lbs on admission.  Her fluid status was net negative 16 liters by discharge, and creatinine was 1.6.  She was prescribed furosemide 40 mg twice daily.   She was seen at [facility] internal medicine clinic on 3/9/18 by nurse practitioner Svetlana Vicente for worsening congestive heart failure.  She weighed 79.3 kg (174 lb) and was hypoxic (oxygen saturation 88%) but reported being able to perform activities around her house.  Labs showed worsening renal failure with creatinine increased to 2.3, but BNP had decreased to 4,875.     She was eating a lot of ice at home, and progressively became weaker, gained weight, became more short of breath, and more nauseated.  Her  tried to convince her for a couple of weeks to go the hospital, but she was stubborn.  She was falling a lot, and was too heavy for her  to manage.  She started becoming confused.   Finally, on 3/21/18, her  called EMS.  She was taken to [facility] Department and found to have hypoglycemic coma, which responded to dextrose administration.  Blood pressure was as low as 85/66.  She was found to have cardiogenic shock with pH of 7.130, lactic acid greater than 12, creatinine increased to 2.7, BNP back up to greater than 4,900.  Weight had increased to 81.6 kg (180 lb).  She was placed on BiPAP, which helped treat her acidosis.  Prior to workup, she was thought to have sepsis, so IV fluids were begun, but stopped at 350 mL after consulting [facility], who also recommended starting a vasopressor or inotrope.  Anesthesiology placed a central venous catheter and she was started on dopamine.  Hospital Medicine recommended consulting her cardiologist, who was not available, so she was admitted to [facility].  She was started on IV furosemide 80 mg three times daily.  Cardiology was consulted to help manage.      *  No surgery found *      Hospital Course:   Since diabetes was too controlled and metformin contributed to lactic acidosis, metformin was discontinued.      Based on her weight, she needed 17 liters of fluid removed to get her to the same fluid state she was in when she was discharged from [facility], since she weighed 2 lbs more than when she was admitted to [facility].  She was put on norepinephrine drip and furosemide 80 mg IV three times daily.  Norepinephrine was able to weaned off relatively quickly.  Lactic acidosis improved, so she was taken off BiPAP on 3/22/18.  Her renal function initially worsened, so Nephrology was consulted when BUN and creatinine reached 83 and 2.9 on 3/22/18, but renal function subsequently improved.  Cardiology was concerned about her worsening anemia, due to risk of arteriovenous malformations.  Hemoglobin and hematocrit remained stable.  As expected, everything was improving because she was getting better perfusion and fluid removal.  Even her chronic hypoxia and stage 3 chronic kidney disease resolved.      The wound care nurse evaluated her right leg and foot on 3/23/18, noted infection of the dorsal foot wound, cultured it, and recommended dressings and Podiatry evaluation.  Doxycycline was started.  Podiatry ordered an x-ray, which showed no evidence of osteomyelitis or fracture.  They also recommended wound care.  Wound culture grew Enterococcus faecalis and a Gram negative jean pierre.  Doxycycline was changed to amoxicillin-clavulanate on 3/26/18.      She was advised she would need to go to a skilled nursing facility.  Physical/Occupational Therapy agreed. She ultimately declined SNF placement after not being accepted to preferred location. Will discharge home with HH and DME instead.      Consults:   Consults         Status Ordering Provider     Inpatient consult to [facility]  Once     Provider:  (Not yet assigned)    MIGUEL Pierre A     Inpatient  consult to Podiatry  Once     Provider:  Salazar Cowan DPM    Completed MIGUEL ROSADO     Inpatient consult to Registered Dietitian/Nutritionist  Once     Provider:  (Not yet assigned)    Completed MIGUEL ROSADO          Cellulitis of right foot    Right foot ulcer  Appreciate Wound Care and Podiatry.  Enterococcus faecalis resistant to tetracycline, so stop doxycycline.  Give amoxicillin-clavulanate.  Follow up Gram negative jean pierre.            Final Active Diagnoses:    Diagnosis Date Noted POA    Acute on chronic systolic congestive heart failure [I50.23] 02/17/2018 Yes    Iron deficiency anemia [D50.9] 03/23/2018 Yes     Chronic    Cellulitis of right foot [L03.115] 03/23/2018 Yes    Right foot ulcer [L97.519] 03/23/2018 Yes    Venous stasis of both lower extremities [I87.8] 03/21/2018 Yes     Chronic    Left atrial enlargement [I51.7]  Yes     Chronic    Coronary artery disease involving native coronary artery of native heart without angina pectoris [I25.10] 12/29/2017 Yes     Chronic    Chronic anticoagulation - on rivaroxaban [Z79.01] 12/29/2017 Not Applicable     Chronic    Paroxysmal atrial fibrillation [I48.0] 12/15/2017 Yes     Chronic    Pulmonary hypertension due to left ventricular systolic dysfunction [I27.22] 12/15/2017 Yes     Chronic    Severe aortic stenosis [I35.0] 12/14/2017 Yes     Chronic    Type 2 diabetes mellitus with neurologic complication, without long-term current use of insulin [E11.49] 12/14/2017 Yes     Chronic    Hyperlipidemia associated with type 2 diabetes mellitus [E11.69, E78.5] 12/14/2017 Yes     Chronic      Problems Resolved During this Admission:    Diagnosis Date Noted Date Resolved POA    PRINCIPAL PROBLEM:  Cardiogenic shock [R57.0] 03/21/2018 03/23/2018 Yes    Hypokalemia [E87.6] 03/24/2018 03/26/2018 No    Hypomagnesemia [E83.42] 03/24/2018 03/25/2018 No    Hypophosphatemia [E83.39] 03/24/2018 03/25/2018 No    Chronic kidney disease, stage III  "(moderate) [N18.3] 03/24/2018 03/25/2018 Yes    Cardiorenal syndrome with renal failure [I13.10, N19] 03/21/2018 03/25/2018 Yes    Hypoglycemic encephalopathy [E16.2] 03/21/2018 03/22/2018 Yes    Chronic hypoxemic respiratory failure [J96.11] 03/09/2018 03/23/2018 Yes     Chronic       Discharged Condition: fair    Disposition: Home-Health Care [facility]    Follow Up:  Follow-up Information     Jason Plaza MD. Go on 4/3/2018.    Specialty:  Cardiology  Why:  @ 3:30pm  Contact information:  [address]  [facility] [address]  [phone]             Call Rodo Lundy MD.    Specialty:  Cardiothoracic Surgery  Why:  This clinic will  contact you with your follow up date and time.  Contact information:  [address]  [facility] [address]  [phone]             Lita Lauren MD. Go on 4/5/2018.    Specialty:  Hospitalist  Why:  @ 10:30am  Contact information:  [address]  [address]  [facility] [address]  [phone]             [facility]    Specialty:  DME Provider  Why:  for rolling walker  Contact information:  [address]  [address]  [facility] [address]  [phone]             [facility].    Specialty:  Home Health Services  Why:  Home Health  Contact information:  [address]  [address]  [facility] [address]  [phone]                 Patient Instructions:     TRANSFER TUB BENCH FOR HOME USE   Order Specific Question Answer Comments   Type of Transfer Tub Bench: Unpadded    Height: 5' 7" (1.702 m)    Weight: 78.1 kg (172 lb 2.9 oz)    Does patient have medical equipment at home? none    Length of need (1-99 months): 99      WALKER FOR HOME USE   Order Specific Question Answer Comments   Type of Walker: Adult (5'4"-6'6")    With wheels? Yes    Height: 5' 7" (1.702 m)    Weight: 78.1 kg (172 lb 2.9 oz)    Length of need (1-99 months): 99    Does patient have medical equipment at home? none    Please check all that apply: Patient is unable " "to safely ambulate without equipment.      COMMODE FOR HOME USE   Order Specific Question Answer Comments   Type: Standard    Height: 5' 7" (1.702 m)    Weight: 78.1 kg (172 lb 2.9 oz)    Does patient have medical equipment at home? none    Length of need (1-99 months): 99      Diet Adult Regular   Order Specific Question Answer Comments   Na restriction, if any: 2gNa    Fluid restriction: Fluid - 2000mL    Additional restrictions: Cardiac (Low Na/Chol)    Additional restrictions: Diabetic 2200      Activity as tolerated     Notify your health care provider if you experience any of the following:  temperature >100.4     Notify your health care provider if you experience any of the following:  persistent nausea and vomiting or diarrhea     Notify your health care provider if you experience any of the following:  severe uncontrolled pain     Notify your health care provider if you experience any of the following:  redness, tenderness, or signs of infection (pain, swelling, redness, odor or green/yellow discharge around incision site)     Notify your health care provider if you experience any of the following:  difficulty breathing or increased cough     Notify your health care provider if you experience any of the following:  persistent dizziness, light-headedness, or visual disturbances         Significant Diagnostic Studies: Labs:   CMP   Recent Labs  Lab 03/26/18  0534 03/27/18  0549     142 142  142   K 4.4  4.4 3.7  3.7     100 101  101   CO2 30*  30* 31*  31*   *  122* 122*  122*   BUN 36*  36* 34*  34*   CREATININE 1.0  1.0 0.9  0.9   CALCIUM 9.3  9.3 9.2  9.2   PROT 6.6 6.5   ALBUMIN 2.8* 2.8*   BILITOT 0.7 0.8   ALKPHOS 165* 153*   AST 54* 47*   ALT 48* 42   ANIONGAP 12  12 10  10   ESTGFRAFRICA >60  >60 >60  >60   EGFRNONAA >60  >60 >60  >60   , CBC No results for input(s): WBC, HGB, HCT, PLT in the last 48 hours., Troponin   Recent Labs  Lab 03/21/18  1136 "   TROPONINI 0.121*    and A1C:   Recent Labs  Lab 12/15/17  0344 01/10/18  0945   HGBA1C 5.4 5.5     Microbiology: Wound Culture: positive for Enterococcus    Radiology:   Imaging Results          X-Ray Chest AP Portable (Final result)  Result time 03/21/18 15:51:59    Final result by Vince Alcaraz MD (03/21/18 15:51:59)                 Impression:      See results above.      Electronically signed by: Vince Alcaraz MD  Date:    03/21/2018  Time:    15:51             Narrative:    EXAMINATION:  XR CHEST AP PORTABLE    CLINICAL HISTORY:  Encounter for adjustment and management of vascular access device    TECHNIQUE:  Single frontal view of the chest was performed.    COMPARISON:  1056 hours study same day    FINDINGS:  Insertion of right IJV catheter, tip mid SVC, no pneumothorax.  Right lung clear.  Patchy infiltrate atelectasis obscuring the left lung base bases segments lower lobe, diaphragm, portion cardiac apex.  Postop changes left shoulder stable.                               CT Head Without Contrast (Final result)  Result time 03/21/18 12:49:19    Final result by Santos Read DO (03/21/18 12:49:19)                 Impression:      Unremarkable slightly motion limited noncontrast CT head specifically without evidence for acute intracranial hemorrhage.  Clinical correlation and further evaluation as warranted.      Electronically signed by: Santos Read DO  Date:    03/21/2018  Time:    12:49             Narrative:    EXAMINATION:  CT HEAD WITHOUT CONTRAST    CLINICAL HISTORY:  altered mental status;  Altered mental status, unspecified    TECHNIQUE:  Multiple sequential 5 mm axial images of the head without contrast.  Coronal and sagittal reformatted imaging from the axial acquisition.    COMPARISON:  None    FINDINGS:  Study slightly limited by patient motion.  Within the limits of the study there is no evidence for acute intracranial hemorrhage or sulcal effacement.  The ventricles are  normal in size without hydrocephalus.  There is no midline shift or mass effect.  Visualized paranasal sinuses and mastoid air cells are clear.                               X-Ray Chest AP Portable (Final result)  Result time 03/21/18 11:40:32    Final result by Santos Read DO (03/21/18 11:40:32)                 Impression:      Please see above      Electronically signed by: Santos Read DO  Date:    03/21/2018  Time:    11:40             Narrative:    EXAMINATION:  XR CHEST AP PORTABLE    CLINICAL HISTORY:  Sepsis;    TECHNIQUE:  Single frontal view of the chest was performed.    COMPARISON:  02/17/2018    FINDINGS:  No significant change from prior.  There is slightly smaller lung volumes likely related to poor inspiratory effort.  Moderate left basilar opacity concerning for effusion with atelectasis, superimposed airspace process not excluded overall similar to prior.  There is no large pneumothorax.                                Cardiac Graphics: Echocardiogram:   2D echo with color flow doppler:   Results for orders placed or performed during the hospital encounter of 03/21/18   2D echo with color flow doppler   Result Value Ref Range    EF 20 (A) 55 - 65    Mitral Valve Regurgitation MILD     Diastolic Dysfunction Yes (A)     Aortic Valve Regurgitation MODERATE (A)     Aortic Valve Stenosis SEVERE (A)     Est. PA Systolic Pressure 54.51 (A)     Mitral Valve Mobility NORMAL     Tricuspid Valve Regurgitation MILD TO MODERATE        Pending Diagnostic Studies:     Procedure Component Value Units Date/Time    Occult blood x 1, stool [[id number]] Collected:  03/22/18 1420    Order Status:  Sent Lab Status:  In process Updated:  03/22/18 1430    Specimen:  Stool from Stool     Procalcitonin [[id number]] Collected:  03/21/18 1202    Order Status:  Sent Lab Status:  In process Updated:  03/21/18 1202    Specimen:  Blood from Blood          Medications:  Reconciled Home Medications:      Medication List       START taking these medications    amoxicillin-clavulanate 875-125mg 875-125 mg per tablet  Commonly known as:  AUGMENTIN  Take 1 tablet by mouth every 12 (twelve) hours.     ferrous sulfate 325 (65 FE) MG EC tablet  Take 1 tablet (325 mg total) by mouth 2 (two) times daily.        CHANGE how you take these medications    metoprolol tartrate 50 MG tablet  Commonly known as:  LOPRESSOR  Take 0.5 tablets (25 mg total) by mouth 2 (two) times daily.  What changed:  · how much to take  · additional instructions        CONTINUE taking these medications    amiodarone 200 MG Tab  Commonly known as:  PACERONE     aspirin 81 MG Chew  Take 1 tablet (81 mg total) by mouth once daily.     atorvastatin 80 MG tablet  Commonly known as:  LIPITOR  Take 1 tablet (80 mg total) by mouth once daily.     butalbital-acetaminophen-caffeine -40 mg -40 mg per tablet  Commonly known as:  FIORICET, ESGIC     clopidogrel 75 mg tablet  Commonly known as:  PLAVIX  Take 1 tablet (75 mg total) by mouth once daily.     collagenase ointment  Apply topically once daily.     estropipate 1.5 MG tablet  Commonly known as:  OGEN     furosemide 40 MG tablet  Commonly known as:  LASIX  Take 1 tablet (40 mg total) by mouth 2 (two) times daily.     gabapentin 100 MG capsule  Commonly known as:  NEURONTIN     hydrocodone-acetaminophen 10-325mg  mg Tab  Commonly known as:  NORCO     lisinopril 20 MG tablet  Commonly known as:  PRINIVIL,ZESTRIL     magnesium oxide 400 mg tablet  Commonly known as:  MAG-OX  Take 1 tablet (400 mg total) by mouth once daily.     metFORMIN 500 MG (MOD) 24 hr tablet  Commonly known as:  GLUMETZA     nitroGLYCERIN 0.4 MG SL tablet  Commonly known as:  NITROSTAT  Place 1 tablet (0.4 mg total) under the tongue every 5 (five) minutes as needed for Chest pain (angina).     rivaroxaban 20 mg Tab  Commonly known as:  XARELTO  Take 1 tablet (20 mg total) by mouth daily with dinner or evening meal.     senna 8.6 mg  tablet  Commonly known as:  SENOKOT  Take 1 tablet by mouth 2 (two) times daily as needed for Constipation.     temazepam 15 mg Cap  Commonly known as:  RESTORIL     venlafaxine 150 MG Cp24  Commonly known as:  EFFEXOR-XR           Where to Get Your Medications      These medications were sent to [facility] - [address]  [address], [place] [address]    Phone:  [phone]   · amoxicillin-clavulanate 875-125mg 875-125 mg per tablet     You can get these medications from any pharmacy    You don't need a prescription for these medications  · ferrous sulfate 325 (65 FE) MG EC tablet     Information about where to get these medications is not yet available    Ask your nurse or doctor about these medications  · metoprolol tartrate 50 MG tablet         Indwelling Lines/Drains at time of discharge:   Lines/Drains/Airways          No matching active lines, drains, or airways          Time spent on the discharge of patient: 40 minutes  Patient was seen and examined on the date of discharge and determined to be suitable for discharge.         Lyndon Melissa MD  Department of Hospital Medicine  [facility]

## 2018-04-12 NOTE — PATIENT INSTRUCTIONS
Home health notification :    PATIENT REPORTING 8/10 BURNING PAIN IN LOWER ABDOMEN.     SHE TOOK hydrocone approximately one hour ago.       Level of urgency: high  Contact info: [phone] or [email or web address]   Whom to call back: clinician

## 2018-04-27 NOTE — TELEPHONE ENCOUNTER
----- Message from Shira Knowles MA sent at 4/27/2018  2:12 PM CDT -----  Contact: Homehealth Nurse/[facility] [phone]  Please call homehealth Nurse Pola she would like to talk to you about pt.medication and swelling in pt. Face L.V 1-19-18 pt. Of  [phone]

## 2018-04-27 NOTE — TELEPHONE ENCOUNTER
----- Message from Marisela Madsen sent at 4/27/2018 12:38 PM CDT -----  Contact: Ms.Jamie [facility] [phone]   Caller states she would like to speak with nurse in reference to getting prescription clarification Pt. Is having facial swelling and would like to know if this is a side effect from medication and what can be done please call Ms.Jamie [facility] Back @ [phone] Thank You :)

## 2018-04-27 NOTE — TELEPHONE ENCOUNTER
Spoke with  nurse, Pola [[phone]]. Says that the pt is having some swelling around her eyes and cheeks. Says no trouble breathing or swallowing. Says that the pt says she has never had this previously.  Says is taking lisinopril 20 mg daily.  Spoke with Dr. Magana since Dr. Plaza is out of the office today. Says for the pt to hold lisinopril and will notify Dr. Plaza on Monday. Instructions given to the nurse and she verbalized understanding.

## 2018-04-27 NOTE — TELEPHONE ENCOUNTER
Returned call; no answer.  Left message with voicemail to call us back.  ----- Message from Marisela Madsen sent at 4/27/2018 12:38 PM CDT -----  Contact: Ms.Jamie [facility] [phone]   Caller states she would like to speak with nurse in reference to getting prescription clarification Pt. Is having facial swelling and would like to know if this is a side effect from medication and what can be done please call Ms.Jamie [facility] Back @ [phone] Thank You :)

## 2018-05-02 PROBLEM — I87.8 VENOUS STASIS OF BOTH LOWER EXTREMITIES: Status: ACTIVE | Noted: 2018-01-01

## 2018-05-02 PROBLEM — Z79.01 CHRONIC ANTICOAGULATION: Status: ACTIVE | Noted: 2017-01-01

## 2018-05-02 PROBLEM — I27.22 PULMONARY HYPERTENSION DUE TO LEFT VENTRICULAR SYSTOLIC DYSFUNCTION: Status: ACTIVE | Noted: 2017-01-01

## 2018-05-02 PROBLEM — E11.69 HYPERLIPIDEMIA ASSOCIATED WITH TYPE 2 DIABETES MELLITUS: Status: ACTIVE | Noted: 2017-01-01

## 2018-05-02 PROBLEM — D50.9 IRON DEFICIENCY ANEMIA: Status: ACTIVE | Noted: 2018-01-01

## 2018-05-02 PROBLEM — I15.0 RENOVASCULAR HYPERTENSION: Status: RESOLVED | Noted: 2017-01-01 | Resolved: 2018-01-01

## 2018-05-02 PROBLEM — I50.43 ACUTE ON CHRONIC COMBINED SYSTOLIC AND DIASTOLIC HEART FAILURE: Status: ACTIVE | Noted: 2018-01-01

## 2018-05-02 PROBLEM — E44.0 MALNUTRITION OF MODERATE DEGREE: Status: RESOLVED | Noted: 2018-01-01 | Resolved: 2018-01-01

## 2018-05-02 PROBLEM — N18.30 CKD (CHRONIC KIDNEY DISEASE) STAGE 3, GFR 30-59 ML/MIN: Status: RESOLVED | Noted: 2017-01-01 | Resolved: 2018-01-01

## 2018-05-02 PROBLEM — J96.11 CHRONIC HYPOXEMIC RESPIRATORY FAILURE: Status: RESOLVED | Noted: 2018-01-01 | Resolved: 2018-01-01

## 2018-05-02 PROBLEM — I25.10 CORONARY ARTERY DISEASE INVOLVING NATIVE CORONARY ARTERY OF NATIVE HEART WITHOUT ANGINA PECTORIS: Status: ACTIVE | Noted: 2017-01-01

## 2018-05-02 PROBLEM — I50.9 ACUTE ON CHRONIC CONGESTIVE HEART FAILURE: Status: ACTIVE | Noted: 2018-01-01

## 2018-05-02 NOTE — ASSESSMENT & PLAN NOTE
Patient has been worked up for AVR/MAZE, however has not completed all necessary consult appointments    Echo March 2018: peak velocity 4.42 m/s, peak gradient 78 mmHg, mean gradient 51 mmHg, area 0.59cm2, AVAi 0.31 cm2/m2

## 2018-05-02 NOTE — HPI
"Ms. Anum Quick is a 62 year old female with HTN, HFrEF (EF 20% with diastolic dysfunction in March 2018), severe aortic stenosis, paroxysmal atrial fibrillation, and type two diabetes who presents for dyspnea and increased leg swelling. Patient was recently hospitalized in March 2018 with cardiogenic shock, for which she was treated with diuresis and norepinephrine drip for a short period of time. Patient had been feeling well for several weeks, and had been able to get around the house on her own and lie flat to sleep at night. Over the past week, she reports increasing orthopnea (now requiring three pillows), dyspnea on exertion, and lower extremity edema. She woke up with substernal pressure-like chest pain last night, which does not worsen with exertion but improved slightly after being given nitroglycerin in the ED.     Ms. Quick stopped taking her prescribed lisinopril on 4/30 per her cardiologist's recommendation when she emailed with complaint of "facial swelling", which was in fact only lower eyelid swelling and did not involve her tongue or mouth. She also decreased her lasix dose from 40mg bid to 20mg bid yesterday, which she states was because "the pharmacy changed it". She reports general adherence to low salt diet and fluid restriction, but recently accidentally ate processed food with high sodium content. Patient denies any fevers, chills, cough, n/v. She reports compliance with all medications.     Patient denies alcohol or tobacco use. She lives at home with her . She would like to be full code at this time.   "

## 2018-05-02 NOTE — ED PROVIDER NOTES
"EMERGENCY DEPARTMENT NOTE       HISTORY OF PRESENT ILLNESS   SCRIBE #1 NOTE: I, Bryant Borja, am scribing for, and in the presence of,  Reyna Choudhary MD. I have scribed the following portions of the note - Other sections scribed: HPI, ROS, PE.       History provided by: Patient    Used: No    HPI: This is a 62 y.o. female with history of chronic systolic heart failure, coronary artery disease, chronic kidney disease, diabetes mellitus type 2, here with complaint of progressive dyspnea and associated bilateral lower extremity edema. Patient reports increased shortness of breath at rest as well as dyspnea on exertion for the past 1 week, becoming short of breath with walking through the hallway at home or with climbing 14-15 steps on a staircase. Patient also complains of constant, moderate intensity chest pain to her mid lower chest, since last night. Patient states that her chest pain is new compared to her past CHF exacerbations. According to , patient has a history of MI and several admissions for congestive heart failure. She was last hospitalized 3 weeks ago. Patient also reports facial swelling for the past 1 week, which is improved now compared to yesterday. Patient endorses orthopnea to where she is sitting at nearly 90 degree angle when sleeping. Patient is not on home O2 and is on Lasix 20 mg BID (recently decreased from 40 mg BID). According to , the patient is drinking about 1 L of water daily and recently has had increased sodium intake.     Patient denies fever, chills, coughing, wheezing, nausea, vomiting, diarrhea, dysuria, frequency. Patient reports chronic lower back pain, which is at baseline. Patient denies headache, visual disturbance, sore throat.          In triage the pt reports Shortness of Breath ("and my face is swollen")    1. Chief Complaint: Progressive Dyspnea  2. Onset of symptoms: symptoms are chronic, gradually increased over the past 1 week  3. What was patient " doing when symptoms started (Context): Similar to prior CHF exacerbations  4. Severity: moderate  5. Timing: fluctuating  6. Activities that worsen symptoms: inc. fluid intake  7. Activities that improve symptoms:  none  8. Quality: aching/ sharp  9. Radiation of symptoms: none  10. Associated signs and Symptoms:   Positive for shortness of breath, bilateral lower lower extremity swelling, chest pain. Negative for sweating, palpitations, Nausea, vomiting, fever , chills and cough  11. Are symptoms worsening? yes      MEDICAL HISTORY     Past Medical History:  Past Medical History:   Diagnosis Date    Chronic anticoagulation - on rivaroxaban 12/29/2017    PAF    Chronic systolic heart failure 12/14/2017     12-15-17   1 - Moderately depressed left ventricular systolic function (EF 30-35%). Akinetic LV apex. There is no thrombus in LV apex.   2 - Indeterminate LV diastolic function.    3 - Mildly to moderately depressed right ventricular systolic function .    4 - Pulmonary hypertension. The estimated PA systolic pressure is 63 mmHg.    5 - Severe low flow aortic valve stenosis (JAMES 0.49 cm2, AVAi 0.27 cm2/m2, peak aortic jet velocity 4.0 m/s,MG 48 mmHg).    6 - Mild to moderate mitral regurgitation.    7 - Mild tricuspid regurgitation.    8 - Severe left atrial enlargement.     CKD (chronic kidney disease) stage 3, GFR 30-59 ml/min 12/29/2017    Coronary artery disease involving native coronary artery of native heart without angina pectoris 12/29/2017    [facility] @ : Per report (12/4/2017) proximal LAD has 20% stenosis, RCA with LI otherwise normal coronaries      Essential hypertension 12/14/2017    Hyperlipidemia associated with type 2 diabetes mellitus 12/14/2017    Left atrial enlargement     NSTEMI (non-ST elevated myocardial infarction) 12/14/2017    Paroxysmal atrial fibrillation 12/15/2017    Renovascular hypertension 12/14/2017    Severe aortic stenosis 12/14/2017    12-15-17  Severe low flow aortic  valve stenosis (JAMES 0.49 cm2, AVAi 0.27 cm2/m2, peak aortic jet velocity 4.0 m/s,MG 48 mmHg).      Type 2 diabetes mellitus with neurologic complication, without long-term current use of insulin 12/14/2017       Past Surgical History:  Past Surgical History:   Procedure Laterality Date    CARDIAC SURGERY      HYSTERECTOMY      KNEE SURGERY      SHOULDER ARTHROSCOPY Right 01/16/2004       Social History:  Social History     Social History    Marital status:      Spouse name: N/A    Number of children: N/A    Years of education: N/A     Occupational History    Not on file.     Social History Main Topics    Smoking status: Never Smoker    Smokeless tobacco: Never Used    Alcohol use No    Drug use: No    Sexual activity: Not on file     Other Topics Concern    Not on file     Social History Narrative    No narrative on file       Family History:  History reviewed. No pertinent family history.    Outpatient Medication:  Previous Medications    AMIODARONE (PACERONE) 200 MG TAB    Take by mouth once daily.    ASPIRIN 81 MG CHEW    Take 1 tablet (81 mg total) by mouth once daily.    ATORVASTATIN (LIPITOR) 80 MG TABLET    Take 1 tablet (80 mg total) by mouth once daily.    BUTALBITAL-ACETAMINOPHEN-CAFFEINE -40 MG (FIORICET, ESGIC) -40 MG PER TABLET    Take 1 tablet by mouth every 4 (four) hours as needed for Pain.    CLOPIDOGREL (PLAVIX) 75 MG TABLET    Take 1 tablet (75 mg total) by mouth once daily.    COLLAGENASE OINTMENT    Apply topically once daily.    ESTROPIPATE (OGEN) 1.5 MG TABLET    Take 1.5 mg by mouth once daily.    FERROUS SULFATE 325 (65 FE) MG EC TABLET    Take 1 tablet (325 mg total) by mouth 2 (two) times daily.    FUROSEMIDE (LASIX) 40 MG TABLET    Take 1 tablet (40 mg total) by mouth 2 (two) times daily.    GABAPENTIN (NEURONTIN) 100 MG CAPSULE    Take 100 mg by mouth 3 (three) times daily.    HYDROCODONE-ACETAMINOPHEN 10-325MG (NORCO)  MG TAB    TK 1 T PO TID PRN  FOR 30 DAYS    LISINOPRIL (PRINIVIL,ZESTRIL) 20 MG TABLET    Take 20 mg by mouth once daily.    MAGNESIUM OXIDE (MAG-OX) 400 MG TABLET    Take 1 tablet (400 mg total) by mouth once daily.    METFORMIN (GLUMETZA) 500 MG (MOD) 24 HR TABLET    Take 500 mg by mouth daily with breakfast.    METOPROLOL TARTRATE (LOPRESSOR) 50 MG TABLET    Take 0.5 tablets (25 mg total) by mouth 2 (two) times daily.    NITROGLYCERIN (NITROSTAT) 0.4 MG SL TABLET    Place 1 tablet (0.4 mg total) under the tongue every 5 (five) minutes as needed for Chest pain (angina).    RIVAROXABAN (XARELTO) 20 MG TAB    Take 1 tablet (20 mg total) by mouth daily with dinner or evening meal.    SENNA (SENOKOT) 8.6 MG TABLET    Take 1 tablet by mouth 2 (two) times daily as needed for Constipation.    TEMAZEPAM (RESTORIL) 15 MG CAP    TK 1 C PO QHS    VENLAFAXINE (EFFEXOR-XR) 150 MG CP24    TK ONE C PO D WF         REVIEW OF SYSTEMS     REVIEW OF SYSTEMS  Constitutional: Negative for fever and chills.   HENT: Negative for sore throat.    Eyes: Negative for blurred vision.   Respiratory: Negative for cough. Positive for shortness of breath.    Cardiovascular: Positive for chest pain and dyspnea on exertion.   Gastrointestinal: Positive for constipation. Negative for nausea, vomiting, abdominal pain and diarrhea.   Genitourinary: Negative for dysuria, frequency.   Musculoskeletal: Negative for Headache and back pain.   Skin: Negative for rash or wounds.   Neurological: Negative for sensory change and focal weakness.   Endo/Heme/Allergies: Does bruise/bleed easily (patient is on Xarelto)  All other systems reviewed and are negative        PHYSICAL EXAM     Vitals:    05/02/18 1631 05/02/18 1701 05/02/18 1809 05/02/18 1839   BP: (!) 150/97 (!) 146/98 (!) 137/98 (!) 142/99   Pulse: 106 102 106 102   Resp: 17 16 17 16   Temp:       TempSrc:       SpO2: 95% (!) 94% (!) 94% 98%   Weight:       Height:       PainSc:       PainLoc:           Physical Exam   Nursing  note and vitals reviewed.  Constitutional: Well developed, well nourished.  Awake . In moderate resp. Distress, able to speak in full sentences, pursed lip breathing.   Head: Atraumatic. Normocephalic.   Eyes: PERRL. EOMI. Conjunctivae- normal  ENT: Mucous membranes are moist and intact. Oropharynx is clear and symmetric. Patent airway.   Neck: Supple.   Cardiovascular: tachycardic rate. Regular rhythm. No murmurs, rubs. S3 gallops.   Pulmonary/Chest:  moderate  respiratory distress. No wheezing,  Rhonchi. Basilar crackles noted. Increased  respiratory effort. No chest wall tenderness.   Abdominal: Soft and non-distended. There is no tenderness.  No rebound, guarding, or rigidity.   Back: Full ROM. Nontender.   Extremities  Extremities: Musculoskeletal: Normal range of motion. Pt exhibits anasarca to the posterior aspect of bilateral thighs and edema to bilateral lower extremities.   Skin: Skin is warm and dry.  No rash. Pt has a chronic wound to the right lower leg, superficial wound to the dorsal aspect of the right foot with generalized erythema that is nontender, generalized varicose veins to both lower legs.   Neurological: awake . Appropriate. Moves all extremities symmetrically.  No motor/sensory deficits  Psychiatric: Good eye contact. Normal interaction      MEDICAL DECISION MAKING     MEDICAL DECISION MAKING  MDM:  SOB/ EDEMA  DDx Includes but not limited to: : CHF Exacerbation, Renal Failure, Hypertensive Crisis, ACS,   PLAN: IV, Labs, Cardiac Monitor, HTN meds, Supplemental O2 and diuretics PRN.  Likely admission to Telemetry with consult to Cards     ED COURSE       Vital Signs: Reviewed the patients vital signs.   Nursing Notes: Reviewed and utilized available nursing notes.  Medical Records Reviewed: Reviewed available past medical records.    PULSE OXIMETRY   Oxygen Saturation by Pulse Oximetry: 92 % on RA  Interventions: 2L NC O2  Interpretation: abnormal  Interpreted independently by Emergency  Physician      CARDIAC STUDIES      The following cardiac studies were independently interpreted by the Emergency Medicine Physician.  For full cardiac study results please see chart.    Monitor Strip  Interpreted by ED Physician  Rate:98  Rhythm: Sinus rhythm with 1st degree A-V Block  ST Changes: none    EKG Interpretation:  Signed and interpreted byED Physician   Time Interpreted:   Comparison:   Rate: 98  Rhythm: NSR  Axis: Normal  Intervals: inferior,  anterior and precordial Q waves  Blocks: 1st deg A-V Block and LAFB  ST segments: No acute changes.  Interpretation: abnormal EKG      LABORATORY RESULTS   Ordered and independently interpreted AVAILABLE laboratory tests. Please see results section in chart for full details.  Results for orders placed or performed during the hospital encounter of 05/02/18   CBC auto differential   Result Value Ref Range    WBC 4.33 3.90 - 12.70 K/uL    RBC 3.54 (L) 4.00 - 5.40 M/uL    Hemoglobin 10.1 (L) 12.0 - 16.0 g/dL    Hematocrit 33.0 (L) 37.0 - 48.5 %    MCV 93 82 - 98 fL    MCH 28.5 27.0 - 31.0 pg    MCHC 30.6 (L) 32.0 - 36.0 g/dL    RDW 23.7 (H) 11.5 - 14.5 %    Platelets 361 (H) 150 - 350 K/uL    MPV 10.8 9.2 - 12.9 fL    Immature Granulocytes 0.2 0.0 - 0.5 %    Gran # (ANC) 2.5 1.8 - 7.7 K/uL    Immature Grans (Abs) 0.01 0.00 - 0.04 K/uL    Lymph # 1.6 1.0 - 4.8 K/uL    Mono # 0.2 (L) 0.3 - 1.0 K/uL    Eos # 0.0 0.0 - 0.5 K/uL    Baso # 0.05 0.00 - 0.20 K/uL    nRBC 0 0 /100 WBC    Gran% 57.3 38.0 - 73.0 %    Lymph% 36.0 18.0 - 48.0 %    Mono% 4.6 4.0 - 15.0 %    Eosinophil% 0.7 0.0 - 8.0 %    Basophil% 1.2 0.0 - 1.9 %    Platelet Estimate Increased (A)     Aniso Slight     Poik Slight     Poly Occasional     Hypo Occasional     Ovalocytes Occasional     Large/Giant Platelets Present     Differential Method Automated    Comprehensive metabolic panel   Result Value Ref Range    Sodium 136 136 - 145 mmol/L    Potassium 5.0 3.5 - 5.1 mmol/L    Chloride 96 95 - 110 mmol/L     CO2 22 (L) 23 - 29 mmol/L    Glucose 121 (H) 70 - 110 mg/dL    BUN, Bld 31 (H) 8 - 23 mg/dL    Creatinine 1.2 0.5 - 1.4 mg/dL    Calcium 10.3 8.7 - 10.5 mg/dL    Total Protein 9.2 (H) 6.0 - 8.4 g/dL    Albumin 4.1 3.5 - 5.2 g/dL    Total Bilirubin 0.9 0.1 - 1.0 mg/dL    Alkaline Phosphatase 362 (H) 55 - 135 U/L    AST 65 (H) 10 - 40 U/L    ALT 45 (H) 10 - 44 U/L    Anion Gap 18 (H) 8 - 16 mmol/L    eGFR if African American 55.6 (A) >60 mL/min/1.73 m^2    eGFR if non  48.2 (A) >60 mL/min/1.73 m^2   Troponin I   Result Value Ref Range    Troponin I 0.024 0.000 - 0.026 ng/mL   Brain natriuretic peptide   Result Value Ref Range    BNP 4,834 (H) 0 - 99 pg/mL   Protime-INR   Result Value Ref Range    Prothrombin Time 14.3 (H) 9.0 - 12.5 sec    INR 1.4 (H) 0.8 - 1.2   Hemoglobin A1c   Result Value Ref Range    Hemoglobin A1C 5.4 4.0 - 5.6 %    Estimated Avg Glucose 108 68 - 131 mg/dL   POCT glucose   Result Value Ref Range    POCT Glucose 109 70 - 110 mg/dL         IMAGING STUDIES    The following imaging studies were independently interpreted by the Emergency Medicine Physician.     X-Ray Chest AP Portable   Final Result      1. Left pleural effusion and/or consolidation, developing infection not excluded.  This appears similar to the previous examination.  There may be developing consolidation projected over the right lower lung zone medially.  These findings are superimposed upon congestive change/edema.         Electronically signed by: Jefry Lima MD   Date:    05/02/2018   Time:    12:54            PULSE OXIMETRY    Oxygen Saturation by Pulse Oximetry: 92 % on RA  Interventions: 2 LNC  Interpretation: abnormal  Interpreted independently by Emergency Physician      MEDICATIONS        Medications   nitroGLYCERIN SL tablet 0.4 mg (0.4 mg Sublingual Given 5/2/18 3360)   lisinopril tablet 20 mg (20 mg Oral Given 5/2/18 1542)   amiodarone tablet 200 mg (200 mg Oral Given 5/2/18 4095)   gabapentin  capsule 100 mg (100 mg Oral Given 5/2/18 2015)   atorvastatin tablet 80 mg (80 mg Oral Given 5/2/18 1541)   senna tablet 1 tablet (not administered)   clopidogrel tablet 75 mg (75 mg Oral Given 5/2/18 1542)   rivaroxaban tablet 20 mg (20 mg Oral Given 5/2/18 1809)   venlafaxine 24 hr capsule 150 mg (150 mg Oral Given 5/2/18 1809)   magnesium oxide tablet 400 mg (400 mg Oral Given 5/2/18 1542)   ferrous sulfate EC tablet 325 mg (325 mg Oral Given 5/2/18 2016)   metoprolol tartrate (LOPRESSOR) tablet 25 mg (25 mg Oral Given 5/2/18 2016)   ramelteon tablet 8 mg (not administered)   sodium chloride 0.9% flush 5 mL (not administered)   glucose chewable tablet 16 g (not administered)   glucose chewable tablet 24 g (not administered)   dextrose 50% injection 12.5 g (not administered)   dextrose 50% injection 25 g (not administered)   glucagon (human recombinant) injection 1 mg (not administered)   acetaminophen tablet 650 mg (650 mg Oral Given 5/2/18 1542)   ondansetron disintegrating tablet 8 mg (not administered)   insulin aspart U-100 pen 0-5 Units (not administered)   furosemide injection 60 mg (60 mg Intravenous Given 5/2/18 1809)   furosemide injection 60 mg (60 mg Intravenous Given 5/2/18 1317)         DISCUSSION         REASSESSMENT PRIOR TO DISPOSITION  Symptoms: unchanged  Exam: continues with slightly increased WOB      DISPO PLAN-  Admit to  Hospitalist -  On TELE  for further evaluation and treatment.        I have discussed the physical findings, labs, radiological findings, diagnosis and plan of care with the patient and/or family and they have verbally expressed understanding and agreement with this management      MDM:  D/C Precautions - I discussed with patient and/or family/caretaker that evaluation in the ED does not suggest any emergent or life threatening condition medical condition requiring immediate intervention beyond what was provided in the ED, and I believe patient is safe for discharge.   Regardless, an unremarkable evaluation in the ED does not preclude the development or presence of a serious of life threatening condition. As such, patient was instructed to return immediately for any worsening or change in current symptoms.       CONSULTATIONS     . Discussed patient case with [facility] med.    Will admit for further evaluation and treatment.     CRITICAL CARE        ATTESTATIONS      I, Dr. Reyna Choudhary, personally performed the services described in this documentation. All medical record entries made by the scribe were at my direction and in my presence.  I have reviewed the chart and agree that the record reflects my personal performance and is accurate and complete. Reyna Choudhary MD.  11:44 AM 05/02/2018      DIAGNOSIS      Diagnosis:   1. Acute on chronic congestive heart failure, unspecified heart failure type    2. Shortness of breath    3. Chest pain at rest        Disposition:  Admit to TELE     Reyna Choudhary MD  05/02/18 2051

## 2018-05-02 NOTE — ASSESSMENT & PLAN NOTE
Home med: metformin 500mg bid, gabapentin 100mg tid    - check A1c  - LDSSI  - POCT glucose ac+hs  - hypoglycemic protocol  - continue gabapentin for neuropathy

## 2018-05-02 NOTE — ED TRIAGE NOTES
Patient presents with c/o SOA and facial swelling x one week. Patient reports chest pain that started last night. Patient endorses that the chest pain is mid-sternal and non-radiating; patient describes the pain as tightness. Patient denies having any allergies that would attribute to her facial swelling. Patient reports being treated at [facility] in [place] for fluid overload three weeks ago. Patient presents A&Ox4, following commands, and VSS. Patient is rating pain 6/10. Patient denies dizziness.

## 2018-05-02 NOTE — SUBJECTIVE & OBJECTIVE
Past Medical History:   Diagnosis Date    Chronic anticoagulation - on rivaroxaban 12/29/2017    PAF    Chronic systolic heart failure 12/14/2017     12-15-17   1 - Moderately depressed left ventricular systolic function (EF 30-35%). Akinetic LV apex. There is no thrombus in LV apex.   2 - Indeterminate LV diastolic function.    3 - Mildly to moderately depressed right ventricular systolic function .    4 - Pulmonary hypertension. The estimated PA systolic pressure is 63 mmHg.    5 - Severe low flow aortic valve stenosis (JAMES 0.49 cm2, AVAi 0.27 cm2/m2, peak aortic jet velocity 4.0 m/s,MG 48 mmHg).    6 - Mild to moderate mitral regurgitation.    7 - Mild tricuspid regurgitation.    8 - Severe left atrial enlargement.     CKD (chronic kidney disease) stage 3, GFR 30-59 ml/min 12/29/2017    Coronary artery disease involving native coronary artery of native heart without angina pectoris 12/29/2017    [facility] @ : Per report (12/4/2017) proximal LAD has 20% stenosis, RCA with LI otherwise normal coronaries      Essential hypertension 12/14/2017    Hyperlipidemia associated with type 2 diabetes mellitus 12/14/2017    Left atrial enlargement     NSTEMI (non-ST elevated myocardial infarction) 12/14/2017    Paroxysmal atrial fibrillation 12/15/2017    Renovascular hypertension 12/14/2017    Severe aortic stenosis 12/14/2017    12-15-17  Severe low flow aortic valve stenosis (JAMES 0.49 cm2, AVAi 0.27 cm2/m2, peak aortic jet velocity 4.0 m/s,MG 48 mmHg).      Type 2 diabetes mellitus with neurologic complication, without long-term current use of insulin 12/14/2017       Past Surgical History:   Procedure Laterality Date    CARDIAC SURGERY      HYSTERECTOMY      KNEE SURGERY      SHOULDER ARTHROSCOPY Right 01/16/2004       Review of patient's allergies indicates:  No Known Allergies    No current facility-administered medications on file prior to encounter.      Current Outpatient Prescriptions on File Prior to  Encounter   Medication Sig    amiodarone (PACERONE) 200 MG Tab Take by mouth once daily.    aspirin 81 MG Chew Take 1 tablet (81 mg total) by mouth once daily.    atorvastatin (LIPITOR) 80 MG tablet Take 1 tablet (80 mg total) by mouth once daily.    butalbital-acetaminophen-caffeine -40 mg (FIORICET, ESGIC) -40 mg per tablet Take 1 tablet by mouth every 4 (four) hours as needed for Pain.    clopidogrel (PLAVIX) 75 mg tablet Take 1 tablet (75 mg total) by mouth once daily.    collagenase ointment Apply topically once daily.    estropipate (OGEN) 1.5 MG tablet Take 1.5 mg by mouth once daily.    ferrous sulfate 325 (65 FE) MG EC tablet Take 1 tablet (325 mg total) by mouth 2 (two) times daily.    furosemide (LASIX) 40 MG tablet Take 1 tablet (40 mg total) by mouth 2 (two) times daily.    gabapentin (NEURONTIN) 100 MG capsule Take 100 mg by mouth 3 (three) times daily.    hydrocodone-acetaminophen 10-325mg (NORCO)  mg Tab TK 1 T PO TID PRN FOR 30 DAYS    lisinopril (PRINIVIL,ZESTRIL) 20 MG tablet Take 20 mg by mouth once daily.    magnesium oxide (MAG-OX) 400 mg tablet Take 1 tablet (400 mg total) by mouth once daily.    metFORMIN (GLUMETZA) 500 MG (MOD) 24 hr tablet Take 500 mg by mouth daily with breakfast.    metoprolol tartrate (LOPRESSOR) 50 MG tablet Take 0.5 tablets (25 mg total) by mouth 2 (two) times daily.    nitroGLYCERIN (NITROSTAT) 0.4 MG SL tablet Place 1 tablet (0.4 mg total) under the tongue every 5 (five) minutes as needed for Chest pain (angina).    rivaroxaban (XARELTO) 20 mg Tab Take 1 tablet (20 mg total) by mouth daily with dinner or evening meal.    senna (SENOKOT) 8.6 mg tablet Take 1 tablet by mouth 2 (two) times daily as needed for Constipation.    temazepam (RESTORIL) 15 mg Cap TK 1 C PO QHS    venlafaxine (EFFEXOR-XR) 150 MG Cp24 TK ONE C PO D WF     Family History     None        Social History Main Topics    Smoking status: Never Smoker    Smokeless  tobacco: Never Used    Alcohol use No    Drug use: No    Sexual activity: Not on file     Review of Systems   Constitutional: Negative for chills, fatigue and fever.   HENT: Negative for rhinorrhea and sore throat.    Eyes: Negative for pain and redness.   Respiratory: Positive for shortness of breath. Negative for cough and wheezing.    Cardiovascular: Positive for chest pain and leg swelling. Negative for palpitations.   Gastrointestinal: Negative for abdominal pain, diarrhea, nausea and vomiting.   Endocrine: Negative for polydipsia and polyuria.   Genitourinary: Negative for dysuria and hematuria.   Musculoskeletal: Negative for back pain and neck pain.   Skin: Negative for color change, pallor and rash.   Neurological: Negative for light-headedness and headaches.   Hematological: Negative for adenopathy. Does not bruise/bleed easily.   Psychiatric/Behavioral: Negative for confusion. The patient is not nervous/anxious.      Objective:     Vital Signs (Most Recent):  Temp: 98.3 °F (36.8 °C) (05/02/18 1011)  Pulse: 96 (05/02/18 1319)  Resp: 17 (05/02/18 1319)  BP: (!) 147/97 (05/02/18 1242)  SpO2: 97 % (05/02/18 1319) Vital Signs (24h Range):  Temp:  [98.3 °F (36.8 °C)] 98.3 °F (36.8 °C)  Pulse:  [] 96  Resp:  [14-22] 17  SpO2:  [90 %-98 %] 97 %  BP: (143-151)/(95-98) 147/97     Weight: 74.8 kg (165 lb)  Body mass index is 25.84 kg/m².    Physical Exam   Constitutional: She is oriented to person, place, and time. She appears well-developed and well-nourished. No distress.   HENT:   Head: Normocephalic and atraumatic.   Right Ear: External ear normal.   Left Ear: External ear normal.   Eyes: Conjunctivae are normal. No scleral icterus.   Neck: Normal range of motion. Neck supple.   Cardiovascular: Exam reveals no friction rub.    Murmur (systolic, heard at right sternal border) heard.  Tachycardic but regular   Pulmonary/Chest:   Slightly tachypneic, with bibasilar rales and diminished breath sounds left  base   Abdominal: Soft. Bowel sounds are normal. She exhibits no distension. There is no tenderness. There is no guarding.   Musculoskeletal: Normal range of motion. She exhibits edema (2+ BLE). She exhibits no tenderness.   Neurological: She is alert and oriented to person, place, and time.   Skin: Skin is warm and dry. She is not diaphoretic. No pallor.   Psychiatric: She has a normal mood and affect. Her behavior is normal.           Significant Labs:   CBC:   Recent Labs  Lab 05/02/18  1132   WBC 4.33   HGB 10.1*   HCT 33.0*   *     CMP:   Recent Labs  Lab 05/02/18  1132      K 5.0   CL 96   CO2 22*   *   BUN 31*   CREATININE 1.2   CALCIUM 10.3   PROT 9.2*   ALBUMIN 4.1   BILITOT 0.9   ALKPHOS 362*   AST 65*   ALT 45*   ANIONGAP 18*   EGFRNONAA 48.2*     Cardiac Markers:   Recent Labs  Lab 05/02/18  1132   BNP 4,834*       Significant Imaging: I have reviewed all pertinent imaging results/findings within the past 24 hours.

## 2018-05-02 NOTE — ASSESSMENT & PLAN NOTE
Hgb better than baseline on admission at 10.1  Previous workup significant for borderline high TIBC and low iron. Normal B12 and folate    - continue iron supplementation

## 2018-05-02 NOTE — H&P
"[facility] Hospital Medicine  History & Physical    Patient Name: Anum Quick  MRN: [id number]  Admission Date: 5/2/2018  Attending Physician: Reyna Choudhary MD   Primary Care Provider: Raghav Valdez MD    [facility] Medicine Team: [facility] MED 2 Claritza Mo DO     Patient information was obtained from patient, past medical records and ER records.     Subjective:     Principal Problem:Acute on chronic combined systolic and diastolic heart failure    Chief Complaint:   Chief Complaint   Patient presents with    Shortness of Breath     "and my face is swollen"        HPI: Ms. Anum Quick is a 62 year old female with HTN, HFrEF (EF 20% with diastolic dysfunction in March 2018), severe aortic stenosis, paroxysmal atrial fibrillation, and type two diabetes who presents for dyspnea and increased leg swelling. Patient was recently hospitalized in March 2018 with cardiogenic shock, for which she was treated with diuresis and norepinephrine drip for a short period of time. Patient had been feeling well for several weeks, and had been able to get around the house on her own and lie flat to sleep at night. Over the past week, she reports increasing orthopnea (now requiring three pillows), dyspnea on exertion, and lower extremity edema. She woke up with substernal pressure-like chest pain last night, which does not worsen with exertion but improved slightly after being given nitroglycerin in the ED.     Ms. Quick stopped taking her prescribed lisinopril on 4/30 per her cardiologist's recommendation when she emailed with complaint of "facial swelling", which was in fact only lower eyelid swelling and did not involve her tongue or mouth. She also decreased her lasix dose from 40mg bid to 20mg bid yesterday, which she states was because "the pharmacy changed it". She reports general adherence to low salt diet and fluid restriction, but recently accidentally ate processed food with high " sodium content. Patient denies any fevers, chills, cough, n/v. She reports compliance with all medications.     Patient denies alcohol or tobacco use. She lives at home with her . She would like to be full code at this time.     Past Medical History:   Diagnosis Date    Chronic anticoagulation - on rivaroxaban 12/29/2017    PAF    Chronic systolic heart failure 12/14/2017     12-15-17   1 - Moderately depressed left ventricular systolic function (EF 30-35%). Akinetic LV apex. There is no thrombus in LV apex.   2 - Indeterminate LV diastolic function.    3 - Mildly to moderately depressed right ventricular systolic function .    4 - Pulmonary hypertension. The estimated PA systolic pressure is 63 mmHg.    5 - Severe low flow aortic valve stenosis (JAMES 0.49 cm2, AVAi 0.27 cm2/m2, peak aortic jet velocity 4.0 m/s,MG 48 mmHg).    6 - Mild to moderate mitral regurgitation.    7 - Mild tricuspid regurgitation.    8 - Severe left atrial enlargement.     CKD (chronic kidney disease) stage 3, GFR 30-59 ml/min 12/29/2017    Coronary artery disease involving native coronary artery of native heart without angina pectoris 12/29/2017    [facility] @ : Per report (12/4/2017) proximal LAD has 20% stenosis, RCA with LI otherwise normal coronaries      Essential hypertension 12/14/2017    Hyperlipidemia associated with type 2 diabetes mellitus 12/14/2017    Left atrial enlargement     NSTEMI (non-ST elevated myocardial infarction) 12/14/2017    Paroxysmal atrial fibrillation 12/15/2017    Renovascular hypertension 12/14/2017    Severe aortic stenosis 12/14/2017    12-15-17  Severe low flow aortic valve stenosis (JAMES 0.49 cm2, AVAi 0.27 cm2/m2, peak aortic jet velocity 4.0 m/s,MG 48 mmHg).      Type 2 diabetes mellitus with neurologic complication, without long-term current use of insulin 12/14/2017       Past Surgical History:   Procedure Laterality Date    CARDIAC SURGERY      HYSTERECTOMY      KNEE SURGERY       SHOULDER ARTHROSCOPY Right 01/16/2004       Review of patient's allergies indicates:  No Known Allergies    No current facility-administered medications on file prior to encounter.      Current Outpatient Prescriptions on File Prior to Encounter   Medication Sig    amiodarone (PACERONE) 200 MG Tab Take by mouth once daily.    aspirin 81 MG Chew Take 1 tablet (81 mg total) by mouth once daily.    atorvastatin (LIPITOR) 80 MG tablet Take 1 tablet (80 mg total) by mouth once daily.    butalbital-acetaminophen-caffeine -40 mg (FIORICET, ESGIC) -40 mg per tablet Take 1 tablet by mouth every 4 (four) hours as needed for Pain.    clopidogrel (PLAVIX) 75 mg tablet Take 1 tablet (75 mg total) by mouth once daily.    collagenase ointment Apply topically once daily.    estropipate (OGEN) 1.5 MG tablet Take 1.5 mg by mouth once daily.    ferrous sulfate 325 (65 FE) MG EC tablet Take 1 tablet (325 mg total) by mouth 2 (two) times daily.    furosemide (LASIX) 40 MG tablet Take 1 tablet (40 mg total) by mouth 2 (two) times daily.    gabapentin (NEURONTIN) 100 MG capsule Take 100 mg by mouth 3 (three) times daily.    hydrocodone-acetaminophen 10-325mg (NORCO)  mg Tab TK 1 T PO TID PRN FOR 30 DAYS    lisinopril (PRINIVIL,ZESTRIL) 20 MG tablet Take 20 mg by mouth once daily.    magnesium oxide (MAG-OX) 400 mg tablet Take 1 tablet (400 mg total) by mouth once daily.    metFORMIN (GLUMETZA) 500 MG (MOD) 24 hr tablet Take 500 mg by mouth daily with breakfast.    metoprolol tartrate (LOPRESSOR) 50 MG tablet Take 0.5 tablets (25 mg total) by mouth 2 (two) times daily.    nitroGLYCERIN (NITROSTAT) 0.4 MG SL tablet Place 1 tablet (0.4 mg total) under the tongue every 5 (five) minutes as needed for Chest pain (angina).    rivaroxaban (XARELTO) 20 mg Tab Take 1 tablet (20 mg total) by mouth daily with dinner or evening meal.    senna (SENOKOT) 8.6 mg tablet Take 1 tablet by mouth 2 (two) times daily as  needed for Constipation.    temazepam (RESTORIL) 15 mg Cap TK 1 C PO QHS    venlafaxine (EFFEXOR-XR) 150 MG Cp24 TK ONE C PO D WF     Family History     None        Social History Main Topics    Smoking status: Never Smoker    Smokeless tobacco: Never Used    Alcohol use No    Drug use: No    Sexual activity: Not on file     Review of Systems   Constitutional: Negative for chills, fatigue and fever.   HENT: Negative for rhinorrhea and sore throat.    Eyes: Negative for pain and redness.   Respiratory: Positive for shortness of breath. Negative for cough and wheezing.    Cardiovascular: Positive for chest pain and leg swelling. Negative for palpitations.   Gastrointestinal: Negative for abdominal pain, diarrhea, nausea and vomiting.   Endocrine: Negative for polydipsia and polyuria.   Genitourinary: Negative for dysuria and hematuria.   Musculoskeletal: Negative for back pain and neck pain.   Skin: Negative for color change, pallor and rash.   Neurological: Negative for light-headedness and headaches.   Hematological: Negative for adenopathy. Does not bruise/bleed easily.   Psychiatric/Behavioral: Negative for confusion. The patient is not nervous/anxious.      Objective:     Vital Signs (Most Recent):  Temp: 98.3 °F (36.8 °C) (05/02/18 1011)  Pulse: 96 (05/02/18 1319)  Resp: 17 (05/02/18 1319)  BP: (!) 147/97 (05/02/18 1242)  SpO2: 97 % (05/02/18 1319) Vital Signs (24h Range):  Temp:  [98.3 °F (36.8 °C)] 98.3 °F (36.8 °C)  Pulse:  [] 96  Resp:  [14-22] 17  SpO2:  [90 %-98 %] 97 %  BP: (143-151)/(95-98) 147/97     Weight: 74.8 kg (165 lb)  Body mass index is 25.84 kg/m².    Physical Exam   Constitutional: She is oriented to person, place, and time. She appears well-developed and well-nourished. No distress.   HENT:   Head: Normocephalic and atraumatic.   Right Ear: External ear normal.   Left Ear: External ear normal.   Eyes: Conjunctivae are normal. No scleral icterus.   Neck: Normal range of motion.  Neck supple.   Cardiovascular: Exam reveals no friction rub.    Murmur (systolic, heard at right sternal border) heard.  Tachycardic but regular   Pulmonary/Chest:   Slightly tachypneic, with bibasilar rales and diminished breath sounds left base   Abdominal: Soft. Bowel sounds are normal. She exhibits no distension. There is no tenderness. There is no guarding.   Musculoskeletal: Normal range of motion. She exhibits edema (2+ BLE). She exhibits no tenderness.   Neurological: She is alert and oriented to person, place, and time.   Skin: Skin is warm and dry. She is not diaphoretic. No pallor.   Psychiatric: She has a normal mood and affect. Her behavior is normal.           Significant Labs:   CBC:   Recent Labs  Lab 05/02/18  1132   WBC 4.33   HGB 10.1*   HCT 33.0*   *     CMP:   Recent Labs  Lab 05/02/18  1132      K 5.0   CL 96   CO2 22*   *   BUN 31*   CREATININE 1.2   CALCIUM 10.3   PROT 9.2*   ALBUMIN 4.1   BILITOT 0.9   ALKPHOS 362*   AST 65*   ALT 45*   ANIONGAP 18*   EGFRNONAA 48.2*     Cardiac Markers:   Recent Labs  Lab 05/02/18  1132   BNP 4,834*       Significant Imaging: I have reviewed all pertinent imaging results/findings within the past 24 hours.    Assessment/Plan:     * Acute on chronic combined systolic and diastolic heart failure    HYPERTENSION  HYPERLIPIDEMIA  CORONARY ARTERY DISEASE    Likely secondary to recent decrease in lasix dose, as well as discontinuation of lisinopril and recent indiscretion with dietary sodium    Home meds: aspirin 81mg daily, atorvastatin 80mg daily, plavix 75mg daily, lasix 20mg bid, lisinopril 20mg daily, metoprolol tartrate 25mg bid  Labs: BNP 4,834 on admission. Negative troponin.   CXR: left pleural effusion, pulmonary edema  EKG: sinus tachycardia at 98 bpm with 1st degree AV block. No ST changes  Echo: 3/22/18 -- EF 20%, diastolic dysfunction, severe aortic stenosis    - IV furosemide 60mg IV bid  - Strict I/O  - Daily standing weights  -  "continue home BB, statin  - restart lisinopril (was held for concern of "facial swelling" which is periorbital edema but not characteristic of angioedema)  - will hold aspirin (as patient is also on plavix and rivaroxaban- her risk of bleeding is unacceptably high on triple therapy and she has not had recent stent placement)        Renovascular hypertension      As above        Severe aortic stenosis      Patient has been worked up for AVR/MAZE, however has not completed all necessary consult appointments    Echo March 2018: peak velocity 4.42 m/s, peak gradient 78 mmHg, mean gradient 51 mmHg, area 0.59cm2, AVAi 0.31 cm2/m2          Type 2 diabetes mellitus with neurologic complication, without long-term current use of insulin      Home med: metformin 500mg bid, gabapentin 100mg tid    - check A1c  - LDSSI  - POCT glucose ac+hs  - hypoglycemic protocol  - continue gabapentin for neuropathy          Paroxysmal atrial fibrillation      Currently sinus tachycardia    Currently being worked up for MAZE as an outpatient    - continue home rivaroxaban         Coronary artery disease involving native coronary artery of native heart without angina pectoris      As above        Hyperlipidemia associated with type 2 diabetes mellitus      As above, continue statin        Iron deficiency anemia      Hgb better than baseline on admission at 10.1  Previous workup significant for borderline high TIBC and low iron. Normal B12 and folate    - continue iron supplementation          VTE Risk Mitigation         Ordered     rivaroxaban tablet 20 mg  With dinner      05/02/18 1500             Claritza Mo DO  Department of Hospital Medicine   [facility]  "

## 2018-05-02 NOTE — ED NOTES
LOC: The patient is awake, alert, and oriented to place, time, situation. Affect is appropriated.  Speech is appropriate and clear.     APPEARANCE: Patient resting comfortably in no acute distress.  Patient is clean and well groomed.    SKIN: The skin is warm and dry; color consistent with ethnicity.  Patient has normal skin turgor and moist mucus membranes.  Skin intact; no breakdown or bruising noted.     MUSCULOSKELETAL: Patient moving upper and lower extremities without difficulty.  Patient reports generalized weakness.    RESPIRATORY: Airway is open and patent. Lungs clear to auscultation in upper lobes; patient with diminished breath sounds present to bilateral lower lobes. Respirations spontaneous, even, easy, and non-labored.  Patient has a normal effort and rate.  No accessory muscle use noted. Denies cough.     CARDIAC:  Patient is bradycardic on the monitor.  Patient with +2 peripheral edema noted to BLE. Capillary refill < 3 seconds. Patient reports chest tightness at this time.     ABDOMEN: Soft and non tender to palpation.  No distention noted. Bowel sounds present x 4.    NEUROLOGIC: PERRLA;  eyes open spontaneously.  Behavior appropriate to situation.  Follows commands; facial expression symmetrical; equal hand grasps bilaterally.  Purposeful motor response noted; normal sensation in all extremities.

## 2018-05-02 NOTE — TELEPHONE ENCOUNTER
"SOB when going up and down stairs, have to lay up on pillows. Legs and face swelling. Has CHF. Weight was going down now up to 165-170 #    Reason for Disposition   Severe difficulty breathing (e.g., struggling for each breath, speaks in single words)    Answer Assessment - Initial Assessment Questions  1. RESPIRATORY STATUS: "Describe your breathing?" (e.g., wheezing, shortness of breath, unable to speak, severe coughing)       SOB when trying to sleep and going up and down stairs  2. ONSET: "When did this breathing problem begin?"       This week  3. PATTERN "Does the difficult breathing come and go, or has it been constant since it started?"       Constant when walking  4. SEVERITY: "How bad is your breathing?" (e.g., mild, moderate, severe)     - MILD: No SOB at rest, mild SOB with walking, speaks normally in sentences, can lay down, no retractions, pulse < 100.     - MODERATE: SOB at rest, SOB with minimal exertion and prefers to sit, cannot lie down flat, speaks in phrases, mild retractions, audible wheezing, pulse 100-120.     - SEVERE: Very SOB at rest, speaks in single words, struggling to breathe, sitting hunched forward, retractions, pulse > 120       Moderate   5. RECURRENT SYMPTOM: "Have you had difficulty breathing before?" If so, ask: "When was the last time?" and "What happened that time?"       History of CHF and was hospitalized last month  6. CARDIAC HISTORY: "Do you have any history of heart disease?" (e.g., heart attack, angina, bypass surgery, angioplasty)       CHF  7. LUNG HISTORY: "Do you have any history of lung disease?"  (e.g., pulmonary embolus, asthma, emphysema)      no  8. CAUSE: "What do you think is causing the breathing problem?"       chf  9. OTHER SYMPTOMS: "Do you have any other symptoms? (e.g., dizziness, runny nose, cough, chest pain, fever)      Runny nose  10. PREGNANCY: "Is there any chance you are pregnant?" "When was your last menstrual period?"        no  11. TRAVEL: " ""Have you traveled out of the country in the last month?" (e.g., travel history, exposures)        no    Protocols used: ST BREATHING DIFFICULTY-A-AH      "

## 2018-05-02 NOTE — ASSESSMENT & PLAN NOTE
Currently sinus tachycardia    Currently being worked up for MAZE as an outpatient    - continue home rivaroxaban

## 2018-05-02 NOTE — CONSULTS
Pt was seen in ED by cardiac rehab. Pt given information on cardiac rehab. Unable to refer pt at this time due to work up process.

## 2018-05-03 PROBLEM — I50.9 ACUTE ON CHRONIC CONGESTIVE HEART FAILURE: Status: ACTIVE | Noted: 2018-01-01

## 2018-05-03 NOTE — HOSPITAL COURSE
Ms. Quick was admitted to hospital medicine on 5/2 for CHF exacerbation. She was treated with IV furosemide 60mg IV bid, and urinated 2.8L overnight. She clinically improved and felt she was at her baseline on 5/3. She was discharged home on increased dose lasix, and restarted home lisinopril. Her aspirin was discontinued. She was instructed to follow up with her cardiologist.

## 2018-05-03 NOTE — PT/OT/SLP EVAL
Physical Therapy Evaluation    Patient Name:  Anum Quick   MRN:  [id number]    Recommendations:     Discharge Recommendations:  home with home health   Discharge Equipment Recommendations: rollator   Barriers to discharge: Inaccessible home    Assessment:     Anum Quick is a 62 y.o. female admitted with a medical diagnosis of Acute on chronic combined systolic and diastolic heart failure.  She presents with the following impairments/functional limitations:  weakness, impaired endurance, impaired cardiopulmonary response to activity. Patient retains good bed mobility and is overall safe while ambulating out of bed. Patient's greatest impairment is her difficulty w/ stair navigation 2/2 poor endurance. Patient's house has 14STE w/ no HR. During Eval, patient showed increased labor during stair navigation. Patient reports that she does it at home, although difficult at times. Patient will benefit from skilled PT services to address her listed impairments to address her listed impairments and help her overall functional mobility.       Rehab Prognosis:  fair; patient would benefit from acute skilled PT services to address these deficits and reach maximum level of function.      Recent Surgery: * No surgery found *      Plan:     During this hospitalization, patient to be seen 3 x/week to address the above listed problems via gait training, therapeutic activities, therapeutic exercises  · Plan of Care Expires:  06/02/18   Plan of Care Reviewed with: patient    Subjective     Communicated with nurse prior to session.  Patient found supine upon PT entry to room, agreeable to evaluation.      Chief Complaint: decreased functional mobility, decreased endurance  Patient comments/goals: to get better  Pain/Comfort:  · Pain Rating 1: 0/10    Patients cultural, spiritual, Jehovah's witness conflicts given the current situation: none stated    Living Environment:  Patient lives in an elevated house w/ her spouse, 14STE  w/ no HR.    Prior to admission, patients level of function was able to manage but required assistance for stair navigation.  Patient has the following equipment: none.    Upon discharge, patient will have assistance from spouse.    Objective:     Patient found with:  (all lines intact)     General Precautions: Standard, fall   Orthopedic Precautions:N/A   Braces: N/A     Exams:  · Cognitive Exam:  Patient is oriented to Person, Place, Time and Situation and follows 100% of all commands   · Fine Motor Coordination:    · -       Intact  · Sensation:    · -       Intact  · RLE ROM: WFL  · RLE Strength: WFL  · LLE ROM: WFL  · LLE Strength: WFL     Functional Mobility:  · Bed Mobility:  Supine to Sit: contact guard assistance  · Sit to Supine: contact guard assistance  · Transfers:  Sit to Stand:  contact guard assistance with no AD  · Gait: 86'  ·  No AD, Decreased Step length   · Stairs:  Pt ascended/descended 8 stair(s) with No Assistive Device with no handrails with Contact Guard Assistance.   · Increased forward trunk lean, increased difficulty, would have been too taxing to complete entire flight    AM-PAC 6 CLICK MOBILITY  Total Score:21       Therapeutic Activities and Exercises:   PT Eric completed  Patient assisted w/ hallway ambulation and stair navigation    Patient left up in chair with all lines intact, call button in reach and nurse notified.    GOALS:    Physical Therapy Goals        Problem: Physical Therapy Goal    Goal Priority Disciplines Outcome Goal Variances Interventions   Physical Therapy Goal     PT/OT, PT Ongoing (interventions implemented as appropriate)     Description:  Goals to be met by: 18    Patient will increase functional independence with mobility by performin. Gait  x 120 feet with Stand-by Assistance w/ or w/o RW/ LRAD  2. Ascend/descend 14 stair with no Handrails Contact Guard Assistance and LRAD  3. Lower extremity exercise program x10 reps in standing, with  assistance as needed                      History:     Past Medical History:   Diagnosis Date    Chronic anticoagulation - on rivaroxaban 12/29/2017    PAF    Chronic systolic heart failure 12/14/2017     12-15-17   1 - Moderately depressed left ventricular systolic function (EF 30-35%). Akinetic LV apex. There is no thrombus in LV apex.   2 - Indeterminate LV diastolic function.    3 - Mildly to moderately depressed right ventricular systolic function .    4 - Pulmonary hypertension. The estimated PA systolic pressure is 63 mmHg.    5 - Severe low flow aortic valve stenosis (JAMES 0.49 cm2, AVAi 0.27 cm2/m2, peak aortic jet velocity 4.0 m/s,MG 48 mmHg).    6 - Mild to moderate mitral regurgitation.    7 - Mild tricuspid regurgitation.    8 - Severe left atrial enlargement.     CKD (chronic kidney disease) stage 3, GFR 30-59 ml/min 12/29/2017    Coronary artery disease involving native coronary artery of native heart without angina pectoris 12/29/2017    [facility] @ : Per report (12/4/2017) proximal LAD has 20% stenosis, RCA with LI otherwise normal coronaries      Essential hypertension 12/14/2017    Hyperlipidemia associated with type 2 diabetes mellitus 12/14/2017    Left atrial enlargement     NSTEMI (non-ST elevated myocardial infarction) 12/14/2017    Paroxysmal atrial fibrillation 12/15/2017    Renovascular hypertension 12/14/2017    Severe aortic stenosis 12/14/2017    12-15-17  Severe low flow aortic valve stenosis (JAMES 0.49 cm2, AVAi 0.27 cm2/m2, peak aortic jet velocity 4.0 m/s,MG 48 mmHg).      Type 2 diabetes mellitus with neurologic complication, without long-term current use of insulin 12/14/2017       Past Surgical History:   Procedure Laterality Date    CARDIAC SURGERY      HYSTERECTOMY      KNEE SURGERY      SHOULDER ARTHROSCOPY Right 01/16/2004       Clinical Decision Making:     History  Co-morbidities and personal factors that may impact the plan of care Examination  Body Structures  and Functions, activity limitations and participation restrictions that may impact the plan of care Clinical Presentation   Decision Making/ Complexity Score   Co-morbidities:   [] Time since onset of injury / illness / exacerbation  [] Status of current condition  []Patient's cognitive status and safety concerns    [] Multiple Medical Problems (see med hx)  Personal Factors:   [] Patient's age  [] Prior Level of function   [] Patient's home situation (environment and family support)  [] Patient's level of motivation  [] Expected progression of patient      HISTORY:(criteria)    [] 67506 - no personal factors/history    [] 20861 - has 1-2 personal factor/comorbidity     [] 77898 - has >3 personal factor/comorbidity     Body Regions:  [] Objective examination findings  [] Head     []  Neck  [] Trunk   [] Upper Extremity  [] Lower Extremity    Body Systems:  [] For communication ability, affect, cognition, language, and learning style: the assessment of the ability to make needs known, consciousness, orientation (person, place, and time), expected emotional /behavioral responses, and learning preferences (eg, learning barriers, education  needs)  [] For the neuromuscular system: a general assessment of gross coordinated movement (eg, balance, gait, locomotion, transfers, and transitions) and motor function  (motor control and motor learning)  [] For the musculoskeletal system: the assessment of gross symmetry, gross range of motion, gross strength, height, and weight  [] For the integumentary system: the assessment of pliability(texture), presence of scar formation, skin color, and skin integrity  [] For cardiovascular/pulmonary system: the assessment of heart rate, respiratory rate, blood pressure, and edema     Activity limitations:    [] Patient's cognitive status and saf ety concerns          [] Status of current condition      [] Weight bearing restriction  [] Cardiopulmunary Restriction    Participation  Restrictions:   [] Goals and goal agreement with the patient     [] Rehab potential (prognosis) and probable outcome      Examination of Body System: (criteria)    [] 34453 - addressing 1-2 elements    [] 84683 - addressing a total of 3 or more elements     [] 53346 -  Addressing a total of 4 or more elements         Clinical Presentation: (criteria)  Choose one     On examination of body system using standardized tests and measures patient presents with (CHOOSE ONE) elements from any of the following: body structures and functions, activity limitations, and/or participation restrictions.  Leading to a clinical presentation that is considered (CHOOSE ONE)                              Clinical Decision Making  (Eval Complexity):  Low- 18311     Time Tracking:     PT Received On: 05/03/18  PT Start Time: 0801     PT Stop Time: 0820  PT Total Time (min): 19 min     Billable Minutes: Evaluation 11 Min and Gait Training 8 Min      Amilcar Thayer, PT  05/03/2018

## 2018-05-03 NOTE — ASSESSMENT & PLAN NOTE
"HYPERTENSION  HYPERLIPIDEMIA  CORONARY ARTERY DISEASE    Likely secondary to recent decrease in lasix dose, as well as discontinuation of lisinopril and recent indiscretion with dietary sodium    Home meds: aspirin 81mg daily, atorvastatin 80mg daily, plavix 75mg daily, lasix 20mg bid, lisinopril 20mg daily, metoprolol tartrate 25mg bid  Labs: BNP 4,834 on admission. Negative troponin.   CXR: left pleural effusion, pulmonary edema  EKG: sinus tachycardia at 98 bpm with 1st degree AV block. No ST changes  Echo: 3/22/18 -- EF 20%, diastolic dysfunction, severe aortic stenosis    - continue home BB, statin  - restart lisinopril (was held for concern of "facial swelling" which is periorbital edema but not characteristic of angioedema)  - will hold aspirin (as patient is also on plavix and rivaroxaban- her risk of bleeding is unacceptably high on triple therapy and she has not had recent stent placement)  - discharged on lasix 40mg bid  "

## 2018-05-03 NOTE — ED NOTES
[facility] medicine 2 paged in regards to patient c/o back pain. Awaiting call back.

## 2018-05-03 NOTE — PROGRESS NOTES
Reviewed AVS d/c instructions with patient, voices understanding. Patient states ride will be here between 3-4pm.

## 2018-05-03 NOTE — PLAN OF CARE
Problem: Patient Care Overview  Goal: Plan of Care Review  Patient only has slpt past 4 hour. VSS. O@ SATS drop to mid *)'s when pt remove or when O2 is off but mid to upper 90's with 2 L bnc. NO shortness of breath noted at rest. Complain of nausea and having some dry heaves Zofran was giving also complain of back pain tylenol was given no other complaint was voiced.Instructed to call Nurse prior to getting out of bed. SO that Rn can monitor her breathing.

## 2018-05-03 NOTE — CONSULTS
"Cardiac Rehab     Anum Quick   [id number]   5/3/2018       Activity taught: Yes    Cardiac Rehab Phase Taught: Phase I & II    Risk Factors-Modifiable: nutrition, hypertension, obesity, sedentary lifestyle, stress, exercise    Risk Factors-Non modifiable: age    Teaching Method: Verbal, Written and Living with Heart Disease book.    Understanding/Response: Pt verbalized understanding, all questions answered. Pt understands their cardiac rehab order is good for 12 months.       Comments: S/P Acute on chronic combined systolic and diastolic heart failure. Discussed cardiac rehab and risk factor modification. Team to refer patient to [facility] Phase II. Educational materials were used in the process and given to the patient. They included "Your Guide to Living with Heart Disease", Phase Two Cardiac Rehabilitation information along with a sample Mediterranean diet.The patient expressed understanding of the teaching and expressed desire to take a role in modifying the risk factors when they return home.    NIKOLAY Clement RN  Cardiac Rehab Nurse      "

## 2018-05-03 NOTE — PLAN OF CARE
Problem: Physical Therapy Goal  Goal: Physical Therapy Goal  Goals to be met by: 18    Patient will increase functional independence with mobility by performin. Gait  x 120 feet with Stand-by Assistance w/ or w/o RW/ LRAD  2. Ascend/descend 14 stair with no Handrails Contact Guard Assistance and LRAD  3. Lower extremity exercise program x10 reps in standing, with assistance as needed    Outcome: Ongoing (interventions implemented as appropriate)  PT Goals established following initial eval  Focus on stairs for house entry    Amilcar Thayer, PT, DPT  5/3/2018  Pager [phone]

## 2018-05-03 NOTE — PT/OT/SLP EVAL
Occupational Therapy   Evaluation, Treatment and Discharge Note    Name: Anum Quick  MRN: [id number]  Admitting Diagnosis:  Acute on chronic combined systolic and diastolic heart failure      Recommendations:     Discharge Recommendations: home with home health  Discharge Equipment Recommendations:  rollator  Barriers to discharge:  None    History:     Occupational Profile:  Living Environment: Pt lives with  in a raised house; pt has 14 NAE with no rails. Pt has a tub/shower combo  Previous level of function: (I)  Roles and Routines: wife  Equipment Owned:  none  Assistance upon Discharge: available    Past Medical History:   Diagnosis Date    Chronic anticoagulation - on rivaroxaban 12/29/2017    PAF    Chronic systolic heart failure 12/14/2017     12-15-17   1 - Moderately depressed left ventricular systolic function (EF 30-35%). Akinetic LV apex. There is no thrombus in LV apex.   2 - Indeterminate LV diastolic function.    3 - Mildly to moderately depressed right ventricular systolic function .    4 - Pulmonary hypertension. The estimated PA systolic pressure is 63 mmHg.    5 - Severe low flow aortic valve stenosis (JAMES 0.49 cm2, AVAi 0.27 cm2/m2, peak aortic jet velocity 4.0 m/s,MG 48 mmHg).    6 - Mild to moderate mitral regurgitation.    7 - Mild tricuspid regurgitation.    8 - Severe left atrial enlargement.     CKD (chronic kidney disease) stage 3, GFR 30-59 ml/min 12/29/2017    Coronary artery disease involving native coronary artery of native heart without angina pectoris 12/29/2017    [facility] @ : Per report (12/4/2017) proximal LAD has 20% stenosis, RCA with LI otherwise normal coronaries      Essential hypertension 12/14/2017    Hyperlipidemia associated with type 2 diabetes mellitus 12/14/2017    Left atrial enlargement     NSTEMI (non-ST elevated myocardial infarction) 12/14/2017    Paroxysmal atrial fibrillation 12/15/2017    Renovascular hypertension 12/14/2017    Severe  aortic stenosis 12/14/2017    12-15-17  Severe low flow aortic valve stenosis (JAMES 0.49 cm2, AVAi 0.27 cm2/m2, peak aortic jet velocity 4.0 m/s,MG 48 mmHg).      Type 2 diabetes mellitus with neurologic complication, without long-term current use of insulin 12/14/2017       Past Surgical History:   Procedure Laterality Date    CARDIAC SURGERY      HYSTERECTOMY      KNEE SURGERY      SHOULDER ARTHROSCOPY Right 01/16/2004       Subjective     Chief Complaint: none  Patient/Family stated goals: go home  Communicated with: RN prior to session.  Pain/Comfort:  · Pain Rating 1: 0/10  · Pain Rating Post-Intervention 1: 0/10    Patients cultural, spiritual, Amish conflicts given the current situation: none stated    Objective:     Patient found with: telemetry, pulse ox (continuous), oxygen    General Precautions: Standard, fall   Orthopedic Precautions:N/A   Braces: N/A     Occupational Performance:    Bed Mobility:    · Patient completed Supine to Sit with supervision  · Patient completed Sit to Supine with supervision    Functional Mobility/Transfers:  · Patient completed Sit <> Stand Transfer with contact guard assistance  with  no assistive device   · Functional Mobility: CGA     Activities of Daily Living:  · UB Dressing: supervision gown as robe  · LB Dressing: supervision socks EOB  · Toileting: modified independence      Cognitive/Visual Perceptual:  Cognitive/Psychosocial Skills:     -       Oriented to: Person, Place, Time and Situation   -       Follows Commands/attention:Follows multistep  commands  -       Communication: clear/fluent  -       Memory: No Deficits noted  -       Safety awareness/insight to disability: intact   -       Mood/Affect/Coping skills/emotional control: Appropriate to situation  Visual/Perceptual:      -Intact    Physical Exam:  Balance:    -       Good/Fair  Postural examination/scapula alignment:    -       No postural abnormalities identified  Dominant hand:    -        "R  Upper Extremity Range of Motion:     -       Right Upper Extremity: WFL  -       Left Upper Extremity: WFL  Upper Extremity Strength:    -       Right Upper Extremity: WFL  -       Left Upper Extremity: WFL   Strength:    -       Right Upper Extremity: WFL  -       Left Upper Extremity: WFL  Fine Motor Coordination:    -       Intact  Gross motor coordination:   WFL    Patient left HOB elevated with all lines intact and call button in reach    AMPA 6 Click:  [facility] Total Score: 23    Treatment & Education:  Pt ed re OT role and POC. Pt performed bed mobility with (I). Pt sat EOB and performed strength and ROM. Pt able to perform all necessary ADLs, but would benefit from HHOT to ensure safe return home to (I)ce.  Pt ed re safety in the bathroom and bathing/showering needs.  Education:    Assessment:     Anum Quick is a 62 y.o. female with a medical diagnosis of Acute on chronic combined systolic and diastolic heart failure. At this time, patient is functioning at their prior level of function and does not require further acute OT services.     Clinical Decision Makin.  OT Low:  "Pt evaluation falls under low complexity for evaluation coding due to performance deficits noted in 1-3 areas as stated above and 0 co-morbities affecting current functional status. Data obtained from problem focused assessments. No modifications or assistance was required for completion of evaluation. Only brief occupational profile and history review completed."     Plan:     During this hospitalization, patient does not require further acute OT services.  Please re-consult if situation changes.    · Plan of Care Reviewed with: patient    This Plan of care has been discussed with the patient who was involved in its development and understands and is in agreement with the identified goals and treatment plan    GOALS:    Occupational Therapy Goals     Not on file          Multidisciplinary Problems (Resolved)        " Problem: Occupational Therapy Goal    Goal Priority Disciplines Outcome Interventions   Occupational Therapy Goal   (Resolved)     OT, PT/OT Outcome(s) achieved                    Time Tracking:     OT Date of Treatment: 05/03/18  OT Start Time: 0803  OT Stop Time: 0820  OT Total Time (min): 17 min    Billable Minutes:Evaluation 9 minutes  Therapeutic Activity 8 minutes    BENJAMIN Helm  5/3/2018  Pager: [phone]

## 2018-05-03 NOTE — DISCHARGE SUMMARY
"[facility] Hospital Medicine  Discharge Summary      Patient Name: Anum Quick  MRN: [id number]  Admission Date: 5/2/2018  Hospital Length of Stay: 1 days  Discharge Date and Time:  05/03/2018 2:36 PM  Attending Physician: Rosina Bryan MD   Discharging Provider: Claritza Mo DO  Primary Care Provider: Raghav Valdez MD  [facility] Medicine Team: [facility] MED 2 Claritza Mo DO    HPI:   Ms. Anum Quick is a 62 year old female with HTN, HFrEF (EF 20% with diastolic dysfunction in March 2018), severe aortic stenosis, paroxysmal atrial fibrillation, and type two diabetes who presents for dyspnea and increased leg swelling. Patient was recently hospitalized in March 2018 with cardiogenic shock, for which she was treated with diuresis and norepinephrine drip for a short period of time. Patient had been feeling well for several weeks, and had been able to get around the house on her own and lie flat to sleep at night. Over the past week, she reports increasing orthopnea (now requiring three pillows), dyspnea on exertion, and lower extremity edema. She woke up with substernal pressure-like chest pain last night, which does not worsen with exertion but improved slightly after being given nitroglycerin in the ED.     Ms. Quick stopped taking her prescribed lisinopril on 4/30 per her cardiologist's recommendation when she emailed with complaint of "facial swelling", which was in fact only lower eyelid swelling and did not involve her tongue or mouth. She also decreased her lasix dose from 40mg bid to 20mg bid yesterday, which she states was because "the pharmacy changed it". She reports general adherence to low salt diet and fluid restriction, but recently accidentally ate processed food with high sodium content. Patient denies any fevers, chills, cough, n/v. She reports compliance with all medications.     Patient denies alcohol or tobacco use. She lives at home with her " ". She would like to be full code at this time.     * No surgery found *      Hospital Course:   Ms. Quick was admitted to hospital medicine on 5/2 for CHF exacerbation. She was treated with IV furosemide 60mg IV bid, and urinated 2.8L overnight. She clinically improved and felt she was at her baseline on 5/3. She was discharged home on increased dose lasix, and restarted home lisinopril. Her aspirin was discontinued. She was instructed to follow up with her cardiologist.      Consults:   Consults         Status Ordering Provider     Inpatient consult to Cardiac Rehab  Once     Provider:  (Not yet assigned)    Acknowledged YG RODRIGUEZ AUGUST          * Acute on chronic combined systolic and diastolic heart failure    HYPERTENSION  HYPERLIPIDEMIA  CORONARY ARTERY DISEASE    Likely secondary to recent decrease in lasix dose, as well as discontinuation of lisinopril and recent indiscretion with dietary sodium    Home meds: aspirin 81mg daily, atorvastatin 80mg daily, plavix 75mg daily, lasix 20mg bid, lisinopril 20mg daily, metoprolol tartrate 25mg bid  Labs: BNP 4,834 on admission. Negative troponin.   CXR: left pleural effusion, pulmonary edema  EKG: sinus tachycardia at 98 bpm with 1st degree AV block. No ST changes  Echo: 3/22/18 -- EF 20%, diastolic dysfunction, severe aortic stenosis    - continue home BB, statin  - restart lisinopril (was held for concern of "facial swelling" which is periorbital edema but not characteristic of angioedema)  - will hold aspirin (as patient is also on plavix and rivaroxaban- her risk of bleeding is unacceptably high on triple therapy and she has not had recent stent placement)  - discharged on lasix 40mg bid        Renovascular hypertension      As above        Severe aortic stenosis      Patient has been worked up for AVR/MAZE, however has not completed all necessary consult appointments    Echo March 2018: peak velocity 4.42 m/s, peak gradient 78 mmHg, mean gradient " 51 mmHg, area 0.59cm2, AVAi 0.31 cm2/m2          Type 2 diabetes mellitus with neurologic complication, without long-term current use of insulin      Home med: metformin 500mg bid, gabapentin 100mg tid    continue          Paroxysmal atrial fibrillation      Currently sinus tachycardia    Currently being worked up for MAZE as an outpatient    - continue home rivaroxaban         Coronary artery disease involving native coronary artery of native heart without angina pectoris      As above        Hyperlipidemia associated with type 2 diabetes mellitus      As above, continue statin        Iron deficiency anemia      Hgb better than baseline on admission at 10.1  Previous workup significant for borderline high TIBC and low iron. Normal B12 and folate    - continue iron supplementation          Final Active Diagnoses:    Diagnosis Date Noted POA    PRINCIPAL PROBLEM:  Acute on chronic combined systolic and diastolic heart failure [I50.43] 05/02/2018 Yes    Renovascular hypertension [I15.0] 12/14/2017 Yes    Severe aortic stenosis [I35.0] 12/14/2017 Yes    Type 2 diabetes mellitus with neurologic complication, without long-term current use of insulin [E11.49] 12/14/2017 Yes    Paroxysmal atrial fibrillation [I48.0] 12/15/2017 Yes    Coronary artery disease involving native coronary artery of native heart without angina pectoris [I25.10] 12/29/2017 Yes    Hyperlipidemia associated with type 2 diabetes mellitus [E11.69, E78.5] 12/14/2017 Yes    Iron deficiency anemia [D50.9] 03/23/2018 Yes    Acute on chronic congestive heart failure [I50.9] 05/03/2018 Yes    Venous stasis of both lower extremities [I87.8] 03/21/2018 Yes      Problems Resolved During this Admission:    Diagnosis Date Noted Date Resolved POA    Malnutrition of moderate degree [E44.0] 02/19/2018 05/02/2018 Yes       Discharged Condition: good    Disposition: Home or Self Care    Follow Up:  Follow-up Information     Raghav Valdez MD.   "  Specialty:  Internal Medicine  Contact information:  [address]  [address]  [place] [address]  [phone]             Jason Plaza MD In 1 week.    Specialty:  Cardiology  Why:  hospital follow up  Contact information:  [address]  [place] [address]  [phone]                 Patient Instructions:     WALKER FOR HOME USE   Order Specific Question Answer Comments   Type of Walker: Adult (5'4"-6'6")    With wheels? Yes    Height: 5' 3" (1.6 m)    Weight: 76.1 kg (167 lb 11.2 oz) stand up scale   Length of need (1-99 months): 99    Does patient have medical equipment at home? none    Please check all that apply: Patient's condition impairs ambulation.    Please check all that apply: Patient is unable to safely ambulate without equipment.    Please check all that apply: Patient needs help to get in and out of chair.    Vendor: Other (use comments) Pt received walker 03/2018   Expected Date of Delivery: 5/3/2018      Diet Cardiac         Significant Diagnostic Studies: Labs:   CMP   Recent Labs  Lab 05/02/18  1132 05/03/18  0402    138   K 5.0 4.2   CL 96 98   CO2 22* 26   * 105   BUN 31* 31*   CREATININE 1.2 1.1   CALCIUM 10.3 9.8   PROT 9.2* 7.4   ALBUMIN 4.1 3.5   BILITOT 0.9 1.1*   ALKPHOS 362* 310*   AST 65* 48*   ALT 45* 36   ANIONGAP 18* 14   ESTGFRAFRICA 55.6* >60.0   EGFRNONAA 48.2* 53.6*    and CBC   Recent Labs  Lab 05/02/18  1132 05/03/18  0402   WBC 4.33 4.56   HGB 10.1* 9.3*   HCT 33.0* 30.6*   * 356*       Pending Diagnostic Studies:     None         Medications:  Reconciled Home Medications:      Medication List      START taking these medications    metoprolol succinate 50 MG 24 hr tablet  Commonly known as:  TOPROL-XL  Take 1 tablet (50 mg total) by mouth once daily.        CONTINUE taking these medications    amiodarone 200 MG Tab  Commonly known as:  PACERONE  Take by mouth once daily.     atorvastatin 80 MG tablet  Commonly known as:  " LIPITOR  Take 1 tablet (80 mg total) by mouth once daily.     butalbital-acetaminophen-caffeine -40 mg -40 mg per tablet  Commonly known as:  FIORICET, ESGIC  Take 1 tablet by mouth every 4 (four) hours as needed for Pain.     clopidogrel 75 mg tablet  Commonly known as:  PLAVIX  Take 1 tablet (75 mg total) by mouth once daily.     collagenase ointment  Apply topically once daily.     estropipate 1.5 MG tablet  Commonly known as:  OGEN  Take 1.5 mg by mouth once daily.     ferrous sulfate 325 (65 FE) MG EC tablet  Take 1 tablet (325 mg total) by mouth 2 (two) times daily.     furosemide 40 MG tablet  Commonly known as:  LASIX  Take 1 tablet (40 mg total) by mouth 2 (two) times daily.     gabapentin 100 MG capsule  Commonly known as:  NEURONTIN  Take 100 mg by mouth 3 (three) times daily.     hydrocodone-acetaminophen 10-325mg  mg Tab  Commonly known as:  NORCO  TK 1 T PO TID PRN FOR 30 DAYS     lisinopril 20 MG tablet  Commonly known as:  PRINIVIL,ZESTRIL  Take 20 mg by mouth once daily.     magnesium oxide 400 mg tablet  Commonly known as:  MAG-OX  Take 1 tablet (400 mg total) by mouth once daily.     metFORMIN 500 MG (MOD) 24 hr tablet  Commonly known as:  GLUMETZA  Take 500 mg by mouth daily with breakfast.     nitroGLYCERIN 0.4 MG SL tablet  Commonly known as:  NITROSTAT  Place 1 tablet (0.4 mg total) under the tongue every 5 (five) minutes as needed for Chest pain (angina).     rivaroxaban 20 mg Tab  Commonly known as:  XARELTO  Take 1 tablet (20 mg total) by mouth daily with dinner or evening meal.     senna 8.6 mg tablet  Commonly known as:  SENOKOT  Take 1 tablet by mouth 2 (two) times daily as needed for Constipation.     temazepam 15 mg Cap  Commonly known as:  RESTORIL  TK 1 C PO QHS     venlafaxine 150 MG Cp24  Commonly known as:  EFFEXOR-XR  TK ONE C PO D WF        STOP taking these medications    aspirin 81 MG Chew     metoprolol tartrate 50 MG tablet  Commonly known as:  LOPRESSOR             Indwelling Lines/Drains at time of discharge:   Lines/Drains/Airways     Drain            Female External Urinary Catheter 05/02/18 1338 1 day                Time spent on the discharge of patient: 30 minutes  Patient was seen and examined on the date of discharge and determined to be suitable for discharge.         Claritza Mo DO  Department of Hospital Medicine  [facility]

## 2018-05-03 NOTE — PLAN OF CARE
Problem: Occupational Therapy Goal  Goal: Occupational Therapy Goal  Outcome: Outcome(s) achieved Date Met: 05/03/18  Pt is near baseline, but still with difficulty accessing her house. OT to d/c, but recommend HH.    BENJAMIN Helm  5/3/2018  Pager: [phone]

## 2018-05-03 NOTE — PLAN OF CARE
Problem: Fall Risk (Adult)  Goal: Absence of Falls  Patient will demonstrate the desired outcomes by discharge/transition of care.   Outcome: Outcome(s) achieved Date Met: 05/03/18 05/03/18 1535   Fall Risk (Adult)   Absence of Falls achieves outcome       Problem: Patient Care Overview  Goal: Plan of Care Review  Outcome: Outcome(s) achieved Date Met: 05/03/18  Questions answered and concerns addressed.   05/03/18 1400   Coping/Psychosocial   Plan Of Care Reviewed With patient

## 2018-05-10 NOTE — PROGRESS NOTES
Subjective:   Patient ID:  Anum Quick is a 62 y.o. female who presents for follow-up of Congestive Heart Failure      Problem List Items Addressed This Visit        Cardiac/Vascular    Chronic systolic heart failure    Severe aortic stenosis - Primary    Relevant Orders    Ambulatory Referral to Interventional Cardiology    Paroxysmal atrial fibrillation    Coronary artery disease involving native coronary artery of native heart without angina pectoris    Venous stasis of both lower extremities    Acute on chronic combined systolic and diastolic heart failure    Relevant Orders    Ambulatory Referral to Interventional Cardiology          HPI: 61 y/o female with multiple medical problems including DM, HTN, anemia, severe AS, cardiomyopathy and PAF present after hospitalization for cardiogenic shock form ADHF. She was diuresed. She was seen by Dr. Lundy but surgery was not done secondary to CHF and kidney function. She is doing better today with no chest pain but continued dyspnea. BP is controlled. She is compliant with medications and low salt diet and is interested in TAVR.    Review of Systems   Constitution: Negative.   HENT: Negative.    Eyes: Negative.    Cardiovascular: Positive for dyspnea on exertion. Negative for chest pain, claudication, cyanosis, irregular heartbeat, leg swelling, near-syncope, orthopnea, palpitations, paroxysmal nocturnal dyspnea and syncope.   Respiratory: Negative.    Endocrine: Negative.    Hematologic/Lymphatic: Negative.    Skin: Negative.    Musculoskeletal: Negative.    Gastrointestinal: Negative.    Neurological: Negative.    Psychiatric/Behavioral: Negative.    Allergic/Immunologic: Negative.        Patient's Medications   New Prescriptions    No medications on file   Previous Medications    AMIODARONE (PACERONE) 200 MG TAB    Take by mouth once daily.    ATORVASTATIN (LIPITOR) 80 MG TABLET    Take 1 tablet (80 mg total) by mouth once daily.     BUTALBITAL-ACETAMINOPHEN-CAFFEINE -40 MG (FIORICET, ESGIC) -40 MG PER TABLET    Take 1 tablet by mouth every 4 (four) hours as needed for Pain.    CLOPIDOGREL (PLAVIX) 75 MG TABLET    Take 1 tablet (75 mg total) by mouth once daily.    COLLAGENASE OINTMENT    Apply topically once daily.    ESTROPIPATE (OGEN) 1.5 MG TABLET    Take 1.5 mg by mouth once daily.    FERROUS SULFATE 325 (65 FE) MG EC TABLET    Take 1 tablet (325 mg total) by mouth 2 (two) times daily.    FUROSEMIDE (LASIX) 40 MG TABLET    Take 1 tablet (40 mg total) by mouth 2 (two) times daily.    GABAPENTIN (NEURONTIN) 100 MG CAPSULE    Take 100 mg by mouth 3 (three) times daily.    HYDROCODONE-ACETAMINOPHEN 10-325MG (NORCO)  MG TAB    TK 1 T PO TID PRN FOR 30 DAYS    MAGNESIUM OXIDE (MAG-OX) 400 MG TABLET    Take 1 tablet (400 mg total) by mouth once daily.    METFORMIN (GLUMETZA) 500 MG (MOD) 24 HR TABLET    Take 500 mg by mouth daily with breakfast.    METOPROLOL SUCCINATE (TOPROL-XL) 50 MG 24 HR TABLET    Take 1 tablet (50 mg total) by mouth once daily.    NITROGLYCERIN (NITROSTAT) 0.4 MG SL TABLET    Place 1 tablet (0.4 mg total) under the tongue every 5 (five) minutes as needed for Chest pain (angina).    RIVAROXABAN (XARELTO) 20 MG TAB    Take 1 tablet (20 mg total) by mouth daily with dinner or evening meal.    SENNA (SENOKOT) 8.6 MG TABLET    Take 1 tablet by mouth 2 (two) times daily as needed for Constipation.    TEMAZEPAM (RESTORIL) 15 MG CAP    TK 1 C PO QHS    VENLAFAXINE (EFFEXOR-XR) 150 MG CP24    TK ONE C PO D WF   Modified Medications    No medications on file   Discontinued Medications    LISINOPRIL (PRINIVIL,ZESTRIL) 20 MG TABLET    Take 20 mg by mouth once daily.       Objective:   Physical Exam   Constitutional: She is oriented to person, place, and time. She appears well-developed and well-nourished. No distress.   Examination of the digits showed no clubbing or cyanosis   HENT:   Head: Normocephalic and  atraumatic.   Eyes: Conjunctivae are normal. Pupils are equal, round, and reactive to light. Right eye exhibits no discharge.   Neck: Normal range of motion. Neck supple. No JVD present. No thyromegaly present.   No carotid bruits   Cardiovascular: Normal rate, regular rhythm, S1 normal, S2 normal, intact distal pulses and normal pulses.  PMI is not displaced.  Exam reveals no gallop, no friction rub and no opening snap.    Murmur heard.  Pulmonary/Chest: Effort normal and breath sounds normal. No respiratory distress. She has no wheezes. She has no rales. She exhibits no tenderness.   Abdominal: Soft. Bowel sounds are normal. She exhibits no distension and no mass. There is no tenderness. There is no guarding.   No hepatosplenomegaly   Musculoskeletal: Normal range of motion. She exhibits no edema or tenderness.   Lymphadenopathy:     She has no cervical adenopathy.   Neurological: She is alert and oriented to person, place, and time.   Skin: Skin is warm. No rash noted. She is not diaphoretic. No erythema.   Psychiatric: She has a normal mood and affect.   Nursing note and vitals reviewed.      ECGs reviewed-NSR with 1 st degree AV block and possible anterior and lateral infarct  LABS reviewed  Imaging including Echoes reviewed- severe As with reduce ef and restrictive physiology    Assessment:     1. Severe aortic stenosis    2. Chronic systolic heart failure    3. Coronary artery disease involving native coronary artery of native heart without angina pectoris    4. Paroxysmal atrial fibrillation    5. Venous stasis of both lower extremities    6. Acute on chronic combined systolic and diastolic heart failure        Plan:     Will refer to Dr. Ybarra for LHC and TAVR. Patient more euvolemic today with better kidney function. I think she will be a great candidate for TAVR.  Continue lasix 40 mg po bid  Low salt diet  Limit exertion  F/u in 2 months       Clinic time spent with patient discussing and treating  medical condition including reviewing images, ECG, labs and medications > 20 minutes.

## 2018-05-15 NOTE — TELEPHONE ENCOUNTER
Referral placed from Dr. Chaudhari to Dr. Ybarra for patient to be seen in valve clinic for TAVR workup. Multiple calls placed to Ms. Quick for additional information with no answer. Voicemails left with contact information.

## 2018-05-28 NOTE — TELEPHONE ENCOUNTER
Hazel with [facility] is requesting a return call with regards to patient.  She can be reached at [phone].

## 2018-06-08 NOTE — TELEPHONE ENCOUNTER
Kishore with [facility] called and wanted to inform you that the patient is not in their current weight spectrum. He said the ideal weight is 150-165. The patient's current weight is 146. Kishore's number is [phone]

## 2018-06-11 NOTE — TELEPHONE ENCOUNTER
----- Message from Edvin Thayer sent at 6/11/2018  1:03 PM CDT -----  Contact: Maeve from [facility]/[phone]  Maeve called to speak with your office or the doctor about the patient.      She has gained 14 pounds in 3 days and had a fall on yesterday.    Please call and advise.

## 2018-06-11 NOTE — DISCHARGE INSTRUCTIONS
Do not scrub area of stitches - you may rub stitches out.  You may apply ice to decreased swelling.  You will have a black eye tomorrow and it may look worse over the next couple of days.  Your stiches will dissovle when it's time for them to fall out.  Return to ER for any concerns.

## 2018-06-11 NOTE — TELEPHONE ENCOUNTER
Message left for Maeve concerning the patient.  She called to say that the patient has gained 14 lbs in 3 days and has had a fall.  I also called the patient and got a message that her voicemail was full and can't take any messages.  I will call again.

## 2018-06-11 NOTE — ED PROVIDER NOTES
Encounter Date: 6/10/2018    SCRIBE #1 NOTE: I, Alexis Heart, am scribing for, and in the presence of,  Dr. Floyd. I have scribed the entire note.       History     Chief Complaint   Patient presents with    Fall     reports trip and fall over a rug. States glasses caused laceration to above right eyebrow. Pt has bandaid over laceration which is controlling bleeding. Pt also abrasions to lip and mid forehead. Pt denies LOC, but does have confusion.      Time seen by provider: 7:03 PM     This is a 62 y.o. female with PMHx of NSTEMI, CKD, and DM who presents with laceration just above right eye secondary to mechanical fall which occurred just prior to arrival.  Pt states she slipped and fell on ceramic floor hitting her head on floor.  She denies LOC.  Per spouse, frame of glasses caused laceration. Pt also c/o small abrasion to upper lip.  She denies associated headache, confusion, nausea, and vomiting.  She is currently taking blood thinners, Plavix and Xarelto, and is compliant with them.  She reports last tetanus shot received this year.       The history is provided by the patient and the spouse.     Review of patient's allergies indicates:  No Known Allergies  Past Medical History:   Diagnosis Date    Chronic anticoagulation - on rivaroxaban 12/29/2017    PAF    Chronic systolic heart failure 12/14/2017     12-15-17   1 - Moderately depressed left ventricular systolic function (EF 30-35%). Akinetic LV apex. There is no thrombus in LV apex.   2 - Indeterminate LV diastolic function.    3 - Mildly to moderately depressed right ventricular systolic function .    4 - Pulmonary hypertension. The estimated PA systolic pressure is 63 mmHg.    5 - Severe low flow aortic valve stenosis (JAMES 0.49 cm2, AVAi 0.27 cm2/m2, peak aortic jet velocity 4.0 m/s,MG 48 mmHg).    6 - Mild to moderate mitral regurgitation.    7 - Mild tricuspid regurgitation.    8 - Severe left atrial enlargement.     CKD (chronic kidney disease)  stage 3, GFR 30-59 ml/min 12/29/2017    Coronary artery disease involving native coronary artery of native heart without angina pectoris 12/29/2017    [facility] @ : Per report (12/4/2017) proximal LAD has 20% stenosis, RCA with LI otherwise normal coronaries      Essential hypertension 12/14/2017    Hyperlipidemia associated with type 2 diabetes mellitus 12/14/2017    Left atrial enlargement     NSTEMI (non-ST elevated myocardial infarction) 12/14/2017    Paroxysmal atrial fibrillation 12/15/2017    Renovascular hypertension 12/14/2017    Severe aortic stenosis 12/14/2017    12-15-17  Severe low flow aortic valve stenosis (JAMES 0.49 cm2, AVAi 0.27 cm2/m2, peak aortic jet velocity 4.0 m/s,MG 48 mmHg).      Type 2 diabetes mellitus with neurologic complication, without long-term current use of insulin 12/14/2017     Past Surgical History:   Procedure Laterality Date    CARDIAC SURGERY      HYSTERECTOMY      KNEE SURGERY      SHOULDER ARTHROSCOPY Right 01/16/2004     History reviewed. No pertinent family history.  Social History   Substance Use Topics    Smoking status: Never Smoker    Smokeless tobacco: Never Used    Alcohol use No     Review of Systems   Constitutional:        Fall   Gastrointestinal: Negative for nausea and vomiting.   Skin: Positive for wound (laceration just above right eye, small abrasion to upper lip).   Neurological: Negative for headaches.   Psychiatric/Behavioral: Negative for confusion.   All other systems reviewed and are negative.      Physical Exam     Initial Vitals [06/10/18 1840]   BP Pulse Resp Temp SpO2   128/80 71 15 97.9 °F (36.6 °C) 98 %      MAP       96         Physical Exam    Nursing note and vitals reviewed.  Constitutional: She appears well-developed and well-nourished. No distress.   HENT:   Head: Normocephalic. Head is with laceration.       Nose: Nose normal.   Mouth/Throat:       1 cm horizontal laceration just above right eyebrow with R angle-shaped 1 cm  laceration just below.  Abrasion and contusion to right upper lip. No oral injury noted to inside of mouth. No blood visualized to nose.    Eyes: Conjunctivae and EOM are normal. Pupils are equal, round, and reactive to light.   Neck: Normal range of motion.   Cardiovascular: Normal rate and regular rhythm.   Murmur heard.  Pulmonary/Chest: Breath sounds normal. No respiratory distress.   Abdominal: She exhibits no distension.   Musculoskeletal: Normal range of motion. She exhibits no edema or tenderness.   Neurological: She is alert and oriented to person, place, and time. She has normal strength. No cranial nerve deficit. GCS eye subscore is 4. GCS verbal subscore is 5. GCS motor subscore is 6.   Skin: Skin is warm and dry.   Psychiatric: She has a normal mood and affect. Her behavior is normal.         ED Course   Lac Repair  Date/Time: 6/10/2018 8:07 PM  Performed by: DU LEWIS.  Authorized by: DU LEWIS.   Consent Done: Not Needed  Body area: head/neck  Location details: forehead  Laceration length: 1 cm  Foreign bodies: no foreign bodies  Tendon involvement: none  Nerve involvement: none  Vascular damage: no  Anesthesia: local infiltration    Anesthesia:  Local Anesthetic: LET (lido,epi,tetracaine) and lidocaine 1% without epinephrine  Patient sedated: no  Preparation: Patient was prepped and draped in the usual sterile fashion.  Irrigation solution: saline  Irrigation method: syringe  Amount of cleaning: standard  Wound skin closure material used: 5-0 vicryl rapide.  Number of sutures: 3  Technique: simple  Approximation: close  Approximation difficulty: simple  Patient tolerance: Patient tolerated the procedure well with no immediate complications    Lac Repair  Date/Time: 6/10/2018 8:18 PM  Performed by: DU LEWIS.  Authorized by: DU LEWIS.   Consent Done: Not Needed  Body area: head/neck  Location details: forehead  Laceration length: 1 cm  Foreign bodies: no foreign bodies  Tendon  involvement: none  Nerve involvement: none  Vascular damage: no  Anesthesia: local infiltration    Anesthesia:  Local Anesthetic: LET (lido,epi,tetracaine) and lidocaine 1% without epinephrine  Patient sedated: no  Preparation: Patient was prepped and draped in the usual sterile fashion.  Irrigation solution: saline  Irrigation method: syringe  Amount of cleaning: standard  Debridement: none  Degree of undermining: none  Wound skin closure material used: 5-0 vicryl rapide.  Number of sutures: 4  Technique: simple  Approximation: close  Approximation difficulty: simple  Patient tolerance: Patient tolerated the procedure well with no immediate complications        Labs Reviewed   POCT GLUCOSE          CT Head Without Contrast   Final Result      Midline and right inferior frontal/right supraorbital scalp soft tissue swelling/contusion with probable superimposed laceration, and also right periorbital soft tissue swelling/contusion, without displaced skull fracture or acute intracranial abnormality.  Specifically, no intracranial hemorrhage.         Electronically signed by: Hadley Sullivan MD   Date:    06/10/2018   Time:    19:47           Medical Decision Making:   History:   I obtained history from: someone other than patient.       <> Summary of History:   Clinical Tests:   Radiological Study: Ordered and Reviewed  ED Management:  62F with fall from standing, hit head.  No LOC but has head/facial injuries.  On blood thinners - will get head CT.  Repair forehead lacerations.  No ICH - head injury precautions discussed.  Lacs repaired without complication.            Scribe Attestation:   Scribe #1: I performed the above scribed service and the documentation accurately describes the services I performed. I attest to the accuracy of the note.               Clinical Impression:     1. CHI (closed head injury), initial encounter    2. Forehead laceration, initial encounter           I, Dr. Marilyn Floyd, personally  performed the services described in this documentation.   All medical record entries made by the scribe were at my direction and in my presence.   I have reviewed the chart and agree that the record is accurate and complete.   Marilyn Floyd MD.  7:56 PM 06/10/2018                      Marilyn Floyd MD  06/10/18 [id number]

## 2018-06-12 NOTE — TELEPHONE ENCOUNTER
I spoke to patient and she stated that she has gained 14lbs in 3 days.  She fell and ended up with 7 stitches to her face.  She says that she is feeling good and has been taking care of her animals and isn't having any symptoms.  Please advise.

## 2018-06-19 NOTE — PROGRESS NOTES
Home Health Recertification with [facility]. Dr Lyndon Ramon.  services provided.

## 2018-06-26 NOTE — PATIENT INSTRUCTIONS
2018    1 Patient name,  : JAE GILLIAM, 1955   2. MD: CRYSTAL  3.Clinician name and agency : Gerald Kim lpn, [facility], [phone]   4.Level of urgency : MODERATE  5. Reason for call : PATIENT'S WEIGHT TODAY WAS 146LBS WHICH FALLS OUTSIDE OF THE DOCTOR'S PARAMETERS -165LBS,   DOCTOR'S OFFICE NOTIFIED,

## 2018-07-06 PROBLEM — S09.12XA: Status: ACTIVE | Noted: 2018-01-01
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
## 2018-07-06 PROBLEM — N17.9 AKI (ACUTE KIDNEY INJURY): Status: ACTIVE | Noted: 2018-01-01

## 2018-07-06 PROBLEM — E03.9 HYPOTHYROIDISM: Status: ACTIVE | Noted: 2018-01-01

## 2018-07-06 NOTE — ED NOTES
"APPEARANCE: Alert, oriented and in no acute distress.  CARDIAC: S1, S2 noted  PERIPHERAL VASCULAR: trace edema to BLE    RESPIRATORY:Normal rate and effort, breath sounds clear bilaterally throughout chest. Respirations are equal and unlabored no obvious signs of distress.  GASTRO: soft, bowel sounds normal, no tenderness, no abdominal distention.  MUSC: weakness noted to LUE and LLE when lifting,  and sensation equal bilaterally per  and pt "Its been about a week since the weakness started"   SKIN: Bilateral periorbital edema and ecchymosis from previous fall, lac repaired yesterday over R eyebrow, generalized bruising all over body, pt denies any pain at this time   NEURO: 5/5 strength major flexors/extensors bilaterally. Sensory intact to light touch bilaterally. Dede coma scale: eyes open spontaneously-4, oriented & converses-5, obeys commands-6. No neurological abnormalities.   MENTAL STATUS: awake, alert and aware of environment. Oriented x3 per  "she's just not with it."   EYE: PERRL, both eyes: pupils brisk and reactive to light. Normal size.  ENT: EARS: no obvious drainage. NOSE: no active bleeding.     "

## 2018-07-06 NOTE — ED PROVIDER NOTES
Encounter Date: 7/6/2018    SCRIBE #1 NOTE: I, Riddhi Cox, am scribing for, and in the presence of,  Dr. Leger. I have scribed the entire note.       History     Chief Complaint   Patient presents with    Fatigue     c/o increased generalized shaking and weakness since waking up this morning. Pt was seen here yesterday after falling. Has had multiple falls recently     Anum Quick is a 62 y.o. female who  has a past medical history of Chronic anticoagulation - on rivaroxaban (12/29/2017); Chronic systolic heart failure (12/14/2017); CKD (chronic kidney disease) stage 3, GFR 30-59 ml/min (12/29/2017); Coronary artery disease involving native coronary artery of native heart without angina pectoris (12/29/2017); Essential hypertension (12/14/2017); Hyperlipidemia associated with type 2 diabetes mellitus (12/14/2017); Left atrial enlargement; NSTEMI (non-ST elevated myocardial infarction) (12/14/2017); Paroxysmal atrial fibrillation (12/15/2017); Renovascular hypertension (12/14/2017); Severe aortic stenosis (12/14/2017); and Type 2 diabetes mellitus with neurologic complication, without long-term current use of insulin (12/14/2017).    The patient presents to the ED due to generalized weakness. She reports onset of symptoms was several hours ago. The patient states she woke feeling weak. She notes the weakness has progressively worsened since onset. She denies any associated chest pain, shortness of breath, palpitations, nausea or vomiting. As per spouse the patient has been having left arm weakness for the last week. He notes the patient has been unable to hold things with her left hand. Of note the patient has been having a lot of falls recently. She denies a fall today but she was evaluated for a fall yesterday. At which time she had a negative head CT and laceration repair. Per medical record the patient takes Hydrocodone 10 mg x 3 once a day.         The history is provided by the patient.      Review of patient's allergies indicates:  No Known Allergies  Past Medical History:   Diagnosis Date    Chronic anticoagulation - on rivaroxaban 12/29/2017    PAF    Chronic systolic heart failure 12/14/2017     12-15-17   1 - Moderately depressed left ventricular systolic function (EF 30-35%). Akinetic LV apex. There is no thrombus in LV apex.   2 - Indeterminate LV diastolic function.    3 - Mildly to moderately depressed right ventricular systolic function .    4 - Pulmonary hypertension. The estimated PA systolic pressure is 63 mmHg.    5 - Severe low flow aortic valve stenosis (JAMES 0.49 cm2, AVAi 0.27 cm2/m2, peak aortic jet velocity 4.0 m/s,MG 48 mmHg).    6 - Mild to moderate mitral regurgitation.    7 - Mild tricuspid regurgitation.    8 - Severe left atrial enlargement.     CKD (chronic kidney disease) stage 3, GFR 30-59 ml/min 12/29/2017    Coronary artery disease involving native coronary artery of native heart without angina pectoris 12/29/2017    [facility] @ : Per report (12/4/2017) proximal LAD has 20% stenosis, RCA with LI otherwise normal coronaries      Essential hypertension 12/14/2017    Hyperlipidemia associated with type 2 diabetes mellitus 12/14/2017    Left atrial enlargement     NSTEMI (non-ST elevated myocardial infarction) 12/14/2017    Paroxysmal atrial fibrillation 12/15/2017    Renovascular hypertension 12/14/2017    Severe aortic stenosis 12/14/2017    12-15-17  Severe low flow aortic valve stenosis (JAMES 0.49 cm2, AVAi 0.27 cm2/m2, peak aortic jet velocity 4.0 m/s,MG 48 mmHg).      Type 2 diabetes mellitus with neurologic complication, without long-term current use of insulin 12/14/2017     Past Surgical History:   Procedure Laterality Date    CARDIAC SURGERY      HYSTERECTOMY      KNEE SURGERY      SHOULDER ARTHROSCOPY Right 01/16/2004     History reviewed. No pertinent family history.  Social History   Substance Use Topics    Smoking status: Never Smoker    Smokeless  tobacco: Never Used    Alcohol use No     Review of Systems   Constitutional: Negative for chills and fever.   Respiratory: Negative for shortness of breath.    Cardiovascular: Negative for chest pain.   Gastrointestinal: Negative for nausea and vomiting.   Neurological: Positive for weakness (generalized and left arm).   All other systems reviewed and are negative.      Physical Exam     Initial Vitals [07/06/18 1453]   BP Pulse Resp Temp SpO2   113/76 77 16 98.4 °F (36.9 °C) (!) 93 %      MAP       --         Physical Exam    Nursing note and vitals reviewed.  Constitutional: She appears well-developed and well-nourished. She is not diaphoretic. No distress.   HENT:   Head: Normocephalic and atraumatic.   Mouth/Throat: Oropharynx is clear and moist.   Sutured laceration above the right eyebrow. No signs of infection   Eyes: Conjunctivae and EOM are normal.   Pupils are 2 mm and reactive   Neck: Normal range of motion. Neck supple.   No cervical vertebral tenderness   Cardiovascular: Normal rate and regular rhythm. Exam reveals no gallop and no friction rub.    Murmur heard.  Systolic ejection murmur   Pulmonary/Chest: Breath sounds normal. She has no wheezes. She has no rhonchi. She has no rales.   Abdominal: Soft. Bowel sounds are normal. There is no tenderness. There is no rebound and no guarding.   Musculoskeletal: Normal range of motion. She exhibits edema. She exhibits no tenderness.   Trace pitting edema bilaterally   Lymphadenopathy:     She has no cervical adenopathy.   Neurological: She is alert and oriented to person, place, and time.   Mild left arm weakness   Skin: Skin is warm and dry. Capillary refill takes less than 2 seconds. No rash noted.         ED Course   Procedures  Labs Reviewed   CBC W/ AUTO DIFFERENTIAL - Abnormal; Notable for the following:        Result Value    RBC 3.38 (*)     Hemoglobin 9.3 (*)     Hematocrit 29.8 (*)     MCHC 31.2 (*)     RDW 20.1 (*)     Lymph # 0.5 (*)     Gran%  85.5 (*)     Lymph% 9.0 (*)     All other components within normal limits   COMPREHENSIVE METABOLIC PANEL - Abnormal; Notable for the following:     CO2 22 (*)     Glucose 139 (*)     BUN, Bld 86 (*)     Creatinine 2.1 (*)     Alkaline Phosphatase 255 (*)     AST 86 (*)     ALT 85 (*)     Anion Gap 17 (*)     eGFR if  28 (*)     eGFR if non  25 (*)     All other components within normal limits   CK - Abnormal; Notable for the following:      (*)     All other components within normal limits   TROPONIN I - Abnormal; Notable for the following:     Troponin I 0.050 (*)     All other components within normal limits   URINALYSIS - Abnormal; Notable for the following:     Protein, UA Trace (*)     All other components within normal limits   PROTIME-INR - Abnormal; Notable for the following:     Prothrombin Time 14.4 (*)     INR 1.4 (*)     All other components within normal limits   MAGNESIUM   TSH     EKG Readings: (Independently Interpreted)   Sinus rhythm with a rate of 77. 1st degree AV block. No STEMI       Imaging Results          X-Ray Chest 1 View (Final result)  Result time 07/06/18 16:28:57    Final result by Marisela Kinney MD (07/06/18 16:28:57)                 Impression:      Mild retrocardiac opacity in the left which could be due to pleural fluid or atelectasis versus consolidation. This appears improved compared to previous exam. Continued follow-up is recommended.      Electronically signed by: Marisela Kinney MD  Date:    07/06/2018  Time:    16:28             Narrative:    EXAMINATION:  XR CHEST 1 VIEW    CLINICAL HISTORY:  weakness;    TECHNIQUE:  Single frontal view of the chest was performed.    COMPARISON:  Prior dated 05/02/2018    FINDINGS:  The mediastinal structures are midline.  The cardiac silhouette is not well evaluated due to AP technique.  There is a suggestion of a retrocardiac opacity in the left which could be due to pleural fluid or  atelectasis versus consolidation.  This appears improved compared to previous exam.  Continued follow-up is recommended.    Bone anchors are noted in the left humerus.                                 Medical Decision Making:   Independently Interpreted Test(s):   I have ordered and independently interpreted EKG Reading(s) - see prior notes  Clinical Tests:   Lab Tests: Ordered and Reviewed  Radiological Study: Ordered and Reviewed  Medical Tests: Ordered and Reviewed  ED Management:  4:17 PM Paged [facility] Hospitalist    4:34 PM Case discussed with Dr. Campos, who will send his resident to admit the patient.                       Clinical Impression:     1. HARISH (acute kidney injury)    2. Weakness    3. Dehydration    4. Elevated troponin         I, Dr. Talib Leger, personally performed the services described in this documentation. All medical record entries made by the scribe were at my direction and in my presence. I have reviewed the chart and agree that the record reflects my personal performance and is accurate and complete. Talib Leger MD.  4:30 PM 07/06/2018                      Talib Leger MD  07/06/18 [id number]
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
## 2018-07-07 PROBLEM — S09.12XA: Status: ACTIVE | Noted: 2018-01-01

## 2018-07-07 PROBLEM — E03.9 SEVERE HYPOTHYROIDISM: Status: ACTIVE | Noted: 2018-01-01

## 2018-07-07 PROBLEM — E03.9 HYPOTHYROIDISM: Status: ACTIVE | Noted: 2018-01-01

## 2018-07-07 NOTE — MEDICAL/APP STUDENT
Pt is a 61 y/o F who was admitted from the ED last night after a witnessed fall secondary to weakness and fatigue. Pt has hx of multiple falls over the past week and reopened a suture on her R forehead.    Subjective: Pt states her facial pain has improved from a 9/10 to a 5/10 and her weakness has also improved. Pt has no other complaints at this time and states she slept well through the night.    Objective:  Vitals:    07/07/18 0405 07/07/18 0752 07/07/18 0759 07/07/18 0800   BP: 125/86 (!) 140/98     BP Location: Right arm      Patient Position: Lying Lying     Pulse: 81 92 90 92   Resp: 18 16 17    Temp: 97.6 °F (36.4 °C) 97.8 °F (36.6 °C)     TempSrc: Axillary Oral     SpO2:   95%    Weight:       Height:         Physical:  Pt is A&O x3 and has some speech slurring but no comprehension deficits.    Head: bilateral periorbital bruising with swelling. Laceration to R forehead dressed with no erythema, swelling, or exudate.   Eye: PERRL; Extraocular muscles intact with anicteric sclera and clear conjunctivae  Cardio: Regular rate and rhythm with normal S1 and S2 with no murmurs, rubs, or gallops  Respo: crackles in bilateral bases and RUL  Extrem: Warm and well-perfused with no pitting edema in LE  Skin: No tenting or abnormal discoloration  Neuro: Cooperative and pleasant     Labs:   Na: 136  K: 3.5  Cl: 99  CO2: 21  Anion Gap: 16  BUN: 71  Creatine: 1.7  eGFR: 32  Glucose: 109  Ca: 9.1  ALP: 240  Protein: 6.9  Albumin: 3.3  Total Bilirubin: 0.9  AST: 73  ALT: 74    RBC: 3.58  H/H: 9.7 / 32.2  MCV: 90  Fe: 24   TIBC: 514  Saturation Fe: 5  Cholesterol: 95  HDL: 26  LDL: 43.8  Triglycerides: 126  Vit B12: 841  HA1C: 6.3  TSH: 68.709  Free T4: <0.40  POCT Glucose: 102    Radiology:  CT Head, No contrast - Negative  CT Chest, no contrast -    1) small mateusz lower lobe pulmonary effusions   2) 7mm nodule in RLL   3) Small Volume perihepatic abscess  CXR - mateusz plural effusion  Abdominal US - Mildly increased hepatic  echogenicity which may relate to hepatic steatosis.    Assessment:  63 y/o F presents with weakness and hx of multiple falls. Consider amiodarone as cause of hyperthyroidism    Plan:  1) Fe deficient anemia - continue iron supplementation  2) hyperthyroidism - continue levothyroxine  3) HARISH - continue to monitor  4) afib - continue amiodarone  5) Elevated LFTs - Follow up  6) Heart Failure - rhythym controlled with amiodarone with plan for aortic valve replacement  7) Troponin - continue to monitor   8) HTN - continue home meds  9) HLD - continue home meds

## 2018-07-07 NOTE — ED NOTES
Pt very restless and c/o nausea, denies any pain at this time. Inpt MD notified of change after narcan admin. New meds ordered. Will continue to monitor pt.

## 2018-07-07 NOTE — PT/OT/SLP EVAL
Speech Language Pathology Evaluation  Bedside Swallow    Patient Name:  Anum Quick   MRN:  [id number]  Admitting Diagnosis: Severe hypothyroidism    Recommendations:                 General Recommendations:  Speech language evaluation  Diet recommendations:  Regular, Thin   Aspiration Precautions: HOB to 90 degrees, Meds whole 1 at a time, Remain upright 30 minutes post meal, Small bites/sips and Standard aspiration precautions   General Precautions: Standard, diabetic, fall  Communication strategies:  none    History:     History of Present Illness:  Anum Quick is a 62 y.o. female who  has a past medical history of Chronic anticoagulation - on rivaroxaban (12/29/2017); Chronic systolic heart failure (12/14/2017); CKD (chronic kidney disease) stage 3, GFR 30-59 ml/min (12/29/2017); Coronary artery disease involving native coronary artery of native heart without angina pectoris (12/29/2017); Essential hypertension (12/14/2017); Hyperlipidemia associated with type 2 diabetes mellitus (12/14/2017); Left atrial enlargement; NSTEMI (non-ST elevated myocardial infarction) (12/14/2017); Paroxysmal atrial fibrillation (12/15/2017); Renovascular hypertension (12/14/2017); Severe aortic stenosis (12/14/2017); and Type 2 diabetes mellitus with neurologic complication, without long-term current use of insulin (12/14/2017). The patient presented to [facility] on 7/6/2018 with a primary complaint of Fatigue (c/o increased generalized shaking and weakness since waking up this morning. Pt was seen here yesterday after falling. Has had multiple falls recently)     Per patient and , started feeling weakness approx 1 week ago, especially to L sided UE and LE. Difficulty with hand  to LE, dropping things frequently. Also with unsteady gait/falls. Fell yesterday when dog ran between her legs, hit her face/head, [place] [facility] head wnl, lac to R forehead requiring sutures. Since  "yesterday, increased slurred speech/difficulty w language expression. Continued weakness/unsteadiness, also with "shaking" per patient. Fell today, lac on head reopened. Returned to ED for eval.     Denies fever, chest pain, SOB, n/v/d. Of note, patient with chronic back pain radiating to LLE. Taking Norco 10 TID, gabapentin, venlafaxine, restoril.     Past Medical History:   Diagnosis Date    Chronic anticoagulation - on rivaroxaban 12/29/2017    PAF    Chronic systolic heart failure 12/14/2017     12-15-17   1 - Moderately depressed left ventricular systolic function (EF 30-35%). Akinetic LV apex. There is no thrombus in LV apex.   2 - Indeterminate LV diastolic function.    3 - Mildly to moderately depressed right ventricular systolic function .    4 - Pulmonary hypertension. The estimated PA systolic pressure is 63 mmHg.    5 - Severe low flow aortic valve stenosis (JAMES 0.49 cm2, AVAi 0.27 cm2/m2, peak aortic jet velocity 4.0 m/s,MG 48 mmHg).    6 - Mild to moderate mitral regurgitation.    7 - Mild tricuspid regurgitation.    8 - Severe left atrial enlargement.     CKD (chronic kidney disease) stage 3, GFR 30-59 ml/min 12/29/2017    Coronary artery disease involving native coronary artery of native heart without angina pectoris 12/29/2017    [facility] @ : Per report (12/4/2017) proximal LAD has 20% stenosis, RCA with LI otherwise normal coronaries      Essential hypertension 12/14/2017    Hyperlipidemia associated with type 2 diabetes mellitus 12/14/2017    Left atrial enlargement     NSTEMI (non-ST elevated myocardial infarction) 12/14/2017    Paroxysmal atrial fibrillation 12/15/2017    Renovascular hypertension 12/14/2017    Severe aortic stenosis 12/14/2017    12-15-17  Severe low flow aortic valve stenosis (JAMES 0.49 cm2, AVAi 0.27 cm2/m2, peak aortic jet velocity 4.0 m/s,MG 48 mmHg).      Type 2 diabetes mellitus with neurologic complication, without long-term current use of insulin 12/14/2017 "     Past Surgical History:   Procedure Laterality Date    CARDIAC SURGERY      HYSTERECTOMY      KNEE SURGERY      SHOULDER ARTHROSCOPY Right 01/16/2004       Social History: Patient lives with spouse.    Prior diet: regular solids/thin liquids.    Subjective     Pt awake, alert, oriented x4. Pt reports speech is at baseline despite presenting with slurred speech.    Pain/Comfort:  · Pain Rating 1: 0/10    Objective:     Oral Musculature Evaluation  · Oral Musculature: WFL  · Dentition: present and adequate  · Secretion Management: adequate  · Mucosal Quality: good  · Mandibular Strength and Mobility: WFL  · Oral Labial Strength and Mobility: WFL  · Lingual Strength and Mobility: WFL  · Buccal Strength and Mobility: WFL  · Volitional Cough: WFL  · Volitional Swallow: WFL  · Voice Prior to PO Intake: Strong, clear, dry  · Oral Musculature Comments: Pt presenting with mild dysarthria c/b imprecise articulation of consonants. Speech intelligibility determined to be at 80%.    Bedside Swallow Eval:   Consistencies Assessed:  · Thin liquids (x3 cup self-regulated sips, x4 straw self-regulated sips)  · Puree (x3 tsp bites)  · Solids (x2 1/2 pieces of khari cracker)     Oral Phase:   · WFL    Pharyngeal Phase:   · no overt clinical signs/symptoms of aspiration  · no overt clinical signs/symptoms of pharyngeal dysphagia    Treatment: SLP provided skilled education to pt re: rationale for BSE, role of SLP in POC, swallow study results, diet recs, and swallow precautions. Pt verbalized understanding and agreement of all information provided. Discussed swallow study results/recommendations with pt's RN.    Assessment:     Anum Quick is a 62 y.o. female with an SLP diagnosis of mild Dysarthria. No overt s/s of aspiration or pharyngeal dysphagia noted with any consistencies. RECS: Regular solids/thin liquids with adherence to standard swallow precautions. ST will f/u to address dysarthria.    Goals:    SLP Goals         Problem: SLP Goal    Goal Priority Disciplines Outcome   SLP Goal     SLP Ongoing (interventions implemented as appropriate)   Description:  SLP Short Term Goals:  1. Patient will tolerate regular consistency/thin liquids po diet with no overt s/s of aspiration.   2. Pt will participate in a motor speech evaluation to construct appropriate treatment plan and goals.                    Plan:     · Patient to be seen:  3 x/week   · Plan of Care expires:  08/05/18  · Plan of Care reviewed with:  patient   · SLP Follow-Up:  Yes       Discharge recommendations:  other (see comments) (TBD)     Time Tracking:     SLP Treatment Date:   07/07/18  Speech Start Time:  1245  Speech Stop Time:  1305     Speech Total Time (min):  20 min    Billable Minutes: Eval Swallow and Oral Function 10 minutes and Seld Care/Home Management Training 10 minutes    Cristel Kelly CCC-SLP  07/07/2018

## 2018-07-07 NOTE — PLAN OF CARE
Problem: Physical Therapy Goal  Goal: Physical Therapy Goal  Goals to be met by: 18     Patient will increase functional independence with mobility by performin. Sit to stand transfer with Supervision  2. Bed to chair transfer with Supervision using Rolling Walker  3. Gait  x 150 feet with Supervision using Rolling Walker.   4. Ascend/descend 1 flight of stairs with bilateral Handrails Contact Guard Assistance  5. Stand for 5 minutes with Supervision using Rolling Walker    Outcome: Ongoing (interventions implemented as appropriate)  PT evaluation completed, note to follow. Pt presents with some confusion and inconsistent answering of questions. Pt ambulated 30 ft with RW and min A with max cues for RW management. Pt on 3 L/min supplemental O2 and SaO2 was 95-98%. Recommendations are ongoing at this time pending pt's progress and safety with functional mobility.

## 2018-07-07 NOTE — PLAN OF CARE
Problem: SLP Goal  Goal: SLP Goal  SLP Short Term Goals:  1. Patient will tolerate regular consistency/thin liquids po diet with no overt s/s of aspiration.   2. Pt will participate in a motor speech evaluation to construct appropriate treatment plan and goals.  Outcome: Ongoing (interventions implemented as appropriate)  7/7/2018: Swallow study completed this PM. No overt s/s of aspiration or pharyngeal dysphagia noted with any consistencies. Pt presenting with mild dysarthria, however, pt reports speech is at baseline. RECS: Regular solids/thin liquids with adherence to standard swallow precautions. ST will f/u to address dysarthria.  ZEB [place]. CCC-SLP  Speech-Language Pathologist

## 2018-07-07 NOTE — PLAN OF CARE
Problem: Occupational Therapy Goal  Goal: Occupational Therapy Goal  Goals to be met by: 7/17/2018    Patient will increase functional independence with ADLs by performing:    Feeding with Modified [place].  UE Dressing with Modified [place].  LE Dressing with Modified [place].  Grooming while standing with Modified [place].  Supine to sit with Modified [place].  Stand pivot transfers with Modified [place].  Toilet transfer to toilet with Modified [place].  Upper extremity exercise program x10 reps per handout, with independence.    Outcome: Ongoing (interventions implemented as appropriate)  OT initial eval completed and treatment initiated.  Pt. Would benefit from ongoing OT.  Discharge recommendations pending progress.  DME :  Needs BSC and TTB but  stated to PT that he will obtain on his own.  Continue with OT POC.

## 2018-07-07 NOTE — PT/OT/SLP EVAL
Occupational Therapy   Evaluation/treatment    Name: Anum Quick  MRN: [id number]  Admitting Diagnosis:  Severe hypothyroidism      Recommendations:     Discharge Recommendations:  (TBD)  Discharge Equipment Recommendations:  bedside commode, bath bench  Barriers to discharge:  Decreased caregiver support ( works few hrs during the day and pt may be home alone )    History:     Occupational Profile:  Living Environment: with  in 2SH with 2 steps to enter and no rails; 1 flight to second floor where bed and bath located.  Has Tub/shower combo; 1/2 bath downstairs.   Previous level of function: ambulates with RW Panda; hx falls; reportedly ADLS performed indep-panda ; assists  with house hold chores.  drives.   Roles and Routines: active in home.  Equipment Owned:  walker, rolling ( to get TTB and BSC )  Assistance upon Discharge: yes but  work few hr a day so pt will be home alone at times.    Past Medical History:   Diagnosis Date    Chronic anticoagulation - on rivaroxaban 12/29/2017    PAF    Chronic systolic heart failure 12/14/2017     12-15-17   1 - Moderately depressed left ventricular systolic function (EF 30-35%). Akinetic LV apex. There is no thrombus in LV apex.   2 - Indeterminate LV diastolic function.    3 - Mildly to moderately depressed right ventricular systolic function .    4 - Pulmonary hypertension. The estimated PA systolic pressure is 63 mmHg.    5 - Severe low flow aortic valve stenosis (JAMES 0.49 cm2, AVAi 0.27 cm2/m2, peak aortic jet velocity 4.0 m/s,MG 48 mmHg).    6 - Mild to moderate mitral regurgitation.    7 - Mild tricuspid regurgitation.    8 - Severe left atrial enlargement.     CKD (chronic kidney disease) stage 3, GFR 30-59 ml/min 12/29/2017    Coronary artery disease involving native coronary artery of native heart without angina pectoris 12/29/2017    [facility] @ : Per report (12/4/2017) proximal LAD has 20% stenosis, RCA with LI  otherwise normal coronaries      Essential hypertension 12/14/2017    Hyperlipidemia associated with type 2 diabetes mellitus 12/14/2017    Left atrial enlargement     NSTEMI (non-ST elevated myocardial infarction) 12/14/2017    Paroxysmal atrial fibrillation 12/15/2017    Renovascular hypertension 12/14/2017    Severe aortic stenosis 12/14/2017    12-15-17  Severe low flow aortic valve stenosis (JAMES 0.49 cm2, AVAi 0.27 cm2/m2, peak aortic jet velocity 4.0 m/s,MG 48 mmHg).      Type 2 diabetes mellitus with neurologic complication, without long-term current use of insulin 12/14/2017       Past Surgical History:   Procedure Laterality Date    CARDIAC SURGERY      HYSTERECTOMY      KNEE SURGERY      SHOULDER ARTHROSCOPY Right 01/16/2004       Subjective     Chief Complaint: none  Patient/Family stated goals: none  Communicated with: nurse prior to session.  Pain/Comfort:  · Pain Rating 1: 0/10  · Pain Rating Post-Intervention 1: 0/10    Patients cultural, spiritual, [employer]alism conflicts given the current situation: na    Objective:     Patient found with: bed alarm, telemetry, oxygen (3 L)    General Precautions: Standard, fall, diabetic, NPO (NPO)   Orthopedic Precautions:N/A   Braces: N/A     Occupational Performance:    Bed Mobility:    · Patient completed Scooting/Bridging with contact guard assistance  · Patient completed Supine to Sit with contact guard assistance and minimum assistance  · Patient completed Sit to Supine with stand by assistance    Functional Mobility/Transfers:  · Patient completed Sit <> Stand Transfer with contact guard assistance  with  rolling walker   · Patient completed Toilet Transfer Step Transfer technique with stand by assistance with  bedside commode  · Functional Mobility: CGA with RW to sink and back to bed; slightly unsteady but min vc for safety awareness.     Activities of Daily Living:  · Feeding:  NPO fr testing??  · Grooming: stand by assistance standing at sink  brushed teeth  · Lower Body Dressing: modified independence socks crosed legs  · Toileting: stand by assistance from BSC safety    Cognitive/Visual Perceptual:  Cognitive/Psychosocial Skills:     -       Oriented to: Person, Place and Time   -       Follows Commands/attention:Follows one-step commands and Follows two-step commands  -       Communication: clear/fluent  -       Memory: Poor immediate recall  -       Safety awareness/insight to disability: impaired   -       Mood/Affect/Coping skills/emotional control: Appropriate to situation and Flat affect  Visual/Perceptual:      -Intact    Physical Exam:  Balance:    -       sitting:  good; standing:  fair plus/poor plus  Postural examination/scapula alignment:    -       Rounded shoulders  Skin integrity: Bruising of eye sockets  Dominant hand:    -       right  Upper Extremity Range of Motion:     -       Right Upper Extremity: WFL  -       Left Upper Extremity: WFL  Upper Extremity Strength:    -       Right Upper Extremity: WFL  -       Left Upper Extremity: WFL   Strength:    -       Right Upper Extremity: WFL  -       Left Upper Extremity: WFL    Patient left HOB elevated with all lines intact, call button in reach, bed alarm on and nurse notified    [facility] 6 Click:  [facility] Total Score: 18    Treatment & Education:  Role of OT And POC; standing with > 4 min standing no LOB or fatigue; safety awareness with RW placement with ADLS.  BSC stand step and toileting SBA.   Education:    Assessment:     Anum Quick is a 62 y.o. female with a medical diagnosis of Severe hypothyroidism.  She presents with OT initial eval completed and treatment initiated.  Pt. Would benefit from ongoing OT.  Discharge recommendations pending progress.  DME :  Needs BSC and TTB but  stated to PT that he will obtain on his own.  Continue with OT POC.     Performance deficits affecting function are impaired self care skills, weakness, impaired balance, decreased  "coordination, decreased safety awareness, gait instability, impaired functional mobilty, impaired cognition, impaired endurance, impaired skin.      Rehab Prognosis:  good; patient would benefit from acute skilled OT services to address these deficits and reach maximum level of function.         Clinical Decision Makin.  OT Mod:  "Pt evaluation falls under moderate complexity for evaluation coding due to identification of 3-5 performance deficits noted as stated above. Eval required Min/Mod assistance to complete on this date and detailed assessment(s) were utilized. Moreover, an expanded review of history and occupational profile obtained with additional review of cognitive, physical and psychosocial hx."     Plan:     Patient to be seen 5 x/week to address the above listed problems via self-care/home management, therapeutic activities, therapeutic exercises  · Plan of Care Expires: 18  · Plan of Care Reviewed with: patient    This Plan of care has been discussed with the patient who was involved in its development and understands and is in agreement with the identified goals and treatment plan    GOALS:    Occupational Therapy Goals        Problem: Occupational Therapy Goal    Goal Priority Disciplines Outcome Interventions   Occupational Therapy Goal     OT, PT/OT Ongoing (interventions implemented as appropriate)    Description:  Goals to be met by: 2018    Patient will increase functional independence with ADLs by performing:    Feeding with Modified [place].  UE Dressing with Modified [place].  LE Dressing with Modified [place].  Grooming while standing with Modified [place].  Supine to sit with Modified [place].  Stand pivot transfers with Modified [place].  Toilet transfer to toilet with Modified [place].  Upper extremity exercise program x10 reps per handout, with independence.                      Time Tracking:     OT Date of Treatment: 18  OT " Start Time: 1158  OT Stop Time: 1222  OT Total Time (min): 24 min    Billable Minutes:Evaluation 15  Self Care/Home Management 9  Total Time 24    Alisa Hollingsworth OT  7/7/2018

## 2018-07-07 NOTE — ED NOTES
Report given to Marina BERRIOS to take over care once transferred to [facility]. Pt currently in CT. Will continue to monitor pt.

## 2018-07-07 NOTE — PROGRESS NOTES
Patient arrived to unit via stretcher accompanied by PCT and . Oriented to room and call light, vitals stable.

## 2018-07-07 NOTE — PLAN OF CARE
Problem: Patient Care Overview  Goal: Plan of Care Review  Outcome: Ongoing (interventions implemented as appropriate)  Patient resting in bed, oriented but lethargic. Medication administered as ordered. Telemetry on, NSR. Asleep through most of the night. Patient is very weak, bed alarm remains on, instructed to use call light. Bruising noted around both eyes and dressing to right forehead laceration remains CDI. Encouraged to call with needs or concerns. Will continue to monitor.

## 2018-07-07 NOTE — PLAN OF CARE
Problem: Patient Care Overview  Goal: Plan of Care Review  Outcome: Revised  Patient is awake and alert but at times is off with her mentation.  Worked with PT this shift.  Able to use bedside commode with minimal assist.   is at bedside.  Denies pain.  Bruises noted to eyes and arms.  On continuous pulse ox, SATS 94%.  Instructed to call for any assistance.  Bed alarm on.  Patient verbalized understanding.  Will continue to monitor.

## 2018-07-07 NOTE — PROGRESS NOTES
Pt on RA with SATS of 85%. Pt placed on 4lpm NC by RT with SATS now at 92%. Will continue to monitor.

## 2018-07-07 NOTE — PT/OT/SLP EVAL
"Physical Therapy Evaluation and Treatment    Patient Name:  Anum Quick   MRN:  [id number]    Recommendations:     Discharge Recommendations:   (ongoing assessment- recommending pt have 24/7 supervision/assist at this time for safety)   Discharge Equipment Recommendations: bedside commode, bath bench (pt's  reporting he has already planned to get the DME)   Barriers to discharge: Inaccessible home and Decreased caregiver support    Assessment:     Anum Quick is a 62 y.o. female admitted with a medical diagnosis of Severe hypothyroidism.  She presents with the following impairments/functional limitations:  weakness, impaired functional mobilty, impaired endurance, impaired self care skills, gait instability, impaired balance, decreased safety awareness, impaired cognition, decreased coordination, impaired coordination, impaired skin. Pt presents with some confusion and inconsistent answering of questions. Pt ambulated 30 ft with RW and min A with max cues for RW management. Pt on 3 L/min supplemental O2 and SaO2 was 95-98%. Recommendations are ongoing at this time pending pt's progress and safety with functional mobility.     Rehab Prognosis: Good; patient would benefit from acute skilled PT services to address these deficits and reach maximum level of function.      Recent Surgery: * No surgery found *      Plan:     During this hospitalization, patient to be seen 6 x/week to address the above listed problems via gait training, therapeutic activities, therapeutic exercises, neuromuscular re-education  · Plan of Care Expires:  08/07/18   Plan of Care Reviewed with: patient, spouse    Subjective     Communicated with AGUSTINA Arnold prior to session.  Patient found supine with HOB elevated upon PT entry to room, agreeable to evaluation.      Chief Complaint: fatigue  Patient comments/goals: pt denies pain but reports she is tired-- pt's  reporting pt fell ~1 month ago from her legs "giving out" " on her and this last fall was because the dogs got in her way and she tripped over him. Pt and pt's  reporting pt's tremors are much better than yesterday.  Pain/Comfort:  · Pain Rating 1: 0/10  · Pain Rating Post-Intervention 1: 0/10    Patients cultural, spiritual, Jainism conflicts given the current situation:      Living Environment:  Pt provides inconsistent answers to home environment questions, pt's  present at end of session to clarify home envt and PLOF. Pt lives with her  in a 2  with 2 NAE with no rails and bedroom/full bath upstairs and 1/2 bath downstairs. Tub/shower combo.  Prior to admission, patients level of function was mod I with gait with or without use of RW (pending pt's fatigue/weakness), toileting, feeding. Requires 's assist with bathing and meal prep.  Patient has the following equipment: walker, rolling (pt's  reporting he is getting a BSC and TTB tomorrow).  DME owned (not currently used): none.  Upon discharge, patient will have assistance from her  when he is home- pt's  reports he works ~2-3 hours every once in a while.    Objective:     Patient found with: bed alarm, oxygen, telemetry (3 L/min O2)     General Precautions: Standard, fall   Orthopedic Precautions:N/A   Braces: N/A     Exams:  · Cognitive Exam:  Patient is oriented to Person, Place, Time and Situation -- pt with flat affect and gives inconsistent responses during interview for home env't and PLOF  · Gross Motor Coordination:  pt presents with mild UE jerking at rest and LE jerking when resisting movements  · Postural Exam:  Patient presented with the following abnormalities:    · -       Rounded shoulders  · -       Forward head  · Sensation:    · -       Intact  · Skin Integrity/Edema:      · -       Skin integrity: Bruising of face  · BLE ROM: WFL  · BLE Strength: ankle DF 5/5, knee ext 4+/5, hip grossly 4/5    Functional Mobility:  · Bed Mobility:     · Scooting:  stand by assistance  · Supine to Sit: stand by assistance  · Sit to Supine: stand by assistance  · Transfers:     · Sit to Stand:  minimum assistance with rolling walker  · Gait: 30 ft with RW and min A for stability and max cues for RW managment and proper distance from RW    AM-PAC 6 CLICK MOBILITY  Total Score:17       Therapeutic Activities and Exercises:  Pt became fatigued following bout of gait and requests to return to supine.    Patient left HOB elevated with all lines intact, call button in reach, bed alarm on, RN notified and pt's  present.    GOALS:    Physical Therapy Goals        Problem: Physical Therapy Goal    Goal Priority Disciplines Outcome Goal Variances Interventions   Physical Therapy Goal     PT/OT, PT Ongoing (interventions implemented as appropriate)     Description:  Goals to be met by: 18     Patient will increase functional independence with mobility by performin. Sit to stand transfer with Supervision  2. Bed to chair transfer with Supervision using Rolling Walker  3. Gait  x 150 feet with Supervision using Rolling Walker.   4. Ascend/descend 1 flight of stairs with bilateral Handrails Contact Guard Assistance  5. Stand for 5 minutes with Supervision using Rolling Walker                      History:     Past Medical History:   Diagnosis Date    Chronic anticoagulation - on rivaroxaban 2017    PAF    Chronic systolic heart failure 2017     12-15-17   1 - Moderately depressed left ventricular systolic function (EF 30-35%). Akinetic LV apex. There is no thrombus in LV apex.   2 - Indeterminate LV diastolic function.    3 - Mildly to moderately depressed right ventricular systolic function .    4 - Pulmonary hypertension. The estimated PA systolic pressure is 63 mmHg.    5 - Severe low flow aortic valve stenosis (JAMES 0.49 cm2, AVAi 0.27 cm2/m2, peak aortic jet velocity 4.0 m/s,MG 48 mmHg).    6 - Mild to moderate mitral regurgitation.    7 - Mild tricuspid  regurgitation.    8 - Severe left atrial enlargement.     CKD (chronic kidney disease) stage 3, GFR 30-59 ml/min 12/29/2017    Coronary artery disease involving native coronary artery of native heart without angina pectoris 12/29/2017    [facility] @ : Per report (12/4/2017) proximal LAD has 20% stenosis, RCA with LI otherwise normal coronaries      Essential hypertension 12/14/2017    Hyperlipidemia associated with type 2 diabetes mellitus 12/14/2017    Left atrial enlargement     NSTEMI (non-ST elevated myocardial infarction) 12/14/2017    Paroxysmal atrial fibrillation 12/15/2017    Renovascular hypertension 12/14/2017    Severe aortic stenosis 12/14/2017    12-15-17  Severe low flow aortic valve stenosis (JAMES 0.49 cm2, AVAi 0.27 cm2/m2, peak aortic jet velocity 4.0 m/s,MG 48 mmHg).      Type 2 diabetes mellitus with neurologic complication, without long-term current use of insulin 12/14/2017       Past Surgical History:   Procedure Laterality Date    CARDIAC SURGERY      HYSTERECTOMY      KNEE SURGERY      SHOULDER ARTHROSCOPY Right 01/16/2004       Clinical Decision Making:     History  Co-morbidities and personal factors that may impact the plan of care Examination  Body Structures and Functions, activity limitations and participation restrictions that may impact the plan of care Clinical Presentation   Decision Making/ Complexity Score   Co-morbidities:   [] Time since onset of injury / illness / exacerbation  [] Status of current condition  [x]Patient's cognitive status and safety concerns    [] Multiple Medical Problems (see med hx)  Personal Factors:   [] Patient's age  [] Prior Level of function   [x] Patient's home situation (environment and family support)  [] Patient's level of motivation  [] Expected progression of patient      HISTORY:(criteria)    [] 55657 - no personal factors/history    [x] 59940 - has 1-2 personal factor/comorbidity     [] 22671 - has >3 personal factor/comorbidity      Body Regions:  [] Objective examination findings  [] Head     []  Neck  [] Trunk   [] Upper Extremity  [] Lower Extremity    Body Systems:  [x] For communication ability, affect, cognition, language, and learning style: the assessment of the ability to make needs known, consciousness, orientation (person, place, and time), expected emotional /behavioral responses, and learning preferences (eg, learning barriers, education  needs)  [x] For the neuromuscular system: a general assessment of gross coordinated movement (eg, balance, gait, locomotion, transfers, and transitions) and motor function  (motor control and motor learning)  [x] For the musculoskeletal system: the assessment of gross symmetry, gross range of motion, gross strength, height, and weight  [x] For the integumentary system: the assessment of pliability(texture), presence of scar formation, skin color, and skin integrity  [] For cardiovascular/pulmonary system: the assessment of heart rate, respiratory rate, blood pressure, and edema     Activity limitations:    [] Patient's cognitive status and saf ety concerns          [] Status of current condition      [] Weight bearing restriction  [] Cardiopulmunary Restriction    Participation Restrictions:   [] Goals and goal agreement with the patient     [] Rehab potential (prognosis) and probable outcome      Examination of Body System: (criteria)    [] 96096 - addressing 1-2 elements    [x] 64107 - addressing a total of 3 or more elements     [] 12984 -  Addressing a total of 4 or more elements         Clinical Presentation: (criteria)  Stable - 49675     On examination of body system using standardized tests and measures patient presents with 3 or more elements from any of the following: body structures and functions, activity limitations, and/or participation restrictions.  Leading to a clinical presentation that is considered stable and/or uncomplicated                              Clinical Decision  Making  (Eval Complexity):  Low- 62898     Time Tracking:     PT Received On: 07/07/18  PT Start Time: 0854     PT Stop Time: 0922  PT Total Time (min): 28 min     Billable Minutes: Evaluation 15 and Gait Training 13      Kacie Blackmon, PT  07/07/2018

## 2018-07-07 NOTE — H&P
[facility] Internal Medicine History and Physical - Resident Note    Admitting Team: [facility] Team A  Attending Physician: Dr. Campos  Resident: Yocasta Lux  Interns: [place] [facility] [place]    Date of Admit: 7/6/2018    Chief Complaint     Fatigue (c/o increased generalized shaking and weakness since waking up this morning. Pt was seen here yesterday after falling. Has had multiple falls recently)   for 1 week.     Subjective:      History of Present Illness:  Anum Quick is a 62 y.o. female who  has a past medical history of Chronic anticoagulation - on rivaroxaban (12/29/2017); Chronic systolic heart failure (12/14/2017); CKD (chronic kidney disease) stage 3, GFR 30-59 ml/min (12/29/2017); Coronary artery disease involving native coronary artery of native heart without angina pectoris (12/29/2017); Essential hypertension (12/14/2017); Hyperlipidemia associated with type 2 diabetes mellitus (12/14/2017); Left atrial enlargement; NSTEMI (non-ST elevated myocardial infarction) (12/14/2017); Paroxysmal atrial fibrillation (12/15/2017); Renovascular hypertension (12/14/2017); Severe aortic stenosis (12/14/2017); and Type 2 diabetes mellitus with neurologic complication, without long-term current use of insulin (12/14/2017).. The patient presented to [facility] on 7/6/2018 with a primary complaint of Fatigue (c/o increased generalized shaking and weakness since waking up this morning. Pt was seen here yesterday after falling. Has had multiple falls recently)    Per patient and , started feeling weakness approx 1 week ago, especially to L sided UE and LE. Difficulty with hand  to LE, dropping things frequently. Also with unsteady gait/falls. Fell yesterday when dog ran between her legs, hit her face/head, presented to [facility] ED. CT head wnl, lac to R forehead requiring sutures. Since yesterday, increased slurred speech/difficulty w language expression. Continued  "weakness/unsteadiness, also with "shaking" per patient. Fell today, lac on head reopened. Returned to ED for eval.    Denies fever, chest pain, SOB, n/v/d. Of note, patient with chronic back pain radiating to LLE. Taking Norco 10 TID, gabapentin, venlafaxine, restoril.     Past Medical History:  Past Medical History:   Diagnosis Date    Chronic anticoagulation - on rivaroxaban 12/29/2017    PAF    Chronic systolic heart failure 12/14/2017     12-15-17   1 - Moderately depressed left ventricular systolic function (EF 30-35%). Akinetic LV apex. There is no thrombus in LV apex.   2 - Indeterminate LV diastolic function.    3 - Mildly to moderately depressed right ventricular systolic function .    4 - Pulmonary hypertension. The estimated PA systolic pressure is 63 mmHg.    5 - Severe low flow aortic valve stenosis (JAMES 0.49 cm2, AVAi 0.27 cm2/m2, peak aortic jet velocity 4.0 m/s,MG 48 mmHg).    6 - Mild to moderate mitral regurgitation.    7 - Mild tricuspid regurgitation.    8 - Severe left atrial enlargement.     CKD (chronic kidney disease) stage 3, GFR 30-59 ml/min 12/29/2017    Coronary artery disease involving native coronary artery of native heart without angina pectoris 12/29/2017    [facility] @ : Per report (12/4/2017) proximal LAD has 20% stenosis, RCA with LI otherwise normal coronaries      Essential hypertension 12/14/2017    Hyperlipidemia associated with type 2 diabetes mellitus 12/14/2017    Left atrial enlargement     NSTEMI (non-ST elevated myocardial infarction) 12/14/2017    Paroxysmal atrial fibrillation 12/15/2017    Renovascular hypertension 12/14/2017    Severe aortic stenosis 12/14/2017    12-15-17  Severe low flow aortic valve stenosis (JAMES 0.49 cm2, AVAi 0.27 cm2/m2, peak aortic jet velocity 4.0 m/s,MG 48 mmHg).      Type 2 diabetes mellitus with neurologic complication, without long-term current use of insulin 12/14/2017       Past Surgical History:  Past Surgical History: "   Procedure Laterality Date    CARDIAC SURGERY      HYSTERECTOMY      KNEE SURGERY      SHOULDER ARTHROSCOPY Right 01/16/2004       Allergies:  Review of patient's allergies indicates:  No Known Allergies    Home Medications:  Prior to Admission medications    Medication Sig Start Date End Date Taking? Authorizing Provider   amiodarone (PACERONE) 200 MG Tab Take by mouth once daily.    Historical Provider, MD   atorvastatin (LIPITOR) 80 MG tablet Take 1 tablet (80 mg total) by mouth once daily. 12/25/17   Poonam Ybarra MD   butalbital-acetaminophen-caffeine -40 mg (FIORICET, ESGIC) -40 mg per tablet Take 1 tablet by mouth every 4 (four) hours as needed for Pain.    Historical Provider, MD   clopidogrel (PLAVIX) 75 mg tablet Take 1 tablet (75 mg total) by mouth once daily. 12/26/17 12/26/18  Poonam Ybarra MD   collagenase ointment Apply topically once daily. 2/24/18   Fanny Rosales MD   estropipate (OGEN) 1.5 MG tablet Take 1.5 mg by mouth once daily.    Historical Provider, MD   ferrous sulfate 325 (65 FE) MG EC tablet Take 1 tablet (325 mg total) by mouth 2 (two) times daily. 3/27/18   Lyndon Melissa MD   furosemide (LASIX) 40 MG tablet Take 1 tablet (40 mg total) by mouth 2 (two) times daily. 5/3/18 5/3/19  Claritza Mo, DO   gabapentin (NEURONTIN) 100 MG capsule Take 100 mg by mouth 3 (three) times daily.    Historical Provider, MD   hydrocodone-acetaminophen 10-325mg (NORCO)  mg Tab TK 1 T PO TID PRN FOR 30 DAYS 1/2/18   Historical Provider, MD   magnesium oxide (MAG-OX) 400 mg tablet Take 1 tablet (400 mg total) by mouth once daily. 3/12/18   TYLER Aragon   metFORMIN (GLUMETZA) 500 MG (MOD) 24 hr tablet Take 500 mg by mouth daily with breakfast.    Historical Provider, MD   metoprolol succinate (TOPROL-XL) 50 MG 24 hr tablet Take 1 tablet (50 mg total) by mouth once daily. 5/3/18 5/3/19  Claritza Mo,    nitroGLYCERIN (NITROSTAT) 0.4 MG SL  "tablet Place 1 tablet (0.4 mg total) under the tongue every 5 (five) minutes as needed for Chest pain (angina). 17  Poonam Ybarra MD   rivaroxaban (XARELTO) 20 mg Tab Take 1 tablet (20 mg total) by mouth daily with dinner or evening meal. 17   Arun Blackmon MD   senna (SENOKOT) 8.6 mg tablet Take 1 tablet by mouth 2 (two) times daily as needed for Constipation. 17   Poonam Ybarra MD   temazepam (RESTORIL) 15 mg Cap TK 1 C PO QHS 17   Historical Provider, MD   venlafaxine (EFFEXOR-XR) 150 MG Cp24 TK ONE C PO D WF 17   Historical Provider, MD       Family History:  History reviewed. No pertinent family history.    Social History:  Social History   Substance Use Topics    Smoking status: Never Smoker    Smokeless tobacco: Never Used    Alcohol use No       Review of Systems:  Pertinent positives and negatives listed in HPI. All other systems are reviewed and are negative.    Health Maintaince :   Primary Care Physician: Courtney  Immunizations:   TDap patient unsure  Influenza is not up to date.  Pneumovax is up to date.  Cancer Screening:  PAP: unsure  Mammogram: is not up to date.   Colonoscopy: is not up to date.      Objective:   Last 24 Hour Vital Signs:  BP  Min: 97/55  Max: 136/81  Temp  Av.9 °F (37.2 °C)  Min: 98.4 °F (36.9 °C)  Max: 99.4 °F (37.4 °C)  Pulse  Av.1  Min: 65  Max: 79  Resp  Avg: 15.7  Min: 15  Max: 16  SpO2  Av.3 %  Min: 93 %  Max: 97 %  Height  Av' 3" (160 cm)  Min: 5' 3" (160 cm)  Max: 5' 3" (160 cm)  Weight  Av.4 kg (153 lb)  Min: 69.4 kg (153 lb)  Max: 69.4 kg (153 lb)  Body mass index is 27.1 kg/m².  No intake/output data recorded.    Physical Examination:  General: Alert and awake, speech slurred with some expressive aphasia  Head:  Bilateral periorbital ecchymoses with swelling, discoloration  Eyes:  PERRL; EOMi with anicteric sclera and clear conjunctivae  Mouth:  Oropharynx clear and without exudate; moist mucous " membranes  Cardio:  Regular rate and rhythm with normal S1 and S2; 3/6 systolic murmur to aortic post, mitral post  Resp:  Some mild crackles bilateral bases, good air movement  Abdom: Soft, NTND with normoactive bowel sounds  Extrem: Warm and well-perfused, trace pitting edema b/l LEs to knee  Skin:  Well healing wound to R ankle   Pulses: 2+ and symmetric distally  Neuro:  AAOx3; cooperative and pleasant with no focal deficits    Laboratory:  Most Recent Data:  CBC: Lab Results   Component Value Date    WBC 5.86 07/06/2018    HGB 9.3 (L) 07/06/2018    HCT 29.8 (L) 07/06/2018     07/06/2018    MCV 88 07/06/2018    RDW 20.1 (H) 07/06/2018     BMP: Lab Results   Component Value Date     07/06/2018    K 4.1 07/06/2018    CL 98 07/06/2018    CO2 22 (L) 07/06/2018    BUN 86 (H) 07/06/2018    CREATININE 2.1 (H) 07/06/2018     (H) 07/06/2018    CALCIUM 9.2 07/06/2018    MG 1.9 07/06/2018    PHOS 3.8 05/03/2018     Estimated Creatinine Clearance: 25.6 mL/min (A) (based on SCr of 2.1 mg/dL (H)).  LFTs: Lab Results   Component Value Date    PROT 6.7 07/06/2018    ALBUMIN 3.5 07/06/2018    BILITOT 0.9 07/06/2018    AST 86 (H) 07/06/2018    ALKPHOS 255 (H) 07/06/2018    ALT 85 (H) 07/06/2018     Coags:   Lab Results   Component Value Date    INR 1.4 (H) 07/06/2018     FLP: Lab Results   Component Value Date    CHOL 173 12/15/2017    HDL 33 (L) 12/15/2017    LDLCALC 100.2 12/15/2017    TRIG 199 (H) 12/15/2017    CHOLHDL 19.1 (L) 12/15/2017     DM: Lab Results   Component Value Date    HGBA1C 6.3 (H) 07/06/2018    HGBA1C 5.4 05/02/2018    HGBA1C 5.5 01/10/2018    LDLCALC 100.2 12/15/2017    CREATININE 2.1 (H) 07/06/2018     Thyroid: Lab Results   Component Value Date    TSH 68.709 (H) 07/06/2018    FREET4 <0.40 (L) 07/06/2018     Anemia: Lab Results   Component Value Date    IRON 14 (L) 03/23/2018    TIBC 408 03/23/2018    FERRITIN 37 03/23/2018    [id number] 861 03/22/2018    FOLATE 5.1 03/22/2018      Cardiac: Lab Results   Component Value Date    TROPONINI 0.050 (H) 07/06/2018    BNP 4,834 (H) 05/02/2018     Urinalysis: Lab Results   Component Value Date    LABURIN No growth 03/21/2018    COLORU Yellow 07/06/2018    SPECGRAV 1.020 07/06/2018    NITRITE Negative 07/06/2018    KETONESU Negative 07/06/2018    UROBILINOGEN Negative 07/06/2018    WBCUA 3 03/21/2018       Trended Cardiac Data:    Recent Labs  Lab 07/06/18  1526   TROPONINI 0.050*       Microbiology Data:  N/A    Other Laboratory Data:  N/A    Other Results:  EKG (my interpretation): NSR, no ST changes    Radiology:  Imaging Results          CT Chest Without Contrast (Final result)  Result time 07/06/18 19:54:22    Final result by Gene Craven MD (07/06/18 19:54:22)                 Impression:      1. Small bilateral pleural effusions with suspected mild pulmonary edema.  2. Right lower lobe 7 mm pulmonary nodule.  For a solid nodule 6-8 mm, Fleischner Society 2017 guidelines recommend follow up with non-contrast chest CT at 6-12 months and 18-24 months after discovery.  3. Small volume perihepatic ascites.  4. Additional findings as detailed above.      Electronically signed by: Gene Craven MD  Date:    07/06/2018  Time:    19:54             Narrative:    EXAMINATION:  CT CHEST WITHOUT CONTRAST    CLINICAL HISTORY:  SOB, pulmonary edema suspected;    TECHNIQUE:  Low dose axial images, sagittal and coronal reformations were obtained from the thoracic inlet to the lung bases. Contrast was not administered.    COMPARISON:  None.    FINDINGS:  Structures at the base of the neck are unremarkable.  There is fusiform dilatation of the ascending thoracic aorta measuring 4.4 cm.  Heart is enlarged without pericardial effusion.  Coronary artery calcification and prominent aortic valve calcification is seen.  Few prominent mediastinal lymph nodes are seen which do not meet CT criteria for enlargement.  The esophagus maintains a normal course and  caliber.    The trachea and bronchi are patent.  The lungs are symmetrically expanded.  Small bilateral pleural effusions are seen, left greater than right.  There is mild interlobular septal thickening with diffuse patchy ground-glass attenuation seen within the lungs.  Right lower lobe pulmonary nodule is visualized measuring 7 mm.    There is small volume perihepatic ascites.  The visualized abdominal structures are unremarkable.  Osseous structures demonstrate degenerative change.  Extrathoracic soft tissues are unremarkable.                               CT Head Without Contrast (Final result)  Result time 07/06/18 19:36:55    Final result by Gene Craven MD (07/06/18 19:36:55)                 Impression:      No acute intracranial abnormalities identified.      Electronically signed by: Gene Craven MD  Date:    07/06/2018  Time:    19:36             Narrative:    EXAMINATION:  CT HEAD WITHOUT CONTRAST    CLINICAL HISTORY:  Stroke;    TECHNIQUE:  Low dose axial images were obtained through the head.  Coronal and sagittal reformations were also performed. Contrast was not administered.    COMPARISON:  CT head from yesterday 07/05/2018.    FINDINGS:  No evidence of acute/recent major vascular distribution cerebral infarction, intraparenchymal hemorrhage, or intra-axial space occupying lesion. The ventricular system is normal in size and configuration with no evidence of hydrocephalus. No effacement of the skull-base cisterns. No abnormal extra-axial fluid collections or blood products. The visualized paranasal sinuses and mastoid air cells are clear. The calvarium shows no significant abnormality.                               X-Ray Chest 1 View (Final result)  Result time 07/06/18 16:28:57    Final result by Marisela Kinney MD (07/06/18 16:28:57)                 Impression:      Mild retrocardiac opacity in the left which could be due to pleural fluid or atelectasis versus consolidation. This  appears improved compared to previous exam. Continued follow-up is recommended.      Electronically signed by: Marisela Kinney MD  Date:    07/06/2018  Time:    16:28             Narrative:    EXAMINATION:  XR CHEST 1 VIEW    CLINICAL HISTORY:  weakness;    TECHNIQUE:  Single frontal view of the chest was performed.    COMPARISON:  Prior dated 05/02/2018    FINDINGS:  The mediastinal structures are midline.  The cardiac silhouette is not well evaluated due to AP technique.  There is a suggestion of a retrocardiac opacity in the left which could be due to pleural fluid or atelectasis versus consolidation.  This appears improved compared to previous exam.  Continued follow-up is recommended.    Bone anchors are noted in the left humerus.                                   Assessment:     Anum Quick is a 62 y.o. female with:  Patient Active Problem List    Diagnosis Date Noted    HARISH (acute kidney injury) 07/06/2018    Acute on chronic congestive heart failure 05/03/2018    Acute on chronic combined systolic and diastolic heart failure 05/02/2018    Iron deficiency anemia 03/23/2018    Cellulitis of right foot 03/23/2018    Right foot ulcer 03/23/2018    Venous stasis of both lower extremities 03/21/2018    Left atrial enlargement     Pancreatic cyst 02/22/2018    Venous stasis ulcers 02/19/2018    Candidal dermatitis 02/18/2018    Acute on chronic systolic congestive heart failure 02/17/2018    Coronary artery disease involving native coronary artery of native heart without angina pectoris 12/29/2017    Chronic anticoagulation - on rivaroxaban 12/29/2017    Paroxysmal atrial fibrillation 12/15/2017    Pulmonary hypertension due to left ventricular systolic dysfunction 12/15/2017    Chronic systolic heart failure 12/14/2017    Type 2 diabetes mellitus with neurologic complication, without long-term current use of insulin 12/14/2017    Renovascular hypertension 12/14/2017    Severe aortic  stenosis 12/14/2017    Hyperlipidemia associated with type 2 diabetes mellitus 12/14/2017        Plan:     Weakness with multiple falls on AC   - Patient and  reporting increasing weakness x 1 week, LUE and LLE  - patient falling with head injury 7/5, continued weakness/unsteady gait today plus speech difficulty  - possible etiologies include medication side effects/polypharmacy, intoxication with other substance, CVA, infection, deconditioning  - CBC with anemia, LFTs elevated  - UA noninfected  - CT Head with no acute hemorrhage or ischemia  - CXR, CT Chest with small pleural effusions as below   - s/p naloxone x1 in ED with increased agitation following  - Holding all opioid/other narcotic or altering medications  - UTox pending    HARISH   - Cr 2.1; baseline 1.8  - unclear if over-diuresing at home v other etiology   - continue to monitor    Hypothyroidism  - On admit TSH 68.709, T4<0.4  - per patient and family no prior hx   - initiate synthroid 25mcg daily     Combined systolic and diastolic heart failure   - TTE 3/22/2018 with severely depressed EF 20-25%, severe AS, grade III DD, pulm HTN 55mmHg  - Plan for aortic valve replacement, follows with Dr. Ybarra  - CXR with mild retrocardiac opacity L; CT chest with small b/l pleural effusions, suspected pulmonary edema  - will hold off on IVF   - continue to monitor volume status     Elevated LFTs  - Alk Phos 255, has been 150 baseline recently  - AST 86, ALT 85  - ordered abdominal US    Atrial Fibrillation  - rate controlled w amiodarone  - continue amio, xarelto  - no active issues    Anemia, NOS  - H/H 9.3/29.8 (bl ~10.8)  - iron studies pending    Intermediate Troponin  - on admit trop 0.050  - repeat trop and EKG pending     DM II  - Hgb A1c 6.3  - hold home metformin  - SSI/accuchecks    HTN  - per patient not on B-b though listed as home med per chart review  - Bps wnl for now    HLD  - holdine home statin in setting of transaminitis     Dispo:  pending clinical improvement, PT/OT/ST eval  Diet: NPO pending eval  Ppx: cont home xarelto       Code Status:     Full     Claritza López  [facility] HO-II  [facility] Service    [facility] Hospitalist Pager numbers:   [facility] Hospitalist Medicine Team A (Beth/Margret): [phone]  [facility] Hospitalist Medicine Team B (Martin/Pa):  [phone]

## 2018-07-07 NOTE — PROGRESS NOTES
"[facility] Internal Medicine Progress Note    Admitting Team: [facility] Internal Medicine Team A  Attending Physician: Dr. Campos  Resident: Maci [place]  Interns: [place]    Date of Admit: 2018    Subjective:      Patient with no complaints this morning, reports feeling better with some improved weakness. Placed on 3L O2 NC for hypoxia. Denies CP, SOB, n/v/d. Questioned further about etiology of bruising; reports feeling safe at home, bruising isolated event from falls, pt denies any abuse from spouse/other.     Objective:   Last 24 Hour Vital Signs:  BP  Min: 97/55  Max: 140/98  Temp  Av.4 °F (36.9 °C)  Min: 97.6 °F (36.4 °C)  Max: 99.4 °F (37.4 °C)  Pulse  Av.6  Min: 72  Max: 92  Resp  Av.1  Min: 15  Max: 18  SpO2  Av.5 %  Min: 85 %  Max: 97 %  Height  Av' 3" (160 cm)  Min: 5' 3" (160 cm)  Max: 5' 3" (160 cm)  Weight  Av.4 kg (153 lb)  Min: 69.4 kg (153 lb)  Max: 69.4 kg (153 lb)  Body mass index is 27.1 kg/m².  I/O last 3 completed shifts:  In: -   Out: 1000 [Urine:1000]    Physical Examination:  General: Alert and awake, speech slurred, on 3L NC   Head:  Bilateral periorbital ecchymoses with swelling, discoloration  Eyes:  PERRL; EOMi with anicteric sclera and clear conjunctivae  Mouth:  Oropharynx clear and without exudate; moist mucous membranes  Cardio:  Regular rate and rhythm with normal S1 and S2; 3/6 systolic murmur to aortic post, mitral post  Resp:  Crackles extending to apex of R lung, basilar crackles to L lung  Abdom: Soft, NTND with normoactive bowel sounds  Extrem: Warm and well-perfused, trace pitting edema b/l LEs to knee  Skin:  Well healed wound to R ankle   Pulses: 2+ and symmetric distally  Neuro:  AAOx3; cooperative and pleasant, off-balance when sitting up    Laboratory:  Most Recent Data:  CBC:   Lab Results   Component Value Date    WBC 7.03 2018    HGB 9.7 (L) 2018    HCT 32.2 (L) 2018     2018    MCV 90 2018    " RDW 20.2 (H) 07/07/2018     BMP:   Lab Results   Component Value Date     07/07/2018    K 3.5 07/07/2018    CL 99 07/07/2018    CO2 21 (L) 07/07/2018    BUN 71 (H) 07/07/2018    CREATININE 1.7 (H) 07/07/2018     07/07/2018    CALCIUM 9.1 07/07/2018    MG 1.9 07/06/2018    PHOS 3.8 05/03/2018     Estimated Creatinine Clearance: 31.7 mL/min (A) (based on SCr of 1.7 mg/dL (H)).  LFTs:   Lab Results   Component Value Date    PROT 6.9 07/07/2018    ALBUMIN 3.3 (L) 07/07/2018    BILITOT 0.9 07/07/2018    AST 73 (H) 07/07/2018    ALKPHOS 240 (H) 07/07/2018    ALT 74 (H) 07/07/2018     Coags:   Lab Results   Component Value Date    INR 1.4 (H) 07/06/2018     FLP:   Lab Results   Component Value Date    CHOL 95 (L) 07/06/2018    HDL 26 (L) 07/06/2018    LDLCALC 43.8 (L) 07/06/2018    TRIG 126 07/06/2018    CHOLHDL 27.4 07/06/2018     DM:   Lab Results   Component Value Date    HGBA1C 6.3 (H) 07/06/2018    HGBA1C 5.4 05/02/2018    HGBA1C 5.5 01/10/2018    LDLCALC 43.8 (L) 07/06/2018    CREATININE 1.7 (H) 07/07/2018     Thyroid:   Lab Results   Component Value Date    TSH 68.709 (H) 07/06/2018    FREET4 <0.40 (L) 07/06/2018     Anemia:   Lab Results   Component Value Date    IRON 24 (L) 07/06/2018    TIBC 514 (H) 07/06/2018    FERRITIN 25 07/06/2018    LBCKRYEV24 841 07/06/2018    FOLATE 9.1 07/06/2018     Cardiac:   Lab Results   Component Value Date    TROPONINI 0.048 (H) 07/06/2018    BNP >4,900 (H) 07/07/2018     Urinalysis:   Lab Results   Component Value Date    LABURIN No growth 03/21/2018    COLORU Yellow 07/06/2018    SPECGRAV 1.020 07/06/2018    NITRITE Negative 07/06/2018    KETONESU Negative 07/06/2018    UROBILINOGEN Negative 07/06/2018    WBCUA 3 03/21/2018       Trended Cardiac Data:    Recent Labs  Lab 07/06/18  1526 07/06/18  2038 07/07/18  0002   TROPONINI 0.050* 0.048*  --    BNP  --   --  >4,900*       Microbiology Data:  N/A    Other Laboratory Data:  N/A    Other Results:  EKG (my  interpretation): NSR, no ST changes    Radiology:  Imaging Results          CT Chest Without Contrast (Final result)  Result time 07/06/18 19:54:22    Final result by Gene Craven MD (07/06/18 19:54:22)                 Impression:      1. Small bilateral pleural effusions with suspected mild pulmonary edema.  2. Right lower lobe 7 mm pulmonary nodule.  For a solid nodule 6-8 mm, Fleischner Society 2017 guidelines recommend follow up with non-contrast chest CT at 6-12 months and 18-24 months after discovery.  3. Small volume perihepatic ascites.  4. Additional findings as detailed above.      Electronically signed by: Gene Craven MD  Date:    07/06/2018  Time:    19:54             Narrative:    EXAMINATION:  CT CHEST WITHOUT CONTRAST    CLINICAL HISTORY:  SOB, pulmonary edema suspected;    TECHNIQUE:  Low dose axial images, sagittal and coronal reformations were obtained from the thoracic inlet to the lung bases. Contrast was not administered.    COMPARISON:  None.    FINDINGS:  Structures at the base of the neck are unremarkable.  There is fusiform dilatation of the ascending thoracic aorta measuring 4.4 cm.  Heart is enlarged without pericardial effusion.  Coronary artery calcification and prominent aortic valve calcification is seen.  Few prominent mediastinal lymph nodes are seen which do not meet CT criteria for enlargement.  The esophagus maintains a normal course and caliber.    The trachea and bronchi are patent.  The lungs are symmetrically expanded.  Small bilateral pleural effusions are seen, left greater than right.  There is mild interlobular septal thickening with diffuse patchy ground-glass attenuation seen within the lungs.  Right lower lobe pulmonary nodule is visualized measuring 7 mm.    There is small volume perihepatic ascites.  The visualized abdominal structures are unremarkable.  Osseous structures demonstrate degenerative change.  Extrathoracic soft tissues are unremarkable.                                CT Head Without Contrast (Final result)  Result time 07/06/18 19:36:55    Final result by Gene Craven MD (07/06/18 19:36:55)                 Impression:      No acute intracranial abnormalities identified.      Electronically signed by: Gene Craven MD  Date:    07/06/2018  Time:    19:36             Narrative:    EXAMINATION:  CT HEAD WITHOUT CONTRAST    CLINICAL HISTORY:  Stroke;    TECHNIQUE:  Low dose axial images were obtained through the head.  Coronal and sagittal reformations were also performed. Contrast was not administered.    COMPARISON:  CT head from yesterday 07/05/2018.    FINDINGS:  No evidence of acute/recent major vascular distribution cerebral infarction, intraparenchymal hemorrhage, or intra-axial space occupying lesion. The ventricular system is normal in size and configuration with no evidence of hydrocephalus. No effacement of the skull-base cisterns. No abnormal extra-axial fluid collections or blood products. The visualized paranasal sinuses and mastoid air cells are clear. The calvarium shows no significant abnormality.                               X-Ray Chest 1 View (Final result)  Result time 07/06/18 16:28:57    Final result by Marisela Kinney MD (07/06/18 16:28:57)                 Impression:      Mild retrocardiac opacity in the left which could be due to pleural fluid or atelectasis versus consolidation. This appears improved compared to previous exam. Continued follow-up is recommended.      Electronically signed by: Marisela Kinney MD  Date:    07/06/2018  Time:    16:28             Narrative:    EXAMINATION:  XR CHEST 1 VIEW    CLINICAL HISTORY:  weakness;    TECHNIQUE:  Single frontal view of the chest was performed.    COMPARISON:  Prior dated 05/02/2018    FINDINGS:  The mediastinal structures are midline.  The cardiac silhouette is not well evaluated due to AP technique.  There is a suggestion of a retrocardiac opacity in the left which  could be due to pleural fluid or atelectasis versus consolidation.  This appears improved compared to previous exam.  Continued follow-up is recommended.    Bone anchors are noted in the left humerus.                                   Assessment:     Anum Quick is a 62 y.o. female with:  Patient Active Problem List    Diagnosis Date Noted    HARISH (acute kidney injury) 07/06/2018    Hypothyroidism 07/06/2018    Fall 07/06/2018    Laceration of fascia of head 07/06/2018    Acute on chronic congestive heart failure 05/03/2018    Acute on chronic combined systolic and diastolic heart failure 05/02/2018    Iron deficiency anemia 03/23/2018    Cellulitis of right foot 03/23/2018    Right foot ulcer 03/23/2018    Venous stasis of both lower extremities 03/21/2018    Left atrial enlargement     Pancreatic cyst 02/22/2018    Venous stasis ulcers 02/19/2018    Candidal dermatitis 02/18/2018    Acute on chronic systolic congestive heart failure 02/17/2018    Coronary artery disease involving native coronary artery of native heart without angina pectoris 12/29/2017    Chronic anticoagulation - on rivaroxaban 12/29/2017    Paroxysmal atrial fibrillation 12/15/2017    Pulmonary hypertension due to left ventricular systolic dysfunction 12/15/2017    Chronic systolic heart failure 12/14/2017    Type 2 diabetes mellitus with neurologic complication, without long-term current use of insulin 12/14/2017    Renovascular hypertension 12/14/2017    Severe aortic stenosis 12/14/2017    Hyperlipidemia associated with type 2 diabetes mellitus 12/14/2017        Plan:     Weakness with multiple falls on AC   - Patient and  reporting increasing weakness x 1 week, LUE and LLE  - patient falling with head injury 7/5, continued weakness/unsteady gait today plus speech difficulty  - possible etiologies include medication side effects/polypharmacy, hypothyroidism, intoxication with other substance, CVA,  infection, deconditioning  - CBC with anemia, LFTs elevated  - Thyrotoxicosis as below on amiodarone  - UA noninfected  - CT Head with no acute hemorrhage or ischemia  - CXR, CT Chest with small pleural effusions as below   - s/p naloxone x1 in ED with increased agitation following  - Holding all opioid/other narcotic or altering medications  - UTox positive for opioids  - initiated synthroid  - PT, OT, ST consulted, appreciate recs     Hypothyroidism  - On admit TSH 68.709, T4<0.4  - per patient and family no prior hx   - likely 2/2 amiodarone   - initiated synthroid 25mcg daily   - anti-TPO, anti-thyroglobulin pending  - will need follow up as outpatient    HARISH   - Cr 2.1 on admit; baseline 1.8  - improved this AM     Combined systolic and diastolic heart failure   - TTE 3/22/2018 with severely depressed EF 20-25%, severe AS, grade III DD, pulm HTN 55mmHg  - Plan for aortic valve replacement, follows with Dr. Ybarra  - CXR with mild retrocardiac opacity L; CT chest with small b/l pleural effusions, suspected pulmonary edema  - this AM with increased crackles, new O2 requirement  - repeat CXR; lasix 40 IV x1    Transaminitis  - Alk Phos 255, has been 150 baseline recently  - AST 86, ALT 85; improved this morning to 73, 74  - abdominal US with small volume ascites, mild increase haptic echogenicity, stable appear sm cystic pancreatic head mass    Atrial Fibrillation  - rate controlled w amiodarone  - continue amio, xarelto  - consider cardiology consult in light of new thyroid dysfunction on amio    Anemia, NOS  - H/H 9.3/29.8 (bl ~10.8)  - iron studies c/w iron deficiency  - iron replacement, stool softener    Intermediate Troponin  - on admit trop 0.050, likely 2/2 demand in setting of HF with some volume overload  - trended down, stopped trending    DM II  - Hgb A1c 6.3  - hold home metformin  - SSI/accuchecks    HTN  - per patient not on B-b though listed as home med per chart review  - Bps wnl for now    HLD  -  holdine home statin in setting of transaminitis     Dispo: pending clinical improvement, PT/OT/ST eval  Diet: NPO pending eval  Ppx: cont home xarelto       Code Status:     Full     Claritza López  U Internal Medicine [id number]  [facility] Internal Medicine Service    [facility] Medicine Hospitalist Pager numbers:   [facility] Hospitalist Medicine Team A (Beth/Margret): [phone]  [facility] Hospitalist Medicine Team B (Martin/Pa):  [phone]

## 2018-07-07 NOTE — ED NOTES
"Pt transferred to inpt room. Pt swallowed with no difficulty, no signs of aspiration. NAD noted. Medicated for nausea as ordered. Per  at bedside when asked if pt speech has changed he replied "no its been like this since it started. Like a week ago." VSS.   "

## 2018-07-08 PROBLEM — E86.0 DEHYDRATION: Status: ACTIVE | Noted: 2018-01-01

## 2018-07-08 PROBLEM — R53.1 WEAKNESS: Status: ACTIVE | Noted: 2018-01-01

## 2018-07-08 PROBLEM — R79.89 ELEVATED TROPONIN: Status: ACTIVE | Noted: 2018-01-01

## 2018-07-08 NOTE — PROGRESS NOTES
"[facility] Internal Medicine Progress Note    Admitting Team: [facility] Internal Medicine Team A  Attending Physician: Dr. Campos  Resident: Yocasta Lux  Interns: Silver and [place]    Date of Admit: 2018    Subjective:      Patient reports overall improvement since yesterday. Her only complaints are some difficulty with urination as well as some back pain. Denies CP, SOB, n/v/d, fevers, chills, abdominal pain, or dysuria.     Objective:   Last 24 Hour Vital Signs:  BP  Min: 131/103  Max: 158/110  Temp  Av.2 °F (36.8 °C)  Min: 97.5 °F (36.4 °C)  Max: 99.1 °F (37.3 °C)  Pulse  Av.3  Min: 85  Max: 101  Resp  Av.5  Min: 16  Max: 18  SpO2  Av.3 %  Min: 94 %  Max: 97 %  Height  Av' 3" (160 cm)  Min: 5' 3" (160 cm)  Max: 5' 3" (160 cm)  Body mass index is 27.1 kg/m².  I/O last 3 completed shifts:  In: -   Out: 3900 [Urine:3900]    Physical Examination:  General: Alert and awake, speech slurred  Head:  Bilateral periorbital ecchymoses with swelling, discoloration  Eyes:  PERRL; EOMi with anicteric sclera and clear conjunctivae  Mouth:  Oropharynx clear and without exudate; moist mucous membranes  Cardio:  Regular rate and rhythm with normal S1 and S2; 3/6 systolic murmur to aortic post, mitral post  Resp:  Inspiratory crackles bilaterally, no wheezing  Abdom: Soft, NTND with normoactive bowel sounds  Extrem: Warm and well-perfused, trace pitting edema b/l LEs to knee  Skin:  Well healed wound to R ankle   Pulses: 2+ and symmetric distally  Neuro:  No gross deficits, cooperative and pleasant    Laboratory:  Most Recent Data:  CBC:   Lab Results   Component Value Date    WBC 8.13 2018    HGB 10.4 (L) 2018    HCT 34.0 (L) 2018     2018    MCV 88 2018    RDW 20.1 (H) 2018     BMP:   Lab Results   Component Value Date     2018    K 4.0 2018     2018    CO2 25 2018    BUN 46 (H) 2018    CREATININE 1.3 2018     " (H) 07/08/2018    CALCIUM 9.6 07/08/2018    MG 1.9 07/06/2018    PHOS 3.8 05/03/2018     Estimated Creatinine Clearance: 41.4 mL/min (based on SCr of 1.3 mg/dL).  LFTs:   Lab Results   Component Value Date    PROT 7.5 07/08/2018    ALBUMIN 3.4 (L) 07/08/2018    BILITOT 1.1 (H) 07/08/2018    AST 54 (H) 07/08/2018    ALKPHOS 249 (H) 07/08/2018    ALT 63 (H) 07/08/2018     (H) 07/07/2018     Coags:   Lab Results   Component Value Date    INR 1.4 (H) 07/06/2018     FLP:   Lab Results   Component Value Date    CHOL 95 (L) 07/06/2018    HDL 26 (L) 07/06/2018    LDLCALC 43.8 (L) 07/06/2018    TRIG 126 07/06/2018    CHOLHDL 27.4 07/06/2018     DM:   Lab Results   Component Value Date    HGBA1C 6.3 (H) 07/06/2018    HGBA1C 5.4 05/02/2018    HGBA1C 5.5 01/10/2018    LDLCALC 43.8 (L) 07/06/2018    CREATININE 1.3 07/08/2018     Thyroid:   Lab Results   Component Value Date    TSH 68.709 (H) 07/06/2018    FREET4 <0.40 (L) 07/06/2018     Anemia:   Lab Results   Component Value Date    IRON 24 (L) 07/06/2018    TIBC 514 (H) 07/06/2018    FERRITIN 25 07/06/2018    FWLAKFPT64 841 07/06/2018    FOLATE 9.1 07/06/2018     Cardiac:   Lab Results   Component Value Date    TROPONINI 0.048 (H) 07/06/2018    BNP >4,900 (H) 07/07/2018     Urinalysis:   Lab Results   Component Value Date    LABURIN No growth 03/21/2018    COLORU Yellow 07/06/2018    SPECGRAV 1.020 07/06/2018    NITRITE Negative 07/06/2018    KETONESU Negative 07/06/2018    UROBILINOGEN Negative 07/06/2018    WBCUA 3 03/21/2018       Trended Cardiac Data:    Recent Labs  Lab 07/06/18  1526 07/06/18  2038 07/07/18  0002   TROPONINI 0.050* 0.048*  --    BNP  --   --  >4,900*       Microbiology Data:  N/A    Other Laboratory Data:  N/A    Other Results:  EKG (my interpretation): NSR, no ST changes    Radiology:  Imaging Results          CT Chest Without Contrast (Final result)  Result time 07/06/18 19:54:22    Final result by Gene Craven MD (07/06/18 19:54:22)                  Impression:      1. Small bilateral pleural effusions with suspected mild pulmonary edema.  2. Right lower lobe 7 mm pulmonary nodule.  For a solid nodule 6-8 mm, Fleischner Society 2017 guidelines recommend follow up with non-contrast chest CT at 6-12 months and 18-24 months after discovery.  3. Small volume perihepatic ascites.  4. Additional findings as detailed above.      Electronically signed by: Gene Craven MD  Date:    07/06/2018  Time:    19:54             Narrative:    EXAMINATION:  CT CHEST WITHOUT CONTRAST    CLINICAL HISTORY:  SOB, pulmonary edema suspected;    TECHNIQUE:  Low dose axial images, sagittal and coronal reformations were obtained from the thoracic inlet to the lung bases. Contrast was not administered.    COMPARISON:  None.    FINDINGS:  Structures at the base of the neck are unremarkable.  There is fusiform dilatation of the ascending thoracic aorta measuring 4.4 cm.  Heart is enlarged without pericardial effusion.  Coronary artery calcification and prominent aortic valve calcification is seen.  Few prominent mediastinal lymph nodes are seen which do not meet CT criteria for enlargement.  The esophagus maintains a normal course and caliber.    The trachea and bronchi are patent.  The lungs are symmetrically expanded.  Small bilateral pleural effusions are seen, left greater than right.  There is mild interlobular septal thickening with diffuse patchy ground-glass attenuation seen within the lungs.  Right lower lobe pulmonary nodule is visualized measuring 7 mm.    There is small volume perihepatic ascites.  The visualized abdominal structures are unremarkable.  Osseous structures demonstrate degenerative change.  Extrathoracic soft tissues are unremarkable.                               CT Head Without Contrast (Final result)  Result time 07/06/18 19:36:55    Final result by Gene Craven MD (07/06/18 19:36:55)                 Impression:      No acute intracranial  abnormalities identified.      Electronically signed by: Gene Craven MD  Date:    07/06/2018  Time:    19:36             Narrative:    EXAMINATION:  CT HEAD WITHOUT CONTRAST    CLINICAL HISTORY:  Stroke;    TECHNIQUE:  Low dose axial images were obtained through the head.  Coronal and sagittal reformations were also performed. Contrast was not administered.    COMPARISON:  CT head from yesterday 07/05/2018.    FINDINGS:  No evidence of acute/recent major vascular distribution cerebral infarction, intraparenchymal hemorrhage, or intra-axial space occupying lesion. The ventricular system is normal in size and configuration with no evidence of hydrocephalus. No effacement of the skull-base cisterns. No abnormal extra-axial fluid collections or blood products. The visualized paranasal sinuses and mastoid air cells are clear. The calvarium shows no significant abnormality.                               X-Ray Chest 1 View (Final result)  Result time 07/06/18 16:28:57    Final result by Marisela Kinney MD (07/06/18 16:28:57)                 Impression:      Mild retrocardiac opacity in the left which could be due to pleural fluid or atelectasis versus consolidation. This appears improved compared to previous exam. Continued follow-up is recommended.      Electronically signed by: Marisela Kinney MD  Date:    07/06/2018  Time:    16:28             Narrative:    EXAMINATION:  XR CHEST 1 VIEW    CLINICAL HISTORY:  weakness;    TECHNIQUE:  Single frontal view of the chest was performed.    COMPARISON:  Prior dated 05/02/2018    FINDINGS:  The mediastinal structures are midline.  The cardiac silhouette is not well evaluated due to AP technique.  There is a suggestion of a retrocardiac opacity in the left which could be due to pleural fluid or atelectasis versus consolidation.  This appears improved compared to previous exam.  Continued follow-up is recommended.    Bone anchors are noted in the left humerus.                                    Assessment:     Anum Quick is a 62 y.o. female with:  Patient Active Problem List    Diagnosis Date Noted    Hypothyroidism 07/07/2018    Fall 07/07/2018    Laceration of fascia of head 07/07/2018    Severe hypothyroidism 07/07/2018    HARISH (acute kidney injury) 07/06/2018    Acute on chronic congestive heart failure 05/03/2018    Acute on chronic combined systolic and diastolic heart failure 05/02/2018    Iron deficiency anemia 03/23/2018    Cellulitis of right foot 03/23/2018    Right foot ulcer 03/23/2018    Venous stasis of both lower extremities 03/21/2018    Left atrial enlargement     Pancreatic cyst 02/22/2018    Venous stasis ulcers 02/19/2018    Candidal dermatitis 02/18/2018    Acute on chronic systolic congestive heart failure 02/17/2018    Coronary artery disease involving native coronary artery of native heart without angina pectoris 12/29/2017    Chronic anticoagulation - on rivaroxaban 12/29/2017    Paroxysmal atrial fibrillation 12/15/2017    Pulmonary hypertension due to left ventricular systolic dysfunction 12/15/2017    Chronic systolic heart failure 12/14/2017    Type 2 diabetes mellitus with neurologic complication, without long-term current use of insulin 12/14/2017    Renovascular hypertension 12/14/2017    Severe aortic stenosis 12/14/2017    Hyperlipidemia associated with type 2 diabetes mellitus 12/14/2017        Plan:     Weakness with multiple falls on AC   - Patient and  reporting increasing weakness x 1 week, LUE and LLE  - patient falling with head injury 7/5, continued weakness/unsteady gait today plus speech difficulty  - possible etiologies include medication side effects/polypharmacy, hypothyroidism, intoxication with other substance, CVA, infection, deconditioning  - CBC with anemia, LFTs elevated  - Thyrotoxicosis as below on amiodarone  - UA noninfected  - CT Head with no acute hemorrhage or ischemia  - CXR, CT  Chest with small pleural effusions as below   - s/p naloxone x1 in ED with increased agitation following  - Holding all opioid/other narcotic or altering medications  - UTox positive for opioids  - initiated synthroid  - PT, OT, ST consulted, appreciate recs     Hypothyroidism  - On admit TSH 68.709, T4<0.4  - per patient and family no prior hx   - likely 2/2 amiodarone   - initiated synthroid 25mcg daily, now on 50 mcg daily  - anti-TPO, anti-thyroglobulin pending  - will need follow up as outpatient    HARISH   - Cr 2.1 on admit; baseline 1.8  - improved this AM     Urinary retention  -renal U/S with significant distention of bladder  -discussed potential bladder scan with nursing  -will discuss addition of medication with staff    Combined systolic and diastolic heart failure   - TTE 3/22/2018 with severely depressed EF 20-25%, severe AS, grade III DD, pulm HTN 55mmHg  - Plan for aortic valve replacement, follows with Dr. Ybarra  - CXR with mild retrocardiac opacity L; CT chest with small b/l pleural effusions, suspected pulmonary edema  - this AM with increased crackles, new O2 requirement  - repeat CXR with increased hazy opacity in bilateral perihilar area and right upper lobe; s/p lasix 40 IV x1, now on lasix 40 PO BID    Transaminitis  - Alk Phos 255, has been 150 baseline recently  - AST 86, ALT 85; improved this morning to 54, 63  -  - abdominal US with small volume ascites, mild increase haptic echogenicity, stable appear sm cystic pancreatic head mass    Atrial Fibrillation  - rate controlled w amiodarone  - continue amio, xarelto  - consider cardiology consult in light of new thyroid dysfunction on amio    Anemia, NOS  - H/H 9.3/29.8 (bl ~10.8)  - iron studies c/w iron deficiency  - iron replacement, stool softener    Intermediate Troponin  - on admit trop 0.050, likely 2/2 demand in setting of HF with some volume overload  - trended down, stopped trending    DM II  - Hgb A1c 6.3  - hold home  metformin  - SSI/accuchecks    HTN  - per patient not on B-b though listed as home med per chart review  - Bps wnl for now    HLD  - holdine home statin in setting of transaminitis     Dispo: pending clinical improvement, PT/OT/ST eval  Diet: diabetic  Ppx: cont home xarelto       Code Status:     Full     Abimael Santana  [facility] Internal Medicine HO-I  [facility] Internal Medicine Service    [facility] Medicine Hospitalist Pager numbers:   [facility] Hospitalist Medicine Team A (Beth/Margret): [phone]  [facility] Hospitalist Medicine Team B (Martin/Pa):  [phone]

## 2018-07-08 NOTE — PROGRESS NOTES
Notified Dr Campos team about pt bp 137/102 and 140/110. Stated understanding. Will continue to monitor.

## 2018-07-08 NOTE — PLAN OF CARE
Problem: Patient Care Overview  Goal: Plan of Care Review  Outcome: Ongoing (interventions implemented as appropriate)  Pt is AAOx3. No complaints of nausea, vomiting, or diarrhea. No complaints of pain. Pt is on 4-5L 02 NC and 02 sats 90-92%. Pt ambulated with assist to the bedside commode. Pt is on diabetic 1500 calorie diet. ACHS and last bs was 136. Tele 8567 and running sinus rhythm. Safety precautions maintained. Tolerated all medications well.

## 2018-07-08 NOTE — PLAN OF CARE
Oxygen Assessment Note    Attempted to wean patient off of supplemental O2. On room air patient with O2 saturation of 86%. Exercise saturation on room air not attempted.       Brodie Rust MD  [facility], -3

## 2018-07-08 NOTE — PLAN OF CARE
" Patient AAO to self and place. Slow responses in recalling information when asked.  No family at bedside. PT/OT on case.   Patient states she may has had [facility] in past.  Future Appointments  Date Time Provider Department Center   7/11/2018 11:15 AM [facility]       Chief Complaint      Fatigue (c/o increased generalized shaking and weakness since waking up this morning. Pt was seen here yesterday after falling. Has had multiple falls recently)   for 1 week.   Per patient and , started feeling weakness approx 1 week ago, especially to L sided UE and LE. Difficulty with hand  to LE, dropping things frequently. Also with unsteady gait/falls. Fell yesterday when dog ran between her legs, hit her face/head, presented to [facility] ED. CT head wnl, lac to R forehead requiring sutures. Since yesterday, increased slurred speech/difficulty w language expression. Continued weakness/unsteadiness, also with "shaking" per patient. Fell today, lac on head reopened. Returned to ED for eval.       07/07/18 1927   Discharge Assessment   Assessment Type Discharge Planning Assessment   Confirmed/corrected address and phone number on facesheet? Yes   Assessment information obtained from? Patient   Prior to hospitilization cognitive status: Unable to Assess   Prior to hospitalization functional status: Assistive Equipment   Current cognitive status: Alert/Oriented;Not Oriented to Person   Current Functional Status: Needs Assistance;Assistive Equipment   Lives With spouse   Able to Return to Prior Arrangements unable to determine at this time (comments)   Is patient able to care for self after discharge? Unable to determine at this time (comments)   Who are your caregiver(s) and their phone number(s)? Spouse   Patient's perception of discharge disposition home or selfcare;home health   Readmission Within The Last 30 Days other (see comments)  ([facility] visits for fall)   Patient currently being " followed by outpatient case management? No   Patient currently receives any other outside agency services? No   Equipment Currently Used at Home walker, rolling   Do you have any problems affording any of your prescribed medications? No   Is the patient taking medications as prescribed? yes   Does the patient have transportation home? Yes   Transportation Available family or friend will provide   Does the patient receive services at the [facility]? No   Discharge Plan A Home with family   Discharge Plan B Home with family;Home Health   Patient/Family In Agreement With Plan yes

## 2018-07-09 NOTE — PLAN OF CARE
Problem: Physical Therapy Goal  Goal: Physical Therapy Goal  Goals to be met by: 18     Patient will increase functional independence with mobility by performin. Sit to stand transfer with Supervision; MET 2018  2. Bed to chair transfer with Supervision using Rolling Walker  3. Gait  x 150 feet with Supervision using Rolling Walker.   4. Ascend/descend 1 flight of stairs with bilateral Handrails Contact Guard Assistance  5. Stand for 5 minutes with Supervision using Rolling Walker     Outcome: Ongoing (interventions implemented as appropriate)  Progressing towards goals, pt has discharge orders to home. Will benefit from HH PT/OT and family supervision at all times when awake. TN to order BSC and  reports he has access to a tub bench

## 2018-07-09 NOTE — PLAN OF CARE
Problem: Occupational Therapy Goal  Goal: Occupational Therapy Goal  Goals to be met by: 7/17/2018    Patient will increase functional independence with ADLs by performing:    Feeding with Modified [place].  UE Dressing with Modified [place].  LE Dressing with Modified [place].  Grooming while standing with Modified [place].  Supine to sit with Modified [place].  Stand pivot transfers with Modified [place].  Toilet transfer to toilet with Modified [place].  Upper extremity exercise program x10 reps per handout, with independence.     Outcome: Unable to achieve outcome(s) by discharge  Pt. Discharged to home with HH.  completed family training. DC OT.

## 2018-07-09 NOTE — PLAN OF CARE
Patient to discharge to home with spouse.  Received okay per Christelle with [facility] to pull Portable oxygen.  Oxygen tank delivered to bedside,  Delivery ticket signed.  [facility] will see patient tomorrow.      [facility] bedside delivery used.     Future Appointments  Date Time Provider Department Center   7/11/2018 11:15 AM [facility]     Follow-up With  Details  Why  Contact Info   Raghav Valdez MD  On 7/16/2018  @11:45am  [address]  [address]  [place] [address]  [phone]   [facility]  [address]  [address]  [facility] [address]  [phone]   Ariane Licona MD  In 2 weeks    [address]  [place] [address]  [phone]   [facility]  [address]  [address]  [facility] [address]  [phone]        patient's spouse will call to make additional follow ups needed.        07/09/18 1523   Final Note   Assessment Type Final Discharge Note   Discharge Disposition Home-Health   What phone number can be called within the next 1-3 days to see how you are doing after discharge? [phone]   Hospital Follow Up  Appt(s) scheduled? Yes   Discharge plans and expectations educations in teach back method with documentation complete? Yes   Right Care Referral Info   Post Acute Recommendation Home-care   Referral Type [facility]   Facility Name [facility]

## 2018-07-09 NOTE — PROGRESS NOTES
Pt and spouse given  explanation on discharge instructions and AVS packet. New medications explained with name, dose, purpose, frequency and side effects with handout given . Tach back method used. Home medications and medication to stop taking explained. Pt given teaching on home oxygen  With hand out. Spouse demonstrated how to attach oxygen tubing and place on 2 L.  all questioned answered with no further questions at this time. Pt awaiting transport for discharge.

## 2018-07-09 NOTE — PT/OT/SLP PROGRESS
"Physical Therapy Treatment    Patient Name:  Anum Quick   MRN:  [id number]    Recommendations:     Discharge Recommendations:  home health PT, home health OT, home with home health (care at all times while patient is awake)   Discharge Equipment Recommendations: bedside commode, bath bench   Barriers to discharge: none,  available and sister will be present when  not    Assessment:     Anum Quick is a 62 y.o. female admitted with a medical diagnosis of Severe hypothyroidism.  She presents with the following impairments/functional limitations:  weakness, impaired endurance, gait instability, impaired functional mobilty, impaired self care skills, impaired balance, impaired cardiopulmonary response to activity, decreased safety awareness, impaired cognition .  Pt found on continuous pulse ox, long sitting in bed with o2 at 3L. Comfortable at rest. As soon as patient started to mobilize to EOB, pt with audible difficulty moving air. Pulse oximeter with difficulty getting measurement consistently, often showing "small pulse" on display. Prior to admission, pt had fallen due to a dog getting in her way while she was carrying groceries - dog is reportedly "103 years old, blind and deaf." Patient was not on home o2, only occasionally used RW and  assisted patient with bathing.  Pt now requiring o2 and RW more frequently and will need increased level of physical assist at home for ADL's.    Rehab Prognosis:  good; patient would benefit from acute skilled PT services to address these deficits and reach maximum level of function.      Recent Surgery: * No surgery found *      Plan:     During this hospitalization, patient to be seen 6 x/week to address the above listed problems via therapeutic activities, therapeutic exercises, gait training  · Plan of Care Expires:  08/07/18   Plan of Care Reviewed with: patient, spouse    Subjective     Communicated with RN prior to session.  Patient " "found long sitting upon PT entry to room, agreeable to treatment.      Chief Complaint: dry mouth  Patient comments/goals: to go home "once I go home, I'll be motivated again"  reports that pt's sister will be available to stay with her when he is not at home.   Pain/Comfort:  Pain Rating 1: 0/10    Patients cultural, spiritual, Mu-ism conflicts given the current situation: none verbalized    Objective:     Patient found with: pulse ox (continuous), oxygen, telemetry     General Precautions: Standard, fall   Orthopedic Precautions:N/A   Braces: N/A     Functional Mobility:  · Bed Mobility:     · Supine to Sit: modified independence  · Sit to Supine: modified independence  · Transfers:     · Sit to Stand:  supervision with rolling walker  · Gait: pt ambulated 30 feet x 2, then 60 feet with RW, SBA, o2 donned at 3L. PT following with o2 tank and Dinamap / pulse ox . Required CGA / SBA. Standing rest breaks to encourage pursed lip breathing and to check o2 sats. Pulse ox not working quickly enough to provide accurate numbers during gait trial  · Balance: Fair+ / Good - with use of RW, forward flexed posturing.       AM-PAC 6 CLICK MOBILITY  Turning over in bed (including adjusting bedclothes, sheets and blankets)?: 4  Sitting down on and standing up from a chair with arms (e.g., wheelchair, bedside commode, etc.): 4  Moving from lying on back to sitting on the side of the bed?: 3  Moving to and from a bed to a chair (including a wheelchair)?: 3  Need to walk in hospital room?: 3  Climbing 3-5 steps with a railing?: 2  Basic Mobility Total Score: 19       Therapeutic Activities and Exercises:   Treatment provided as above for gait trial followed by return to room. Allowed pt to rest EOB and then had pt perform sit <> stand repetitions 5 reps x 2, requiring rest breaks between trials.  Pt remained on 3L o2 throughout session. Provided education to patient in regards to use of o2 when at home since it was being " ordered, to always wear the o2 when bathing and to use bath bench to conserve energy during task.    Patient left HOB elevated with all lines intact, call button in reach, bed alarm on and  present..    GOALS:    Physical Therapy Goals        Problem: Physical Therapy Goal    Goal Priority Disciplines Outcome Goal Variances Interventions   Physical Therapy Goal     PT/OT, PT Ongoing (interventions implemented as appropriate)     Description:  Goals to be met by: 18     Patient will increase functional independence with mobility by performin. Sit to stand transfer with Supervision; MET 2018  2. Bed to chair transfer with Supervision using Rolling Walker  3. Gait  x 150 feet with Supervision using Rolling Walker.   4. Ascend/descend 1 flight of stairs with bilateral Handrails Contact Guard Assistance  5. Stand for 5 minutes with Supervision using Rolling Walker                       Time Tracking:     PT Received On: 18  PT Start Time: 1125     PT Stop Time: 1201  PT Total Time (min): 36 min     Billable Minutes: Gait Training 25 and Therapeutic Exercise 11    Treatment Type: Treatment  PT/PTA: PT     PTA Visit Number: 0     Anum Silverman, PT  2018

## 2018-07-09 NOTE — PROGRESS NOTES
[facility] Internal Medicine Progress Note    Admitting Team: [facility] Internal Medicine Team A  Attending Physician: Dr. Campos  Resident: Yocasta Lux  Interns: Max López    Date of Admit: 2018    Subjective:      Patient reports overall improvement. Still requiring oxygen via NC. Denies chest pain, n/v/d. Feels her speech is at baseline.     Objective:   Last 24 Hour Vital Signs:  BP  Min: 137/97  Max: 165/119  Temp  Av.4 °F (36.3 °C)  Min: 96.1 °F (35.6 °C)  Max: 98.4 °F (36.9 °C)  Pulse  Av.3  Min: 99  Max: 114  Resp  Av.8  Min: 16  Max: 20  SpO2  Av %  Min: 95 %  Max: 97 %  Body mass index is 27.1 kg/m².  I/O last 3 completed shifts:  In: -   Out: 2975 [Urine:2975]    Physical Examination:  General: Alert and awake, speech with some slurring but improved  Head:  Bilateral periorbital ecchymoses with discoloration  Eyes:  PERRL; EOMi with anicteric sclera and clear conjunctivae  Mouth:  Oropharynx clear and without exudate; moist mucous membranes  Cardio:  Regular rate and rhythm with normal S1 and S2; 3/6 systolic murmur to aortic post, mitral post  Resp:  Inspiratory crackles at R lower lung, no wheezing, NC in place to 4L  Abdom: Soft, NTND with normoactive bowel sounds  Extrem: Warm and well-perfused, trace pitting edema b/l LEs to knee  Skin:  Well healed wound to R ankle   Pulses: 2+ and symmetric distally  Neuro:  No gross deficits, cooperative and pleasant    Laboratory:  Most Recent Data:  CBC:   Lab Results   Component Value Date    WBC 8.22 2018    HGB 10.4 (L) 2018    HCT 33.8 (L) 2018     2018    MCV 88 2018    RDW 20.2 (H) 2018     BMP:   Lab Results   Component Value Date     2018    K 4.0 2018    CL 99 2018    CO2 27 2018    BUN 35 (H) 2018    CREATININE 1.1 2018     (H) 2018    CALCIUM 9.9 2018    MG 1.9 2018    PHOS 3.8 2018     Estimated Creatinine  Clearance: 48.9 mL/min (based on SCr of 1.1 mg/dL).  LFTs:   Lab Results   Component Value Date    PROT 7.4 07/09/2018    ALBUMIN 3.5 07/09/2018    BILITOT 1.6 (H) 07/09/2018    AST 44 (H) 07/09/2018    ALKPHOS 235 (H) 07/09/2018    ALT 55 (H) 07/09/2018     (H) 07/07/2018     Coags:   Lab Results   Component Value Date    INR 1.4 (H) 07/06/2018     FLP:   Lab Results   Component Value Date    CHOL 95 (L) 07/06/2018    HDL 26 (L) 07/06/2018    LDLCALC 43.8 (L) 07/06/2018    TRIG 126 07/06/2018    CHOLHDL 27.4 07/06/2018     DM:   Lab Results   Component Value Date    HGBA1C 6.3 (H) 07/06/2018    HGBA1C 5.4 05/02/2018    HGBA1C 5.5 01/10/2018    LDLCALC 43.8 (L) 07/06/2018    CREATININE 1.1 07/09/2018     Thyroid:   Lab Results   Component Value Date    TSH 68.709 (H) 07/06/2018    FREET4 <0.40 (L) 07/06/2018     Anemia:   Lab Results   Component Value Date    IRON 24 (L) 07/06/2018    TIBC 514 (H) 07/06/2018    FERRITIN 25 07/06/2018    MXVHVOVG37 841 07/06/2018    FOLATE 9.1 07/06/2018     Cardiac:   Lab Results   Component Value Date    TROPONINI 0.048 (H) 07/06/2018    BNP >4,900 (H) 07/07/2018     Urinalysis:   Lab Results   Component Value Date    LABURIN No growth 03/21/2018    COLORU Yellow 07/06/2018    SPECGRAV 1.020 07/06/2018    NITRITE Negative 07/06/2018    KETONESU Negative 07/06/2018    UROBILINOGEN Negative 07/06/2018    WBCUA 3 03/21/2018       Trended Cardiac Data:    Recent Labs  Lab 07/06/18  1526 07/06/18  2038 07/07/18  0002   TROPONINI 0.050* 0.048*  --    BNP  --   --  >4,900*       Microbiology Data:  N/A    Other Laboratory Data:  N/A    Other Results:  EKG (my interpretation): NSR, no ST changes    Radiology:  Imaging Results          CT Chest Without Contrast (Final result)  Result time 07/06/18 19:54:22    Final result by Gene Craven MD (07/06/18 19:54:22)                 Impression:      1. Small bilateral pleural effusions with suspected mild pulmonary edema.  2. Right  lower lobe 7 mm pulmonary nodule.  For a solid nodule 6-8 mm, Fleischner Society 2017 guidelines recommend follow up with non-contrast chest CT at 6-12 months and 18-24 months after discovery.  3. Small volume perihepatic ascites.  4. Additional findings as detailed above.      Electronically signed by: Gene Craven MD  Date:    07/06/2018  Time:    19:54             Narrative:    EXAMINATION:  CT CHEST WITHOUT CONTRAST    CLINICAL HISTORY:  SOB, pulmonary edema suspected;    TECHNIQUE:  Low dose axial images, sagittal and coronal reformations were obtained from the thoracic inlet to the lung bases. Contrast was not administered.    COMPARISON:  None.    FINDINGS:  Structures at the base of the neck are unremarkable.  There is fusiform dilatation of the ascending thoracic aorta measuring 4.4 cm.  Heart is enlarged without pericardial effusion.  Coronary artery calcification and prominent aortic valve calcification is seen.  Few prominent mediastinal lymph nodes are seen which do not meet CT criteria for enlargement.  The esophagus maintains a normal course and caliber.    The trachea and bronchi are patent.  The lungs are symmetrically expanded.  Small bilateral pleural effusions are seen, left greater than right.  There is mild interlobular septal thickening with diffuse patchy ground-glass attenuation seen within the lungs.  Right lower lobe pulmonary nodule is visualized measuring 7 mm.    There is small volume perihepatic ascites.  The visualized abdominal structures are unremarkable.  Osseous structures demonstrate degenerative change.  Extrathoracic soft tissues are unremarkable.                               CT Head Without Contrast (Final result)  Result time 07/06/18 19:36:55    Final result by Gene Craven MD (07/06/18 19:36:55)                 Impression:      No acute intracranial abnormalities identified.      Electronically signed by: Gene Craven MD  Date:    07/06/2018  Time:    19:36              Narrative:    EXAMINATION:  CT HEAD WITHOUT CONTRAST    CLINICAL HISTORY:  Stroke;    TECHNIQUE:  Low dose axial images were obtained through the head.  Coronal and sagittal reformations were also performed. Contrast was not administered.    COMPARISON:  CT head from yesterday 07/05/2018.    FINDINGS:  No evidence of acute/recent major vascular distribution cerebral infarction, intraparenchymal hemorrhage, or intra-axial space occupying lesion. The ventricular system is normal in size and configuration with no evidence of hydrocephalus. No effacement of the skull-base cisterns. No abnormal extra-axial fluid collections or blood products. The visualized paranasal sinuses and mastoid air cells are clear. The calvarium shows no significant abnormality.                               X-Ray Chest 1 View (Final result)  Result time 07/06/18 16:28:57    Final result by Marisela Kinney MD (07/06/18 16:28:57)                 Impression:      Mild retrocardiac opacity in the left which could be due to pleural fluid or atelectasis versus consolidation. This appears improved compared to previous exam. Continued follow-up is recommended.      Electronically signed by: Marisela Kinney MD  Date:    07/06/2018  Time:    16:28             Narrative:    EXAMINATION:  XR CHEST 1 VIEW    CLINICAL HISTORY:  weakness;    TECHNIQUE:  Single frontal view of the chest was performed.    COMPARISON:  Prior dated 05/02/2018    FINDINGS:  The mediastinal structures are midline.  The cardiac silhouette is not well evaluated due to AP technique.  There is a suggestion of a retrocardiac opacity in the left which could be due to pleural fluid or atelectasis versus consolidation.  This appears improved compared to previous exam.  Continued follow-up is recommended.    Bone anchors are noted in the left humerus.                                   Assessment:     Anum Quick is a 62 y.o. female with:  Patient Active Problem List     Diagnosis Date Noted    Dehydration 07/08/2018    Elevated troponin 07/08/2018    Weakness 07/08/2018    Hypothyroidism 07/07/2018    Fall 07/07/2018    Laceration of fascia of head 07/07/2018    Severe hypothyroidism 07/07/2018    HARISH (acute kidney injury) 07/06/2018    Acute on chronic congestive heart failure 05/03/2018    Acute on chronic combined systolic and diastolic heart failure 05/02/2018    Iron deficiency anemia 03/23/2018    Cellulitis of right foot 03/23/2018    Right foot ulcer 03/23/2018    Venous stasis of both lower extremities 03/21/2018    Left atrial enlargement     Pancreatic cyst 02/22/2018    Venous stasis ulcers 02/19/2018    Candidal dermatitis 02/18/2018    Acute on chronic systolic congestive heart failure 02/17/2018    Coronary artery disease involving native coronary artery of native heart without angina pectoris 12/29/2017    Chronic anticoagulation - on rivaroxaban 12/29/2017    Paroxysmal atrial fibrillation 12/15/2017    Pulmonary hypertension due to left ventricular systolic dysfunction 12/15/2017    Chronic systolic heart failure 12/14/2017    Type 2 diabetes mellitus with neurologic complication, without long-term current use of insulin 12/14/2017    Renovascular hypertension 12/14/2017    Severe aortic stenosis 12/14/2017    Hyperlipidemia associated with type 2 diabetes mellitus 12/14/2017        Plan:     Weakness with multiple falls on AC   - Patient and  reporting increasing weakness x 1 week, LUE and LLE  - patient falling with head injury 7/5, continued weakness/unsteady gait today plus speech difficulty  - possible etiologies include medication side effects/polypharmacy, hypothyroidism, intoxication with other substance, CVA, infection, hypoxia, deconditioning  - CBC with anemia, LFTs elevated  - Thyrotoxicosis as below on amiodarone  - UA noninfected  - CT Head with no acute hemorrhage or ischemia  - CXR, CT Chest with small pleural  effusions as below   - s/p naloxone x1 in ED with increased agitation following  - Holding all opioid/other narcotic or altering medications  - UTox positive for opioids   - initiated synthroid with much improvement    - PT, OT, ST consulted, appreciate recs, will continue to work with patient     Hypothyroidism  - On admit TSH 68.709, T4<0.4  - per patient and family no prior hx   - likely 2/2 amiodarone   - initiated synthroid 50 mcg daily  - anti-TPO, anti-thyroglobulin in process  - will need follow up with Endo as outpatient    HARISH   - Cr 2.1 on admit; baseline 1.8  - Likely cardiorenal syndrome, resolved with diuresis    Urinary retention  -renal U/S with significant distention of bladder  -discussed potential bladder scan with nursing  -does have UOP, will reassess later today     Combined systolic and diastolic heart failure   - TTE 3/22/2018 with severely depressed EF 20-25%, severe AS, grade III DD, pulm HTN 55mmHg  - Plan for aortic valve replacement, follows with Dr. Ybarra  - CXR with mild retrocardiac opacity L; CT chest with small b/l pleural effusions, suspected pulmonary edema  - New O2 requirement this admission  - repeat CXR with increased hazy opacity in bilateral perihilar area and right upper lobe; s/p lasix 40 IV x1, now on lasix 40 PO BID  - will need home O2 on discharge and follow up with PCP     Transaminitis  - Alk Phos 255, has been 150 baseline recently  - AST 86, ALT 85; improved this morning   -  - abdominal US with small volume ascites, mild increase haptic echogenicity, stable appear sm cystic pancreatic head mass  - likely 2/2 passive hepatic congestion as improving with diuresis    Atrial Fibrillation  - rate controlled w amiodarone  - discontinued amiodarone  - initiated lopressor     Anemia, NOS  - H/H 9.3/29.8 (bl ~10.8)  - iron studies c/w iron deficiency  - iron replacement, stool softener    Intermediate Troponin  - on admit trop 0.050, likely 2/2 demand in setting of  HF with some volume overload  - trended down, stopped trending    DM II  - Hgb A1c 6.3  - hold home metformin  - SSI/accuchecks    HTN  - initiated lopressor as above  - may require second BP agent if pressures remain elevated    HLD  - holdine home statin in setting of transaminitis     Dispo: pending clinical improvement, PT/OT/ST eval/recs  Diet: diabetic  Ppx: cont home xarelto       Code Status:     Full     Claritza López  [facility] Internal Medicine HO-I  [facility] Internal Medicine Service    [facility] Medicine Hospitalist Pager numbers:   [facility] Hospitalist Medicine Team A (Beth/Margret): [phone]  [facility] Hospitalist Medicine Team B (Martin/Pa):  [phone]

## 2018-07-09 NOTE — MEDICAL/APP STUDENT
[facility] Internal Medicine Resident HO-II Progress Note    Subjective:      Anum Quick is a 63 y/o F who was admitted 3 days ago for an acute fall secondary to weakness and fatigue. Pt had history of multiple falls over the past week.      Pt states her fatigue and weakness has improved along with her SOB, although she still becomes SOB with exertion or without her oxygen. Pt also has improved speech with no slurring and states her periorbital bruising/pain has decreased.      Objective:   Last 24 Hour Vital Signs:  BP  Min: 137/97  Max: 165/119  Temp  Av.4 °F (36.3 °C)  Min: 96.1 °F (35.6 °C)  Max: 98.4 °F (36.9 °C)  Pulse  Av.3  Min: 99  Max: 114  Resp  Av.8  Min: 16  Max: 20  SpO2  Av %  Min: 95 %  Max: 97 %  I/O last 3 completed shifts:  In: -   Out: 2975 [Urine:2975]    Physical Examination:  General: Alert, Awake, Oriented x 3, No Acute Distress,  Head: periorbital bruising and mild swelling; healing laceration on R forehead  Eyes: PERRL, EOMI, no scleral icterus, no conjunctival pallor  Nose: Bruising on bridge of the nose  Mouth and Throat: no lesions noted, moist mucus membranes  Neck: no lymphadenopathy appreciated, Systolic murmur can be ascultated   Respiratory: Crackles in RLL   Cardiovascular: regular rate with regular rhythm, systolic murmur ascultated R 2nd intercostal space  Gastrointestinal: soft, nontender, nondistended,no rebound or guarding, normoactive bowel sounds.   Extremities: pulses 1+ in all extremities, no pitting edema, normal ROM   Neuro:  full strength even in all extremities, no sensory deficits.   Skin: no lesions, rashes, or breakdown.     Laboratory:  Laboratory Data Reviewed:  Trended Lab Data:    Recent Labs  Lab 18  0003 18  1020 18  0506 18  0357   WBC 7.03  --  8.13 8.22   HGB 9.7*  --  10.4* 10.4*   HCT 32.2*  --  34.0* 33.8*     --  250 255   MCV 90  --  88 88   RDW 20.2*  --  20.1* 20.2*   NA  --  138 140 141   K  --   3.6 4.0 4.0   CL  --  101 100 99   CO2  --  21* 25 27   BUN  --  58* 46* 35*   CREATININE  --  1.4 1.3 1.1   GLU  --  123* 150* 142*   PROT  --  7.1 7.5 7.4   ALBUMIN  --  3.3* 3.4* 3.5   BILITOT  --  1.0 1.1* 1.6*   AST  --  63* 54* 44*   ALKPHOS  --  234* 249* 235*   ALT  --  71* 63* 55*       Trended Cardiac Data:    Recent Labs  Lab 07/06/18  1526 07/06/18  2038 07/07/18  0002   TROPONINI 0.050* 0.048*  --    BNP  --   --  >4,900*       Microbiology Data   Microbiology Results (last 7 days)     ** No results found for the last 168 hours. **          Radiology:  Imaging Results          CT Chest Without Contrast (Final result)  Result time 07/06/18 19:54:22    Final result by Gene Craven MD (07/06/18 19:54:22)                 Impression:      1. Small bilateral pleural effusions with suspected mild pulmonary edema.  2. Right lower lobe 7 mm pulmonary nodule.  For a solid nodule 6-8 mm, Fleischner Society 2017 guidelines recommend follow up with non-contrast chest CT at 6-12 months and 18-24 months after discovery.  3. Small volume perihepatic ascites.  4. Additional findings as detailed above.      Electronically signed by: Gene Craven MD  Date:    07/06/2018  Time:    19:54             Narrative:    EXAMINATION:  CT CHEST WITHOUT CONTRAST    CLINICAL HISTORY:  SOB, pulmonary edema suspected;    TECHNIQUE:  Low dose axial images, sagittal and coronal reformations were obtained from the thoracic inlet to the lung bases. Contrast was not administered.    COMPARISON:  None.    FINDINGS:  Structures at the base of the neck are unremarkable.  There is fusiform dilatation of the ascending thoracic aorta measuring 4.4 cm.  Heart is enlarged without pericardial effusion.  Coronary artery calcification and prominent aortic valve calcification is seen.  Few prominent mediastinal lymph nodes are seen which do not meet CT criteria for enlargement.  The esophagus maintains a normal course and caliber.    The trachea  and bronchi are patent.  The lungs are symmetrically expanded.  Small bilateral pleural effusions are seen, left greater than right.  There is mild interlobular septal thickening with diffuse patchy ground-glass attenuation seen within the lungs.  Right lower lobe pulmonary nodule is visualized measuring 7 mm.    There is small volume perihepatic ascites.  The visualized abdominal structures are unremarkable.  Osseous structures demonstrate degenerative change.  Extrathoracic soft tissues are unremarkable.                               CT Head Without Contrast (Final result)  Result time 07/06/18 19:36:55    Final result by Gene Craven MD (07/06/18 19:36:55)                 Impression:      No acute intracranial abnormalities identified.      Electronically signed by: Gene Craven MD  Date:    07/06/2018  Time:    19:36             Narrative:    EXAMINATION:  CT HEAD WITHOUT CONTRAST    CLINICAL HISTORY:  Stroke;    TECHNIQUE:  Low dose axial images were obtained through the head.  Coronal and sagittal reformations were also performed. Contrast was not administered.    COMPARISON:  CT head from yesterday 07/05/2018.    FINDINGS:  No evidence of acute/recent major vascular distribution cerebral infarction, intraparenchymal hemorrhage, or intra-axial space occupying lesion. The ventricular system is normal in size and configuration with no evidence of hydrocephalus. No effacement of the skull-base cisterns. No abnormal extra-axial fluid collections or blood products. The visualized paranasal sinuses and mastoid air cells are clear. The calvarium shows no significant abnormality.                               X-Ray Chest 1 View (Final result)  Result time 07/06/18 16:28:57    Final result by Marisela Kinney MD (07/06/18 16:28:57)                 Impression:      Mild retrocardiac opacity in the left which could be due to pleural fluid or atelectasis versus consolidation. This appears improved compared to  previous exam. Continued follow-up is recommended.      Electronically signed by: Marisela Kinney MD  Date:    07/06/2018  Time:    16:28             Narrative:    EXAMINATION:  XR CHEST 1 VIEW    CLINICAL HISTORY:  weakness;    TECHNIQUE:  Single frontal view of the chest was performed.    COMPARISON:  Prior dated 05/02/2018    FINDINGS:  The mediastinal structures are midline.  The cardiac silhouette is not well evaluated due to AP technique.  There is a suggestion of a retrocardiac opacity in the left which could be due to pleural fluid or atelectasis versus consolidation.  This appears improved compared to previous exam.  Continued follow-up is recommended.    Bone anchors are noted in the left humerus.                                  Current Medications:     Infusions:       Scheduled:   docusate sodium  100 mg Oral Daily    ferrous sulfate  325 mg Oral BID    furosemide  40 mg Oral BID    levothyroxine  50 mcg Oral Before breakfast    metoprolol tartrate  25 mg Oral BID    rivaroxaban  20 mg Oral Daily with dinner        PRN:  acetaminophen, dextrose 50%, glucagon (human recombinant), insulin aspart U-100, ramelteon        Lines and Day Number of Therapy:  Peripheral single lumen IV in R forearm - 2 days    Assessment and Plan     Anum Quick is a 62 y.o.female who was admitted from the ED with multiple falls over the past week secondary to weakness and fatigue.    Weakness/Fatigue with falls  - increasing weakness x1 week with trauma to head with laceration to R forehead, pt denies dizziness, headache, or blurry vision but states improved gait and speech  - potential causes include CVA, anemia, polypharmacy, and infection  - consider amiodarone induced hypothyroidsim  - replace amiodarone with other rhythym control; consider Diltiazam  - continue to administer levothyroxine for hypothyroidism     Hypothyroidism  - On  Admit - TSH: 68.079    Free T4: <0.4  - Now - TSH: 75.000    Free T4:  0.48  - Continue levothyroxine treatment and f/u in 6 weeks with Endo for TSH and T4 levels  - consider amiodarone tx for afib as cause of hypothyroidism     Systolic and Diastolic Heart Failure  - Echo on 3/22/18 showed EF 20-25%, severe aortic stenosis, pulmonary HTN at 55mmHg  - Plan for aortic valve replacement; f/u with Cardio  - CXR showed mateusz lower lobe effusion with hazy opacity in bilateral perihilar area  - patient currently on lasix 40 PO BID  - administer O2 on discharge for symptomatic on exertion     Transminitis  - at admission: , AST 86, ALT 85  - now: , AST 44, ALT 55  - abdominal US showed  small volume of abdominal ascites, mildly increased hepatic echogenicity which may relate to hepatic steatosis, stable appearing small cystic lesion of the pancreatic head.  - likely due to hepatic congestion from volume overload    Afib  - discontinue amiodarone and begin diltiazem for rate and rhythym control  - patient currently in sinus rhythym    Iron Deficiency Anemia  - H/H on admission 9.3/29.8 and 10.4/33.8 this morning  - Fe at admission: 24  - likely due to iron defeciency  - treat with PO iron sulfate    Intermediate Troponin  - on admit 0.050 and results pending  - likely due to volume overload and heart failure    DmII  - HA1c 6.3 at admission   - hold home metformin  - sliding scale and accuchecks for tx    HTN  - initiate diltiazem  - consider second agent if not treated to goal    HLD  - at admission: Cholesterol 95, HDL 26, LDL 43.8  - administer home statin therapy    HARISH (resolved)  - Creatine/BUN at admission: 2.1/86   - Creatine/BUN now: 1.1/35  - most likely causes by Cardorenal syndrome   - improved after diuresis    Suspected Urinary Retention (resolved)  - bladder scan showed 100cc   - no intervention required      Lalit Morris  [facility] Internal Medicine L3  [facility] Hospitalitis Service Team    [facility] Medicine Hospitalist Pager numbers:   [facility] Hospitalist Medicine Team A  (Beth/Margret): [phone]  [facility] Hospitalist Medicine Team B (Martin/Pa):  [phone]

## 2018-07-09 NOTE — DISCHARGE SUMMARY
[facility] Med Team A Discharge Summary    Primary Team: [facility] Med Team A  Attending Physician: Elvira Campos MD  Resident: Maci  Intern: Rene    Date of Admit: 7/6/2018  Date of Discharge: 7/9/2018    Discharge to: home  Condition: stable    Discharge Diagnoses     Patient Active Problem List   Diagnosis    Chronic systolic heart failure    Type 2 diabetes mellitus with neurologic complication, without long-term current use of insulin    Renovascular hypertension    Severe aortic stenosis    Hyperlipidemia associated with type 2 diabetes mellitus    Paroxysmal atrial fibrillation    Coronary artery disease involving native coronary artery of native heart without angina pectoris    Chronic anticoagulation - on rivaroxaban    Acute on chronic systolic congestive heart failure    Candidal dermatitis    Venous stasis ulcers    Pancreatic cyst    Venous stasis of both lower extremities    Left atrial enlargement    Pulmonary hypertension due to left ventricular systolic dysfunction    Iron deficiency anemia    Cellulitis of right foot    Right foot ulcer    Acute on chronic combined systolic and diastolic heart failure    Acute on chronic congestive heart failure    HARISH (acute kidney injury)    Hypothyroidism    Fall    Laceration of fascia of head    Severe hypothyroidism    Dehydration    Elevated troponin    Weakness       Consultants and Procedures     Consultants:  nonw    Procedures:   none    Brief History of Present Illness      Anum Quick is a 62 y.o. female who  has a past medical history of Chronic anticoagulation - on rivaroxaban (12/29/2017); Chronic systolic heart failure (12/14/2017); CKD (chronic kidney disease) stage 3, GFR 30-59 ml/min (12/29/2017); Coronary artery disease involving native coronary artery of native heart without angina pectoris (12/29/2017); Essential hypertension (12/14/2017); Hyperlipidemia associated with type 2 diabetes mellitus (12/14/2017); Left  "atrial enlargement; NSTEMI (non-ST elevated myocardial infarction) (12/14/2017); Paroxysmal atrial fibrillation (12/15/2017); Renovascular hypertension (12/14/2017); Severe aortic stenosis (12/14/2017); and Type 2 diabetes mellitus with neurologic complication, without long-term current use of insulin (12/14/2017).. The patient presented to [facility] on 7/6/2018 with a primary complaint of Fatigue (c/o increased generalized shaking and weakness since waking up this morning. Pt was seen here yesterday after falling. Has had multiple falls recently)     Per patient and , started feeling weakness approx 1 week ago, especially to L sided UE and LE. Difficulty with hand  to LE, dropping things frequently. Also with unsteady gait/falls. Fell yesterday when dog ran between her legs, hit her face/head, presented to [facility] ED. CT head wnl, lac to R forehead requiring sutures. Since yesterday, increased slurred speech/difficulty w language expression. Continued weakness/unsteadiness, also with "shaking" per patient. Fell today, lac on head reopened. Returned to ED for eval.     Denies fever, chest pain, SOB, n/v/d. Of note, patient with chronic back pain radiating to LLE. Taking Norco 10 TID, gabapentin, venlafaxine, restoril    Hospital Course By Problem with Pertinent Findings   Weakness with multiple falls on AC   - Patient and  reporting increasing weakness x 1 week, LUE and LLE  - patient falling with head injury 7/5, continued weakness/unsteady gait today plus speech difficulty  - possible etiologies include medication side effects/polypharmacy, hypothyroidism, intoxication with other substance, CVA, infection, hypoxia, deconditioning  - CBC with anemia, LFTs elevated  - Thyrotoxicosis as below on amiodarone  - UA noninfected  - CT Head with no acute hemorrhage or ischemia  - CXR, CT Chest with small pleural effusions as below   - s/p naloxone x1 in ED with increased agitation " following  - Holding all opioid/other narcotic or altering medications  - UTox positive for opioids   - initiated synthroid with much improvement    - PT, OT, consulted, will need HH, aminata PT/OT, stopped sedating meds, added synthroid 50mcg, recheck TSH in 6 weeks     Hypothyroidism  - On admit TSH 68.709, T4<0.4  - per patient and family no prior hx   - likely 2/2 amiodarone   - initiated synthroid 50 mcg daily  - anti-TPO elevated, anti-thyroglobulin wnl  - given  follow up with Endo as outpatient, cont synthroid 50mcg qd on discharge, stop amio     HARISH   - Cr 2.1 on admit; baseline 1.8  - Likely cardiorenal syndrome, resolved with diuresis     Urinary retention  -renal U/S with significant distention of bladder  -bladder scan wnl     Combined systolic and diastolic heart failure   - TTE 3/22/2018 with severely depressed EF 20-25%, severe AS, grade III DD, pulm HTN 55mmHg  - Plan for aortic valve replacement, follows with Dr. Ybarra  - CXR with mild retrocardiac opacity L; CT chest with small b/l pleural effusions, suspected pulmonary edema  - New O2 requirement this admission  - repeat CXR with increased hazy opacity in bilateral perihilar area and right upper lobe; s/p lasix 40 IV x1, now on lasix 40 PO BID  - ordered home O2 on discharge and follow up with PCP      Transaminitis  - Alk Phos 255, has been 150 baseline recently  - AST 86, ALT 85; improved this morning   -  - abdominal US with small volume ascites, mild increase haptic echogenicity, stable appear sm cystic pancreatic head mass  - likely 2/2 passive hepatic congestion as improving with diuresis     Atrial Fibrillation  - rate controlled w amiodarone  - discontinued amiodarone  - initiated lopressor      Anemia, NOS  - H/H 9.3/29.8 (bl ~10.8)  - iron studies c/w iron deficiency  - iron replacement, stool softener     Intermediate Troponin  - on admit trop 0.050, likely 2/2 demand in setting of HF with some volume overload  - trended down,  stopped trending     DM II  - Hgb A1c 6.3  - hold home metformin  - SSI/accuchecks     HTN  - initiated diltiazem as above       HLD  - resume home statin    Discharge Medications        Medication List      START taking these medications    diltiaZEM 60 MG Cp12  Commonly known as:  CARDIZEM SR  Take 1 capsule (60 mg total) by mouth 2 (two) times daily.     ferrous sulfate 325 mg (65 mg iron) Tab tablet  Take 1 tablet (325 mg total) by mouth 2 (two) times daily with meals.  Replaces:  ferrous sulfate 325 (65 FE) MG EC tablet     levothyroxine 50 MCG tablet  Commonly known as:  SYNTHROID  Take 1 tablet (50 mcg total) by mouth before breakfast.  Start taking on:  7/10/2018        CONTINUE taking these medications    atorvastatin 80 MG tablet  Commonly known as:  LIPITOR  Take 1 tablet (80 mg total) by mouth once daily.     butalbital-acetaminophen-caffeine -40 mg -40 mg per tablet  Commonly known as:  FIORICET, ESGIC     collagenase ointment  Apply topically once daily.     estropipate 1.5 MG tablet  Commonly known as:  OGEN     furosemide 40 MG tablet  Commonly known as:  LASIX  Take 1 tablet (40 mg total) by mouth 2 (two) times daily.     magnesium oxide 400 mg tablet  Commonly known as:  MAG-OX  Take 1 tablet (400 mg total) by mouth once daily.     metFORMIN 500 MG (MOD) 24 hr tablet  Commonly known as:  GLUMETZA     nitroGLYCERIN 0.4 MG SL tablet  Commonly known as:  NITROSTAT  Place 1 tablet (0.4 mg total) under the tongue every 5 (five) minutes as needed for Chest pain (angina).     rivaroxaban 20 mg Tab  Commonly known as:  XARELTO  Take 1 tablet (20 mg total) by mouth daily with dinner or evening meal.     senna 8.6 mg tablet  Commonly known as:  SENOKOT  Take 1 tablet by mouth 2 (two) times daily as needed for Constipation.     venlafaxine 150 MG Cp24  Commonly known as:  EFFEXOR-XR        STOP taking these medications    amiodarone 200 MG Tab  Commonly known as:  PACERONE     clopidogrel 75 mg  tablet  Commonly known as:  PLAVIX     ferrous sulfate 325 (65 FE) MG EC tablet  Replaced by:  ferrous sulfate 325 mg (65 mg iron) Tab tablet     gabapentin 100 MG capsule  Commonly known as:  NEURONTIN     HYDROcodone-acetaminophen  mg per tablet  Commonly known as:  NORCO     metoprolol succinate 50 MG 24 hr tablet  Commonly known as:  TOPROL-XL     temazepam 15 mg Cap  Commonly known as:  RESTORIL           Where to Get Your Medications      These medications were sent to [id number] - [place] - [address] AT [facility]  [address] [address]    Phone:  [phone]   · diltiaZEM 60 MG Cp12  · ferrous sulfate 325 mg (65 mg iron) Tab tablet  · levothyroxine 50 MCG tablet         Discharge Information:   Diet:  Diabetic/cardiac thin liquids    Physical Activity:  As tolerated    Instructions:  1. Take all medications as prescribed  2. Keep all follow-up appointments  3. Return to the hospital or call your primary care physicians if any worsening symptoms such as chest pain, shortness of breath, palpitations, fevers, nausea, vomiting, diarrhea, dizziness, loss of consciousness, urinary/bowel abnormalities from baseline, bleeding      Follow-Up Appointments:  Follow-up Information     Raghav Valdez MD On 7/16/2018.    Specialty:  Internal Medicine  Why:  @11:45am  Contact information:  [address] [facility]  [address]  [facility] [id number] [phone]             [facility] [place].    Specialty:  Home Health Services  Why:  Home Health  Contact information:  [address] [facility]  [address]  [place] [address] [phone]             Araine Licona MD In 2 weeks.    Specialty:  Dermatology  Contact information:  [address]  [place] [id number] [phone]             Leroy Marquis MD In 1 week.    Specialties:  INTERVENTIONAL CARDIOLOGY, Cardiology  Contact information:  [address] [facility]  [address]  [place]  [address]  [phone]             [facility]    Specialty:  DME Provider  Why:  DME  Contact information:  [address]  [facility] [address]  [phone]                     Amilcar Lux  hospitals Internal Medicine, -2

## 2018-07-09 NOTE — PLAN OF CARE
Problem: Patient Care Overview  Goal: Plan of Care Review  Outcome: Ongoing (interventions implemented as appropriate)  Pt is AAOx3. No complaints of nausea, vomiting, or diarrhea. No complaints of pain. On 2L NC 02 97%. Tele 8567 and running sinus rhythm to sinus tachy. ACHS and last bs was 193. Ambulated to the bedside commode. Safety precautions maintained. Tolerated all medications well.

## 2018-07-09 NOTE — PROGRESS NOTES
Notified Dr Campos team about pt bp 163/106 and asymptomatic. Stated understanding. Will continue to monitor.

## 2018-07-09 NOTE — PROGRESS NOTES
.Pharmacy New Medication Education    Patient accepted medication education.    Pharmacy educated patient on name and purpose of medications and possible side effects, using the teach-back method.     Current Inpatient Medication Orders   acetaminophen tablet 650 mg   dextrose 50% injection 12.5 g   docusate sodium capsule 100 mg   ferrous sulfate EC tablet 325 mg   furosemide tablet 40 mg   glucagon (human recombinant) injection 1 mg   insulin aspart U-100 pen 0-5 Units   levothyroxine tablet 50 mcg   metoprolol tartrate (LOPRESSOR) tablet 25 mg   ramelteon tablet 8 mg   rivaroxaban tablet 20 mg       Learners of pharmacy medication education included:  Patient    Patient +/- learner response:  Verbalized Understanding, Teachback

## 2018-07-09 NOTE — PLAN OF CARE
O2 requirement assessed on patient. Patient sats 87% on room air at rest. Patient sats 97% on 2L NC with exertion. Patient requiring 2L NC O2 both at rest and with exertion to keep sats above 88%.    Abimael Santana MD   [facility] Internal Medicine HO-I

## 2018-07-09 NOTE — PT/OT/SLP PROGRESS
Occupational Therapy   Treatment/Discharge Summary    Name: Anum Quick  MRN: [id number]  Admitting Diagnosis:  Severe hypothyroidism       Recommendations:     Discharge Recommendations: home health OT, home health PT, home with home health  Discharge Equipment Recommendations:  bedside commode, bath bench  Barriers to discharge:  Decreased caregiver support    Subjective     Communicated with: nurse prior to session.  Pain/Comfort:  · Pain Rating 1: 0/10    Patients cultural, spiritual, Restoration conflicts given the current situation: na    Objective:     Patient found with: telemetry, oxygen    General Precautions: Standard, fall, respiratory   Orthopedic Precautions:N/A   Braces:       Occupational Performance:    Bed Mobility:    · Patient completed Scooting/Bridging with modified independence  · Patient completed Supine to Sit with modified independence  · Patient completed Sit to Supine with modified independence     Functional Mobility/Transfers:  · Patient completed Sit <> Stand Transfer with stand by assistance  with  rolling walker   · Patient completed Toilet Transfer Step Transfer technique with stand by assistance with  rolling walker and bedside commode  · Functional Mobility: SBA witjh RW to sink and back to bed; mild SOB with O2 NC ini place    Activities of Daily Living:  · Grooming: stand by assistance standing at sink brushed teth and washed face.   · Upper Body Dressing: setup seated EOB .  · Lower Body Dressing: stand by assistance pants  · Toileting: stand by assistance clothes management with RW    Patient left seated EOB with   with all lines intact, call button in reach and  present    AMPA 6 Click:  [facility] Total Score: 20    Treatment & Education:  Hand placement cues sit<>stand from EOB and BSC to RW; family trn with   Education:    Assessment:     Anum Quick is a 62 y.o. female with a medical diagnosis of Severe hypothyroidism.  She presents with Pt.  Discharged to home with HH.  completed family training. DC OT.  Performance deficits affecting function are weakness, impaired functional mobilty, decreased safety awareness, gait instability, impaired endurance, impaired balance, impaired self care skills, impaired cardiopulmonary response to activity.      Rehab Prognosis:  good; patient would benefit from acute skilled OT services to address these deficits and reach maximum level of function.       Plan:     Patient to be seen 5 x/week to address the above listed problems via self-care/home management, therapeutic activities, therapeutic exercises  · Plan of Care Expires: 08/07/18  · Plan of Care Reviewed with: patient, spouse    This Plan of care has been discussed with the patient who was involved in its development and understands and is in agreement with the identified goals and treatment plan    GOALS:    Occupational Therapy Goals        Problem: Occupational Therapy Goal    Goal Priority Disciplines Outcome Interventions   Occupational Therapy Goal     OT, PT/OT Unable to achieve outcome(s) by discharge    Description:  Goals to be met by: 7/17/2018    Patient will increase functional independence with ADLs by performing:    Feeding with Modified [place].  UE Dressing with Modified [place].  LE Dressing with Modified [place].  Grooming while standing with Modified [place].  Supine to sit with Modified [place].  Stand pivot transfers with Modified [place].  Toilet transfer to toilet with Modified [place].  Upper extremity exercise program x10 reps per handout, with independence.                       Time Tracking:     OT Date of Treatment: 07/09/18  OT Start Time: 1352  OT Stop Time: 1430  OT Total Time (min): 38 min    Billable Minutes:Self Care/Home Management 25  Therapeutic Activity 13  Total Time 38    Alisa Hollingsworth OT  7/9/2018

## 2018-07-09 NOTE — PROGRESS NOTES
Home Oxygen Evaluation    Date Performed: 7/9/2018    1) Patient's Home O2 Sat on room air, while at rest: 86          If O2 sats on room air at rest are 88% or below, patient qualifies. No additional testing needed. Document N/A in steps 2 and 3. If 89% or above, complete steps 2.      2) Patient's O2 Sat on room air while exercising:         If O2 sats on room air while exercising remain 89% or above patient does not qualify, no further testing needed Document N/A in step 3. If O2 sats on room air while exercising are 88% or below, continue to step 3.      3) Patient's O2 Sat while exercising on O2:  at  LPM         (Must show improvement from #2 for patients to qualify)    If O2 sats improve on oxygen, patient qualifies for portable oxygen. If not, the patient does not qualify.

## 2018-07-10 NOTE — TELEPHONE ENCOUNTER
----- Message from MAXWELL Young ANP sent at 7/10/2018  1:07 PM CDT -----  Contact: Edna  Please arrange for office follow up with Dr. Chaudhari this week or next week. Patient recently discharged with stopping of BB and Amiodarone due to elevated TSH and started on Cardizem CD. Has depressed LVEF and is seeing Dr. Lundy tomorrow for AS evaluation. Needs follow up in office for medication adjustment for CHF and AS

## 2018-07-10 NOTE — PROGRESS NOTES
C3 nurse attempted to contact patient. No answer.  C3 nurse attempted to contact Anum Quick for a TCC post hospital discharge follow up call. No answer at phone number listed and no voicemail available. The patient has a HOSFU appointment with Raghav Valdez MD on 7/16/2018  @ 11:45am

## 2018-07-10 NOTE — PATIENT INSTRUCTIONS
Discharge Instructions for Hypothyroidism and Myxedema  Hypothyroidism means your thyroid gland is not producing enough thyroid hormone to meet your body's needs. Overall, hypothyroidism slows your bodys normal rate of functioning, causing mental and physical sluggishness. Various symptoms may range from mild to severe. The most severe form is called myxedema.  Medicine  Take your medicine exactly as directed. You will take this medicine for the rest of your life.  · Take your medicine the same time every day.  · Keep your pills in a container that is labeled with the days of the week. This will help you remember if youve taken your medicine each day.  · Take your medicine with a full glass of water. Take it at least 1 before you eat breakfast. Or at bedtime, at least 3 hours after eating.  · Do not take calcium or iron within 4 hours of taking your thyroid medicine. And, ask your healthcare provider about taking other medicines with your thyroid pill.  · Continue to take your medicine if you become pregnant. Many women need more thyroid medicine during pregnancy. Your doctor may increase your dose.  · Your healthcare provider will regularly check your thyroid hormone levels with blood tests. If your dose is changed, you will usually have lab work in 4 to 6 weeks to be sure that the new dose is right for you.  · Always alert your healthcare providers of changes in your other medicines (including estrogens, testosterone, and anti-seizure medicines) as these changes may affect your thyroid hormone levels.  · Never stop treatment on your own. If you do, your symptoms will return.  Other home care  · During your routine visits, tell your healthcare provider about any signs of hyperthyroidism (too much thyroid hormone), such as:  ¨ Restlessness  ¨ Rapid weight loss  ¨ Sweating  ¨ Palpitations  · Eat a high-fiber, low-calorie diet to relieve constipation and maintain a healthy weight.  · Exercise. Start slow, with a 5  to 15 minute walk each day. Try to work up to 10,000 steps, or 3 20-minute walks each day.  · Remember, hypothyroidism is associated with increased cholesterol and an increased risk of heart disease. Correcting hypothyroidism generally improves cholesterol levels. Talk to your healthcare provider about the elements of a healthy lifestyle.  To learn more  The resources below can help you learn more:  · American Thy[employer] Association [phone] www.thyroid.org  · [facility] [phone] www.hormone.org  Follow-up care  Follow-up with your healthcare provider, or as advised.  When to seek medical care  Call your healthcare provider right away if you have any of the following:  · Extreme fatigue  · Puffy hands, face, or feet  · Irregular heartbeat  · Confusion   Date Last Reviewed: 12/1/2016  © 9907-6476 [employer]. [address] [place] [address], [place] [address]. All rights reserved. This information is not intended as a substitute for professional medical care. Always follow your healthcare professional's instructions.

## 2018-07-11 NOTE — PROGRESS NOTES
Subjective:      Patient ID: Anum Quick is a 62 y.o. female.    Chief Complaint: Follow-up      HPI:  Anum Quick is a 62 y.o. female who presents h/o recently diagnosed CHF, AS, DM, HTN, A Fib (on amio), presenting with shortness of breath,  patient reports has been going on x3 weeks, and is progressive, worse with exertion, now exercise limitation to < 1 block without exercise limitations previously. Pt denies chest pain, palpitations, syncope, light headed. She reports 3 pillow orthopnea, PND, and ankle swelling. She reports has been increasing fluid intake recently. Had recent admission to [facility], pt reports undergoing cardiac cath (no reported significant CAD per reports, we have requested disc) was told she had aortic valve stenosis that would require surgery but also was told about possible TAVR.      She states for the last 3-4 weeks she has experienced HA (Class III sx) and is unable to walk up her 17 stairs in her home without stopping to catch her breath. Her SOB resolves with rest and is not associated with Cp, light headedness, dizziness. At  she believes she had ARELI, Coronary Angiogram, CTA but we are still awaiting records. She was also told she would need surgical AVR but was told she must be referred to [place], [place] for surgery. She was unsure as to the reasoning behind this. Since discharge she has adhered to a low sodium diet and been complaint with newly prescribed Lasix, however, she began experiencing SOB and noticed bilateral ankle edema. She presented to [facility] this hospitalization as she wanted another opinion regarding management of her CHF and AS.     Current medications Reviewed    Review of Systems   Constitutional: Negative for chills, fatigue, fever and unexpected weight change.   HENT: Negative for hearing loss and nosebleeds.    Eyes: Negative for pain, redness and visual disturbance.   Respiratory: Negative for cough, chest tightness and shortness of breath.     Cardiovascular: Negative for chest pain and leg swelling.   Gastrointestinal: Negative for abdominal distention.   Genitourinary: Negative for difficulty urinating and hematuria.   Musculoskeletal: Negative for back pain, gait problem and neck pain.   Neurological: Negative for dizziness, seizures, syncope and headaches.   Hematological: Negative for adenopathy. Does not bruise/bleed easily.   Psychiatric/Behavioral: Negative for behavioral problems.     Objective:   Physical Exam   Constitutional: She is oriented to person, place, and time. She appears well-developed and well-nourished.   HENT:   Head: Normocephalic and atraumatic.   Eyes: Conjunctivae and EOM are normal. Pupils are equal, round, and reactive to light.   Neck: Normal range of motion. Neck supple. No JVD present. No tracheal deviation present. No thyromegaly present.   Cardiovascular: Normal rate, regular rhythm, normal heart sounds and intact distal pulses.    Pulmonary/Chest: Effort normal and breath sounds normal.   Abdominal: Soft. Bowel sounds are normal.   Musculoskeletal: Normal range of motion.   Neurological: She is alert and oriented to person, place, and time. She has normal reflexes.   Skin: Skin is warm and dry.   Psychiatric: She has a normal mood and affect. Her behavior is normal. Judgment and thought content normal.   Nursing note and vitals reviewed.        Assessment:   1. AS  2. A.Fib  Plan:   Would like patient to follow up in valve clinic for TAVR eval due frailty and falls. She is moderate risk for surgery with low EF and frailty.

## 2018-07-13 NOTE — TELEPHONE ENCOUNTER
Patient referred to valve clinic by Dr. Lundy.  Records reviewed.  Attempted to contact patient regarding cath and CT films from [facility].  Voice mail is full.  Will try to call again.  Will need all outside records and films to review before scheduling appointments in Valve Clinic

## 2018-07-17 NOTE — PROGRESS NOTES
Spoke to patient regarding TAVR evaluation.  [facility] and [facility]ogram films.  Once received/reviewed will contact her for scheduling.

## 2018-07-17 NOTE — TELEPHONE ENCOUNTER
----- Message from Deepali Dela Cruz sent at 7/17/2018 10:39 AM CDT -----  Contact: Manjit nixon/ [facility]/ [phone]  Nurse called in to let you know patient has gain weight since last week. She gained 7 lbs. Patient has increased swelling to her leg but no shortness of breath.    Please call and advise.

## 2018-07-22 PROBLEM — G40.901 STATUS EPILEPTICUS: Status: ACTIVE | Noted: 2018-01-01

## 2018-07-22 PROBLEM — I46.9 CARDIAC ARREST: Status: ACTIVE | Noted: 2018-01-01

## 2018-07-22 NOTE — PROGRESS NOTES
Patient came on arrival via car with : I was called to room 11 for a breathing tx: when I arrived patient was agonal.  We coded patient : size 7 tube at 22 lip.  cpr was performed  co2 detector with good color change  Patient put on vent ac 450/16/5/100%  15mg treatment given via inline   abg obtained

## 2018-07-22 NOTE — PROGRESS NOTES
Results for JAE GILLIAM (MRN [id number]) as of 7/22/2018 16:40   Ref. Range 7/22/2018 16:26   POC PH Latest Ref Range: 7.35 - 7.45  7.427   POC PCO2 Latest Ref Range: 35 - 45 mmHg 39.1   POC PO2 Latest Ref Range: 80 - 100 mmHg 126 (H)   POC BE Latest Ref Range: -2 to 2 mmol/L 1   POC HCO3 Latest Ref Range: 24 - 28 mmol/L 25.8   POC SATURATED O2 Latest Ref Range: 95 - 100 % 99   POC TCO2 Latest Ref Range: 23 - 27 mmol/L 27   Sample Unknown ARTERIAL

## 2018-07-22 NOTE — PROGRESS NOTES
Results for JAE GILLIAM (MRN [id number]) as of 7/22/2018 12:41   Ref. Range 7/22/2018 12:09   POC PH Latest Ref Range: 7.35 - 7.45  7.093 (LL)   POC PCO2 Latest Ref Range: 35 - 45 mmHg 49.2 (H)   POC PO2 Latest Ref Range: 80 - 100 mmHg 108 (H)   POC BE Latest Ref Range: -2 to 2 mmol/L -15   POC HCO3 Latest Ref Range: 24 - 28 mmol/L 15.0 (L)   POC SATURATED O2 Latest Ref Range: 95 - 100 % 96   POC TCO2 Latest Ref Range: 23 - 27 mmol/L 17 (L)   Vt Unknown 450   PEEP Unknown 5   Sample Unknown ARTERIAL   DelSys Unknown Adult Vent   Allens Test Unknown Pass   Site Unknown RR   Mode Unknown AC/PRVC   Rate Unknown 14

## 2018-07-22 NOTE — ED PROVIDER NOTES
Encounter Date: 7/22/2018    SCRIBE #1 NOTE: I, Davi Cross, am scribing for, and in the presence of,  Dr. Christina Ortiz. I have scribed the entire note.       History     Chief Complaint   Patient presents with    Shortness of Breath     recieved pt from from ramp with sob,  did not bring pts home o2, pt placed on er stretcher.     Anum Quick is a 62 y.o. female who  has a past medical history of Chronic anticoagulation - on rivaroxaban (12/29/2017); Chronic systolic heart failure (12/14/2017); CKD (chronic kidney disease) stage 3, GFR 30-59 ml/min (12/29/2017); Coronary artery disease involving native coronary artery of native heart without angina pectoris (12/29/2017); Essential hypertension (12/14/2017); Hyperlipidemia associated with type 2 diabetes mellitus (12/14/2017); Left atrial enlargement; NSTEMI (non-ST elevated myocardial infarction) (12/14/2017); Paroxysmal atrial fibrillation (12/15/2017); Renovascular hypertension (12/14/2017); Severe aortic stenosis (12/14/2017); and Type 2 diabetes mellitus with neurologic complication, without long-term current use of insulin (12/14/2017).    Patient presents to the ED due to SOB.  states pt was feeling SOB last night, this morning worsened. Nml 2 L O2 at baseline.  rushed pt to ED in car, did not bring O2. Celeste (tech) noted pt was working to breathe, pale. Placed in room. Evaluated by PA, noted pt was cyanotic and shortly after became unresponsive. noted pulseless, CPR initiated per ACLS protocol.         The history is provided by the spouse. The history is limited by the condition of the patient.     Review of patient's allergies indicates:  No Known Allergies  Past Medical History:   Diagnosis Date    Chronic anticoagulation - on rivaroxaban 12/29/2017    PAF    Chronic systolic heart failure 12/14/2017     12-15-17   1 - Moderately depressed left ventricular systolic function (EF 30-35%). Akinetic LV apex. There is no  thrombus in LV apex.   2 - Indeterminate LV diastolic function.    3 - Mildly to moderately depressed right ventricular systolic function .    4 - Pulmonary hypertension. The estimated PA systolic pressure is 63 mmHg.    5 - Severe low flow aortic valve stenosis (JAMES 0.49 cm2, AVAi 0.27 cm2/m2, peak aortic jet velocity 4.0 m/s,MG 48 mmHg).    6 - Mild to moderate mitral regurgitation.    7 - Mild tricuspid regurgitation.    8 - Severe left atrial enlargement.     CKD (chronic kidney disease) stage 3, GFR 30-59 ml/min 12/29/2017    Coronary artery disease involving native coronary artery of native heart without angina pectoris 12/29/2017    [facility] @ : Per report (12/4/2017) proximal LAD has 20% stenosis, RCA with LI otherwise normal coronaries      Essential hypertension 12/14/2017    Hyperlipidemia associated with type 2 diabetes mellitus 12/14/2017    Left atrial enlargement     NSTEMI (non-ST elevated myocardial infarction) 12/14/2017    Paroxysmal atrial fibrillation 12/15/2017    Renovascular hypertension 12/14/2017    Severe aortic stenosis 12/14/2017    12-15-17  Severe low flow aortic valve stenosis (JAMES 0.49 cm2, AVAi 0.27 cm2/m2, peak aortic jet velocity 4.0 m/s,MG 48 mmHg).      Type 2 diabetes mellitus with neurologic complication, without long-term current use of insulin 12/14/2017     Past Surgical History:   Procedure Laterality Date    CARDIAC SURGERY      HYSTERECTOMY      KNEE SURGERY      SHOULDER ARTHROSCOPY Right 01/16/2004     History reviewed. No pertinent family history.  Social History   Substance Use Topics    Smoking status: Never Smoker    Smokeless tobacco: Never Used    Alcohol use No     Review of Systems   Unable to perform ROS: Patient unresponsive       Physical Exam     Initial Vitals   BP Pulse Resp Temp SpO2   07/22/18 1230 07/22/18 1141 07/22/18 1224 07/22/18 1517 07/22/18 1141   121/79 90 20 96.8 °F (36 °C) 100 %      MAP       --                Physical  Exam    Nursing note and vitals reviewed.  Constitutional: She appears well-developed and well-nourished.   unresponsive   HENT:   Head: Normocephalic and atraumatic.   Right Ear: External ear normal.   Left Ear: External ear normal.   Nose: Nose normal.   Eyes: Conjunctivae and EOM are normal. Pupils are equal, round, and reactive to light. Right eye exhibits no discharge. Left eye exhibits no discharge. No scleral icterus.   Neck: Normal range of motion. Neck supple.   Cardiovascular:   no heart tones, no pulse  2+ pitting edema b/l   Pulmonary/Chest:   lungs coarse b/l  currently bagged   Abdominal: Soft. She exhibits no distension.   Musculoskeletal: She exhibits edema.   Neurological:   unresponsive   Skin: Skin is warm and dry.         ED Course   Critical Care  Date/Time: 7/22/2018 3:08 PM  Performed by: FRIEDA BRADLEY.  Authorized by: FRIEDA BRADLEY   Total critical care time (exclusive of procedural time) : 35 minutes  Critical care time was exclusive of separately billable procedures and treating other patients.  Critical care was necessary to treat or prevent imminent or life-threatening deterioration of the following conditions: cardiac failure, circulatory failure and respiratory failure.  Critical care was time spent personally by me on the following activities: blood draw for specimens, development of treatment plan with patient or surrogate, discussions with consultants, evaluation of patient's response to treatment, examination of patient, obtaining history from patient or surrogate, ordering and performing treatments and interventions, ordering and review of laboratory studies, ordering and review of radiographic studies, pulse oximetry, re-evaluation of patient's condition, review of old charts and ventilator management.    Intubation  Date/Time: 7/22/2018 3:08 PM  Performed by: FRIEDA BRADLEY  Authorized by: FRIEDA BRADLEY   Consent Done: Emergent Situation  Indications:  respiratory failure  Intubation method: direct  Patient status: unconscious  Preoxygenation: BVM  Paralytic: none  Laryngoscope size: Mac 4  Tube size: 7.0 mm  Number of attempts: 1  Cricoid pressure: no  Cords visualized: yes  Post-procedure assessment: chest rise  Breath sounds: rales/crackles  Patient tolerance: Patient tolerated the procedure well with no immediate complications    External Jugular IV  Date/Time: 7/22/2018 3:10 PM  Performed by: FRIEDA BRADLEY  Authorized by: FRIEDA BRADLEY   Consent Done: Emergent Situation  Location (Ext Jugular): Right.  Area Prepped With: Chlorohexidine.  Catheter Size: 20 ga.  Number of attempts: 1  Fixation/Dressing: Taped in place.  Patient tolerance: Patient tolerated the procedure well with no immediate complications        Labs Reviewed   COMPREHENSIVE METABOLIC PANEL - Abnormal; Notable for the following:        Result Value    CO2 13 (*)     Glucose 149 (*)     Albumin 3.1 (*)     Total Bilirubin 1.5 (*)     Alkaline Phosphatase 204 (*)     Anion Gap 29 (*)     All other components within normal limits   B-TYPE NATRIURETIC PEPTIDE - Abnormal; Notable for the following:     BNP >4,900 (*)     All other components within normal limits   TROPONIN I - Abnormal; Notable for the following:     Troponin I 0.056 (*)     All other components within normal limits   CBC W/ AUTO DIFFERENTIAL - Abnormal; Notable for the following:     RBC 3.32 (*)     Hemoglobin 9.2 (*)     Hematocrit 32.1 (*)     MCHC 28.7 (*)     RDW 21.4 (*)     All other components within normal limits   PROTIME-INR - Abnormal; Notable for the following:     Prothrombin Time 30.8 (*)     INR 2.9 (*)     All other components within normal limits   LACTIC ACID, PLASMA - Abnormal; Notable for the following:     Lactate (Lactic Acid) >12.0 (*)     All other components within normal limits    Narrative:        Lactic Acid critical result(s) called and verbal readback obtained   from Danny Soriano RN,  07/22/2018 12:13   URINALYSIS, REFLEX TO URINE CULTURE - Abnormal; Notable for the following:     Appearance, UA Hazy (*)     Protein, UA 3+ (*)     Occult Blood UA 1+ (*)     All other components within normal limits    Narrative:     Preferred Collection Type->Urine, Catheterized   TSH - Abnormal; Notable for the following:     TSH 59.103 (*)     All other components within normal limits   BASIC METABOLIC PANEL - Abnormal; Notable for the following:     Potassium 2.8 (*)     CO2 14 (*)     Glucose 306 (*)     BUN, Bld 25 (*)     Anion Gap 30 (*)     eGFR if  56 (*)     eGFR if non  48 (*)     All other components within normal limits   T4, FREE - Abnormal; Notable for the following:     Free T4 0.50 (*)     All other components within normal limits   ISTAT PROCEDURE - Abnormal; Notable for the following:     POC PH 7.093 (*)     POC PCO2 49.2 (*)     POC PO2 108 (*)     POC HCO3 15.0 (*)     POC TCO2 17 (*)     All other components within normal limits   POCT GLUCOSE - Abnormal; Notable for the following:     POCT Glucose 306 (*)     All other components within normal limits   ISTAT PROCEDURE - Abnormal; Notable for the following:     POC PO2 126 (*)     All other components within normal limits   POCT GLUCOSE - Abnormal; Notable for the following:     POCT Glucose 255 (*)     All other components within normal limits   CULTURE, URINE   CULTURE, BLOOD   CULTURE, BLOOD   APTT   MAGNESIUM   LIPASE   PHOSPHORUS   MAGNESIUM   LIPASE   PHOSPHORUS   TSH   URINALYSIS MICROSCOPIC    Narrative:     Preferred Collection Type->Urine, Catheterized   CK   CK     EKG Readings: (Independently Interpreted)   Initial Reading: No STEMI.   3:00 PM  sinus rhythm 1st deg AV block  prlonged QT    nml ST segments, No STEMI  rate 89       Imaging Results          CT Head Without Contrast (Final result)  Result time 07/22/18 14:10:20    Final result by Fletcher Rosales DO (07/22/18 14:10:20)                  Impression:      1.  No acute intracranial pathology      Electronically signed by: Fletcher Rosales DO  Date:    07/22/2018  Time:    14:10             Narrative:    EXAMINATION:  CT HEAD WITHOUT CONTRAST    CLINICAL HISTORY:  Confusion/delirium, altered LOC, unexplained;    TECHNIQUE:  Low dose axial images were obtained through the head.  Coronal and sagittal reformations were also performed. Contrast was not administered.    COMPARISON:  07/06/2018    FINDINGS:  There is no evidence for hemorrhage, midline shift or mass effect.There is no gross evidence to suggest an acute infarct.Mild generalized cortical atrophy noted.  Endotracheal to and orogastric tube in place.    The visualized paranasal sinuses and mastoid air cells are clear.    The calvarium is intact.                               X-Ray Chest AP Portable (Final result)  Result time 07/22/18 11:58:38    Final result by Fletcher Rosales DO (07/22/18 11:58:38)                 Impression:      As above      Electronically signed by: Fletcher Rosales DO  Date:    07/22/2018  Time:    11:58             Narrative:    EXAMINATION:  XR CHEST AP PORTABLE    CLINICAL HISTORY:  tube placement;    TECHNIQUE:  Single frontal view of the chest was performed.    COMPARISON:  07/07/2018    FINDINGS:  Large area of consolidation involving the right mid to upper lung zone.  There is also increased parenchymal consolidation in the left lung base when compared to the prior exam.  Small right-sided pleural effusion and small to moderate probable left-sided pleural effusion.  An endotracheal tube is in place.  The tip is located approximately 2 cm above the kita.  Transcutaneous pacing device noted.                                 Medical Decision Making:   Initial Assessment:   Patient presented with SOB and went into respiratory failure and cardiac arrest  Differential Diagnosis:   Hypoxia, hypoglycemia, hyperkalemia, hypovolemia, acidosis, hypokalemia,  hypothermia, toxins, cardiac tamponade, PTX, MI, Trauma  Independently Interpreted Test(s):   I have ordered and independently interpreted EKG Reading(s) - see prior notes  Clinical Tests:   Lab Tests: Ordered and Reviewed  Radiological Study: Ordered and Reviewed  Medical Tests: Ordered and Reviewed  ED Management:  Patient intubated and resuscitated per ACLS protocol.  Rhythm always asystole.  ROSC achieved.  Suspect hypoxia or sepsis.  Treating patient for PNA with abx.  Initiated therapeutic hypothermia.   She will be admitted to ICU by [facility].                   ED Course as of Jul 28 1127   Sun Jul 22, 2018   1425 Case discussed with Dr. Oswald who will admit patient.   [LD]   1530 [facility] IM at patient's bedside and requesting consult to neurocritical care  [LD]   1630 Dr Hernandez with neurocritical care is accepting patient and a bed is available at [facility].   [LD]   1640 Spoke with Dr. Hernandez who states that patient can be cooled upon arrival to [facility].   [LD]      ED Course User Index  [LD] Christina Ortiz MD     Clinical Impression:   The primary encounter diagnosis was Cardiac arrest. Diagnoses of Endotracheal tube present and Respiratory failure with hypoxia, unspecified chronicity were also pertinent to this visit.      Disposition:   Disposition: Admitted  Condition: Critical       I, Christina Ortiz,  personally performed the services described in this documentation. All medical record entries made by the scribe were at my direction and in my presence.  I have reviewed the chart and agree that the record reflects my personal performance and is accurate and complete. Christina Ortiz M.D. 3:14 PM07/28/2018                   Christina Ortiz MD  07/22/18 1528       Christina Ortiz MD  07/28/18 1127

## 2018-07-22 NOTE — PLAN OF CARE
Consulted to see pt, 63 yo female, PMH of severe AS, hypothyroid, urinary retention, chronic sys/carney HF, A fib, SHERI, DM2 presenting with cardiac arrest. Pt developed shortness of breath on night PTA and it progressed until the day of admit when  brought her in (off her O2 so unclear how hypoxic she was and the duration). Of note, he reports PTA she had a brief episode of choking after trying to swallow pills but it soon resolved. He reports she was not compliant with her home O2 but is compliant with meds. In ED, pt was found to be hypoxic and soon after went into asystole. CPR performed for 15 min with multiple rounds of epi with ROSC. Pt intubated, started on broad spec abx, heparin gtt, propofol gtt and keppra load. Exam with mechanical BS, course. Myoclonic jerks, PERRL, no corneal, no gag, no pain. ABG with profound AGMA, intermediate trops, EKG with baseline 1st degree AVB. Lactate >12. Pt possibly in SE with myoclonic jerking and will be transferred to [facility] for neuro ICU. Family made aware. She remains full code.      Amilcar Lux MD, HO-III  [facility]

## 2018-07-22 NOTE — PROGRESS NOTES
Patient arrived via EMS from another hospital already intubated.  Patient's current settings are as follows:  A/C   Rate 25, VT  450,  Peep 10,  FIO2 100%  ET tube size is 7.0 and measures 23 @ lip.   Patent is maintaining current settings.

## 2018-07-23 PROBLEM — R94.30 CARDIAC LV EJECTION FRACTION <20%: Status: ACTIVE | Noted: 2018-01-01

## 2018-07-23 PROBLEM — I46.8 CARDIAC ARREST DUE TO RESPIRATORY DISORDER: Status: ACTIVE | Noted: 2018-01-01

## 2018-07-23 PROBLEM — Z29.9 PREVENTIVE MEASURE: Status: ACTIVE | Noted: 2018-01-01

## 2018-07-23 PROBLEM — I27.20 PULMONARY HYPERTENSION: Status: ACTIVE | Noted: 2018-01-01

## 2018-07-23 PROBLEM — R23.9 ALTERATION IN SKIN INTEGRITY: Status: ACTIVE | Noted: 2018-01-01

## 2018-07-23 PROBLEM — J98.9 CARDIAC ARREST DUE TO RESPIRATORY DISORDER: Status: ACTIVE | Noted: 2018-01-01

## 2018-07-23 NOTE — PROGRESS NOTES
ICU Progress Note  Neurocritical Care    Admit Date: 7/22/2018  LOS: 1    CC: Cardiac arrest due to respiratory disorder    Code Status: Full Code     SUBJECTIVE:     Interval History/Significant Events:   S/p cardiac arrest   ROSC 15 minutes  GCS: 7 poa  Induced hypothermia  Malignant hypertension requiring acute intensive management  Sirs  Acidosis; present on admission  Elevated inr;poa  Lactate high      Medications:  Continuous Infusions:   fentanyl 75 mcg/hr (07/23/18 1400)    propofol 50 mcg/kg/min (07/23/18 1400)     Scheduled Meds:   acetaminophen  650 mg Per NG tube Q6H    aspirin  81 mg Per NG tube Daily    [START ON 7/24/2018] atorvastatin  40 mg Per NG tube Daily    chlorhexidine  15 mL Mouth/Throat BID    famotidine  20 mg Per NG tube BID    levothyroxine  25 mcg Intravenous Once    [START ON 7/24/2018] levothyroxine  50 mcg Intravenous Daily    magnesium sulfate IVPB  2 g Intravenous Once    potassium phosphate IVPB  20 mmol Intravenous Once     PRN Meds:.dextrose 50%, fentaNYL, glucagon (human recombinant), insulin aspart U-100    OBJECTIVE:   Vital Signs (Most Recent):   Temp: (!) 95.9 °F (35.5 °C) (07/23/18 1258)  Pulse: 75 (07/23/18 1258)  Resp: 20 (07/23/18 1258)  BP: 112/68 (07/23/18 1200)  SpO2: 100 % (07/23/18 1258)    Vital Signs (24h Range):   Temp:  [93.9 °F (34.4 °C)-98.8 °F (37.1 °C)] 95.9 °F (35.5 °C)  Pulse:  [75-99] 75  Resp:  [9-44] 20  SpO2:  [81 %-100 %] 100 %  BP: ()/(48-94) 112/68  Arterial Line BP: (104-148)/() 112/82    ICP/CPP (Last 24h):        I & O (Last 24h):   Intake/Output Summary (Last 24 hours) at 07/23/18 1433  Last data filed at 07/23/18 1200   Gross per 24 hour   Intake          1467.41 ml   Output              885 ml   Net           582.41 ml     Physical Exam:  GA: Alert, comfortable, no acute distress.   HEENT: No scleral icterus or JVD.   Pulmonary: Clear to auscultation A/P/L. No wheezing, crackles, or rhonchi.  Cardiac: RRR S1 & S2 w/o  rubs/murmurs/gallops.   Abdominal: Bowel sounds present x 4. No appreciable hepatosplenomegaly.  Skin: No jaundice, rashes, or visible lesions.  Neuro:  Attempting to open eyes to voice  on propofol an fent gtt    Vent Data:   Vent Mode: A/C  Oxygen Concentration (%):  [] 50  Resp Rate Total:  [20 br/min-35 br/min] 20 br/min  Vt Set:  [450 mL] 450 mL  PEEP/CPAP:  [8 cmH20-10 cmH20] 8 cmH20  Pressure Support:  [0 cmH20] 0 cmH20  Mean Airway Pressure:  [14 odF48-74 cmH20] 15 cmH20    Lines/Drains/Airway:        Airway - Non-Surgical 07/22/18 1848 Endotracheal Tube (Active)   Secured at 23 cm 7/23/2018 11:34 AM   Measured At Lips 7/23/2018 11:34 AM   Secured Location Right 7/23/2018 11:34 AM   Secured by Commercial tube malcolm 7/23/2018 11:34 AM   Bite Block right;secure and patent 7/23/2018 11:34 AM   Site Condition Dry 7/23/2018 11:34 AM   Status Intact;Secured;Patent 7/23/2018 11:34 AM   Site Assessment Clean;Dry;No bleeding;No drainage 7/23/2018 11:34 AM   Cuff Pressure 28 cm H2O 7/23/2018  7:28 AM             Arterial Line 07/22/18 2300 Right Brachial (Active)   Site Assessment Clean;Dry;Intact;No redness;No swelling 7/23/2018 11:00 AM   Line Status Pulsatile blood flow 7/23/2018 11:00 AM   Art Line Waveform Square wave test performed;Appropriate 7/23/2018 11:00 AM   Arterial Line Interventions Zeroed and calibrated;Leveled;Tubing changed;Transducer changed 7/23/2018 11:00 AM   Color/Movement/Sensation Capillary refill less than 3 sec 7/23/2018 11:00 AM   Dressing Type Transparent 7/23/2018  3:00 AM   Dressing Status Biopatch in place;Clean;Dry;Intact 7/23/2018  3:00 AM   Dressing Intervention New dressing 7/23/2018  3:00 AM   Dressing Change Due 07/29/18 7/23/2018  3:00 AM           NG/OG Tube 07/22/18 1200 orogastric;Catron sump 14 Fr. Right mouth (Active)   Placement Check placement verified by aspirate characteristics;placement verified by aspirate pH;placement verified by distal tube length  "measurement;placement verified by x-ray 7/23/2018 11:00 AM   Distal Tube Length (cm) 60 7/23/2018  7:01 AM   Securement taped to commercial device 7/23/2018 11:00 AM   Clamp Status/Tolerance unclamped 7/23/2018  3:00 AM   Suction Setting/Drainage Method suction at;low;intermittent setting 7/23/2018 11:00 AM   Intake (mL) 120 mL 7/23/2018  9:00 AM   Tube Output(mL)(Include Discarded Residual) 30 mL 7/23/2018  9:00 AM            Urethral Catheter 07/22/18 1900 Temperature probe (Active)   Site Assessment Clean;Intact 7/23/2018 11:00 AM   Collection Container Urimeter 7/23/2018 11:00 AM   Securement Method secured to top of thigh w/ adhesive device 7/23/2018 11:00 AM   Catheter Care Performed yes 7/23/2018 11:00 AM   Reason for Continuing Urinary Catheterization Critically ill in ICU requiring intensive monitoring 7/23/2018 11:00 AM   CAUTI Prevention Bundle StatLock in place w 1" slack 7/23/2018  7:01 AM   Output (mL) 25 mL 7/23/2018 12:00 PM     Nutrition/Tube Feeds (if NPO state why): npo    Labs:  ABG:   Recent Labs  Lab 07/23/18  0333   PH 7.579*   PO2 130*   PCO2 31.9*   HCO3 29.8*   POCSATURATED 99   BE 8     BMP:  Recent Labs  Lab 07/23/18  0754 07/23/18  0930   NA  --  143   K  --  3.5   CL  --  103   CO2  --  27   BUN  --  26*   CREATININE  --  1.0   GLU  --  131*   MG  --  1.6   PHOS 2.9  --      LFT: Lab Results   Component Value Date    AST 43 (H) 07/23/2018    ALT 21 07/23/2018     (H) 07/07/2018    ALKPHOS 165 (H) 07/23/2018    BILITOT 1.9 (H) 07/23/2018    ALBUMIN 2.7 (L) 07/23/2018    PROT 5.7 (L) 07/23/2018     CBC:   Lab Results   Component Value Date    WBC 10.42 07/23/2018    HGB 8.5 (L) 07/23/2018    HCT 27.1 (L) 07/23/2018    MCV 90 07/23/2018     07/23/2018     Microbiology x 7d:   Microbiology Results (last 7 days)     Procedure Component Value Units Date/Time    Culture, Respiratory with Gram Stain [[id number]] Collected:  07/23/18 0333    Order Status:  Completed Specimen:  " Respiratory from Sputum Updated:  07/23/18 1010     Gram Stain (Respiratory) <10 epithelial cells per low power field.     Gram Stain (Respiratory) Moderate WBC's     Gram Stain (Respiratory) No organisms seen    Blood culture [[id number]] Collected:  07/22/18 2145    Order Status:  Completed Specimen:  Blood from Wrist, Left Updated:  07/23/18 0715     Blood Culture, Routine No Growth to date    Blood culture [[id number]] Collected:  07/22/18 2152    Order Status:  Completed Specimen:  Blood from Peripheral, Antecubital, Left Updated:  07/23/18 0715     Blood Culture, Routine No Growth to date    Urine culture [[id number]]     Order Status:  Canceled Specimen:  Urine         Imaging:   cxr ; pulmonary edema and right lung aspiration    I personally reviewed the above image.    ASSESSMENT/PLAN:     Active Hospital Problems    Diagnosis    *Cardiac arrest due to respiratory disorder    Alteration in skin integrity    Preventive measure    Cardiac LV ejection fraction <20%    Pulmonary hypertension    Status epilepticus    Acquired hypothyroidism    HARISH (acute kidney injury)    Right foot ulcer    Acute on chronic systolic congestive heart failure    Chronic anticoagulation - on rivaroxaban     PAF      Paroxysmal atrial fibrillation    Hyperlipidemia associated with type 2 diabetes mellitus    Type 2 diabetes mellitus with neurologic complication, without long-term current use of insulin    Severe aortic stenosis     12-15-17  Severe low flow aortic valve stenosis (JAMES 0.49 cm2, AVAi 0.27 cm2/m2, peak aortic jet velocity 4.0 m/s,MG 48 mmHg).               Plan:  Euthermic; 36 deg  Replace lorena  Start zosyn  Endo consult  cardiology consult  PICC  Follow exam      Critical secondary to Patient has a condition that poses threat to life and bodily function: manage infusion/family discussion     total cc time 46 mins  Critical care was time spent personally by me on the following activities: development of  treatment plan with patient or surrogate and bedside caregivers, discussions with consultants, evaluation of patient's response to treatment, examination of patient, ordering and performing treatments and interventions, ordering and review of laboratory studies, ordering and review of radiographic studies, pulse oximetry, re-evaluation of patient's condition. This critical care time did not overlap with that of any other provider or involve time for any procedures.

## 2018-07-23 NOTE — SUBJECTIVE & OBJECTIVE
Interval HPI:   Overnight events:  Critically ill.  Intubated, sedated.    On LT4 50mg IV.     PMH, PSH, FH, SH updated and reviewed     ROS:  Review of Systems   Unable to perform ROS: Intubated       Labs Reviewed and Include:  BASELINE Creatinine:   Lab Results   Component Value Date    WBC 10.42 07/23/2018    HGB 8.5 (L) 07/23/2018    HCT 27.1 (L) 07/23/2018    MCV 90 07/23/2018     07/23/2018       Recent Labs  Lab 07/23/18  0418 07/23/18  0930   *  118*  118* 131*   CALCIUM 9.1  9.1 9.2   ALBUMIN 2.7*  --    PROT 5.7*  --      142 143   K 3.1*  3.1* 3.5   CO2 25  25 27     103 103   BUN 24*  24* 26*   CREATININE 1.0  1.0 1.0   ALKPHOS 165*  --    ALT 21  --    AST 43*  --    BILITOT 1.9*  --      Lab Results   Component Value Date    HGBA1C 6.0 (H) 07/23/2018       Nutritional status:   Body mass index is 28.11 kg/m².  Lab Results   Component Value Date    ALBUMIN 2.7 (L) 07/23/2018    ALBUMIN 2.8 (L) 07/22/2018    ALBUMIN 3.0 (L) 07/22/2018     No results found for: PREALBUMIN    Estimated Creatinine Clearance: 61.1 mL/min (based on SCr of 1 mg/dL).    POCT Glucose results:    Current Insulin Regimen:         PHYSICAL EXAMINATION:  Vitals:    07/23/18 1528   BP: (!) 88/60   Pulse: 69   Resp: (!) 21   Temp: 96.6 °F (35.9 °C)     Body mass index is 28.11 kg/m².    Physical Exam   Constitutional: She appears well-developed.   Critically ill  Intubated, sedated   HENT:   Right Ear: External ear normal.   Left Ear: External ear normal.   Nose: Nose normal.   Neck: No tracheal deviation present.   Unable to assess thyroid given intubation   Cardiovascular: Normal rate.    No murmur heard.  Pulmonary/Chest: Effort normal.   Ventilator breath sounds   Abdominal: Soft. She exhibits no distension. No hernia.   Musculoskeletal: She exhibits no edema.   Neurological: She has normal reflexes.   Sedated  Unable to assess cranial nerves   Skin: No rash noted.   No nodules   Psychiatric:    Unable to assess mood/affect, judgment   Vitals reviewed.

## 2018-07-23 NOTE — ASSESSMENT & PLAN NOTE
Only on oral mediation in outpatient setting  a1c 6.0%    BG goal 140-180 while inpatient    Recommend:  Low dose correction  POC q4h while NPO      Discharge:  TBD.

## 2018-07-23 NOTE — ASSESSMENT & PLAN NOTE
- s/p cardiac arrest  - Cooling protocol goal temp 36  - EEG   - propofol infusion  - CTA chest- 1. Small bilateral pleural effusions with suspected mild pulmonary edema.  2. Right lower lobe 7 mm pulmonary nodule.  For a solid nodule 6-8 mm, Fleischner Society 2017 guidelines recommend follow up with non-contrast chest CT at 6-12 months and 18-24 months after discovery.  3. Small volume perihepatic ascites.

## 2018-07-23 NOTE — HPI
Anum Quick 61 yo female, PMH of severe AS, hypothyroid, urinary retention, chronic sys/carney HF, A fib, SHERI, DM2 who presents to [facility] s/p cardiac arrest. Pt developed shortness of breath on night PTA and it progressed until the day of admit when  brought her to ED.  He reports she was not compliant with her home O2 but is compliant with meds. In ED, pt was found to be hypoxic and soon after went into asystole. CPR performed for 15 min with multiple rounds of epi with ROSC. Pt intubated, started on broad spec abx, heparin gtt, propofol gtt and keppra load. She is being admitted to [facility] for a higher level of care.

## 2018-07-23 NOTE — SUBJECTIVE & OBJECTIVE
Interval History:  Admit NCC     Review of Systems  Unable to obtain a complete ROS due to level of consciousness.  Objective:     Vitals:  Temp: (!) 95.7 °F (35.4 °C)  Pulse: 79  Rhythm: normal sinus rhythm  BP: 106/71  MAP (mmHg): 85  Resp: (!) 26  SpO2: 100 %  Oxygen Concentration (%): 60  O2 Device (Oxygen Therapy): ventilator  Vent Mode: A/C  Set Rate: 25 bmp  Vt Set: 450 mL  Pressure Support: 0 cmH20  PEEP/CPAP: 10 cmH20  Peak Airway Pressure: 27 cmH2O  Mean Airway Pressure: 17 cmH20  Plateau Pressure: 0 cmH20    Temp  Min: 93.9 °F (34.4 °C)  Max: 98.8 °F (37.1 °C)  Pulse  Min: 77  Max: 142  BP  Min: 96/78  Max: 135/92  MAP (mmHg)  Min: 78  Max: 105  Resp  Min: 20  Max: 44  SpO2  Min: 81 %  Max: 100 %  Oxygen Concentration (%)  Min: 60  Max: 100    No intake/output data recorded.   Unmeasured Output  Stool Occurrence: 0       Physical Exam      Physical Exam:  GA:sedated , comfortable, no acute distress.   HEENT: No scleral icterus or JVD.   Pulmonary: Clear to auscultation A/L.   Cardiac: RRR S1 & S2 w/o rubs/murmurs/gallops.   Abdominal: Bowel sounds present x 4.   Skin: No jaundice, rashes, or visible lesions.  Neuro:  --GCS: E1 V1t M3  --Mental Status: Sedated on propofol    --Pupils 3mm, PERRL.   --Corneal reflex, gag, cough intact.  --extends bilateral uppers triple fexion bilateral lowers   Unable to test orientation, language, memory, judgment, insight, fund of knowledge, hearing, shoulder shrug, tongue protrusion, coordination, gait due to level of consciousness.    Medications:  Continuous  sodium chloride 0.9% Last Rate: 75 mL/hr at 07/22/18 2255   propofol Last Rate: 20 mcg/kg/min (07/22/18 2200)   Scheduled  [START ON 7/23/2018] acetaminophen 650 mg Q6H   [START ON 7/23/2018] aspirin 81 mg Daily   chlorhexidine 15 mL QID   [START ON 7/23/2018] famotidine 20 mg Daily   [START ON 7/23/2018] levothyroxine 25 mcg Daily   PRN  [START ON 7/23/2018] fentaNYL 50 mcg Q2H PRN   magnesium sulfate IVPB 2 g  PRN     Today I personally reviewed pertinent medications, lines/drains/airways, imaging, cardiology, lab results,     Diet  Diet NPO  Diet NPO

## 2018-07-23 NOTE — HPI
Reason for Consult: Management of hypothyroidism (on LT4 50mcg outpatient)    HPI:   Patient is a 62 y.o. female with a diagnosis of severe AS, hypothyroid, chronic sys/carney HF, A fib, SHERI, pHTN, and T2DM who has been admitted to [facility] s/p cardiac arrest. Per chart review, she was brought into the ED by her  secondary to progressive SOB.  In ED, pt was found to be hypoxic and soon after went into asystole. CPR performed for 15 min with multiple rounds of epi with ROSC. Pt intubated, started on broad spec abx, heparin gtt, propofol gtt and keppra load.    Endocrinology has been consulted for hypothyroidism.  Prior to being initiated on hypothermic protocol, patient was afebrile, normotensive and normal HR.  She had hypoxia/SOB upon arrival, and is on home O2, with intermittent compliance.  Besides the respiratory symptoms (which can be explained by comorbidities), no evidence of myxedema coma.

## 2018-07-23 NOTE — PROGRESS NOTES
Consult received for right foot  from Yesenia Franz RN  Relevant PMH: shortness of breath, severe AS, A-fib, SHERI, DM@  The right dorsal foot has a partial thickness skin tear noted to the right dorsal foot- generalized swelling to area/no erythema, draining small amount of clear yellow fluid.   Tolerated procedure/treatment -non-responsive  Notified Unit Nurse Ezio Meyer RN of care provided today and discussed treatment plan, verbalized understanding.   Notified  regarding recommendations, approval noted    Consultant Recommendations:   aquacel dressing to right foot- change Tues/Fri   Skin breakdown precautions    Wound care team to follow harrison Lamar RN, [facility]  [id number]     07/23/18 0920       Wound 07/23/18 0920 Skin Tear dorsal Foot   Date First Assessed/Time First Assessed: 07/23/18 0920   Pre-existing: Yes  Primary Wound Type: Skin Tear  Side: Right  Orientation: dorsal  Location: Foot   Wound Image    Wound WDL ex   Dressing Appearance Intact;Moist drainage   Drainage Amount Small   Drainage Characteristics/Odor Clear;Yellow   Appearance Pink;Moist   Tissue loss description Partial thickness   Red (%), Wound Tissue Color 100 %   Periwound Area Intact;Swelling   Wound Edges Open   Wound Length (cm) 1 cm   Wound Width (cm) 2 cm   Wound Depth (cm) 0.1 cm   Wound Volume (cm^3) 0.2 cm^3   Care Cleansed with:;Sterile normal saline   Dressing Foam   Dressing Change Due 07/24/18   Skin Interventions   Device Skin Pressure Protection absorbent pad utilized/changed;positioning supports utilized;pressure points protected;skin-to-device areas padded;skin-to-skin areas padded   Pressure Reduction Devices Specialty bed utilized;Pressure-redistributing mattress utilized;Heel offloading device utilized;Foam padding utilized   Pressure Reduction Techniques weight shift assistance provided;pressure points protected;positioned off wounds;heels elevated off bed   Skin Protection Urinary containment  device;Tubing/devices free from skin contact;Skin-to-skin areas padded;Skin-to-device areas padded;Skin sealant/moisture barrier;Protective footwear;Preventative foam dressing;Adhesive use limited

## 2018-07-23 NOTE — HOSPITAL COURSE
7/22: admit NCC, cooling protocol, EEG Diffuse slowing no sz, propofol infusion  7/25: Neuro: patient without corneal reflex. With pupillary reflex +, gag reflex +, does not withdraw to pain.  7/29: MRI brain with diffusion restriction involving the bilateral lentiform nuclei, thalami, caudate nuclei, paramedian occipital cortices, and perirolandic cortices. EEG reported as alpha coma.No ictal activity. Severe suppression of brain activity.  CXR: L > R pleural effusions.   7/31: no acute events overnight   8/1: rising wbc, temp trending up. Panculture. Will resume antibiotics if temp spike >38C  8/2: hypernatremic, increased enteral water to 400q4h, 40mg lasix IV q12h for 4 doses, EEG continues to remain w/o seizures per epilepsy, will d/c today, patient made DNR per family's wishes  8/3: continued hypernatremia, Na 163, bolus D5 500 mL, D5 100 mL/h, q6h Na, check UA for hypernatremia, stopped furosemide, family wishes to plan for withdrawal tomorrow

## 2018-07-23 NOTE — NURSING
Patient arrived to [facility] from [place] via [facility] ambulances.    diagnosis: status, post cardiac arrest     Current symptoms: unresponsive, whole body twitching, extensor posturing BUE, triple flexion BLE    Skin assessment done: Yes  Wounds noted: multiple bruises BUE/BLE, right foot ulcer    NCC notified: ashley gray NP    Will continue to monitor and treat per POC

## 2018-07-23 NOTE — PLAN OF CARE
[id number]  [place], [place] - [address] AT [facility] [address]  [address] [address]  Phone: [phone] Fax: [phone]    This CM spoke with patient spouse at bedside.       07/23/18 1358   Discharge Assessment   Assessment Type Discharge Planning Assessment   Confirmed/corrected address and phone number on facesheet? Yes   Assessment information obtained from? Caregiver   Expected Length of Stay (days) 3   Communicated expected length of stay with patient/caregiver no   Prior to hospitilization cognitive status: Alert/Oriented   Prior to hospitalization functional status: Assistive Equipment   Current cognitive status: Coma/Sedated/Intubated   Current Functional Status: Completely Dependent   Facility Arrived From: (via ambulance from [facility])   Lives With spouse   Able to Return to Prior Arrangements unable to determine at this time (comments)   Is patient able to care for self after discharge? Unable to determine at this time (comments)   Who are your caregiver(s) and their phone number(s)? (spouse Joey Quick [phone])   Patient's perception of discharge disposition other (comments)  (roberto)   Readmission Within The Last 30 Days current reason for admission unrelated to previous admission   Patient currently being followed by outpatient case management? No   Patient currently receives any other outside agency services? No   Equipment Currently Used at Home commode;oxygen;shower chair;walker, rolling   Do you have any problems affording any of your prescribed medications? No   Is the patient taking medications as prescribed? yes   Does the patient have transportation home? Yes   Transportation Available family or friend will provide   Does the patient receive services at the [facility]? No   Discharge Plan A Long-term acute care facility (LTAC)   Discharge Plan B Home;Home Health   Patient/Family In Agreement With Plan yes    Readmission Questionnaire   At the time of your discharge, did someone talk to you about what your health problems were? Yes   At the time of discharge, did someone talk to you about what to watch out for regarding worsening of your health problem? Yes   At the time of discharge, did someone talk to you about what to do if you experienced worsening of your health problem? Yes   At the time of discharge, did someone talk to you about which medication to take when you left the hospital and which ones to stop taking? Yes   At the time of discharge, did someone talk to you about when and where to follow up with a doctor after you left the hospital? Yes   What do you believe caused you to be sick enough to be re-admitted? (Status Epilepticus)   How often do you need to have someone help you when you read instructions, pamphlets, or other written material from your doctor or pharmacy? Rarely   Do you have problems taking your medications as prescribed? No   Do you have any problems affording any of  your prescribed medications? No   Do you have problems obtaining/receiving your medications? No   Does the patient have transportation to healthcare appointments? Yes   Living Arrangements house   Does the patient have family/friends to help with healtcare needs after discharge? yes   Does your caregiver provide all the help you need? I don't know   Are you currently feeling confused? (roberto)   Have you felt down, depressed, or hopeless? Unable to Assess   Have you felt little interest or pleasure in doing things? Unable to assess   In the last 7 days, my sleep quality was: (roberto)     Hayley Weiner RN/BSN/LE  [phone]  [facility]

## 2018-07-23 NOTE — NURSING
Patient in bed exhibiting seizure like activity.  EEG tech called to hook up continuous eeg.  See flowsheet for full assessment.

## 2018-07-23 NOTE — PLAN OF CARE
Problem: Patient Care Overview  Goal: Plan of Care Review  Outcome: Ongoing (interventions implemented as appropriate)  POC reviewed with pt's  at 0500. Pt's  verbalized understanding. Questions and concerns addressed. Continuing hypothermia protocol for at least 24 hours. Pt progressing toward goals. Will continue to monitor. See flowsheets for full assessment and VS info

## 2018-07-23 NOTE — PROCEDURES
DATE OF SERVICE:  07/22/2018.    EEG NUMBER:  FH -1.    LOCATION OF SERVICE:  [facility].    REQUESTING PHYSICIAN:  Dr. Owens.    ICU EEG/VIDEO MONITORING REPORT    METHODOLOGY:  Electroencephalographic (EEG) is recorded with electrodes placed   according to the International 10-20 placement system.  Thirty two (32) channels   of digital signal (sampling rate of 512/sec), including T1 and T2, were   simultaneously recorded from the scalp and may include EKG, EMG, and/or eye   monitors.  Recording band pass was 0.1 to 512 Hz.  Digital video recording of   the patient is simultaneously recorded with the EEG.  The patient is instructed   to report clinical symptoms which may occur during the recording session.  EEG   and video recording are stored and archived in digital format.  Activation   procedures, which include photic stimulation, hyperventilation and instructing   patients to perform simple tasks, are done in selected patients.    The EEG is displayed on a monitor screen and can be reviewed using different   montages.  Computer-assisted analysis is employed to detect spike and   electrographic seizure activity.   The entire record is submitted for computer   analysis.  The entire recording is visually reviewed, and the times identified   by computer analysis as being spikes or seizures are reviewed again.    Compressed spectral analysis (CSA) is also performed on the activity recorded   from each individual channel.  This is displayed as a power display of   frequencies from 0 to 30 Hz over time.  The CSA is reviewed looking for   asymmetries in power between homologous areas of the scalp, then compared with   the original EEG recording.    Bizak software was also utilized in the review of this study.  This software   suite analyzes the EEG recording in multiple domains.  Coherence and rhythmicity   are computed to identify EEG sections which may contain organized seizures.    Each channel undergoes  analysis to detect the presence of spike and sharp waves   which have special and morphological characteristics of epileptic activity.  The   routine EEG recording is converted from special into frequency domain.  This is   then displayed comparing homologous areas to identify areas of significant   asymmetry.  Algorithm to identify non-cortically generated artifact is used to   separate artifact from the EEG.    RECORDING TIMES:  Start on 07/22/2018 at 19:29.  End on 07/23/2018 at 07:00.  A total of 11 hours and 30 minutes of EEG monitoring was obtained.    EEG FINDINGS:  Recording was obtained at the patient's bedside in the ICU.    Electrode cap was utilized to obtain intermediate survey of electrocortical   function.  Background consisted of a low voltage featureless activity punctuated   by bursts of irregular theta and beta frequencies, which occurred in short runs   and symmetrical over the two hemispheres.  During the burst, there was an   occasional jerk with a muscle/movement artifact, but no electrocortical   correlate.  Periods of suppression were at times in excess of a minute.  The   patient apparently was on no anesthetics during the recording.    IMPRESSION:  Markedly abnormal EEG with suppression burst pattern.  The absence   of anesthetics indicative of marked generalized cortical dysfunction.  The   prognosis in the absence of an anesthetic agent is very poor.      RR/HN  dd: 07/23/2018 10:03:04 (CDT)  td: 07/23/2018 10:32:59 (CDT)  Doc ID   #[id number]  Job ID #[id number]    CC:

## 2018-07-23 NOTE — NURSING
1900- Pt zimmerman replaced with temp sensing zimmerman per NCC. Pt toes bilaterally very dusky and turning purple, pulses palpable and dopplered, sites marked, NCC at bedside to evaluate, will continue to monitor.   1930- Pt began cooling to 96.0 C per NCC.  2000- Donna notified of GCS of 5.  2045- Pt reached goal temperature of 36 C.  2130- Artic sun pads not mainintaining pt at 36 C, pt dropped to 34 C, ivette hugger applied per NCC.   2200- Karine at bedside, placed R brachial a-line, stated to avoid changing dressing if possible to avoid losing it.   2300- Leroy guardado NP added prn fentanyl q2h for agitation to pts med list.  0630- pt breathing over the vent and coughing a lot- ROSI Milton ordered for propofol gtt to be changed to titratable up to 50 mcg/kg/min for pt comfort while cooled.       Will continue to monitor and treat per POC

## 2018-07-23 NOTE — ASSESSMENT & PLAN NOTE
Patient on home dose of LT4 50mcg, which is below weight based recommendations of 125mcg daily.    TSH elevated two weeks ago at 68.7 and repeated 75.0.    Patient given IV LT4 50mcg daily.  This is below 50% of PO recommended dose.    Recommend increasing to LT4 65mcg IV qD.  When patient tolerating PO and decreased concern for absorption, recommend converting to 125mcg PO qD.    Plan for daily FT4 levels, until normalized.  Repeat TSH in one week.

## 2018-07-23 NOTE — PROGRESS NOTES
[facility]  Neurocritical Care  H&P     Admit Date: 7/22/2018  Service Date: 07/22/2018  Length of Stay: 0    Subjective:     Chief Complaint: Cardiac arrest    History of Present Illness: Anum Quick 63 yo female, PMH of severe AS, hypothyroid, urinary retention, chronic sys/carney HF, A fib, SHERI, DM2 who presents to [facility] s/p cardiac arrest. Pt developed shortness of breath on night PTA and it progressed until the day of admit when  brought her to ED.  He reports she was not compliant with her home O2 but is compliant with meds. In ED, pt was found to be hypoxic and soon after went into asystole. CPR performed for 15 min with multiple rounds of epi with ROSC. Pt intubated, started on broad spec abx, heparin gtt, propofol gtt and keppra load. She is being admitted to [facility] for a higher level of care.     Hospital Course: 7/22: admit NCC, cooling protocol, EEG Diffuse slowing no sz, propofol infusion    Interval History:  Admit NCC     Review of Systems  Unable to obtain a complete ROS due to level of consciousness.  Objective:     Vitals:  Temp: (!) 95.7 °F (35.4 °C)  Pulse: 79  Rhythm: normal sinus rhythm  BP: 106/71  MAP (mmHg): 85  Resp: (!) 26  SpO2: 100 %  Oxygen Concentration (%): 60  O2 Device (Oxygen Therapy): ventilator  Vent Mode: A/C  Set Rate: 25 bmp  Vt Set: 450 mL  Pressure Support: 0 cmH20  PEEP/CPAP: 10 cmH20  Peak Airway Pressure: 27 cmH2O  Mean Airway Pressure: 17 cmH20  Plateau Pressure: 0 cmH20    Temp  Min: 93.9 °F (34.4 °C)  Max: 98.8 °F (37.1 °C)  Pulse  Min: 77  Max: 142  BP  Min: 96/78  Max: 135/92  MAP (mmHg)  Min: 78  Max: 105  Resp  Min: 20  Max: 44  SpO2  Min: 81 %  Max: 100 %  Oxygen Concentration (%)  Min: 60  Max: 100    No intake/output data recorded.   Unmeasured Output  Stool Occurrence: 0       Physical Exam      Physical Exam:  GA:sedated , comfortable, no acute distress.   HEENT: No scleral icterus or JVD.   Pulmonary: Clear to auscultation A/L.    Cardiac: RRR S1 & S2 w/o rubs/murmurs/gallops.   Abdominal: Bowel sounds present x 4.   Skin: No jaundice, rashes, or visible lesions.  Neuro:  --GCS: E1 V1t M3  --Mental Status: Sedated on propofol    --Pupils 3mm, PERRL.   --Corneal reflex, gag, cough intact.  --extends bilateral uppers triple fexion bilateral lowers   Unable to test orientation, language, memory, judgment, insight, fund of knowledge, hearing, shoulder shrug, tongue protrusion, coordination, gait due to level of consciousness.    Medications:  Continuous  sodium chloride 0.9% Last Rate: 75 mL/hr at 07/22/18 2255   propofol Last Rate: 20 mcg/kg/min (07/22/18 2200)   Scheduled  [START ON 7/23/2018] acetaminophen 650 mg Q6H   [START ON 7/23/2018] aspirin 81 mg Daily   chlorhexidine 15 mL QID   [START ON 7/23/2018] famotidine 20 mg Daily   [START ON 7/23/2018] levothyroxine 25 mcg Daily   PRN  [START ON 7/23/2018] fentaNYL 50 mcg Q2H PRN   magnesium sulfate IVPB 2 g PRN     Today I personally reviewed pertinent medications, lines/drains/airways, imaging, cardiology, lab results,     Diet  Diet NPO  Diet NPO        Assessment/Plan:     Neuro   Status epilepticus    - EEG- no sz per epilepsy  - Propofol gtt        Derm   Right foot ulcer    - consult wound care        Cardiac/Vascular   * Cardiac arrest    - s/p cardiac arrest  - Cooling protocol goal temp 36  - EEG   - propofol infusion  - CTA chest- 1. Small bilateral pleural effusions with suspected mild pulmonary edema.  2. Right lower lobe 7 mm pulmonary nodule.  For a solid nodule 6-8 mm, Fleischner Society 2017 guidelines recommend follow up with non-contrast chest CT at 6-12 months and 18-24 months after discovery.  3. Small volume perihepatic ascites.        Acute on chronic systolic congestive heart failure    - echo pending         Paroxysmal atrial fibrillation    - Hold anticoagulation   - INR 3.3         Hyperlipidemia associated with type 2 diabetes mellitus    -lipid panel pending          Renal/   HARISH (acute kidney injury)    - Cr 1.2  - monitor labs   - NS @ 75        Hematology   Chronic anticoagulation - on rivaroxaban    - currently held         Endocrine   Hypothyroidism    - TSH   - Continue synthroid         Type 2 diabetes mellitus with neurologic complication, without long-term current use of insulin    - Ha1C  - SSI with accu checks             Prophylaxis:  Venous Thromboembolism: mechanical  Stress Ulcer: H2B  Ventilator Pneumonia: yes     Activity Orders          None        Full Code     CCT 56 min     Leroy Gonzalez NP  Neurocritical Care  [facility]

## 2018-07-23 NOTE — NURSING
Patient arrived per ambulance form [place] emergency room post cardiac arrest with suspected status epilepticus. Report received from Danny BERRIOS.  Patient on propofol, heparin and intubated.  Patient exhibiting seizure like activity and family is at the beside.  I give report to Inés BERRIOS.

## 2018-07-23 NOTE — NURSING
Neuro assessment performed at 1100  [facility] NP notified that the patient originally had a slight cough gag and corneal reflex.  On the 1100 assessment the patient does not have a cough gag or corneal reflex.  We have titrated up on the fentanyl and propofol through the last 12 hours.  The patients pupils are still reactive, postures to pain in the upper extremities and triple flexes in the lower extremities to pain.  [facility] and the staff physician would round on the patient and assess the findings.

## 2018-07-23 NOTE — NURSING
Dr Owens to the bedside to assess the patient.  All neuro changes discussed during rounds.  Trinity and Bari placed an arterial line and triple lumen central line at the bedside.  See flowsheet for detailed assessment.  Dr Owens spoke with the family and questions answered.  Patient's blood pressure trending down and patient started on levophed and propofol decreased.  Dr Owens notified that the urine output is 20cc per hour.

## 2018-07-23 NOTE — CONSULTS
[facility]  Endocrinology  Consult Note    Consult Requested by: Junior Steiner MD   Reason for admit: Cardiac arrest due to respiratory disorder    HISTORY OF PRESENT ILLNESS:  Reason for Consult: Management of hypothyroidism (on LT4 50mcg outpatient)    HPI:   Patient is a 62 y.o. female with a diagnosis of severe AS, hypothyroid, chronic sys/carney HF, A fib, SHERI, pHTN, and T2DM who has been admitted to [facility] s/p cardiac arrest. Per chart review, she was brought into the ED by her  secondary to progressive SOB.  In ED, pt was found to be hypoxic and soon after went into asystole. CPR performed for 15 min with multiple rounds of epi with ROSC. Pt intubated, started on broad spec abx, heparin gtt, propofol gtt and keppra load.    Endocrinology has been consulted for hypothyroidism.  Prior to being initiated on hypothermic protocol, patient was afebrile, normotensive and normal HR.  She had hypoxia/SOB upon arrival, and is on home O2, with intermittent compliance.  Besides the respiratory symptoms (which can be explained by comorbidities), no evidence of myxedema coma.       Medications and/or Treatments Impacting Glycemic Control:  Immunotherapy:    Immunosuppressants     None        Steroids:   Hormones     Start     Stop Route Frequency Ordered    07/23/18 1600  hydrocortisone sodium succinate injection 100 mg      -- IV Every 8 hours 07/23/18 1548        Pressors:    Autonomic Drugs     Start     Stop Route Frequency Ordered    07/23/18 1700  norepinephrine 4 mg in dextrose 5% 250 mL infusion (premix) (titrating)     Question Answer Comment   Titrate by: (in mcg/kg/min) 0.02    Titrate interval: (in minutes) 15    Titrate to maintain: (MAP or SBP) MAP    Greater than: (in mmHg) 65    Maximum dose: (in mcg/kg/min) 3        -- IV Continuous 07/23/18 1548    07/23/18 1545  norepinephrine bitartrate-D5W 4 mg/250 mL (16 mcg/mL) infusion Soln     Comments:  Created by cabinet override     07/24 0359   07/23/18 1545          Prescriptions Prior to Admission   Medication Sig Dispense Refill Last Dose    atorvastatin (LIPITOR) 80 MG tablet Take 1 tablet (80 mg total) by mouth once daily. 30 tablet 11 Taking    butalbital-acetaminophen-caffeine -40 mg (FIORICET, ESGIC) -40 mg per tablet Take 1 tablet by mouth every 4 (four) hours as needed for Pain.   Taking    collagenase ointment Apply topically once daily. 15 g 0 Taking    diltiaZEM (CARDIZEM SR) 60 MG Cp12 Take 1 capsule (60 mg total) by mouth 2 (two) times daily. 60 capsule 3 Taking    estropipate (OGEN) 1.5 MG tablet Take 1.5 mg by mouth once daily.   Taking    ferrous sulfate 325 mg (65 mg iron) Tab tablet Take 1 tablet (325 mg total) by mouth 2 (two) times daily with meals. 60 tablet 3 Taking    furosemide (LASIX) 40 MG tablet Take 1 tablet (40 mg total) by mouth 2 (two) times daily. 60 tablet 2 Taking    levothyroxine (SYNTHROID) 50 MCG tablet Take 1 tablet (50 mcg total) by mouth before breakfast. 30 tablet 3 Taking    magnesium oxide (MAG-OX) 400 mg tablet Take 1 tablet (400 mg total) by mouth once daily.  0 Taking    metFORMIN (GLUMETZA) 500 MG (MOD) 24 hr tablet Take 500 mg by mouth daily with breakfast.   Taking    nitroGLYCERIN (NITROSTAT) 0.4 MG SL tablet Place 1 tablet (0.4 mg total) under the tongue every 5 (five) minutes as needed for Chest pain (angina). 20 tablet 0 Taking    rivaroxaban (XARELTO) 20 mg Tab Take 1 tablet (20 mg total) by mouth daily with dinner or evening meal. 90 tablet 4 Taking    senna (SENOKOT) 8.6 mg tablet Take 1 tablet by mouth 2 (two) times daily as needed for Constipation.   Taking    venlafaxine (EFFEXOR-XR) 150 MG Cp24 TK ONE C PO D WF  1 Taking       Current Facility-Administered Medications   Medication Dose Route Frequency Provider Last Rate Last Dose    acetaminophen oral solution 650 mg  650 mg Per NG tube Q6H PRN Jefry Petersen, TERE        albumin human 5% 5 % bottle              aspirin chewable tablet 81 mg  81 mg Per NG tube Daily Leroy Gonzalez NP   81 mg at 07/23/18 0807    chlorhexidine 0.12 % solution 15 mL  15 mL Mouth/Throat BID Andre Owens MD        dextrose 50% injection 12.5 g  12.5 g Intravenous PRN Leroy Gonzalez NP        famotidine tablet 20 mg  20 mg Per NG tube BID Jefry Petersen NP        fentaNYL 2500 mcg in 0.9% sodium chloride 250 mL infusion premix (titrating)  25 mcg/hr Intravenous Continuous Junior Steiner MD 7.5 mL/hr at 07/23/18 1400 75 mcg/hr at 07/23/18 1400    fentaNYL injection 50 mcg  50 mcg Intravenous Q2H PRN Jefry Petersen NP        glucagon (human recombinant) injection 1 mg  1 mg Intramuscular PRN Leroy Gonzalez NP        hydrocortisone sodium succinate injection 100 mg  100 mg Intravenous Q8H Jefry Petersen NP        insulin aspart U-100 pen 1-10 Units  1-10 Units Subcutaneous Q6H PRN Leroy Gonzalez NP        [START ON 7/24/2018] levothyroxine injection 50 mcg  50 mcg Intravenous Daily Jefry Petersen NP        magnesium sulfate 2g in water 50mL IVPB (premix)  2 g Intravenous Once Jefry Petersen NP   2 g at 07/23/18 1503    norepinephrine 4 mg in dextrose 5% 250 mL infusion (premix) (titrating)  0.02 mcg/kg/min (Dosing Weight) Intravenous Continuous Jefry Petersen NP        norepinephrine bitartrate-D5W 4 mg/250 mL (16 mcg/mL) infusion Soln             piperacillin-tazobactam 4.5 g in dextrose 5 % 100 mL IVPB (ready to mix system)  4.5 g Intravenous Q8H Jefry Petersen NP        propofol (DIPRIVAN) 10 mg/mL infusion (NON-TITRATING)  30 mcg/kg/min (Dosing Weight) Intravenous Continuous Jefry Petersen NP 14.2 mL/hr at 07/23/18 1510 30 mcg/kg/min at 07/23/18 1510    vancomycin (VANCOCIN) 1,250 mg in dextrose 5 % 250 mL IVPB  15 mg/kg (Dosing Weight) Intravenous Q12H Jefry D. Sheard, NP           Interval HPI:   Overnight events:  Critically ill.  Intubated, sedated.    On LT4 50mg IV.     PMH, PSH, FH, SH  updated and reviewed     ROS:  Review of Systems   Unable to perform ROS: Intubated       Labs Reviewed and Include:  BASELINE Creatinine:   Lab Results   Component Value Date    WBC 10.42 07/23/2018    HGB 8.5 (L) 07/23/2018    HCT 27.1 (L) 07/23/2018    MCV 90 07/23/2018     07/23/2018       Recent Labs  Lab 07/23/18  0418 07/23/18  0930   *  118*  118* 131*   CALCIUM 9.1  9.1 9.2   ALBUMIN 2.7*  --    PROT 5.7*  --      142 143   K 3.1*  3.1* 3.5   CO2 25  25 27     103 103   BUN 24*  24* 26*   CREATININE 1.0  1.0 1.0   ALKPHOS 165*  --    ALT 21  --    AST 43*  --    BILITOT 1.9*  --      Lab Results   Component Value Date    HGBA1C 6.0 (H) 07/23/2018       Nutritional status:   Body mass index is 28.11 kg/m².  Lab Results   Component Value Date    ALBUMIN 2.7 (L) 07/23/2018    ALBUMIN 2.8 (L) 07/22/2018    ALBUMIN 3.0 (L) 07/22/2018     No results found for: PREALBUMIN    Estimated Creatinine Clearance: 61.1 mL/min (based on SCr of 1 mg/dL).    POCT Glucose results:    Current Insulin Regimen:         PHYSICAL EXAMINATION:  Vitals:    07/23/18 1528   BP: (!) 88/60   Pulse: 69   Resp: (!) 21   Temp: 96.6 °F (35.9 °C)     Body mass index is 28.11 kg/m².    Physical Exam   Constitutional: She appears well-developed.   Critically ill  Intubated, sedated   HENT:   Right Ear: External ear normal.   Left Ear: External ear normal.   Nose: Nose normal.   Neck: No tracheal deviation present.   Unable to assess thyroid given intubation   Cardiovascular: Normal rate.    No murmur heard.  Pulmonary/Chest: Effort normal.   Ventilator breath sounds   Abdominal: Soft. She exhibits no distension. No hernia.   Musculoskeletal: She exhibits no edema.   Neurological: She has normal reflexes.   Sedated  Unable to assess cranial nerves   Skin: No rash noted.   No nodules   Psychiatric:   Unable to assess mood/affect, judgment   Vitals reviewed.    .     ASSESSMENT and PLAN:    * Cardiac arrest due  to respiratory disorder      Avoid hypoglycemia  Avoid iatrogenic hyperthyroidism        Acquired hypothyroidism      Patient on home dose of LT4 50mcg, which is below weight based recommendations of 125mcg daily.    TSH elevated two weeks ago at 68.7 and repeated 75.0.    Patient given IV LT4 50mcg daily.  This is below 50% of PO recommended dose.    Recommend increasing to LT4 65mcg IV qD.  When patient tolerating PO and decreased concern for absorption, recommend converting to 125mcg PO qD.    Plan for daily FT4 levels, until normalized.  Repeat TSH in one week.         Type 2 diabetes mellitus with neurologic complication, without long-term current use of insulin    Only on oral mediation in outpatient setting  a1c 6.0%    BG goal 140-180 while inpatient    Recommend:  Low dose correction  POC q4h while NPO      Discharge:  TBD.        Acute on chronic systolic congestive heart failure      Cardiac comorbidity  Managed per primary    Careful to not overdose LT4.        Paroxysmal atrial fibrillation      Cardiac comorbidity  Careful to not overdose synthroid            DISCHARGE NEEDS: will assess daily    Lorna Roldan MD  Endocrinology  [facility]

## 2018-07-24 NOTE — SUBJECTIVE & OBJECTIVE
Interval HPI:   Overnight events:  Critically ill.  Intubated, sedated.    On LT4 50mg IV.     PMH, PSH, FH, SH updated and reviewed     ROS:  Review of Systems   Unable to perform ROS: Intubated       Labs Reviewed and Include:  BASELINE Creatinine:   Lab Results   Component Value Date    WBC 10.42 07/23/2018    HGB 8.5 (L) 07/23/2018    HCT 27.1 (L) 07/23/2018    MCV 90 07/23/2018     07/23/2018       Recent Labs  Lab 07/23/18  1514   *  116*  116*  116*  116*   CALCIUM 8.6*  8.6*  8.6*   ALBUMIN 2.5*   PROT 5.2*     139  139   K 3.1*  3.1*  3.1*   CO2 28  28  28     101  101   BUN 27*  27*  27*   CREATININE 1.0  1.0  1.0   ALKPHOS 144*   ALT 20   AST 37   BILITOT 1.3*     Lab Results   Component Value Date    HGBA1C 6.0 (H) 07/23/2018       Nutritional status:   Body mass index is 28.11 kg/m².  Lab Results   Component Value Date    ALBUMIN 2.5 (L) 07/23/2018    ALBUMIN 2.7 (L) 07/23/2018    ALBUMIN 2.8 (L) 07/22/2018     No results found for: PREALBUMIN    Estimated Creatinine Clearance: 61.1 mL/min (based on SCr of 1 mg/dL).    POCT Glucose results:    Current Insulin Regimen:         PHYSICAL EXAMINATION:  Vitals:    07/23/18 1909   BP:    Pulse: 82   Resp: 17   Temp: 97.3 °F (36.3 °C)     Body mass index is 28.11 kg/m².    Physical Exam   Constitutional: She appears well-developed.   Critically ill  Intubated, sedated   HENT:   Right Ear: External ear normal.   Left Ear: External ear normal.   Nose: Nose normal.   Neck: No tracheal deviation present.   Unable to assess thyroid given intubation   Cardiovascular: Normal rate.    No murmur heard.  Pulmonary/Chest: Effort normal.   Ventilator breath sounds   Abdominal: Soft. She exhibits no distension. No hernia.   Musculoskeletal: She exhibits no edema.   Neurological: She has normal reflexes.   Sedated  Unable to assess cranial nerves   Skin: No rash noted.   No nodules   Psychiatric:   Unable to assess mood/affect,  judgment   Vitals reviewed.      Review of Systems   Unable to perform ROS: Intubated       Physical Exam   Constitutional: She appears well-developed.   Critically ill  Intubated, sedated   HENT:   Right Ear: External ear normal.   Left Ear: External ear normal.   Nose: Nose normal.   Neck: No tracheal deviation present.   Unable to assess thyroid given intubation   Cardiovascular: Normal rate.    No murmur heard.  Pulmonary/Chest: Effort normal.   Ventilator breath sounds   Abdominal: Soft. She exhibits no distension. No hernia.   Musculoskeletal: She exhibits no edema.   Neurological: She has normal reflexes.   Sedated  Unable to assess cranial nerves   Skin: No rash noted.   No nodules   Psychiatric:   Unable to assess mood/affect, judgment   Vitals reviewed.

## 2018-07-24 NOTE — PROGRESS NOTES
[facility]  Cardiology  Progress Note    Patient Name: Anum Quick  MRN: [id number]  Admission Date: 7/22/2018  Hospital Length of Stay: 2 days  Code Status: Full Code   Attending Physician: Junior Steiner MD   Primary Care Physician: Raghav Valdez MD  Expected Discharge Date:   Principal Problem:Cardiac arrest due to respiratory disorder    Subjective:     Hospital Course:   The patient was seen today still intubated and MV on AC mode BP: 158/93 MAP: 119 off pressors. Echocardiogram done showed:  1 - Mild biatrial enlargement.     2 - Severely depressed left ventricular systolic function (EF 15%).     3 - Right ventricular enlargement with mildly depressed systolic function.     4 - Severe aortic valve stenosis (JAMES 0.61 cm2, AVAi 0.32 cm2/m2, peak aortic jet velocity 4.4 m/s,MG 47 mmHg, ).     5 - Pulmonary hypertension. The estimated PA systolic pressure is 54 mmHg.     6 - Mild tricuspid regurgitation.     7 - Trivial pericardial effusion.     8 - Pleural effusion.     Chest xray: Pulmonary edema    Interval HPI:   Overnight events:  Critically ill.  Intubated, sedated.    On LT4 50mg IV.     PMH, PSH, FH, SH updated and reviewed     ROS:  Review of Systems   Unable to perform ROS: Intubated       Labs Reviewed and Include:  BASELINE Creatinine:   Lab Results   Component Value Date    WBC 8.77 07/24/2018    HGB 7.5 (L) 07/24/2018    HCT 24.1 (L) 07/24/2018    MCV 90 07/24/2018     07/24/2018       Recent Labs  Lab 07/24/18  0312 07/24/18  0754   *  141*  141* 157*  157*   CALCIUM 9.1  9.1 9.5   ALBUMIN 2.9*  --    PROT 5.9*  --      141 141   K 3.4*  3.4* 3.3*   CO2 24 24 24     101 101   BUN 30*  30* 31*   CREATININE 1.0  1.0 1.1   ALKPHOS 141*  --    ALT 21  --    AST 41*  --    BILITOT 1.7*  --      Lab Results   Component Value Date    HGBA1C 6.0 (H) 07/23/2018       Nutritional status:   Body mass index is 28.11 kg/m².  Lab Results    Component Value Date    ALBUMIN 2.9 (L) 07/24/2018    ALBUMIN 2.5 (L) 07/23/2018    ALBUMIN 2.7 (L) 07/23/2018     No results found for: PREALBUMIN    Estimated Creatinine Clearance: 55.5 mL/min (based on SCr of 1.1 mg/dL).    POCT Glucose results:    Current Insulin Regimen:         PHYSICAL EXAMINATION:  Vitals:    07/24/18 1045   BP:    Pulse: 85   Resp: 16   Temp: 97.5 °F (36.4 °C)     Body mass index is 28.11 kg/m².    Physical Exam   Constitutional: She appears well-developed.   Critically ill  Intubated, sedated   HENT:   Right Ear: External ear normal.   Left Ear: External ear normal.   Nose: Nose normal.   Neck: No tracheal deviation present.   Unable to assess thyroid given intubation   Cardiovascular: Normal rate.    No murmur heard.  Pulmonary/Chest: Effort normal.   Ventilator breath sounds   Abdominal: Soft. She exhibits no distension. No hernia.   Musculoskeletal: She exhibits no edema.   Neurological: She has normal reflexes.   Sedated  Unable to assess cranial nerves   Skin: No rash noted.   No nodules   Psychiatric:   Unable to assess mood/affect, judgment   Vitals reviewed.      Review of Systems   Unable to perform ROS: Intubated       Physical Exam   Constitutional: She appears well-developed.   Critically ill  Intubated, sedated   HENT:   Right Ear: External ear normal.   Left Ear: External ear normal.   Nose: Nose normal.   Neck: No tracheal deviation present.   Unable to assess thyroid given intubation   Cardiovascular: Normal rate.    No murmur heard.  Pulmonary/Chest: Effort normal.   Ventilator breath sounds   Abdominal: Soft. She exhibits no distension. No hernia.   Musculoskeletal: She exhibits no edema.   Neurological: She has normal reflexes.   Sedated  Unable to assess cranial nerves   Skin: No rash noted.   No nodules   Psychiatric:   Unable to assess mood/affect, judgment   Vitals reviewed.        Assessment and Plan:     Brief HPI:  Reason for consult: s/p arrest, CPR 15 min. EF  15%, AS, Pul htn    Anum Quick 61 yo female, PMH of severe AS, hypothyroid, urinary retention, chronic sys/carney HF, A fib, SHERI, DM2 who presents to [facility] s/p cardiac arrest. Pt developed shortness of breath on night PTA and it progressed until the day of admit when  brought her to ED.  He reports she was not compliant with her home O2 but is compliant with meds. In ED, pt was found to be hypoxic and soon after went into asystole. CPR performed for 15 min with multiple rounds of epi with ROSC. Pt intubated, started on broad spec abx, heparin gtt, propofol gtt and keppra load. She is being admitted to [facility] for a higher level of care.     Severe aortic stenosis    The patient was seen today still intubated and MV on AC mode BP: 158/93 MAP: 119 off pressors. Echocardiogram done showed:  1 - Mild biatrial enlargement.     2 - Severely depressed left ventricular systolic function (EF 15%).     3 - Right ventricular enlargement with mildly depressed systolic function.     4 - Severe aortic valve stenosis (JAMES 0.61 cm2, AVAi 0.32 cm2/m2, peak aortic jet velocity 4.4 m/s,MG 47 mmHg, ).     5 - Pulmonary hypertension. The estimated PA systolic pressure is 54 mmHg.     6 - Mild tricuspid regurgitation.     7 - Trivial pericardial effusion.     8 - Pleural effusion.     Chest xray: Pulmonary edema    Impression: The patient has end stage severe AS and hypoxic brain injury less likely to respond to medical therapy     Recommend:  Start IV Furosemide 20 mg Q 6 hours  Monitor I/O   Monitor electrolytes and replace as needed            VTE Risk Mitigation     None          Maricruz Gilliam MD  Cardiology  [facility]

## 2018-07-24 NOTE — PLAN OF CARE
Reviewed thyroid labs and medications.    First dose of weight based IV today.  AM FT4 0.57 (low).    Will follow labs and adjust as needed.  Continue LT4 65mcg IV daily.  Continue daily labs.  TSH one week (7/30)    Lorna Roldan MD  Endocrinology Fellow

## 2018-07-24 NOTE — PLAN OF CARE
Problem: Patient Care Overview  Goal: Plan of Care Review  Outcome: Ongoing (interventions implemented as appropriate)  POC reviewed with pt's family at 0500. Pt's family verbalized understanding. Questions and concerns addressed. See previous note, levo gtt titurated off and all sedation off. Pt appearing to be having seizures, epilepsy paged. See previous note. Pt progressing toward goals. Plan to rewarm today. Will continue to monitor. See flowsheets for full assessment and VS info

## 2018-07-24 NOTE — PROCEDURES
"Anum Quick is a 62 y.o. female patient.    Temp: 96.8 °F (36 °C) (07/24/18 0911)  Pulse: 79 (07/24/18 0911)  Resp: 19 (07/24/18 0911)  BP: 132/89 (07/24/18 0830)  SpO2: 100 % (07/24/18 0911)  Weight: 79 kg (174 lb 2.6 oz) (07/22/18 1920)  Height: 5' 6" (167.6 cm) (07/22/18 1920)       Central Line  Date/Time: 7/24/2018 9:21 AM  Location procedure was performed: [facility] NEURO CRITICAL CARE  Performed by: ANDRE SIERRA  Assisting provider: ANDRE SIERRA  Consent Done: Yes  Time out: Immediately prior to procedure a "time out" was called to verify the correct patient, procedure, equipment, support staff and site/side marked as required.  Indications: vascular access  Preparation: skin prepped with ChloraPrep  Skin prep agent dried: skin prep agent completely dried prior to procedure  Sterile barriers: all five maximum sterile barriers used - cap, mask, sterile gown, sterile gloves, and large sterile sheet  Hand hygiene: hand hygiene performed prior to central venous catheter insertion  Location details: right femoral  Site selection rationale: attempted r scl. unable to cannulate vein  Catheter type: triple lumen  Catheter size: 7 Fr  Catheter Length: 17cm    Ultrasound guidance: yes  Vessel Caliber: medium, patent, compressibility normal  Needle advanced into vessel with real time Ultrasound guidance.  Guidewire confirmed in vessel.  Manometry: Yes  Number of attempts: 1  Post-procedure: line sutured,  chlorhexidine patch,  sterile dressing applied and blood return through all ports  Complications: No          Andre Sierra  7/24/2018    "

## 2018-07-24 NOTE — HPI
Reason for consult: s/p arrest, CPR 15 min. EF 15%, AS, Pul htn    Anum Quick 63 yo female, PMH of severe AS, hypothyroid, urinary retention, chronic sys/carney HF, A fib, SHERI, DM2 who presents to [facility] s/p cardiac arrest. Pt developed shortness of breath on night PTA and it progressed until the day of admit when  brought her to ED.  He reports she was not compliant with her home O2 but is compliant with meds. In ED, pt was found to be hypoxic and soon after went into asystole. CPR performed for 15 min with multiple rounds of epi with ROSC. Pt intubated, started on broad spec abx, heparin gtt, propofol gtt and keppra load. She is being admitted to [facility] for a higher level of care.

## 2018-07-24 NOTE — ASSESSMENT & PLAN NOTE
The patient was seen today still intubated and MV on AC mode BP: 88/60 MAP: 85 on pressors. Echocardiogram done today showed:  1 - Mild biatrial enlargement.     2 - Severely depressed left ventricular systolic function (EF 15%).     3 - Right ventricular enlargement with mildly depressed systolic function.     4 - Severe aortic valve stenosis (JAMES 0.61 cm2, AVAi 0.32 cm2/m2, peak aortic jet velocity 4.4 m/s,MG 47 mmHg, ).     5 - Pulmonary hypertension. The estimated PA systolic pressure is 54 mmHg.     6 - Mild tricuspid regurgitation.     7 - Trivial pericardial effusion.     8 - Pleural effusion.     The patient has end stage severe AS and hypoxic brain injury less likely to respond to medical therapy we recommend supportive treatment

## 2018-07-24 NOTE — NURSING
1901- Pt with STAT CT ordered, cEEG okay to take of per TERE Petersen.   1915-Pt transported to and from CT on continuous cardiac monitoring, o2 and transport vent with RN, RT, and PCT. Pt tolerated well.  1945- Pt placed back on artic sun, continuing to maintain 36 degrees C. TERE Petersen at bedside, ordered EEG cap to be placed, sedation to remain off, and discussed CT and plan of care with  and his daughter at bedside. Epilepsy paged, awaiting call back.  2100- Pt breathing in mid-high 20's, coughing on vent, and having lots of body jerking- MD Uli at bedside to assess patient. MD ordered for fentanyl infusion to be turned back on, pt tolerated well. Pt UO below 30 cc/hr, MD stated to continue to monitor.   2330- Pt pulse ox having trouble reading, pt continuously having hiccup type movements- ABGs confirming sats. NCC aware, MD ordered ABG prn for when pulse ox not reading.  0530- Pt not hiccupping like before, but having rhythmic eye movements along with BLE first 2 toes twitching- MD at bedside to evaluate pt and EEG, Epileptologist paged, awaiting new orders.   Will continue to monitor and treat per POC

## 2018-07-24 NOTE — ASSESSMENT & PLAN NOTE
The patient was seen today still intubated and MV on AC mode BP: 158/93 MAP: 119 off pressors. Echocardiogram done showed:  1 - Mild biatrial enlargement.     2 - Severely depressed left ventricular systolic function (EF 15%).     3 - Right ventricular enlargement with mildly depressed systolic function.     4 - Severe aortic valve stenosis (JAMES 0.61 cm2, AVAi 0.32 cm2/m2, peak aortic jet velocity 4.4 m/s,MG 47 mmHg, ).     5 - Pulmonary hypertension. The estimated PA systolic pressure is 54 mmHg.     6 - Mild tricuspid regurgitation.     7 - Trivial pericardial effusion.     8 - Pleural effusion.     Chest xray: Pulmonary edema    Impression: The patient has end stage severe AS and hypoxic brain injury less likely to respond to medical therapy     Recommend:  Start IV Furosemide 20 mg Q 6 hours  Monitor I/O   Monitor electrolytes and replace as needed

## 2018-07-24 NOTE — PROGRESS NOTES
[facility]  Cardiology  Progress Note    Patient Name: Anum Quick  MRN: [id number]  Admission Date: 7/22/2018  Hospital Length of Stay: 2 days  Code Status: Full Code   Attending Physician: Junior Steiner MD   Primary Care Physician: Raghav Valdez MD  Expected Discharge Date:   Principal Problem:Cardiac arrest due to respiratory disorder    Subjective:     Hospital Course:   The patient was seen today still intubated and MV on AC mode BP: 88/60 MAP: 85 on pressors. Echocardiogram done today showed:  1 - Mild biatrial enlargement.     2 - Severely depressed left ventricular systolic function (EF 15%).     3 - Right ventricular enlargement with mildly depressed systolic function.     4 - Severe aortic valve stenosis (JAMES 0.61 cm2, AVAi 0.32 cm2/m2, peak aortic jet velocity 4.4 m/s,MG 47 mmHg, ).     5 - Pulmonary hypertension. The estimated PA systolic pressure is 54 mmHg.     6 - Mild tricuspid regurgitation.     7 - Trivial pericardial effusion.     8 - Pleural effusion.     Interval HPI:   Overnight events:  Critically ill.  Intubated, sedated.    On LT4 50mg IV.     PMH, PSH, FH, SH updated and reviewed     ROS:  Review of Systems   Unable to perform ROS: Intubated       Labs Reviewed and Include:  BASELINE Creatinine:   Lab Results   Component Value Date    WBC 8.77 07/24/2018    HGB 7.5 (L) 07/24/2018    HCT 24.1 (L) 07/24/2018    MCV 90 07/24/2018     07/24/2018       Recent Labs  Lab 07/24/18  0312 07/24/18  0754   *  141*  141* 157*  157*   CALCIUM 9.1  9.1 9.5   ALBUMIN 2.9*  --    PROT 5.9*  --      141 141   K 3.4*  3.4* 3.3*   CO2 24 24 24     101 101   BUN 30*  30* 31*   CREATININE 1.0  1.0 1.1   ALKPHOS 141*  --    ALT 21  --    AST 41*  --    BILITOT 1.7*  --      Lab Results   Component Value Date    HGBA1C 6.0 (H) 07/23/2018       Nutritional status:   Body mass index is 28.11 kg/m².  Lab Results   Component Value Date    ALBUMIN  2.9 (L) 07/24/2018    ALBUMIN 2.5 (L) 07/23/2018    ALBUMIN 2.7 (L) 07/23/2018     No results found for: PREALBUMIN    Estimated Creatinine Clearance: 55.5 mL/min (based on SCr of 1.1 mg/dL).    POCT Glucose results:    Current Insulin Regimen:         PHYSICAL EXAMINATION:  Vitals:    07/24/18 1045   BP:    Pulse: 85   Resp: 16   Temp: 97.5 °F (36.4 °C)     Body mass index is 28.11 kg/m².    Physical Exam   Constitutional: She appears well-developed.   Critically ill  Intubated, sedated   HENT:   Right Ear: External ear normal.   Left Ear: External ear normal.   Nose: Nose normal.   Neck: No tracheal deviation present.   Unable to assess thyroid given intubation   Cardiovascular: Normal rate.    No murmur heard.  Pulmonary/Chest: Effort normal.   Ventilator breath sounds   Abdominal: Soft. She exhibits no distension. No hernia.   Musculoskeletal: She exhibits no edema.   Neurological: She has normal reflexes.   Sedated  Unable to assess cranial nerves   Skin: No rash noted.   No nodules   Psychiatric:   Unable to assess mood/affect, judgment   Vitals reviewed.      Review of Systems   Unable to perform ROS: Intubated       Physical Exam   Constitutional: She appears well-developed.   Critically ill  Intubated, sedated   HENT:   Right Ear: External ear normal.   Left Ear: External ear normal.   Nose: Nose normal.   Neck: No tracheal deviation present.   Unable to assess thyroid given intubation   Cardiovascular: Normal rate.    No murmur heard.  Pulmonary/Chest: Effort normal.   Ventilator breath sounds   Abdominal: Soft. She exhibits no distension. No hernia.   Musculoskeletal: She exhibits no edema.   Neurological: She has normal reflexes.   Sedated  Unable to assess cranial nerves   Skin: No rash noted.   No nodules   Psychiatric:   Unable to assess mood/affect, judgment   Vitals reviewed.        Assessment and Plan:     Brief HPI:   Reason for consult: s/p arrest, CPR 15 min. EF 15%, AS, Pul htn    Anum Quick  63 yo female, PMH of severe AS, hypothyroid, urinary retention, chronic sys/carney HF, A fib, SHERI, DM2 who presents to [facility] s/p cardiac arrest. Pt developed shortness of breath on night PTA and it progressed until the day of admit when  brought her to ED.  He reports she was not compliant with her home O2 but is compliant with meds. In ED, pt was found to be hypoxic and soon after went into asystole. CPR performed for 15 min with multiple rounds of epi with ROSC. Pt intubated, started on broad spec abx, heparin gtt, propofol gtt and keppra load. She is being admitted to [facility] for a higher level of care.     Severe aortic stenosis    The patient was seen today still intubated and MV on AC mode BP: 158/93 MAP: 119 off pressors. Echocardiogram done showed:  1 - Mild biatrial enlargement.     2 - Severely depressed left ventricular systolic function (EF 15%).     3 - Right ventricular enlargement with mildly depressed systolic function.     4 - Severe aortic valve stenosis (JAMES 0.61 cm2, AVAi 0.32 cm2/m2, peak aortic jet velocity 4.4 m/s,MG 47 mmHg, ).     5 - Pulmonary hypertension. The estimated PA systolic pressure is 54 mmHg.     6 - Mild tricuspid regurgitation.     7 - Trivial pericardial effusion.     8 - Pleural effusion.     Chest xray: Pulmonary edema    Impression: The patient has end stage severe AS and hypoxic brain injury less likely to respond to medical therapy     Recommend:  Start IV Furosemide 20 mg Q 6 hours  Monitor I/O   Monitor electrolytes and replace as needed            VTE Risk Mitigation     None          Maricruz Gilliam MD  Cardiology  [facility]

## 2018-07-24 NOTE — HOSPITAL COURSE
The patient was seen today still intubated and MV on AC mode BP: 154/102 MAP: 119 off pressors. Echocardiogram done showed:  1 - Mild biatrial enlargement.     2 - Severely depressed left ventricular systolic function (EF 15%).     3 - Right ventricular enlargement with mildly depressed systolic function.     4 - Severe aortic valve stenosis (JAMES 0.61 cm2, AVAi 0.32 cm2/m2, peak aortic jet velocity 4.4 m/s,MG 47 mmHg, ).     5 - Pulmonary hypertension. The estimated PA systolic pressure is 54 mmHg.     6 - Mild tricuspid regurgitation.     7 - Trivial pericardial effusion.     8 - Pleural effusion.     Chest xray: Pulmonary edema

## 2018-07-24 NOTE — PROGRESS NOTES
ICU Progress Note  Neurocritical Care    Admit Date: 7/22/2018  LOS: 2    CC: Cardiac arrest due to respiratory disorder    Code Status: Full Code     SUBJECTIVE:     Interval History/Significant Events:   Septic shock ; now resolved  Aspiration poa  arf  rewarm today  anemia  Abnormal coagulation profile  GCS;7  Pulmonary edema; poa      Medications:  Continuous Infusions:   fentanyl Stopped (07/24/18 0400)     Scheduled Meds:   aspirin  81 mg Per NG tube Daily    chlorhexidine  15 mL Mouth/Throat BID    famotidine  20 mg Per NG tube BID    hydrocortisone sodium succinate  100 mg Intravenous Q8H    levothyroxine  65 mcg Intravenous Daily    piperacillin-tazobactam 4.5 g in sodium chloride 0.9% 100 mL IVPB (ready to mix system)  4.5 g Intravenous Q8H    vancomycin (VANCOCIN) IVPB  15 mg/kg (Dosing Weight) Intravenous Q12H     PRN Meds:.sodium chloride, acetaminophen, dextrose 50%, fentaNYL, glucagon (human recombinant), insulin aspart U-100    OBJECTIVE:   Vital Signs (Most Recent):   Temp: 96.8 °F (36 °C) (07/24/18 0911)  Pulse: 79 (07/24/18 0911)  Resp: 19 (07/24/18 0911)  BP: 132/89 (07/24/18 0830)  SpO2: 100 % (07/24/18 0911)    Vital Signs (24h Range):   Temp:  [95.9 °F (35.5 °C)-97.3 °F (36.3 °C)] 96.8 °F (36 °C)  Pulse:  [68-86] 79  Resp:  [17-31] 19  SpO2:  [94 %-100 %] 100 %  BP: ()/() 132/89  Arterial Line BP: ()/() 131/88    ICP/CPP (Last 24h):   CO:  [2.7 L/min-2.8 L/min] 2.8 L/min  CI:  [1.4 L/min/m2-1.5 L/min/m2] 1.5 L/min/m2    I & O (Last 24h):   Intake/Output Summary (Last 24 hours) at 07/24/18 0924  Last data filed at 07/24/18 0800   Gross per 24 hour   Intake          2814.28 ml   Output              765 ml   Net          2049.28 ml     Physical Exam:  GA: Alert, comfortable, no acute distress.   HEENT: No scleral icterus or JVD.   Pulmonary: Clear to auscultation A/P/L. No wheezing, crackles, or rhonchi.  Cardiac: RRR S1 & S2 w/o rubs/murmurs/gallops.   Abdominal:  Bowel sounds present x 4. No appreciable hepatosplenomegaly.  Skin: No jaundice, rashes, or visible lesions.  Neuro:  Pupils 4 mm reactive  Opening eyes spontaneously  Not following exam        Vent Data:   Vent Mode: A/C  Oxygen Concentration (%):  [40-50] 40  Resp Rate Total:  [16 br/min-26 br/min] 17 br/min  Vt Set:  [450 mL] 450 mL  PEEP/CPAP:  [8 cmH20] 8 cmH20  Pressure Support:  [0 cmH20] 0 cmH20  Mean Airway Pressure:  [14 voD46-43 cmH20] 14 cmH20    Lines/Drains/Airway: cvp;1- a1         Airway - Non-Surgical 07/22/18 1848 Endotracheal Tube (Active)   Secured at 24 cm 7/24/2018  8:00 AM   Measured At Lips 7/24/2018  8:00 AM   Secured Location Center 7/24/2018  8:00 AM   Secured by Commercial tube malcolm 7/24/2018  8:00 AM   Bite Block center;secure and patent 7/24/2018  8:00 AM   Site Condition Dry 7/24/2018  8:00 AM   Status Intact;Secured;Patent 7/24/2018  8:00 AM   Site Assessment Clean;Dry;No bleeding;No drainage 7/24/2018  8:00 AM   Cuff Pressure 27 cm H2O 7/24/2018  8:00 AM           Percutaneous Central Line Insertion/Assessment - triple lumen  07/23/18 1600 right femoral (Active)   Dressing biopatch in place 7/24/2018  7:01 AM   Securement secured w/ sutures 7/24/2018  7:01 AM   Additional Site Signs drainage 7/24/2018  7:01 AM   Distal Patency/Care flushed w/o difficulty 7/24/2018  7:01 AM   Medial Patency/Care flushed w/o difficulty 7/24/2018  7:01 AM   Proximal Patency/Care flushed w/o difficulty 7/24/2018  7:01 AM   Dressing Change Due 07/30/18 7/24/2018  7:01 AM   Daily Line Review Performed 7/24/2018  7:01 AM           Arterial Line 07/23/18 1530 Left Other (Comment) (Active)   Site Assessment Clean;Dry;Intact;No redness;No swelling 7/24/2018  7:01 AM   Line Status Pulsatile blood flow 7/24/2018  7:01 AM   Art Line Waveform Appropriate;Square wave test performed 7/24/2018  7:01 AM   Arterial Line Interventions Zeroed and calibrated 7/24/2018  7:01 AM   Color/Movement/Sensation Capillary refill  "less than 3 sec 7/24/2018  7:01 AM   Dressing Type Transparent 7/24/2018  7:01 AM   Dressing Status Biopatch in place;Clean;Dry;Intact 7/24/2018  7:01 AM   Dressing Intervention New dressing 7/24/2018  7:01 AM   Dressing Change Due 07/30/18 7/24/2018  7:01 AM           NG/OG Tube 07/22/18 1200 orogastric;Vermillion sump 14 Fr. Right mouth (Active)   Placement Check placement verified by aspirate characteristics;placement verified by aspirate pH;placement verified by distal tube length measurement;placement verified by x-ray 7/24/2018  7:01 AM   Distal Tube Length (cm) 60 7/24/2018  7:01 AM   Securement taped to commercial device 7/24/2018  7:01 AM   Clamp Status/Tolerance clamped;no abdominal discomfort;no abdominal distention;no emesis;no nausea;no residual;no restlessness 7/24/2018  3:00 AM   Suction Setting/Drainage Method suction at;low;intermittent setting 7/24/2018  7:01 AM   Intake (mL) 100 mL 7/24/2018  8:00 AM   Tube Output(mL)(Include Discarded Residual) 0 mL 7/24/2018  8:00 AM            Urethral Catheter 07/22/18 1900 Temperature probe (Active)   Site Assessment Clean;Intact 7/24/2018  7:01 AM   Collection Container Urimeter 7/24/2018  7:01 AM   Securement Method secured to top of thigh w/ adhesive device 7/24/2018  7:01 AM   Catheter Care Performed yes 7/24/2018  7:01 AM   Reason for Continuing Urinary Catheterization Critically ill in ICU requiring intensive monitoring 7/24/2018  7:01 AM   CAUTI Prevention Bundle StatLock in place w 1" slack;Intact seal between catheter & drainage tubing;Drainage bag off the floor;Green sheeting clip in use;No dependent loops or kinks;Drainage bag not overfilled (<2/3 full);Drainage bag below bladder 7/24/2018  7:01 AM   Output (mL) 50 mL 7/24/2018  8:00 AM     Nutrition/Tube Feeds (if NPO state why):  Off t feeds    Labs:  ABG:   Recent Labs  Lab 07/23/18 2036   PH 7.557*   PO2 139*   PCO2 33.5*   HCO3 29.8*   POCSATURATED 99   BE 8     BMP:  Recent Labs  Lab " 07/24/18  0312     141   K 3.4*  3.4*     101   CO2 24  24   BUN 30*  30*   CREATININE 1.0  1.0   *  141*  141*   MG 2.1   PHOS 4.4     LFT: Lab Results   Component Value Date    AST 41 (H) 07/24/2018    ALT 21 07/24/2018     (H) 07/07/2018    ALKPHOS 141 (H) 07/24/2018    BILITOT 1.7 (H) 07/24/2018    ALBUMIN 2.9 (L) 07/24/2018    PROT 5.9 (L) 07/24/2018     CBC:   Lab Results   Component Value Date    WBC 8.77 07/24/2018    HGB 7.5 (L) 07/24/2018    HCT 24.1 (L) 07/24/2018    MCV 90 07/24/2018     07/24/2018     Microbiology x 7d:   Microbiology Results (last 7 days)     Procedure Component Value Units Date/Time    Blood culture [[id number]] Collected:  07/22/18 2152    Order Status:  Completed Specimen:  Blood from Peripheral, Antecubital, Left Updated:  07/24/18 0613     Blood Culture, Routine No Growth to date     Blood Culture, Routine No Growth to date    Blood culture [[id number]] Collected:  07/22/18 2145    Order Status:  Completed Specimen:  Blood from Wrist, Left Updated:  07/24/18 0613     Blood Culture, Routine No Growth to date     Blood Culture, Routine No Growth to date    Culture, Respiratory with Gram Stain [[id number]] Collected:  07/23/18 0333    Order Status:  Completed Specimen:  Respiratory from Sputum Updated:  07/23/18 1010     Gram Stain (Respiratory) <10 epithelial cells per low power field.     Gram Stain (Respiratory) Moderate WBC's     Gram Stain (Respiratory) No organisms seen    Urine culture [[id number]]     Order Status:  Canceled Specimen:  Urine         Imaging:  Cxr; pulmonary edema    I personally reviewed the above image.    ASSESSMENT/PLAN:     Active Hospital Problems    Diagnosis    *Cardiac arrest due to respiratory disorder    Alteration in skin integrity    Preventive measure    Cardiac LV ejection fraction <20%    Pulmonary hypertension    Status epilepticus    Acquired hypothyroidism    HARISH (acute kidney injury)    Right  foot ulcer    Acute on chronic systolic congestive heart failure    Chronic anticoagulation - on rivaroxaban     PAF      Paroxysmal atrial fibrillation    Hyperlipidemia associated with type 2 diabetes mellitus    Type 2 diabetes mellitus with neurologic complication, without long-term current use of insulin    Severe aortic stenosis     12-15-17  Severe low flow aortic valve stenosis (JAMES 0.49 cm2, AVAi 0.27 cm2/m2, peak aortic jet velocity 4.0 m/s,MG 48 mmHg).               Plan:  abx  2 prbc  Hypokalemia  Follow INR  EEG  Fluids  Follow UO      Critical secondary to Patient has a condition that poses threat to life and bodily function: anoxic brain injury/sepsis/arf/  Follow exam/family discussion/rewarming      total cc time 42 mins     Critical care was time spent personally by me on the following activities: development of treatment plan with patient or surrogate and bedside caregivers, discussions with consultants, evaluation of patient's response to treatment, examination of patient, ordering and performing treatments and interventions, ordering and review of laboratory studies, ordering and review of radiographic studies, pulse oximetry, re-evaluation of patient's condition. This critical care time did not overlap with that of any other provider or involve time for any procedures.

## 2018-07-24 NOTE — NURSING
Dr Owens and Sheard to the beside this morning to assess the patient.  The patient has minimal cough and gag with intermittent corneal reflexes.  The patient does not display and reaction in her upper extremities and triple flexes in her lower extremities to painful stimuli.  Her pupils are equal and reactive.  See flowsheet for complete assessment.  Patient rewarmed per protocol using the [employer].  MRI ordered and done at 1500 without incident.  [facility] notified that the MRI has been completed.  Family is at the bedside.

## 2018-07-24 NOTE — CONSULTS
[facility]  Cardiology  Consult Note    Patient Name: Anum Quick  MRN: [id number]  Admission Date: 7/22/2018  Hospital Length of Stay: 1 days  Code Status: Full Code   Attending Provider: Junior Steiner MD   Consulting Provider: Brad Barakat MD  Primary Care Physician: Raghav Valdez MD  Principal Problem:Cardiac arrest due to respiratory disorder    Patient information was obtained from ER records.     Inpatient consult to Cardiology  Consult performed by: BRAD BARAKAT  Consult ordered by: JOEL RODRIGUEZ        Subjective:         HPI:   Reason for consult: s/p arrest, CPR 15 min. EF 15%, AS, Pul htn    Anum Quick 61 yo female, PMH of severe AS, hypothyroid, urinary retention, chronic sys/carney HF, A fib, SHERI, DM2 who presents to [facility] s/p cardiac arrest. Pt developed shortness of breath on night PTA and it progressed until the day of admit when  brought her to ED.  He reports she was not compliant with her home O2 but is compliant with meds. In ED, pt was found to be hypoxic and soon after went into asystole. CPR performed for 15 min with multiple rounds of epi with ROSC. Pt intubated, started on broad spec abx, heparin gtt, propofol gtt and keppra load. She is being admitted to [facility] for a higher level of care.     Interval HPI:   Overnight events:  Critically ill.  Intubated, sedated.    On LT4 50mg IV.     PMH, PSH, FH, SH updated and reviewed     ROS:  Review of Systems   Unable to perform ROS: Intubated       Labs Reviewed and Include:  BASELINE Creatinine:   Lab Results   Component Value Date    WBC 10.42 07/23/2018    HGB 8.5 (L) 07/23/2018    HCT 27.1 (L) 07/23/2018    MCV 90 07/23/2018     07/23/2018       Recent Labs  Lab 07/23/18  1514   *  116*  116*  116*  116*   CALCIUM 8.6*  8.6*  8.6*   ALBUMIN 2.5*   PROT 5.2*     139  139   K 3.1*  3.1*  3.1*   CO2 28  28  28     101  101   BUN 27*  27*  27*   CREATININE 1.0  1.0   1.0   ALKPHOS 144*   ALT 20   AST 37   BILITOT 1.3*     Lab Results   Component Value Date    HGBA1C 6.0 (H) 07/23/2018       Nutritional status:   Body mass index is 28.11 kg/m².  Lab Results   Component Value Date    ALBUMIN 2.5 (L) 07/23/2018    ALBUMIN 2.7 (L) 07/23/2018    ALBUMIN 2.8 (L) 07/22/2018     No results found for: PREALBUMIN    Estimated Creatinine Clearance: 61.1 mL/min (based on SCr of 1 mg/dL).    POCT Glucose results:    Current Insulin Regimen:         PHYSICAL EXAMINATION:  Vitals:    07/23/18 1909   BP:    Pulse: 82   Resp: 17   Temp: 97.3 °F (36.3 °C)     Body mass index is 28.11 kg/m².    Physical Exam   Constitutional: She appears well-developed.   Critically ill  Intubated, sedated   HENT:   Right Ear: External ear normal.   Left Ear: External ear normal.   Nose: Nose normal.   Neck: No tracheal deviation present.   Unable to assess thyroid given intubation   Cardiovascular: Normal rate.    No murmur heard.  Pulmonary/Chest: Effort normal.   Ventilator breath sounds   Abdominal: Soft. She exhibits no distension. No hernia.   Musculoskeletal: She exhibits no edema.   Neurological: She has normal reflexes.   Sedated  Unable to assess cranial nerves   Skin: No rash noted.   No nodules   Psychiatric:   Unable to assess mood/affect, judgment   Vitals reviewed.      Review of Systems   Unable to perform ROS: Intubated       Physical Exam   Constitutional: She appears well-developed.   Critically ill  Intubated, sedated   HENT:   Right Ear: External ear normal.   Left Ear: External ear normal.   Nose: Nose normal.   Neck: No tracheal deviation present.   Unable to assess thyroid given intubation   Cardiovascular: Normal rate.    No murmur heard.  Pulmonary/Chest: Effort normal.   Ventilator breath sounds   Abdominal: Soft. She exhibits no distension. No hernia.   Musculoskeletal: She exhibits no edema.   Neurological: She has normal reflexes.   Sedated  Unable to assess cranial nerves   Skin: No  rash noted.   No nodules   Psychiatric:   Unable to assess mood/affect, judgment   Vitals reviewed.        Assessment and Plan:     Severe aortic stenosis    The patient was seen today still intubated and MV on AC mode BP: 88/60 MAP: 85 on pressors. Echocardiogram done today showed:  1 - Mild biatrial enlargement.     2 - Severely depressed left ventricular systolic function (EF 15%).     3 - Right ventricular enlargement with mildly depressed systolic function.     4 - Severe aortic valve stenosis (JAMES 0.61 cm2, AVAi 0.32 cm2/m2, peak aortic jet velocity 4.4 m/s,MG 47 mmHg, ).     5 - Pulmonary hypertension. The estimated PA systolic pressure is 54 mmHg.     6 - Mild tricuspid regurgitation.     7 - Trivial pericardial effusion.     8 - Pleural effusion.     The patient has end stage severe AS and hypoxic brain injury less likely to respond to medical therapy we recommend supportive treatment             VTE Risk Mitigation     None          Thank you for your consult. I will follow-up with patient. Please contact us if you have any additional questions.    Maricruz Gilliam MD  Cardiology   [facility]

## 2018-07-24 NOTE — SUBJECTIVE & OBJECTIVE
Interval HPI:   Overnight events:  Critically ill.  Intubated, sedated.    On LT4 50mg IV.     PMH, PSH, FH, SH updated and reviewed     ROS:  Review of Systems   Unable to perform ROS: Intubated       Labs Reviewed and Include:  BASELINE Creatinine:   Lab Results   Component Value Date    WBC 8.77 07/24/2018    HGB 7.5 (L) 07/24/2018    HCT 24.1 (L) 07/24/2018    MCV 90 07/24/2018     07/24/2018       Recent Labs  Lab 07/24/18  0312 07/24/18  0754   *  141*  141* 157*  157*   CALCIUM 9.1  9.1 9.5   ALBUMIN 2.9*  --    PROT 5.9*  --      141 141   K 3.4*  3.4* 3.3*   CO2 24 24 24     101 101   BUN 30*  30* 31*   CREATININE 1.0  1.0 1.1   ALKPHOS 141*  --    ALT 21  --    AST 41*  --    BILITOT 1.7*  --      Lab Results   Component Value Date    HGBA1C 6.0 (H) 07/23/2018       Nutritional status:   Body mass index is 28.11 kg/m².  Lab Results   Component Value Date    ALBUMIN 2.9 (L) 07/24/2018    ALBUMIN 2.5 (L) 07/23/2018    ALBUMIN 2.7 (L) 07/23/2018     No results found for: PREALBUMIN    Estimated Creatinine Clearance: 55.5 mL/min (based on SCr of 1.1 mg/dL).    POCT Glucose results:    Current Insulin Regimen:         PHYSICAL EXAMINATION:  Vitals:    07/24/18 1045   BP:    Pulse: 85   Resp: 16   Temp: 97.5 °F (36.4 °C)     Body mass index is 28.11 kg/m².    Physical Exam   Constitutional: She appears well-developed.   Critically ill  Intubated, sedated   HENT:   Right Ear: External ear normal.   Left Ear: External ear normal.   Nose: Nose normal.   Neck: No tracheal deviation present.   Unable to assess thyroid given intubation   Cardiovascular: Normal rate.    No murmur heard.  Pulmonary/Chest: Effort normal.   Ventilator breath sounds   Abdominal: Soft. She exhibits no distension. No hernia.   Musculoskeletal: She exhibits no edema.   Neurological: She has normal reflexes.   Sedated  Unable to assess cranial nerves   Skin: No rash noted.   No nodules   Psychiatric:    Unable to assess mood/affect, judgment   Vitals reviewed.      Review of Systems   Unable to perform ROS: Intubated       Physical Exam   Constitutional: She appears well-developed.   Critically ill  Intubated, sedated   HENT:   Right Ear: External ear normal.   Left Ear: External ear normal.   Nose: Nose normal.   Neck: No tracheal deviation present.   Unable to assess thyroid given intubation   Cardiovascular: Normal rate.    No murmur heard.  Pulmonary/Chest: Effort normal.   Ventilator breath sounds   Abdominal: Soft. She exhibits no distension. No hernia.   Musculoskeletal: She exhibits no edema.   Neurological: She has normal reflexes.   Sedated  Unable to assess cranial nerves   Skin: No rash noted.   No nodules   Psychiatric:   Unable to assess mood/affect, judgment   Vitals reviewed.

## 2018-07-25 NOTE — PROGRESS NOTES
Left pupil 4 cm, round, brisk. Right pupil 3 cm, round, brisk.Trinity NP of NCC team aware, no new orders given. Will continue to monitor.

## 2018-07-25 NOTE — PROGRESS NOTES
0940- Notified TERE Petersen of pt -120's per arterial line with cuff correlation. Instructed to administer 5mg of Labetolol at this time. Will continue to monitor.    0949- Pt /87. Will continue to monitor.

## 2018-07-25 NOTE — PROGRESS NOTES
[facility]  Neurocritical Care  Progress Note    Admit Date: 7/22/2018  Service Date: 07/25/2018  Length of Stay: 3    Subjective:     Chief Complaint: Cardiac arrest due to respiratory disorder    History of Present Illness: Anum Quick 61 yo female, PMH of severe AS, hypothyroid, urinary retention, chronic sys/carney HF, A fib, SHERI, DM2 who presents to [facility] s/p cardiac arrest. Pt developed shortness of breath on night PTA and it progressed until the day of admit when  brought her to ED.  He reports she was not compliant with her home O2 but is compliant with meds. In ED, pt was found to be hypoxic and soon after went into asystole. CPR performed for 15 min with multiple rounds of epi with ROSC. Pt intubated, started on broad spec abx, heparin gtt, propofol gtt and keppra load. She is being admitted to [facility] for a higher level of care.     Hospital Course: 7/22: admit NCC, cooling protocol, EEG Diffuse slowing no sz, propofol infusion    7/25:   Neuro: patient without corneal reflex. With pupillary reflex +, gag reflex +, does not withdraw to pain    Interval History:  Pupillary, cough reflex, no corneal. Does not withdraw to pain      Review of Systems   Unable to obtain a complete ROS due to level of consciousness.  Objective:     Vitals:  Temp: 98.6 °F (37 °C)  Pulse: 74  Rhythm: normal sinus rhythm  BP: (!) 145/102  MAP (mmHg): 119  CI (L/min/m2): 1.8 L/min/m2  SVI: 21  SVV: 11 %  Resp: 16  SpO2: 100 %  Oxygen Concentration (%): 40  O2 Device (Oxygen Therapy): ventilator  Vent Mode: SIMV  Set Rate: 16 bmp  Vt Set: 450 mL  Pressure Support: 14 cmH20  PEEP/CPAP: 5 cmH20  Peak Airway Pressure: 34 cmH2O  Mean Airway Pressure: 10 cmH20  Plateau Pressure: 0 cmH20    Temp  Min: 97.3 °F (36.3 °C)  Max: 99.3 °F (37.4 °C)  Pulse  Min: 69  Max: 97  BP  Min: 114/87  Max: 159/95  MAP (mmHg)  Min: 95  Max: 130  CI (L/min/m2)  Min: 1.3 L/min/m2  Max: 14.9 L/min/m2  SVI  Min: 14  Max: 22  SVV  Min: 8 %   Max: 19 %  Resp  Min: 16  Max: 36  SpO2  Min: 97 %  Max: 100 %  Oxygen Concentration (%)  Min: 40  Max: 50    07/24 0701 - 07/25 0700  In: 1305 [I.V.:130]  Out: 690 [Urine:690]   Unmeasured Output  Stool Occurrence: 0       Physical Exam   Constitutional: She appears well-developed.   HENT:   Head: Normocephalic.   Eyes:   Pupils react to light BL   Cardiovascular: Normal rate.    Pulmonary/Chest:   intubated   Abdominal: Soft.     Unable to test orientation, language, memory, judgment, insight, fund of knowledge, hearing, shoulder shrug, tongue protrusion, coordination, gait due to level of consciousness.    Medications:  Continuous Scheduled  aspirin 81 mg Daily   chlorhexidine 15 mL BID   famotidine 20 mg BID   hydrocortisone sodium succinate 100 mg Q8H   levothyroxine 65 mcg Daily   piperacillin-tazobactam 4.5 g in sodium chloride 0.9% 100 mL IVPB (ready to mix system) 4.5 g Q8H   PRN  sodium chloride  Q24H PRN   acetaminophen 650 mg Q6H PRN   dextrose 50% 12.5 g PRN   fentaNYL 50 mcg Q2H PRN   glucagon (human recombinant) 1 mg PRN   insulin aspart U-100 0-5 Units Q4H PRN     Today I personally reviewed pertinent medications, lines/drains/airways, imaging, cardiology, lab results, microbiology results, notably:    Diet  Diet NPO  Diet NPO    Assessment/Plan:    Neuro: 7.24 imaging with hypoxic ischemic event inv BL lentiform nuclei, thalami, caudate nuclei, paramedian and occipital cortices.  EEG without sz    Lungs: intubated  PEEP 5, FiO2 40%  Ph 7.4 pCO2 33 pO2 172 bicarb 23.8    CVS: /100 MAP > 65    GI: failed VIVI  TF at goal    Endocrine: on IV levothyroxine  Hydrocortisone Q8    Hematology: counts stable    Antibx:zosyn continue        Assessment/Plan:     Neuro   Status epilepticus    - EEG- no sz per epilepsy  - Propofol gtt        Pulmonary   * Cardiac arrest due to respiratory disorder    - s/p cardiac arrest  - Cooling protocol goal temp 36  - EEG   - propofol infusion  - CTA chest- 1. Small  bilateral pleural effusions with suspected mild pulmonary edema.  2. Right lower lobe 7 mm pulmonary nodule.  For a solid nodule 6-8 mm, Fleischner Society 2017 guidelines recommend follow up with non-contrast chest CT at 6-12 months and 18-24 months after discovery.  3. Small volume perihepatic ascites.        Endocrine   Type 2 diabetes mellitus with neurologic complication, without long-term current use of insulin      - SSI with accu checks             Assessment/Plan:     Neuro: 7.24 imaging with hypoxic ischemic event inv BL lentiform nuclei, thalami, caudate nuclei, paramedian and occipital cortices.  EEG without sz     Lungs: intubated  PEEP 5, FiO2 40%  Ph 7.4 pCO2 33 pO2 172 bicarb 23.8     CVS: /100 MAP > 65     GI: failed VIVI  TF started today     Endocrine: on IV levothyroxine  Hydrocortisone Q8  SSI     Hematology: counts stable     Antibx:zosyn continue      Prophylaxis:  Venous Thromboembolism: mechanical  Stress Ulcer: H2B  Ventilator Pneumonia: no     Activity Orders          None        Full Code    Rosi Irving MD  Neurocritical Care  [facility]

## 2018-07-25 NOTE — PLAN OF CARE
Problem: Patient Care Overview  Goal: Plan of Care Review  Outcome: Ongoing (interventions implemented as appropriate)  POC reviewed with pt at 0500. Pt is intubated and unable to verbalize understanding. NCC notified of pt's DBP > 100, labet given per order. Will continue to monitor. See flowsheets for full assessment and VS info

## 2018-07-25 NOTE — PROGRESS NOTES
[facility]  Cardiology  Progress Note    Patient Name: Anum Quick  MRN: [id number]  Admission Date: 7/22/2018  Hospital Length of Stay: 3 days  Code Status: Full Code   Attending Physician: Andre Owens MD   Primary Care Physician: Raghav Valdez MD  Expected Discharge Date:   Principal Problem:Cardiac arrest due to respiratory disorder    Subjective:     Hospital Course:   The patient was seen today still intubated and MV on AC mode BP: 154/102 MAP: 119 off pressors. Echocardiogram done showed:  1 - Mild biatrial enlargement.     2 - Severely depressed left ventricular systolic function (EF 15%).     3 - Right ventricular enlargement with mildly depressed systolic function.     4 - Severe aortic valve stenosis (JAMES 0.61 cm2, AVAi 0.32 cm2/m2, peak aortic jet velocity 4.4 m/s,MG 47 mmHg, ).     5 - Pulmonary hypertension. The estimated PA systolic pressure is 54 mmHg.     6 - Mild tricuspid regurgitation.     7 - Trivial pericardial effusion.     8 - Pleural effusion.     Chest xray: Pulmonary edema    Interval HPI:   Overnight events:  Critically ill.  Intubated, sedated.    On LT4 50mg IV.     PMH, PSH, FH, SH updated and reviewed     ROS:  Review of Systems   Unable to perform ROS: Intubated       Labs Reviewed and Include:  BASELINE Creatinine:   Lab Results   Component Value Date    WBC 8.03 07/25/2018    HGB 10.8 (L) 07/25/2018    HCT 33.8 (L) 07/25/2018    MCV 88 07/25/2018     07/25/2018       Recent Labs  Lab 07/25/18  0329 07/25/18  0812   *  117*  117* 128*  128*   CALCIUM 9.6  9.6 9.6   ALBUMIN 3.1*  --    PROT 6.5  --      140 140   K 3.6  3.6 3.5   CO2 19*  19* 23     102 102   BUN 41*  41* 44*   CREATININE 1.3  1.3 1.3   ALKPHOS 173*  --    ALT 25  --    AST 61*  --    BILITOT 2.8*  --      Lab Results   Component Value Date    HGBA1C 6.0 (H) 07/23/2018       Nutritional status:   Body mass index is 28.11 kg/m².  Lab Results    Component Value Date    ALBUMIN 3.1 (L) 07/25/2018    ALBUMIN 2.9 (L) 07/24/2018    ALBUMIN 2.5 (L) 07/23/2018     No results found for: PREALBUMIN    Estimated Creatinine Clearance: 47 mL/min (based on SCr of 1.3 mg/dL).    POCT Glucose results:    Current Insulin Regimen:         PHYSICAL EXAMINATION:  Vitals:    07/25/18 1500   BP:    Pulse: 81   Resp: (!) 33   Temp: 98.8 °F (37.1 °C)     Body mass index is 28.11 kg/m².    Physical Exam   Constitutional: She appears well-developed.   Critically ill  Intubated, sedated   HENT:   Right Ear: External ear normal.   Left Ear: External ear normal.   Nose: Nose normal.   Neck: No tracheal deviation present.   Unable to assess thyroid given intubation   Cardiovascular: Normal rate.    No murmur heard.  Pulmonary/Chest: Effort normal.   Ventilator breath sounds   Abdominal: Soft. She exhibits no distension. No hernia.   Musculoskeletal: She exhibits no edema.   Neurological: She has normal reflexes.   Sedated  Unable to assess cranial nerves   Skin: No rash noted.   No nodules   Psychiatric:   Unable to assess mood/affect, judgment   Vitals reviewed.      Review of Systems   Unable to perform ROS: Intubated       Physical Exam   Constitutional: She appears well-developed.   Critically ill  Intubated, sedated   HENT:   Right Ear: External ear normal.   Left Ear: External ear normal.   Nose: Nose normal.   Neck: No tracheal deviation present.   Unable to assess thyroid given intubation   Cardiovascular: Normal rate.    No murmur heard.  Pulmonary/Chest: Effort normal.   Ventilator breath sounds   Abdominal: Soft. She exhibits no distension. No hernia.   Musculoskeletal: She exhibits no edema.   Neurological: She has normal reflexes.   Sedated  Unable to assess cranial nerves   Skin: No rash noted.   No nodules   Psychiatric:   Unable to assess mood/affect, judgment   Vitals reviewed.        Assessment and Plan:     Brief HPI: Reason for consult: s/p arrest, CPR 15 min. EF  15%, AS, Pul htn    Anum Quick 61 yo female, PMH of severe AS, hypothyroid, urinary retention, chronic sys/carney HF, A fib, SHERI, DM2 who presents to [facility] s/p cardiac arrest. Pt developed shortness of breath on night PTA and it progressed until the day of admit when  brought her to ED.  He reports she was not compliant with her home O2 but is compliant with meds. In ED, pt was found to be hypoxic and soon after went into asystole. CPR performed for 15 min with multiple rounds of epi with ROSC. Pt intubated, started on broad spec abx, heparin gtt, propofol gtt and keppra load. She is being admitted to [facility] for a higher level of care    Severe aortic stenosis    The patient was seen today still intubated and MV on AC mode BP: 145/102 MAP: 119 off pressors. Echocardiogram done showed:  1 - Mild biatrial enlargement.     2 - Severely depressed left ventricular systolic function (EF 15%).     3 - Right ventricular enlargement with mildly depressed systolic function.     4 - Severe aortic valve stenosis (JAMES 0.61 cm2, AVAi 0.32 cm2/m2, peak aortic jet velocity 4.4 m/s,MG 47 mmHg, ).     5 - Pulmonary hypertension. The estimated PA systolic pressure is 54 mmHg.     6 - Mild tricuspid regurgitation.     7 - Trivial pericardial effusion.     8 - Pleural effusion.     Chest xray: Pulmonary edema    Impression: The patient has end stage severe AS and hypoxic brain injury less likely to respond to medical therapy     Recommend:  Start IV Furosemide 20 mg Q 6 hours  Monitor I/O   Monitor electrolytes and replace as needed  Repeat CXR and BNP            VTE Risk Mitigation     None          Maricruz Gilliam MD  Cardiology  [facility]

## 2018-07-25 NOTE — PROCEDURES
"Anum Quick is a 62 y.o. female patient.    Temp: 97.3 °F (36.3 °C) (18)  Pulse: 81 (18)  Resp: 16 (18)  BP: 137/89 (18)  SpO2: 100 % (18)  Weight: 79 kg (174 lb 2.6 oz) (18)  Height: 5' 6" (167.6 cm) (18)       Arterial Line  Date/Time: 2018 3:31 PM  Performed by: JOEL RODRIGUEZ  Authorized by: JOEL RODRIGUEZ   Consent Done: Yes  Consent: Verbal consent obtained. Written consent not obtained.  Risks and benefits: risks, benefits and alternatives were discussed  Consent given by: patient  Required items: required blood products, implants, devices, and special equipment available  Patient identity confirmed: , MRN, name and verbally with patient  Time out: Immediately prior to procedure a "time out" was called to verify the correct patient, procedure, equipment, support staff and site/side marked as required.  Preparation: Patient was prepped and draped in the usual sterile fashion.  Indications: multiple ABGs, respiratory failure and hemodynamic monitoring  Location: left axillary.  Needle gauge: 20  Number of attempts: 3  Complications: No  Specimens: No  Implants: No  Post-procedure: dressing applied  Post-procedure CMS: normal and unchanged  Patient tolerance: Patient tolerated the procedure well with no immediate complications          Joel Rodriguez  2018    "

## 2018-07-25 NOTE — SUBJECTIVE & OBJECTIVE
Interval History:  Pupillary, cough reflex, no corneal. Does not withdraw to pain      Review of Systems   Unable to obtain a complete ROS due to level of consciousness.  Objective:     Vitals:  Temp: 98.6 °F (37 °C)  Pulse: 74  Rhythm: normal sinus rhythm  BP: (!) 145/102  MAP (mmHg): 119  CI (L/min/m2): 1.8 L/min/m2  SVI: 21  SVV: 11 %  Resp: 16  SpO2: 100 %  Oxygen Concentration (%): 40  O2 Device (Oxygen Therapy): ventilator  Vent Mode: SIMV  Set Rate: 16 bmp  Vt Set: 450 mL  Pressure Support: 14 cmH20  PEEP/CPAP: 5 cmH20  Peak Airway Pressure: 34 cmH2O  Mean Airway Pressure: 10 cmH20  Plateau Pressure: 0 cmH20    Temp  Min: 97.3 °F (36.3 °C)  Max: 99.3 °F (37.4 °C)  Pulse  Min: 69  Max: 97  BP  Min: 114/87  Max: 159/95  MAP (mmHg)  Min: 95  Max: 130  CI (L/min/m2)  Min: 1.3 L/min/m2  Max: 14.9 L/min/m2  SVI  Min: 14  Max: 22  SVV  Min: 8 %  Max: 19 %  Resp  Min: 16  Max: 36  SpO2  Min: 97 %  Max: 100 %  Oxygen Concentration (%)  Min: 40  Max: 50    07/24 0701 - 07/25 0700  In: 1305 [I.V.:130]  Out: 690 [Urine:690]   Unmeasured Output  Stool Occurrence: 0       Physical Exam   Constitutional: She appears well-developed.   HENT:   Head: Normocephalic.   Eyes:   Pupils react to light BL   Cardiovascular: Normal rate.    Pulmonary/Chest:   intubated   Abdominal: Soft.     Unable to test orientation, language, memory, judgment, insight, fund of knowledge, hearing, shoulder shrug, tongue protrusion, coordination, gait due to level of consciousness.    Medications:  Continuous Scheduled  aspirin 81 mg Daily   chlorhexidine 15 mL BID   famotidine 20 mg BID   hydrocortisone sodium succinate 100 mg Q8H   levothyroxine 65 mcg Daily   piperacillin-tazobactam 4.5 g in sodium chloride 0.9% 100 mL IVPB (ready to mix system) 4.5 g Q8H   PRN  sodium chloride  Q24H PRN   acetaminophen 650 mg Q6H PRN   dextrose 50% 12.5 g PRN   fentaNYL 50 mcg Q2H PRN   glucagon (human recombinant) 1 mg PRN   insulin aspart U-100 0-5 Units Q4H  PRN     Today I personally reviewed pertinent medications, lines/drains/airways, imaging, cardiology, lab results, microbiology results, notably:    Diet  Diet NPO  Diet NPO    Assessment/Plan:    Neuro: 7.24 imaging with hypoxic ischemic event inv BL lentiform nuclei, thalami, caudate nuclei, paramedian and occipital cortices.  EEG without sz    Lungs: intubated  PEEP 5, FiO2 40%  Ph 7.4 pCO2 33 pO2 172 bicarb 23.8    CVS: /100 MAP > 65    GI: failed VIVI  TF at goal    Endocrine: on IV levothyroxine  Hydrocortisone Q8    Hematology: counts stable    Antibx:zosyn continue

## 2018-07-25 NOTE — CONSULTS
"  [facility]  Adult Nutrition  Consult Note    SUMMARY     Recommendations    Recommendation/Intervention:   Recommend increasing TF goal rate to better meet pt's needs.   Glucerna 1.5 @ 45mL/hr.   - Provides 1620kcals, 89g protein and 820mL free water.   - Hold for residuals >500mL.     RD to monitor.    Goals: Pt to receive >85% EEN and EPN  Nutrition Goal Status: new  Communication of RD Recs: reviewed with RN    Reason for Assessment    Reason for Assessment: consult  Diagnosis: other (see comments) (cardiac arrest)  Relevant Medical History: hypothyroidism, HF, a fib, T2DM  Interdisciplinary Rounds: attended  General Information Comments: Pt intubated.  at bedside. TF started, advancing to goal. NFPE not appropriate at this time.  Nutrition Discharge Planning: unable to determine at this time    Nutrition Risk Screen    Nutrition Risk Screen: no indicators present    Nutrition/Diet History    Do you have any cultural, spiritual, Mandaeism conflicts, given your current situation?: none  Factors Affecting Nutritional Intake: NPO, on mechanical ventilation    Anthropometrics    Temp: 98.2 °F (36.8 °C)  Height Method: Measured  Height: 5' 6" (167.6 cm)  Height (inches): 66 in  Weight Method: Bed Scale  Weight: 79 kg (174 lb 2.6 oz)  Weight (lb): 174.17 lb  Ideal Body Weight (IBW), Female: 130 lb  % Ideal Body Weight, Female (lb): 133.98 lb  BMI (Calculated): 28.2  BMI Grade: 25 - 29.9 - overweight       Lab/Procedures/Meds    Pertinent Labs Reviewed: reviewed  Pertinent Labs Comments: HgbA1c 6.0, POCT Glu 104-222, Ph 4.7  Pertinent Medications Reviewed: reviewed  Pertinent Medications Comments: versed, insulin    Physical Findings/Assessment    Overall Physical Appearance: on ventilator support, loss of muscle mass, advanced age, generalized wasting, edematous  Tubes: orogastric tube  Skin: edema, skin tear    Estimated/Assessed Needs    Weight Used For Calorie Calculations: 79 kg (174 lb " 2.6 oz)  Energy Calorie Requirements (kcal): 1520  Energy Need Method: [place]  Protein Requirements: 95-119g (1.2-1.5g/kg)  Weight Used For Protein Calculations: 79 kg (174 lb 2.6 oz)  Fluid Requirements (mL): 1mL/kcal or per MD     RDA Method (mL): 1520  CHO Requirement: 50% of total kcals      Nutrition Prescription Ordered    Current Diet Order: NPO  Nutrition Order Comments: TF at 10mL/hr  Current Nutrition Support Formula Ordered: Glucerna 1.5  Current Nutrition Support Rate Ordered: 40 (ml)  Current Nutrition Support Frequency Ordered: mL/hr    Evaluation of Received Nutrient/Fluid Intake    Enteral Calories (kcal): 1440  Enteral Protein (gm): 79  Enteral (Free Water) Fluid (mL): 729    % Kcal Needs: 95  % Protein Needs: 83    I/O: +I/O, + UOP, no BM documented    Energy Calories Required: meeting needs  Protein Required: not meeting needs  Fluid Required: other (see comments) (per MD)    Tolerance: tolerating    % Intake of Estimated Energy Needs: 75 - 100 %  % Meal Intake: NPO    Nutrition Risk    Level of Risk/Frequency of Follow-up:  (f/u 2x/week)     Assessment and Plan    Nutrition Problem  Inadequate protein intake    Related to (etiology):   TF provision     Signs and Symptoms (as evidenced by):   Pt receiving <85% EPN via TF.     Nutrition Diagnosis Status:   New           Monitor and Evaluation    Food and Nutrient Intake: enteral nutrition intake  Food and Nutrient Adminstration: enteral and parenteral nutrition administration  Anthropometric Measurements: weight, weight change, body mass index  Biochemical Data, Medical Tests and Procedures: electrolyte and renal panel, gastrointestinal profile, glucose/endocrine profile, inflammatory profile, lipid profile  Nutrition-Focused Physical Findings: overall appearance     Nutrition Follow-Up    RD Follow-up?: Yes

## 2018-07-25 NOTE — PLAN OF CARE
Problem: Patient Care Overview  Goal: Plan of Care Review  Outcome: Ongoing (interventions implemented as appropriate)  Nutrition assessment completed. Please see RD note for details.    Recommendation/Intervention:   Recommend increasing TF goal rate to better meet pt's needs.   Glucerna 1.5 @ 45mL/hr.   - Provides 1620kcals, 89g protein and 820mL free water.   - Hold for residuals >500mL.     RD to monitor.    Goals: Pt to receive >85% EEN and EPN  Nutrition Goal Status: new  Communication of RD Recs: reviewed with RN

## 2018-07-25 NOTE — PHYSICIAN QUERY
PT Name: Anum Quick  MR #: [id number]    Physician Query Form - CardioPulmonary Clarification      CDS/: Maxwell Ragsdale               Contact information: [phone]    This form is a permanent document in the medical record.    Query Date: July 25, 2018    By submitting this query, we are merely seeking further clarification of documentation. Please utilize your independent clinical judgment when addressing the question(s) below.    The Medical record contains the following:   Indicators   Supporting Clinical Findings Location in Medical Record   x Pulmonary Hypertension documented Active Hospital Problems     Diagnosis    *Cardiac arrest due to respiratory disorder    Alteration in skin integrity    Preventive measure    Cardiac LV ejection fraction <20%    Pulmonary hypertension    Status epilepticus    Acquired hypothyroidism    HARISH (acute kidney injury)    Right foot ulcer    Acute on chronic systolic congestive heart failure    Chronic anticoagulation - on rivaroxaban       PAF      Neuro CC Progress note 7/23 (Sab)   x Acute/Chronic Illness Interval History/Significant Events:   S/p cardiac arrest   ROSC 15 minutes  GCS: 7 poa  Induced hypothermia  Malignant hypertension requiring acute intensive management  Sirs  Acidosis; present on admission  Elevated inr;poa  Lactate high Neuro CC Progress note 7/23 (Sab)   x Echo and/or Heart Cath Findings CONCLUSIONS     1 - Mild biatrial enlargement.     2 - Severely depressed left ventricular systolic function (EF 15%).     3 - Right ventricular enlargement with mildly depressed systolic function.     4 - Severe aortic valve stenosis (JAMES 0.61 cm2, AVAi 0.32 cm2/m2, peak aortic jet velocity 4.4 m/s,MG 47 mmHg, ).     5 - Pulmonary hypertension. The estimated PA systolic pressure is 54 mmHg.     6 - Mild tricuspid regurgitation.     7 - Trivial pericardial effusion.     8 - Pleural effusion.  Echo 7/23   x BiPAP/Intubation/Supplemental O2   Pt developed shortness of breath on night PTA and it progressed until the day of admit when  brought her to ED.  He reports she was not compliant with her home O2 but is compliant with meds. In ED, pt was found to be hypoxic and soon after went into asystole. CPR performed for 15 min with multiple rounds of epi with ROSC. Pt intubated, started on broad spec abx, heparin gtt, propofol gtt and keppra load. She is being admitted to [facility] for a higher level of care.  Progress note Neuro CC (Maria Fernanda/Sab) 7/25   x SOB, HA, Fatigue, Dizziness, LE Edema, Cyanosis, Chest Pain, Respiratory Distress, Hypoxia, etc. CC: Cardiac arrest due to respiratory disorder Neuro CC Progress note 7/23 (Sab)    Treatment         Medication      Other     Provider, please specify the type of pulmonary hypertension:  [   ]  Group 1:  Pulmonary Arterial Hypertension - includes Primary, Idiopathic, Inheritable, and Secondary (due to drugs, toxins, congenital heart diease, HIV infection, etc.)    [  x ]  Group 2:  Pulmonary Hypertension due to Left Heart Disease, including left heart failure and/or left heart valve disease    [   ]  Group 3:  Pulmonary Hypertension due to Lung Disease    [   ]  Group 4:  Pulmonary Hypertension due to Obstruction of the Pulmonary Vessels caused by Chronic Thromboemboli, Tumor, or Foreign Bodies    [   ]  Group 5:  Pumonary Hypertension due to other, multifactorial, or unclear mechanisms    [   ]  Pulmonary Hypertension, unspecified    [   ]  Other Cardiopulmonary Condition (please specify):  _____________________________________    [   ]  Clinically Undetermined    Please document in your progress notes daily for the duration of treatment, until resolved, and include in your discharge summary.

## 2018-07-25 NOTE — SUBJECTIVE & OBJECTIVE
Interval History:  >4 elements OR status of 3 inpatient conditions  MRI brain with diffusion restriction involving the bilateral lentiform nuclei, thalami, caudate nuclei, paramedian occipital cortices, and perirolandic cortices. EEG reported as alpha coma.No ictal activity. Severe suppression of brain activity.  CXR: L > R pleural effusions.      Review of Systems   Unable to perform ROS: Patient nonverbal     2 systems OR Unable to obtain a complete ROS due to level of consciousness.  Objective:     Vitals:  Temp: 98.6 °F (37 °C)  Pulse: 74  Rhythm: normal sinus rhythm  BP: (!) 145/102  MAP (mmHg): 119  CI (L/min/m2): 1.8 L/min/m2  SVI: 21  SVV: 11 %  Resp: 16  SpO2: 100 %  Oxygen Concentration (%): 40  O2 Device (Oxygen Therapy): ventilator  Vent Mode: SIMV  Set Rate: 16 bmp  Vt Set: 450 mL  Pressure Support: 14 cmH20  PEEP/CPAP: 5 cmH20  Peak Airway Pressure: 34 cmH2O  Mean Airway Pressure: 10 cmH20  Plateau Pressure: 0 cmH20    Temp  Min: 97.3 °F (36.3 °C)  Max: 99.3 °F (37.4 °C)  Pulse  Min: 69  Max: 97  BP  Min: 114/87  Max: 159/95  MAP (mmHg)  Min: 95  Max: 130  CI (L/min/m2)  Min: 1.3 L/min/m2  Max: 14.9 L/min/m2  SVI  Min: 14  Max: 22  SVV  Min: 8 %  Max: 19 %  Resp  Min: 16  Max: 25  SpO2  Min: 97 %  Max: 100 %  Oxygen Concentration (%)  Min: 40  Max: 50    07/24 0701 - 07/25 0700  In: 1305 [I.V.:130]  Out: 690 [Urine:690]   Unmeasured Output  Stool Occurrence: 0       Physical Exam  Unable to test orientation, language, memory, judgment, insight, fund of knowledge, hearing, shoulder shrug, tongue protrusion, coordination, gait due to level of consciousness.     General   HEENT: ETT  Chest Heart RRR / Lungs Clear to auscultation  Abdomen: Soft nontender + BS  Extremities: OK distal pulses.  Skin: UK  Neurological Exam:  MS; Coma. Open her eyes intermnittently but no tracking.  CN: II-XII Pupils are reactive and symmetric, + Dolls.  Motor: LUE   0/5 / RUE  0/5  Tone increased. Extensor posturing.              LLE   0/5 /  RLE  0/5  Tone increased  Extensor posturing.  Sensory: LT/PP/T/ Vibration Not able to assess.                 Complex sensory modalities: not tested  DTR:  normal throughout.  Coordination /Fine motor: Not able to assess.   Gait: Not tested.  Meningeal signs: Absent    Medications:  Continuous Scheduled  aspirin 81 mg Daily   chlorhexidine 15 mL BID   [START ON 7/26/2018] famotidine 20 mg Daily   levothyroxine 65 mcg Daily   piperacillin-tazobactam 4.5 g in sodium chloride 0.9% 100 mL IVPB (ready to mix system) 4.5 g Q8H   sodium chloride 0.9% 500 mL Once   PRN  acetaminophen 650 mg Q6H PRN   dextrose 50% 12.5 g PRN   glucagon (human recombinant) 1 mg PRN   insulin aspart U-100 0-5 Units Q4H PRN     Today I personally reviewed pertinent medications, lines/drains/airways, imaging, cardiology, lab results, microbiology results, notably:    Diet  Diet NPO  CMP:      Recent Labs  Lab 07/29/18 0239   CALCIUM 9.1   ALBUMIN 2.7*   PROT 5.7*   *   K 4.1   CO2 19*   *   BUN 60*   CREATININE 1.3   ALKPHOS 482*   *   *   BILITOT 3.0*      BMP:      Recent Labs  Lab 07/29/18 0239   *   K 4.1   *   CO2 19*   BUN 60*   CREATININE 1.3   CALCIUM 9.1      CBC:      Recent Labs  Lab 07/29/18 0239   WBC 7.54   RBC 3.48*   HGB 9.6*   HCT 31.4*      MCV 90   MCH 27.6   MCHC 30.6*      Lipid Panel:      Recent Labs  Lab 07/23/18 0418   CHOL 78*   LDLCALC 30.4*   HDL 35*   TRIG 63      Coagulation:      Recent Labs  Lab 07/29/18 0239 07/29/18  0834   INR  --  2.3*   APTT 24.4  --       Platelet Aggregation Study: No results for input(s): PLTAGG, PLTAGINTERP, PLTAGREGLACO, ADPPLTAGGREG in the last 168 hours.  Hgb A1C:      Recent Labs  Lab 07/23/18 0418   HGBA1C 6.0*      TSH:      Recent Labs  Lab 07/22/18  1126   TSH 59.103*         Recent Labs  Lab 07/29/18  0728   PH 7.466*   PCO2 29.7*   PO2 155*   HCO3 21.5*   POCSATURATED 100   BE -2

## 2018-07-25 NOTE — PROGRESS NOTES
Instructed per TERE Petersen to use Cardene gtt to keep SBP <180, DBP <105 while maintaining MAP >65. Will monitor closely

## 2018-07-25 NOTE — PROGRESS NOTES
Notified TERE Petersen of pt DBP remaining 105-120's despite labetolol 5 mg doses x 2 being administered already today. Was instructed to administer 10mg of Labetolol at this time. Pt SBP remains >120. Was informed per MD Rosi that pt DBP goal is to keep it <110. Will continue to monitor.

## 2018-07-25 NOTE — PHYSICIAN QUERY
PT Name: Anum Quick  MR #: [id number]    Physician Query Form - Nutrition Clarification     CDS/: Maxwell Ragsdale               Contact information: [phone]    This form is a permanent document in the medical record.     Query Date: July 25, 2018    By submitting this query, we are merely seeking further clarification of documentation.. Please utilize your independent clinical judgment when addressing the question(s) below.    The Medical record contains the following:   Indicators  Supporting Clinical Findings Location in Medical Record   x % of Estimated Energy Intake over a time frame from p.o., TF, or TPN Recommend increasing TF goal rate to better meet pt's needs.   Glucerna 1.5 @ 45mL/hr.   - Provides 1620kcals, 89g protein and 820mL free water.   - Hold for residuals >500mL.    Factors Affecting Nutritional Intake: NPO, on mechanical ventilation    Nutrition Problem  Inadequate protein intake     Nutrition Consult 7/25    Weight Status over a time frame     x Subcutaneous Fat and/or Muscle Loss on ventilator support, loss of muscle mass, advanced age, generalized wasting   Nutrition Consult 7/25   x Fluid Accumulation or Edema Edematous   Nutrition Consult 7/25    Reduced  Strength     x Wt / BMI / Usual Body Weight Weight (lb): 174.17 lb  Ideal Body Weight (IBW), Female: 130 lb  % Ideal Body Weight, Female (lb): 133.98 lb  BMI (Calculated): 28.2  BMI Grade: 25 - 29.9 - overweight Nutrition Consult 7/25   x Delayed Wound Healing / Failure to Thrive Skin: edema, skin tear   Nutrition Consult 7/25   x Acute or Chronic Illness Diagnosis: other (see comments) (cardiac arrest)  Relevant Medical History: hypothyroidism, HF, a fib, T2DM Nutrition Consult 7/25    Medication      Treatment      Other       AND / ASPEN Clinical Characteristics (October 2011)  A minimum of two characteristics is recommended for diagnosing either moderate or severe malnutrition   Mild Malnutrition Moderate  Malnutrition Severe Malnutrition   Energy Intake from p.o., TF or TPN. < 75% intake of estimated energy needs for less than 7 days < 75% intake of estimated energy needs for greater than 7 days < 50% intake of estimated energy needs for > 5 days   Weight Loss 1-2% in 1 month  5% in 3 months  7.5% in 6 months  10% in 1 year 1-2 % in 1 week  5% in 1 month  7.5% in 3 months  10% in 6 months  20% in 1 year > 2% in 1 week  > 5% in 1 month  > 7.5% in 3 months  > 10% in 6 months  > 20% in 1 year   Physical Findings     None *Mild subcutaneous fat and/or muscle loss  *Mild fluid accumulation  *Stage II decubitus  *Surgical wound or non-healing wound *Mod/severe subcutaneous fat and/or muscle loss  *Mod/severe fluid accumulation  *Stage III or IV decubitus  *Non-healing surgical wound     Provider, please specify diagnosis or diagnoses associated with above clinical findings.    [ ] Mild Protein-Calorie Malnutrition  [x ] Moderate Protein-Calorie Malnutrition  [ ] Other Nutritional Diagnosis (please specify): ____________________________________  [ ] Other: ________________________________  [ ] Clinically Undetermined    Please document in your progress notes daily for the duration of treatment until resolved and include in your discharge summary.

## 2018-07-25 NOTE — PROCEDURES
DATE OF PROCEDURE:  07/24/2018    EEG #:  [id number].    REQUESTING PHYSICIAN:  Dr. Steiner.    ICU EEG/VIDEO MONITORING REPORT     METHODOLOGY:  Electroencephalographic (EEG) is recorded with electrodes placed   according to the International 10-20 placement system.  Thirty two (32) channels   of digital signal (sampling rate of 512/sec), including T1 and T2, were   simultaneously recorded from the scalp and may include EKG, EMG, and/or eye   monitors.  Recording band pass was 0.1 to 512 Hz.  Digital video recording of   the patient is simultaneously recorded with the EEG.  The patient is instructed   to report clinical symptoms which may occur during the recording session.  EEG   and video recording are stored and archived in digital format.  Activation   procedures, which include photic stimulation, hyperventilation and instructing   patients to perform simple tasks, are done in selected patients  The EEG is displayed on a monitor screen and can be reviewed using different   montages.  Computer-assisted analysis is employed to detect spike and   electrographic seizure activity.   The entire record is submitted for computer   analysis.  The entire recording is visually reviewed, and the times identified   by computer analysis as being spikes or seizures are reviewed again.    Compressed spectral analysis (CSA) is also performed on the activity recorded   from each individual channel.  This is displayed as a power display of   frequencies from 0 to 30 Hz over time.   The CSA is reviewed looking for   asymmetries in power between homologous areas of the scalp, then compared with   the original EEG recording.    [employer] software was also utilized in the review of this study.  This software   suite analyzes the EEG recording in multiple domains.  Coherence and rhythmicity   are computed to identify EEG sections which may contain organized seizures.    Each channel undergoes analysis to detect the presence of spike  and sharp waves   which have special and morphological characteristics of epileptic activity.  The   routine EEG recording is converted from special into frequency domain.  This is   then displayed comparing homologous areas to identify areas of significant   asymmetry.  Algorithm to identify non-cortically generated artifact is used to   separate artifact from the EEG.      RECORDING TIMES:  Start on 07/24/2018 at 17:56.  End on 07/25/2018 at 07:00.    A total of 13 hours and no minutes of EEG recording was obtained.    EEG FINDINGS:  Recording was obtained at the patient's bedside in the ICU.  The   patient was unresponsive and intubated on a respirator.  Background was moderate   amplitude mixture of theta and some delta frequencies.  It was punctuated by   periodic rhythmic epileptic discharges, which had a generalized distribution at   highest amplitude in the anterior head regions.  This pattern persisted   throughout.  There are no clinical behavioral changes noted.  The patient   remained comatose throughout.    IMPRESSION:  Markedly abnormal EEG with GPEDs noted throughout indicative of   presence of nonconvulsive status epilepticus.  This is a change from previous   recordings.      RR/HN  dd: 07/25/2018 09:58:02 (CDT)  td: 07/25/2018 10:45:11 (CDT)  Doc ID   #[id number]  Job ID #[id number]    CC:

## 2018-07-25 NOTE — PROGRESS NOTES
TERE Petersen and MD Rosi of NCC team advised if DBP>115 sustained >15 minutes to contact them for Rx of Cardene.

## 2018-07-25 NOTE — SUBJECTIVE & OBJECTIVE
Interval HPI:   Overnight events:  Critically ill.  Intubated, sedated.    On LT4 50mg IV.     PMH, PSH, FH, SH updated and reviewed     ROS:  Review of Systems   Unable to perform ROS: Intubated       Labs Reviewed and Include:  BASELINE Creatinine:   Lab Results   Component Value Date    WBC 8.03 07/25/2018    HGB 10.8 (L) 07/25/2018    HCT 33.8 (L) 07/25/2018    MCV 88 07/25/2018     07/25/2018       Recent Labs  Lab 07/25/18  0329 07/25/18  0812   *  117*  117* 128*  128*   CALCIUM 9.6  9.6 9.6   ALBUMIN 3.1*  --    PROT 6.5  --      140 140   K 3.6  3.6 3.5   CO2 19*  19* 23     102 102   BUN 41*  41* 44*   CREATININE 1.3  1.3 1.3   ALKPHOS 173*  --    ALT 25  --    AST 61*  --    BILITOT 2.8*  --      Lab Results   Component Value Date    HGBA1C 6.0 (H) 07/23/2018       Nutritional status:   Body mass index is 28.11 kg/m².  Lab Results   Component Value Date    ALBUMIN 3.1 (L) 07/25/2018    ALBUMIN 2.9 (L) 07/24/2018    ALBUMIN 2.5 (L) 07/23/2018     No results found for: PREALBUMIN    Estimated Creatinine Clearance: 47 mL/min (based on SCr of 1.3 mg/dL).    POCT Glucose results:    Current Insulin Regimen:         PHYSICAL EXAMINATION:  Vitals:    07/25/18 1500   BP:    Pulse: 81   Resp: (!) 33   Temp: 98.8 °F (37.1 °C)     Body mass index is 28.11 kg/m².    Physical Exam   Constitutional: She appears well-developed.   Critically ill  Intubated, sedated   HENT:   Right Ear: External ear normal.   Left Ear: External ear normal.   Nose: Nose normal.   Neck: No tracheal deviation present.   Unable to assess thyroid given intubation   Cardiovascular: Normal rate.    No murmur heard.  Pulmonary/Chest: Effort normal.   Ventilator breath sounds   Abdominal: Soft. She exhibits no distension. No hernia.   Musculoskeletal: She exhibits no edema.   Neurological: She has normal reflexes.   Sedated  Unable to assess cranial nerves   Skin: No rash noted.   No nodules   Psychiatric:    Unable to assess mood/affect, judgment   Vitals reviewed.      Review of Systems   Unable to perform ROS: Intubated       Physical Exam   Constitutional: She appears well-developed.   Critically ill  Intubated, sedated   HENT:   Right Ear: External ear normal.   Left Ear: External ear normal.   Nose: Nose normal.   Neck: No tracheal deviation present.   Unable to assess thyroid given intubation   Cardiovascular: Normal rate.    No murmur heard.  Pulmonary/Chest: Effort normal.   Ventilator breath sounds   Abdominal: Soft. She exhibits no distension. No hernia.   Musculoskeletal: She exhibits no edema.   Neurological: She has normal reflexes.   Sedated  Unable to assess cranial nerves   Skin: No rash noted.   No nodules   Psychiatric:   Unable to assess mood/affect, judgment   Vitals reviewed.

## 2018-07-25 NOTE — CHAPLAIN
" Daniel has been  to pt. for 20 years. This was his 6th wife!!! He has "lots"n of children, some local and offering support. The pt has 2 children but they are not supportive due to significant substance abuse issues. Daniel is broken hearted about Laura - she "tamed" him and he lived for her. He is very despondent. Two of his dtrs are coming in today - from [place] and another [place] location.  Daniel has a crucifix with him that he holds and kisses fervently. I did not ask him the significance of it. He was anxious about SOS and I assured him pt had received it.   Daniel enjoyed talking about his life and his wife and we got to laughing about his "old bad self."    "

## 2018-07-25 NOTE — ASSESSMENT & PLAN NOTE
The patient was seen today still intubated and MV on AC mode BP: 145/102 MAP: 119 off pressors. Echocardiogram done showed:  1 - Mild biatrial enlargement.     2 - Severely depressed left ventricular systolic function (EF 15%).     3 - Right ventricular enlargement with mildly depressed systolic function.     4 - Severe aortic valve stenosis (JAMES 0.61 cm2, AVAi 0.32 cm2/m2, peak aortic jet velocity 4.4 m/s,MG 47 mmHg, ).     5 - Pulmonary hypertension. The estimated PA systolic pressure is 54 mmHg.     6 - Mild tricuspid regurgitation.     7 - Trivial pericardial effusion.     8 - Pleural effusion.     Chest xray: Pulmonary edema    Impression: The patient has end stage severe AS and hypoxic brain injury less likely to respond to medical therapy     Recommend:  Start IV Furosemide 20 mg Q 6 hours  Monitor I/O   Monitor electrolytes and replace as needed  Repeat CXR and BNP

## 2018-07-25 NOTE — PROCEDURES
DATE OF PROCEDURE:  07/23/2018    EEG #:  [id number] and [id number].    LOCATION OF SERVICE:  [facility].    ICU EEG/VIDEO MONITORING REPORT     METHODOLOGY:  Electroencephalographic (EEG) is recorded with electrodes placed   according to the International 10-20 placement system.  Thirty two (32) channels   of digital signal (sampling rate of 512/sec), including T1 and T2, were   simultaneously recorded from the scalp and may include EKG, EMG, and/or eye   monitors.  Recording band pass was 0.1 to 512 Hz.  Digital video recording of   the patient is simultaneously recorded with the EEG.  The patient is instructed   to report clinical symptoms which may occur during the recording session.  EEG   and video recording are stored and archived in digital format.  Activation   procedures, which include photic stimulation, hyperventilation and instructing   patients to perform simple tasks, are done in selected patients  The EEG is displayed on a monitor screen and can be reviewed using different   montages.  Computer-assisted analysis is employed to detect spike and   electrographic seizure activity.   The entire record is submitted for computer   analysis.  The entire recording is visually reviewed, and the times identified   by computer analysis as being spikes or seizures are reviewed again.    Compressed spectral analysis (CSA) is also performed on the activity recorded   from each individual channel.  This is displayed as a power display of   frequencies from 0 to 30 Hz over time.   The CSA is reviewed looking for   asymmetries in power between homologous areas of the scalp, then compared with   the original EEG recording.    [employer] software was also utilized in the review of this study.  This software   suite analyzes the EEG recording in multiple domains.  Coherence and rhythmicity   are computed to identify EEG sections which may contain organized seizures.    Each channel undergoes analysis to detect the presence of  spike and sharp waves   which have special and morphological characteristics of epileptic activity.  The   routine EEG recording is converted from special into frequency domain.  This is   then displayed comparing homologous areas to identify areas of significant   asymmetry.  Algorithm to identify non-cortically generated artifact is used to   separate artifact from the EEG.      RECORDING TIMES:  Start on 07/23/2018 at 07:00 and end at 09:06.  Start on 07/23/2018 at 10:13 and end at 18:52.    Start on 07/23/2018 at 20:06.  End on 07/24/2018 at 10:10.    Total of 24 hours of EEG monitoring was obtained.    EEG FINDINGS:  Recording was obtained at the patient's bedside in the ICU.  The   patient was unresponsive, intubated and on a respirator.  The initial pattern   was long periods of suppression punctuated by brief burst of mixed frequencies   consisting of theta, alpha and beta activity.  There was no spike component to   the burst.  As the monitoring continued, the burst became a little more frequent   and at times were lasting 8 to 10 seconds.  The burst pattern did change and   consisted in the middle portion with runs of 8 to 10 Hz alpha, which would slow   into the theta range and would last 4 to 6 seconds.  There was no spike   component at this time.  Near the end of the monitoring, pattern again changed.    The bursts became shorter and the period of suppression became longer.    Subsequently, the interburst period became shorter and the bursts were consisted   of alpha-theta mixture and at the onset of the burst, there was a sharp   component, which by the end of this monitoring recording became clearly a spike   component.    IMPRESSION:  Markedly abnormal EEG with a burst suppression, which initially did   not contain a spike or sharp wave component, but by the end, each of the burst   was preceded by generalized spike indicative of a marked diffuse encephalopathy   and now the clear emergence of an  irritative process.      RR/HN  dd: 07/25/2018 13:16:11 (CDT)  td: 07/25/2018 14:00:03 (CDT)  Doc ID   #[id number]  Job ID #[id number]    CC:

## 2018-07-25 NOTE — PROGRESS NOTES
TERE Petersen notified again of pt L pupil 6mm and brisk and R pupil 4-5mm and brisk. All other neuro assessment remains unchanged.

## 2018-07-25 NOTE — PLAN OF CARE
Problem: Patient Care Overview  Goal: Plan of Care Review  Outcome: Ongoing (interventions implemented as appropriate)  POC reviewed with pt family at 1430. Pt family verbalized understanding. Questions and concerns addressed. Pt remains on continues EEG, intubated and on arctic sun goal rate 37*C. Tube feeds started, goal rate 45. Bath given, linens changed. Oral care provided Q2. Pt turned Q2. Pt started on Cardene 2.5 for sustained DBP >115, discontinued due to hypotension. Pt progressing toward goals. Will continue to monitor. See flowsheets for full assessment and VS info.

## 2018-07-25 NOTE — PHYSICIAN QUERY
PT Name: Anum Quick  MR #: [id number]    Physician Query Form - Respiratory Condition Clarification      CDS/: Maxwell Ragsdale               Contact information: [phone]    This form is a permanent document in the medical record.    Query Date: July 25, 2018    By submitting this query, we are merely seeking further clarification of documentation. Please utilize your independent clinical judgment when addressing the question(s) below.    The Medical record contains the following   Indicators   Supporting Clinical Findings Location in Medical Record   x   SOB, HA, Wheezing, Productive Cough, Use of Accessory Muscles, etc. Pt developed shortness of breath on night PTA and it progressed until the day of admit when  brought her to ED.    Progress Note Neuro CC 7/25 (Maria Fernanda/Sab)   x   Acute/Chronic Illness Anoxic brain injury  Coma GCS;5  arf  Oliguria  Cytotoxic cerebral edema  Critical AS    PMH of severe AS, hypothyroid, urinary retention, chronic sys/carney HF, A fib, SHERI, DM2 who presents to [facility] s/p cardiac arrest.    Progress Note Neuro CC 7/25 (Maria Fernanda/Sab)   x   Radiology Findings FINDINGS:  Large area of consolidation involving the right mid to upper lung zone.  There is also increased parenchymal consolidation in the left lung base when compared to the prior exam.  Small right-sided pleural effusion and small to moderate probable left-sided pleural effusion.  An endotracheal tube is in place.  The tip is located approximately 2 cm above the kita.  Transcutaneous pacing device noted CT Scan 7/22   x   Respiratory Distress or Failure arf Progress Note Neuro CC 7/25 (Maria Fernanda/Sab)      Hypoxia or Hypercapnia        RR         ABGs         O2 sat     x   BiPAP/Intubation   In ED, pt was found to be hypoxic and soon after went into asystole. CPR performed for 15 min with multiple rounds of epi with ROSC. Pt intubated, started on broad spec abx, heparin gtt, propofol gtt and keppra load. She is  being admitted to [facility] for a higher level of care.     Intubation  Date/Time: 7/22/2018 3:08 PM  Performed by: FRIEDA BRADLEY  Authorized by: FRIEDA BRADLEY   Consent Done: Emergent Situation  Indications: respiratory failure    Progress Note Neuro CC 7/25 (Maria Fernanda/Sab)                      ED Note (Angel) 7/22      Supplemental O2     x   Home O2, Oxygen Dependence He reports she was not compliant with her home O2 but is compliant with meds.  Progress Note Neuro CC 7/25 (Maria Fernanda/Sab)      Treatment        Other       Respiratory failure can be acute, chronic or both. It is generally further specificed as hypoxic, hypercapnic or both. Lastly, it is important to identify an etiology, if known or suspected.   References:: https://www.acphospitalist.org/archives/2013/10/coding; htm; http://[employer]/acute-respiratory-failure-know    The clinical guidelines noted below are only system guidelines, and do not replace the providers clinical judgment.    Provider, please specify diagnosis or diagnoses associated with above clinical findings.     [   x ] Acute Respiratory Failure with Hypoxia - ABG pO2 < 60 mmHg or O2 sat of 88% on RA and respiratory symptoms documented  [    ] Other Acute Respiratory Failure    [    ] Acute and (on) Chronic Respiratory Failure with Hypoxia - pO2 >10 mmHg below baseline OR SpO2 < 91% on usual home O2 OR O2 ? 2L/min over baseline home O2   [    ] Other Acute and (on) Chronic Respiratory Failure   [    ] Acute Respiratory Insufficiency - Generally describes less severe respiratory symptoms and measurements (pO2, SpO2, pH, and pCO2) NOT meeting criteria for respiratory failure    [    ] Acute Respiratory Distress - Generally describes less severe respiratory symptoms (tachypnea, in respiratory distress, increased work of breathing, unable to speak in complete sentences, labored breathing, use of accessory muscles, RR> 24, cyanosis, dyspnea, wheezing, stridor, lethargy)  without sufficient measurements (pO2, SpO2, pH, and pCO2) to meet criteria for respiratory failure   [    ] Hypoxia Only  [    ] No Respiratory Failure, Maintained on Vent for Routine Care or Airway Protection -  purposely intubated for airway protection (e.g.: angioedema, stroke, trauma); without meeting the criteria for respiratory failure.   [    ] Other Respiratory Diagnosis (please specify): _________________________________  [    ] Clinically Undetermined    Please document in your progress notes daily for the duration of treatment until resolved and include in your discharge summary.

## 2018-07-25 NOTE — PROGRESS NOTES
Left pupil 6 cm, round, brisk. Right pupil 4 cm, round, brisk. Left corneal reflex intact. Right corneal reflex absent. Weak cough reflex present. Triple flexion present. TERE Petersen of NCC team aware and reported to bedside to examine pt. No new orders given. Will continue to monitor.

## 2018-07-26 NOTE — PROGRESS NOTES
[facility]  Neurocritical Care  Progress Note     Admit Date: 7/22/2018  Service Date: 07/26/2018  Length of Stay: 4     Subjective:      Chief Complaint: Cardiac arrest due to respiratory disorder     History of Present Illness: Anum Quick 63 yo female, PMH of severe AS, hypothyroid, urinary retention, chronic sys/carney HF, A fib, SHERI, DM2 who presents to [facility] s/p cardiac arrest. Pt developed shortness of breath on night PTA and it progressed until the day of admit when  brought her to ED.  He reports she was not compliant with her home O2 but is compliant with meds. In ED, pt was found to be hypoxic and soon after went into asystole. CPR performed for 15 min with multiple rounds of epi with ROSC. Pt intubated, started on broad spec abx, heparin gtt, propofol gtt and keppra load. She is being admitted to [facility] for a higher level of care.      Hospital Course: 7/22: admit NCC, cooling protocol, EEG Diffuse slowing no sz, propofol infusion     7/26: EEG monitoring    Interval History:  pupils dilated BL, reactive BL, UE posturing, LE triple flexion. Weak cough         Review of Systems   Unable to obtain a complete ROS due to level of consciousness.  Objective:      Vitals:  Temp: 98.6 °F (37 °C)  Pulse: 74  Rhythm: normal sinus rhythm  BP: (!) 145/102  MAP (mmHg): 119  CI (L/min/m2): 1.8 L/min/m2  SVI: 21  SVV: 11 %  Resp: 16  SpO2: 100 %  Oxygen Concentration (%): 40  O2 Device (Oxygen Therapy): ventilator  Vent Mode: SIMV  Set Rate: 16 bmp  Vt Set: 450 mL  Pressure Support: 14 cmH20  PEEP/CPAP: 5 cmH20  Peak Airway Pressure: 34 cmH2O  Mean Airway Pressure: 10 cmH20  Plateau Pressure: 0 cmH20     Temp  Min: 97.3 °F (36.3 °C)  Max: 99.3 °F (37.4 °C)  Pulse  Min: 69  Max: 97  BP  Min: 114/87  Max: 159/95  MAP (mmHg)  Min: 95  Max: 130  CI (L/min/m2)  Min: 1.3 L/min/m2  Max: 14.9 L/min/m2  SVI  Min: 14  Max: 22  SVV  Min: 8 %  Max: 19 %  Resp  Min: 16  Max: 36  SpO2  Min: 97 %  Max: 100  %  Oxygen Concentration (%)  Min: 40  Max: 50     07/24 0701 - 07/25 0700  In: 1305 [I.V.:130]  Out: 690 [Urine:690]   Unmeasured Output  Stool Occurrence: 0         Physical Exam   Constitutional: She appears well-developed.   HENT:   Head: Normocephalic.   Eyes:   Pupils react to light BL   Cardiovascular: Normal rate.    Pulmonary/Chest:   intubated   Abdominal: Soft.    Neuro:  pupils dilated BL, reactive BL,  Weak cough    UE posturing and ? spontanous mvmts. Posturing in UE response to pain   LE triple flexion.   Plantar reflex BL  Unable to test orientation, language, memory, judgment, insight, fund of knowledge, hearing, shoulder shrug, tongue protrusion, coordination, gait due to level of consciousness.     Medications:  Continuous Scheduled  aspirin 81 mg Daily   chlorhexidine 15 mL BID   famotidine 20 mg BID   hydrocortisone sodium succinate 100 mg Q8H   levothyroxine 65 mcg Daily   piperacillin-tazobactam 4.5 g in sodium chloride 0.9% 100 mL IVPB (ready to mix system) 4.5 g Q8H   PRN  sodium chloride   Q24H PRN   acetaminophen 650 mg Q6H PRN   dextrose 50% 12.5 g PRN   fentaNYL 50 mcg Q2H PRN   glucagon (human recombinant) 1 mg PRN   insulin aspart U-100 0-5 Units Q4H PRN      Today I personally reviewed pertinent medications, lines/drains/airways, imaging, cardiology, lab results, microbiology results, notably:     Diet  Diet NPO  Diet NPO          Assessment/Plan:      Assessment/Plan:     Neuro: 7.24 imaging with hypoxic ischemic event inv BL lentiform nuclei, thalami, caudate nuclei, paramedian and occipital cortices.  EEG monitoring today.   On ASA 81 mg po qd     Lungs: intubated  PEEP 5, FiO2 40%  Ph 7.4 pCO2 26.3 pO2 133 bicarb 18.1  Cxr with BL pleujral effusions, R midlung consolidation, LL consolidation     CVS: SBP < 180   MAP at goal  Not rqing cardene     GI:  TF at goal     Endocrine: on IV levothyroxine  SSI     Hematology: counts stable   coags stable    ID:afebrile, no  leukocytosis  Urine 7/22: ok  CXR: BL pleural effusions, right midlung consolidation, LL consolidation  resp cx with MSSA  Blood cx neg  7/23 US LE neg for DVT, left baker's cyst  On zosyn     DVT ppx: -  Stress ulcer ppx: famotidine          Activity Orders            None          Full Code     Rosi Irving MD  Neurocritical Care  [facility]

## 2018-07-26 NOTE — PLAN OF CARE
Alex Alfred, wean cardene.  Discharge plan: to be determined when medically appropriate.       07/26/18 1023   Discharge Reassessment   Assessment Type Discharge Planning Reassessment   Provided patient/caregiver education on the expected discharge date and the discharge plan No   Do you have any problems affording any of your prescribed medications? No   Discharge Plan A Other  (TBD)   Patient choice form signed by patient/caregiver No   Can the patient answer the patient profile reliably? No, cognitively impaired   How does the patient rate their overall health at the present time? (roberto)   Describe the patient's ability to walk at the present time. Does not walk or unable to take any steps at all   How often would a person be available to care for the patient? Often   Number of comorbid conditions (as recorded on the chart) Two   During the past month, has the patient often been bothered by feeling down, depressed or hopeless? (roberto)   During the past month, has the patient often been bothered by little interest or pleasure in doing things? (roberto)     Hayley Weiner RN/TIN/LE  [phone]  [facility]

## 2018-07-26 NOTE — PLAN OF CARE
Problem: Patient Care Overview  Goal: Plan of Care Review  Outcome: Ongoing (interventions implemented as appropriate)  POC reviewed with pt at 0500. Pt unable to verbalize understanding. Questions and concerns addressed with family at start of shift. No acute events overnight.  Will continue to monitor. See flowsheets for full assessment and VS info

## 2018-07-26 NOTE — PLAN OF CARE
Problem: Patient Care Overview  Goal: Plan of Care Review  Outcome: Ongoing (interventions implemented as appropriate)  POC reviewed with pt and family at 1400.   Family verbalized understanding.   Questions and concerns addressed.   See flowsheets for full assessment and VS info.     TF @ 45 ml/hr.   500 ml NS bolus given.   NS @ 75 ml/hr.  Bath completed.   Pt turned every 2 hours.   CXR done.  Albumin given.

## 2018-07-26 NOTE — SIGNIFICANT EVENT
Repeat labs show rising  LFT  Renal enzymes  And lactic acid.      Plan:  Fluid bolus given  Follow UO  Follow end organ dysfunction    At very high risk of deterioration from severe aortic stenosis and end organ dysfunction    Total cc time 117 mins

## 2018-07-26 NOTE — PROVIDER PROGRESS NOTES - EMERGENCY DEPT.
Encounter Date: 7/22/2018    ED Physician Progress Notes         07/26/2018 2:19 PM culture is showing enterococcus greater than 100,000. patient is currently admitted to [facility] receiving ABX via IV. LC

## 2018-07-27 NOTE — PLAN OF CARE
Problem: Patient Care Overview  Goal: Plan of Care Review  Outcome: Ongoing (interventions implemented as appropriate)  POC reviewed with pt at 0500. Pt is intubated and unable to verbalize understanding. Questions and concerns addressed with  at bedside. No acute events overnight.  Will continue to monitor. See flowsheets for full assessment and VS info

## 2018-07-27 NOTE — PROGRESS NOTES
NCC notified of pt's Temp 102.8 despite PO tylenol, cooling blanket and ice packs, one time dose of IV tylenol given per order.

## 2018-07-27 NOTE — PROGRESS NOTES
[facility]  Neurocritical Care  Progress Note     Admit Date: 7/22/2018  Service Date: 07/27/2018  Length of Stay: 5     Subjective:      Chief Complaint: Cardiac arrest due to respiratory disorder     History of Present Illness: Anum Quick 61 yo female, PMH of severe AS, hypothyroid, urinary retention, chronic sys/carney HF, A fib, SHERI, DM2 who presents to [facility] s/p cardiac arrest. Pt developed shortness of breath on night PTA and it progressed until the day of admit when  brought her to ED.  He reports she was not compliant with her home O2 but is compliant with meds. In ED, pt was found to be hypoxic and soon after went into asystole. CPR performed for 15 min with multiple rounds of epi with ROSC. Pt intubated, started on broad spec abx, heparin gtt, propofol gtt and keppra load. She is being admitted to [facility] for a higher level of care.      Hospital Course: 7/22: admit NCC, cooling protocol, EEG Diffuse slowing no sz, propofol infusion     7/27: EEG monitoring     Interval History:  pupils dilated BL, reactive BL, UE posturing, LE triple flexion. Weak cough         Review of Systems   Unable to obtain a complete ROS due to level of consciousness.  Objective:        07/26 0700 07/27 0659 07/27 0700 07/27 0859 Most Recent    Temp (°F)      96.1-102.7 96.1 96.1 (35.6)    Pulse       89-90 90    Resp      20-46 23-24 24    BP      138//97 126/86 126/86    MAP (mmHg)  117       Arterial Line BP      112//102 126//88 128/88    Arterial Line MAP (mmHg) (mmHg)       104-106 106    SpO2 (%)       95-97 95    Medications   Report   Scheduled     Medication Last Action   aspirin chewable tablet 81 mg Given   81 mg, PER NG TUBE, Daily    07/27 0826   ceFAZolin injection 2 g Given   2 g, IV, Q12H    07/27 0445   chlorhexidine 0.12 % solution 15 mL Given   15 mL, MT, BID    07/27 0826   famotidine tablet 20 mg Given   20 mg, PER NG TUBE, Daily    07/27 0826    levothyroxine injection 65 mcg Given   65 mcg, IV, Daily    07/27 0826      Continuous     Medication Last Action   0.9%  NaCl infusion Verify Only   75 mL/hr, IV, Continuous    07/27 0800      PRN     Medication Last Action   acetaminophen oral solution 650 mg Given   650 mg, PER NG TUBE, Q6H PRN    07/26 2201   dextrose 50% injection 12.5 g Ordered   12.5 g, IV, PRN       glucagon (human recombinant) injection 1 mg Ordered   1 mg, IM, PRN       insulin aspart U-100 pen 0-5 Units Given   2 Units, SubQ, Q4H PRN    07/27 0831      BMI and BSA Data     Body Mass Index: 28.11 kg/m² Body Surface Area: 1.92 m²                                     Intake/Output      Report    07/26 0700 07/27 0659 07/27 0700 07/28 0659   I.V. (mL/kg) 1450 (18.4) 143.8 (1.8)   NG/GT 1020 120   IV Piggyback 700    Total Intake(mL/kg) 3170 (40.1) 263.8 (3.3)   Urine (mL/kg/hr) 970 (0.5) 235 (1.5)   Total Output 970 235   Net +2200 +28.8        Stool Occurrence 1 x    Respiratory   Report   Lab Data   (Last 24 hours)     07/27 0414         POC PH  7.549          POC PCO2  25.2          POC PO2  141          POC HCO3  22.0          POC BE  0          POC SATURATED O2  100          POC TCO2  23             O2/Vent Data       07/27 0415  Most Recent       SpO2 (%) 97  95     O2 Device (Oxygen Therapy)   ventilator     Vent Mode SIMV  SIMV     Set Rate (bmp) 16  16     Oxygen Concentration (%) 40  40        Infectious Disease   Report   Temp/WBC Trend   (Last 1 days)    07/26 0701 07/27 0859 24h Max     Temp (°F)      102.7 (39.3)  07/26 2343       07/26 0701 07/27 0859 Most Recent    WBC   8.21    07/27 0231                     Selected Labs      Report   (Up to last 2 results from past 72 hours)    07/27 0003 07/27 0231 07/27 0510   Sodium      145            Potassium      4.0            Chloride      106            CO2      20            BUN, Bld      71            Creatinine      1.8            Glucose      172            Magnesium       2.6            WBC      8.21            Hemoglobin      9.8            Hematocrit      30.5            Platelets      257            Coumadin Monitoring INR 3.4           3.3        Phosphorus      3.5            Lactate, Steven 9.2           4.1         07/26 0302 07/26 1116    Sodium      144        Potassium      3.3        Chloride      105        CO2      16        BUN, Bld      68        Creatinine      1.5        Glucose      163        Magnesium 2.2             WBC 10.02             Hemoglobin 10.6             Hematocrit 34.5             Platelets 264             Coumadin Monitoring INR              Phosphorus 5.7             Lactate, Steven              I                           Lines/Drains/Airways   Report   Central Venous Catheter Line          Percutaneous Central Line Insertion/Assessment - triple lumen  07/23/18 1600 right femoral 3 days      Peripheral Intravenous Line          Peripheral IV - Single Lumen 07/23/18 0900 Left Upper Arm 3 days        Peripheral IV - Single Lumen 07/23/18 1206 Left Antecubital 3 days      Drain          NG/OG Tube 07/22/18 1200 orogastric;[place] sump 14 Fr. Right mouth 4 days        Urethral Catheter 07/22/18 1900 Temperature probe 4 days      Airway          Airway - Non-Surgical 07/22/18 1848 Endotracheal Tube 4 days      Arterial Line          Arterial Line 07/23/18 1530 Left Other (Comment) 3 days      Wound          Wound 05/03/18 0100 Ulceration anterior Foot 85 days        Wound 07/05/18 2000 Laceration anterior Eyebrow 21 days        Wound 07/22/18 1901 Skin Tear dorsal Foot 4 days        Wound 07/23/18 0920 Skin Tear dorsal Foot 3 days                   Physical Exam   Constitutional: She appears well-developed.   HENT:   Head: Normocephalic.   Eyes:   Pupils react to light BL   Cardiovascular: Normal rate.    Pulmonary/Chest:   intubated   Abdominal: Soft.    Neuro:  pupils dilated BL, reactive BL,  Weak cough    UE posturing and ? spontanous mvmts. Posturing in UE  response to pain   LE triple flexion.   Plantar reflex BL  Unable to test orientation, language, memory, judgment, insight, fund of knowledge, hearing, shoulder shrug, tongue protrusion, coordination, gait due to level of consciousness.     Today I personally reviewed pertinent medications, lines/drains/airways, imaging, cardiology, lab results, microbiology results, notably:     Diet  Diet NPO           Assessment/Plan:      Assessment/Plan:     Neuro: 7.24 imaging with hypoxic ischemic event inv BL lentiform nuclei, thalami, caudate nuclei, paramedian and occipital cortices.  EEG monitoring with NCSE, GPEDs  On ASA 81 mg po qd     Lungs: intubated  MSSA  PEEP 6, FiO2 40%  Ph 7.5 pCO2 25 pO2 141 bicarb 22  Cxr with BL pleujral effusions, R midlung consolidation, LL consolidation     CVS: SBP < 180   MAP at goal  Not rqing cardene     GI:  TF at goal     Endocrine: on IV levothyroxine  SSI     Hematology: counts stable   INR 3.3     ID:febrile 102.7 overnight, no leukocytosis  Urine 7/22: ok  CXR: BL pleural effusions, right midlung consolidation, LL consolidation  resp cx with MSSA  Blood cx neg  7/23 US LE neg for DVT, left baker's cyst  On cefazolin     DVT ppx: -  Stress ulcer ppx: famotidine            Activity Orders            None          Full Code     Rosi Irving MD  Neurocritical Care  [facility]

## 2018-07-27 NOTE — PLAN OF CARE
Problem: Patient Care Overview  Goal: Plan of Care Review  Outcome: Ongoing (interventions implemented as appropriate)  POC reviewed with pt and family at 1400.   Pt's  and daughter verbalized understanding.   Questions and concerns addressed.   See flowsheets for full assessment and VS info.     TF d/c'd per order.  NS @ 75 ml/hr.  CT completed.   Vit K given.   Pt connected to cEEG.

## 2018-07-27 NOTE — PROGRESS NOTES
"[facility]  Endocrinology  Progress Note    Admit Date: 2018     Reason for Consult: Management of hypothyroidism (on LT4 50mcg outpatient)    HPI:   Patient is a 62 y.o. female with a diagnosis of severe AS, hypothyroid, chronic sys/carney HF, A fib, SHERI, pHTN, and T2DM who has been admitted to [facility] s/p cardiac arrest. Per chart review, she was brought into the ED by her  secondary to progressive SOB.  In ED, pt was found to be hypoxic and soon after went into asystole. CPR performed for 15 min with multiple rounds of epi with ROSC. Pt intubated, started on broad spec abx, heparin gtt, propofol gtt and keppra load.    Endocrinology has been consulted for hypothyroidism.  Prior to being initiated on hypothermic protocol, patient was afebrile, normotensive and normal HR.  She had hypoxia/SOB upon arrival, and is on home O2, with intermittent compliance.  Besides the respiratory symptoms (which can be explained by comorbidities), no evidence of myxedema coma.       Interval HPI:   Overnight events: Had temp of 102.7 F. Rest of the VS stable.   Eatin%  Nausea: No  Hypoglycemia and intervention: No  Fever: Yes  TPN and/or TF: Yes  If yes, type of TF/TPN and rate: TF at 45 ml/h    /86   Pulse 90   Temp 96.1 °F (35.6 °C)   Resp (!) 24   Ht 5' 6" (1.676 m)   Wt 79 kg (174 lb 2.6 oz)   LMP  (LMP Unknown)   SpO2 96%   Breastfeeding? No   BMI 28.11 kg/m²       Labs Reviewed and Include      Recent Labs  Lab 18  0231   *   CALCIUM 9.0   ALBUMIN 2.9*   PROT 6.1      K 4.0   CO2 20*      BUN 71*   CREATININE 1.8*   ALKPHOS 408*   *   *   BILITOT 2.9*     Lab Results   Component Value Date    WBC 8.21 2018    HGB 9.8 (L) 2018    HCT 30.5 (L) 2018    MCV 89 2018     2018       Recent Labs  Lab 18  1126  18  0302 18  0231   TSH 59.103*  --   --   --    FREET4 0.50*  < > 0.77 0.71   < > = " values in this interval not displayed.  Lab Results   Component Value Date    HGBA1C 6.0 (H) 07/23/2018       Nutritional status:   Body mass index is 28.11 kg/m².  Lab Results   Component Value Date    ALBUMIN 2.9 (L) 07/27/2018    ALBUMIN 2.8 (L) 07/26/2018    ALBUMIN 3.0 (L) 07/26/2018     No results found for: PREALBUMIN    Estimated Creatinine Clearance: 33.9 mL/min (A) (based on SCr of 1.8 mg/dL (H)).    Accu-Checks  Recent Labs      07/25/18   0337  07/25/18   1211  07/25/18   1500  07/25/18   2015  07/26/18   0027  07/26/18   0305  07/26/18   0814  07/26/18   0815  07/27/18   0507  07/27/18   0829   POCTGLUCOSE  104  131*  144*  122*  121*  133*  <20*  151*  219*  208*       Current Medications and/or Treatments Impacting Glycemic Control  Immunotherapy:  Immunosuppressants     None        Steroids:   Hormones     None        Pressors:    Autonomic Drugs     None        Hyperglycemia/Diabetes Medications: Antihyperglycemics     Start     Stop Route Frequency Ordered    07/27/18 1110  insulin aspart U-100 pen 1-10 Units      -- SubQ Every 4 hours PRN 07/27/18 1010          ASSESSMENT and PLAN    * Cardiac arrest due to respiratory disorder      Avoid hypoglycemia  Avoid iatrogenic hyperthyroidism        Acquired hypothyroidism      Patient on home dose of LT4 50 mcg, which is below weight based recommendations of 125 mcg daily.    TSH elevated two weeks ago at 68.7 and repeated 75.0.    Currently on IV Levothyroxine 65 mcg, daily  FT4 is 0.71    Would increase LT4 to 85 mcg IV qD.  When patient tolerating PO and decreased concern for absorption, recommend converting to 125mcg PO qD.    Continue daily FT4 levels, until normalized.  Repeat TSH on 7/30.         Acute on chronic systolic congestive heart failure      Cardiac comorbidity  Managed per primary    Careful to not overdose LT4.        Paroxysmal atrial fibrillation      Cardiac comorbidity  Careful to not overdose synthroid        Type 2 diabetes  mellitus with neurologic complication, without long-term current use of insulin    Only on oral mediation in outpatient setting  a1c 6.0%    BG goal 140-180 while inpatient    Recommend:  Low dose correction  POC q4h while NPO      Discharge:  TBD.            Didi Larkin MD  Endocrinology  [facility]

## 2018-07-27 NOTE — NURSING
Pt transported to and from CT on portable cardiac monitor and portable vent with 1 RT and 1 RN.  Pt back in room and connected to wall monitor, NCC aware of pts arrival back to room, VSS at this time.

## 2018-07-27 NOTE — ASSESSMENT & PLAN NOTE
Patient on home dose of LT4 50 mcg, which is below weight based recommendations of 125 mcg daily.    TSH elevated two weeks ago at 68.7 and repeated 75.0.    Currently on IV Levothyroxine 65 mcg, daily  FT4 is 0.71    Continue LT4 65mcg IV qD.  When patient tolerating PO and decreased concern for absorption, recommend converting to 125mcg PO qD.    Continue daily FT4 levels, until normalized.  Repeat TSH on 7/30.

## 2018-07-27 NOTE — PROGRESS NOTES
ICU Progress Note  Neurocritical Care    Admit Date: 7/22/2018  LOS: 5    CC: Cardiac arrest due to respiratory disorder    Code Status: Full Code     SUBJECTIVE:     Interval History/Significant Events:     Acute hepatic insuff  Febrile  Sepsis; secondary to aspiration poa  atn  Critical aortic stenosis  Abnormal coagulation profile  Alkalosis              Medications:  Continuous Infusions:   sodium chloride 0.9% 75 mL/hr at 07/27/18 0800     Scheduled Meds:   aspirin  81 mg Per NG tube Daily    ceFAZolin (ANCEF) IVPB  2 g Intravenous Q12H    chlorhexidine  15 mL Mouth/Throat BID    famotidine  20 mg Per NG tube Daily    levothyroxine  65 mcg Intravenous Daily    phytonadione ((AQUA-MEPHYTON) IVPB  10 mg Intravenous Daily     PRN Meds:.dextrose 50%, glucagon (human recombinant), insulin aspart U-100    OBJECTIVE:   Vital Signs (Most Recent):   Temp: 96.1 °F (35.6 °C) (07/27/18 0905)  Pulse: 90 (07/27/18 0905)  Resp: (!) 24 (07/27/18 0905)  BP: 126/86 (07/27/18 0701)  SpO2: 96 % (07/27/18 0905)    Vital Signs (24h Range):   Temp:  [96.1 °F (35.6 °C)-102.7 °F (39.3 °C)] 96.1 °F (35.6 °C)  Pulse:  [] 90  Resp:  [20-46] 24  SpO2:  [92 %-100 %] 96 %  BP: (126-138)/(86-93) 126/86  Arterial Line BP: (112-147)/() 128/88    ICP/CPP (Last 24h):   CO:  [2.7 L/min] 2.7 L/min  CI:  [1.4 L/min/m2] 1.4 L/min/m2    I & O (Last 24h):   Intake/Output Summary (Last 24 hours) at 07/27/18 0948  Last data filed at 07/27/18 0800   Gross per 24 hour   Intake          3138.75 ml   Output             1105 ml   Net          2033.75 ml     Physical Exam:  GA: Alert, comfortable, no acute distress.   HEENT: No scleral icterus or JVD.   Pulmonary: Clear to auscultation A/P/L. No wheezing, crackles, or rhonchi.  Cardiac: RRR S1 & S2 w/o rubs/murmurs/gallops.   Abdominal: Bowel sounds present x 4. No appreciable hepatosplenomegaly.  Skin: No jaundice, rashes, or visible lesions.  Neuro:  Pupils 3 mm reactive  Extensor  posturing  No change in exam       Vent Data:   Vent Mode: SIMV  Oxygen Concentration (%):  [40] 40  Resp Rate Total:  [23 br/min-39 br/min] 26 br/min  Vt Set:  [450 mL] 450 mL  PEEP/CPAP:  [5 cmH20] 5 cmH20  Pressure Support:  [14 cmH20] 14 cmH20  Mean Airway Pressure:  [8.5 fhO98-10 cmH20] 11 cmH20    Lines/Drains/Airway:   R fem;3  A line;3       Airway - Non-Surgical 07/22/18 1848 Endotracheal Tube (Active)   Secured at 24 cm 7/27/2018  8:00 AM   Measured At Lips 7/27/2018  8:00 AM   Secured Location Right 7/27/2018  8:00 AM   Secured by Commercial tube malcolm 7/27/2018  8:00 AM   Bite Block right;secure and patent 7/27/2018  8:00 AM   Site Condition Dry 7/27/2018  8:00 AM   Status Intact;Secured;Patent 7/27/2018  8:00 AM   Site Assessment Clean;Dry;No bleeding;No drainage 7/27/2018  8:00 AM   Cuff Pressure 26 cm H2O 7/27/2018  8:00 AM           Percutaneous Central Line Insertion/Assessment - triple lumen  07/23/18 1600 right femoral (Active)   Dressing biopatch in place;dressing dry and intact;dressing reinforced 7/27/2018  7:01 AM   Securement secured w/ sutures 7/27/2018  7:01 AM   Additional Site Signs no erythema;no warmth;no edema;no pain;no palpable cord;no streak formation;no drainage 7/25/2018  3:00 PM   Distal Patency/Care flushed w/o difficulty 7/27/2018  7:01 AM   Medial Patency/Care flushed w/o difficulty 7/27/2018  7:01 AM   Proximal Patency/Care flushed w/o difficulty 7/27/2018  7:01 AM   Dressing Change Due 07/30/18 7/27/2018  7:01 AM   Daily Line Review Performed 7/27/2018  7:01 AM           Arterial Line 07/23/18 1530 Left Other (Comment) (Active)   Site Assessment Clean;Dry;Intact;No redness;No swelling 7/27/2018  7:01 AM   Line Status Pulsatile blood flow 7/27/2018  7:01 AM   Art Line Waveform Appropriate;Square wave test performed 7/27/2018  7:01 AM   Arterial Line Interventions Zeroed and calibrated;Leveled;Connections checked and tightened;Flushed per protocol;Line pulled back 7/27/2018   "7:01 AM   Color/Movement/Sensation Capillary refill less than 3 sec 7/27/2018  7:01 AM   Dressing Type Transparent 7/27/2018  7:01 AM   Dressing Status Biopatch in place;Clean;Dry;Intact 7/27/2018  7:01 AM   Dressing Intervention Dressing reinforced 7/27/2018  7:01 AM   Dressing Change Due 07/30/18 7/27/2018  7:01 AM           NG/OG Tube 07/22/18 1200 orogastric;[place] sump 14 Fr. Right mouth (Active)   Placement Check placement verified by aspirate characteristics;placement verified by distal tube length measurement;placement verified by x-ray 7/27/2018  7:01 AM   Distal Tube Length (cm) 60 7/27/2018  3:05 AM   Tolerance no signs/symptoms of discomfort 7/27/2018  7:01 AM   Securement taped to commercial device 7/27/2018  7:01 AM   Clamp Status/Tolerance unclamped 7/27/2018  7:01 AM   Suction Setting/Drainage Method suction at;low;intermittent setting 7/24/2018  3:00 PM   Insertion Site Appearance no redness, warmth, tenderness, skin breakdown, drainage 7/27/2018  3:05 AM   Flush/Irrigation flushed w/;water;no resistance met 7/27/2018  7:01 AM   Feeding Method continuous 7/27/2018  7:01 AM   Feeding Action feeding continued 7/27/2018  7:01 AM   Current Rate (mL/hr) 45 mL/hr 7/27/2018  3:05 AM   Goal Rate (mL/hr) 45 mL/hr 7/27/2018  3:05 AM   Intake (mL) 30 mL 7/27/2018  8:00 AM   Tube Output(mL)(Include Discarded Residual) 0 mL 7/24/2018  8:00 AM   Intake (mL) - Formula Tube Feeding 45 7/27/2018  8:00 AM   Residual Amount (ml) 0 ml 7/25/2018  3:00 PM            Urethral Catheter 07/22/18 1900 Temperature probe (Active)   Site Assessment Clean;Intact 7/27/2018  7:01 AM   Collection Container Urimeter 7/27/2018  7:01 AM   Securement Method secured to top of thigh w/ adhesive device 7/27/2018  7:01 AM   Catheter Care Performed yes 7/27/2018  7:01 AM   Reason for Continuing Urinary Catheterization Critically ill in ICU requiring intensive monitoring 7/27/2018  7:01 AM   CAUTI Prevention Bundle StatLock in place w 1" " slack;Intact seal between catheter & drainage tubing;Drainage bag off the floor;Drainage bag below bladder;Drainage bag not overfilled (<2/3 full);No dependent loops or kinks;Green sheeting clip in use 7/27/2018  7:01 AM   Output (mL) 110 mL 7/27/2018  8:00 AM     Nutrition/Tube Feeds (if NPO state why):  t feeds    Labs:  ABG:   Recent Labs  Lab 07/27/18  0414   PH 7.549*   PO2 141*   PCO2 25.2*   HCO3 22.0*   POCSATURATED 100   BE 0     BMP:  Recent Labs  Lab 07/27/18  0231      K 4.0      CO2 20*   BUN 71*   CREATININE 1.8*   *   MG 2.6   PHOS 3.5     LFT: Lab Results   Component Value Date     (H) 07/27/2018     (H) 07/27/2018     (H) 07/07/2018    ALKPHOS 408 (H) 07/27/2018    BILITOT 2.9 (H) 07/27/2018    ALBUMIN 2.9 (L) 07/27/2018    PROT 6.1 07/27/2018     CBC:   Lab Results   Component Value Date    WBC 8.21 07/27/2018    HGB 9.8 (L) 07/27/2018    HCT 30.5 (L) 07/27/2018    MCV 89 07/27/2018     07/27/2018     Microbiology x 7d:   Microbiology Results (last 7 days)     Procedure Component Value Units Date/Time    Blood culture [[id number]] Collected:  07/22/18 2145    Order Status:  Completed Specimen:  Blood from Wrist, Left Updated:  07/27/18 0612     Blood Culture, Routine No Growth to date     Blood Culture, Routine No Growth to date     Blood Culture, Routine No Growth to date     Blood Culture, Routine No Growth to date     Blood Culture, Routine No Growth to date    Blood culture [[id number]] Collected:  07/22/18 2152    Order Status:  Completed Specimen:  Blood from Peripheral, Antecubital, Left Updated:  07/27/18 0612     Blood Culture, Routine No Growth to date     Blood Culture, Routine No Growth to date     Blood Culture, Routine No Growth to date     Blood Culture, Routine No Growth to date     Blood Culture, Routine No Growth to date    Culture, Respiratory with Gram Stain [[id number]]  (Susceptibility) Collected:  07/23/18 0333    Order Status:   Completed Specimen:  Respiratory from Sputum Updated:  07/25/18 1137     Respiratory Culture --     STAPHYLOCOCCUS AUREUS  Rare       Gram Stain (Respiratory) <10 epithelial cells per low power field.     Gram Stain (Respiratory) Moderate WBC's     Gram Stain (Respiratory) No organisms seen    Urine culture [[id number]]     Order Status:  Canceled Specimen:  Urine         Imaging:  cxr ; RUL aspiration from  Prior    I personally reviewed the above image.    ASSESSMENT/PLAN:     Active Hospital Problems    Diagnosis    *Cardiac arrest due to respiratory disorder    Alteration in skin integrity    Preventive measure    Cardiac LV ejection fraction <20%    Pulmonary hypertension    Status epilepticus    Acquired hypothyroidism    HARISH (acute kidney injury)    Right foot ulcer    Acute on chronic systolic congestive heart failure    Chronic anticoagulation - on rivaroxaban     PAF      Paroxysmal atrial fibrillation    Hyperlipidemia associated with type 2 diabetes mellitus    Type 2 diabetes mellitus with neurologic complication, without long-term current use of insulin    Severe aortic stenosis     12-15-17  Severe low flow aortic valve stenosis (JAMES 0.49 cm2, AVAi 0.27 cm2/m2, peak aortic jet velocity 4.0 m/s,MG 48 mmHg).               Plan:  Restart zosyn  Follow LFT/CR/UO  Vit k  Follow exam  discuss with epilepsy service  Discussed critical condition with  in detail    Critical secondary to Patient has a condition that poses threat to life and bodily function: hepatic insuff/atn/inr/discussion with       Total cc time 43 mins     Critical care was time spent personally by me on the following activities: development of treatment plan with patient or surrogate and bedside caregivers, discussions with consultants, evaluation of patient's response to treatment, examination of patient, ordering and performing treatments and interventions, ordering and review of laboratory studies, ordering  and review of radiographic studies, pulse oximetry, re-evaluation of patient's condition. This critical care time did not overlap with that of any other provider or involve time for any procedures.

## 2018-07-27 NOTE — SUBJECTIVE & OBJECTIVE
"Interval HPI:   Overnight events: Had temp of 102.7 F. Rest of the VS stable.   Eatin%  Nausea: No  Hypoglycemia and intervention: No  Fever: Yes  TPN and/or TF: Yes  If yes, type of TF/TPN and rate: TF at 45 ml/h    /86   Pulse 90   Temp 96.1 °F (35.6 °C)   Resp (!) 24   Ht 5' 6" (1.676 m)   Wt 79 kg (174 lb 2.6 oz)   LMP  (LMP Unknown)   SpO2 96%   Breastfeeding? No   BMI 28.11 kg/m²     Labs Reviewed and Include      Recent Labs  Lab 18  0231   *   CALCIUM 9.0   ALBUMIN 2.9*   PROT 6.1      K 4.0   CO2 20*      BUN 71*   CREATININE 1.8*   ALKPHOS 408*   *   *   BILITOT 2.9*     Lab Results   Component Value Date    WBC 8.21 2018    HGB 9.8 (L) 2018    HCT 30.5 (L) 2018    MCV 89 2018     2018       Recent Labs  Lab 18  1126  18  0302 18  0231   TSH 59.103*  --   --   --    FREET4 0.50*  < > 0.77 0.71   < > = values in this interval not displayed.  Lab Results   Component Value Date    HGBA1C 6.0 (H) 2018       Nutritional status:   Body mass index is 28.11 kg/m².  Lab Results   Component Value Date    ALBUMIN 2.9 (L) 2018    ALBUMIN 2.8 (L) 2018    ALBUMIN 3.0 (L) 2018     No results found for: PREALBUMIN    Estimated Creatinine Clearance: 33.9 mL/min (A) (based on SCr of 1.8 mg/dL (H)).    Accu-Checks  Recent Labs      18   0337  18   1211  18   1500  18   0027  18   0305  18   0814  18   0815  18   0507  18   0829   POCTGLUCOSE  104  131*  144*  122*  121*  133*  <20*  151*  219*  208*       Current Medications and/or Treatments Impacting Glycemic Control  Immunotherapy:  Immunosuppressants     None        Steroids:   Hormones     None        Pressors:    Autonomic Drugs     None        Hyperglycemia/Diabetes Medications: Antihyperglycemics     Start     Stop Route Frequency Ordered    18 1110  " insulin aspart U-100 pen 1-10 Units      -- SubQ Every 4 hours PRN 07/27/18 1016

## 2018-07-28 NOTE — PROGRESS NOTES
NCC notified of pt's temp of 101.7, ice packs and cooling blanket applied. No new orders at this time. Will continue to monitor.

## 2018-07-28 NOTE — PLAN OF CARE
Endocrinology consulted for hypothyroidism.  Weight based requirement ~125mcg.  Yesterday increased to LT4 85mcg IV qD.    TPO +.  FT4 0.73.    BG and insulin regimen reviewed.    BG goal 140-180.  Patient's BG at or below goal.  Was on moderate correction.  TF discontinued.  Remains NPO.      Recommend:  Decrease to low dose correction  POCq4h  Continue IV LT4 85mcg qD  Daily Free T4  Repeat TSH 7/30    We will continue to follow.  Please call endocrinology with any questions.        Lorna Roldan MD  Endocrinology Fellow

## 2018-07-28 NOTE — PROGRESS NOTES
ICU Progress Note  Neurocritical Care    Admit Date: 7/22/2018  LOS: 6    CC: Cardiac arrest due to respiratory disorder    Code Status: Full Code     SUBJECTIVE:     Interval History/Significant Events:       Toshia/lipase sill very high  INR high  Worsening CXR  Sirs  Sepsis;poa  Hypernatremia  hypokalemia  Elect. abnl  Metabolic alkalosis          Continuous Infusions:   sodium chloride 0.9% 75 mL/hr at 07/28/18 0605     Scheduled Meds:   aspirin  81 mg Per NG tube Daily    chlorhexidine  15 mL Mouth/Throat BID    famotidine  20 mg Per NG tube Daily    levothyroxine  65 mcg Intravenous Daily    piperacillin-tazobactam (ZOSYN) IVPB  4.5 g Intravenous Q12H     PRN Meds:.dextrose 50%, glucagon (human recombinant), insulin aspart U-100    OBJECTIVE:   Vital Signs (Most Recent):   Temp: (!) 95.4 °F (35.2 °C) (07/28/18 0916)  Pulse: 84 (07/28/18 0916)  Resp: (!) 24 (07/28/18 0916)  BP: 123/83 (07/27/18 1500)  SpO2: 100 % (07/28/18 0916)    Vital Signs (24h Range):   Temp:  [92.3 °F (33.5 °C)-101.3 °F (38.5 °C)] 95.4 °F (35.2 °C)  Pulse:  [] 84  Resp:  [16-35] 24  SpO2:  [93 %-100 %] 100 %  BP: (121-123)/(82-83) 123/83  Arterial Line BP: (118-149)/() 127/84    ICP/CPP (Last 24h):        I & O (Last 24h):   Intake/Output Summary (Last 24 hours) at 07/28/18 1016  Last data filed at 07/28/18 0605   Gross per 24 hour   Intake             2030 ml   Output             2105 ml   Net              -75 ml     Physical Exam:  GA: Alert, comfortable, no acute distress.   HEENT: No scleral icterus or JVD.   Pulmonary: Clear to auscultation A/P/L. No wheezing, crackles, or rhonchi.  Cardiac: RRR S1 & S2 w/o rubs/murmurs/gallops.   Abdominal: Bowel sounds present x 4. No appreciable hepatosplenomegaly.  Skin: No jaundice, rashes, or visible lesions.  Neuro:  Pupils 3 mm reactive  opens eyes to noxious stimuli    Vent Data:   Vent Mode: SIMV  Oxygen Concentration (%):  [40] 40  Resp Rate Total:  [16 br/min-32 br/min] 26  br/min  Vt Set:  [450 mL] 450 mL  PEEP/CPAP:  [5 cmH20] 5 cmH20  Pressure Support:  [14 cmH20] 14 cmH20  Mean Airway Pressure:  [11 asW41-54 cmH20] 11 cmH20    Lines/Drains/Airway: cvp;4  Christina;4       Airway - Non-Surgical 07/22/18 1848 Endotracheal Tube (Active)   Secured at 23 cm 7/28/2018  8:27 AM   Measured At Lips 7/28/2018  8:27 AM   Secured Location Right 7/28/2018  8:27 AM   Secured by Commercial tube malcolm 7/28/2018  8:27 AM   Bite Block right 7/28/2018  8:27 AM   Site Condition Cool;Dry 7/28/2018  4:01 AM   Status Secured;Patent 7/28/2018  8:27 AM   Site Assessment Clean;Dry 7/28/2018  4:01 AM   Cuff Pressure 30 cm H2O 7/28/2018  4:01 AM           Percutaneous Central Line Insertion/Assessment - triple lumen  07/23/18 1600 right femoral (Active)   Dressing biopatch in place;dressing dry and intact 7/28/2018  3:05 AM   Securement secured w/ sutures 7/28/2018  3:05 AM   Additional Site Signs no erythema;no warmth;no edema;no pain;no palpable cord;no streak formation;no drainage 7/25/2018  3:00 PM   Distal Patency/Care flushed w/o difficulty 7/28/2018  3:05 AM   Medial Patency/Care flushed w/o difficulty 7/28/2018  3:05 AM   Proximal Patency/Care flushed w/o difficulty 7/28/2018  3:05 AM   Dressing Change Due 07/30/18 7/28/2018  3:05 AM   Daily Line Review Performed 7/28/2018  3:05 AM           Arterial Line 07/23/18 1530 Left Other (Comment) (Active)   Site Assessment Clean;Dry;Intact;No redness;No swelling 7/28/2018  3:05 AM   Line Status Pulsatile blood flow 7/28/2018  3:05 AM   Art Line Waveform Appropriate;Whip;Square wave test performed 7/28/2018  3:05 AM   Arterial Line Interventions Zeroed and calibrated 7/28/2018  3:05 AM   Color/Movement/Sensation Capillary refill less than 3 sec 7/28/2018  3:05 AM   Dressing Type Transparent 7/28/2018  3:05 AM   Dressing Status Biopatch in place;Clean;Intact;Dry 7/28/2018  3:05 AM   Dressing Intervention Dressing reinforced 7/27/2018  3:00 PM   Dressing Change  "Due 07/30/18 7/28/2018  3:05 AM           NG/OG Tube 07/22/18 1200 orogastric;[place] sump 14 Fr. Right mouth (Active)   Placement Check placement verified by distal tube length measurement;placement verified by x-ray 7/28/2018  3:05 AM   Distal Tube Length (cm) 60 7/28/2018  3:05 AM   Tolerance no signs/symptoms of discomfort 7/28/2018  3:05 AM   Securement taped to commercial device 7/28/2018  3:05 AM   Clamp Status/Tolerance clamped 7/28/2018  3:05 AM   Suction Setting/Drainage Method suction at;low;intermittent setting 7/24/2018  3:00 PM   Insertion Site Appearance no redness, warmth, tenderness, skin breakdown, drainage 7/28/2018  3:05 AM   Flush/Irrigation flushed w/;water;no resistance met 7/27/2018  3:00 PM   Feeding Method continuous 7/27/2018  3:00 PM   Feeding Action feeding held 7/28/2018  3:05 AM   Current Rate (mL/hr) 0 mL/hr 7/28/2018  3:05 AM   Goal Rate (mL/hr) 0 mL/hr 7/28/2018  3:05 AM   Intake (mL) 30 mL 7/27/2018  8:00 AM   Tube Output(mL)(Include Discarded Residual) 0 mL 7/24/2018  8:00 AM   Intake (mL) - Formula Tube Feeding 0 7/27/2018  6:00 PM   Residual Amount (ml) 0 ml 7/25/2018  3:00 PM            Urethral Catheter 07/22/18 1900 Temperature probe (Active)   Site Assessment Clean;Intact 7/28/2018  3:05 AM   Collection Container Urimeter 7/28/2018  3:05 AM   Securement Method secured to top of thigh w/ adhesive device 7/28/2018  3:05 AM   Catheter Care Performed yes 7/28/2018  3:05 AM   Reason for Continuing Urinary Catheterization Critically ill in ICU requiring intensive monitoring 7/28/2018  3:05 AM   CAUTI Prevention Bundle StatLock in place w 1" slack;Intact seal between catheter & drainage tubing;Drainage bag off the floor;Green sheeting clip in use;No dependent loops or kinks;Drainage bag not overfilled (<2/3 full);Drainage bag below bladder 7/28/2018  3:05 AM   Output (mL) 175 mL 7/28/2018  6:05 AM     Nutrition/Tube Feeds (if NPO state why):  On hold ; secondary to  Increased " lipase    Labs:  ABG:   Recent Labs  Lab 07/28/18  0401   PH 7.517*   PO2 142*   PCO2 31.1*   HCO3 25.2   POCSATURATED 99   BE 2     BMP:  Recent Labs  Lab 07/28/18  0330 07/28/18  0901   *  --    K 3.2* 2.7*   *  --    CO2 26  --    BUN 61*  --    CREATININE 1.2  --    *  --    MG 2.0  --    PHOS 2.7  --      LFT: Lab Results   Component Value Date     (H) 07/28/2018     (H) 07/28/2018     (H) 07/07/2018    ALKPHOS 389 (H) 07/28/2018    BILITOT 2.9 (H) 07/28/2018    ALBUMIN 2.7 (L) 07/28/2018    PROT 5.6 (L) 07/28/2018     CBC:   Lab Results   Component Value Date    WBC 8.00 07/28/2018    HGB 10.0 (L) 07/28/2018    HCT 31.8 (L) 07/28/2018    MCV 90 07/28/2018     07/28/2018     Microbiology x 7d:   Microbiology Results (last 7 days)     Procedure Component Value Units Date/Time    Blood culture [[id number]] Collected:  07/22/18 2152    Order Status:  Completed Specimen:  Blood from Peripheral, Antecubital, Left Updated:  07/28/18 0612     Blood Culture, Routine No growth after 5 days.    Blood culture [[id number]] Collected:  07/22/18 2145    Order Status:  Completed Specimen:  Blood from Wrist, Left Updated:  07/28/18 0612     Blood Culture, Routine No growth after 5 days.    Culture, Respiratory with Gram Stain [[id number]]  (Susceptibility) Collected:  07/23/18 0333    Order Status:  Completed Specimen:  Respiratory from Sputum Updated:  07/25/18 1137     Respiratory Culture --     STAPHYLOCOCCUS AUREUS  Rare       Gram Stain (Respiratory) <10 epithelial cells per low power field.     Gram Stain (Respiratory) Moderate WBC's     Gram Stain (Respiratory) No organisms seen    Urine culture [[id number]]     Order Status:  Canceled Specimen:  Urine         Imaging:   cxr ; right lung worsened    I personally reviewed the above image.    ASSESSMENT/PLAN:     Active Hospital Problems    Diagnosis    *Cardiac arrest due to respiratory disorder    Alteration in skin integrity     Preventive measure    Cardiac LV ejection fraction <20%    Pulmonary hypertension    Status epilepticus    Acquired hypothyroidism    HARISH (acute kidney injury)    Right foot ulcer    Acute on chronic systolic congestive heart failure    Chronic anticoagulation - on rivaroxaban     PAF      Paroxysmal atrial fibrillation    Hyperlipidemia associated with type 2 diabetes mellitus    Type 2 diabetes mellitus with neurologic complication, without long-term current use of insulin    Severe aortic stenosis     12-15-17  Severe low flow aortic valve stenosis (JAMES 0.49 cm2, AVAi 0.27 cm2/m2, peak aortic jet velocity 4.0 m/s,MG 48 mmHg).                 Plan:  Follow vanco level q am  cxr q am  Follow INR  Follow LFT's  Follow lactates  Discussed with  in detail  CT in am      Critical secondary to Patient has a condition that poses threat to life and bodily function: follow inr/lactate/cxr/alex-lipase. Discussion with       Total cc time 37 mins     Critical care was time spent personally by me on the following activities: development of treatment plan with patient or surrogate and bedside caregivers, discussions with consultants, evaluation of patient's response to treatment, examination of patient, ordering and performing treatments and interventions, ordering and review of laboratory studies, ordering and review of radiographic studies, pulse oximetry, re-evaluation of patient's condition. This critical care time did not overlap with that of any other provider or involve time for any procedures.

## 2018-07-28 NOTE — PLAN OF CARE
Problem: Patient Care Overview  Goal: Plan of Care Review  Outcome: Ongoing (interventions implemented as appropriate)  POC reviewed with pt at 0400. Pt unable to verbalize understanding. Questions and concerns addressed with family at start of shift.  Will continue to monitor. See flowsheets for full assessment and VS info

## 2018-07-28 NOTE — PLAN OF CARE
Problem: Patient Care Overview  Goal: Plan of Care Review  Outcome: Ongoing (interventions implemented as appropriate)  POC reviewed with pt's  at 1000. Pt's  verbalized understanding. Questions and concerns addressed. No acute events today. Called NCC regarding drop in pt UO. Watching UO and will let NCC know if pt's UO drops below 25. Pt did have a critical value for K+ (2.7), NCC was notified and K+ replacement was ordered and given. Pt progressing toward goals. Will continue to monitor. See flowsheets for full assessment and VS info.

## 2018-07-28 NOTE — PROGRESS NOTES
NCC notified of pt's temperature continuing to drop despite heat packs and bear hugger. No new orders at this time. Will continue to monitor.

## 2018-07-29 NOTE — PROGRESS NOTES
Received in report pt started to develop labored breathing after oral care. Chitra, NP and RT at bedside to assess. RT to adjust vent settings and Chitra to order 25 mcg Fentanyl push for comfort. After administering Fentanyl pt appears to be resting comfortably, VSS, no labored breathing noted. Will continue to monitor.

## 2018-07-29 NOTE — PLAN OF CARE
Reviewed chart.     Glucose at goal without insulin requirements.  FT4 in the normal range this AM.     No new recs today.     Will continue to follow along; please call with any questions.

## 2018-07-29 NOTE — PLAN OF CARE
Problem: Patient Care Overview  Goal: Plan of Care Review  Outcome: Ongoing (interventions implemented as appropriate)  POC reviewed with pt and  at 1900. Pt  verbalized understanding. Pt unable to verbalize. Questions and concerns addressed. No acute events overnight. Fentanyl given x2 for agitation. Pt progressing toward goals. Will continue to monitor. See flowsheets for full assessment and VS info

## 2018-07-29 NOTE — PLAN OF CARE
Problem: Patient Care Overview  Goal: Plan of Care Review  Outcome: Ongoing (interventions implemented as appropriate)  POC reviewed with pt's  at 1400. Pt's  verbalized understanding. Questions and concerns addressed. No acute events today. Pt was given albumin and lasix, UO increased 30-35hr after administration. Pt was also bathed. Pt was taken off of EEG. Pt progressing toward goals. Will continue to monitor. See flowsheets for full assessment and VS info.

## 2018-07-30 NOTE — PLAN OF CARE
Problem: Patient Care Overview  Goal: Plan of Care Review  Outcome: Ongoing (interventions implemented as appropriate)  POC reviewed with pt's spouse and family at 1400. Pt's  and brother verbalized understanding. Questions and concerns addressed. No acute events today. Pt progressing toward goals. Starting patient on TF (Glucerna 1.5). Potential CT and EEG tomorrow. Will continue to monitor. See flowsheets for full assessment and VS info.

## 2018-07-30 NOTE — PROCEDURES
DATE OF STUDY:  07/29/2018    EEG NUMBER:  [id number].    REQUESTED BY:  Andre Owens M.D.    LOCATION OF SERVICE:  [facility].    ICU EEG AND VIDEO MONITORING REPORT    METHODOLOGY:  Electroencephalographic (EEG) is recorded with electrodes placed   according to the International 10-20 placement system.  Thirty two (32) channels   of digital signal (sampling rate of 512/sec), including T1 and T2, were   simultaneously recorded from the scalp and may include EKG, EMG, and/or eye   monitors.  Recording band pass was 0.1 to 512 Hz.  Digital video recording of   the patient is simultaneously recorded with the EEG.  The patient is instructed   to report clinical symptoms which may occur during the recording session.  EEG   and video recording are stored and archived in digital format.  Activation   procedures, which include photic stimulation, hyperventilation and instructing   patients to perform simple tasks, are done in selected patients.    The EEG is displayed on a monitor screen and can be reviewed using different   montages.  Computer-assisted analysis is employed to detect spike and   electrographic seizure activity.  The entire record is submitted for computer   analysis.  The entire recording is visually reviewed, and the times identified   by computer analysis as being spikes or seizures are reviewed again.    Compressed spectral analysis (CSA) is also performed on the activity recorded   from each individual channel.  This is displayed as a power display of   frequencies from 0 to 30 Hz over time.  The CSA is reviewed looking for   asymmetries in power between homologous areas of the scalp, then compared with   the original EEG recording.    [employer] software was also utilized in the review of this study.  This software   suite analyzes the EEG recording in multiple domains.  Coherence and rhythmicity   are computed to identify EEG sections which may contain organized seizures.    Each channel undergoes  analysis to detect the presence of spike and sharp waves   which have special and morphological characteristics of epileptic activity.  The   routine EEG recording is converted from special into frequency domain.  This is   then displayed comparing homologous areas to identify areas of significant   asymmetry.  Algorithm to identify non-cortically generated artifact is used to   separate artifact from the EEG.    RECORDING TIMES:  Start on 07/29/2018, at 07:00.  End on 07/29/2018, at 13:26.  A total of 6 hours and 26 minutes of EEG monitoring was obtained.    EEG FINDINGS:  Recording was obtained at the patient's bedside in the ICU.  The   patient was unresponsive, intubated and on a respirator.  Background was   extremely low amplitude and with higher amplification, no clear electrocortical   activity was present.  This was present throughout the entire segment of the   recording.  Activation procedures consisted of pupillary light reflex testing   and also the application of cutaneous painful stimulation.  This produced no   change in the electrocortical activity.    IMPRESSION:  Markedly abnormal EEG with background very low voltage with no   discernible electrocortical activity indicating a very marked diffuse cortical   dysfunction.      RR/IN  dd: 07/30/2018 08:51:06 (CDT)  td: 07/30/2018 09:04:17 (CDT)  Doc ID   #[id number]  Job ID #[id number]    CC:

## 2018-07-30 NOTE — PLAN OF CARE
Reviewed labs, BG, and insulin requirements.    BG goal 140-180.  Patient's BG at goal without insulin requirements.  FT4 continues to improve (0.83).  TSH also improved (18.347)    No new recs today.  Continue current regimen.      Plan:  TSH (8/6/18 - if remains inpatient)  Continue daily FT4 (as patient is still critical)   Okay to discontinue daily labs once FT4 >1.0  LT4 65mcg IV daily  Low dose correction  POC q4h      We will continue to follow.  Please call endocrinology with any questions.    Lorna Roldan MD  Endocrinology Fellow

## 2018-07-30 NOTE — PROGRESS NOTES
" [facility]  Adult Nutrition  Progress Note    SUMMARY       Recommendations    Recommendation/Intervention:   As medically able to restart TF, recommend Glucerna 1.5 @ goal rate 45mL/hr.   - Initiate @ 15mL/hr and increase by 10mL q4hrs, or as tolerated, until goal rate is reached.   - Provides 1620kcals, 89g protein and 820mL free water.   - Hold for residuals >500mL.     RD to monitor.    Goals: Pt to receive >85% EEN and EPN  Nutrition Goal Status: progressing towards goal  Communication of RD Recs: reviewed with RN    Reason for Assessment    Reason for Assessment: RD follow-up  Diagnosis: other (see comments) (cardiac arrest)  Relevant Medical History: hypothyroidism, HF, a fib, T2DM  Interdisciplinary Rounds: attended  General Information Comments: Pt remains intubated. TF held 2/2 increased pancreatic enzymes. TF discontinued 7/27.  Nutrition Discharge Planning: unable to determine at this time    Nutrition Risk Screen    Nutrition Risk Screen: no indicators present    Nutrition/Diet History    Do you have any cultural, spiritual, Yarsani conflicts, given your current situation?: none  Factors Affecting Nutritional Intake: NPO, on mechanical ventilation    Anthropometrics    Temp: 98.4 °F (36.9 °C)  Height Method: Measured  Height: 5' 6" (167.6 cm)  Height (inches): 66 in  Weight Method: Bed Scale  Weight: 79 kg (174 lb 2.6 oz)  Weight (lb): 174.17 lb  Ideal Body Weight (IBW), Female: 130 lb  % Ideal Body Weight, Female (lb): 133.98 lb  BMI (Calculated): 28.2  BMI Grade: 25 - 29.9 - overweight       Lab/Procedures/Meds    Pertinent Labs Reviewed: reviewed  Pertinent Labs Comments: Na 156, BUN 69, lipase 167, amylase 110  Pertinent Medications Reviewed: reviewed  Pertinent Medications Comments: albumin, lasix, heparin, MVI, thiamine, IVF, insulin    Physical Findings/Assessment    Overall Physical Appearance: on ventilator support, loss of muscle mass, advanced age, generalized wasting, " edematous  Tubes: orogastric tube  Skin: edema, skin tear    Estimated/Assessed Needs    Weight Used For Calorie Calculations: 79 kg (174 lb 2.6 oz)  Energy Calorie Requirements (kcal): 1524  Energy Need Method: [facility]  Protein Requirements: 95-119g (1.2-1.5g/kg)  Weight Used For Protein Calculations: 79 kg (174 lb 2.6 oz)  Fluid Requirements (mL): 1mL/kcal or per MD     RDA Method (mL): 1524  CHO Requirement: 50% of total kcals      Nutrition Prescription Ordered    Current Diet Order: NPO  Nutrition Order Comments: TF discontinued  Current Nutrition Support Rate Ordered: 0 (ml)  Current Nutrition Support Frequency Ordered: mL/hr    Evaluation of Received Nutrient/Fluid Intake      I/O: +I/O, good UOP, LBM 7/28    % Intake of Estimated Energy Needs: 0 - 25 %  % Meal Intake: NPO    Nutrition Risk    Level of Risk/Frequency of Follow-up:  (f/u 2x/week)     Assessment and Plan    Nutrition Problem  Inadequate protein intake     Related to (etiology):   TF provision      Signs and Symptoms (as evidenced by):   Pt receiving <85% EPN via TF.      Nutrition Diagnosis Status:   Continues    Monitor and Evaluation    Food and Nutrient Intake: enteral nutrition intake  Food and Nutrient Adminstration: enteral and parenteral nutrition administration  Anthropometric Measurements: weight, weight change, body mass index  Biochemical Data, Medical Tests and Procedures: electrolyte and renal panel, gastrointestinal profile, glucose/endocrine profile, inflammatory profile, lipid profile  Nutrition-Focused Physical Findings: overall appearance     Nutrition Follow-Up    RD Follow-up?: Yes

## 2018-07-30 NOTE — NURSING
Upon 0300 assessment, labored breathing and use of accessory muscles noted. RR still in mid-20s. RT and NCC team notified. Ventilator mode switched to AC from SIMV. Will continue to monitor.

## 2018-07-30 NOTE — PROGRESS NOTES
ICU Progress Note  Neurocritical Care    Admit Date: 7/22/2018  LOS: 8    CC: Cardiac arrest due to respiratory disorder    Code Status: Full Code     SUBJECTIVE:     Interval History/Significant Events:   comatose  arf  atn  Non-oliguria  Abnormal coagulation profile  Hypernatremia  hypokalemia  Decreasing alex/lipase  Lactate acidosis  Right hilar infiltarte    Medications:  Continuous Infusions:   sodium chloride 0.9% 75 mL/hr at 07/30/18 1305     Scheduled Meds:   aspirin  81 mg Per NG tube Daily    chlorhexidine  15 mL Mouth/Throat BID    famotidine  20 mg Per NG tube Daily    heparin (porcine)  5,000 Units Subcutaneous Q8H    levothyroxine  65 mcg Intravenous Daily    [START ON 7/31/2018] modafinil  100 mg Per NG tube BID    multivitamin  1 tablet Per NG tube Daily    senna-docusate 8.6-50 mg  1 tablet Per NG tube Daily    silodosin  4 mg Per NG tube Daily    thiamine  100 mg Per NG tube Daily     PRN Meds:.albuterol-ipratropium, dextrose 50%, fentaNYL, glucagon (human recombinant), insulin aspart U-100, potassium chloride 10%, potassium chloride 10%, potassium chloride 10%    OBJECTIVE:   Vital Signs (Most Recent):   Temp: 98.8 °F (37.1 °C) (07/30/18 1305)  Pulse: 100 (07/30/18 1305)  Resp: 19 (07/30/18 1305)  BP: (!) 130/91 (07/29/18 1505)  SpO2: 100 % (07/30/18 1305)    Vital Signs (24h Range):   Temp:  [97.9 °F (36.6 °C)-99 °F (37.2 °C)] 98.8 °F (37.1 °C)  Pulse:  [] 100  Resp:  [16-27] 19  SpO2:  [95 %-100 %] 100 %  BP: (130)/(91) 130/91  Arterial Line BP: (115-146)/() 122/85    ICP/CPP (Last 24h):   CO:  [2.5 L/min-2.6 L/min] 2.5 L/min  CI:  [1.4 L/min/m2-1.5 L/min/m2] 1.5 L/min/m2    I & O (Last 24h):   Intake/Output Summary (Last 24 hours) at 07/30/18 1416  Last data filed at 07/30/18 1305   Gross per 24 hour   Intake             1885 ml   Output              775 ml   Net             1110 ml     Physical Exam:  GA: Alert, comfortable, no acute distress.   HEENT: No scleral icterus  or JVD.   Pulmonary: Clear to auscultation A/P/L. No wheezing, crackles, or rhonchi.  Cardiac: RRR S1 & S2 w/o rubs/murmurs/gallops.   Abdominal: Bowel sounds present x 4. No appreciable hepatosplenomegaly.  Skin: No jaundice, rashes, or visible lesions.  Neuro:  -  Pupils 3 mm  Reactive  corneals positive  positive gag/cough    Vent Data:   Vent Mode: A/C  Oxygen Concentration (%):  [35-40] 35  Resp Rate Total:  [16 br/min-29 br/min] 24 br/min  Vt Set:  [450 mL] 450 mL  PEEP/CPAP:  [5 cmH20] 5 cmH20  Pressure Support:  [0 tvL51-49 cmH20] 0 cmH20  Mean Airway Pressure:  [9.3 vkD34-98 cmH20] 9.9 cmH20    Lines/Drains/Airway: fem;6       Airway - Non-Surgical 07/22/18 1848 Endotracheal Tube (Active)   Secured at 23 cm 7/30/2018 11:05 AM   Measured At Lips 7/30/2018 11:05 AM   Secured Location Center 7/30/2018 11:05 AM   Secured by Commercial tube malcolm 7/30/2018 11:05 AM   Bite Block secure and patent 7/30/2018 11:05 AM   Site Condition Cool;Dry 7/30/2018 11:05 AM   Status Intact;Secured;Patent 7/30/2018 11:05 AM   Site Assessment Clean;Dry 7/30/2018 11:05 AM   Cuff Pressure 28 cm H2O 7/30/2018  7:30 AM           Percutaneous Central Line Insertion/Assessment - triple lumen  07/23/18 1600 right femoral (Active)   Dressing biopatch in place;dressing dry and intact;dressing reinforced 7/30/2018 11:05 AM   Securement secured w/ sutures 7/30/2018 11:05 AM   Additional Site Signs no erythema;no warmth;no pain;no edema;no palpable cord;no streak formation;no drainage 7/30/2018 11:05 AM   Distal Patency/Care infusing 7/30/2018 11:05 AM   Medial Patency/Care infusing 7/30/2018 11:05 AM   Proximal Patency/Care flushed w/o difficulty;normal saline locked 7/30/2018 11:05 AM   Dressing Change Due 07/30/18 7/30/2018 11:05 AM   Daily Line Review Performed 7/30/2018 11:05 AM           Arterial Line 07/23/18 1530 Left Other (Comment) (Active)   Site Assessment Clean;Dry;Intact;No redness;No swelling 7/30/2018 11:05 AM   Line Status  Pulsatile blood flow 7/30/2018 11:05 AM   Art Line Waveform Appropriate;Whip;Square wave test performed 7/30/2018 11:05 AM   Arterial Line Interventions Zeroed and calibrated;Leveled;Connections checked and tightened;Flushed per protocol;Line pulled back 7/30/2018 11:05 AM   Color/Movement/Sensation Capillary refill less than 3 sec 7/30/2018 11:05 AM   Dressing Type Transparent 7/30/2018 11:05 AM   Dressing Status Biopatch in place;Clean;Dry;Intact 7/30/2018 11:05 AM   Dressing Intervention Dressing reinforced 7/30/2018 11:05 AM   Dressing Change Due 07/30/18 7/30/2018 11:05 AM           NG/OG Tube 07/22/18 1200 orogastric;Bartholomew sump 14 Fr. Right mouth (Active)   Placement Check placement verified by x-ray 7/30/2018 11:05 AM   Distal Tube Length (cm) 60 7/30/2018 11:05 AM   Tolerance no signs/symptoms of discomfort 7/30/2018 11:05 AM   Securement taped to commercial device 7/30/2018 11:05 AM   Clamp Status/Tolerance clamped 7/30/2018 11:05 AM   Suction Setting/Drainage Method suction at;low;intermittent setting 7/24/2018  3:00 PM   Insertion Site Appearance no redness, warmth, tenderness, skin breakdown, drainage 7/30/2018 11:05 AM   Flush/Irrigation flushed w/;water;no resistance met 7/30/2018 11:05 AM   Feeding Method continuous 7/27/2018  3:00 PM   Feeding Action feeding held 7/28/2018  3:05 AM   Current Rate (mL/hr) 0 mL/hr 7/28/2018  3:05 AM   Goal Rate (mL/hr) 0 mL/hr 7/28/2018  3:05 AM   Intake (mL) 80 mL 7/30/2018  9:05 AM   Tube Output(mL)(Include Discarded Residual) 0 mL 7/24/2018  8:00 AM   Intake (mL) - Formula Tube Feeding 0 7/27/2018  6:00 PM   Residual Amount (ml) 0 ml 7/30/2018  5:05 AM            Urethral Catheter 07/22/18 1900 Temperature probe (Active)   Site Assessment Clean;Intact 7/30/2018 11:05 AM   Collection Container Urimeter 7/30/2018 11:05 AM   Securement Method secured to top of thigh w/ adhesive device 7/30/2018 11:05 AM   Catheter Care Performed no 7/30/2018 11:05 AM   Reason for  "Continuing Urinary Catheterization Critically ill in ICU requiring intensive monitoring 7/30/2018 11:05 AM   CAUTI Prevention Bundle StatLock in place w 1" slack;Intact seal between catheter & drainage tubing;Drainage bag off the floor;Green sheeting clip in use;No dependent loops or kinks;Drainage bag not overfilled (<2/3 full);Drainage bag below bladder 7/30/2018 11:05 AM   Output (mL) 40 mL 7/30/2018  1:05 PM     Nutrition/Tube Feeds (if NPO state why): npo  Labs:  ABG:   Recent Labs  Lab 07/30/18  0504   PH 7.412   PO2 163*   PCO2 23.2*   HCO3 14.8*   POCSATURATED 100   BE -10     BMP:  Recent Labs  Lab 07/30/18  0330 07/30/18  1118   * 158*   K 4.3 3.9   * 122*   CO2 11* 18*   BUN 69* 80*   CREATININE 1.2 1.5*    170*   MG 2.2  --    PHOS 4.4  --      LFT: Lab Results   Component Value Date     (H) 07/30/2018    ALT 84 (H) 07/30/2018     (H) 07/07/2018    ALKPHOS 416 (H) 07/30/2018    BILITOT 3.3 (H) 07/30/2018    ALBUMIN 2.9 (L) 07/30/2018    PROT 5.6 (L) 07/30/2018     CBC:   Lab Results   Component Value Date    WBC 8.17 07/30/2018    HGB 9.6 (L) 07/30/2018    HCT 31.9 (L) 07/30/2018    MCV 95 07/30/2018     07/30/2018     Microbiology x 7d:   Microbiology Results (last 7 days)     Procedure Component Value Units Date/Time    Blood culture [[id number]] Collected:  07/22/18 2152    Order Status:  Completed Specimen:  Blood from Peripheral, Antecubital, Left Updated:  07/28/18 0612     Blood Culture, Routine No growth after 5 days.    Blood culture [[id number]] Collected:  07/22/18 2145    Order Status:  Completed Specimen:  Blood from Wrist, Left Updated:  07/28/18 0612     Blood Culture, Routine No growth after 5 days.    Culture, Respiratory with Gram Stain [[id number]]  (Susceptibility) Collected:  07/23/18 0333    Order Status:  Completed Specimen:  Respiratory from Sputum Updated:  07/25/18 8756     Respiratory Culture --     STAPHYLOCOCCUS AUREUS  Rare       Gram " Stain (Respiratory) <10 epithelial cells per low power field.     Gram Stain (Respiratory) Moderate WBC's     Gram Stain (Respiratory) No organisms seen        Imaging:  Right hilar infiltarte  I personally reviewed the above image.    ASSESSMENT/PLAN:     Active Hospital Problems    Diagnosis    *Cardiac arrest due to respiratory disorder    Alteration in skin integrity    Preventive measure    Cardiac LV ejection fraction <20%    Pulmonary hypertension    Status epilepticus    Acquired hypothyroidism    HARISH (acute kidney injury)    Right foot ulcer    Acute on chronic systolic congestive heart failure    Chronic anticoagulation - on rivaroxaban     PAF      Paroxysmal atrial fibrillation    Hyperlipidemia associated with type 2 diabetes mellitus    Type 2 diabetes mellitus with neurologic complication, without long-term current use of insulin    Severe aortic stenosis     12-15-17  Severe low flow aortic valve stenosis (JAMES 0.49 cm2, AVAi 0.27 cm2/m2, peak aortic jet velocity 4.0 m/s,MG 48 mmHg).                 Plan:  EEG  CT in am  Follow INR  Follow CT  Follow lactate  Follow UO   discussed with  in detail    Critical secondary to Patient has a condition that poses threat to life and bodily function: arf/anoxic brain injury. Follow  Uo/lactate/inr    Discussed with     Total cc time 38 mins     Critical care was time spent personally by me on the following activities: development of treatment plan with patient or surrogate and bedside caregivers, discussions with consultants, evaluation of patient's response to treatment, examination of patient, ordering and performing treatments and interventions, ordering and review of laboratory studies, ordering and review of radiographic studies, pulse oximetry, re-evaluation of patient's condition. This critical care time did not overlap with that of any other provider or involve time for any procedures.

## 2018-07-30 NOTE — PROCEDURES
DATE OF PROCEDURE:  07/28/2018    EEG NUMBER:  [id number]    REQUESTED BY:  Dr. Owens.    LOCATION OF SERVICE [id number].    ICU EEG/VIDEO MONITORING REPORT    METHODOLOGY:  Electroencephalographic (EEG) is recorded with electrodes placed   according to the International 10-20 placement system.  Thirty two (32) channels   of digital signal (sampling rate of 512/sec), including T1 and T2, were   simultaneously recorded from the scalp and may include EKG, EMG, and/or eye   monitors.  Recording band pass was 0.1 to 512 Hz.  Digital video recording of   the patient is simultaneously recorded with the EEG.  The patient is instructed   to report clinical symptoms which may occur during the recording session.  EEG   and video recording are stored and archived in digital format.  Activation   procedures, which include photic stimulation, hyperventilation and instructing   patients to perform simple tasks, are done in selected patients.    The EEG is displayed on a monitor screen and can be reviewed using different   montages.  Computer-assisted analysis is employed to detect spike and   electrographic seizure activity.   The entire record is submitted for computer   analysis.  The entire recording is visually reviewed, and the times identified   by computer analysis as being spikes or seizures are reviewed again.    Compressed spectral analysis (CSA) is also performed on the activity recorded   from each individual channel.  This is displayed as a power display of   frequencies from 0 to 30 Hz over time.   The CSA is reviewed looking for   asymmetries in power between homologous areas of the scalp, then compared with   the original EEG recording.    [employer] software was also utilized in the review of this study.  This software   suite analyzes the EEG recording in multiple domains.  Coherence and rhythmicity   are computed to identify EEG sections which may contain organized seizures.    Each channel undergoes analysis to  detect the presence of spike and sharp waves   which have special and morphological characteristics of epileptic activity.  The   routine EEG recording is converted from special into frequency domain.  This is   then displayed comparing homologous areas to identify areas of significant   asymmetry.  Algorithm to identify non-cortically generated artifact is used to   separate artifact from the EEG.    RECORDING TIMES:  Start on 07/28/2018 at 07:00.  End on 07/29/2018 at 07:00.    A total of 24 hours of EEG monitoring was obtained.    EEG FINDINGS:  Recording was obtained at the patient's bedside in the ICU.  The   patient was unresponsive, intubated and on a respirator.  Background was   extremely low amplitude and consisted of some theta frequencies of less than 5   microvolts seen in the frontal polar areas.  No other cortical activity was   noted.  Activation procedures were not carried out.    IMPRESSION:  Markedly abnormal EEG with very low voltage theta frequencies noted   at the higher sensitivity, which was less than 5 microvolts and may be of   cortical origin, but this was restricted predominantly to the frontal leads.    Cortical activity in the other areas was not clearly present.  This would   suggest a very marked generalized cortical dysfunction.  The lack of   electrocortical rhythms to suggest electrocerebral silence but the recording   does not caried out in the way to establish that diagnosis.      RR/HN  dd: 07/29/2018 10:55:45 (CDT)  td: 07/29/2018 11:29:00 (CDT)  Doc ID   #[id number]  Job ID #[id number]    CC:

## 2018-07-30 NOTE — PROGRESS NOTES
[facility]  Neurocritical Care  Progress Note    Admit Date: 7/22/2018  Service Date: 07/29/2018  Length of Stay: 7    Subjective:     Chief Complaint: Cardiac arrest due to respiratory disorder    History of Present Illness: Anum Quick 63 yo female, PMH of severe AS, hypothyroid, urinary retention, chronic sys/carney HF, A fib, SHERI, DM2 who presents to [facility] s/p cardiac arrest. Pt developed shortness of breath on night PTA and it progressed until the day of admit when  brought her to ED.  He reports she was not compliant with her home O2 but is compliant with meds. In ED, pt was found to be hypoxic and soon after went into asystole. CPR performed for 15 min with multiple rounds of epi with ROSC. Pt intubated, started on broad spec abx, heparin gtt, propofol gtt and keppra load. She is being admitted to [facility] for a higher level of care.     Hospital Course: 7/22: admit NCC, cooling protocol, EEG Diffuse slowing no sz, propofol infusion  7/25: Neuro: patient without corneal reflex. With pupillary reflex +, gag reflex +, does not withdraw to pain.  7/29: MRI brain with diffusion restriction involving the bilateral lentiform nuclei, thalami, caudate nuclei, paramedian occipital cortices, and perirolandic cortices. EEG reported as alpha coma.No ictal activity. Severe suppression of brain activity.  CXR: L > R pleural effusions.        Interval History:  >4 elements OR status of 3 inpatient conditions  MRI brain with diffusion restriction involving the bilateral lentiform nuclei, thalami, caudate nuclei, paramedian occipital cortices, and perirolandic cortices. EEG reported as alpha coma.No ictal activity. Severe suppression of brain activity.  CXR: L > R pleural effusions.      Review of Systems   Unable to perform ROS: Patient nonverbal     2 systems OR Unable to obtain a complete ROS due to level of consciousness.  Objective:     Vitals:  Temp: 98.6 °F (37 °C)  Pulse: 74  Rhythm: normal sinus  rhythm  BP: (!) 145/102  MAP (mmHg): 119  CI (L/min/m2): 1.8 L/min/m2  SVI: 21  SVV: 11 %  Resp: 16  SpO2: 100 %  Oxygen Concentration (%): 40  O2 Device (Oxygen Therapy): ventilator  Vent Mode: SIMV  Set Rate: 16 bmp  Vt Set: 450 mL  Pressure Support: 14 cmH20  PEEP/CPAP: 5 cmH20  Peak Airway Pressure: 34 cmH2O  Mean Airway Pressure: 10 cmH20  Plateau Pressure: 0 cmH20    Temp  Min: 97.3 °F (36.3 °C)  Max: 99.3 °F (37.4 °C)  Pulse  Min: 69  Max: 97  BP  Min: 114/87  Max: 159/95  MAP (mmHg)  Min: 95  Max: 130  CI (L/min/m2)  Min: 1.3 L/min/m2  Max: 14.9 L/min/m2  SVI  Min: 14  Max: 22  SVV  Min: 8 %  Max: 19 %  Resp  Min: 16  Max: 25  SpO2  Min: 97 %  Max: 100 %  Oxygen Concentration (%)  Min: 40  Max: 50    07/24 0701 - 07/25 0700  In: 1305 [I.V.:130]  Out: 690 [Urine:690]   Unmeasured Output  Stool Occurrence: 0       Physical Exam  Unable to test orientation, language, memory, judgment, insight, fund of knowledge, hearing, shoulder shrug, tongue protrusion, coordination, gait due to level of consciousness.     General   HEENT: ETT  Chest Heart RRR / Lungs Clear to auscultation  Abdomen: Soft nontender + BS  Extremities: OK distal pulses.  Skin: UK  Neurological Exam:  MS; Coma. Open her eyes intermnittently but no tracking.  CN: II-XII Pupils are reactive and symmetric, + Dolls.  Motor: LUE   0/5 / RUE  0/5  Tone increased. Extensor posturing.             LLE   0/5 /  RLE  0/5  Tone increased  Extensor posturing.  Sensory: LT/PP/T/ Vibration Not able to assess.                 Complex sensory modalities: not tested  DTR:  normal throughout.  Coordination /Fine motor: Not able to assess.   Gait: Not tested.  Meningeal signs: Absent    Medications:  Continuous Scheduled  aspirin 81 mg Daily   chlorhexidine 15 mL BID   [START ON 7/26/2018] famotidine 20 mg Daily   levothyroxine 65 mcg Daily   piperacillin-tazobactam 4.5 g in sodium chloride 0.9% 100 mL IVPB (ready to mix system) 4.5 g Q8H   sodium chloride 0.9% 500  mL Once   PRN  acetaminophen 650 mg Q6H PRN   dextrose 50% 12.5 g PRN   glucagon (human recombinant) 1 mg PRN   insulin aspart U-100 0-5 Units Q4H PRN     Today I personally reviewed pertinent medications, lines/drains/airways, imaging, cardiology, lab results, microbiology results, notably:    Diet  Diet NPO  CMP:      Recent Labs  Lab 07/29/18 0239   CALCIUM 9.1   ALBUMIN 2.7*   PROT 5.7*   *   K 4.1   CO2 19*   *   BUN 60*   CREATININE 1.3   ALKPHOS 482*   *   *   BILITOT 3.0*      BMP:      Recent Labs  Lab 07/29/18 0239   *   K 4.1   *   CO2 19*   BUN 60*   CREATININE 1.3   CALCIUM 9.1      CBC:      Recent Labs  Lab 07/29/18 0239   WBC 7.54   RBC 3.48*   HGB 9.6*   HCT 31.4*      MCV 90   MCH 27.6   MCHC 30.6*      Lipid Panel:      Recent Labs  Lab 07/23/18 0418   CHOL 78*   LDLCALC 30.4*   HDL 35*   TRIG 63      Coagulation:      Recent Labs  Lab 07/29/18 0239 07/29/18  0834   INR  --  2.3*   APTT 24.4  --       Platelet Aggregation Study: No results for input(s): PLTAGG, PLTAGINTERP, PLTAGREGLACO, ADPPLTAGGREG in the last 168 hours.  Hgb A1C:      Recent Labs  Lab 07/23/18 0418   HGBA1C 6.0*      TSH:      Recent Labs  Lab 07/22/18  1126   TSH 59.103*         Recent Labs  Lab 07/29/18  0728   PH 7.466*   PCO2 29.7*   PO2 155*   HCO3 21.5*   POCSATURATED 100   BE -2             Assessment/Plan:     No new Assessment & Plan notes have been filed under this hospital service since the last note was generated.  Service: Neuro Critical Care      Active Problem List:   1.Anoxic encephalopathy post cardiac arrest  2. CHF LVSDF with 15% EF and severe  AS and pulmonary hypertension  3. A Fib  4. DM II  5. Hypothyroidism  6. Urinary retention      Assessment / Plan:     Neuro:  -Provigil 100 q 6AM and 1PM  -ASA  81mg qd  -Hold statin intil LFTs normalize  -Arterial ammonia level.  -Keep normothermic and normoglycemic.   -Conisder D/C EEG monitoring  -Keep  normothermic.  -MVT/Thiamine supplementation.  Resp: Metabolic acidosis compensated respiratory, CXR: L pleural effusion.  Vent Mode: SIMV  Oxygen Concentration (%):  [40] 40  Resp Rate Total:  [12 br/min-27 br/min] 18 br/min  Vt Set:  [450 mL] 450 mL  PEEP/CPAP:  [5 cmH20] 5 cmH20  Pressure Support:  [14 cmH20] 14 cmH20  Mean Airway Pressure:  [9.2 raI96-84 cmH20] 10 cmH20  -Dual nebs PRN q 6hrs  -CXR and ABG in the AM  CV: TTE:  Severe AS and Severe LVSDF EF 15%  -ASA  81mg qd  IVF/nutrition/Renal/GI: Transaminates and elevated pancreatic enzymes. Lactice acid 6.  -TF on hold. Restart when pancreatic enzymes normalized.   -Lactic acid  -Albumin 25% 100cc over 1 hr and followed by Lasix 20mg IV x 1.   -Lactic acid and chemestry q 6hrs  -Occult blood in stools.  -Crowe catheter.  -Rapaflo4 mg qd for urinary retention  -Crowe catheter  -NS at 75cc /hr IV  -BR senna and colace,  Hem / ID: MSSA, WBC normal.  -D/C Zosyn after 7 days. D/C today.   Endo:  -Synthroid 0.055 mg qd  -SSI q6hrs  Prophylaxis:  -Famotidine  -VAP  -SC Heparin 5000 U q 8hrs Start.   - 4 limb US for DVT assessment.  Advance Directives and Disposition:    Full Code. Keep in the NICU.           Uninterrupted Critical Care/Counseling Time (directly spent today by me not including procedures 30-74 min ([id number])): =  35  min    Activity Orders          None        Full Code    Octavio Chambers MD  Neurocritical Care  [facility]

## 2018-07-30 NOTE — PLAN OF CARE
Problem: Patient Care Overview  Goal: Plan of Care Review  Outcome: Ongoing (interventions implemented as appropriate)  POC reviewed with pt at 0500. No acute events overnight. No BM overnight. Vent mode switched to AC overnight; see note. Pt progressing toward goals. Will continue to monitor. See flowsheets for full assessment and VS info.

## 2018-07-30 NOTE — PROCEDURES
DATE OF SERVICE:  07/27/2018    EEG NUMBER:  FH -5.    LOCATION OF SERVICE:  [facility].    REQUESTED BY:  Dr. Owens.    ICU EEG/VIDEO MONITORING REPORT     METHODOLOGY:  Electroencephalographic (EEG) is recorded with electrodes placed   according to the International 10-20 placement system.  Thirty two (32) channels   of digital signal (sampling rate of 512/sec), including T1 and T2, were   simultaneously recorded from the scalp and may include EKG, EMG, and/or eye   monitors.  Recording band pass was 0.1 to 512 Hz.  Digital video recording of   the patient is simultaneously recorded with the EEG.  The patient is instructed   to report clinical symptoms which may occur during the recording session.  EEG   and video recording are stored and archived in digital format.  Activation   procedures, which include photic stimulation, hyperventilation and instructing   patients to perform simple tasks, are done in selected patients.    The EEG is displayed on a monitor screen and can be reviewed using different   montages.  Computer-assisted analysis is employed to detect spike and   electrographic seizure activity.   The entire record is submitted for computer   analysis.  The entire recording is visually reviewed, and the times identified   by computer analysis as being spikes or seizures are reviewed again.    Compressed spectral analysis (CSA) is also performed on the activity recorded   from each individual channel.  This is displayed as a power display of   frequencies from 0 to 30 Hz over time.   The CSA is reviewed looking for   asymmetries in power between homologous areas of the scalp, then compared with   the original EEG recording.    Member[employer] software was also utilized in the review of this study.  This software   suite analyzes the EEG recording in multiple domains.  Coherence and rhythmicity   are computed to identify EEG sections which may contain organized seizures.    Each channel undergoes analysis to  detect the presence of spike and sharp waves   which have special and morphological characteristics of epileptic activity.  The   routine EEG recording is converted from special into frequency domain.  This is   then displayed comparing homologous areas to identify areas of significant   asymmetry.  Algorithm to identify non-cortically generated artifact is used to   separate artifact from the EEG.      RECORDING TIMES:  Start on 07/27/2018 at 07:00.  End on 07/28/2018 at 07:00.  A total of 24 hours of EEG monitoring was obtained.    EEG FINDINGS:  Recording was obtained at the patient's bedside in the ICU.  The   patient was unresponsive, intubated and on a respirator.  Background was very   low voltage and with the use of maximum sensitivity, theta and some delta   frequencies could be identified, but they were all less than 10 microvolts in   amplitude.  There were no lateralized or focal findings.  However, occasional   slower delta was seen bilaterally and was maximal in the frontal polar area.  No   spike or sharp wave activity was seen.  Activation procedures were not carried   out.  Near the end of the recording, the background became even lower voltage.    Maximal voltage at this time was 5 microvolts or less.    IMPRESSION:  Markedly abnormal EEG with very low voltage noted throughout   indicative of a marked generalized cortical dysfunction and there is no evidence   for an irritative process.      RR/HN  dd: 07/28/2018 10:26:16 (CDT)  td: 07/28/2018 10:38:48 (CDT)  Doc ID   #[id number]  Job ID #[id number]    CC:

## 2018-07-31 NOTE — PROGRESS NOTES
"[facility]  Endocrinology  Progress Note    Admit Date: 2018     Reason for Consult: Management of hypothyroidism (on LT4 50mcg outpatient)    HPI:   Patient is a 62 y.o. female with a diagnosis of severe AS, hypothyroid, chronic sys/carney HF, A fib, SHERI, pHTN, and T2DM who has been admitted to [facility] s/p cardiac arrest. Per chart review, she was brought into the ED by her  secondary to progressive SOB.  In ED, pt was found to be hypoxic and soon after went into asystole. CPR performed for 15 min with multiple rounds of epi with ROSC. Pt intubated, started on broad spec abx, heparin gtt, propofol gtt and keppra load.    Endocrinology has been consulted for hypothyroidism.  Prior to being initiated on hypothermic protocol, patient was afebrile, normotensive and normal HR.  She had hypoxia/SOB upon arrival, and is on home O2, with intermittent compliance.  Besides the respiratory symptoms (which can be explained by comorbidities), no evidence of myxedema coma.       Interval HPI:   Overnight events:  VS stable    Eatin%  Nausea: No  Hypoglycemia and intervention: No  Fever: No  TPN and/or TF: Yes  If yes, type of TF/TPN and rate: 90 ml/h    /75 (BP Location: Right arm, Patient Position: Lying)   Pulse 99   Temp 98.8 °F (37.1 °C)   Resp (!) 21   Ht 5' 6" (1.676 m)   Wt 79 kg (174 lb 2.6 oz)   LMP  (LMP Unknown)   SpO2 100%   Breastfeeding? No   BMI 28.11 kg/m²       Labs Reviewed and Include      Recent Labs  Lab 18  0111   *   CALCIUM 9.2   ALBUMIN 2.9*   PROT 5.7*   *   K 4.0   CO2 17*   *   BUN 84*   CREATININE 1.4   ALKPHOS 414*   ALT 73*   *   BILITOT 3.1*     Lab Results   Component Value Date    WBC 10.20 2018    HGB 9.5 (L) 2018    HCT 31.8 (L) 2018    MCV 93 2018     2018       Recent Labs  Lab 18  0015 18  0330 18  0111   TSH 18.347*  --   --    FREET4 0.84 0.84 0.70* "     Lab Results   Component Value Date    HGBA1C 6.0 (H) 07/23/2018       Nutritional status:   Body mass index is 28.11 kg/m².  Lab Results   Component Value Date    ALBUMIN 2.9 (L) 07/31/2018    ALBUMIN 2.9 (L) 07/30/2018    ALBUMIN 3.0 (L) 07/30/2018    ALBUMIN 3.0 (L) 07/30/2018     No results found for: PREALBUMIN    Estimated Creatinine Clearance: 43.6 mL/min (based on SCr of 1.4 mg/dL).    Accu-Checks  Recent Labs      07/28/18   2030  07/29/18   0727  07/29/18   1700  07/29/18   1953  07/30/18   0013  07/30/18   0327  07/30/18   0850  07/30/18   1116  07/30/18   1630  07/31/18   0801   POCTGLUCOSE  163*  175*  144*  124*  96  109  148*  147*  150*  164*       Current Medications and/or Treatments Impacting Glycemic Control  Immunotherapy:  Immunosuppressants     None        Steroids:   Hormones     None        Pressors:    Autonomic Drugs     None        Hyperglycemia/Diabetes Medications: Antihyperglycemics     Start     Stop Route Frequency Ordered    07/28/18 1129  insulin aspart U-100 pen 0-5 Units      -- SubQ Every 4 hours PRN 07/28/18 1029          ASSESSMENT and PLAN    * Cardiac arrest due to respiratory disorder      Avoid hypoglycemia  Avoid iatrogenic hyperthyroidism        Acquired hypothyroidism      Patient on home dose of LT4 50 mcg, which is below weight based recommendations of 125 mcg daily.    Currently on IV Levothyroxine 65 mcg, daily  FT4 is 0.70    Would increase IV Levothyroxine to 80 mcg, daily.  When patient tolerating PO and decreased concern for absorption, recommend converting to 125mcg PO qD.    Check FT4 on alternate days, and TSH in one week.          Acute on chronic systolic congestive heart failure      Cardiac comorbidity  Managed per primary    Careful to not overdose LT4.        Paroxysmal atrial fibrillation      Cardiac comorbidity  Careful to not overdose synthroid        Type 2 diabetes mellitus with neurologic complication, without long-term current use of insulin     Only on oral mediation in outpatient setting  a1c 6.0%    BG goal 140-180 while inpatient    Recommend:  Low dose correction  POC q4h while NPO      Discharge:  TBD.            Didi Larkin MD  Endocrinology  [facility]

## 2018-07-31 NOTE — ASSESSMENT & PLAN NOTE
Patient on home dose of LT4 50 mcg, which is below weight based recommendations of 125 mcg daily.    Currently on IV Levothyroxine 65 mcg, daily  FT4 is 0.70    Would increase IV Levothyroxine to .  When patient tolerating PO and decreased concern for absorption, recommend converting to 125mcg PO qD.    Continue daily FT4 levels, until normalized.

## 2018-07-31 NOTE — PLAN OF CARE
Toxic metabolic encephalopathy superimposed on severe hypoxic ischemic encephalopathy  Cont mech vent  Supportive care  Sorry increase modafinil 200mg twice a day  Will consider amantadine  Coagulopathy unclear etiology, liver dysfunction - trial Vit K 10mg po x3 days.

## 2018-07-31 NOTE — PLAN OF CARE
Problem: Patient Care Overview  Goal: Plan of Care Review  Outcome: Ongoing (interventions implemented as appropriate)  POC reviewed with pt at 0515. Pt unable to verbalized understanding. Questions and concerns addressed. No acute events overnight. Pt progressing toward goals. Will continue to monitor. See flowsheets for full assessment and VS info

## 2018-07-31 NOTE — SUBJECTIVE & OBJECTIVE
"Interval HPI:   Overnight events:  VS stable    Eatin%  Nausea: No  Hypoglycemia and intervention: No  Fever: No  TPN and/or TF: Yes  If yes, type of TF/TPN and rate: 90 ml/h    /75 (BP Location: Right arm, Patient Position: Lying)   Pulse 99   Temp 98.8 °F (37.1 °C)   Resp (!) 21   Ht 5' 6" (1.676 m)   Wt 79 kg (174 lb 2.6 oz)   LMP  (LMP Unknown)   SpO2 100%   Breastfeeding? No   BMI 28.11 kg/m²     Labs Reviewed and Include      Recent Labs  Lab 18  0111   *   CALCIUM 9.2   ALBUMIN 2.9*   PROT 5.7*   *   K 4.0   CO2 17*   *   BUN 84*   CREATININE 1.4   ALKPHOS 414*   ALT 73*   *   BILITOT 3.1*     Lab Results   Component Value Date    WBC 10.20 2018    HGB 9.5 (L) 2018    HCT 31.8 (L) 2018    MCV 93 2018     2018       Recent Labs  Lab 18  0015 18  0330 18  0111   TSH 18.347*  --   --    FREET4 0.84 0.84 0.70*     Lab Results   Component Value Date    HGBA1C 6.0 (H) 2018       Nutritional status:   Body mass index is 28.11 kg/m².  Lab Results   Component Value Date    ALBUMIN 2.9 (L) 2018    ALBUMIN 2.9 (L) 2018    ALBUMIN 3.0 (L) 2018    ALBUMIN 3.0 (L) 2018     No results found for: PREALBUMIN    Estimated Creatinine Clearance: 43.6 mL/min (based on SCr of 1.4 mg/dL).    Accu-Checks  Recent Labs      18   2030  18   0727  18   1700  18   1953  18   0013  18   0327  18   0850  18   1116  18   1630  18   0801   POCTGLUCOSE  163*  175*  144*  124*  96  109  148*  147*  150*  164*       Current Medications and/or Treatments Impacting Glycemic Control  Immunotherapy:  Immunosuppressants     None        Steroids:   Hormones     None        Pressors:    Autonomic Drugs     None        Hyperglycemia/Diabetes Medications: Antihyperglycemics     Start     Stop Route Frequency Ordered    18 1129  insulin aspart U-100 " pen 0-5 Units      -- SubQ Every 4 hours PRN 07/28/18 7082

## 2018-07-31 NOTE — PHYSICIAN QUERY
PT Name: Anum Quick  MR #: [id number]    Physician Query Form - Cause and Effect Relationship Clarification      CDS/: Maxwell Ragsdale               Contact information: [phone]    This form is a permanent document in the medical record.     Query Date: July 31, 2018    By submitting this query, we are merely seeking further clarification of documentation. Please utilize your independent clinical judgment when addressing the question(s) below.    The Medical record contains the following:  Supporting Clinical Findings   Location in record      Culture, Respiratory with Gram Stain [[id number]]  (Susceptibility) Collected:  07/23/18 0333      Order Status:  Completed Specimen:  Respiratory from Sputum Updated:  07/25/18 1137       Respiratory Culture --       STAPHYLOCOCCUS AUREUS  Rare     Active Hospital Problems     Diagnosis    *Cardiac arrest due to respiratory disorder    Alteration in skin integrity    Preventive measure    Cardiac LV ejection fraction <20%    Pulmonary hypertension    Status epilepticus    Acquired hypothyroidism    HARISH (acute kidney injury)    Right foot ulcer    Acute on chronic systolic congestive heart failure    Chronic anticoagulation - on rivaroxaban       PAF       Paroxysmal atrial fibrillation    Hyperlipidemia associated with type 2 diabetes mellitus    Type 2 diabetes mellitus with neurologic complication, without long-term current use of insulin                                                                                                                                                                                                Progress note( [facility]) Neuro CC 7/30    Acute hepatic insuff    Febrile    Sepsis; secondary to aspiration poa    atn                                                                                                                                                                                          Progress note 7/27 Neuro  CC ([facility])         Provider, please clarify if there is any correlation between ___STAPHYLOCOCCUS AUREUS______ and ___Sepsis_______________.           Are the conditions:     [  ] Due to or associated with each other     [ x ] Unrelated to each other     [  ] Other (Please Specify): _________________________     [  ] Clinically Undetermined

## 2018-07-31 NOTE — PLAN OF CARE
Problem: Patient Care Overview  Goal: Plan of Care Review  Outcome: Ongoing (interventions implemented as appropriate)  POC reviewed with pt's  and brother at 1500. Pt's  and brother verbalized understanding. Questions and concerns addressed. No acute events today. Pt was connected to EEG. Pt was also given a bath and had a bowel movement. Pt progressing toward goals. Will continue to monitor. See flowsheets for full assessment and VS info.

## 2018-08-01 NOTE — PLAN OF CARE
Acquired hypothyroidism  Reviewed FT4 level and BG levels.    Would increase IV Levothyroxine to 80 mcg, daily.  When patient tolerating PO and decreased concern for absorption, recommend converting to 125mcg PO qD.     Check FT4 on alternate days, and TSH in one week.    Type 2 diabetes mellitus with neurologic complication, without long-term current use of insulin     Only on oral mediation in outpatient setting  a1c 6.0%     BG goal 140-180 while inpatient     Recommend:  Low dose correction  POC q4h while NPO

## 2018-08-01 NOTE — PROGRESS NOTES
[facility]  Neurocritical Care  Progress Note    Admit Date: 7/22/2018  Service Date: 07/31/2018  Length of Stay: 9    Subjective:     Chief Complaint: Cardiac arrest due to respiratory disorder    History of Present Illness: nAum Quick 61 yo female, PMH of severe AS, hypothyroid, urinary retention, chronic sys/carney HF, A fib, SHERI, DM2 who presents to [facility] s/p cardiac arrest. Pt developed shortness of breath on night PTA and it progressed until the day of admit when  brought her to ED.  He reports she was not compliant with her home O2 but is compliant with meds. In ED, pt was found to be hypoxic and soon after went into asystole. CPR performed for 15 min with multiple rounds of epi with ROSC. Pt intubated, started on broad spec abx, heparin gtt, propofol gtt and keppra load. She is being admitted to [facility] for a higher level of care.     Hospital Course: 7/22: admit NCC, cooling protocol, EEG Diffuse slowing no sz, propofol infusion  7/25: Neuro: patient without corneal reflex. With pupillary reflex +, gag reflex +, does not withdraw to pain.  7/29: MRI brain with diffusion restriction involving the bilateral lentiform nuclei, thalami, caudate nuclei, paramedian occipital cortices, and perirolandic cortices. EEG reported as alpha coma.No ictal activity. Severe suppression of brain activity.  CXR: L > R pleural effusions.   7/31: no acute events overnight     Review of Symptoms: comatose cannot participate  Constitutional: Denies fevers or chills.  Pulmonary: Denies shortness of breath or cough.  Cardiology: Denies chest pain or palpitations.  GI: Denies abdominal pain or constipation.  Neurologic: Denies new weakness, headaches, or paresthesias.     Medications:  Continuous Infusions:   sodium chloride 0.9% 75 mL/hr at 07/31/18 1805     Scheduled Meds:   aspirin  81 mg Per NG tube Daily    chlorhexidine  15 mL Mouth/Throat BID    famotidine  20 mg Per NG tube Daily    heparin  (porcine)  5,000 Units Subcutaneous Q8H    levothyroxine  65 mcg Intravenous Daily    [START ON 8/1/2018] modafinil  200 mg Per NG tube BID    multivitamin  1 tablet Per NG tube Daily    phytonadione  10 mg Per OG tube Daily    senna-docusate 8.6-50 mg  1 tablet Per NG tube Daily    silodosin  4 mg Per NG tube Daily    thiamine  100 mg Per NG tube Daily     PRN Meds:.albuterol-ipratropium, dextrose 50%, fentaNYL, glucagon (human recombinant), insulin aspart U-100, potassium chloride 10%, potassium chloride 10%, potassium chloride 10%    OBJECTIVE:   Vital Signs (Most Recent):   Temp: 99.7 °F (37.6 °C) (07/31/18 2131)  Pulse: 104 (07/31/18 2203)  Resp: (!) 23 (07/31/18 2203)  BP: (!) 140/75 (07/31/18 1905)  SpO2: (!) 92 % (07/31/18 2203)    Vital Signs (24h Range):   Temp:  [98.8 °F (37.1 °C)-99.7 °F (37.6 °C)] 99.7 °F (37.6 °C)  Pulse:  [] 104  Resp:  [15-40] 23  SpO2:  [92 %-100 %] 92 %  BP: (130-140)/(75) 140/75  Arterial Line BP: (118-142)/() 139/98    ICP/CPP (Last 24h):        I & O (Last 24h):   Intake/Output Summary (Last 24 hours) at 07/31/18 2210  Last data filed at 07/31/18 2204   Gross per 24 hour   Intake          2803.75 ml   Output             1560 ml   Net          1243.75 ml     Physical Exam:  GA: comatose, comfortable, no acute distress.   HEENT: No scleral icterus or JVD.   Pulmonary: Clear to auscultation A/P/L. No wheezing, crackles, or rhonchi.  Cardiac: RRR S1 & S2 w/o rubs/murmurs/gallops.   Abdominal: Bowel sounds present x 4. No appreciable hepatosplenomegaly.  Skin: No jaundice, rashes, or visible lesions.  Neuro:  --Mental Status:  Comatose, no spontaneous eye opening, no purposeful movement  --Pupils 3.5 mm, PERRL.   --Corneal reflex, gag, cough intact.  No movements    Vent Data:   Vent Mode: A/C  Oxygen Concentration (%):  [35] 35  Resp Rate Total:  [13 br/min-25 br/min] 21 br/min  Vt Set:  [450 mL] 450 mL  PEEP/CPAP:  [5 cmH20] 5 cmH20  Pressure Support:  [0 cmH20]  0 cmH20  Mean Airway Pressure:  [9.1 dsI54-95 cmH20] 9.9 cmH20    Lines/Drains/Airway:        Airway - Non-Surgical 07/22/18 1848 Endotracheal Tube (Active)   Secured at 23 cm 7/31/2018  7:39 PM   Measured At Lips 7/31/2018  7:39 PM   Secured Location Center 7/31/2018  7:39 PM   Secured by Commercial tube malcolm 7/31/2018  7:39 PM   Bite Block center;secure and patent 7/31/2018  7:39 PM   Site Condition Cool;Dry 7/31/2018  7:39 PM   Status Intact;Patent;Secured 7/31/2018  7:39 PM   Site Assessment Clean;No bleeding;Dry 7/31/2018  7:39 PM   Cuff Pressure 24 cm H2O 7/31/2018  7:39 PM           Percutaneous Central Line Insertion/Assessment - triple lumen  07/23/18 1600 right femoral (Active)   Dressing biopatch in place;dressing dry and intact 7/31/2018  7:05 PM   Securement secured w/ sutures 7/31/2018  7:05 PM   Additional Site Signs no erythema;no warmth;no pain;no edema;no palpable cord;no streak formation;no drainage 7/31/2018  3:05 PM   Distal Patency/Care infusing 7/31/2018  7:05 PM   Medial Patency/Care infusing 7/31/2018  7:05 PM   Proximal Patency/Care flushed w/o difficulty;normal saline locked 7/31/2018  7:05 PM   Dressing Change Due 08/06/18 7/31/2018  7:05 PM   Daily Line Review Performed 7/31/2018  7:05 PM           Arterial Line 07/23/18 1530 Left Other (Comment) (Active)   Site Assessment Clean;Dry;Intact;No redness;No swelling 7/31/2018  7:05 PM   Line Status Pulsatile blood flow 7/31/2018  7:05 PM   Art Line Waveform Appropriate;Square wave test performed 7/31/2018  7:05 PM   Arterial Line Interventions Zeroed and calibrated 7/31/2018  7:05 PM   Color/Movement/Sensation Capillary refill less than 3 sec 7/31/2018  7:05 PM   Dressing Type Transparent 7/31/2018  7:05 PM   Dressing Status Biopatch in place;Clean;Dry;Intact 7/31/2018  7:05 PM   Dressing Intervention Dressing reinforced 7/31/2018  7:05 PM   Dressing Change Due 08/06/18 7/31/2018  7:05 PM           NG/OG Tube 07/22/18 1200 orogastric;[place]  "sump 14 Fr. Right mouth (Active)   Placement Check placement verified by x-ray 7/31/2018  7:05 PM   Distal Tube Length (cm) 60 7/31/2018  3:05 PM   Tolerance no signs/symptoms of discomfort 7/31/2018  3:05 PM   Securement taped to commercial device 7/31/2018  3:05 PM   Clamp Status/Tolerance unclamped 7/31/2018  3:05 PM   Suction Setting/Drainage Method suction at;low;intermittent setting 7/24/2018  3:00 PM   Insertion Site Appearance no redness, warmth, tenderness, skin breakdown, drainage 7/31/2018  3:05 PM   Flush/Irrigation flushed w/;water;no resistance met 7/31/2018  7:05 PM   Feeding Method continuous 7/31/2018  3:05 PM   Feeding Action feeding continued 7/31/2018  3:05 PM   Current Rate (mL/hr) 45 mL/hr 7/31/2018  3:05 PM   Goal Rate (mL/hr) 45 mL/hr 7/31/2018  3:05 PM   Intake (mL) 80 mL 7/30/2018  9:05 AM   Tube Output(mL)(Include Discarded Residual) 0 mL 7/24/2018  8:00 AM   Intake (mL) - Formula Tube Feeding 45 7/31/2018 10:04 PM   Residual Amount (ml) 0 ml 7/30/2018  5:05 AM            Urethral Catheter 07/22/18 1900 Temperature probe (Active)   Site Assessment Clean;Intact 7/31/2018  7:05 PM   Collection Container Urimeter 7/31/2018  7:05 PM   Securement Method secured to top of thigh w/ adhesive device 7/31/2018  7:05 PM   Catheter Care Performed yes 7/31/2018  7:05 PM   Reason for Continuing Urinary Catheterization Critically ill in ICU requiring intensive monitoring 7/31/2018  7:05 PM   CAUTI Prevention Bundle StatLock in place w 1" slack;Intact seal between catheter & drainage tubing;Drainage bag off the floor;Green sheeting clip in use;No dependent loops or kinks;Drainage bag not overfilled (<2/3 full);Drainage bag below bladder 7/31/2018  3:05 PM   Output (mL) 65 mL 7/31/2018 10:04 PM     Nutrition/Tube Feeds (if NPO state why): tf     Labs:  ABG:   Recent Labs  Lab 07/31/18  0352   PH 7.435   PO2 172*   PCO2 33.2*   HCO3 22.2*   POCSATURATED 100   BE -2     BMP:  Recent Labs  Lab 07/31/18  0111 "   *   K 4.0   *   CO2 17*   BUN 84*   CREATININE 1.4   *   MG 2.4   PHOS 4.3     LFT: Lab Results   Component Value Date     (H) 07/31/2018    ALT 73 (H) 07/31/2018     (H) 07/07/2018    ALKPHOS 414 (H) 07/31/2018    BILITOT 3.1 (H) 07/31/2018    ALBUMIN 2.9 (L) 07/31/2018    PROT 5.7 (L) 07/31/2018     CBC:   Lab Results   Component Value Date    WBC 10.20 07/31/2018    HGB 9.5 (L) 07/31/2018    HCT 31.8 (L) 07/31/2018    MCV 93 07/31/2018     07/31/2018     Microbiology x 7d:   Microbiology Results (last 7 days)     Procedure Component Value Units Date/Time    Blood culture [[id number]] Collected:  07/22/18 2152    Order Status:  Completed Specimen:  Blood from Peripheral, Antecubital, Left Updated:  07/28/18 0612     Blood Culture, Routine No growth after 5 days.    Blood culture [[id number]] Collected:  07/22/18 2145    Order Status:  Completed Specimen:  Blood from Wrist, Left Updated:  07/28/18 0612     Blood Culture, Routine No growth after 5 days.    Culture, Respiratory with Gram Stain [[id number]]  (Susceptibility) Collected:  07/23/18 0333    Order Status:  Completed Specimen:  Respiratory from Sputum Updated:  07/25/18 1137     Respiratory Culture --     STAPHYLOCOCCUS AUREUS  Rare       Gram Stain (Respiratory) <10 epithelial cells per low power field.     Gram Stain (Respiratory) Moderate WBC's     Gram Stain (Respiratory) No organisms seen        Imaging:    I personally reviewed the above image.    ASSESSMENT/PLAN:     Active Hospital Problems    Diagnosis    *Cardiac arrest due to respiratory disorder    Alteration in skin integrity    Preventive measure    Cardiac LV ejection fraction <20%    Pulmonary hypertension    Status epilepticus    Acquired hypothyroidism    HARISH (acute kidney injury)    Right foot ulcer    Acute on chronic systolic congestive heart failure    Chronic anticoagulation - on rivaroxaban     PAF      Paroxysmal atrial fibrillation     Hyperlipidemia associated with type 2 diabetes mellitus    Type 2 diabetes mellitus with neurologic complication, without long-term current use of insulin    Severe aortic stenosis     12-15-17  Severe low flow aortic valve stenosis (JAMES 0.49 cm2, AVAi 0.27 cm2/m2, peak aortic jet velocity 4.0 m/s,MG 48 mmHg).           Neuro:   Toxic metabolic encephalopathy with HIE  Increase modafinil 200 bid  May consider amantadine    Pulmonary:   Cont mech vent  Likely needs trach    Cardiac:   Hemodynamically stable    Renal:    Making urine    ID:   Afebrile, normal wbc    Hem/Onc:   Stable hh and plt count    Endocrine:    On levothyroxine  Appreciate endocrinology input    Fluids/Electrolytes/Nutrition/GI:   tf  Lines:  Art +  CVC +  ETT +  Crowe  NG +  PEG    Proph:  DVT:SCD/ heparin  Constipation:  Last output:bowel regimen  PUP:pepcid  VAP:peridex    Full Code    Uninterrupted Critical Care/Counseling Time (not including procedures):: 30 mins    Barber Riddle MD  Neurocritical care attending    Barber Riddle MD  Neurocritical Care  [facility]

## 2018-08-01 NOTE — PROGRESS NOTES
[facility]  Neurocritical Care  Progress Note    Admit Date: 7/22/2018  Service Date: 08/01/2018  Length of Stay: 10    Subjective:     Chief Complaint: Cardiac arrest due to respiratory disorder    History of Present Illness: Anum Quick 61 yo female, PMH of severe AS, hypothyroid, urinary retention, chronic sys/carney HF, A fib, SHERI, DM2 who presents to [facility] s/p cardiac arrest. Pt developed shortness of breath on night PTA and it progressed until the day of admit when  brought her to ED.  He reports she was not compliant with her home O2 but is compliant with meds. In ED, pt was found to be hypoxic and soon after went into asystole. CPR performed for 15 min with multiple rounds of epi with ROSC. Pt intubated, started on broad spec abx, heparin gtt, propofol gtt and keppra load. She is being admitted to [facility] for a higher level of care.     Hospital Course: 7/22: admit NCC, cooling protocol, EEG Diffuse slowing no sz, propofol infusion  7/25: Neuro: patient without corneal reflex. With pupillary reflex +, gag reflex +, does not withdraw to pain.  7/29: MRI brain with diffusion restriction involving the bilateral lentiform nuclei, thalami, caudate nuclei, paramedian occipital cortices, and perirolandic cortices. EEG reported as alpha coma.No ictal activity. Severe suppression of brain activity.  CXR: L > R pleural effusions.   7/31: no acute events overnight   8/1: rising wbc, temp trending up. Panculture. Will resume antibiotics if temp spike >38C    Review of Symptoms: comatose cannot participate  Constitutional: Denies fevers or chills.  Pulmonary: Denies shortness of breath or cough.  Cardiology: Denies chest pain or palpitations.  GI: Denies abdominal pain or constipation.  Neurologic: Denies new weakness, headaches, or paresthesias.     Medications:  Continuous Infusions:   sodium chloride 0.9% 75 mL/hr at 08/01/18 0800     Scheduled Meds:   aspirin  81 mg Per NG tube Daily     chlorhexidine  15 mL Mouth/Throat BID    famotidine  20 mg Per NG tube Daily    heparin (porcine)  5,000 Units Subcutaneous Q8H    levothyroxine  65 mcg Intravenous Daily    modafinil  200 mg Per NG tube BID    multivitamin  1 tablet Per NG tube Daily    phytonadione  10 mg Per OG tube Daily    senna-docusate 8.6-50 mg  1 tablet Per NG tube Daily    silodosin  4 mg Per NG tube Daily    thiamine  100 mg Per NG tube Daily     PRN Meds:.albuterol-ipratropium, dextrose 50%, fentaNYL, glucagon (human recombinant), insulin aspart U-100, potassium chloride 10%, potassium chloride 10%, potassium chloride 10%    OBJECTIVE:   Vital Signs (Most Recent):   Temp: 99.7 °F (37.6 °C) (08/01/18 0805)  Pulse: 100 (08/01/18 0805)  Resp: (!) 21 (08/01/18 0805)  BP: (!) 140/75 (08/01/18 0305)  SpO2: 100 % (08/01/18 0805)    Vital Signs (24h Range):   Temp:  [98.8 °F (37.1 °C)-100 °F (37.8 °C)] 99.7 °F (37.6 °C)  Pulse:  [] 100  Resp:  [16-40] 21  SpO2:  [91 %-100 %] 100 %  BP: (135-140)/(75) 140/75  Arterial Line BP: (118-147)/() 147/105    ICP/CPP (Last 24h):        I & O (Last 24h):     Intake/Output Summary (Last 24 hours) at 08/01/18 0859  Last data filed at 08/01/18 0800   Gross per 24 hour   Intake          3103.75 ml   Output             1605 ml   Net          1498.75 ml     Physical Exam:  GA: comatose, comfortable, no acute distress.   HEENT: No scleral icterus or JVD.   Pulmonary: Clear to auscultation A/P/L. No wheezing, crackles, or rhonchi.  Cardiac: RRR S1 & S2 w/o rubs/murmurs/gallops.   Abdominal: Bowel sounds present x 4. No appreciable hepatosplenomegaly.  Skin: No jaundice, rashes, or visible lesions.  Neuro:  --Mental Status:  Comatose, no spontaneous eye opening, no purposeful movement  --Pupils 3.5 mm, PERRL.   --Corneal reflex, gag, cough intact.  No movements  Diffuse pitting peripheral edema ue and le    Vent Data:   Vent Mode: A/C  Oxygen Concentration (%):  [35] 35  Resp Rate Total:  [13  br/min-22 br/min] 21 br/min  Vt Set:  [450 mL] 450 mL  PEEP/CPAP:  [5 cmH20] 5 cmH20  Pressure Support:  [0 cmH20] 0 cmH20  Mean Airway Pressure:  [9.9 rlX89-54 cmH20] 12 cmH20    Lines/Drains/Airway:        Airway - Non-Surgical 07/22/18 1848 Endotracheal Tube (Active)   Secured at 23 cm 7/31/2018  7:39 PM   Measured At Lips 7/31/2018  7:39 PM   Secured Location Center 7/31/2018  7:39 PM   Secured by Commercial tube malcolm 7/31/2018  7:39 PM   Bite Block center;secure and patent 7/31/2018  7:39 PM   Site Condition Cool;Dry 7/31/2018  7:39 PM   Status Intact;Patent;Secured 7/31/2018  7:39 PM   Site Assessment Clean;No bleeding;Dry 7/31/2018  7:39 PM   Cuff Pressure 24 cm H2O 7/31/2018  7:39 PM           Percutaneous Central Line Insertion/Assessment - triple lumen  07/23/18 1600 right femoral (Active)   Dressing biopatch in place;dressing dry and intact 7/31/2018  7:05 PM   Securement secured w/ sutures 7/31/2018  7:05 PM   Additional Site Signs no erythema;no warmth;no pain;no edema;no palpable cord;no streak formation;no drainage 7/31/2018  3:05 PM   Distal Patency/Care infusing 7/31/2018  7:05 PM   Medial Patency/Care infusing 7/31/2018  7:05 PM   Proximal Patency/Care flushed w/o difficulty;normal saline locked 7/31/2018  7:05 PM   Dressing Change Due 08/06/18 7/31/2018  7:05 PM   Daily Line Review Performed 7/31/2018  7:05 PM           Arterial Line 07/23/18 1530 Left Other (Comment) (Active)   Site Assessment Clean;Dry;Intact;No redness;No swelling 7/31/2018  7:05 PM   Line Status Pulsatile blood flow 7/31/2018  7:05 PM   Art Line Waveform Appropriate;Square wave test performed 7/31/2018  7:05 PM   Arterial Line Interventions Zeroed and calibrated 7/31/2018  7:05 PM   Color/Movement/Sensation Capillary refill less than 3 sec 7/31/2018  7:05 PM   Dressing Type Transparent 7/31/2018  7:05 PM   Dressing Status Biopatch in place;Clean;Dry;Intact 7/31/2018  7:05 PM   Dressing Intervention Dressing reinforced  "7/31/2018  7:05 PM   Dressing Change Due 08/06/18 7/31/2018  7:05 PM           NG/OG Tube 07/22/18 1200 orogastric;[place] sump 14 Fr. Right mouth (Active)   Placement Check placement verified by x-ray 7/31/2018  7:05 PM   Distal Tube Length (cm) 60 7/31/2018  3:05 PM   Tolerance no signs/symptoms of discomfort 7/31/2018  3:05 PM   Securement taped to commercial device 7/31/2018  3:05 PM   Clamp Status/Tolerance unclamped 7/31/2018  3:05 PM   Suction Setting/Drainage Method suction at;low;intermittent setting 7/24/2018  3:00 PM   Insertion Site Appearance no redness, warmth, tenderness, skin breakdown, drainage 7/31/2018  3:05 PM   Flush/Irrigation flushed w/;water;no resistance met 7/31/2018  7:05 PM   Feeding Method continuous 7/31/2018  3:05 PM   Feeding Action feeding continued 7/31/2018  3:05 PM   Current Rate (mL/hr) 45 mL/hr 7/31/2018  3:05 PM   Goal Rate (mL/hr) 45 mL/hr 7/31/2018  3:05 PM   Intake (mL) 80 mL 7/30/2018  9:05 AM   Tube Output(mL)(Include Discarded Residual) 0 mL 7/24/2018  8:00 AM   Intake (mL) - Formula Tube Feeding 45 7/31/2018 10:04 PM   Residual Amount (ml) 0 ml 7/30/2018  5:05 AM            Urethral Catheter 07/22/18 1900 Temperature probe (Active)   Site Assessment Clean;Intact 7/31/2018  7:05 PM   Collection Container Urimeter 7/31/2018  7:05 PM   Securement Method secured to top of thigh w/ adhesive device 7/31/2018  7:05 PM   Catheter Care Performed yes 7/31/2018  7:05 PM   Reason for Continuing Urinary Catheterization Critically ill in ICU requiring intensive monitoring 7/31/2018  7:05 PM   CAUTI Prevention Bundle StatLock in place w 1" slack;Intact seal between catheter & drainage tubing;Drainage bag off the floor;Green sheeting clip in use;No dependent loops or kinks;Drainage bag not overfilled (<2/3 full);Drainage bag below bladder 7/31/2018  3:05 PM   Output (mL) 65 mL 7/31/2018 10:04 PM     Nutrition/Tube Feeds (if NPO state why): tf     Labs:  ABG:     Recent Labs  Lab " 08/01/18  0512   PH 7.485*   PO2 84   PCO2 30.8*   HCO3 23.3*   POCSATURATED 97   BE 0     BMP:    Recent Labs  Lab 08/01/18  0130   *   K 5.0   *   CO2 18*   BUN 82*   CREATININE 1.3   *   MG 3.1*   PHOS 3.4     LFT:   Lab Results   Component Value Date     (H) 08/01/2018    ALT 66 (H) 08/01/2018     (H) 07/07/2018    ALKPHOS 513 (H) 08/01/2018    BILITOT 2.5 (H) 08/01/2018    ALBUMIN 2.7 (L) 08/01/2018    PROT 6.1 08/01/2018     CBC:   Lab Results   Component Value Date    WBC 16.72 (H) 08/01/2018    HGB 9.7 (L) 08/01/2018    HCT 32.9 (L) 08/01/2018    MCV 96 08/01/2018     08/01/2018     Microbiology x 7d:   Microbiology Results (last 7 days)     Procedure Component Value Units Date/Time    Blood culture [[id number]] Collected:  07/22/18 2152    Order Status:  Completed Specimen:  Blood from Peripheral, Antecubital, Left Updated:  07/28/18 0612     Blood Culture, Routine No growth after 5 days.    Blood culture [[id number]] Collected:  07/22/18 2145    Order Status:  Completed Specimen:  Blood from Wrist, Left Updated:  07/28/18 0612     Blood Culture, Routine No growth after 5 days.    Culture, Respiratory with Gram Stain [[id number]]  (Susceptibility) Collected:  07/23/18 0333    Order Status:  Completed Specimen:  Respiratory from Sputum Updated:  07/25/18 1137     Respiratory Culture --     STAPHYLOCOCCUS AUREUS  Rare       Gram Stain (Respiratory) <10 epithelial cells per low power field.     Gram Stain (Respiratory) Moderate WBC's     Gram Stain (Respiratory) No organisms seen        Imaging:    I personally reviewed the above image.    ASSESSMENT/PLAN:     Active Hospital Problems    Diagnosis    *Cardiac arrest due to respiratory disorder    Alteration in skin integrity    Preventive measure    Cardiac LV ejection fraction <20%    Pulmonary hypertension    Status epilepticus    Acquired hypothyroidism    HARISH (acute kidney injury)    Right foot ulcer    Acute  on chronic systolic congestive heart failure    Chronic anticoagulation - on rivaroxaban     PAF      Paroxysmal atrial fibrillation    Hyperlipidemia associated with type 2 diabetes mellitus    Type 2 diabetes mellitus with neurologic complication, without long-term current use of insulin    Severe aortic stenosis     12-15-17  Severe low flow aortic valve stenosis (JAMES 0.49 cm2, AVAi 0.27 cm2/m2, peak aortic jet velocity 4.0 m/s,MG 48 mmHg).           Neuro:   Toxic metabolic encephalopathy with HIE  Increased modafinil 200 bid  Consider amantadine    Pulmonary:   Cont mech vent  Likely needs trach    Cardiac:   Hemodynamically stable  EF 15%    Renal:    Making urine  Lasix 40mg q12 PRN to achieve urine output >100/hr. (hold for MAP <65mmhg)      ID:   Leucocytosis, temp trending up    Hem/Onc:   Stable hh and plt count    Endocrine:    On levothyroxine  Appreciate endocrinology input    Fluids/Electrolytes/Nutrition/GI:   tf  Lines:  Art +  CVC + d/c  ETT +  Crowe  NG +  PEG NA    Proph:  DVT:SCD/ heparin  Constipation:  Last output:bowel regimen  PUP:pepcid  VAP:peridex    Full Code    Uninterrupted Critical Care/Counseling Time (not including procedures):: 30 mins    Barber Riddle MD  Neurocritical care attending    Barber Riddle MD  Neurocritical Care  [facility]

## 2018-08-01 NOTE — PROGRESS NOTES
Unable to get PIV access. Only able to get one set of blood cultures. Notified NCC team, spoke with ROSI Leonardo. Orders received for midline insertion.

## 2018-08-01 NOTE — CONSULTS
Midline placed in right brachial vein, 18g x 10cm size.  Max dwell date 8/30/18.  Lot# [id number].  Needle advanced using realtime u/s guidance.

## 2018-08-01 NOTE — PLAN OF CARE
Problem: Patient Care Overview  Goal: Plan of Care Review  Outcome: Ongoing (interventions implemented as appropriate)  POC reviewed with pt at 0500. Pt unable to verbalized understanding. Questions and concerns addressed. No acute events overnight. Pt progressing toward goals. Will continue to monitor. See flowsheets for full assessment and VS info

## 2018-08-01 NOTE — PLAN OF CARE
Problem: Patient Care Overview  Goal: Plan of Care Review  Outcome: Ongoing (interventions implemented as appropriate)  POC reviewed with pt and family at 1400. Family verbalized understanding. Questions and concerns addressed. NO evidence of learning for patient. No acute events today. Pt progressing toward goals. Will continue to monitor. See notes/flowsheets for full assessment and VS info.

## 2018-08-02 PROBLEM — E87.0 HYPERNATREMIA: Status: ACTIVE | Noted: 2018-01-01

## 2018-08-02 NOTE — PROGRESS NOTES
[facility]  Neurocritical Care  Progress Note    Admit Date: 7/22/2018  Service Date: 08/02/2018  Length of Stay: 11    Subjective:     Chief Complaint: Cardiac arrest due to respiratory disorder    History of Present Illness: Anum Quick 63 yo female, PMH of severe AS, hypothyroid, urinary retention, chronic sys/carney HF, A fib, SHERI, DM2 who presents to [facility] s/p cardiac arrest. Pt developed shortness of breath on night PTA and it progressed until the day of admit when  brought her to ED.  He reports she was not compliant with her home O2 but is compliant with meds. In ED, pt was found to be hypoxic and soon after went into asystole. CPR performed for 15 min with multiple rounds of epi with ROSC. Pt intubated, started on broad spec abx, heparin gtt, propofol gtt and keppra load. She is being admitted to [facility] for a higher level of care.     Hospital Course: 7/22: admit NCC, cooling protocol, EEG Diffuse slowing no sz, propofol infusion  7/25: Neuro: patient without corneal reflex. With pupillary reflex +, gag reflex +, does not withdraw to pain.  7/29: MRI brain with diffusion restriction involving the bilateral lentiform nuclei, thalami, caudate nuclei, paramedian occipital cortices, and perirolandic cortices. EEG reported as alpha coma.No ictal activity. Severe suppression of brain activity.  CXR: L > R pleural effusions.   7/31: no acute events overnight   8/1: rising wbc, temp trending up. Panculture. Will resume antibiotics if temp spike >38C  8/2: hypernatremic, increased enteral water to 400q4h, 40mg lasix IV q12h for 4 doses, EEG continues to remain w/o seizures per epilepsy, will d/c today, patient made DNR per family's wishes        Review of Systems   Unable to obtain a complete ROS due to level of consciousness and intubated  Objective:     Vitals:  Temp: 99.5 °F (37.5 °C)  Pulse: 91  Rhythm: sinus tachycardia  BP: 135/75  CI (L/min/m2): 1.6 L/min/m2  SVI: 18  SVV: 3 %  Resp:  20  SpO2: 100 %  Oxygen Concentration (%): 40  O2 Device (Oxygen Therapy): ventilator  Vent Mode: A/C  Set Rate: 12 bmp  Vt Set: 450 mL  Pressure Support: 0 cmH20  PEEP/CPAP: 5 cmH20  Peak Airway Pressure: 24 cmH2O  Mean Airway Pressure: 9.7 cmH20  Plateau Pressure: 19 cmH20    Temp  Min: 98.6 °F (37 °C)  Max: 100.2 °F (37.9 °C)  Pulse  Min: 89  Max: 104  BP  Min: 125/75  Max: 135/75  CI (L/min/m2)  Min: 1.6 L/min/m2  Max: 1.6 L/min/m2  SVI  Min: 16  Max: 18  SVV  Min: 3 %  Max: 3 %  Resp  Min: 13  Max: 20  SpO2  Min: 95 %  Max: 100 %  Oxygen Concentration (%)  Min: 40  Max: 40    08/01 0701 - 08/02 0700  In: 1715 [I.V.:555]  Out: 4090 [Urine:4090]   Unmeasured Output  Stool Occurrence: 1       Physical Exam    Physical Exam:  GA: comfortable, no acute distress.   HEENT: No scleral icterus or JVD.   Pulmonary: Clear to auscultation Anteriorly. No wheezing, crackles, or rhonchi.  Cardiac: systolic murmur  Abdominal: Bowel sounds present x 4. No appreciable hepatosplenomegaly.  Skin: No jaundice, rashes, or visible lesions.  Neuro:  --GCS: E2 Vt M2  --Mental Status:  Eyes closed, intubated, doesn't follow commands  --Pupils 4mm, PERRL.   --Corneal reflex, gag, cough intact.  --upper extremities extensor posturing  --lower extremities triple flexion  Unable to test orientation, language, memory, judgment, insight, fund of knowledge, hearing, shoulder shrug, tongue protrusion, coordination, gait due to level of consciousness.    Medications:  Continuous Scheduled  aspirin 81 mg Daily   chlorhexidine 15 mL BID   famotidine 20 mg Daily   heparin (porcine) 5,000 Units Q8H   levothyroxine 65 mcg Daily   modafinil 200 mg BID   multivitamin 1 tablet Daily   phytonadione 10 mg Daily   senna-docusate 8.6-50 mg 1 tablet Daily   silodosin 4 mg Daily   thiamine 100 mg Daily   PRN  albuterol-ipratropium 3 mL Q6H PRN   dextrose 50% 12.5 g PRN   fentaNYL 50 mcg Q4H PRN   furosemide 40 mg BID PRN   glucagon (human recombinant) 1 mg  PRN   insulin aspart U-100 0-5 Units Q4H PRN     Today I personally reviewed pertinent medications, lines/drains/airways, imaging, cardiology, lab results, microbiology results,     Diet  Diet NPO  Diet NPO  Tube feeds      Assessment/Plan:     Neuro   Status epilepticus    - EEG - 7/30 = very low voltage with no electrocortical activity indicating a very marked diffuse cortical dysfuction  8/2 = epilepsy states EEG continues to remain flat, recommend d/c EEG for now per epilepsy  - EEG d/c  - Propofol gtt d/c        Pulmonary   * Cardiac arrest due to respiratory disorder    - s/p cardiac arrest  - Cooling protocol goal temp 36  - CTA chest 7/6- 1. Small bilateral pleural effusions with suspected mild pulmonary edema.  2. Right lower lobe 7 mm pulmonary nodule.   3. Small volume perihepatic ascites.        Cardiac/Vascular   Acute on chronic systolic congestive heart failure    - echo 7/23/18  CONCLUSIONS     1 - Mild biatrial enlargement.     2 - Severely depressed left ventricular systolic function (EF 15%).     3 - Right ventricular enlargement with mildly depressed systolic function.     4 - Severe aortic valve stenosis (JAMES 0.61 cm2, AVAi 0.32 cm2/m2, peak aortic jet velocity 4.4 m/s,MG 47 mmHg, ).     5 - Pulmonary hypertension. The estimated PA systolic pressure is 54 mmHg.     6 - Mild tricuspid regurgitation.     7 - Trivial pericardial effusion.     8 - Pleural effusion.         Paroxysmal atrial fibrillation    - Hold anticoagulation   - INR 1.5 > 1.8 > 2.2 > 2.3>>>3.3 on admission         Renal/   Hypernatremia    - Na 166 > 159  - increased enteral water to 400 q4h  - check Na BID        HARISH (acute kidney injury)    - Cr 1.2 > 1.3 > 1.4 > 1.5 > 1.2  - monitor labs   - NS @ 75 d/c        Endocrine   Acquired hypothyroidism    - TSH 18.347, free T4 0.67 > 0.75 > 0.70  - Continue synthroid         Type 2 diabetes mellitus with neurologic complication, without long-term current use of insulin    - SSI  with accu checks             Prophylaxis:  Venous Thromboembolism: mechanical chemical  Stress Ulcer: H2B  Ventilator Pneumonia: yes     Activity Orders          None        Full Code    Sharyn Contreras PA-C  Neurocritical Care  [facility]

## 2018-08-02 NOTE — LOPA/MORA/SWTA/AOC/AEB
KAYDEN will continue to follow. Please notify KAYDEN for plans for brain death studies and/ or time of death. Please do not discuss donation with family as it is not the appropriate time and candidacy will be evaluated up until approach conversation is had. If family mentions donation, please let them know that someone can come speak with them at their request.    Thank you.

## 2018-08-02 NOTE — PROCEDURES
DATE OF PROCEDURE:  07/31/2018 and 08/01/2018 and 08/02/2018    ICU EEG/VIDEO MONITORING REPORT    METHODOLOGY:  Electroencephalographic (EEG) is recorded with electrodes placed   according to the International 10-20 placement system.  Thirty two (32) channels   of digital signal (sampling rate of 512/sec), including T1 and T2, were   simultaneously recorded from the scalp and may include EKG, EMG, and/or eye   monitors.  Recording band pass was 0.1 to 512 Hz.  Digital video recording of   the patient is simultaneously recorded with the EEG.  The patient is instructed   to report clinical symptoms which may occur during the recording session.  EEG   and video recording are stored and archived in digital format.  Activation   procedures, which include photic stimulation, hyperventilation and instructing   patients to perform simple tasks, are done in selected patients.    The EEG is displayed on a monitor screen and can be reviewed using different   montages.  Computer-assisted analysis is employed to detect spike and   electrographic seizure activity.  The entire record is submitted for computer   analysis.  The entire recording is visually reviewed, and the times identified   by computer analysis as being spikes or seizures are reviewed again.    Compressed spectral analysis (CSA) is also performed on the activity recorded   from each individual channel.  This is displayed as a power display of   frequencies from 0 to 30 Hz over time.  The CSA is reviewed looking for   asymmetries in power between homologous areas of the scalp, then compared with   the original EEG recording.    The [employer] software was also utilized in the review of this study.  This software   suite analyzes the EEG recording in multiple domains.  Coherence and rhythmicity   are computed to identify EEG sections which may contain organized seizures.    Each channel undergoes analysis to detect the presence of spike and sharp waves   which have  special and morphological characteristics of epileptic activity.  The   routine EEG recording is converted from special into frequency domain.  This is   then displayed comparing homologous areas to identify areas of significant   asymmetry.  Algorithm to identify non-cortically generated artifact is used to   separate artifact from the EEG.    RECORDING TIMES:  Start on 07/31/2018 at 1406  Stop on 08/02/2018 at 1600  The total duration of this portion of the record is 50 hours and 45 minutes.    EEG FINDINGS:  This record consists of profoundly suppressed cerebral activity   globally with essentially no brain wave activity detectable in any portion of   the record above 3 microvolts in amplitude.  There is background noise   electrically from the ICU as well as other artifacts, which can be seen during   the record at times and there is a possibility of a very low voltage beta   activity, which could be of cerebral origin in the order of 2 microvolts seen   diffusely at times, but this is practically indistinguishable from the ambient   electrical noise in the room.    There were no normal EEG findings.  There were no seizures.  There were no   asymmetries or focal findings.    IMPRESSION:  Profoundly abnormal EEG due to near total suppression of   electrocortical activity in this setting with only artifact and possibly   extremely low voltage beta activity of cerebral origin present.  Results suggest   a severe encephalopathy and in the absence of sedation would portend a poor   prognosis for meaningful recovery in general.      NBB/ISSA  dd: 08/02/2018 14:40:28 (CDT)  td: 08/02/2018 14:52:01 (CDT)  Doc ID   #[id number]  Job ID #[id number]    CC:

## 2018-08-02 NOTE — PLAN OF CARE
ICU Attending Note  Neurocritical Care    cEEG largely flat    [id number]    -modafanil per Dr Riddle  -MAP >65  -increase free water 400 mL q4h  -furosemide 40 mg IV q12h x4  -q12h Na  -HGB >7  -SSI  -levothyroxine  -TF  -prophylaxis

## 2018-08-02 NOTE — PLAN OF CARE
Problem: Patient Care Overview  Goal: Plan of Care Review  Outcome: Ongoing (interventions implemented as appropriate)  POC reviewed with pt and family at 1400. Patient's family verbalized understanding. Questions and concerns addressed. Pt is EEG still not showing activity. Will continue to monitor. See flowsheets for full assessment and VS info.

## 2018-08-02 NOTE — SUBJECTIVE & OBJECTIVE
Review of Systems   Unable to obtain a complete ROS due to level of consciousness and intubated  Objective:     Vitals:  Temp: 99.5 °F (37.5 °C)  Pulse: 91  Rhythm: sinus tachycardia  BP: 135/75  CI (L/min/m2): 1.6 L/min/m2  SVI: 18  SVV: 3 %  Resp: 20  SpO2: 100 %  Oxygen Concentration (%): 40  O2 Device (Oxygen Therapy): ventilator  Vent Mode: A/C  Set Rate: 12 bmp  Vt Set: 450 mL  Pressure Support: 0 cmH20  PEEP/CPAP: 5 cmH20  Peak Airway Pressure: 24 cmH2O  Mean Airway Pressure: 9.7 cmH20  Plateau Pressure: 19 cmH20    Temp  Min: 98.6 °F (37 °C)  Max: 100.2 °F (37.9 °C)  Pulse  Min: 89  Max: 104  BP  Min: 125/75  Max: 135/75  CI (L/min/m2)  Min: 1.6 L/min/m2  Max: 1.6 L/min/m2  SVI  Min: 16  Max: 18  SVV  Min: 3 %  Max: 3 %  Resp  Min: 13  Max: 20  SpO2  Min: 95 %  Max: 100 %  Oxygen Concentration (%)  Min: 40  Max: 40    08/01 0701 - 08/02 0700  In: 1715 [I.V.:555]  Out: 4090 [Urine:4090]   Unmeasured Output  Stool Occurrence: 1       Physical Exam    Physical Exam:  GA: comfortable, no acute distress.   HEENT: No scleral icterus or JVD.   Pulmonary: Clear to auscultation Anteriorly. No wheezing, crackles, or rhonchi.  Cardiac: systolic murmur  Abdominal: Bowel sounds present x 4. No appreciable hepatosplenomegaly.  Skin: No jaundice, rashes, or visible lesions.  Neuro:  --GCS: E2 Vt M2  --Mental Status:  Eyes closed, intubated, doesn't follow commands  --Pupils 4mm, PERRL.   --Corneal reflex, gag, cough intact.  --upper extremities extensor posturing  --lower extremities triple flexion  Unable to test orientation, language, memory, judgment, insight, fund of knowledge, hearing, shoulder shrug, tongue protrusion, coordination, gait due to level of consciousness.    Medications:  Continuous Scheduled  aspirin 81 mg Daily   chlorhexidine 15 mL BID   famotidine 20 mg Daily   heparin (porcine) 5,000 Units Q8H   levothyroxine 65 mcg Daily   modafinil 200 mg BID   multivitamin 1 tablet Daily   phytonadione 10 mg  Daily   senna-docusate 8.6-50 mg 1 tablet Daily   silodosin 4 mg Daily   thiamine 100 mg Daily   PRN  albuterol-ipratropium 3 mL Q6H PRN   dextrose 50% 12.5 g PRN   fentaNYL 50 mcg Q4H PRN   furosemide 40 mg BID PRN   glucagon (human recombinant) 1 mg PRN   insulin aspart U-100 0-5 Units Q4H PRN     Today I personally reviewed pertinent medications, lines/drains/airways, imaging, cardiology, lab results, microbiology results,     Diet  Diet NPO  Diet NPO  Tube feeds

## 2018-08-02 NOTE — ASSESSMENT & PLAN NOTE
- EEG - 7/30 = very low voltage with no electrocortical activity indicating a very marked diffuse cortical dysfuction  8/2 = epilepsy states EEG continues to remain flat, recommend d/c EEG for now per epilepsy  - EEG d/c  - Propofol gtt d/c

## 2018-08-02 NOTE — NURSING
1613H- Family decided to changed code status to DNR, Patients current GCS is 5 E2V1M2/ Update was given to KAYDEN.

## 2018-08-02 NOTE — ASSESSMENT & PLAN NOTE
- echo 7/23/18  CONCLUSIONS     1 - Mild biatrial enlargement.     2 - Severely depressed left ventricular systolic function (EF 15%).     3 - Right ventricular enlargement with mildly depressed systolic function.     4 - Severe aortic valve stenosis (JAMES 0.61 cm2, AVAi 0.32 cm2/m2, peak aortic jet velocity 4.4 m/s,MG 47 mmHg, ).     5 - Pulmonary hypertension. The estimated PA systolic pressure is 54 mmHg.     6 - Mild tricuspid regurgitation.     7 - Trivial pericardial effusion.     8 - Pleural effusion.

## 2018-08-02 NOTE — PROGRESS NOTES
" [facility]  Adult Nutrition  Progress Note    SUMMARY       Recommendations    Recommendation/Intervention:  Continue Glucerna 1.5 @ 45mL/hr.   - Provides 1620kcals, 89g protein and 820mL free water.   - Hold for residuals >500mL.     RD to monitor    Goals: Pt to receive >85% EEN and EPN  Nutrition Goal Status: progressing towards goal  Communication of RD Recs: reviewed with RN    Reason for Assessment    Reason for Assessment: RD follow-up  Diagnosis: other (see comments) (cardiac arrest)  Relevant Medical History: hypothyroidism, HF, a fib, T2DM  Interdisciplinary Rounds: attended  General Information Comments: Pt remains intubated. TF re-started s/p lipase and amylase down trending. Tolerating at goal with minimal residuals.  Nutrition Discharge Planning: unable to determine at this time    Nutrition Risk Screen    Nutrition Risk Screen: no indicators present    Nutrition/Diet History    Do you have any cultural, spiritual, Shinto conflicts, given your current situation?: none  Factors Affecting Nutritional Intake: NPO, on mechanical ventilation    Anthropometrics    Temp: 99.5 °F (37.5 °C)  Height Method: Measured  Height: 5' 6" (167.6 cm)  Height (inches): 66 in  Weight Method: Bed Scale  Weight: 79 kg (174 lb 2.6 oz)  Weight (lb): 174.17 lb  Ideal Body Weight (IBW), Female: 130 lb  % Ideal Body Weight, Female (lb): 133.98 lb  BMI (Calculated): 28.2  BMI Grade: 25 - 29.9 - overweight       Lab/Procedures/Meds    Pertinent Labs Reviewed: reviewed  Pertinent Labs Comments: Na 166, K 3.2, Ph 2.8, POCT Glu 148-192  Pertinent Medications Reviewed: reviewed  Pertinent Medications Comments: heparin, MVI, thiamine, lasix, insulin    Physical Findings/Assessment    Overall Physical Appearance: on ventilator support, loss of muscle mass, advanced age, generalized wasting, edematous  Tubes: orogastric tube  Skin: edema, skin tear    Estimated/Assessed Needs    Weight Used For Calorie " Calculations: 79 kg (174 lb 2.6 oz)  Energy Calorie Requirements (kcal): 1553  Energy Need Method: [employer] [place]  Protein Requirements: 95-119g (1.2-1.5g/kg)  Weight Used For Protein Calculations: 79 kg (174 lb 2.6 oz)  Fluid Requirements (mL): 1mL/kcal or per MD     RDA Method (mL): 1553  CHO Requirement: 50% of total kcals      Nutrition Prescription Ordered    Current Diet Order: NPO  Nutrition Order Comments: TF at goal  Current Nutrition Support Formula Ordered: Glucerna 1.5  Current Nutrition Support Rate Ordered: 45 (ml)  Current Nutrition Support Frequency Ordered: mL/hr    Evaluation of Received Nutrient/Fluid Intake    Enteral Calories (kcal): 1620  Enteral Protein (gm): 89  Enteral (Free Water) Fluid (mL): 820  Free Water Flush Fluid (mL): 1000    % Kcal Needs: 104  % Protein Needs: 94    I/O: +I/O, good UOP, LBM 8/2    Energy Calories Required: meeting needs  Protein Required: meeting needs  Fluid Required: other (see comments) (per MD)    Comments: 15mL residuals 8/2  Tolerance: tolerating    % Intake of Estimated Energy Needs: 75 - 100 %  % Meal Intake: NPO    Nutrition Risk    Level of Risk/Frequency of Follow-up:  (f/u 1x/week)     Assessment and Plan    Nutrition Problem  Inadequate protein intake     Related to (etiology):   TF provision      Signs and Symptoms (as evidenced by):   Pt receiving <85% EPN via TF.      Nutrition Diagnosis Status:   Resolved       Monitor and Evaluation    Food and Nutrient Intake: enteral nutrition intake  Food and Nutrient Adminstration: enteral and parenteral nutrition administration  Anthropometric Measurements: weight, weight change, body mass index  Biochemical Data, Medical Tests and Procedures: electrolyte and renal panel, gastrointestinal profile, glucose/endocrine profile, inflammatory profile, lipid profile  Nutrition-Focused Physical Findings: overall appearance     Nutrition Follow-Up    RD Follow-up?: Yes

## 2018-08-02 NOTE — ASSESSMENT & PLAN NOTE
- s/p cardiac arrest  - Cooling protocol goal temp 36  - CTA chest 7/6- 1. Small bilateral pleural effusions with suspected mild pulmonary edema.  2. Right lower lobe 7 mm pulmonary nodule.   3. Small volume perihepatic ascites.

## 2018-08-03 NOTE — PLAN OF CARE
Problem: Patient Care Overview  Goal: Plan of Care Review  Outcome: Ongoing (interventions implemented as appropriate)  POC reviewed with pt and family at 1400. Patient's family verbalized understanding.  No acute events today. Pt is on DNR status, family is planning to do withdrawal of care on 8/4/18. Will continue to monitor. See flowsheets for full assessment and VS info.

## 2018-08-03 NOTE — PLAN OF CARE
Acquired hypothyroidism  Reviewed FT4 level (0.74) and BG levels.     Would increase IV Levothyroxine to 80 mcg, daily.  When patient tolerating PO and decreased concern for absorption, recommend converting to 125mcg PO qD.     Check FT4 on alternate days, and TSH weekly.           Type 2 diabetes mellitus with neurologic complication, without long-term current use of insulin     Only on oral mediation in outpatient setting  a1c 6.0%     BG goal 140-180 while inpatient     Recommend:  Low dose correction  POC q4h while NPO

## 2018-08-03 NOTE — PROGRESS NOTES
[facility]  Neurocritical Care  Progress Note    Admit Date: 7/22/2018  Service Date: 08/03/2018  Length of Stay: 12    Subjective:     Chief Complaint: Cardiac arrest due to respiratory disorder    History of Present Illness: Anum Quick 63 yo female, PMH of severe AS, hypothyroid, urinary retention, chronic sys/carney HF, A fib, SHERI, DM2 who presents to [facility] s/p cardiac arrest. Pt developed shortness of breath on night PTA and it progressed until the day of admit when  brought her to ED.  He reports she was not compliant with her home O2 but is compliant with meds. In ED, pt was found to be hypoxic and soon after went into asystole. CPR performed for 15 min with multiple rounds of epi with ROSC. Pt intubated, started on broad spec abx, heparin gtt, propofol gtt and keppra load. She is being admitted to [facility] for a higher level of care.     Hospital Course: 7/22: admit NCC, cooling protocol, EEG Diffuse slowing no sz, propofol infusion  7/25: Neuro: patient without corneal reflex. With pupillary reflex +, gag reflex +, does not withdraw to pain.  7/29: MRI brain with diffusion restriction involving the bilateral lentiform nuclei, thalami, caudate nuclei, paramedian occipital cortices, and perirolandic cortices. EEG reported as alpha coma.No ictal activity. Severe suppression of brain activity.  CXR: L > R pleural effusions.   7/31: no acute events overnight   8/1: rising wbc, temp trending up. Panculture. Will resume antibiotics if temp spike >38C  8/2: hypernatremic, increased enteral water to 400q4h, 40mg lasix IV q12h for 4 doses, EEG continues to remain w/o seizures per epilepsy, will d/c today, patient made DNR per family's wishes  8/3: continued hypernatremia, Na 163, bolus D5 500 mL, D5 100 mL/h, q6h Na, check UA for hypernatremia, stopped furosemide, family wishes to plan for withdrawal tomorrow        Review of Systems     Unable to obtain a complete ROS due to level of  consciousness and intubated  Objective:     Vitals:  Temp: 99.5 °F (37.5 °C)  Pulse: 81  Rhythm: normal sinus rhythm  CI (L/min/m2): 1.4 L/min/m2  SVI: 18  SVV: 6 %  Resp: 17  SpO2: 100 %  Oxygen Concentration (%): 40  O2 Device (Oxygen Therapy): ventilator  Vent Mode: A/C  Set Rate: 12 bmp  Vt Set: 450 mL  Pressure Support: 0 cmH20  PEEP/CPAP: 5 cmH20  Peak Airway Pressure: 21 cmH2O  Mean Airway Pressure: 9.8 cmH20  Plateau Pressure: 19 cmH20    Temp  Min: 98.8 °F (37.1 °C)  Max: 100 °F (37.8 °C)  Pulse  Min: 81  Max: 101  CI (L/min/m2)  Min: 1.4 L/min/m2  Max: 1.8 L/min/m2  SVI  Min: 16  Max: 20  SVV  Min: 2 %  Max: 6 %  Resp  Min: 12  Max: 24  SpO2  Min: 99 %  Max: 100 %  Oxygen Concentration (%)  Min: 40  Max: 40    08/02 0701 - 08/03 0700  In: 2950 [I.V.:240]  Out: 4415 [Urine:4415]   Unmeasured Output  Stool Occurrence: 1       Physical Exam      Physical Exam:  GA: comfortable, no acute distress.   HEENT: No scleral icterus or JVD.   Pulmonary: Clear to auscultation Anteriorly. No wheezing, crackles, or rhonchi.  Cardiac: systolic murmur  Abdominal: Bowel sounds present x 4. No appreciable hepatosplenomegaly.  Skin: No jaundice, rashes, or visible lesions.  Neuro:  --GCS: E2 Vt M3  --Mental Status:  Eyes closed, intubated, doesn't follow commands  --Pupils 3mm, PERRL.   --Corneal reflex, gag, cough intact.  --right upper extremities extensor posturing  --left upper extremity flexion w/d to noxious stimuli  --lower extremities triple flexion  Unable to test orientation, language, memory, judgment, insight, fund of knowledge, hearing, shoulder shrug, tongue protrusion, coordination, gait due to level of consciousness.    Medications:  Continuous    dextrose 5 % Last Rate: 100 mL/hr at 08/03/18 0901   Scheduled    aspirin 81 mg Daily   chlorhexidine 15 mL BID   famotidine 20 mg Daily   heparin (porcine) 5,000 Units Q8H   levothyroxine 80 mcg Daily   modafinil 200 mg BID   multivitamin 1 tablet Daily    senna-docusate 8.6-50 mg 1 tablet Daily   silodosin 4 mg Daily   thiamine 100 mg Daily   PRN    albuterol-ipratropium 3 mL Q6H PRN   dextrose 50% 12.5 g PRN   fentaNYL 50 mcg Q4H PRN   glucagon (human recombinant) 1 mg PRN   insulin aspart U-100 1-10 Units Q4H PRN     Today I personally reviewed pertinent medications, lines/drains/airways, imaging, cardiology, lab results, microbiology results,     Diet  Diet NPO  Diet NPO  Tube feeds      Assessment/Plan:     Neuro   Status epilepticus    - EEG - 7/30 = very low voltage with no electrocortical activity indicating a very marked diffuse cortical dysfuction  8/2 = epilepsy states EEG continues to remain flat, recommend d/c EEG for now per epilepsy  - EEG d/c  - Propofol gtt d/c  - plan for w/d of care tomorrow per family wishes  - DNR        Pulmonary   * Cardiac arrest due to respiratory disorder    - s/p cardiac arrest  - Cooling protocol goal temp 36  - CTA chest 7/6- 1. Small bilateral pleural effusions with suspected mild pulmonary edema.  2. Right lower lobe 7 mm pulmonary nodule.   3. Small volume perihepatic ascites.        Cardiac/Vascular   Acute on chronic systolic congestive heart failure    - echo 7/23/18  CONCLUSIONS     1 - Mild biatrial enlargement.     2 - Severely depressed left ventricular systolic function (EF 15%).     3 - Right ventricular enlargement with mildly depressed systolic function.     4 - Severe aortic valve stenosis (JAMES 0.61 cm2, AVAi 0.32 cm2/m2, peak aortic jet velocity 4.4 m/s,MG 47 mmHg, ).     5 - Pulmonary hypertension. The estimated PA systolic pressure is 54 mmHg.     6 - Mild tricuspid regurgitation.     7 - Trivial pericardial effusion.     8 - Pleural effusion.         Paroxysmal atrial fibrillation    - Hold anticoagulation   - INR 2.2>1.8>1.5 > 1.8 > 2.2 > 2.3>>>3.3 on admission         Renal/   Hypernatremia    - Na 163>166 > 159  - increased enteral water to 400 q4h  - check Na q6h  - bolus D5 500 mL   - D5 100  mL/h  - check UA for hypernatremia  - d/c furosemide        HARISH (acute kidney injury)    - Cr 1.0>1.2 > 1.3 > 1.4 > 1.5 > 1.2  - monitor labs   - NS @ 75 d/c        Endocrine   Acquired hypothyroidism    - TSH 18.347, free T4 0.67 > 0.75 > 0.70  - Continue synthroid         Type 2 diabetes mellitus with neurologic complication, without long-term current use of insulin    - SSI with accu checks             Prophylaxis:  Venous Thromboembolism: mechanical chemical  Stress Ulcer: H2B  Ventilator Pneumonia: yes     Activity Orders          None        DNR    Sharyn Contreras PA-C  Neurocritical Care  [facility]

## 2018-08-03 NOTE — ASSESSMENT & PLAN NOTE
- EEG - 7/30 = very low voltage with no electrocortical activity indicating a very marked diffuse cortical dysfuction  8/2 = epilepsy states EEG continues to remain flat, recommend d/c EEG for now per epilepsy  - EEG d/c  - Propofol gtt d/c  - plan for w/d of care tomorrow per family wishes  - DNR

## 2018-08-03 NOTE — PROGRESS NOTES
Pt temp 100F core bladder. ROSI Myles aware and liver and kidney enzymes elevated. Kerens removed and will continue to monitor.

## 2018-08-03 NOTE — PLAN OF CARE
Problem: Patient Care Overview  Goal: Plan of Care Review  Outcome: Ongoing (interventions implemented as appropriate)  POC reviewed with pt at 0430. Pt did not verbalize understanding r/t being intubated. Questions and concerns not addressed. No acute events overnight. Pt progressing toward goals. Will continue to monitor. See flowsheets for full assessment and VS info.

## 2018-08-03 NOTE — PLAN OF CARE
ICU Attending Note  Neurocritical Care    [id number]    -bolus D5 500 mL, D5 100 mL/h, q6h Na, check UA for hypernatremia  -stopped furosemide  -plan withdrawal tomorrow  -DNR

## 2018-08-03 NOTE — ASSESSMENT & PLAN NOTE
- Na 163>166 > 159  - increased enteral water to 400 q4h  - check Na q6h  - bolus D5 500 mL   - D5 100 mL/h  - check UA for hypernatremia  - d/c furosemide

## 2018-08-03 NOTE — SUBJECTIVE & OBJECTIVE
Review of Systems     Unable to obtain a complete ROS due to level of consciousness and intubated  Objective:     Vitals:  Temp: 99.5 °F (37.5 °C)  Pulse: 81  Rhythm: normal sinus rhythm  CI (L/min/m2): 1.4 L/min/m2  SVI: 18  SVV: 6 %  Resp: 17  SpO2: 100 %  Oxygen Concentration (%): 40  O2 Device (Oxygen Therapy): ventilator  Vent Mode: A/C  Set Rate: 12 bmp  Vt Set: 450 mL  Pressure Support: 0 cmH20  PEEP/CPAP: 5 cmH20  Peak Airway Pressure: 21 cmH2O  Mean Airway Pressure: 9.8 cmH20  Plateau Pressure: 19 cmH20    Temp  Min: 98.8 °F (37.1 °C)  Max: 100 °F (37.8 °C)  Pulse  Min: 81  Max: 101  CI (L/min/m2)  Min: 1.4 L/min/m2  Max: 1.8 L/min/m2  SVI  Min: 16  Max: 20  SVV  Min: 2 %  Max: 6 %  Resp  Min: 12  Max: 24  SpO2  Min: 99 %  Max: 100 %  Oxygen Concentration (%)  Min: 40  Max: 40    08/02 0701 - 08/03 0700  In: 2950 [I.V.:240]  Out: 4415 [Urine:4415]   Unmeasured Output  Stool Occurrence: 1       Physical Exam      Physical Exam:  GA: comfortable, no acute distress.   HEENT: No scleral icterus or JVD.   Pulmonary: Clear to auscultation Anteriorly. No wheezing, crackles, or rhonchi.  Cardiac: systolic murmur  Abdominal: Bowel sounds present x 4. No appreciable hepatosplenomegaly.  Skin: No jaundice, rashes, or visible lesions.  Neuro:  --GCS: E2 Vt M3  --Mental Status:  Eyes closed, intubated, doesn't follow commands  --Pupils 3mm, PERRL.   --Corneal reflex, gag, cough intact.  --right upper extremities extensor posturing  --left upper extremity flexion w/d to noxious stimuli  --lower extremities triple flexion  Unable to test orientation, language, memory, judgment, insight, fund of knowledge, hearing, shoulder shrug, tongue protrusion, coordination, gait due to level of consciousness.    Medications:  Continuous    dextrose 5 % Last Rate: 100 mL/hr at 08/03/18 0901   Scheduled    aspirin 81 mg Daily   chlorhexidine 15 mL BID   famotidine 20 mg Daily   heparin (porcine) 5,000 Units Q8H   levothyroxine 80  mcg Daily   modafinil 200 mg BID   multivitamin 1 tablet Daily   senna-docusate 8.6-50 mg 1 tablet Daily   silodosin 4 mg Daily   thiamine 100 mg Daily   PRN    albuterol-ipratropium 3 mL Q6H PRN   dextrose 50% 12.5 g PRN   fentaNYL 50 mcg Q4H PRN   glucagon (human recombinant) 1 mg PRN   insulin aspart U-100 1-10 Units Q4H PRN     Today I personally reviewed pertinent medications, lines/drains/airways, imaging, cardiology, lab results, microbiology results,     Diet  Diet NPO  Diet NPO  Tube feeds

## 2018-08-04 PROBLEM — G93.1 ANOXIC BRAIN INJURY: Status: ACTIVE | Noted: 2018-01-01

## 2018-08-04 NOTE — NURSING
1311H- Patient's family requested to delay extubation, Family are still waiting for the patient's brother.

## 2018-08-04 NOTE — PLAN OF CARE
Reviewed FT4 level and BG levels.     Would increase IV Levothyroxine to 80 mcg, daily.  When patient tolerating PO and decreased concern for absorption, recommend converting to 125mcg PO qD.     Check FT4 on alternate days, and TSH weekly.    Goal B-180 mg/dl  Current BG levels at goal    Continue Low dose correction  POC Q4h

## 2018-08-04 NOTE — NURSING
1101H- KAYDEN was updated regarding Extubation and comfort care today after after starting the Fentanyl drip.

## 2018-08-04 NOTE — NURSING
1500H- Patient rhythm is now asystole. Sharyn was notified. [facility] was notified.

## 2018-08-04 NOTE — DISCHARGE SUMMARY
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    Preliminary Cause of Death: anoxic brain injury secondary to cardiac arrest    Secondary Diagnoses:   Active Hospital Problems    Diagnosis  POA    *Cardiac arrest due to respiratory disorder [J98.9, I46.8]  Yes    Anoxic brain injury [G93.1]  Unknown    Hypernatremia [E87.0]  Unknown    Alteration in skin integrity [R23.9]  Yes    Preventive measure [Z29.9]  Not Applicable    Cardiac LV ejection fraction <20% [R94.30]  Yes    Pulmonary hypertension [I27.20]  Yes    Status epilepticus [G40.901]  Yes    Acquired hypothyroidism [E03.9]  Yes    HARISH (acute kidney injury) [N17.9]  Yes    Right foot ulcer [L97.519]  Yes    Acute on chronic systolic congestive heart failure [I50.23]  Yes    Chronic anticoagulation - on rivaroxaban [Z79.01]  Not Applicable     PAF      Paroxysmal atrial fibrillation [I48.0]  Yes    Hyperlipidemia associated with type 2 diabetes mellitus [E11.69, E78.5]  Yes    Type 2 diabetes mellitus with neurologic complication, without long-term current use of insulin [E11.49]  Yes    Severe aortic stenosis [I35.0]  Yes     12-15-17  Severe low flow aortic valve stenosis (JAMES 0.49 cm2, AVAi 0.27 cm2/m2, peak aortic jet velocity 4.0 m/s,MG 48 mmHg).           Resolved Hospital Problems    Diagnosis Date Resolved POA   No resolved problems to display.        Discharged Condition:     HPI:  Anum Quick 63 yo female, PMH of severe AS, hypothyroid, urinary retention, chronic sys/carney HF, A fib, SHERI, DM2 who presents to [facility] s/p cardiac arrest. Pt developed shortness of breath on night PTA and it progressed until the day of admit when  brought her to ED.  He reports she was not compliant with her home O2 but is compliant with meds. In ED, pt was  found to be hypoxic and soon after went into asystole. CPR performed for 15 min with multiple rounds of epi with ROSC. Pt intubated, started on broad spec abx, heparin gtt, propofol gtt and keppra load. She is being admitted to [facility] for a higher level of care.     Hospital/ICU Course:  7/22: admit NCC, cooling protocol, EEG Diffuse slowing no sz, propofol infusion  7/25: Neuro: patient without corneal reflex. With pupillary reflex +, gag reflex +, does not withdraw to pain.  7/29: MRI brain with diffusion restriction involving the bilateral lentiform nuclei, thalami, caudate nuclei, paramedian occipital cortices, and perirolandic cortices. EEG reported as alpha coma.No ictal activity. Severe suppression of brain activity.  CXR: L > R pleural effusions.   7/31: no acute events overnight   8/1: rising wbc, temp trending up. Panculture. Will resume antibiotics if temp spike >38C  8/2: hypernatremic, increased enteral water to 400q4h, 40mg lasix IV q12h for 4 doses, EEG continues to remain w/o seizures per epilepsy, will d/c today, patient made DNR per family's wishes  8/3: continued hypernatremia, Na 163, bolus D5 500 mL, D5 100 mL/h, q6h Na, check UA for hypernatremia, stopped furosemide, family wishes to plan for withdrawal tomorrow  8/4: w/d of care per patient's family wishes      Consultations were held with the family regarding the patient's expected poor prognosis. At the direction of the family, the patient was extubated and measures to ensure the comfort of the patient including, but not limited to, fentanyl as needed for pain. The patient was subsequently declared dead.

## 2018-08-04 NOTE — PROGRESS NOTES
[facility]  Neurocritical Care  Progress Note    Admit Date: 7/22/2018  Service Date: 08/04/2018  Length of Stay: 13    Subjective:     Chief Complaint: Cardiac arrest due to respiratory disorder    Interval History: Opening eyes but no tracking. Appears more uncomfortable. Family is resolved for comfort measures.    Review of Systems: Unable to obtain a complete ROS due to level of consciousness.     Vitals:   Temp: 99.3 °F (37.4 °C)  Pulse: 86  Rhythm: normal sinus rhythm  BP: (!) 160/93  MAP (mmHg): 121  CI (L/min/m2): 1.5 L/min/m2  SVI: 16  SVV: 21 %  Resp: 18  SpO2: 100 %  Oxygen Concentration (%): 45  O2 Device (Oxygen Therapy): ventilator  Vent Mode: A/C  Set Rate: 12 bmp  Vt Set: 450 mL  Pressure Support: 0 cmH20  PEEP/CPAP: 5 cmH20  Peak Airway Pressure: 28 cmH2O  Mean Airway Pressure: 12 cmH20  Plateau Pressure: 19 cmH20    Temp  Min: 97.9 °F (36.6 °C)  Max: 99.3 °F (37.4 °C)  Pulse  Min: 73  Max: 95  BP  Min: 123/89  Max: 170/103  MAP (mmHg)  Min: 103  Max: 129  CI (L/min/m2)  Min: 1.5 L/min/m2  Max: 1.8 L/min/m2  SVI  Min: 16  Max: 24  SVV  Min: 2 %  Max: 21 %  Resp  Min: 12  Max: 23  SpO2  Min: 94 %  Max: 100 %  Oxygen Concentration (%)  Min: 40  Max: 45    08/03 0701 - 08/04 0700  In: 6010.4 [I.V.:2475.4]  Out: 2902 [Urine:2902]   Unmeasured Output  Stool Occurrence: 1     Examination:   Neurologic:  -GCS [id number]    Medications:   Continuous  dextrose 5 % Last Rate: 125 mL/hr at 08/04/18 1001   Scheduled  aspirin 81 mg Daily   chlorhexidine 15 mL BID   famotidine 20 mg Daily   heparin (porcine) 5,000 Units Q8H   levothyroxine 80 mcg Daily   modafinil 200 mg BID   multivitamin 1 tablet Daily   senna-docusate 8.6-50 mg 1 tablet Daily   silodosin 4 mg Daily   thiamine 100 mg Daily   PRN  albuterol-ipratropium 3 mL Q6H PRN   dextrose 50% 12.5 g PRN   fentaNYL 50 mcg Q4H PRN   glucagon (human recombinant) 1 mg PRN   insulin aspart U-100 1-10 Units Q4H PRN      Today I independently  reviewed pertinent medications, lines/drains/airways, lab results, notably:     ISTAT:   Recent Labs  Lab 08/04/18  0355   PH 7.506*   PCO2 38.4   PO2 87   POCSATURATED 97   HCO3 30.3*   BE 7   POCTCO2 31*   SAMPLE ARTERIAL      Chem:   Recent Labs  Lab 08/04/18  0202 08/04/18  0509   * 158*   K 3.3*  --    *  --    CO2 29  --    *  --    BUN 39*  --    CREATININE 0.8  --    ESTGFRAFRICA >60.0  --    EGFRNONAA >60.0  --    CALCIUM 8.2*  --    MG 2.2  --    PHOS 1.7*  --    ANIONGAP 7*  --    PROT 5.4*  --    ALBUMIN 2.3*  --    BILITOT 1.4*  --    ALKPHOS 378*  --    AST 73*  --    ALT 40  --      Heme:   Recent Labs  Lab 08/04/18  0202   WBC 12.22   HGB 9.5*   HCT 31.1*   PLT 91*   INR 1.2     Endo:   Recent Labs  Lab 08/03/18  1146 08/03/18  1545 08/04/18  0809   POCTGLUCOSE 197* 135* 150*      Assessment/Plan:     Neuro   Anoxic brain injury    -from cardiac arrest from AS and chronic systolic CHF  -poor prognosis based on EEG, imaging  -withdrawal of life support, comfort measures only  -fentanyl drip and prn, midazolam prn  -terminal extubation  -DNR          DNR    Griffin Adams MD  Neurocritical Care  [facility]

## 2018-08-04 NOTE — NURSING
[facility] was notified. Patient is a registered eye donor as stated by Shyam Rico from [facility].

## 2018-08-04 NOTE — ASSESSMENT & PLAN NOTE
-from cardiac arrest from AS and chronic systolic CHF  -poor prognosis based on EEG, imaging  -withdrawal of life support, comfort measures only  -fentanyl drip and prn, midazolam prn  -terminal extubation  -DNR

## 2018-08-04 NOTE — CHAPLAIN
Notified of pending extubation by RN.  Met with patient and family at bedside.  Patient appears comfortable.  No distress noted.  Family engaged in meaningful life review.  Appear to be grieving appropriately.  Appear supportive of one another and of patient's spouse.  Spiritual support provided.

## 2018-08-04 NOTE — PLAN OF CARE
Problem: Patient Care Overview  Goal: Plan of Care Review  Outcome: Ongoing (interventions implemented as appropriate)  POC reviewed with Pt @ 0500. Pt unable to verbalize understanding. Questions and concerns unable to be addressed. Pt is non verbal with no family @ BS. No acute events overnight. Pt progressing toward goals as tolerated. Will continue to monitor. See flowsheet for details.

## 2018-08-04 NOTE — NURSING
1535H- Received call from Jocelin from [facility] and stated hold the body in the morgue until they have spoken to the family. Patient's  number was given to Jocelin.

## 2018-08-04 NOTE — SIGNIFICANT EVENT
Comfort care orders initiated today. Called at bedside by RN. I was notified that the monitor showed asystole; confirmed with EKG. I examined Mrs. Quick at bedside and found no radial pulse, no breath sounds bilaterally, and no heart sounds. She was pronounced dead at 3:00 pm. Cause of death due to anoxic brain injury secondary to cardiac arrest.

## 2018-08-06 PROBLEM — G93.1 ANOXIC BRAIN INJURY: Status: ACTIVE | Noted: 2018-08-06

## 2018-08-06 PROBLEM — E87.0 HYPERNATREMIA: Status: ACTIVE | Noted: 2018-08-06

## 2018-08-06 LAB
BACTERIA BLD CULT: NORMAL
BACTERIA BLD CULT: NORMAL

## 2020-01-29 NOTE — PROCEDURES
DATE OF STUDY:  07/26/2018.    EEG #:  [id number].    LOCATION OF SERVICE:  [facility].    REQUESTING PHYSICIAN:  Dr. Owens.    ICU EEG/VIDEO MONITORING REPORT    METHODOLOGY:  Electroencephalographic (EEG) is recorded with electrodes placed   according to the International 10-20 placement system.  Thirty two (32) channels   of digital signal (sampling rate of 512/sec), including T1 and T2, were   simultaneously recorded from the scalp and may include EKG, EMG, and/or eye   monitors.  Recording band pass was 0.1 to 512 Hz.  Digital video recording of   the patient is simultaneously recorded with the EEG.  The patient is instructed   to report clinical symptoms which may occur during the recording session.  EEG   and video recording are stored and archived in digital format.  Activation   procedures, which include photic stimulation, hyperventilation and instructing   patients to perform simple tasks, are done in selected patients  The EEG is displayed on a monitor screen and can be reviewed using different   montages.  Computer-assisted analysis is employed to detect spike and   electrographic seizure activity.   The entire record is submitted for computer   analysis.  The entire recording is visually reviewed, and the times identified   by computer analysis as being spikes or seizures are reviewed again.    Compressed spectral analysis (CSA) is also performed on the activity recorded   from each individual channel.  This is displayed as a power display of   frequencies from 0 to 30 Hz over time.   The CSA is reviewed looking for   asymmetries in power between homologous areas of the scalp, then compared with   the original EEG recording.    Cater to u software was also utilized in the review of this study.  This software   suite analyzes the EEG recording in multiple domains.  Coherence and rhythmicity   are computed to identify EEG sections which may contain organized seizures.    Each channel undergoes analysis to  detect the presence of spike and sharp waves   which have special and morphological characteristics of epileptic activity.  The   routine EEG recording is converted from special into frequency domain.  This is   then displayed comparing homologous areas to identify areas of significant   asymmetry.  Algorithm to identify non-cortically generated artifact is used to   separate artifact from the EEG.      RECORDING TIMES:  Start on 07/26/2018 at 07:00  End on 07/27/2018 at 07:00  A total of 24 hours of EEG monitoring was obtained.    EEG FINDINGS:  Recording was obtained at the patient's bedside in the ICU.  The   patient was comatose and intubated and on a respirator.  Background initially   consisted of fairly rhythmic 8 and 10 Hz alpha, which was noted symmetrically   over both hemispheres and maximal in the mid and frontal region.  Occasionally,   a bifrontal sawtooth shaped single 3 Hz wave was seen in the bifrontal regions   synchronously.  Occasionally, these would be repetitive occurring every 3 to 4   seconds.  There were no lateralized or focal components and there was no clear   epileptic activity recorded.  Rhythmic alpha seen maximal in the frontal region   and was present throughout the recording.    IMPRESSION:  Markedly abnormal EEG with very prominent rhythmic alpha seen more   in the frontal region.  This is a typical pattern of alpha coma, which has been   associated with generalized dysfunction and pathologically with laminar   necrosis.      RR/HN  dd: 07/27/2018 10:16:33 (CDT)  td: 07/27/2018 10:33:28 (CDT)  Doc ID   #[id number]  Job ID #[id number]    CC:    none

## 2021-08-29 NOTE — NURSING
Sheard to the bedside to follow up on neuro exam and order given and initiated to hold the fentanyl and propofol for 30 minutes.  Sheard notified last potassium is 3.1.   Local with sedation 29-Aug-2021

## 2021-10-07 NOTE — PLAN OF CARE
Problem: Physical Therapy Goal  Goal: Physical Therapy Goal  Goals to be met by: 3/5/18    Patient will increase functional independence with mobility by performin. Supine to sit with HOB flat requiring Modified [place].  2. Sit to supine with HOB flat requiring Modified [place].  3.. Rolling to Left and Right with Modified [place].  4. Sit to stand transfer with Modified [place].  5. Bed to chair transfer with Modified [place] using no AD.  6.. Gait  x 150 feet with Supervision using no AD.  7. Ascend/descend 15 stair with out handrails Contact Guard Assistance.    Outcome: Ongoing (interventions implemented as appropriate)    Goals appropriate as above to meet pt functional mobility needs.     Silvia Plasencia, SPT  18       detailed exam

## 2022-12-18 NOTE — PROGRESS NOTES
Progress Note  Nephrology      Consult Requested By: Cal Hollis MD  Reason for Consult: HARISH    SUBJECTIVE:     Review of Systems   Constitutional: Negative for chills and fever.   Respiratory: Negative for cough and shortness of breath.    Cardiovascular: Negative for chest pain and leg swelling.   Gastrointestinal: Negative for nausea.     Patient Active Problem List   Diagnosis    Chronic systolic heart failure    Type 2 diabetes mellitus with neurologic complication, without long-term current use of insulin    Renovascular hypertension    Severe aortic stenosis    Hyperlipidemia associated with type 2 diabetes mellitus    Paroxysmal atrial fibrillation    Coronary artery disease involving native coronary artery of native heart without angina pectoris    Chronic anticoagulation - on rivaroxaban    CKD (chronic kidney disease) stage 3, GFR 30-59 ml/min    Acute on chronic systolic congestive heart failure    Candidal dermatitis    Malnutrition of moderate degree    Venous stasis ulcers    Pancreatic cyst    Pseudomonas infection    Venous stasis of both lower extremities    Cardiorenal syndrome with renal failure    Left atrial enlargement    Pulmonary hypertension due to left ventricular systolic dysfunction    Iron deficiency anemia    Cellulitis of right foot    Right foot ulcer       OBJECTIVE:     Medications:   amiodarone  200 mg Oral Daily    aspirin  81 mg Oral Daily    atorvastatin  80 mg Oral Daily    [START ON 3/24/2018] collagenase   Topical (Top) Every other day    doxycycline  100 mg Oral Q12H    ferrous sulfate  325 mg Oral BID    furosemide  80 mg Intravenous TID    metoprolol tartrate  25 mg Oral BID       Vitals:    03/23/18 2145   BP:    Pulse: 84   Resp: 14   Temp:      I/O last 3 completed shifts:  In: 770 [P.O.:720; I.V.:50]  Out: 7215 [Urine:7215]  Physical Exam   Constitutional: She is oriented to person, place, and time. She appears well-developed and  well-nourished. No distress.   HENT:   Head: Normocephalic and atraumatic.   Mouth/Throat: Oropharynx is clear and moist.   Eyes: EOM are normal. No scleral icterus.   Neck: Normal range of motion.   Cardiovascular: Normal rate and regular rhythm.  Exam reveals no friction rub.    Murmur heard.  Pulmonary/Chest: Effort normal. She has decreased breath sounds in the right lower field and the left lower field. She has no rales.   Abdominal: Soft. Bowel sounds are normal. She exhibits no distension. There is no tenderness.   Musculoskeletal: Normal range of motion. She exhibits edema (1+ pitting BLE).   Lymphadenopathy:     She has no cervical adenopathy.   Neurological: She is alert and oriented to person, place, and time.   Skin: Skin is warm and dry. No erythema.   Psychiatric: Thought content normal.     Laboratory:    Recent Labs  Lab 03/21/18  1136 03/22/18  0603 03/23/18  0505   WBC 12.38 10.76 8.24   HGB 8.2* 7.9* 7.9*   HCT 28.8* 25.7* 25.7*    237 242   MONO 5.9  0.7 5.2  0.6 6.7  0.6       Recent Labs  Lab 03/21/18  1136 03/22/18  0553 03/23/18  0505    140 139   K 4.7 4.5 3.2*   CL 98 100 99   CO2 5* 18* 28   BUN 63* 83* 80*   CREATININE 2.7* 2.9* 2.1*   CALCIUM 8.9 8.2* 7.9*   PHOS 7.7* 6.4* 3.7     Labs reviewed  Diagnostic Results:  X-Ray: Reviewed  US: Reviewed  Echo: Reviewed      ASSESSMENT/PLAN:   1. HARISH on CKD3 Baseline creatinine- Baseline cr 4 weeks ago and before was ~ 1.4, but then become to worsen on 2/24/18 to 1.6 and on 3/9/18 to 2.3        Avoid nephrotoxins, dose all meds ad for GFr < 10  No indication for urgent RRT. Monitor closely. Strict ins/outs, daily weight     2. Acute on Chronic SHF with previous EF 30-35% (in 2017) and severe AS - continue LAsix, -800 cc/hour, decrease to 40 IV BID     3. Metabolic acidosis - lactic acidosis - improving, monitor     4. Hypomagnesemia/hypokalemia - replace IV, monitor daily while on IV diuretics     5. Hypotension/cardiogenic  shock - much better, off levophed     6. Severe anemia of CKD+ SHERI - start daily venofer, outpt work up with GI      Recent Labs  Lab 03/21/18  1136 03/22/18  0603 03/23/18  0505   WBC 12.38 10.76 8.24   HGB 8.2* 7.9* 7.9*   HCT 28.8* 25.7* 25.7*    237 242     Lab Results   Component Value Date    IRON 14 (L) 03/23/2018    TIBC 408 03/23/2018    FERRITIN 37 03/23/2018       7. Deconditioning PT/OT - recommending SNF       Thank you for allowing me to participate in the care of your patients  With any question please call [phone]  Shraddha Barr    [facility]  SULEIMAN Ferro MD, DON MCGINNIS MD,   MD EMILY Kelly, NP  [address]  [place], [id number]  [phone]         .

## 2024-05-28 NOTE — TELEPHONE ENCOUNTER
, Just MEAGAN.Instructed both the pt,her ,as well as the  nurse visiting the home, that  said for the pt to continue holding off on taking the Lisinopril and that he would like to see the pt in Clinic with in the next 2-3 days.Scheduled the pt for tomorrow 5/1/18 but the pt's  said that he would prefer to schedule her to see a Cardiologist at the [facility] b/c it is much closer to the house.I did tell him that  does not go to [facility] so this would be a different Cardiologist.Pt.  said that would be ok.when I tried to schedule him he then requested the appt be around 4 PM so he could bring her after he got off from work.I did not see an appt available this week for that time .Gave the pt the office # to call [facility] directly to arrange the appt.   66

## 2024-11-26 NOTE — ASSESSMENT & PLAN NOTE
CKD (chronic kidney disease) stage 3, GFR 30-59 ml/min  Worsening, may be due to damage from cardiogenic shock.  Consult Nephrology.     Yes